# Patient Record
Sex: FEMALE | Race: OTHER | HISPANIC OR LATINO | URBAN - METROPOLITAN AREA
[De-identification: names, ages, dates, MRNs, and addresses within clinical notes are randomized per-mention and may not be internally consistent; named-entity substitution may affect disease eponyms.]

---

## 2021-10-25 ENCOUNTER — EMERGENCY (EMERGENCY)
Facility: HOSPITAL | Age: 56
LOS: 0 days | Discharge: HOME | End: 2021-10-25
Attending: EMERGENCY MEDICINE | Admitting: EMERGENCY MEDICINE
Payer: MEDICAID

## 2021-10-25 VITALS
TEMPERATURE: 99 F | SYSTOLIC BLOOD PRESSURE: 146 MMHG | HEART RATE: 86 BPM | WEIGHT: 160.06 LBS | OXYGEN SATURATION: 100 % | RESPIRATION RATE: 20 BRPM | DIASTOLIC BLOOD PRESSURE: 66 MMHG

## 2021-10-25 DIAGNOSIS — L97.429 NON-PRESSURE CHRONIC ULCER OF LEFT HEEL AND MIDFOOT WITH UNSPECIFIED SEVERITY: ICD-10-CM

## 2021-10-25 DIAGNOSIS — I12.9 HYPERTENSIVE CHRONIC KIDNEY DISEASE WITH STAGE 1 THROUGH STAGE 4 CHRONIC KIDNEY DISEASE, OR UNSPECIFIED CHRONIC KIDNEY DISEASE: ICD-10-CM

## 2021-10-25 DIAGNOSIS — E11.22 TYPE 2 DIABETES MELLITUS WITH DIABETIC CHRONIC KIDNEY DISEASE: ICD-10-CM

## 2021-10-25 DIAGNOSIS — M79.662 PAIN IN LEFT LOWER LEG: ICD-10-CM

## 2021-10-25 DIAGNOSIS — I77.1 STRICTURE OF ARTERY: ICD-10-CM

## 2021-10-25 DIAGNOSIS — F17.200 NICOTINE DEPENDENCE, UNSPECIFIED, UNCOMPLICATED: ICD-10-CM

## 2021-10-25 DIAGNOSIS — N18.9 CHRONIC KIDNEY DISEASE, UNSPECIFIED: ICD-10-CM

## 2021-10-25 LAB
ANION GAP SERPL CALC-SCNC: 14 MMOL/L — SIGNIFICANT CHANGE UP (ref 7–14)
APTT BLD: 33.4 SEC — SIGNIFICANT CHANGE UP (ref 27–39.2)
BASOPHILS # BLD AUTO: 0.03 K/UL — SIGNIFICANT CHANGE UP (ref 0–0.2)
BASOPHILS NFR BLD AUTO: 0.3 % — SIGNIFICANT CHANGE UP (ref 0–1)
BUN SERPL-MCNC: 45 MG/DL — HIGH (ref 10–20)
CALCIUM SERPL-MCNC: 8.5 MG/DL — SIGNIFICANT CHANGE UP (ref 8.5–10.1)
CHLORIDE SERPL-SCNC: 110 MMOL/L — SIGNIFICANT CHANGE UP (ref 98–110)
CO2 SERPL-SCNC: 17 MMOL/L — SIGNIFICANT CHANGE UP (ref 17–32)
CREAT SERPL-MCNC: 2.4 MG/DL — HIGH (ref 0.7–1.5)
EOSINOPHIL # BLD AUTO: 0.59 K/UL — SIGNIFICANT CHANGE UP (ref 0–0.7)
EOSINOPHIL NFR BLD AUTO: 6.4 % — SIGNIFICANT CHANGE UP (ref 0–8)
GLUCOSE SERPL-MCNC: 140 MG/DL — HIGH (ref 70–99)
HCT VFR BLD CALC: 29.8 % — LOW (ref 37–47)
HGB BLD-MCNC: 9.4 G/DL — LOW (ref 12–16)
IMM GRANULOCYTES NFR BLD AUTO: 0.4 % — HIGH (ref 0.1–0.3)
INR BLD: 0.96 RATIO — SIGNIFICANT CHANGE UP (ref 0.65–1.3)
LYMPHOCYTES # BLD AUTO: 2.22 K/UL — SIGNIFICANT CHANGE UP (ref 1.2–3.4)
LYMPHOCYTES # BLD AUTO: 24.2 % — SIGNIFICANT CHANGE UP (ref 20.5–51.1)
MCHC RBC-ENTMCNC: 27.6 PG — SIGNIFICANT CHANGE UP (ref 27–31)
MCHC RBC-ENTMCNC: 31.5 G/DL — LOW (ref 32–37)
MCV RBC AUTO: 87.4 FL — SIGNIFICANT CHANGE UP (ref 81–99)
MONOCYTES # BLD AUTO: 0.6 K/UL — SIGNIFICANT CHANGE UP (ref 0.1–0.6)
MONOCYTES NFR BLD AUTO: 6.5 % — SIGNIFICANT CHANGE UP (ref 1.7–9.3)
NEUTROPHILS # BLD AUTO: 5.69 K/UL — SIGNIFICANT CHANGE UP (ref 1.4–6.5)
NEUTROPHILS NFR BLD AUTO: 62.2 % — SIGNIFICANT CHANGE UP (ref 42.2–75.2)
NRBC # BLD: 0 /100 WBCS — SIGNIFICANT CHANGE UP (ref 0–0)
PLATELET # BLD AUTO: 239 K/UL — SIGNIFICANT CHANGE UP (ref 130–400)
POTASSIUM SERPL-MCNC: 5 MMOL/L — SIGNIFICANT CHANGE UP (ref 3.5–5)
POTASSIUM SERPL-SCNC: 5 MMOL/L — SIGNIFICANT CHANGE UP (ref 3.5–5)
PROTHROM AB SERPL-ACNC: 11 SEC — SIGNIFICANT CHANGE UP (ref 9.95–12.87)
RBC # BLD: 3.41 M/UL — LOW (ref 4.2–5.4)
RBC # FLD: 13.6 % — SIGNIFICANT CHANGE UP (ref 11.5–14.5)
SODIUM SERPL-SCNC: 141 MMOL/L — SIGNIFICANT CHANGE UP (ref 135–146)
WBC # BLD: 9.17 K/UL — SIGNIFICANT CHANGE UP (ref 4.8–10.8)
WBC # FLD AUTO: 9.17 K/UL — SIGNIFICANT CHANGE UP (ref 4.8–10.8)

## 2021-10-25 PROCEDURE — 93970 EXTREMITY STUDY: CPT | Mod: 26

## 2021-10-25 PROCEDURE — 93926 LOWER EXTREMITY STUDY: CPT | Mod: 26,LT

## 2021-10-25 PROCEDURE — 99285 EMERGENCY DEPT VISIT HI MDM: CPT

## 2021-10-25 NOTE — CONSULT NOTE ADULT - ASSESSMENT
No acute surgical intervention at this time , patient has a chronic heel ulcer , occlusions are chronic  Patient seen and examined by Dr. Sullivan,- patient leg is warm 5/5 motor and sensation (non urgent)   Will schedule patient as outpt lle angiogram possible endovascular revascularization- Nephew was informed to call Dr. Sullivan office on Wednesday speak to Farrah and schedule procedure at that time

## 2021-10-25 NOTE — ED ADULT TRIAGE NOTE - CHIEF COMPLAINT QUOTE
patient sent in by md for decreased pulses to left leg - patient complains of pain to left side- patient needs

## 2021-10-25 NOTE — ED PROVIDER NOTE - NSFOLLOWUPINSTRUCTIONS_ED_ALL_ED_FT
WE FOUND ARTERIAL OCCLUSION (BLOCKED/NO BLOOD FLOW) IN YOUR LEFT LEG. YOU WERE SEEN BY THE VASCULAR SURGERY SPECIALIST AND YOU MUST CALL THEIR OFFICE FOR A PROCEDURE TO BE SCHEDULED  RETURN TO THE ER WITH ANY NEW OR WORSENING SYMPTOMS

## 2021-10-25 NOTE — ED PROVIDER NOTE - CLINICAL SUMMARY MEDICAL DECISION MAKING FREE TEXT BOX
57 yo female PMH PVD, smoker, intellectual disability and hearing/speech impairment  sent by her doctor for evaluation of rt leg pain and decreased pulses .  According to patient (  was used to obtain history, but the  had some difficulties communicating with the patient) symptoms are present for over a month, she is her today only because her doctor sent her in.  Denies fever, chills, CP, SOB, abdominal or back pain, no other acute complaints.  Well-appearing, well-nourished female in NAD, PERRL, pink conjunctivae, mmm, nml work of breathing, lungs CTA b/l, RRR, abdomen soft, NT/ND, no CVA or midline spine ttp, + large ulcer of the rt heel without odor or purulent drainage, no surrounding cellulitis, s/p rt 4th toe amputation, foot is warm to the touch without erythema, difficult to palpate pulses, both feet are same color and temp, no calf ttp, no leg edema.  Will get labs, vascular studies /consult and reassess. 57 yo female PMH DM, CKD, PVD, smoker, intellectual disability and hearing/speech impairment  sent by her doctor for evaluation of rt leg pain and decreased pulses .  According to patient (  was used to obtain history, but the  had some difficulties communicating with the patient) symptoms are present for over a month, she is her today only because her doctor sent her in.  Denies fever, chills, CP, SOB, abdominal or back pain, no other acute complaints.  Well-appearing, well-nourished female in NAD, PERRL, pink conjunctivae, mmm, nml work of breathing, lungs CTA b/l, RRR, abdomen soft, NT/ND, no CVA or midline spine ttp, + large ulcer of the rt heel without odor or purulent drainage, no surrounding cellulitis, s/p rt 4th toe amputation, foot is warm to the touch without erythema, difficult to palpate pulses, both feet are same color and temp, no calf ttp, no leg edema.  Will get labs, vascular studies /consult and reassess.

## 2021-10-25 NOTE — ED ADULT NURSE NOTE - NSIMPLEMENTINTERV_GEN_ALL_ED
Implemented All Universal Safety Interventions:  [place] to call system. Call bell, personal items and telephone within reach. Instruct patient to call for assistance. Room bathroom lighting operational. Non-slip footwear when patient is off stretcher. Physically safe environment: no spills, clutter or unnecessary equipment. Stretcher in lowest position, wheels locked, appropriate side rails in place.

## 2021-10-25 NOTE — ED PROVIDER NOTE - OBJECTIVE STATEMENT
pt with pmhx HTN, DM, CKD, PAD/PVD with left toe amputation, active smoker, CVA presents to ED for further eval of left leg pulseless and heel ulcer for weeks. Denies fever/chill/HA/dizziness/chest pain/palpitation/sob/abd pain/n/v/d/ black stool/bloody stool/urinary sxs

## 2021-10-25 NOTE — ED PROVIDER NOTE - CARE PROVIDER_API CALL
Augustin Sullivan)  Surgery; Vascular Surgery  [address] [place] [address]  Phone: [phone]  Fax: [phone]  Follow Up Time:

## 2021-10-25 NOTE — ED PROVIDER NOTE - PATIENT PORTAL LINK FT
You can access the FollowMyHealth Patient Portal offered by [facility] by registering at the following website: [email or web address]. By joining [employer]’s FollowMyHealth portal, you will also be able to view your health information using other applications (apps) compatible with our system.

## 2021-10-25 NOTE — ED ADULT NURSE NOTE - OBJECTIVE STATEMENT
Pt presents to the Ed for decreases pulses in the lower leg bilaterally. Pt complains of pain to the left leg x3 days.     Pt is alert and oriented x3. No acute s/s of respiratory distress. Pt is ambulatory with walker. Skin is intact.

## 2021-10-25 NOTE — ED PROVIDER NOTE - PHYSICAL EXAMINATION
CONSTITUTIONAL: Well-appearing; well-nourished; in no apparent distress.   MS: pulseless, dusky, but warm LLE; Normal ROM in all four extremities; non-tender to palpation; distal pulses are normal RLE  SKIN: medial heel ulcerated likely chronic 2/2 PAD; Normal for age and race; warm; dry; good turgor; no apparent lesions or exudate.   NEURO/PSYCH: A & O x 4; grossly unremarkable. mood and manner are appropriate.

## 2021-10-25 NOTE — ED PROVIDER NOTE - PROGRESS NOTE DETAILS
per vasc, chronic and no need for acute intervention/admission; will f/u outpt for angio Patient cleared by vascular surgery for outpatient management of chronic occlusions.  Patient to be discharged from ED. Any available test results were discussed with patient and/or family. Verbal instructions given, including instructions to return to ED immediately for any new, worsening, or concerning symptoms. Patient endorsed understanding. Written discharge instructions additionally given, including follow-up plan.  Patient was given opportunity to ask questions.

## 2021-10-25 NOTE — ED PROVIDER NOTE - ATTENDING CONTRIBUTION TO CARE
57 yo female PMH PVD, smoker, intellectual disability and hearing/speech impairment  sent by her doctor for evaluation of rt leg pain and decreased pulses .  According to patient (  was used to obtain history, but the  had some difficulties communicating with the patient) symptoms are present for over a month, she is her today only because her doctor sent her in.  Denies fever, chills, CP, SOB, abdominal or back pain, no other acute complaints.  Well-appearing, well-nourished female in NAD, PERRL, pink conjunctivae, mmm, nml work of breathing, lungs CTA b/l, RRR, abdomen soft, NT/ND, no CVA or midline spine ttp, + large ulcer of the rt heel without odor or purulent drainage, no surrounding cellulitis, s/p rt 4th toe amputation, foot is warm to the touch without erythema, difficult to palpate pulses, both feet are same color and temp, no calf ttp, no leg edema.  Will get labs, vascular studies /consult and reassess. 55 yo female PMH DM, CKD, PVD, smoker, intellectual disability and hearing/speech impairment  sent by her doctor for evaluation of rt leg pain and decreased pulses .  According to patient (  was used to obtain history, but the  had some difficulties communicating with the patient) symptoms are present for over a month, she is her today only because her doctor sent her in.  Denies fever, chills, CP, SOB, abdominal or back pain, no other acute complaints.  Well-appearing, well-nourished female in NAD, PERRL, pink conjunctivae, mmm, nml work of breathing, lungs CTA b/l, RRR, abdomen soft, NT/ND, no CVA or midline spine ttp, + large ulcer of the rt heel without odor or purulent drainage, no surrounding cellulitis, s/p rt 4th toe amputation, foot is warm to the touch without erythema, difficult to palpate pulses, both feet are same color and temp, no calf ttp, no leg edema.  Will get labs, vascular studies /consult and reassess.

## 2021-10-25 NOTE — CONSULT NOTE ADULT - SUBJECTIVE AND OBJECTIVE BOX
55 yo female PMH PVD, smoker, intellectual disability and hearing/speech impairment  sent by her doctor for evaluation of rt leg pain and decreased pulses .  According to patient (  was used to obtain history, but the  had some difficulties communicating with the patient) symptoms are present for over a month, she is her today only because her doctor sent her in.  Denies fever, chills, CP, SOB, abdominal or back pain, no other acute complaints.    Past Medical History/ Surgical History:      Arterial occlusion    DM     PVD       Allergies:No Known Allergies    Medications:    Physical Exam:  Vitals:T(C): 37.1 (10-25-21 @ 16:40), Max: 37.1 (10-25-21 @ 16:40)  HR: 86 (10-25-21 @ 16:40) (86 - 86)  BP: 146/66 (10-25-21 @ 16:40) (146/66 - 146/66)  RR: 20 (10-25-21 @ 16:40) (20 - 20)  SpO2: 100% (10-25-21 @ 16:40) (100% - 100%)    General: Alert and oriented times 3 , Not in acute distress   Heart: Regular rate and rhythm , no rubs murmurs or gallops  Lungs: Clear to auscultation , no wheezes , rales rhonci or adventicious breath sounds  Abdomen: Soft , positive bowel sounds, non tender, non distended, no peritoneal signs  Extemities: , warm, capillary refill, no swelling , no edema, good motor and sensation 5/5 , absent pulses below theknee                 9.4    9.17  )-----------( 239      ( 10-25 @ 18:26 )             29.8                    141   |  110   |  45                 Ca: 8.5    BMP:   ----------------------------< 140    Mg: x     (10-25-21 @ 18:26)             5.0    |  17    | 2.4                Ph: x                PT/INR - ( 25 Oct 2021 18:26 )   PT: 11.00 sec;   INR: 0.96 ratio         PTT - ( 25 Oct 2021 18:26 )  PTT:33.4 sec                         57 yo female PMH PVD, smoker, intellectual disability and hearing/speech impairment  sent by her doctor for evaluation of rt leg pain and decreased pulses .  According to patient (  was used to obtain history, but the  had some difficulties communicating with the patient) symptoms are present for over a month, she is her today only because her doctor sent her in.  Denies fever, chills, CP, SOB, abdominal or back pain, no other acute complaints.    Past Medical History/ Surgical History:      Arterial occlusion    DM     PVD       Allergies:No Known Allergies    Medications:    Physical Exam:  Vitals:T(C): 37.1 (10-25-21 @ 16:40), Max: 37.1 (10-25-21 @ 16:40)  HR: 86 (10-25-21 @ 16:40) (86 - 86)  BP: 146/66 (10-25-21 @ 16:40) (146/66 - 146/66)  RR: 20 (10-25-21 @ 16:40) (20 - 20)  SpO2: 100% (10-25-21 @ 16:40) (100% - 100%)    General: Alert and oriented times 3 , Not in acute distress   Heart: Regular rate and rhythm , no rubs murmurs or gallops  Lungs: Clear to auscultation , no wheezes , rales rhonci or adventicious breath sounds  Abdomen: Soft , positive bowel sounds, non tender, non distended, no peritoneal signs  Extemities: , warm, capillary refill, no swelling , no edema, good motor and sensation 5/5 , absent pulses below the knee, chronic heel ulcer lle                  9.4    9.17  )-----------( 239      ( 10-25 @ 18:26 )             29.8                    141   |  110   |  45                 Ca: 8.5    BMP:   ----------------------------< 140    Mg: x     (10-25-21 @ 18:26)             5.0    |  17    | 2.4                Ph: x                PT/INR - ( 25 Oct 2021 18:26 )   PT: 11.00 sec;   INR: 0.96 ratio         PTT - ( 25 Oct 2021 18:26 )  PTT:33.4 sec

## 2021-10-26 PROCEDURE — 99222 1ST HOSP IP/OBS MODERATE 55: CPT

## 2021-10-27 ENCOUNTER — APPOINTMENT (OUTPATIENT)
Dept: VASCULAR SURGERY | Facility: CLINIC | Age: 56
End: 2021-10-27
Payer: MEDICAID

## 2021-10-27 VITALS — HEIGHT: 60 IN | BODY MASS INDEX: 31.41 KG/M2 | WEIGHT: 160 LBS

## 2021-10-27 PROCEDURE — 99214 OFFICE O/P EST MOD 30 MIN: CPT

## 2021-10-27 RX ORDER — CALCITRIOL 0.25 UG/1
0.25 CAPSULE, LIQUID FILLED ORAL
Refills: 0 | Status: ACTIVE | COMMUNITY

## 2021-10-27 RX ORDER — ASPIRIN 81 MG
81 TABLET, DELAYED RELEASE (ENTERIC COATED) ORAL
Refills: 0 | Status: ACTIVE | COMMUNITY

## 2021-10-27 RX ORDER — FAMOTIDINE 20 MG/1
20 TABLET, FILM COATED ORAL
Refills: 0 | Status: DISCONTINUED | COMMUNITY
End: 2021-10-27

## 2021-10-27 RX ORDER — NIFEDIPINE 60 MG
60 TABLET, EXTENDED RELEASE ORAL
Refills: 0 | Status: DISCONTINUED | COMMUNITY
End: 2021-10-27

## 2021-11-01 ENCOUNTER — INPATIENT (INPATIENT)
Facility: HOSPITAL | Age: 56
LOS: 17 days | Discharge: HOME HEALTH | End: 2021-11-19
Attending: SURGERY | Admitting: SURGERY
Payer: MEDICAID

## 2021-11-01 VITALS
TEMPERATURE: 99 F | SYSTOLIC BLOOD PRESSURE: 131 MMHG | RESPIRATION RATE: 16 BRPM | DIASTOLIC BLOOD PRESSURE: 59 MMHG | OXYGEN SATURATION: 99 % | HEART RATE: 79 BPM | WEIGHT: 158.73 LBS

## 2021-11-01 LAB
ALBUMIN SERPL ELPH-MCNC: 4 G/DL — SIGNIFICANT CHANGE UP (ref 3.5–5.2)
ALP SERPL-CCNC: 236 U/L — HIGH (ref 30–115)
ALT FLD-CCNC: 18 U/L — SIGNIFICANT CHANGE UP (ref 0–41)
ANION GAP SERPL CALC-SCNC: 14 MMOL/L — SIGNIFICANT CHANGE UP (ref 7–14)
APTT BLD: 36.6 SEC — SIGNIFICANT CHANGE UP (ref 27–39.2)
AST SERPL-CCNC: 18 U/L — SIGNIFICANT CHANGE UP (ref 0–41)
BASOPHILS # BLD AUTO: 0.05 K/UL — SIGNIFICANT CHANGE UP (ref 0–0.2)
BASOPHILS NFR BLD AUTO: 0.6 % — SIGNIFICANT CHANGE UP (ref 0–1)
BILIRUB SERPL-MCNC: <0.2 MG/DL — SIGNIFICANT CHANGE UP (ref 0.2–1.2)
BLD GP AB SCN SERPL QL: SIGNIFICANT CHANGE UP
BUN SERPL-MCNC: 56 MG/DL — HIGH (ref 10–20)
CALCIUM SERPL-MCNC: 8.7 MG/DL — SIGNIFICANT CHANGE UP (ref 8.5–10.1)
CHLORIDE SERPL-SCNC: 108 MMOL/L — SIGNIFICANT CHANGE UP (ref 98–110)
CO2 SERPL-SCNC: 18 MMOL/L — SIGNIFICANT CHANGE UP (ref 17–32)
CREAT SERPL-MCNC: 2.5 MG/DL — HIGH (ref 0.7–1.5)
EOSINOPHIL # BLD AUTO: 0.52 K/UL — SIGNIFICANT CHANGE UP (ref 0–0.7)
EOSINOPHIL NFR BLD AUTO: 6.6 % — SIGNIFICANT CHANGE UP (ref 0–8)
GLUCOSE BLDC GLUCOMTR-MCNC: 204 MG/DL — HIGH (ref 70–99)
GLUCOSE BLDC GLUCOMTR-MCNC: 287 MG/DL — HIGH (ref 70–99)
GLUCOSE SERPL-MCNC: 144 MG/DL — HIGH (ref 70–99)
HCT VFR BLD CALC: 31.1 % — LOW (ref 37–47)
HGB BLD-MCNC: 10 G/DL — LOW (ref 12–16)
IMM GRANULOCYTES NFR BLD AUTO: 0.3 % — SIGNIFICANT CHANGE UP (ref 0.1–0.3)
INR BLD: 0.93 RATIO — SIGNIFICANT CHANGE UP (ref 0.65–1.3)
LYMPHOCYTES # BLD AUTO: 2.06 K/UL — SIGNIFICANT CHANGE UP (ref 1.2–3.4)
LYMPHOCYTES # BLD AUTO: 26.3 % — SIGNIFICANT CHANGE UP (ref 20.5–51.1)
MCHC RBC-ENTMCNC: 28.5 PG — SIGNIFICANT CHANGE UP (ref 27–31)
MCHC RBC-ENTMCNC: 32.2 G/DL — SIGNIFICANT CHANGE UP (ref 32–37)
MCV RBC AUTO: 88.6 FL — SIGNIFICANT CHANGE UP (ref 81–99)
MONOCYTES # BLD AUTO: 0.51 K/UL — SIGNIFICANT CHANGE UP (ref 0.1–0.6)
MONOCYTES NFR BLD AUTO: 6.5 % — SIGNIFICANT CHANGE UP (ref 1.7–9.3)
NEUTROPHILS # BLD AUTO: 4.66 K/UL — SIGNIFICANT CHANGE UP (ref 1.4–6.5)
NEUTROPHILS NFR BLD AUTO: 59.7 % — SIGNIFICANT CHANGE UP (ref 42.2–75.2)
NRBC # BLD: 0 /100 WBCS — SIGNIFICANT CHANGE UP (ref 0–0)
PLATELET # BLD AUTO: 276 K/UL — SIGNIFICANT CHANGE UP (ref 130–400)
POTASSIUM SERPL-MCNC: 5 MMOL/L — SIGNIFICANT CHANGE UP (ref 3.5–5)
POTASSIUM SERPL-SCNC: 5 MMOL/L — SIGNIFICANT CHANGE UP (ref 3.5–5)
PROT SERPL-MCNC: 7.2 G/DL — SIGNIFICANT CHANGE UP (ref 6–8)
PROTHROM AB SERPL-ACNC: 10.7 SEC — SIGNIFICANT CHANGE UP (ref 9.95–12.87)
RBC # BLD: 3.51 M/UL — LOW (ref 4.2–5.4)
RBC # FLD: 13.2 % — SIGNIFICANT CHANGE UP (ref 11.5–14.5)
SARS-COV-2 RNA SPEC QL NAA+PROBE: SIGNIFICANT CHANGE UP
SODIUM SERPL-SCNC: 140 MMOL/L — SIGNIFICANT CHANGE UP (ref 135–146)
WBC # BLD: 7.82 K/UL — SIGNIFICANT CHANGE UP (ref 4.8–10.8)
WBC # FLD AUTO: 7.82 K/UL — SIGNIFICANT CHANGE UP (ref 4.8–10.8)

## 2021-11-01 PROCEDURE — 76937 US GUIDE VASCULAR ACCESS: CPT | Mod: 26

## 2021-11-01 PROCEDURE — 99285 EMERGENCY DEPT VISIT HI MDM: CPT | Mod: 25

## 2021-11-01 PROCEDURE — 36000 PLACE NEEDLE IN VEIN: CPT

## 2021-11-01 PROCEDURE — 93010 ELECTROCARDIOGRAM REPORT: CPT

## 2021-11-01 PROCEDURE — 71045 X-RAY EXAM CHEST 1 VIEW: CPT | Mod: 26

## 2021-11-01 PROCEDURE — 99223 1ST HOSP IP/OBS HIGH 75: CPT | Mod: GC

## 2021-11-01 PROCEDURE — 99222 1ST HOSP IP/OBS MODERATE 55: CPT

## 2021-11-01 RX ORDER — METOPROLOL TARTRATE 50 MG
25 TABLET ORAL DAILY
Refills: 0 | Status: DISCONTINUED | OUTPATIENT
Start: 2021-11-01 | End: 2021-11-03

## 2021-11-01 RX ORDER — GABAPENTIN 400 MG/1
300 CAPSULE ORAL THREE TIMES A DAY
Refills: 0 | Status: DISCONTINUED | OUTPATIENT
Start: 2021-11-01 | End: 2021-11-03

## 2021-11-01 RX ORDER — PANTOPRAZOLE SODIUM 20 MG/1
40 TABLET, DELAYED RELEASE ORAL
Refills: 0 | Status: DISCONTINUED | OUTPATIENT
Start: 2021-11-01 | End: 2021-11-03

## 2021-11-01 RX ORDER — CALCITRIOL 0.5 UG/1
0.25 CAPSULE ORAL DAILY
Refills: 0 | Status: DISCONTINUED | OUTPATIENT
Start: 2021-11-01 | End: 2021-11-03

## 2021-11-01 RX ORDER — CLOPIDOGREL BISULFATE 75 MG/1
75 TABLET, FILM COATED ORAL DAILY
Refills: 0 | Status: DISCONTINUED | OUTPATIENT
Start: 2021-11-01 | End: 2021-11-03

## 2021-11-01 RX ORDER — LOSARTAN POTASSIUM 100 MG/1
50 TABLET, FILM COATED ORAL DAILY
Refills: 0 | Status: DISCONTINUED | OUTPATIENT
Start: 2021-11-01 | End: 2021-11-03

## 2021-11-01 RX ORDER — ACETAMINOPHEN 500 MG
650 TABLET ORAL EVERY 6 HOURS
Refills: 0 | Status: DISCONTINUED | OUTPATIENT
Start: 2021-11-01 | End: 2021-11-03

## 2021-11-01 RX ORDER — ATORVASTATIN CALCIUM 80 MG/1
80 TABLET, FILM COATED ORAL AT BEDTIME
Refills: 0 | Status: DISCONTINUED | OUTPATIENT
Start: 2021-11-01 | End: 2021-11-03

## 2021-11-01 RX ORDER — ASPIRIN/CALCIUM CARB/MAGNESIUM 324 MG
81 TABLET ORAL DAILY
Refills: 0 | Status: DISCONTINUED | OUTPATIENT
Start: 2021-11-01 | End: 2021-11-03

## 2021-11-01 RX ORDER — HEPARIN SODIUM 5000 [USP'U]/ML
5000 INJECTION INTRAVENOUS; SUBCUTANEOUS EVERY 8 HOURS
Refills: 0 | Status: DISCONTINUED | OUTPATIENT
Start: 2021-11-01 | End: 2021-11-03

## 2021-11-01 RX ADMIN — GABAPENTIN 300 MILLIGRAM(S): 400 CAPSULE ORAL at 21:39

## 2021-11-01 RX ADMIN — ATORVASTATIN CALCIUM 80 MILLIGRAM(S): 80 TABLET, FILM COATED ORAL at 21:39

## 2021-11-01 RX ADMIN — HEPARIN SODIUM 5000 UNIT(S): 5000 INJECTION INTRAVENOUS; SUBCUTANEOUS at 21:40

## 2021-11-01 RX ADMIN — Medication 650 MILLIGRAM(S): at 18:08

## 2021-11-01 RX ADMIN — Medication 650 MILLIGRAM(S): at 18:41

## 2021-11-01 RX ADMIN — Medication 650 MILLIGRAM(S): at 23:18

## 2021-11-01 NOTE — H&P ADULT - ASSESSMENT
ASSESSMENT:   57 yo female PMH PVD, smoker, HTN, HLD, h/o right pontine CVA (2/7/21), intellectual disability, hearing/speech impairment presents with chronic left heel ulcer and pain. Recent duplex on 10/25/21 showed no significant arterial blood flow visualized beyond the left superficial femoral artery.    PLAN:   - Admit to vascular surgery   - Booked for Left lower extremity angiogram; possible endovascular revascularization on Wednesday 11/3/21  - PreOp pt tomorrow   - Nephrology consulted for CKD3 management  - Podiatry following   - Plan discussed with Attending, Dr. Fink      VASCULAR TEAM SPECTRA: [id number]

## 2021-11-01 NOTE — H&P ADULT - NSICDXPASTMEDICALHX_GEN_ALL_CORE_FT
PAST MEDICAL HISTORY:  Benign essential HTN     Hearing impaired     HLD (hyperlipidemia)     Intellectual disability     PVD (peripheral vascular disease)

## 2021-11-01 NOTE — ED PROVIDER NOTE - PHYSICAL EXAMINATION
CONSTITUTIONAL: NAD  SKIN: warm, dry  HEAD: Normocephalic; atraumatic.  EYES: PERRL, EOMI, no conjunctival erythema  ENT: No nasal discharge; airway clear.  NECK: Supple; non tender.  CARD: S1, S2 normal; no murmurs, gallops, or rubs.   RESP: No wheezes, rales or rhonchi.  ABD: soft ntnd  EXT: Left leg ttp. Pulse diminished. Gangrenous ulcer on heel.   LYMPH: No acute cervical adenopathy.  NEURO: Alert, oriented, grossly unremarkable  PSYCH: Cooperative, appropriate.

## 2021-11-01 NOTE — ED PROVIDER NOTE - ATTENDING CONTRIBUTION TO CARE
55 yo female PMH PVD, smoker, HTN, HLD, h/o right pontine CVA (2/7/21), intellectual disability, hearing/speech impairment presents with chronic left heel ulcer and pain. Pt's son was at bedside to help translate. Pt states the pain is in the entire left leg and can travel up to her back. Pt recently visited the ED pm 10/25/21 for left lower extremity chronic arterial occlusion. Duplex at that time showed No significant arterial blood flow visualized beyond the left superficial femoral artery. Pt was sent to see Dr. Sullivan as an outpatient; at that appointment the pt was told to return to the hospital for an angiogram. Denies fever, chills, CP, SOB, abdominal, HA.   PE:  agree with above.  A/P:  Severe PAD, ADMIT VASCULAR

## 2021-11-01 NOTE — ED PROVIDER NOTE - OBJECTIVE STATEMENT
56 y f, pmh of cva w residual slurred speech, dm, htn, pad, sent by Dr. Sullivan for admission for angiogram. Pt  endorses pain in her left leg, worse in the calf, nonradiating, constant, no all factors, worse w walking, 10/10, associated with ulcer on the foot. Denies f/c, cp, sob, n/v/d, abd pain dysuria

## 2021-11-01 NOTE — H&P ADULT - NSHPLABSRESULTS_GEN_ALL_CORE
LAB/STUDIES:                        10.0   7.82  )-----------( 276      ( 01 Nov 2021 14:30 )             31.1     11-01    140  |  108  |  56<H>  ----------------------------<  144<H>  5.0   |  18  |  2.5<H>    Ca    8.7      01 Nov 2021 14:30    TPro  7.2  /  Alb  4.0  /  TBili  <0.2  /  DBili  x   /  AST  18  /  ALT  18  /  AlkPhos  236<H>  11-01    PT/INR - ( 01 Nov 2021 14:30 )   PT: 10.70 sec;   INR: 0.93 ratio    PTT - ( 01 Nov 2021 14:30 )  PTT:36.6 sec  LIVER FUNCTIONS - ( 01 Nov 2021 14:30 )  Alb: 4.0 g/dL / Pro: 7.2 g/dL / ALK PHOS: 236 U/L / ALT: 18 U/L / AST: 18 U/L / GGT: x           IMAGING:

## 2021-11-01 NOTE — CONSULT NOTE ADULT - SUBJECTIVE AND OBJECTIVE BOX
Podiatry Consult Note    Subjective:  SEN LING is a 56y old  Female who presents with a chief complaint of left heel wound;  HPI:  Pt follows up w/ Dr. Sullivan; Per pt, pt is in ED due to planned LLE angio on Wed 11/3 by Dr. Sullivan; Podiatry was consulted for left heel wound; eval L foot;     Past Medical History and Surgical History  PAST MEDICAL & SURGICAL HISTORY:    Objective:  Vital Signs Last 24 Hrs  T(C): 37 (01 Nov 2021 10:45), Max: 37 (01 Nov 2021 10:45)  T(F): 98.6 (01 Nov 2021 10:45), Max: 98.6 (01 Nov 2021 10:45)  HR: 79 (01 Nov 2021 10:45) (79 - 79)  BP: 131/59 (01 Nov 2021 10:45) (131/59 - 131/59)  BP(mean): --  RR: 16 (01 Nov 2021 10:45) (16 - 16)  SpO2: 99% (01 Nov 2021 10:45) (99% - 99%)                Physical Exam - Lower Extremity Focused:   Derm:   L heel wounds; two  circular wounds, about 2cm x 2cm each; necrotic soft tissue wound base without active bleeding or drainage; no malodor noted;   Vascular: DP and PT Pulses Diminished;   Neuro: Protective Sensation Diminished;    Assessment:  Left heel wounds;     Plan:  Chart reviewed and Patient evaluated. All Questions and Concerns Addressed and Answered  Discussed diagnosis and treatment with patient  Wound Flushed w/ normal saline; Betadine / DSD / Kerlix; q24  Local Wound Care; As Stated Above   Request left foot xray; will f/u w/ result;  Request arterial duplex of left lower extremities;  Request vascular surgery consult; per patient, pt came into ED for Wed 11/3 Left lower extremities angio;   Possible sx intervention from podiatry post vascular intervention on Wed 11/3; will update exact sx procedure and schedule once finalized;   Request medical clearance;  Weight bearing status; WBAT BL feet;   Discussed plan w/ Dr. Llamas    Podiatry ;

## 2021-11-01 NOTE — H&P ADULT - NSHPPHYSICALEXAM_GEN_ALL_CORE
PHYSICAL EXAM  Vital Signs Last 24 Hrs  T(C): 35.7 (01 Nov 2021 15:33), Max: 37 (01 Nov 2021 10:45)  T(F): 96.2 (01 Nov 2021 15:33), Max: 98.6 (01 Nov 2021 10:45)  HR: 67 (01 Nov 2021 15:33) (67 - 79)  BP: 144/69 (01 Nov 2021 15:33) (131/59 - 144/69)  BP(mean): --  RR: 18 (01 Nov 2021 15:33) (16 - 18)  SpO2: 100% (01 Nov 2021 15:33) (99% - 100%)    Appearance: Normal	  HEENT: NCAT, EOMI	  Neck: Supple  Cardiovascular: Normal S1 S2,   Respiratory: Lungs clear to auscultation  Gastrointestinal:  Soft, Non-tender, nondistended   Extremities: LE are warm, LLE chronic heel ulcer; motor and sensation intact    PULSES:  Popliteal: +signals on doppler bilaterally   Dorsal Pedal: diminished signals on doppler bilaterally   Posterior Tibial: no signals appreciated on doppler bilaterally

## 2021-11-01 NOTE — H&P ADULT - NSHPREVIEWOFSYSTEMS_GEN_ALL_CORE
REVIEW OF SYSTEMS:  GENERAL:                                         see above  SKIN:                                                 see above  OPTHALMOLOGIC:                          negative  ENMT:                                               negative  RESPIRATORY AND THORAX:        negative  CARDIOVASCULAR:                         see above  GASTROINTESTINAL:                       negative  NEPHROLOGY:                                  negative  MUSCULOSKELETAL:                       see above  NEUROLOGIC:                                   see above  PSYCHIATRIC:                                    negative  HEMATOLOGY/LYMPHATICS:         negative  ENDOCRINE:                                     negative  ALLERGIC/IMMUNOLOGIC:            negative    12 point ROS otherwise normal except as stated in HPI

## 2021-11-02 ENCOUNTER — TRANSCRIPTION ENCOUNTER (OUTPATIENT)
Age: 56
End: 2021-11-02

## 2021-11-02 LAB
ANION GAP SERPL CALC-SCNC: 14 MMOL/L — SIGNIFICANT CHANGE UP (ref 7–14)
APPEARANCE UR: ABNORMAL
APTT BLD: 38.2 SEC — SIGNIFICANT CHANGE UP (ref 27–39.2)
BACTERIA # UR AUTO: ABNORMAL
BASOPHILS # BLD AUTO: 0.05 K/UL — SIGNIFICANT CHANGE UP (ref 0–0.2)
BASOPHILS NFR BLD AUTO: 0.7 % — SIGNIFICANT CHANGE UP (ref 0–1)
BILIRUB UR-MCNC: NEGATIVE — SIGNIFICANT CHANGE UP
BUN SERPL-MCNC: 56 MG/DL — HIGH (ref 10–20)
CALCIUM SERPL-MCNC: 8.5 MG/DL — SIGNIFICANT CHANGE UP (ref 8.5–10.1)
CHLORIDE SERPL-SCNC: 111 MMOL/L — HIGH (ref 98–110)
CO2 SERPL-SCNC: 15 MMOL/L — LOW (ref 17–32)
COLOR SPEC: YELLOW — SIGNIFICANT CHANGE UP
CREAT SERPL-MCNC: 2.9 MG/DL — HIGH (ref 0.7–1.5)
DIFF PNL FLD: SIGNIFICANT CHANGE UP
EOSINOPHIL # BLD AUTO: 0.48 K/UL — SIGNIFICANT CHANGE UP (ref 0–0.7)
EOSINOPHIL NFR BLD AUTO: 6.6 % — SIGNIFICANT CHANGE UP (ref 0–8)
EPI CELLS # UR: 1 /HPF — SIGNIFICANT CHANGE UP (ref 0–5)
GLUCOSE BLDC GLUCOMTR-MCNC: 142 MG/DL — HIGH (ref 70–99)
GLUCOSE BLDC GLUCOMTR-MCNC: 190 MG/DL — HIGH (ref 70–99)
GLUCOSE BLDC GLUCOMTR-MCNC: 191 MG/DL — HIGH (ref 70–99)
GLUCOSE BLDC GLUCOMTR-MCNC: 209 MG/DL — HIGH (ref 70–99)
GLUCOSE SERPL-MCNC: 208 MG/DL — HIGH (ref 70–99)
GLUCOSE UR QL: NEGATIVE — SIGNIFICANT CHANGE UP
HCT VFR BLD CALC: 31.7 % — LOW (ref 37–47)
HCV AB S/CO SERPL IA: 0.03 COI — SIGNIFICANT CHANGE UP
HCV AB SERPL-IMP: SIGNIFICANT CHANGE UP
HGB BLD-MCNC: 10.1 G/DL — LOW (ref 12–16)
HYALINE CASTS # UR AUTO: 1 /LPF — SIGNIFICANT CHANGE UP (ref 0–7)
IMM GRANULOCYTES NFR BLD AUTO: 0.3 % — SIGNIFICANT CHANGE UP (ref 0.1–0.3)
INR BLD: 0.97 RATIO — SIGNIFICANT CHANGE UP (ref 0.65–1.3)
KETONES UR-MCNC: NEGATIVE — SIGNIFICANT CHANGE UP
LEUKOCYTE ESTERASE UR-ACNC: ABNORMAL
LYMPHOCYTES # BLD AUTO: 2.04 K/UL — SIGNIFICANT CHANGE UP (ref 1.2–3.4)
LYMPHOCYTES # BLD AUTO: 28.1 % — SIGNIFICANT CHANGE UP (ref 20.5–51.1)
MAGNESIUM SERPL-MCNC: 1.9 MG/DL — SIGNIFICANT CHANGE UP (ref 1.8–2.4)
MCHC RBC-ENTMCNC: 28.7 PG — SIGNIFICANT CHANGE UP (ref 27–31)
MCHC RBC-ENTMCNC: 31.9 G/DL — LOW (ref 32–37)
MCV RBC AUTO: 90.1 FL — SIGNIFICANT CHANGE UP (ref 81–99)
MONOCYTES # BLD AUTO: 0.47 K/UL — SIGNIFICANT CHANGE UP (ref 0.1–0.6)
MONOCYTES NFR BLD AUTO: 6.5 % — SIGNIFICANT CHANGE UP (ref 1.7–9.3)
NEUTROPHILS # BLD AUTO: 4.2 K/UL — SIGNIFICANT CHANGE UP (ref 1.4–6.5)
NEUTROPHILS NFR BLD AUTO: 57.8 % — SIGNIFICANT CHANGE UP (ref 42.2–75.2)
NITRITE UR-MCNC: NEGATIVE — SIGNIFICANT CHANGE UP
NRBC # BLD: 0 /100 WBCS — SIGNIFICANT CHANGE UP (ref 0–0)
PH UR: 6 — SIGNIFICANT CHANGE UP (ref 5–8)
PHOSPHATE SERPL-MCNC: 5 MG/DL — HIGH (ref 2.1–4.9)
PLATELET # BLD AUTO: 266 K/UL — SIGNIFICANT CHANGE UP (ref 130–400)
POTASSIUM SERPL-MCNC: 6 MMOL/L — CRITICAL HIGH (ref 3.5–5)
POTASSIUM SERPL-SCNC: 6 MMOL/L — CRITICAL HIGH (ref 3.5–5)
PROT UR-MCNC: ABNORMAL
PROTHROM AB SERPL-ACNC: 11.2 SEC — SIGNIFICANT CHANGE UP (ref 9.95–12.87)
RBC # BLD: 3.52 M/UL — LOW (ref 4.2–5.4)
RBC # FLD: 13.2 % — SIGNIFICANT CHANGE UP (ref 11.5–14.5)
RBC CASTS # UR COMP ASSIST: 1 /HPF — SIGNIFICANT CHANGE UP (ref 0–4)
SODIUM SERPL-SCNC: 140 MMOL/L — SIGNIFICANT CHANGE UP (ref 135–146)
SP GR SPEC: 1.01 — SIGNIFICANT CHANGE UP (ref 1.01–1.03)
UROBILINOGEN FLD QL: SIGNIFICANT CHANGE UP
WBC # BLD: 7.26 K/UL — SIGNIFICANT CHANGE UP (ref 4.8–10.8)
WBC # FLD AUTO: 7.26 K/UL — SIGNIFICANT CHANGE UP (ref 4.8–10.8)
WBC UR QL: 122 /HPF — HIGH (ref 0–5)

## 2021-11-02 PROCEDURE — 73630 X-RAY EXAM OF FOOT: CPT | Mod: 26,LT

## 2021-11-02 PROCEDURE — 99232 SBSQ HOSP IP/OBS MODERATE 35: CPT

## 2021-11-02 PROCEDURE — 93010 ELECTROCARDIOGRAM REPORT: CPT

## 2021-11-02 RX ORDER — SODIUM CHLORIDE 9 MG/ML
1000 INJECTION, SOLUTION INTRAVENOUS
Refills: 0 | Status: DISCONTINUED | OUTPATIENT
Start: 2021-11-02 | End: 2021-11-03

## 2021-11-02 RX ORDER — GLUCAGON INJECTION, SOLUTION 0.5 MG/.1ML
1 INJECTION, SOLUTION SUBCUTANEOUS ONCE
Refills: 0 | Status: DISCONTINUED | OUTPATIENT
Start: 2021-11-02 | End: 2021-11-03

## 2021-11-02 RX ORDER — DEXTROSE 50 % IN WATER 50 %
25 SYRINGE (ML) INTRAVENOUS ONCE
Refills: 0 | Status: DISCONTINUED | OUTPATIENT
Start: 2021-11-02 | End: 2021-11-03

## 2021-11-02 RX ORDER — INSULIN LISPRO 100/ML
VIAL (ML) SUBCUTANEOUS
Refills: 0 | Status: DISCONTINUED | OUTPATIENT
Start: 2021-11-02 | End: 2021-11-03

## 2021-11-02 RX ORDER — DEXTROSE 50 % IN WATER 50 %
12.5 SYRINGE (ML) INTRAVENOUS ONCE
Refills: 0 | Status: DISCONTINUED | OUTPATIENT
Start: 2021-11-02 | End: 2021-11-03

## 2021-11-02 RX ORDER — CALCIUM GLUCONATE 100 MG/ML
1 VIAL (ML) INTRAVENOUS ONCE
Refills: 0 | Status: COMPLETED | OUTPATIENT
Start: 2021-11-02 | End: 2021-11-02

## 2021-11-02 RX ORDER — DEXTROSE 50 % IN WATER 50 %
15 SYRINGE (ML) INTRAVENOUS ONCE
Refills: 0 | Status: DISCONTINUED | OUTPATIENT
Start: 2021-11-02 | End: 2021-11-03

## 2021-11-02 RX ORDER — SODIUM CHLORIDE 9 MG/ML
1000 INJECTION INTRAMUSCULAR; INTRAVENOUS; SUBCUTANEOUS
Refills: 0 | Status: DISCONTINUED | OUTPATIENT
Start: 2021-11-02 | End: 2021-11-03

## 2021-11-02 RX ORDER — DEXTROSE 50 % IN WATER 50 %
50 SYRINGE (ML) INTRAVENOUS ONCE
Refills: 0 | Status: COMPLETED | OUTPATIENT
Start: 2021-11-02 | End: 2021-11-02

## 2021-11-02 RX ORDER — INSULIN HUMAN 100 [IU]/ML
10 INJECTION, SOLUTION SUBCUTANEOUS ONCE
Refills: 0 | Status: COMPLETED | OUTPATIENT
Start: 2021-11-02 | End: 2021-11-02

## 2021-11-02 RX ADMIN — Medication 650 MILLIGRAM(S): at 12:10

## 2021-11-02 RX ADMIN — LOSARTAN POTASSIUM 50 MILLIGRAM(S): 100 TABLET, FILM COATED ORAL at 05:26

## 2021-11-02 RX ADMIN — PANTOPRAZOLE SODIUM 40 MILLIGRAM(S): 20 TABLET, DELAYED RELEASE ORAL at 05:25

## 2021-11-02 RX ADMIN — Medication 81 MILLIGRAM(S): at 12:10

## 2021-11-02 RX ADMIN — INSULIN HUMAN 10 UNIT(S): 100 INJECTION, SOLUTION SUBCUTANEOUS at 21:01

## 2021-11-02 RX ADMIN — Medication 650 MILLIGRAM(S): at 17:07

## 2021-11-02 RX ADMIN — Medication 2: at 12:11

## 2021-11-02 RX ADMIN — ATORVASTATIN CALCIUM 80 MILLIGRAM(S): 80 TABLET, FILM COATED ORAL at 21:02

## 2021-11-02 RX ADMIN — GABAPENTIN 300 MILLIGRAM(S): 400 CAPSULE ORAL at 21:02

## 2021-11-02 RX ADMIN — Medication 25 MILLIGRAM(S): at 05:25

## 2021-11-02 RX ADMIN — HEPARIN SODIUM 5000 UNIT(S): 5000 INJECTION INTRAVENOUS; SUBCUTANEOUS at 14:19

## 2021-11-02 RX ADMIN — Medication 50 MILLILITER(S): at 21:01

## 2021-11-02 RX ADMIN — Medication 650 MILLIGRAM(S): at 00:00

## 2021-11-02 RX ADMIN — Medication 100 GRAM(S): at 22:25

## 2021-11-02 RX ADMIN — GABAPENTIN 300 MILLIGRAM(S): 400 CAPSULE ORAL at 05:25

## 2021-11-02 RX ADMIN — CLOPIDOGREL BISULFATE 75 MILLIGRAM(S): 75 TABLET, FILM COATED ORAL at 12:10

## 2021-11-02 RX ADMIN — Medication 650 MILLIGRAM(S): at 23:05

## 2021-11-02 RX ADMIN — Medication 650 MILLIGRAM(S): at 23:20

## 2021-11-02 RX ADMIN — HEPARIN SODIUM 5000 UNIT(S): 5000 INJECTION INTRAVENOUS; SUBCUTANEOUS at 05:26

## 2021-11-02 RX ADMIN — CALCITRIOL 0.25 MICROGRAM(S): 0.5 CAPSULE ORAL at 12:10

## 2021-11-02 RX ADMIN — Medication 2: at 08:18

## 2021-11-02 RX ADMIN — GABAPENTIN 300 MILLIGRAM(S): 400 CAPSULE ORAL at 14:19

## 2021-11-02 RX ADMIN — Medication 650 MILLIGRAM(S): at 05:25

## 2021-11-02 RX ADMIN — HEPARIN SODIUM 5000 UNIT(S): 5000 INJECTION INTRAVENOUS; SUBCUTANEOUS at 21:02

## 2021-11-02 RX ADMIN — Medication 4: at 17:07

## 2021-11-02 RX ADMIN — Medication 650 MILLIGRAM(S): at 05:51

## 2021-11-02 NOTE — PROGRESS NOTE ADULT - ASSESSMENT
ASSESSMENT:  57 yo female PMH PVD, smoker, HTN, HLD, h/o right pontine CVA (2/7/21), intellectual disability, hearing/speech impairment presents with chronic left heel ulcer and pain. Recent duplex on 10/25/21 showed no significant arterial blood flow visualized beyond the left superficial femoral artery.    PLAN:   - OR tomorrow (11/3/21) for Left lower extremity angiogram; possible endovascular revascularization  - Consent in chart  - PreOp labs today   - NPO after midnight   - F/U nephrology consult  - Pain control     VASCULAR TEAM SPECTRA: [id number] ASSESSMENT:  57 yo female PMH PVD, smoker, HTN, HLD, h/o right pontine CVA (2/7/21), intellectual disability, hearing/speech impairment presents with chronic left heel ulcer and pain. Recent duplex on 10/25/21 showed no significant arterial blood flow visualized beyond the left superficial femoral artery.    PLAN:   - OR tomorrow (11/3/21) for Left lower extremity angiogram; possible endovascular revascularization  - Consent in chart  - PreOp labs today   - NPO after midnight   - Appreciate nephrology recommendation   - Pain control     VASCULAR TEAM SPECTRA: [id number]

## 2021-11-02 NOTE — DISCHARGE NOTE NURSING/CASE MANAGEMENT/SOCIAL WORK - PATIENT PORTAL LINK FT
You can access the FollowMyHealth Patient Portal offered by [facility] by registering at the following website: [email or web address]. By joining [employer]’s FollowMyHealth portal, you will also be able to view your health information using other applications (apps) compatible with our system.

## 2021-11-02 NOTE — CONSULT NOTE ADULT - SUBJECTIVE AND OBJECTIVE BOX
NEPHROLOGY CONSULTATION NOTE    57 yo female PMH CKD3 PVD, smoker, HTN, HLD, h/o right pontine CVA (2/7/21), intellectual disability, hearing/speech impairment presents with chronic left heel ulcer and pain. Pt's son was at bedside to help translate. Pt states the pain is in the entire left leg and can travel up to her back. Pt recently visited the ED pm 10/25/21 for left lower extremity chronic arterial occlusion. Duplex at that time showed No significant arterial blood flow visualized beyond the left superficial femoral artery. Pt was sent to see Dr. Sullivan as an outpatient; at that appointment the pt was told to return to the hospital for an angiogram. Denies fever, chills, CP, SOB, abdominal, HA.     Has underlying CKD4 Cr 2.7 eGFR <20 at least since 2/21'     PAST MEDICAL & SURGICAL HISTORY:  PVD (peripheral vascular disease)    Benign essential HTN    Intellectual disability    Hearing impaired    HLD (hyperlipidemia)      Allergies:  No Known Allergies    Home Medications Reviewed  Hospital Medications:   MEDICATIONS  (STANDING):  acetaminophen     Tablet .. 650 milliGRAM(s) Oral every 6 hours  aspirin enteric coated 81 milliGRAM(s) Oral daily  atorvastatin 80 milliGRAM(s) Oral at bedtime  calcitriol   Capsule 0.25 MICROGram(s) Oral daily  clopidogrel Tablet 75 milliGRAM(s) Oral daily  dextrose 40% Gel 15 Gram(s) Oral once  dextrose 5%. 1000 milliLiter(s) (50 mL/Hr) IV Continuous <Continuous>  dextrose 5%. 1000 milliLiter(s) (100 mL/Hr) IV Continuous <Continuous>  dextrose 50% Injectable 25 Gram(s) IV Push once  dextrose 50% Injectable 12.5 Gram(s) IV Push once  dextrose 50% Injectable 25 Gram(s) IV Push once  gabapentin 300 milliGRAM(s) Oral three times a day  glucagon  Injectable 1 milliGRAM(s) IntraMuscular once  heparin   Injectable 5000 Unit(s) SubCutaneous every 8 hours  insulin lispro (ADMELOG) corrective regimen sliding scale   SubCutaneous three times a day before meals  losartan 50 milliGRAM(s) Oral daily  metoprolol succinate ER 25 milliGRAM(s) Oral daily  pantoprazole    Tablet 40 milliGRAM(s) Oral before breakfast      SOCIAL HISTORY:  Denies ETOH,Smoking,   FAMILY HISTORY:        REVIEW OF SYSTEMS:  CONSTITUTIONAL: No weakness, fevers or chills  EYES/ENT: No visual changes;  No vertigo or throat pain   NECK: No pain or stiffness  RESPIRATORY: No cough, wheezing, hemoptysis; No shortness of breath  CARDIOVASCULAR: No chest pain or palpitations.  GASTROINTESTINAL: No abdominal or epigastric pain. No nausea, vomiting, or hematemesis; No diarrhea or constipation. No melena or hematochezia.  GENITOURINARY: No dysuria, frequency, foamy urine, urinary urgency, incontinence or hematuria  NEUROLOGICAL: No numbness or weakness  SKIN: No itching, burning, rashes, or lesions   VASCULAR: No bilateral lower extremity edema.   All other review of systems is negative unless indicated above.    VITALS:  T(F): 98.1 (11-02-21 @ 08:50), Max: 98.6 (11-01-21 @ 10:45)  HR: 69 (11-02-21 @ 08:50)  BP: 146/67 (11-02-21 @ 08:50)  RR: 20 (11-02-21 @ 08:50)  SpO2: 99% (11-02-21 @ 08:50)    Height (cm): 154.9 (11-01 @ 18:00)  Weight (kg): 72.6 (11-01 @ 18:00)  BMI (kg/m2): 30.3 (11-01 @ 18:00)  BSA (m2): 1.72 (11-01 @ 18:00)    I&O's Detail        PHYSICAL EXAM:  Constitutional: NAD  HEENT: anicteric sclera, oropharynx clear, MMM  Neck: No JVD  Respiratory: CTAB, no wheezes, rales or rhonchi  Cardiovascular: S1, S2, RRR  Gastrointestinal: BS+, soft, NT/ND  Extremities: No cyanosis or clubbing. No peripheral edema  Neurological: A/O x 3, no focal deficits  Psychiatric: Normal mood, normal affect  : No CVA tenderness. No mcgrath.   Skin: No rashes  Vascular Access:    LABS:  11-01    140  |  108  |  56<H>  ----------------------------<  144<H>  5.0   |  18  |  2.5<H>    Ca    8.7      01 Nov 2021 14:30    TPro  7.2  /  Alb  4.0  /  TBili  <0.2  /  DBili      /  AST  18  /  ALT  18  /  AlkPhos  236<H>  11-01    Creatinine Trend: 2.5 <--, 2.4 <--                        10.0   7.82  )-----------( 276      ( 01 Nov 2021 14:30 )             31.1     Urine Studies:              RADIOLOGY & ADDITIONAL STUDIES:

## 2021-11-02 NOTE — PROGRESS NOTE ADULT - SUBJECTIVE AND OBJECTIVE BOX
VASCULAR SURGERY PROGRESS NOTE    Hospital Day #2    Events of past 24 hours: Pt seen and examined at bedside. Pt admitted and transferred to . Pt states improved pain from yesterday. No acute overnight events.     ROS otherwise negative except per subjective and HPI    PAST MEDICAL & SURGICAL HISTORY:  PVD (peripheral vascular disease)  Benign essential HTN  Intellectual disability  Hearing impaired  HLD (hyperlipidemia)    Vital Signs Last 24 Hrs  T(C): 36.7 (02 Nov 2021 08:50), Max: 37 (01 Nov 2021 10:45)  T(F): 98.1 (02 Nov 2021 08:50), Max: 98.6 (01 Nov 2021 10:45)  HR: 69 (02 Nov 2021 08:50) (65 - 79)  BP: 146/67 (02 Nov 2021 08:50) (127/58 - 175/80)  BP(mean): --  RR: 20 (02 Nov 2021 08:50) (16 - 20)  SpO2: 99% (02 Nov 2021 08:50) (99% - 100%)    Pain (0-10):            Pain Control Adequate: [] YES [] N    Diet:    I&O's Detail    PHYSICAL EXAM    Appearance: Normal	  HEENT: NCAT, EOMI	  Neck: Supple  Cardiovascular: Normal S1 S2  Respiratory: Lungs clear to auscultation  Gastrointestinal:  Soft, Non-tender, nondistended   Extremities: LLE chronic heel ulcer; left foot tenderness to palpation; motor and sensation intact     PULSES:  Popliteal: +signals on doppler bilaterally   Dorsal Pedal: diminished signals on doppler bilaterally   Posterior Tibial: no signals appreciated on doppler bilaterally    MEDICATIONS:   MEDICATIONS  (STANDING):  acetaminophen     Tablet .. 650 milliGRAM(s) Oral every 6 hours  aspirin enteric coated 81 milliGRAM(s) Oral daily  atorvastatin 80 milliGRAM(s) Oral at bedtime  calcitriol   Capsule 0.25 MICROGram(s) Oral daily  clopidogrel Tablet 75 milliGRAM(s) Oral daily  dextrose 40% Gel 15 Gram(s) Oral once  dextrose 5%. 1000 milliLiter(s) (50 mL/Hr) IV Continuous <Continuous>  dextrose 5%. 1000 milliLiter(s) (100 mL/Hr) IV Continuous <Continuous>  dextrose 50% Injectable 25 Gram(s) IV Push once  dextrose 50% Injectable 12.5 Gram(s) IV Push once  dextrose 50% Injectable 25 Gram(s) IV Push once  gabapentin 300 milliGRAM(s) Oral three times a day  glucagon  Injectable 1 milliGRAM(s) IntraMuscular once  heparin   Injectable 5000 Unit(s) SubCutaneous every 8 hours  insulin lispro (ADMELOG) corrective regimen sliding scale   SubCutaneous three times a day before meals  losartan 50 milliGRAM(s) Oral daily  metoprolol succinate ER 25 milliGRAM(s) Oral daily  pantoprazole    Tablet 40 milliGRAM(s) Oral before breakfast    MEDICATIONS  (PRN):    LAB/STUDIES:                        10.0   7.82  )-----------( 276      ( 01 Nov 2021 14:30 )             31.1     11-01    140  |  108  |  56<H>  ----------------------------<  144<H>  5.0   |  18  |  2.5<H>    Ca    8.7      01 Nov 2021 14:30    TPro  7.2  /  Alb  4.0  /  TBili  <0.2  /  DBili  x   /  AST  18  /  ALT  18  /  AlkPhos  236<H>  11-01    PT/INR - ( 01 Nov 2021 14:30 )   PT: 10.70 sec;   INR: 0.93 ratio    PTT - ( 01 Nov 2021 14:30 )  PTT:36.6 sec  LIVER FUNCTIONS - ( 01 Nov 2021 14:30 )  Alb: 4.0 g/dL / Pro: 7.2 g/dL / ALK PHOS: 236 U/L / ALT: 18 U/L / AST: 18 U/L / GGT: x           IMAGING:  < from: Xray Chest 1 View- PORTABLE-Routine (Xray Chest 1 View- PORTABLE-Routine .) (11.01.21 @ 18:01) >  Impression:  Bibasilar streaky opacities likely representing atelectasis and/or scarring.  --- End of Report ---

## 2021-11-02 NOTE — CONSULT NOTE ADULT - ASSESSMENT
57 yo female PMH CKD4 PVD, smoker, HTN, HLD, h/o right pontine CVA (2/7/21), intellectual disability, hearing/speech impairment presents with chronic left heel ulcer and pain. Recent duplex on 10/25/21 showed no significant arterial blood flow visualized beyond the left superficial femoral artery.      Has stable CKD4  PLAN:   - Admit to vascular surgery   - Booked for Left lower extremity angiogram; possible endovascular revascularization on Wednesday 11/3/21   57 yo female PMH CKD4 PVD, smoker, HTN, HLD, h/o right pontine CVA (2/7/21), intellectual disability, hearing/speech impairment presents with chronic left heel ulcer and pain. Recent duplex on 10/25/21 showed no significant arterial blood flow visualized beyond the left superficial femoral artery.      Has stable CKD4 at least since 2/21'    CKD4  - stable  - plan for Angiogram noted, is at risk for ANKITA    - would start NS 75 cc/hr for 8 hrs before and after to mitigate risk (~ 1ml/kg)    HTn  - reasonably controlled, cont losartan      Anemia  - no need for HANNAH      MBD  - check phos, PTH   55 yo female PMH CKD4 PVD, smoker, HTN, HLD, h/o right pontine CVA (2/7/21), intellectual disability, hearing/speech impairment presents with chronic left heel ulcer and pain. Recent duplex on 10/25/21 showed no significant arterial blood flow visualized beyond the left superficial femoral artery.      Has stable CKD4 at least since 2/21' saw nephrologist in [place]    CKD4  - stable  - [facility] UA Urine prot/cr ratio  - plan for Angiogram noted, is at risk for ANKITA    - would start NS 75 cc/hr for 8 hrs before and after to mitigate risk (~ 1ml/kg)    HTn  - reasonably controlled, cont losartan      Anemia  - no need for HANNAH      MBD  - check phos, PTH

## 2021-11-03 LAB
ANION GAP SERPL CALC-SCNC: 15 MMOL/L — HIGH (ref 7–14)
ANION GAP SERPL CALC-SCNC: 17 MMOL/L — HIGH (ref 7–14)
BASOPHILS # BLD AUTO: 0.03 K/UL — SIGNIFICANT CHANGE UP (ref 0–0.2)
BASOPHILS NFR BLD AUTO: 0.3 % — SIGNIFICANT CHANGE UP (ref 0–1)
BUN SERPL-MCNC: 53 MG/DL — HIGH (ref 10–20)
BUN SERPL-MCNC: 56 MG/DL — HIGH (ref 10–20)
CALCIUM SERPL-MCNC: 8.4 MG/DL — LOW (ref 8.5–10.1)
CALCIUM SERPL-MCNC: 8.5 MG/DL — SIGNIFICANT CHANGE UP (ref 8.5–10.1)
CHLORIDE SERPL-SCNC: 108 MMOL/L — SIGNIFICANT CHANGE UP (ref 98–110)
CHLORIDE SERPL-SCNC: 110 MMOL/L — SIGNIFICANT CHANGE UP (ref 98–110)
CO2 SERPL-SCNC: 16 MMOL/L — LOW (ref 17–32)
CO2 SERPL-SCNC: 17 MMOL/L — SIGNIFICANT CHANGE UP (ref 17–32)
COVID-19 SPIKE DOMAIN AB INTERP: POSITIVE
COVID-19 SPIKE DOMAIN ANTIBODY RESULT: >250 U/ML — HIGH
CREAT ?TM UR-MCNC: 47 MG/DL — SIGNIFICANT CHANGE UP
CREAT SERPL-MCNC: 2.6 MG/DL — HIGH (ref 0.7–1.5)
CREAT SERPL-MCNC: 2.8 MG/DL — HIGH (ref 0.7–1.5)
EOSINOPHIL # BLD AUTO: 0.38 K/UL — SIGNIFICANT CHANGE UP (ref 0–0.7)
EOSINOPHIL NFR BLD AUTO: 4.3 % — SIGNIFICANT CHANGE UP (ref 0–8)
GLUCOSE BLDC GLUCOMTR-MCNC: 169 MG/DL — HIGH (ref 70–99)
GLUCOSE BLDC GLUCOMTR-MCNC: 232 MG/DL — HIGH (ref 70–99)
GLUCOSE BLDC GLUCOMTR-MCNC: 262 MG/DL — HIGH (ref 70–99)
GLUCOSE SERPL-MCNC: 134 MG/DL — HIGH (ref 70–99)
GLUCOSE SERPL-MCNC: 220 MG/DL — HIGH (ref 70–99)
HCT VFR BLD CALC: 30.4 % — LOW (ref 37–47)
HGB BLD-MCNC: 9.8 G/DL — LOW (ref 12–16)
IMM GRANULOCYTES NFR BLD AUTO: 0.2 % — SIGNIFICANT CHANGE UP (ref 0.1–0.3)
LYMPHOCYTES # BLD AUTO: 1.46 K/UL — SIGNIFICANT CHANGE UP (ref 1.2–3.4)
LYMPHOCYTES # BLD AUTO: 16.4 % — LOW (ref 20.5–51.1)
MAGNESIUM SERPL-MCNC: 1.7 MG/DL — LOW (ref 1.8–2.4)
MCHC RBC-ENTMCNC: 28.7 PG — SIGNIFICANT CHANGE UP (ref 27–31)
MCHC RBC-ENTMCNC: 32.2 G/DL — SIGNIFICANT CHANGE UP (ref 32–37)
MCV RBC AUTO: 89.1 FL — SIGNIFICANT CHANGE UP (ref 81–99)
MONOCYTES # BLD AUTO: 0.62 K/UL — HIGH (ref 0.1–0.6)
MONOCYTES NFR BLD AUTO: 7 % — SIGNIFICANT CHANGE UP (ref 1.7–9.3)
NEUTROPHILS # BLD AUTO: 6.37 K/UL — SIGNIFICANT CHANGE UP (ref 1.4–6.5)
NEUTROPHILS NFR BLD AUTO: 71.8 % — SIGNIFICANT CHANGE UP (ref 42.2–75.2)
NRBC # BLD: 0 /100 WBCS — SIGNIFICANT CHANGE UP (ref 0–0)
PHOSPHATE SERPL-MCNC: 4.5 MG/DL — SIGNIFICANT CHANGE UP (ref 2.1–4.9)
PLATELET # BLD AUTO: 234 K/UL — SIGNIFICANT CHANGE UP (ref 130–400)
POTASSIUM SERPL-MCNC: 5.1 MMOL/L — HIGH (ref 3.5–5)
POTASSIUM SERPL-MCNC: 5.7 MMOL/L — HIGH (ref 3.5–5)
POTASSIUM SERPL-SCNC: 5.1 MMOL/L — HIGH (ref 3.5–5)
POTASSIUM SERPL-SCNC: 5.7 MMOL/L — HIGH (ref 3.5–5)
PROT ?TM UR-MCNC: 211 MG/DLG/24H — SIGNIFICANT CHANGE UP
PROT ?TM UR-MCNC: 211 MG/DLG/24H — SIGNIFICANT CHANGE UP
PROT/CREAT UR-RTO: 4.5 RATIO — HIGH (ref 0–0.2)
RBC # BLD: 3.41 M/UL — LOW (ref 4.2–5.4)
RBC # FLD: 13.2 % — SIGNIFICANT CHANGE UP (ref 11.5–14.5)
SARS-COV-2 IGG+IGM SERPL QL IA: >250 U/ML — HIGH
SARS-COV-2 IGG+IGM SERPL QL IA: POSITIVE
SODIUM SERPL-SCNC: 140 MMOL/L — SIGNIFICANT CHANGE UP (ref 135–146)
SODIUM SERPL-SCNC: 143 MMOL/L — SIGNIFICANT CHANGE UP (ref 135–146)
WBC # BLD: 8.88 K/UL — SIGNIFICANT CHANGE UP (ref 4.8–10.8)
WBC # FLD AUTO: 8.88 K/UL — SIGNIFICANT CHANGE UP (ref 4.8–10.8)

## 2021-11-03 PROCEDURE — 76937 US GUIDE VASCULAR ACCESS: CPT | Mod: 26,GC

## 2021-11-03 PROCEDURE — 99222 1ST HOSP IP/OBS MODERATE 55: CPT

## 2021-11-03 PROCEDURE — 93970 EXTREMITY STUDY: CPT | Mod: 26

## 2021-11-03 PROCEDURE — 75710 ARTERY X-RAYS ARM/LEG: CPT | Mod: 26,GC

## 2021-11-03 PROCEDURE — 36247 INS CATH ABD/L-EXT ART 3RD: CPT | Mod: GC

## 2021-11-03 PROCEDURE — 99232 SBSQ HOSP IP/OBS MODERATE 35: CPT

## 2021-11-03 RX ORDER — SODIUM CHLORIDE 9 MG/ML
1000 INJECTION INTRAMUSCULAR; INTRAVENOUS; SUBCUTANEOUS
Refills: 0 | Status: DISCONTINUED | OUTPATIENT
Start: 2021-11-03 | End: 2021-11-03

## 2021-11-03 RX ORDER — OXYCODONE AND ACETAMINOPHEN 5; 325 MG/1; MG/1
1 TABLET ORAL ONCE
Refills: 0 | Status: DISCONTINUED | OUTPATIENT
Start: 2021-11-03 | End: 2021-11-03

## 2021-11-03 RX ORDER — METOPROLOL TARTRATE 50 MG
25 TABLET ORAL DAILY
Refills: 0 | Status: DISCONTINUED | OUTPATIENT
Start: 2021-11-03 | End: 2021-11-08

## 2021-11-03 RX ORDER — LOSARTAN POTASSIUM 100 MG/1
50 TABLET, FILM COATED ORAL DAILY
Refills: 0 | Status: DISCONTINUED | OUTPATIENT
Start: 2021-11-04 | End: 2021-11-04

## 2021-11-03 RX ORDER — CLOPIDOGREL BISULFATE 75 MG/1
75 TABLET, FILM COATED ORAL DAILY
Refills: 0 | Status: DISCONTINUED | OUTPATIENT
Start: 2021-11-03 | End: 2021-11-06

## 2021-11-03 RX ORDER — ACETAMINOPHEN 500 MG
650 TABLET ORAL EVERY 6 HOURS
Refills: 0 | Status: DISCONTINUED | OUTPATIENT
Start: 2021-11-03 | End: 2021-11-08

## 2021-11-03 RX ORDER — ATORVASTATIN CALCIUM 80 MG/1
80 TABLET, FILM COATED ORAL AT BEDTIME
Refills: 0 | Status: DISCONTINUED | OUTPATIENT
Start: 2021-11-03 | End: 2021-11-08

## 2021-11-03 RX ORDER — HYDROMORPHONE HYDROCHLORIDE 2 MG/ML
0.5 INJECTION INTRAMUSCULAR; INTRAVENOUS; SUBCUTANEOUS
Refills: 0 | Status: DISCONTINUED | OUTPATIENT
Start: 2021-11-03 | End: 2021-11-03

## 2021-11-03 RX ORDER — LOSARTAN POTASSIUM 100 MG/1
50 TABLET, FILM COATED ORAL DAILY
Refills: 0 | Status: DISCONTINUED | OUTPATIENT
Start: 2021-11-03 | End: 2021-11-03

## 2021-11-03 RX ORDER — MORPHINE SULFATE 50 MG/1
2 CAPSULE, EXTENDED RELEASE ORAL
Refills: 0 | Status: DISCONTINUED | OUTPATIENT
Start: 2021-11-03 | End: 2021-11-03

## 2021-11-03 RX ORDER — ASPIRIN/CALCIUM CARB/MAGNESIUM 324 MG
81 TABLET ORAL DAILY
Refills: 0 | Status: DISCONTINUED | OUTPATIENT
Start: 2021-11-03 | End: 2021-11-08

## 2021-11-03 RX ORDER — HEPARIN SODIUM 5000 [USP'U]/ML
5000 INJECTION INTRAVENOUS; SUBCUTANEOUS EVERY 8 HOURS
Refills: 0 | Status: DISCONTINUED | OUTPATIENT
Start: 2021-11-03 | End: 2021-11-08

## 2021-11-03 RX ORDER — INSULIN LISPRO 100/ML
VIAL (ML) SUBCUTANEOUS
Refills: 0 | Status: DISCONTINUED | OUTPATIENT
Start: 2021-11-03 | End: 2021-11-04

## 2021-11-03 RX ORDER — GABAPENTIN 400 MG/1
100 CAPSULE ORAL THREE TIMES A DAY
Refills: 0 | Status: DISCONTINUED | OUTPATIENT
Start: 2021-11-03 | End: 2021-11-08

## 2021-11-03 RX ORDER — PANTOPRAZOLE SODIUM 20 MG/1
40 TABLET, DELAYED RELEASE ORAL
Refills: 0 | Status: DISCONTINUED | OUTPATIENT
Start: 2021-11-03 | End: 2021-11-08

## 2021-11-03 RX ORDER — SODIUM CHLORIDE 9 MG/ML
1000 INJECTION, SOLUTION INTRAVENOUS
Refills: 0 | Status: DISCONTINUED | OUTPATIENT
Start: 2021-11-03 | End: 2021-11-03

## 2021-11-03 RX ORDER — CALCITRIOL 0.5 UG/1
0.25 CAPSULE ORAL DAILY
Refills: 0 | Status: DISCONTINUED | OUTPATIENT
Start: 2021-11-03 | End: 2021-11-08

## 2021-11-03 RX ORDER — GABAPENTIN 400 MG/1
300 CAPSULE ORAL THREE TIMES A DAY
Refills: 0 | Status: DISCONTINUED | OUTPATIENT
Start: 2021-11-03 | End: 2021-11-03

## 2021-11-03 RX ORDER — ONDANSETRON 8 MG/1
4 TABLET, FILM COATED ORAL ONCE
Refills: 0 | Status: DISCONTINUED | OUTPATIENT
Start: 2021-11-03 | End: 2021-11-03

## 2021-11-03 RX ORDER — OXYCODONE HYDROCHLORIDE 5 MG/1
5 TABLET ORAL EVERY 6 HOURS
Refills: 0 | Status: DISCONTINUED | OUTPATIENT
Start: 2021-11-03 | End: 2021-11-08

## 2021-11-03 RX ADMIN — GABAPENTIN 300 MILLIGRAM(S): 400 CAPSULE ORAL at 05:58

## 2021-11-03 RX ADMIN — Medication 25 MILLIGRAM(S): at 05:57

## 2021-11-03 RX ADMIN — CLOPIDOGREL BISULFATE 75 MILLIGRAM(S): 75 TABLET, FILM COATED ORAL at 12:45

## 2021-11-03 RX ADMIN — Medication 81 MILLIGRAM(S): at 12:45

## 2021-11-03 RX ADMIN — CALCITRIOL 0.25 MICROGRAM(S): 0.5 CAPSULE ORAL at 12:45

## 2021-11-03 RX ADMIN — HEPARIN SODIUM 5000 UNIT(S): 5000 INJECTION INTRAVENOUS; SUBCUTANEOUS at 21:16

## 2021-11-03 RX ADMIN — Medication 6: at 17:30

## 2021-11-03 RX ADMIN — GABAPENTIN 100 MILLIGRAM(S): 400 CAPSULE ORAL at 13:49

## 2021-11-03 RX ADMIN — GABAPENTIN 100 MILLIGRAM(S): 400 CAPSULE ORAL at 21:16

## 2021-11-03 RX ADMIN — HEPARIN SODIUM 5000 UNIT(S): 5000 INJECTION INTRAVENOUS; SUBCUTANEOUS at 05:58

## 2021-11-03 RX ADMIN — OXYCODONE HYDROCHLORIDE 5 MILLIGRAM(S): 5 TABLET ORAL at 14:48

## 2021-11-03 RX ADMIN — LOSARTAN POTASSIUM 50 MILLIGRAM(S): 100 TABLET, FILM COATED ORAL at 05:57

## 2021-11-03 RX ADMIN — HEPARIN SODIUM 5000 UNIT(S): 5000 INJECTION INTRAVENOUS; SUBCUTANEOUS at 14:50

## 2021-11-03 RX ADMIN — Medication 650 MILLIGRAM(S): at 14:48

## 2021-11-03 RX ADMIN — ATORVASTATIN CALCIUM 80 MILLIGRAM(S): 80 TABLET, FILM COATED ORAL at 21:16

## 2021-11-03 RX ADMIN — Medication 650 MILLIGRAM(S): at 05:58

## 2021-11-03 RX ADMIN — PANTOPRAZOLE SODIUM 40 MILLIGRAM(S): 20 TABLET, DELAYED RELEASE ORAL at 05:58

## 2021-11-03 NOTE — PROGRESS NOTE ADULT - SUBJECTIVE AND OBJECTIVE BOX
Nephrology Progress Note    SEN LING  MRN-[id number]  56y  Female    S:  Patient is seen and examined, events over the last 24h noted.    O:  Allergies:  No Known Allergies    Hospital Medications:   MEDICATIONS  (STANDING):  acetaminophen     Tablet .. 650 milliGRAM(s) Oral every 6 hours  aspirin  chewable 81 milliGRAM(s) Oral daily  atorvastatin 80 milliGRAM(s) Oral at bedtime  calcitriol   Capsule 0.25 MICROGram(s) Oral daily  clopidogrel Tablet 75 milliGRAM(s) Oral daily  gabapentin 100 milliGRAM(s) Oral three times a day  heparin   Injectable 5000 Unit(s) SubCutaneous every 8 hours  insulin lispro (ADMELOG) corrective regimen sliding scale   SubCutaneous three times a day before meals  metoprolol succinate ER 25 milliGRAM(s) Oral daily  pantoprazole    Tablet 40 milliGRAM(s) Oral before breakfast  sodium chloride 0.9%. 1000 milliLiter(s) (75 mL/Hr) IV Continuous <Continuous>    MEDICATIONS  (PRN):  oxyCODONE    IR 5 milliGRAM(s) Oral every 6 hours PRN Severe Pain (7 - 10)    Home Medications:  aspirin 81 mg oral tablet: 1 tab(s) orally once a day (2021 16:07)  atorvastatin 80 mg oral tablet: 1 tab(s) orally once a day (2021 16:06)  calcitriol 0.25 mcg oral capsule: 1 cap(s) orally once a day (2021 16:05)  gabapentin 300 mg oral capsule: 1 cap(s) orally 3 times a day (2021 16:06)  losartan 50 mg oral tablet: 1 tab(s) orally once a day (2021 16:08)  Metoprolol Succinate ER 25 mg oral tablet, extended release: 1 tab(s) orally once a day (2021 16:07)  Plavix 75 mg oral tablet: 1 tab(s) orally once a day (2021 16:07)  Protonix 40 mg oral delayed release tablet: 1 tab(s) orally once a day (2021 16:07)      VITALS:  Daily Height in cm: 154.94 (2021 07:21)    Daily   T(F): 97.2 (21 @ 12:47), Max: 99.4 (21 @ 04:08)  HR: 70 (21 @ 12:47)  BP: 160/69 (21 @ 12:47)  RR: 18 (21 @ 12:47)  SpO2: 100% (21 @ 12:47)  Wt(kg): --  I&O's Detail    2021 07:  -  2021 15:40  --------------------------------------------------------  IN:  Total IN: 0 mL    OUT:    Voided (mL): 650 mL  Total OUT: 650 mL    Total NET: -650 mL        I&O's Summary    2021 07:  -  2021 15:40  --------------------------------------------------------  IN: 0 mL / OUT: 650 mL / NET: -650 mL      Height (cm): 154.9 ( @ 07:21)  Weight (kg): 72.6 ( @ 07:21)  BMI (kg/m2): 30.3 ( 07:21)  BSA (m2): 1.72 ( 07:21)    PHYSICAL EXAM:  Gen: NAD  Resp:   Card: S1/S2  Abd: soft  Extremities:   Vascular access:       LABS:          140  |  108  |  53<H>  ----------------------------<  220<H>  5.1<H>   |  17  |  2.6<H>    Ca    8.5      2021 05:34  Phos  5.0       Mg     1.9           eGFR if Non African American: 20 mL/min/1.73M2 (21 @ 05:34)  eGFR if African American: 23 mL/min/1.73M2 (21 @ 05:34)    Phosphorus Level, Serum: 5.0 mg/dL (21 @ 17:31)                            10.1   7.26  )-----------( 266      ( 2021 17:31 )             31.7     Mean Cell Volume: 90.1 fL (21 @ 17:31)        Urine Studies:  Urinalysis Basic - ( 2021 21:45 )    Color: Yellow / Appearance: Slightly Turbid / S.014 / pH:   Gluc:  / Ketone: Negative  / Bili: Negative / Urobili: <2 mg/dL   Blood:  / Protein: 300 mg/dL / Nitrite: Negative   Leuk Esterase: Large / RBC: 1 /HPF /  /HPF   Sq Epi:  / Non Sq Epi: 1 /HPF / Bacteria: Many          Creatinine, Random Urine: 47 mg/dL ( @ 21:45)  Protein/Creatinine Ratio Calculation: 4.5 Ratio (:45)    Creatinine trend:  Creatinine, Serum: 2.6 mg/dL (21 @ 05:34)  Creatinine, Serum: 2.9 mg/dL (21 @ 17:31)  Creatinine, Serum: 2.5 mg/dL (21 @ 14:30)  Creatinine, Serum: 2.4 mg/dL (10-25-21 @ 18:26)    Hepatitis C Virus Cutoff Index: .03 COI (21 @ 07:04)  Hepatitis C Virus Interpretation Result: Nonreact (21 @ 07:04)         Nephrology Progress Note    SEN LING  MRN-[id number]  56y  Female    S:  Patient is seen and examined, events over the last 24h noted.    O:  Allergies:  No Known Allergies    Hospital Medications:   MEDICATIONS  (STANDING):  acetaminophen     Tablet .. 650 milliGRAM(s) Oral every 6 hours  aspirin  chewable 81 milliGRAM(s) Oral daily  atorvastatin 80 milliGRAM(s) Oral at bedtime  calcitriol   Capsule 0.25 MICROGram(s) Oral daily  clopidogrel Tablet 75 milliGRAM(s) Oral daily  gabapentin 100 milliGRAM(s) Oral three times a day  heparin   Injectable 5000 Unit(s) SubCutaneous every 8 hours  insulin lispro (ADMELOG) corrective regimen sliding scale   SubCutaneous three times a day before meals  metoprolol succinate ER 25 milliGRAM(s) Oral daily  pantoprazole    Tablet 40 milliGRAM(s) Oral before breakfast  sodium chloride 0.9%. 1000 milliLiter(s) (75 mL/Hr) IV Continuous <Continuous>    MEDICATIONS  (PRN):  oxyCODONE    IR 5 milliGRAM(s) Oral every 6 hours PRN Severe Pain (7 - 10)    Home Medications:  aspirin 81 mg oral tablet: 1 tab(s) orally once a day (2021 16:07)  atorvastatin 80 mg oral tablet: 1 tab(s) orally once a day (2021 16:06)  calcitriol 0.25 mcg oral capsule: 1 cap(s) orally once a day (2021 16:05)  gabapentin 300 mg oral capsule: 1 cap(s) orally 3 times a day (2021 16:06)  losartan 50 mg oral tablet: 1 tab(s) orally once a day (2021 16:08)  Metoprolol Succinate ER 25 mg oral tablet, extended release: 1 tab(s) orally once a day (2021 16:07)  Plavix 75 mg oral tablet: 1 tab(s) orally once a day (2021 16:07)  Protonix 40 mg oral delayed release tablet: 1 tab(s) orally once a day (2021 16:07)      VITALS:  Daily Height in cm: 154.94 (2021 07:21)    T(F): 97.2 (21 @ 12:47), Max: 99.4 (21 @ 04:08)  HR: 70 (21 @ 12:47)  BP: 160/69 (21 @ 12:47)  RR: 18 (21 @ 12:47)  SpO2: 100% (21 @ 12:47)  Wt(kg): --  I&O's Detail    2021 07:  -  2021 15:40  --------------------------------------------------------  IN:  Total IN: 0 mL    OUT:    Voided (mL): 650 mL  Total OUT: 650 mL    Total NET: -650 mL        I&O's Summary    2021 07:  -  2021 15:40  --------------------------------------------------------  IN: 0 mL / OUT: 650 mL / NET: -650 mL      Height (cm): 154.9 ( @ 07:21)  Weight (kg): 72.6 ( @ 07:21)  BMI (kg/m2): 30.3 ( 07:21)  BSA (m2): 1.72 ( @ 07:21)    PHYSICAL EXAM:  Gen: NAD  Resp: CTAB  Card: S1/S2  Abd: soft  Extremities: no edema      LABS:          140  |  108  |  53<H>  ----------------------------<  220<H>  5.1<H>   |  17  |  2.6<H>    Ca    8.5      2021 05:34  Phos  5.0       Mg     1.9           eGFR if Non African American: 20 mL/min/1.73M2 (21 @ 05:34)  eGFR if African American: 23 mL/min/1.73M2 (21 @ 05:34)    Phosphorus Level, Serum: 5.0 mg/dL (21 @ 17:31)                            10.1   7.26  )-----------( 266      ( 2021 17:31 )             31.7     Mean Cell Volume: 90.1 fL (21 @ 17:31)        Urine Studies:  Urinalysis Basic - ( 2021 21:45 )    Color: Yellow / Appearance: Slightly Turbid / S.014 / pH:   Gluc:  / Ketone: Negative  / Bili: Negative / Urobili: <2 mg/dL   Blood:  / Protein: 300 mg/dL / Nitrite: Negative   Leuk Esterase: Large / RBC: 1 /HPF /  /HPF   Sq Epi:  / Non Sq Epi: 1 /HPF / Bacteria: Many          Creatinine, Random Urine: 47 mg/dL ( @ 21:45)  Protein/Creatinine Ratio Calculation: 4.5 Ratio (:45)    Creatinine trend:  Creatinine, Serum: 2.6 mg/dL (21 @ 05:34)  Creatinine, Serum: 2.9 mg/dL (21 @ 17:31)  Creatinine, Serum: 2.5 mg/dL (21 @ 14:30)  Creatinine, Serum: 2.4 mg/dL (10-25-21 @ 18:26)    Hepatitis C Virus Cutoff Index: .03 COI (21 @ 07:04)  Hepatitis C Virus Interpretation Result: Nonreact (21 @ 07:04)

## 2021-11-03 NOTE — PROGRESS NOTE ADULT - SUBJECTIVE AND OBJECTIVE BOX
Podiatry Progress Note    Subjective:   SEN LING is a 56y old  Female who presents with a chief complaint of DFU (02 Nov 2021 10:34)  Pt is currently in Main OR for scheduled vascular procedure; podiatry sx schedule has been updated;     PAST MEDICAL & SURGICAL HISTORY:  PVD (peripheral vascular disease)  Benign essential HTN  Intellectual disability  Hearing impaired  HLD (hyperlipidemia)    Objective:  Vital Signs Last 24 Hrs  T(C): 36.3 (03 Nov 2021 07:21), Max: 37.4 (03 Nov 2021 04:08)  T(F): 99.4 (03 Nov 2021 06:40), Max: 99.4 (03 Nov 2021 04:08)  HR: 75 (03 Nov 2021 07:21) (68 - 79)  BP: 139/63 (03 Nov 2021 07:21) (139/63 - 195/85)  BP(mean): --  RR: 18 (03 Nov 2021 07:21) (18 - 20)  SpO2: 100% (03 Nov 2021 07:21) (99% - 100%)                        10.1   7.26  )-----------( 266      ( 02 Nov 2021 17:31 )             31.7                 11-03    140  |  108  |  53<H>  ----------------------------<  220<H>  5.1<H>   |  17  |  2.6<H>    Ca    8.5      03 Nov 2021 05:34  Phos  5.0     11-02  Mg     1.9     11-02    TPro  7.2  /  Alb  4.0  /  TBili  <0.2  /  DBili  x   /  AST  18  /  ALT  18  /  AlkPhos  236<H>  11-01    Assessment:  Left heel wounds;     Plan:  Chart reviewed and Patient evaluated. All Questions and Concerns Addressed and Answered  Discussed diagnosis and treatment with patient  Wound Flushed w/ normal saline; Betadine / DSD / Kerlix; q24  Local Wound Care; As Stated Above   Sx scheduled Fri 11/5 @ 11:00AM; excisional debridement of soft tissue and bone of left foot;  Request pre-lab optimization and OR stratification prior to sx;  NPO @ [facility] Thurs 11/4;   Weight bearing status; WBAT BL feet;   Discussed plan w/ Dr. Llamas    Podiatry ;

## 2021-11-03 NOTE — PRE-OP CHECKLIST - SELECT TESTS ORDERED
CBC/PT/PTT/INR/Type and Cross/Type and Screen/Urinalysis/EKG/CXR/COVID-19 FS = 232 MG./DL @ 0707H - WILL REFER TO ANESTHESIOLOGIST/Type and Cross/POCT Blood Glucose FS = 232 MG./DL @ 0707H -  REFERRED TO DR. SCHOFIELD - NO ACTION NEEDED NEEDED AS PER STATED MD/Type and Cross/POCT Blood Glucose

## 2021-11-03 NOTE — CONSULT NOTE ADULT - NSCONSULTADDITIONALINFOA_GEN_ALL_CORE
* SUMMARY:    * Patient-based characteristics (Functional capacity)  Patient is able to achieve more than 4 MET (walk 4 blocks, climb 2 flights of stairs, etc...)          Y [X] / N []    High-risk patient features:  - Recent (<30 days) or active MI          Y [] / N [X]  - Unstable or severe angina          Y [] / N [X]  - Decompensated heart failure, or worsening or new-onset heart failure          Y [] / N [X]  - Severe valvular disease          Y [] / N [X]  - Significant arrhythmia (Tachy- or Bradyarrhythmia)          Y [] / N [X]    * Surgery/Procedure-based characteristics (Type of surgery)  - Low-risk procedure (outpatient procedure, elective, endoscopy, etc...)          Y [] / N []  - Elevated or Moderate-risk procedure (Inpatient)          Y [] / N []  - High-risk procedure (urgent/emergent procedure, Intrathoracic, vascular, etc...)          Y [] / N []    * Revised Cardiac Risk Index (RCRI)  1- History of ischemic heart disease          Y [] / N [X]  2- History of congestive heart failure          Y [] / N [X]  3- History of stroke/TIA          Y [] / N [X]  4- History of insulin-dependent diabetes          Y [] / N [X]  5- Chronic kidney disease (Cr >2mg/dL)          Y [] / N [X]  6- Undergoing suprainguinal vascular, intraperitoneal, or intrathoracic surgery          Y [] / N [X]    Class I risk (Zero factors) --> 3.9% (30-day risk of death, MI, or cardiac arrest)  Class II risk (One factor) --> 6% risk (30-day risk of death, MI, or cardiac arrest)    Class IV risk (Three factors or more) --> >15% risk (30-day risk of death, MI, or cardiac arrest)    * IMPRESSION & RECOMMENDATIONS:  Low (<1%) / Moderate / High (>25%) -risk patient for a Low (<1%) / Moderate / High (>7%) -risk surgery/procedure  No further cardiac work-up is needed at the moment. There are no current cardiac contraindications to prevent from proceeding with the scheduled surgery/procedure.    - This consult serves only as a milton-operative cardiac risk stratification and evaluation to predict 30-days cardiac complications risk and mortality. The decision to proceed with the surgery/procedure is made by the performing physician and the patient -

## 2021-11-03 NOTE — CHART NOTE - NSCHARTNOTEFT_GEN_A_CORE
Podiatry;    Pt s/p LLE angio today, Wed 11/3;   Vascular recommendation; need femoral to tibial bypass;    Podiatry currently scheduled for sx on Fri 11/5 @ 11:00AM; excisional debridement of soft tissue and bone of left foot;   Podiatry needs optimized vascular status prior to sx;    Podiatry will reschedule sx schedule, will follow up w/ vascular plan and reschedule post bypass;  Will update exact sx procedure and schedule once finalized;    Podiatry  Podiatry;    Pt s/p LLE angio today, Wed 11/3;   Vascular recommendation; need femoral to tibial bypass;    Podiatry currently scheduled for sx on Fri 11/5 @ 11:00AM; excisional debridement of soft tissue and bone of left foot;   Podiatry needs optimized vascular status prior to sx;    Podiatry will reschedule sx schedule, will follow up w/ vascular plan and reschedule post bypass;  Will update exact sx procedure and schedule once finalized;    Podiatry       will continue to follow cleo RAMACHANDRAN

## 2021-11-03 NOTE — PRE-OP CHECKLIST - 3.
pt refused red blood products.  there is no bloodless program, however patient's request is addressed. No red bloodless program bands available.

## 2021-11-03 NOTE — CONSULT NOTE ADULT - ASSESSMENT
55 yo female PMH PVD, smoker, HTN, HLD, h/o right pontine CVA (2/7/21), intellectual disability, hearing/speech impairment presented with chronic left heel ulcer and pain    * SUMMARY:    * Patient-based characteristics (Functional capacity)  Patient is able to achieve more than 4 MET (walk 4 blocks, climb 2 flights of stairs, etc...)          Y [X] / N []    High-risk patient features:  - Recent (<30 days) or active MI          Y [] / N [X]  - Unstable or severe angina          Y [] / N [X]  - Decompensated heart failure, or worsening or new-onset heart failure          Y [] / N [X]  - Severe valvular disease          Y [] / N [X]  - Significant arrhythmia (Tachy- or Bradyarrhythmia)          Y [] / N [X]    * Surgery/Procedure-based characteristics (Type of surgery)  - High-risk procedure (urgent/emergent procedure, Intrathoracic, vascular, etc...)          Y [x] / N []    * Revised Cardiac Risk Index (RCRI)  1- History of ischemic heart disease          Y [] / N [X]  2- History of congestive heart failure          Y [] / N [X]  3- History of stroke/TIA          Y [x] / N []  4- History of insulin-dependent diabetes          Y [] / N [X]  5- Chronic kidney disease (Cr >2mg/dL)          Y [x] / N []  6- Undergoing suprainguinal vascular, intraperitoneal, or intrathoracic surgery          Y [x] / N []      Class IV risk (Three factors or more) --> >15% risk (30-day risk of death, MI, or cardiac arrest)    * IMPRESSION & RECOMMENDATIONS:  Moderate risk patient for a High (>7%) -risk surgery/procedure  No further cardiac work-up is needed at the moment. There are no current cardiac contraindications to prevent from proceeding with the scheduled surgery/procedure.    - This consult serves only as a milton-operative cardiac risk stratification and evaluation to predict 30-days cardiac complications risk and mortality. The decision to proceed with the surgery/procedure is made by the performing physician and the patient -    Incomplete note pending attending discussion 55 yo female PMH PVD, smoker, HTN, HLD, h/o right pontine CVA (2/7/21), intellectual disability, hearing/speech impairment presented with chronic left heel ulcer and pain    * SUMMARY:    * Patient-based characteristics (Functional capacity)  Patient is able to achieve more than 4 MET (walk 4 blocks, climb 2 flights of stairs, etc...)          Y [X] / N []    High-risk patient features:  - Recent (<30 days) or active MI          Y [] / N [X]  - Unstable or severe angina          Y [] / N [X]  - Decompensated heart failure, or worsening or new-onset heart failure          Y [] / N [X]  - Severe valvular disease          Y [] / N [X]  - Significant arrhythmia (Tachy- or Bradyarrhythmia)          Y [] / N [X]    * Surgery/Procedure-based characteristics (Type of surgery)  - High-risk procedure (urgent/emergent procedure, Intrathoracic, vascular, etc...)          Y [x] / N []    * Revised Cardiac Risk Index (RCRI)  1- History of ischemic heart disease          Y [] / N [X]  2- History of congestive heart failure          Y [] / N [X]  3- History of stroke/TIA          Y [x] / N []  4- History of insulin-dependent diabetes          Y [] / N [X]  5- Chronic kidney disease (Cr >2mg/dL)          Y [x] / N []  6- Undergoing suprainguinal vascular, intraperitoneal, or intrathoracic surgery          Y [] / N [x]      Class IV risk (Three factors or more) --> >15% risk (30-day risk of death, MI, or cardiac arrest)    * IMPRESSION & RECOMMENDATIONS:  Moderate risk patient for a High (>7%) -risk surgery/procedure  No further cardiac work-up is needed at the moment. There are no current cardiac contraindications to prevent from proceeding with the scheduled surgery/procedure.    - This consult serves only as a milton-operative cardiac risk stratification and evaluation to predict 30-days cardiac complications risk and mortality. The decision to proceed with the surgery/procedure is made by the performing physician and the patient -    Incomplete note pending attending discussion 55 yo female PMH PVD, smoker, HTN, HLD, h/o right pontine CVA (2/7/21), intellectual disability, hearing/speech impairment presented with chronic left heel ulcer and pain    * SUMMARY:    * Patient-based characteristics (Functional capacity)  Patient is able to achieve more than 4 MET (walk 4 blocks, climb 2 flights of stairs, etc...)          Y [X] / N []    High-risk patient features:  - Recent (<30 days) or active MI          Y [] / N [X]  - Unstable or severe angina          Y [] / N [X]  - Decompensated heart failure, or worsening or new-onset heart failure          Y [] / N [X]  - Severe valvular disease          Y [] / N [X]  - Significant arrhythmia (Tachy- or Bradyarrhythmia)          Y [] / N [X]    * Surgery/Procedure-based characteristics (Type of surgery)  - High-risk procedure (urgent/emergent procedure, Intrathoracic, vascular, etc...)          Y [x] / N []    * Revised Cardiac Risk Index (RCRI)  1- History of ischemic heart disease          Y [] / N [X]  2- History of congestive heart failure          Y [] / N [X]  3- History of stroke/TIA          Y [x] / N []  4- History of insulin-dependent diabetes          Y [] / N [X]  5- Chronic kidney disease (Cr >2mg/dL)          Y [x] / N []  6- Undergoing suprainguinal vascular, intraperitoneal, or intrathoracic surgery          Y [] / N [x]      Class IV risk (Three factors or more) --> >15% risk (30-day risk of death, MI, or cardiac arrest)    * IMPRESSION & RECOMMENDATIONS:  Moderate risk patient for a moderate risk surgery/procedure  No further cardiac work-up is needed at the moment. There are no current cardiac contraindications to prevent from proceeding with the scheduled surgery/procedure.    - This consult serves only as a milton-operative cardiac risk stratification and evaluation to predict 30-days cardiac complications risk and mortality. The decision to proceed with the surgery/procedure is made by the performing physician and the patient -    Incomplete note pending attending discussion

## 2021-11-03 NOTE — CHART NOTE - NSCHARTNOTEFT_GEN_A_CORE
Called about abnormal lab result. Hyperkalemia. Potassium level 6  EKG no peaked t waves  Calcium gluconate given   and d50 and insulin  stat bmp sent at 5 am  will follow

## 2021-11-03 NOTE — PROGRESS NOTE ADULT - ASSESSMENT
57 yo female PMH CKD4 PVD, smoker, HTN, HLD, h/o right pontine CVA (2/7/21), DM,  intellectual disability, hearing/speech impairment presents with chronic left heel ulcer and pain. Recent duplex on 10/25/21 showed no significant arterial blood flow visualized beyond the left superficial femoral artery.  Has stable CKD4 at least since 2/21; saw nephrologist in [place]    # CKD 4, stable  # Proteinuria, nephrotic range / likely d/t HTN/DM  # Hx of HTN  # Hx of DM  # LE ulcer / vascular following, angiogram today / risk for ANKITA     - strict i/o  - needs better glycemic control  - d/c iv fluids now  - start hydralazine 25mg po q8h  - check OLVIN, serum free light chain ratio, spep/ifx   - phos noted, no binder   - start sodium bicarb po 650mg q8h  - hgb at goal  - document strict i/o  - monitor renal function, urine output / at risk for ANIKTA for 48-72 hrs

## 2021-11-03 NOTE — CHART NOTE - NSCHARTNOTEFT_GEN_A_CORE
Post Operative Note  Patient: SEN LING 56y (1965) Female   MRN: [id number]  Location: [address]  Visit: 11-01-21 Inpatient  Date: 11-03-21 @ 14:03    Procedure:  56F s/p Left lower extremity angiogram: single AT vessel runoff    Subjective:   Nausea: no, Vomiting: no, Ambulating: no, Flatus: no  Pain Assessment: Patient is complaining of groin pain that is appropriate for post-operative course. Patient still has left leg pain beyond the knee.    Objective:  Vitals: T(F): 97.2 (11-03-21 @ 12:47), Max: 99.4 (11-03-21 @ 04:08)  HR: 70 (11-03-21 @ 12:47)  BP: 160/69 (11-03-21 @ 12:47) (139/62 - 195/85)  RR: 18 (11-03-21 @ 12:47)  SpO2: 100% (11-03-21 @ 12:47)  Vent Settings:     In:   11-03-21 @ 07:01  -  11-03-21 @ 14:03  --------------------------------------------------------  IN: 0 mL      IV Fluids: calcitriol   Capsule 0.25 MICROGram(s) Oral daily      Out:   11-03-21 @ 07:01  -  11-03-21 @ 14:03  --------------------------------------------------------  OUT: 650 mL      EBL:     Voided Urine:   11-03-21 @ 07:01  -  11-03-21 @ 14:03  --------------------------------------------------------  OUT: 650 mL      Henriquez Catheter: yes no   Drains:   PÉREZ:    ,   Chest Tube:      NG Tube:       Physical Examination:  General Appearance: NAD  HEENT: EOMI, sclera non-icteric.  Heart: RRR  Lungs: CTABL  Abdomen:  Soft, nontender, No rigidity, guarding, or rebound tenderness.   MSK/Extremities: Right groin wound clean/dry/intact  LLE: +DP on doppler, No PT on doppler  RLE: +DP and +PT on doppler    Medications: [Standing]  aspirin  chewable 81 milliGRAM(s) Oral daily  atorvastatin 80 milliGRAM(s) Oral at bedtime  calcitriol   Capsule 0.25 MICROGram(s) Oral daily  clopidogrel Tablet 75 milliGRAM(s) Oral daily  gabapentin 100 milliGRAM(s) Oral three times a day  metoprolol succinate ER 25 milliGRAM(s) Oral daily  pantoprazole    Tablet 40 milliGRAM(s) Oral before breakfast    Medications: [PRN]  aspirin  chewable 81 milliGRAM(s) Oral daily  atorvastatin 80 milliGRAM(s) Oral at bedtime  calcitriol   Capsule 0.25 MICROGram(s) Oral daily  clopidogrel Tablet 75 milliGRAM(s) Oral daily  gabapentin 100 milliGRAM(s) Oral three times a day  metoprolol succinate ER 25 milliGRAM(s) Oral daily  pantoprazole    Tablet 40 milliGRAM(s) Oral before breakfast      DVT PROPHYLAXIS:   GI PROPHYLAXIS: pantoprazole    Tablet 40 milliGRAM(s) Oral before breakfast    ANTICOAGULATION:   ANTIBIOTICS:        Labs:                        10.1   7.26  )-----------( 266      ( 02 Nov 2021 17:31 )             31.7     11-03    140  |  108  |  53<H>  ----------------------------<  220<H>  5.1<H>   |  17  |  2.6<H>    Ca    8.5      03 Nov 2021 05:34  Phos  5.0     11-02  Mg     1.9     11-02    TPro  7.2  /  Alb  4.0  /  TBili  <0.2  /  DBili  x   /  AST  18  /  ALT  18  /  AlkPhos  236<H>  11-01    PT/INR - ( 02 Nov 2021 17:31 )   PT: 11.20 sec;   INR: 0.97 ratio         PTT - ( 02 Nov 2021 17:31 )  PTT:38.2 sec    Imaging:  No post-op imaging studies    Assessment:  56F s/p Left lower extremity angiogram: single AT vessel runoff    Plan:  - needs femoral to tibial bypass  - cardiac evaluation for clearance for general anesthesia  - vein mapping  - echocardiogram ordered    Date/Time: 11-03-21 @ 14:03 Post Operative Note  Patient: SEN LING 56y (1965) Female   MRN: [id number]  Location: [address]  Visit: 11-01-21 Inpatient  Date: 11-03-21 @ 14:03    Procedure:  56F s/p Left lower extremity angiogram: single AT vessel runoff    Subjective:   Nausea: no, Vomiting: no, Ambulating: no, Flatus: no  Pain Assessment: Patient is complaining of groin pain that is appropriate for post-operative course. Patient still has left leg pain beyond the knee.    Objective:  Vitals: T(F): 97.2 (11-03-21 @ 12:47), Max: 99.4 (11-03-21 @ 04:08)  HR: 70 (11-03-21 @ 12:47)  BP: 160/69 (11-03-21 @ 12:47) (139/62 - 195/85)  RR: 18 (11-03-21 @ 12:47)  SpO2: 100% (11-03-21 @ 12:47)  Vent Settings:     In:   11-03-21 @ 07:01  -  11-03-21 @ 14:03  --------------------------------------------------------  IN: 0 mL      IV Fluids: calcitriol   Capsule 0.25 MICROGram(s) Oral daily      Out:   11-03-21 @ 07:01  -  11-03-21 @ 14:03  --------------------------------------------------------  OUT: 650 mL      EBL:     Voided Urine:   11-03-21 @ 07:01  -  11-03-21 @ 14:03  --------------------------------------------------------  OUT: 650 mL      Henriquez Catheter: yes no   Drains:   PÉREZ:    ,   Chest Tube:      NG Tube:       Physical Examination:  General Appearance: NAD  HEENT: EOMI, sclera non-icteric.  Heart: RRR  Lungs: CTABL  Abdomen:  Soft, nontender, No rigidity, guarding, or rebound tenderness.   MSK/Extremities: Right groin wound clean/dry/intact  LLE: + Popliteal, +DP on doppler, No PT on doppler  RLE: +DP and +PT on doppler    Medications: [Standing]  aspirin  chewable 81 milliGRAM(s) Oral daily  atorvastatin 80 milliGRAM(s) Oral at bedtime  calcitriol   Capsule 0.25 MICROGram(s) Oral daily  clopidogrel Tablet 75 milliGRAM(s) Oral daily  gabapentin 100 milliGRAM(s) Oral three times a day  metoprolol succinate ER 25 milliGRAM(s) Oral daily  pantoprazole    Tablet 40 milliGRAM(s) Oral before breakfast    Medications: [PRN]  aspirin  chewable 81 milliGRAM(s) Oral daily  atorvastatin 80 milliGRAM(s) Oral at bedtime  calcitriol   Capsule 0.25 MICROGram(s) Oral daily  clopidogrel Tablet 75 milliGRAM(s) Oral daily  gabapentin 100 milliGRAM(s) Oral three times a day  metoprolol succinate ER 25 milliGRAM(s) Oral daily  pantoprazole    Tablet 40 milliGRAM(s) Oral before breakfast      DVT PROPHYLAXIS:   GI PROPHYLAXIS: pantoprazole    Tablet 40 milliGRAM(s) Oral before breakfast    ANTICOAGULATION:   ANTIBIOTICS:        Labs:                        10.1   7.26  )-----------( 266      ( 02 Nov 2021 17:31 )             31.7     11-03    140  |  108  |  53<H>  ----------------------------<  220<H>  5.1<H>   |  17  |  2.6<H>    Ca    8.5      03 Nov 2021 05:34  Phos  5.0     11-02  Mg     1.9     11-02    TPro  7.2  /  Alb  4.0  /  TBili  <0.2  /  DBili  x   /  AST  18  /  ALT  18  /  AlkPhos  236<H>  11-01    PT/INR - ( 02 Nov 2021 17:31 )   PT: 11.20 sec;   INR: 0.97 ratio         PTT - ( 02 Nov 2021 17:31 )  PTT:38.2 sec    Imaging:  No post-op imaging studies    Assessment:  56F s/p Left lower extremity angiogram: single AT vessel runoff    Plan:  - needs femoral to tibial bypass  - cardiac evaluation for clearance for general anesthesia  - vein mapping  - echocardiogram ordered    Date/Time: 11-03-21 @ 14:03

## 2021-11-03 NOTE — CONSULT NOTE ADULT - SUBJECTIVE AND OBJECTIVE BOX
HPI:  HPI:  55 yo female PMH PVD, smoker, HTN, HLD, h/o right pontine CVA (2/7/21), intellectual disability, hearing/speech impairment presented with chronic left heel ulcer and pain. Pt's son was at bedside to help translate. Pt states the pain is in the entire left leg and can travel up to her back. Pt recently visited the ED pm 10/25/21 for left lower extremity chronic arterial occlusion. Duplex at that time showed No significant arterial blood flow visualized beyond the left superficial femoral artery. Pt was sent to see Dr. Sullivan as an outpatient; at that appointment the pt was told to return to the hospital for an angiogram. Denies fever, chills, CP, SOB, abdominal, HA.    (01 Nov 2021 15:52)  interval historyL patient LLE angiogram shoed Single AT runoff. patient is scheduled for femoral to tibial bypass. cardiology consulted for pre-operative risk assessment     PAST MEDICAL & SURGICAL HISTORY  PVD (peripheral vascular disease)    Benign essential HTN    Intellectual disability    Hearing impaired    HLD (hyperlipidemia)        FAMILY HISTORY:  FAMILY HISTORY:      SOCIAL HISTORY:  []smoker  []Alcohol  []Drug    ALLERGIES:  No Known Allergies      MEDICATIONS:  MEDICATIONS  (STANDING):  aspirin  chewable 81 milliGRAM(s) Oral daily  atorvastatin 80 milliGRAM(s) Oral at bedtime  calcitriol   Capsule 0.25 MICROGram(s) Oral daily  clopidogrel Tablet 75 milliGRAM(s) Oral daily  gabapentin 100 milliGRAM(s) Oral three times a day  metoprolol succinate ER 25 milliGRAM(s) Oral daily  pantoprazole    Tablet 40 milliGRAM(s) Oral before breakfast    MEDICATIONS  (PRN):      HOME MEDICATIONS:  Home Medications:  aspirin 81 mg oral tablet: 1 tab(s) orally once a day (01 Nov 2021 16:07)  atorvastatin 80 mg oral tablet: 1 tab(s) orally once a day (01 Nov 2021 16:06)  calcitriol 0.25 mcg oral capsule: 1 cap(s) orally once a day (01 Nov 2021 16:05)  gabapentin 300 mg oral capsule: 1 cap(s) orally 3 times a day (01 Nov 2021 16:06)  losartan 50 mg oral tablet: 1 tab(s) orally once a day (01 Nov 2021 16:08)  Metoprolol Succinate ER 25 mg oral tablet, extended release: 1 tab(s) orally once a day (01 Nov 2021 16:07)  Plavix 75 mg oral tablet: 1 tab(s) orally once a day (01 Nov 2021 16:07)  Protonix 40 mg oral delayed release tablet: 1 tab(s) orally once a day (01 Nov 2021 16:07)      VITALS:   T(F): 98 (11-03 @ 11:45), Max: 99.4 (11-03 @ 04:08)  HR: 67 (11-03 @ 11:45) (65 - 79)  BP: 140/65 (11-03 @ 11:45) (127/58 - 195/85)  BP(mean): --  RR: 16 (11-03 @ 11:45) (11 - 20)  SpO2: 100% (11-03 @ 11:45) (99% - 100%)    I&O's Summary    03 Nov 2021 07:01  -  03 Nov 2021 12:26  --------------------------------------------------------  IN: 0 mL / OUT: 650 mL / NET: -650 mL        REVIEW OF SYSTEMS:  unable to assess     PHYSICAL EXAM:  NEURO: patient is awake , alert and oriented  GEN: Not in acute distress  NECK: no thyroid enlargement, no JVD  LUNGS: Clear to auscultation bilaterally   CARDIOVASCULAR: S1/S2 present, RRR , no murmurs or rubs, no carotid bruits,  + PP bilaterally  ABD: Soft, non-tender, non-distended, +BS  EXT: left heel ulcer, covered with dressing   SKIN: Intact    LABS:                        10.1   7.26  )-----------( 266      ( 02 Nov 2021 17:31 )             31.7     11-03    140  |  108  |  53<H>  ----------------------------<  220<H>  5.1<H>   |  17  |  2.6<H>    Ca    8.5      03 Nov 2021 05:34  Phos  5.0     11-02  Mg     1.9     11-02    TPro  7.2  /  Alb  4.0  /  TBili  <0.2  /  DBili  x   /  AST  18  /  ALT  18  /  AlkPhos  236<H>  11-01    PT/INR - ( 02 Nov 2021 17:31 )   PT: 11.20 sec;   INR: 0.97 ratio         PTT - ( 02 Nov 2021 17:31 )  PTT:38.2 sec          Troponin trend:            RADIOLOGY:  -CXR:  < from: Xray Chest 1 View- PORTABLE-Routine (Xray Chest 1 View- PORTABLE-Routine .) (11.01.21 @ 18:01) >  Impression:    Bibasilar streaky opacities likely representing atelectasis and/or scarring.    --- End of Report ---    < end of copied text >      -TTE:  pending    ECG:    < from: 12 Lead ECG (11.01.21 @ 12:09) >  Diagnosis Line Normal sinus rhythm  Normal ECG    < end of copied text >

## 2021-11-03 NOTE — CHART NOTE - NSCHARTNOTEFT_GEN_A_CORE
dx: left heel necrosis  s/p Left lower extremity angiogram: single AT vessel runoff  Recs:  - needs femoral to tibial bypass  - cardiac evaluation for clearance for general anesthesia  - vein mapping  - echocardiogram ordered    SPECTRA [id number]

## 2021-11-04 LAB
ANION GAP SERPL CALC-SCNC: 15 MMOL/L — HIGH (ref 7–14)
ANION GAP SERPL CALC-SCNC: 19 MMOL/L — HIGH (ref 7–14)
APTT BLD: 35.3 SEC — SIGNIFICANT CHANGE UP (ref 27–39.2)
BUN SERPL-MCNC: 55 MG/DL — HIGH (ref 10–20)
BUN SERPL-MCNC: 57 MG/DL — HIGH (ref 10–20)
CALCIUM SERPL-MCNC: 8.4 MG/DL — LOW (ref 8.5–10.1)
CALCIUM SERPL-MCNC: 8.6 MG/DL — SIGNIFICANT CHANGE UP (ref 8.5–10.1)
CHLORIDE SERPL-SCNC: 104 MMOL/L — SIGNIFICANT CHANGE UP (ref 98–110)
CHLORIDE SERPL-SCNC: 106 MMOL/L — SIGNIFICANT CHANGE UP (ref 98–110)
CO2 SERPL-SCNC: 14 MMOL/L — LOW (ref 17–32)
CO2 SERPL-SCNC: 17 MMOL/L — SIGNIFICANT CHANGE UP (ref 17–32)
CREAT SERPL-MCNC: 3.2 MG/DL — HIGH (ref 0.7–1.5)
CREAT SERPL-MCNC: 3.3 MG/DL — HIGH (ref 0.7–1.5)
GLUCOSE BLDC GLUCOMTR-MCNC: 126 MG/DL — HIGH (ref 70–99)
GLUCOSE BLDC GLUCOMTR-MCNC: 175 MG/DL — HIGH (ref 70–99)
GLUCOSE BLDC GLUCOMTR-MCNC: 181 MG/DL — HIGH (ref 70–99)
GLUCOSE BLDC GLUCOMTR-MCNC: 195 MG/DL — HIGH (ref 70–99)
GLUCOSE BLDC GLUCOMTR-MCNC: 252 MG/DL — HIGH (ref 70–99)
GLUCOSE SERPL-MCNC: 194 MG/DL — HIGH (ref 70–99)
GLUCOSE SERPL-MCNC: 347 MG/DL — HIGH (ref 70–99)
HCT VFR BLD CALC: 29.6 % — LOW (ref 37–47)
HGB BLD-MCNC: 9.4 G/DL — LOW (ref 12–16)
INR BLD: 0.9 RATIO — SIGNIFICANT CHANGE UP (ref 0.65–1.3)
MAGNESIUM SERPL-MCNC: 2.4 MG/DL — SIGNIFICANT CHANGE UP (ref 1.8–2.4)
MAGNESIUM SERPL-MCNC: 2.4 MG/DL — SIGNIFICANT CHANGE UP (ref 1.8–2.4)
MCHC RBC-ENTMCNC: 28.4 PG — SIGNIFICANT CHANGE UP (ref 27–31)
MCHC RBC-ENTMCNC: 31.8 G/DL — LOW (ref 32–37)
MCV RBC AUTO: 89.4 FL — SIGNIFICANT CHANGE UP (ref 81–99)
NRBC # BLD: 0 /100 WBCS — SIGNIFICANT CHANGE UP (ref 0–0)
PLATELET # BLD AUTO: 242 K/UL — SIGNIFICANT CHANGE UP (ref 130–400)
POTASSIUM SERPL-MCNC: 5.5 MMOL/L — HIGH (ref 3.5–5)
POTASSIUM SERPL-MCNC: 5.8 MMOL/L — HIGH (ref 3.5–5)
POTASSIUM SERPL-SCNC: 5.5 MMOL/L — HIGH (ref 3.5–5)
POTASSIUM SERPL-SCNC: 5.8 MMOL/L — HIGH (ref 3.5–5)
PROTHROM AB SERPL-ACNC: 10.4 SEC — SIGNIFICANT CHANGE UP (ref 9.95–12.87)
RBC # BLD: 3.31 M/UL — LOW (ref 4.2–5.4)
RBC # FLD: 13.3 % — SIGNIFICANT CHANGE UP (ref 11.5–14.5)
SODIUM SERPL-SCNC: 137 MMOL/L — SIGNIFICANT CHANGE UP (ref 135–146)
SODIUM SERPL-SCNC: 138 MMOL/L — SIGNIFICANT CHANGE UP (ref 135–146)
WBC # BLD: 9.45 K/UL — SIGNIFICANT CHANGE UP (ref 4.8–10.8)
WBC # FLD AUTO: 9.45 K/UL — SIGNIFICANT CHANGE UP (ref 4.8–10.8)

## 2021-11-04 PROCEDURE — 93306 TTE W/DOPPLER COMPLETE: CPT | Mod: 26

## 2021-11-04 RX ORDER — SODIUM ZIRCONIUM CYCLOSILICATE 10 G/10G
5 POWDER, FOR SUSPENSION ORAL ONCE
Refills: 0 | Status: COMPLETED | OUTPATIENT
Start: 2021-11-04 | End: 2021-11-04

## 2021-11-04 RX ORDER — MAGNESIUM SULFATE 500 MG/ML
2 VIAL (ML) INJECTION ONCE
Refills: 0 | Status: COMPLETED | OUTPATIENT
Start: 2021-11-04 | End: 2021-11-04

## 2021-11-04 RX ORDER — METHYLPREDNISOLONE 4 MG
500 TABLET ORAL ONCE
Refills: 0 | Status: COMPLETED | OUTPATIENT
Start: 2021-11-04 | End: 2021-11-04

## 2021-11-04 RX ORDER — DEXTROSE 50 % IN WATER 50 %
50 SYRINGE (ML) INTRAVENOUS ONCE
Refills: 0 | Status: COMPLETED | OUTPATIENT
Start: 2021-11-04 | End: 2021-11-04

## 2021-11-04 RX ORDER — MAGNESIUM HYDROXIDE 400 MG/1
30 TABLET, CHEWABLE ORAL ONCE
Refills: 0 | Status: DISCONTINUED | OUTPATIENT
Start: 2021-11-04 | End: 2021-11-08

## 2021-11-04 RX ORDER — SODIUM BICARBONATE 1 MEQ/ML
650 SYRINGE (ML) INTRAVENOUS EVERY 8 HOURS
Refills: 0 | Status: DISCONTINUED | OUTPATIENT
Start: 2021-11-04 | End: 2021-11-05

## 2021-11-04 RX ORDER — INSULIN LISPRO 100/ML
VIAL (ML) SUBCUTANEOUS
Refills: 0 | Status: DISCONTINUED | OUTPATIENT
Start: 2021-11-04 | End: 2021-11-08

## 2021-11-04 RX ORDER — HYDRALAZINE HCL 50 MG
25 TABLET ORAL EVERY 8 HOURS
Refills: 0 | Status: DISCONTINUED | OUTPATIENT
Start: 2021-11-04 | End: 2021-11-08

## 2021-11-04 RX ORDER — INSULIN HUMAN 100 [IU]/ML
10 INJECTION, SOLUTION SUBCUTANEOUS ONCE
Refills: 0 | Status: COMPLETED | OUTPATIENT
Start: 2021-11-04 | End: 2021-11-04

## 2021-11-04 RX ADMIN — Medication 50 GRAM(S): at 07:57

## 2021-11-04 RX ADMIN — Medication 650 MILLIGRAM(S): at 00:47

## 2021-11-04 RX ADMIN — Medication 650 MILLIGRAM(S): at 13:00

## 2021-11-04 RX ADMIN — ATORVASTATIN CALCIUM 80 MILLIGRAM(S): 80 TABLET, FILM COATED ORAL at 21:59

## 2021-11-04 RX ADMIN — PANTOPRAZOLE SODIUM 40 MILLIGRAM(S): 20 TABLET, DELAYED RELEASE ORAL at 08:08

## 2021-11-04 RX ADMIN — Medication 25 MILLIGRAM(S): at 13:52

## 2021-11-04 RX ADMIN — Medication 650 MILLIGRAM(S): at 12:37

## 2021-11-04 RX ADMIN — INSULIN HUMAN 10 UNIT(S): 100 INJECTION, SOLUTION SUBCUTANEOUS at 12:34

## 2021-11-04 RX ADMIN — OXYCODONE HYDROCHLORIDE 5 MILLIGRAM(S): 5 TABLET ORAL at 17:45

## 2021-11-04 RX ADMIN — Medication 4: at 07:57

## 2021-11-04 RX ADMIN — Medication 650 MILLIGRAM(S): at 13:52

## 2021-11-04 RX ADMIN — Medication 50 MILLILITER(S): at 12:34

## 2021-11-04 RX ADMIN — OXYCODONE HYDROCHLORIDE 5 MILLIGRAM(S): 5 TABLET ORAL at 11:10

## 2021-11-04 RX ADMIN — CALCITRIOL 0.25 MICROGRAM(S): 0.5 CAPSULE ORAL at 12:27

## 2021-11-04 RX ADMIN — Medication 81 MILLIGRAM(S): at 12:27

## 2021-11-04 RX ADMIN — Medication 650 MILLIGRAM(S): at 17:45

## 2021-11-04 RX ADMIN — GABAPENTIN 100 MILLIGRAM(S): 400 CAPSULE ORAL at 05:51

## 2021-11-04 RX ADMIN — OXYCODONE HYDROCHLORIDE 5 MILLIGRAM(S): 5 TABLET ORAL at 11:40

## 2021-11-04 RX ADMIN — GABAPENTIN 100 MILLIGRAM(S): 400 CAPSULE ORAL at 22:05

## 2021-11-04 RX ADMIN — SODIUM ZIRCONIUM CYCLOSILICATE 5 GRAM(S): 10 POWDER, FOR SUSPENSION ORAL at 12:35

## 2021-11-04 RX ADMIN — Medication 8: at 12:27

## 2021-11-04 RX ADMIN — Medication 25 MILLIGRAM(S): at 22:02

## 2021-11-04 RX ADMIN — OXYCODONE HYDROCHLORIDE 5 MILLIGRAM(S): 5 TABLET ORAL at 00:44

## 2021-11-04 RX ADMIN — Medication 650 MILLIGRAM(S): at 06:00

## 2021-11-04 RX ADMIN — Medication 650 MILLIGRAM(S): at 17:15

## 2021-11-04 RX ADMIN — Medication 25 MILLIGRAM(S): at 05:51

## 2021-11-04 RX ADMIN — HEPARIN SODIUM 5000 UNIT(S): 5000 INJECTION INTRAVENOUS; SUBCUTANEOUS at 22:02

## 2021-11-04 RX ADMIN — GABAPENTIN 100 MILLIGRAM(S): 400 CAPSULE ORAL at 13:52

## 2021-11-04 RX ADMIN — CLOPIDOGREL BISULFATE 75 MILLIGRAM(S): 75 TABLET, FILM COATED ORAL at 12:27

## 2021-11-04 RX ADMIN — HEPARIN SODIUM 5000 UNIT(S): 5000 INJECTION INTRAVENOUS; SUBCUTANEOUS at 05:51

## 2021-11-04 RX ADMIN — Medication 500 MILLIGRAM(S): at 12:45

## 2021-11-04 RX ADMIN — Medication 650 MILLIGRAM(S): at 22:03

## 2021-11-04 RX ADMIN — Medication 650 MILLIGRAM(S): at 05:44

## 2021-11-04 RX ADMIN — Medication 4: at 17:07

## 2021-11-04 RX ADMIN — OXYCODONE HYDROCHLORIDE 5 MILLIGRAM(S): 5 TABLET ORAL at 17:15

## 2021-11-04 RX ADMIN — HEPARIN SODIUM 5000 UNIT(S): 5000 INJECTION INTRAVENOUS; SUBCUTANEOUS at 13:52

## 2021-11-04 NOTE — PROGRESS NOTE ADULT - SUBJECTIVE AND OBJECTIVE BOX
GENERAL SURGERY PROGRESS NOTE    Patient: SEN LING , 56y ([birth year])Female   MRN: [id number]  Location: [address]  Visit: 21 Inpatient  Date: 21 @ 00:08    Hospital Day #: 4  Post-Op Day #: 1    Procedure/Dx/Injuries: LLE angiogram    Events of past 24 hours:    PAST MEDICAL & SURGICAL HISTORY:  PVD (peripheral vascular disease)    Benign essential HTN    Intellectual disability    Hearing impaired    HLD (hyperlipidemia)        Vitals:   T(F): 97.6 (21 @ 22:00), Max: 99.4 (21 @ 04:08)  HR: 72 (21 @ 22:00)  BP: 129/59 (21 @ 22:00)  RR: 18 (21 @ 22:00)  SpO2: 98% (21 @ 22:00)      Diet, Consistent Carbohydrate Renal w/Evening Snack      Fluids:     I & O's:      Bowel Movement: : [] YES [] NO  Flatus: : [] YES [] NO    PHYSICAL EXAM:  General: NAD,   Cardiac: RRR S1, S2,  Respiratory: CTAB,   Abdomen: Soft, non-distended, non-tender, right groin dressing in place, no bleeding or hematoma  Vascular: LLE + DP, no PT, RLE +DP/PT    MEDICATIONS  (STANDING):  acetaminophen     Tablet .. 650 milliGRAM(s) Oral every 6 hours  aspirin  chewable 81 milliGRAM(s) Oral daily  atorvastatin 80 milliGRAM(s) Oral at bedtime  calcitriol   Capsule 0.25 MICROGram(s) Oral daily  clopidogrel Tablet 75 milliGRAM(s) Oral daily  gabapentin 100 milliGRAM(s) Oral three times a day  heparin   Injectable 5000 Unit(s) SubCutaneous every 8 hours  insulin lispro (ADMELOG) corrective regimen sliding scale   SubCutaneous three times a day before meals  losartan 50 milliGRAM(s) Oral daily  metoprolol succinate ER 25 milliGRAM(s) Oral daily  pantoprazole    Tablet 40 milliGRAM(s) Oral before breakfast    MEDICATIONS  (PRN):  oxyCODONE    IR 5 milliGRAM(s) Oral every 6 hours PRN Severe Pain (7 - 10)      DVT PROPHYLAXIS: heparin   Injectable 5000 Unit(s) SubCutaneous every 8 hours    GI PROPHYLAXIS: pantoprazole    Tablet 40 milliGRAM(s) Oral before breakfast    ANTICOAGULATION:   ANTIBIOTICS:            LAB/STUDIES:  Labs:  CAPILLARY BLOOD GLUCOSE      POCT Blood Glucose.: 169 mg/dL (2021 22:03)  POCT Blood Glucose.: 262 mg/dL (2021 16:35)  POCT Blood Glucose.: 232 mg/dL (2021 07:06)                          9.8    8.88  )-----------( 234      ( 2021 20:00 )             30.4       Auto Neutrophil %: 71.8 % (21 @ 20:00)  Auto Immature Granulocyte %: 0.2 % (21 @ 20:00)        143  |  110  |  56<H>  ----------------------------<  134<H>  5.7<H>   |  16<L>  |  2.8<H>      Calcium, Total Serum: 8.4 mg/dL (21 @ 20:00)      LFTs:         Coags:     11.  ----< 0.97    ( 2021 17:31 )     38.2                Urinalysis Basic - ( 2021 21:45 )    Color: Yellow / Appearance: Slightly Turbid / S.014 / pH: x  Gluc: x / Ketone: Negative  / Bili: Negative / Urobili: <2 mg/dL   Blood: x / Protein: 300 mg/dL / Nitrite: Negative   Leuk Esterase: Large / RBC: 1 /HPF /  /HPF   Sq Epi: x / Non Sq Epi: 1 /HPF / Bacteria: Many    IMAGING:      ACCESS/ DEVICES:  [x ] Peripheral IV  [ ] Central Venous Line	[ ] R	[ ] L	[ ] IJ	[ ] Fem	[ ] SC	Placed:   [ ] Arterial Line		[ ] R	[ ] L	[ ] Fem	[ ] Rad	[ ] Ax	Placed:   [ ] PICC:					[ ] Mediport  [ ] Urinary Catheter,  Date Placed:   [ ] Chest tube: [ ] Right, [ ] Left  [ ] PÉREZ/Panfilo Drains

## 2021-11-04 NOTE — PROGRESS NOTE ADULT - ASSESSMENT
55 yo female PMH CKD4 PVD, smoker, HTN, HLD, h/o right pontine CVA (2/7/21), DM,  intellectual disability, hearing/speech impairment presents with chronic left heel ulcer and pain. Recent duplex on 10/25/21 showed no significant arterial blood flow visualized beyond the left superficial femoral artery.  Has stable CKD4 at least since 2/21; saw nephrologist in [place]    # CKD 4, stable  # Proteinuria, nephrotic range / likely d/t HTN/DM  # Hx of HTN  # Hx of DM  # LE ulcer / vascular following, angiogram today / risk for ANKITA     - strict i/o  - needs better glycemic control; increase insulin- will help lower K as well  - give patient 500cc of d5w + 75meq NaHCO3 at 50cc/hr d/t higher urine output iso hyperglycemia and pt npo w/ rising Na and metabolic acidosis   - please document accurate urine output   - cont hydralazine, bp better controlled  - check OLVIN, serum free light chain ratio, spep/ifx   - phos noted, no binder   - increase sodium bicarb po to 1300mg q8h  - hgb at goal  - document strict i/o  - monitor renal function closely, urine output / still at risk for ANKITA - creat up-trending may be d/t mild hypovolemia

## 2021-11-04 NOTE — PROGRESS NOTE ADULT - ASSESSMENT
ASSESSMENT:  56y F s/p LLE angiogram,     PLAN:  - patient needs femoral to tibial bypass  - cardiac evaluation: moderate to dennis risk for surgery   - f/u vein mapping  - f/u echocardiogram   - pain control  -DVT/GI prophylaxis    spectra [id number]   ASSESSMENT:  56y F with necrotic wounds on left heel, DM, neuropathy, CKD, CVA in the past, s/p LLE angiogram 11/3     PLAN:  - patient needs femoral to tibial bypass  - cardiac evaluation: moderate to high risk for surgery   - f/u vein mapping done  - f/u echocardiogram   - Hyperkalemia - give [place], f/u PM BMP  - Hypomagnesemia - replete Mg today, f/u repeat  - CKD - monitor Cr levels BID after angiogram    - pain control  -DVT/GI prophylaxis    spectra [id number]

## 2021-11-04 NOTE — PROGRESS NOTE ADULT - SUBJECTIVE AND OBJECTIVE BOX
Nephrology Progress Note    SEN LING  MRN-[id number]  56y  Female    S:  Patient is seen and examined, events over the last 24h noted.    O:  Allergies:  No Known Allergies    Hospital Medications:   MEDICATIONS  (STANDING):  acetaminophen     Tablet .. 650 milliGRAM(s) Oral every 6 hours  aspirin  chewable 81 milliGRAM(s) Oral daily  atorvastatin 80 milliGRAM(s) Oral at bedtime  calcitriol   Capsule 0.25 MICROGram(s) Oral daily  clopidogrel Tablet 75 milliGRAM(s) Oral daily  dextrose 50% Injectable 50 milliLiter(s) IV Push once  gabapentin 100 milliGRAM(s) Oral three times a day  heparin   Injectable 5000 Unit(s) SubCutaneous every 8 hours  hydrALAZINE 25 milliGRAM(s) Oral every 8 hours  insulin lispro (ADMELOG) corrective regimen sliding scale   SubCutaneous three times a day before meals  insulin regular  human recombinant 10 Unit(s) IV Push once  magnesium gluconate 500 milliGRAM(s) Oral once  metoprolol succinate ER 25 milliGRAM(s) Oral daily  pantoprazole    Tablet 40 milliGRAM(s) Oral before breakfast  sodium bicarbonate 650 milliGRAM(s) Oral every 8 hours  sodium zirconium cyclosilicate 5 Gram(s) Oral once    MEDICATIONS  (PRN):  magnesium hydroxide Suspension 30 milliLiter(s) Oral once PRN Constipation  oxyCODONE    IR 5 milliGRAM(s) Oral every 6 hours PRN Severe Pain (7 - 10)    Home Medications:  aspirin 81 mg oral tablet: 1 tab(s) orally once a day (2021 16:07)  atorvastatin 80 mg oral tablet: 1 tab(s) orally once a day (2021 16:06)  calcitriol 0.25 mcg oral capsule: 1 cap(s) orally once a day (2021 16:05)  gabapentin 300 mg oral capsule: 1 cap(s) orally 3 times a day (2021 16:06)  losartan 50 mg oral tablet: 1 tab(s) orally once a day (2021 16:08)  Metoprolol Succinate ER 25 mg oral tablet, extended release: 1 tab(s) orally once a day (2021 16:07)  Plavix 75 mg oral tablet: 1 tab(s) orally once a day (2021 16:07)  Protonix 40 mg oral delayed release tablet: 1 tab(s) orally once a day (2021 16:07)      VITALS:  Daily     Daily   T(F): 97.5 (21 @ 09:00), Max: 98 (21 @ 11:45)  HR: 77 (21 @ 09:00)  BP: 131/62 (21 @ 09:00)  RR: 20 (21 @ 09:00)  SpO2: 98% (21 @ 09:00)  Wt(kg): --  I&O's Detail    2021 07:  -  2021 07:00  --------------------------------------------------------  IN:  Total IN: 0 mL    OUT:    Voided (mL): 650 mL  Total OUT: 650 mL    Total NET: -650 mL        I&O's Summary    2021 07:01  -  2021 07:00  --------------------------------------------------------  IN: 0 mL / OUT: 650 mL / NET: -650 mL          PHYSICAL EXAM:  Gen: NAD  Resp:   Card: S1/S2  Abd: soft  Extremities:   Vascular access:       LABS:          143  |  110  |  56<H>  ----------------------------<  134<H>  5.7<H>   |  16<L>  |  2.8<H>    Ca    8.4<L>      2021 20:00  Phos  4.5       Mg     1.7           eGFR if Non African American: 18 mL/min/1.73M2 (21 @ 20:00)  eGFR if African American: 21 mL/min/1.73M2 (21 @ 20:00)    Phosphorus Level, Serum: 4.5 mg/dL (11-03-21 @ 20:00)  Phosphorus Level, Serum: 5.0 mg/dL (21 @ 17:31)                            9.8    8.88  )-----------( 234      ( 2021 20:00 )             30.4     Mean Cell Volume: 89.1 fL (21 @ 20:00)        Urine Studies:  Urinalysis Basic - ( 2021 21:45 )    Color: Yellow / Appearance: Slightly Turbid / S.014 / pH:   Gluc:  / Ketone: Negative  / Bili: Negative / Urobili: <2 mg/dL   Blood:  / Protein: 300 mg/dL / Nitrite: Negative   Leuk Esterase: Large / RBC: 1 /HPF /  /HPF   Sq Epi:  / Non Sq Epi: 1 /HPF / Bacteria: Many          Creatinine, Random Urine: 47 mg/dL ( @ 21:45)  Protein/Creatinine Ratio Calculation: 4.5 Ratio ( 21:45)    Creatinine trend:  Creatinine, Serum: 2.8 mg/dL (21 @ 20:00)  Creatinine, Serum: 2.6 mg/dL (21 @ 05:34)  Creatinine, Serum: 2.9 mg/dL (21 @ 17:31)  Creatinine, Serum: 2.5 mg/dL (21 @ 14:30)  Creatinine, Serum: 2.4 mg/dL (10-25-21 @ 18:26)    Hepatitis C Virus Cutoff Index: .03 COI (21 @ 07:04)  Hepatitis C Virus Interpretation Result: Nonreact (21 @ 07:04)         Nephrology Progress Note    SEN LING  MRN-[id number]  56y  Female    S:  Patient is seen and examined, events over the last 24h noted.    O:  Allergies:  No Known Allergies    Hospital Medications:   MEDICATIONS  (STANDING):  acetaminophen     Tablet .. 650 milliGRAM(s) Oral every 6 hours  aspirin  chewable 81 milliGRAM(s) Oral daily  atorvastatin 80 milliGRAM(s) Oral at bedtime  calcitriol   Capsule 0.25 MICROGram(s) Oral daily  clopidogrel Tablet 75 milliGRAM(s) Oral daily  gabapentin 100 milliGRAM(s) Oral three times a day  heparin   Injectable 5000 Unit(s) SubCutaneous every 8 hours  hydrALAZINE 25 milliGRAM(s) Oral every 8 hours  insulin lispro (ADMELOG) corrective regimen sliding scale   SubCutaneous three times a day before meals  insulin regular  human recombinant 10 Unit(s) IV Push once  magnesium gluconate 500 milliGRAM(s) Oral once  metoprolol succinate ER 25 milliGRAM(s) Oral daily  pantoprazole    Tablet 40 milliGRAM(s) Oral before breakfast  sodium bicarbonate 650 milliGRAM(s) Oral every 8 hours  sodium zirconium cyclosilicate 5 Gram(s) Oral once    MEDICATIONS  (PRN):  magnesium hydroxide Suspension 30 milliLiter(s) Oral once PRN Constipation  oxyCODONE    IR 5 milliGRAM(s) Oral every 6 hours PRN Severe Pain (7 - 10)    Home Medications:  aspirin 81 mg oral tablet: 1 tab(s) orally once a day (2021 16:07)  atorvastatin 80 mg oral tablet: 1 tab(s) orally once a day (2021 16:06)  calcitriol 0.25 mcg oral capsule: 1 cap(s) orally once a day (2021 16:05)  gabapentin 300 mg oral capsule: 1 cap(s) orally 3 times a day (2021 16:06)  losartan 50 mg oral tablet: 1 tab(s) orally once a day (2021 16:08)  Metoprolol Succinate ER 25 mg oral tablet, extended release: 1 tab(s) orally once a day (2021 16:07)  Plavix 75 mg oral tablet: 1 tab(s) orally once a day (2021 16:07)  Protonix 40 mg oral delayed release tablet: 1 tab(s) orally once a day (2021 16:07)      VITALS:  T(F): 97.5 (21 @ 09:00), Max: 98 (21 @ 11:45)  HR: 77 (21 @ 09:00)  BP: 131/62 (21 @ 09:00)  RR: 20 (21 @ 09:00)  SpO2: 98% (21 @ 09:00)  Wt(kg): --  I&O's Detail    2021 07:01  -  2021 07:00  --------------------------------------------------------  IN:  Total IN: 0 mL    OUT:    Voided (mL): 650 mL  Total OUT: 650 mL    Total NET: -650 mL        I&O's Summary    2021 07:  -  2021 07:00  --------------------------------------------------------  IN: 0 mL / OUT: 650 mL / NET: -650 mL          PHYSICAL EXAM:  Gen: NAD  Resp: CTAB  Card: S1/S2  Abd: soft  Extremities: no edema       LABS:          143  |  110  |  56<H>  ----------------------------<  134<H>  5.7<H>   |  16<L>  |  2.8<H>    Ca    8.4<L>      2021 20:00  Phos  4.5       Mg     1.7           eGFR if Non African American: 18 mL/min/1.73M2 (21 @ 20:00)  eGFR if African American: 21 mL/min/1.73M2 (21 @ 20:00)    Phosphorus Level, Serum: 4.5 mg/dL (21 @ 20:00)  Phosphorus Level, Serum: 5.0 mg/dL (21 @ 17:31)                            9.8    8.88  )-----------( 234      ( 2021 20:00 )             30.4     Mean Cell Volume: 89.1 fL (21 @ 20:00)        Urine Studies:  Urinalysis Basic - ( 2021 21:45 )    Color: Yellow / Appearance: Slightly Turbid / S.014 / pH:   Gluc:  / Ketone: Negative  / Bili: Negative / Urobili: <2 mg/dL   Blood:  / Protein: 300 mg/dL / Nitrite: Negative   Leuk Esterase: Large / RBC: 1 /HPF /  /HPF   Sq Epi:  / Non Sq Epi: 1 /HPF / Bacteria: Many          Creatinine, Random Urine: 47 mg/dL ( @ 21:45)  Protein/Creatinine Ratio Calculation: 4.5 Ratio (:45)    Creatinine trend:  Creatinine, Serum: 2.8 mg/dL (21 @ 20:00)  Creatinine, Serum: 2.6 mg/dL (21 @ 05:34)  Creatinine, Serum: 2.9 mg/dL (21 @ 17:31)  Creatinine, Serum: 2.5 mg/dL (21 @ 14:30)  Creatinine, Serum: 2.4 mg/dL (10-25-21 @ 18:26)    Hepatitis C Virus Cutoff Index: .03 COI (21 @ 07:04)  Hepatitis C Virus Interpretation Result: Nonreact (21 @ 07:04)

## 2021-11-05 LAB
ANION GAP SERPL CALC-SCNC: 15 MMOL/L — HIGH (ref 7–14)
ANION GAP SERPL CALC-SCNC: 16 MMOL/L — HIGH (ref 7–14)
APTT BLD: 40.7 SEC — HIGH (ref 27–39.2)
BASOPHILS # BLD AUTO: 0.02 K/UL — SIGNIFICANT CHANGE UP (ref 0–0.2)
BASOPHILS NFR BLD AUTO: 0.2 % — SIGNIFICANT CHANGE UP (ref 0–1)
BUN SERPL-MCNC: 54 MG/DL — HIGH (ref 10–20)
BUN SERPL-MCNC: 54 MG/DL — HIGH (ref 10–20)
CALCIUM SERPL-MCNC: 8.2 MG/DL — LOW (ref 8.5–10.1)
CALCIUM SERPL-MCNC: 8.6 MG/DL — SIGNIFICANT CHANGE UP (ref 8.5–10.1)
CHLORIDE SERPL-SCNC: 107 MMOL/L — SIGNIFICANT CHANGE UP (ref 98–110)
CHLORIDE SERPL-SCNC: 107 MMOL/L — SIGNIFICANT CHANGE UP (ref 98–110)
CO2 SERPL-SCNC: 17 MMOL/L — SIGNIFICANT CHANGE UP (ref 17–32)
CO2 SERPL-SCNC: 18 MMOL/L — SIGNIFICANT CHANGE UP (ref 17–32)
CREAT SERPL-MCNC: 3.3 MG/DL — HIGH (ref 0.7–1.5)
CREAT SERPL-MCNC: 3.4 MG/DL — HIGH (ref 0.7–1.5)
EOSINOPHIL # BLD AUTO: 0.73 K/UL — HIGH (ref 0–0.7)
EOSINOPHIL NFR BLD AUTO: 8.7 % — HIGH (ref 0–8)
GLUCOSE BLDC GLUCOMTR-MCNC: 146 MG/DL — HIGH (ref 70–99)
GLUCOSE BLDC GLUCOMTR-MCNC: 155 MG/DL — HIGH (ref 70–99)
GLUCOSE BLDC GLUCOMTR-MCNC: 165 MG/DL — HIGH (ref 70–99)
GLUCOSE BLDC GLUCOMTR-MCNC: 197 MG/DL — HIGH (ref 70–99)
GLUCOSE BLDC GLUCOMTR-MCNC: 231 MG/DL — HIGH (ref 70–99)
GLUCOSE BLDC GLUCOMTR-MCNC: 234 MG/DL — HIGH (ref 70–99)
GLUCOSE BLDC GLUCOMTR-MCNC: 249 MG/DL — HIGH (ref 70–99)
GLUCOSE SERPL-MCNC: 121 MG/DL — HIGH (ref 70–99)
GLUCOSE SERPL-MCNC: 131 MG/DL — HIGH (ref 70–99)
HCT VFR BLD CALC: 29.5 % — LOW (ref 37–47)
HGB BLD-MCNC: 9.2 G/DL — LOW (ref 12–16)
IMM GRANULOCYTES NFR BLD AUTO: 0.4 % — HIGH (ref 0.1–0.3)
INR BLD: 0.94 RATIO — SIGNIFICANT CHANGE UP (ref 0.65–1.3)
KAPPA LC SER QL IFE: 9.28 MG/DL — HIGH (ref 0.33–1.94)
KAPPA/LAMBDA FREE LIGHT CHAIN RATIO, SERUM: 1.14 RATIO — SIGNIFICANT CHANGE UP (ref 0.26–1.65)
LAMBDA LC SER QL IFE: 8.11 MG/DL — HIGH (ref 0.57–2.63)
LYMPHOCYTES # BLD AUTO: 2.5 K/UL — SIGNIFICANT CHANGE UP (ref 1.2–3.4)
LYMPHOCYTES # BLD AUTO: 29.8 % — SIGNIFICANT CHANGE UP (ref 20.5–51.1)
MAGNESIUM SERPL-MCNC: 2.1 MG/DL — SIGNIFICANT CHANGE UP (ref 1.8–2.4)
MAGNESIUM SERPL-MCNC: 2.3 MG/DL — SIGNIFICANT CHANGE UP (ref 1.8–2.4)
MCHC RBC-ENTMCNC: 28 PG — SIGNIFICANT CHANGE UP (ref 27–31)
MCHC RBC-ENTMCNC: 31.2 G/DL — LOW (ref 32–37)
MCV RBC AUTO: 89.7 FL — SIGNIFICANT CHANGE UP (ref 81–99)
MONOCYTES # BLD AUTO: 0.55 K/UL — SIGNIFICANT CHANGE UP (ref 0.1–0.6)
MONOCYTES NFR BLD AUTO: 6.6 % — SIGNIFICANT CHANGE UP (ref 1.7–9.3)
NEUTROPHILS # BLD AUTO: 4.55 K/UL — SIGNIFICANT CHANGE UP (ref 1.4–6.5)
NEUTROPHILS NFR BLD AUTO: 54.3 % — SIGNIFICANT CHANGE UP (ref 42.2–75.2)
NRBC # BLD: 0 /100 WBCS — SIGNIFICANT CHANGE UP (ref 0–0)
PHOSPHATE SERPL-MCNC: 4.5 MG/DL — SIGNIFICANT CHANGE UP (ref 2.1–4.9)
PLATELET # BLD AUTO: 242 K/UL — SIGNIFICANT CHANGE UP (ref 130–400)
POTASSIUM SERPL-MCNC: 5.1 MMOL/L — HIGH (ref 3.5–5)
POTASSIUM SERPL-MCNC: 5.1 MMOL/L — HIGH (ref 3.5–5)
POTASSIUM SERPL-SCNC: 5.1 MMOL/L — HIGH (ref 3.5–5)
POTASSIUM SERPL-SCNC: 5.1 MMOL/L — HIGH (ref 3.5–5)
PROT SERPL-MCNC: 6.1 G/DL — SIGNIFICANT CHANGE UP (ref 6–8.3)
PROT SERPL-MCNC: 6.1 G/DL — SIGNIFICANT CHANGE UP (ref 6–8.3)
PROTHROM AB SERPL-ACNC: 10.8 SEC — SIGNIFICANT CHANGE UP (ref 9.95–12.87)
RBC # BLD: 3.29 M/UL — LOW (ref 4.2–5.4)
RBC # FLD: 13.2 % — SIGNIFICANT CHANGE UP (ref 11.5–14.5)
SODIUM SERPL-SCNC: 140 MMOL/L — SIGNIFICANT CHANGE UP (ref 135–146)
SODIUM SERPL-SCNC: 140 MMOL/L — SIGNIFICANT CHANGE UP (ref 135–146)
WBC # BLD: 8.38 K/UL — SIGNIFICANT CHANGE UP (ref 4.8–10.8)
WBC # FLD AUTO: 8.38 K/UL — SIGNIFICANT CHANGE UP (ref 4.8–10.8)

## 2021-11-05 PROCEDURE — 99232 SBSQ HOSP IP/OBS MODERATE 35: CPT

## 2021-11-05 PROCEDURE — 93010 ELECTROCARDIOGRAM REPORT: CPT

## 2021-11-05 RX ORDER — INSULIN HUMAN 100 [IU]/ML
10 INJECTION, SOLUTION SUBCUTANEOUS ONCE
Refills: 0 | Status: COMPLETED | OUTPATIENT
Start: 2021-11-05 | End: 2021-11-05

## 2021-11-05 RX ORDER — SODIUM CHLORIDE 9 MG/ML
500 INJECTION, SOLUTION INTRAVENOUS
Refills: 0 | Status: DISCONTINUED | OUTPATIENT
Start: 2021-11-05 | End: 2021-11-05

## 2021-11-05 RX ORDER — SODIUM CHLORIDE 9 MG/ML
1000 INJECTION, SOLUTION INTRAVENOUS
Refills: 0 | Status: DISCONTINUED | OUTPATIENT
Start: 2021-11-05 | End: 2021-11-08

## 2021-11-05 RX ORDER — SODIUM BICARBONATE 1 MEQ/ML
1300 SYRINGE (ML) INTRAVENOUS EVERY 8 HOURS
Refills: 0 | Status: DISCONTINUED | OUTPATIENT
Start: 2021-11-05 | End: 2021-11-08

## 2021-11-05 RX ORDER — INSULIN GLARGINE 100 [IU]/ML
5 INJECTION, SOLUTION SUBCUTANEOUS AT BEDTIME
Refills: 0 | Status: DISCONTINUED | OUTPATIENT
Start: 2021-11-05 | End: 2021-11-08

## 2021-11-05 RX ORDER — DEXTROSE 50 % IN WATER 50 %
50 SYRINGE (ML) INTRAVENOUS ONCE
Refills: 0 | Status: COMPLETED | OUTPATIENT
Start: 2021-11-05 | End: 2021-11-05

## 2021-11-05 RX ADMIN — OXYCODONE HYDROCHLORIDE 5 MILLIGRAM(S): 5 TABLET ORAL at 03:06

## 2021-11-05 RX ADMIN — Medication 650 MILLIGRAM(S): at 00:40

## 2021-11-05 RX ADMIN — OXYCODONE HYDROCHLORIDE 5 MILLIGRAM(S): 5 TABLET ORAL at 13:00

## 2021-11-05 RX ADMIN — Medication 1300 MILLIGRAM(S): at 21:38

## 2021-11-05 RX ADMIN — PANTOPRAZOLE SODIUM 40 MILLIGRAM(S): 20 TABLET, DELAYED RELEASE ORAL at 06:05

## 2021-11-05 RX ADMIN — Medication 6: at 12:32

## 2021-11-05 RX ADMIN — Medication 650 MILLIGRAM(S): at 23:18

## 2021-11-05 RX ADMIN — Medication 650 MILLIGRAM(S): at 12:32

## 2021-11-05 RX ADMIN — CALCITRIOL 0.25 MICROGRAM(S): 0.5 CAPSULE ORAL at 12:32

## 2021-11-05 RX ADMIN — Medication 50 MILLILITER(S): at 01:59

## 2021-11-05 RX ADMIN — INSULIN GLARGINE 5 UNIT(S): 100 INJECTION, SOLUTION SUBCUTANEOUS at 21:38

## 2021-11-05 RX ADMIN — Medication 25 MILLIGRAM(S): at 21:38

## 2021-11-05 RX ADMIN — HEPARIN SODIUM 5000 UNIT(S): 5000 INJECTION INTRAVENOUS; SUBCUTANEOUS at 13:03

## 2021-11-05 RX ADMIN — HEPARIN SODIUM 5000 UNIT(S): 5000 INJECTION INTRAVENOUS; SUBCUTANEOUS at 06:00

## 2021-11-05 RX ADMIN — HEPARIN SODIUM 5000 UNIT(S): 5000 INJECTION INTRAVENOUS; SUBCUTANEOUS at 21:39

## 2021-11-05 RX ADMIN — SODIUM CHLORIDE 50 MILLILITER(S): 9 INJECTION, SOLUTION INTRAVENOUS at 17:35

## 2021-11-05 RX ADMIN — Medication 25 MILLIGRAM(S): at 06:01

## 2021-11-05 RX ADMIN — Medication 650 MILLIGRAM(S): at 01:29

## 2021-11-05 RX ADMIN — Medication 650 MILLIGRAM(S): at 16:50

## 2021-11-05 RX ADMIN — Medication 1300 MILLIGRAM(S): at 13:03

## 2021-11-05 RX ADMIN — Medication 650 MILLIGRAM(S): at 06:58

## 2021-11-05 RX ADMIN — Medication 650 MILLIGRAM(S): at 06:02

## 2021-11-05 RX ADMIN — OXYCODONE HYDROCHLORIDE 5 MILLIGRAM(S): 5 TABLET ORAL at 02:29

## 2021-11-05 RX ADMIN — Medication 650 MILLIGRAM(S): at 13:00

## 2021-11-05 RX ADMIN — OXYCODONE HYDROCHLORIDE 5 MILLIGRAM(S): 5 TABLET ORAL at 12:32

## 2021-11-05 RX ADMIN — INSULIN HUMAN 10 UNIT(S): 100 INJECTION, SOLUTION SUBCUTANEOUS at 01:59

## 2021-11-05 RX ADMIN — ATORVASTATIN CALCIUM 80 MILLIGRAM(S): 80 TABLET, FILM COATED ORAL at 21:38

## 2021-11-05 RX ADMIN — Medication 650 MILLIGRAM(S): at 06:01

## 2021-11-05 RX ADMIN — GABAPENTIN 100 MILLIGRAM(S): 400 CAPSULE ORAL at 06:05

## 2021-11-05 RX ADMIN — GABAPENTIN 100 MILLIGRAM(S): 400 CAPSULE ORAL at 21:51

## 2021-11-05 RX ADMIN — Medication 650 MILLIGRAM(S): at 16:19

## 2021-11-05 RX ADMIN — Medication 25 MILLIGRAM(S): at 13:02

## 2021-11-05 RX ADMIN — GABAPENTIN 100 MILLIGRAM(S): 400 CAPSULE ORAL at 13:03

## 2021-11-05 RX ADMIN — CLOPIDOGREL BISULFATE 75 MILLIGRAM(S): 75 TABLET, FILM COATED ORAL at 12:32

## 2021-11-05 RX ADMIN — Medication 81 MILLIGRAM(S): at 12:32

## 2021-11-05 RX ADMIN — Medication 4: at 17:34

## 2021-11-05 NOTE — PROGRESS NOTE ADULT - SUBJECTIVE AND OBJECTIVE BOX
GENERAL SURGERY PROGRESS NOTE    Patient: SEN LING , 56y ([birth year])Female   MRN: [id number]  Location: [address]  Visit: 11-01-21 Inpatient  Date: 11-05-21 @ 00:26    Hospital Day #: 5  Post-Op Day #: 2    Procedure/Dx/Injuries: LLE angiogram    Events of past 24 hours: 1 episode of vomiting overnight, some nausea and epigastric discomfort     PAST MEDICAL & SURGICAL HISTORY:  PVD (peripheral vascular disease)    Benign essential HTN    Intellectual disability    Hearing impaired    HLD (hyperlipidemia)    Vitals:   T(F): 97.1 (11-04-21 @ 20:34), Max: 98 (11-04-21 @ 01:06)  HR: 76 (11-04-21 @ 20:34)  BP: 122/70 (11-04-21 @ 20:34)  RR: 20 (11-04-21 @ 20:34)  SpO2: 98% (11-04-21 @ 20:34)      Diet, Consistent Carbohydrate Renal w/Evening Snack      Fluids:     I & O's:    11-03-21 @ 07:01  -  11-04-21 @ 07:00  --------------------------------------------------------  IN:  Total IN: 0 mL    OUT:    Voided (mL): 650 mL  Total OUT: 650 mL    Total NET: -650 mL    Bowel Movement: : [] YES [] NO  Flatus: : [] YES [] NO    PHYSICAL EXAM:  General: NAD,   Cardiac: RRR S1, S2,  Respiratory: CTAB,   Abdomen: Soft, non-distended, non-tender, right groin dressing in place, no bleeding or hematoma  Vascular: LLE + DP, no PT, RLE +DP/PT    MEDICATIONS  (STANDING):  acetaminophen     Tablet .. 650 milliGRAM(s) Oral every 6 hours  aspirin  chewable 81 milliGRAM(s) Oral daily  atorvastatin 80 milliGRAM(s) Oral at bedtime  calcitriol   Capsule 0.25 MICROGram(s) Oral daily  clopidogrel Tablet 75 milliGRAM(s) Oral daily  gabapentin 100 milliGRAM(s) Oral three times a day  heparin   Injectable 5000 Unit(s) SubCutaneous every 8 hours  hydrALAZINE 25 milliGRAM(s) Oral every 8 hours  insulin lispro (ADMELOG) corrective regimen sliding scale   SubCutaneous three times a day before meals  metoprolol succinate ER 25 milliGRAM(s) Oral daily  pantoprazole    Tablet 40 milliGRAM(s) Oral before breakfast  sodium bicarbonate 650 milliGRAM(s) Oral every 8 hours    MEDICATIONS  (PRN):  magnesium hydroxide Suspension 30 milliLiter(s) Oral once PRN Constipation  oxyCODONE    IR 5 milliGRAM(s) Oral every 6 hours PRN Severe Pain (7 - 10)      DVT PROPHYLAXIS: heparin   Injectable 5000 Unit(s) SubCutaneous every 8 hours    GI PROPHYLAXIS: pantoprazole    Tablet 40 milliGRAM(s) Oral before breakfast    ANTICOAGULATION:   ANTIBIOTICS:      LAB/STUDIES:  Labs:  CAPILLARY BLOOD GLUCOSE      POCT Blood Glucose.: 126 mg/dL (04 Nov 2021 21:20)  POCT Blood Glucose.: 195 mg/dL (04 Nov 2021 16:57)  POCT Blood Glucose.: 252 mg/dL (04 Nov 2021 12:23)  POCT Blood Glucose.: 181 mg/dL (04 Nov 2021 11:16)  POCT Blood Glucose.: 175 mg/dL (04 Nov 2021 07:31)                          9.4    9.45  )-----------( 242      ( 04 Nov 2021 12:47 )             29.6         11-04    137  |  104  |  55<H>  ----------------------------<  194<H>  5.8<H>   |  14<L>  |  3.3<H>      Calcium, Total Serum: 8.6 mg/dL (11-04-21 @ 17:48)    Coags:     10.40  ----< 0.90    ( 03 Nov 2021 20:00 )     35.3     IMAGING:    ACCESS/ DEVICES:  [x ] Peripheral IV  [ ] Central Venous Line	[ ] R	[ ] L	[ ] IJ	[ ] Fem	[ ] SC	Placed:   [ ] Arterial Line		[ ] R	[ ] L	[ ] Fem	[ ] Rad	[ ] Ax	Placed:   [ ] PICC:					[ ] Mediport  [ ] Urinary Catheter,  Date Placed:   [ ] Chest tube: [ ] Right, [ ] Left  [ ] PÉREZ/Panfilo Drains

## 2021-11-05 NOTE — PROGRESS NOTE ADULT - SUBJECTIVE AND OBJECTIVE BOX
Nephrology progress note    Patient is seen and examined, events over the last 24 h noted .    Allergies:  No Known Allergies    Hospital Medications:   MEDICATIONS  (STANDING):  acetaminophen     Tablet .. 650 milliGRAM(s) Oral every 6 hours  aspirin  chewable 81 milliGRAM(s) Oral daily  atorvastatin 80 milliGRAM(s) Oral at bedtime  calcitriol   Capsule 0.25 MICROGram(s) Oral daily  clopidogrel Tablet 75 milliGRAM(s) Oral daily  dextrose 5% 1000 milliLiter(s) (50 mL/Hr) IV Continuous <Continuous>  gabapentin 100 milliGRAM(s) Oral three times a day  heparin   Injectable 5000 Unit(s) SubCutaneous every 8 hours  hydrALAZINE 25 milliGRAM(s) Oral every 8 hours  insulin glargine Injectable (LANTUS) 5 Unit(s) SubCutaneous at bedtime  insulin lispro (ADMELOG) corrective regimen sliding scale   SubCutaneous three times a day before meals  metoprolol succinate ER 25 milliGRAM(s) Oral daily  pantoprazole    Tablet 40 milliGRAM(s) Oral before breakfast  sodium bicarbonate 1300 milliGRAM(s) Oral every 8 hours        VITALS:  T(F): 98 (21 @ 13:45), Max: 98 (21 @ 13:45)  HR: 80 (21 @ 13:45)  BP: 132/60 (21 @ 13:45)  RR: 20 (21 @ 13:45)  SpO2: 98% (21 @ 13:45)  Wt(kg): --     @ 07:01  -   @ 07:00  --------------------------------------------------------  IN: 0 mL / OUT: 650 mL / NET: -650 mL          PHYSICAL EXAM:  Constitutional: NAD  HEENT: anicteric sclera, oropharynx clear, MMM  Neck: No JVD  Respiratory: CTA  Cardiovascular: S1, S2, RRR  Gastrointestinal: BS+, soft, NT/ND  Extremities: No peripheral edema  Neurological: Awake alert  : No CVA tenderness. No mcgrath.   Skin: No rashes  Vascular Access:    LABS:      140  |  107  |  54<H>  ----------------------------<  121<H>  5.1<H>   |  18  |  3.3<H>    Ca    8.6      2021 06:30  Phos  4.5       Mg     2.3         TPro  6.1  /  Alb      /  TBili      /  DBili      /  AST      /  ALT      /  AlkPhos                                9.2    8.38  )-----------( 242      ( 2021 06:30 )             29.5       Urine Studies:  Urinalysis Basic - ( 2021 21:45 )    Color: Yellow / Appearance: Slightly Turbid / S.014 / pH:   Gluc:  / Ketone: Negative  / Bili: Negative / Urobili: <2 mg/dL   Blood:  / Protein: 300 mg/dL / Nitrite: Negative   Leuk Esterase: Large / RBC: 1 /HPF /  /HPF   Sq Epi:  / Non Sq Epi: 1 /HPF / Bacteria: Many      Creatinine, Random Urine: 47 mg/dL ( @ 21:45)  Protein/Creatinine Ratio Calculation: 4.5 Ratio ( @ 21:45)    RADIOLOGY & ADDITIONAL STUDIES:  < from: Xray Chest 1 View- PORTABLE-Routine (Xray Chest 1 View- PORTABLE-Routine .) (21 @ 18:01) >    Bibasilar streaky opacities likely representing atelectasis and/or scarring.    < end of copied text >

## 2021-11-05 NOTE — PROGRESS NOTE ADULT - ASSESSMENT
ASSESSMENT:  56y F with necrotic wounds on left heel, DM, neuropathy, CKD, CVA in the past, s/p LLE angiogram 11/3     PLAN:  - patient needs femoral to tibial bypass  - cardiac evaluation: moderate to high risk for surgery   - Hyperkalemia - given Gorge, f/u BMP  - CKD - monitor Cr levels BID after angiogram    - pain control  - DVT/GI prophylaxis    spectra [id number]

## 2021-11-05 NOTE — PROGRESS NOTE ADULT - SUBJECTIVE AND OBJECTIVE BOX
Podiatry Progress Note    Subjective:   SEN LING is a 56y old  Female who presents with a chief complaint of DFU (05 Nov 2021 00:25)  Discussed w/ vascular team regarding bypass schedule; vascular and podiatry sx schedule updated;     PAST MEDICAL & SURGICAL HISTORY:  PVD (peripheral vascular disease)  Benign essential HTN  Intellectual disability  Hearing impaired  HLD (hyperlipidemia)    Objective:  Vital Signs Last 24 Hrs  T(C): 36.3 (05 Nov 2021 09:00), Max: 36.4 (04 Nov 2021 11:20)  T(F): 97.3 (05 Nov 2021 09:00), Max: 97.6 (05 Nov 2021 05:14)  HR: 75 (05 Nov 2021 09:00) (73 - 82)  BP: 131/61 (05 Nov 2021 09:00) (122/56 - 158/64)  BP(mean): --  RR: 20 (05 Nov 2021 09:00) (18 - 20)  SpO2: 99% (05 Nov 2021 09:00) (98% - 99%)                        9.2    8.38  )-----------( 242      ( 05 Nov 2021 06:30 )             29.5                 11-05    140  |  107  |  54<H>  ----------------------------<  121<H>  5.1<H>   |  18  |  3.3<H>    Ca    8.6      05 Nov 2021 06:30  Phos  4.5     11-05  Mg     2.3     11-05    TPro  6.1  /  Alb  x   /  TBili  x   /  DBili  x   /  AST  x   /  ALT  x   /  AlkPhos  x   11-04    Assessment:  Left heel wounds;     Plan:  Chart reviewed and Patient evaluated. All Questions and Concerns Addressed and Answered  Discussed diagnosis and treatment with patient  Wound Flushed w/ normal saline; Betadine / DSD / Kerlix; q24  Local Wound Care; As Stated Above   Discussed w/ vascular team regarding sx schedule; Vascular plan for LLE bypass on Mon 11/8;   Podiatry sx scheduled on Tues 11/9 @ 11:00AM; excisional debridement of soft tissue and bone of left foot;  Request pre-lab optimization and OR stratification prior to sx;  NPO @ [facility] Mon 11/8;   Weight bearing status; WBAT BL feet;   Discussed plan w/ Dr. Llamas    Podiatry ;    Podiatry Progress Note    Subjective:   SEN LING is a 56y old  Female who presents with a chief complaint of DFU (05 Nov 2021 00:25)  Discussed w/ vascular team regarding bypass schedule; vascular and podiatry sx schedule updated;     PAST MEDICAL & SURGICAL HISTORY:  PVD (peripheral vascular disease)  Benign essential HTN  Intellectual disability  Hearing impaired  HLD (hyperlipidemia)    Objective:  Vital Signs Last 24 Hrs  T(C): 36.3 (05 Nov 2021 09:00), Max: 36.4 (04 Nov 2021 11:20)  T(F): 97.3 (05 Nov 2021 09:00), Max: 97.6 (05 Nov 2021 05:14)  HR: 75 (05 Nov 2021 09:00) (73 - 82)  BP: 131/61 (05 Nov 2021 09:00) (122/56 - 158/64)  BP(mean): --  RR: 20 (05 Nov 2021 09:00) (18 - 20)  SpO2: 99% (05 Nov 2021 09:00) (98% - 99%)                        9.2    8.38  )-----------( 242      ( 05 Nov 2021 06:30 )             29.5                 11-05    140  |  107  |  54<H>  ----------------------------<  121<H>  5.1<H>   |  18  |  3.3<H>    Ca    8.6      05 Nov 2021 06:30  Phos  4.5     11-05  Mg     2.3     11-05    TPro  6.1  /  Alb  x   /  TBili  x   /  DBili  x   /  AST  x   /  ALT  x   /  AlkPhos  x   11-04    Assessment:  Left heel wounds;     Plan:  Chart reviewed and Patient evaluated. All Questions and Concerns Addressed and Answered  Discussed diagnosis and treatment with patient  Wound Flushed w/ normal saline; Betadine / DSD / Kerlix; q24  Local Wound Care; As Stated Above   Discussed w/ vascular team regarding sx schedule; Vascular plan for LLE bypass on Mon 11/8;   Podiatry sx scheduled on Tues 11/9 @ 11:00AM; excisional debridement of soft tissue and bone of left foot;  Request pre-lab optimization and OR stratification prior to sx;  NPO @ [facility] Mon 11/8;   Weight bearing status; WBAT BL feet;   Discussed plan w/ Dr. Llamas    Podiatry ;

## 2021-11-05 NOTE — PROGRESS NOTE ADULT - ASSESSMENT
55 yo female PMH CKD4 PVD, smoker, HTN, HLD, h/o right pontine CVA (2/7/21), DM,  intellectual disability, hearing/speech impairment presents with chronic left heel ulcer and pain. Recent duplex on 10/25/21 showed no significant arterial blood flow visualized beyond the left superficial femoral artery.  Has stable CKD4 at least since 2/21; saw nephrologist in [place]    # CKD 4, stable  # Proteinuria, nephrotic range / likely d/t HTN/DM  # Hx of HTN  # Hx of DM  # LE ulcer / vascular following, angiogram today / risk for ANKITA   #PVD s/p LLE angio 11/3 - needs LE bypass    - strict i/o, low K diet  - needs better glycemic control; increase insulin- will help lower K as well  - met acidosi s- improved, cont po Bicarb 1300 tid  - please document accurate urine output   - cont hydralazine, bp better controlled  - check OLVIN, serum free light chain ratio, spep/ifx   - phos noted, no binder   - hgb at goal  - monitor renal function closely, urine output / still at risk for ANKITA - creat up-trending may be d/t mild hypovolemia

## 2021-11-06 LAB
ANION GAP SERPL CALC-SCNC: 16 MMOL/L — HIGH (ref 7–14)
BASOPHILS # BLD AUTO: 0.03 K/UL — SIGNIFICANT CHANGE UP (ref 0–0.2)
BASOPHILS NFR BLD AUTO: 0.3 % — SIGNIFICANT CHANGE UP (ref 0–1)
BUN SERPL-MCNC: 51 MG/DL — HIGH (ref 10–20)
CALCIUM SERPL-MCNC: 8.4 MG/DL — LOW (ref 8.5–10.1)
CHLORIDE SERPL-SCNC: 104 MMOL/L — SIGNIFICANT CHANGE UP (ref 98–110)
CO2 SERPL-SCNC: 19 MMOL/L — SIGNIFICANT CHANGE UP (ref 17–32)
CREAT SERPL-MCNC: 3.1 MG/DL — HIGH (ref 0.7–1.5)
EOSINOPHIL # BLD AUTO: 0.67 K/UL — SIGNIFICANT CHANGE UP (ref 0–0.7)
EOSINOPHIL NFR BLD AUTO: 7.3 % — SIGNIFICANT CHANGE UP (ref 0–8)
GLUCOSE BLDC GLUCOMTR-MCNC: 127 MG/DL — HIGH (ref 70–99)
GLUCOSE BLDC GLUCOMTR-MCNC: 197 MG/DL — HIGH (ref 70–99)
GLUCOSE BLDC GLUCOMTR-MCNC: 222 MG/DL — HIGH (ref 70–99)
GLUCOSE BLDC GLUCOMTR-MCNC: 284 MG/DL — HIGH (ref 70–99)
GLUCOSE SERPL-MCNC: 173 MG/DL — HIGH (ref 70–99)
HCT VFR BLD CALC: 27.7 % — LOW (ref 37–47)
HCT VFR BLD CALC: 29.7 % — LOW (ref 37–47)
HGB BLD-MCNC: 8.9 G/DL — LOW (ref 12–16)
HGB BLD-MCNC: 9.3 G/DL — LOW (ref 12–16)
IMM GRANULOCYTES NFR BLD AUTO: 0.3 % — SIGNIFICANT CHANGE UP (ref 0.1–0.3)
LYMPHOCYTES # BLD AUTO: 1.89 K/UL — SIGNIFICANT CHANGE UP (ref 1.2–3.4)
LYMPHOCYTES # BLD AUTO: 20.5 % — SIGNIFICANT CHANGE UP (ref 20.5–51.1)
MAGNESIUM SERPL-MCNC: 2 MG/DL — SIGNIFICANT CHANGE UP (ref 1.8–2.4)
MCHC RBC-ENTMCNC: 28.1 PG — SIGNIFICANT CHANGE UP (ref 27–31)
MCHC RBC-ENTMCNC: 28.4 PG — SIGNIFICANT CHANGE UP (ref 27–31)
MCHC RBC-ENTMCNC: 31.3 G/DL — LOW (ref 32–37)
MCHC RBC-ENTMCNC: 32.1 G/DL — SIGNIFICANT CHANGE UP (ref 32–37)
MCV RBC AUTO: 88.5 FL — SIGNIFICANT CHANGE UP (ref 81–99)
MCV RBC AUTO: 89.7 FL — SIGNIFICANT CHANGE UP (ref 81–99)
MONOCYTES # BLD AUTO: 0.63 K/UL — HIGH (ref 0.1–0.6)
MONOCYTES NFR BLD AUTO: 6.8 % — SIGNIFICANT CHANGE UP (ref 1.7–9.3)
NEUTROPHILS # BLD AUTO: 5.99 K/UL — SIGNIFICANT CHANGE UP (ref 1.4–6.5)
NEUTROPHILS NFR BLD AUTO: 64.8 % — SIGNIFICANT CHANGE UP (ref 42.2–75.2)
NRBC # BLD: 0 /100 WBCS — SIGNIFICANT CHANGE UP (ref 0–0)
NRBC # BLD: 0 /100 WBCS — SIGNIFICANT CHANGE UP (ref 0–0)
PLATELET # BLD AUTO: 235 K/UL — SIGNIFICANT CHANGE UP (ref 130–400)
PLATELET # BLD AUTO: 248 K/UL — SIGNIFICANT CHANGE UP (ref 130–400)
POTASSIUM SERPL-MCNC: 4.8 MMOL/L — SIGNIFICANT CHANGE UP (ref 3.5–5)
POTASSIUM SERPL-SCNC: 4.8 MMOL/L — SIGNIFICANT CHANGE UP (ref 3.5–5)
RBC # BLD: 3.13 M/UL — LOW (ref 4.2–5.4)
RBC # BLD: 3.31 M/UL — LOW (ref 4.2–5.4)
RBC # FLD: 13.1 % — SIGNIFICANT CHANGE UP (ref 11.5–14.5)
RBC # FLD: 13.1 % — SIGNIFICANT CHANGE UP (ref 11.5–14.5)
SODIUM SERPL-SCNC: 139 MMOL/L — SIGNIFICANT CHANGE UP (ref 135–146)
WBC # BLD: 8.57 K/UL — SIGNIFICANT CHANGE UP (ref 4.8–10.8)
WBC # BLD: 9.24 K/UL — SIGNIFICANT CHANGE UP (ref 4.8–10.8)
WBC # FLD AUTO: 8.57 K/UL — SIGNIFICANT CHANGE UP (ref 4.8–10.8)
WBC # FLD AUTO: 9.24 K/UL — SIGNIFICANT CHANGE UP (ref 4.8–10.8)

## 2021-11-06 RX ADMIN — ATORVASTATIN CALCIUM 80 MILLIGRAM(S): 80 TABLET, FILM COATED ORAL at 21:50

## 2021-11-06 RX ADMIN — INSULIN GLARGINE 5 UNIT(S): 100 INJECTION, SOLUTION SUBCUTANEOUS at 21:50

## 2021-11-06 RX ADMIN — Medication 650 MILLIGRAM(S): at 05:53

## 2021-11-06 RX ADMIN — Medication 1300 MILLIGRAM(S): at 21:50

## 2021-11-06 RX ADMIN — GABAPENTIN 100 MILLIGRAM(S): 400 CAPSULE ORAL at 05:53

## 2021-11-06 RX ADMIN — Medication 1300 MILLIGRAM(S): at 05:53

## 2021-11-06 RX ADMIN — Medication 650 MILLIGRAM(S): at 17:59

## 2021-11-06 RX ADMIN — HEPARIN SODIUM 5000 UNIT(S): 5000 INJECTION INTRAVENOUS; SUBCUTANEOUS at 14:45

## 2021-11-06 RX ADMIN — Medication 25 MILLIGRAM(S): at 21:50

## 2021-11-06 RX ADMIN — Medication 6: at 12:30

## 2021-11-06 RX ADMIN — GABAPENTIN 100 MILLIGRAM(S): 400 CAPSULE ORAL at 14:46

## 2021-11-06 RX ADMIN — Medication 25 MILLIGRAM(S): at 14:45

## 2021-11-06 RX ADMIN — HEPARIN SODIUM 5000 UNIT(S): 5000 INJECTION INTRAVENOUS; SUBCUTANEOUS at 21:51

## 2021-11-06 RX ADMIN — Medication 25 MILLIGRAM(S): at 05:53

## 2021-11-06 RX ADMIN — Medication 1300 MILLIGRAM(S): at 14:45

## 2021-11-06 RX ADMIN — CALCITRIOL 0.25 MICROGRAM(S): 0.5 CAPSULE ORAL at 11:36

## 2021-11-06 RX ADMIN — PANTOPRAZOLE SODIUM 40 MILLIGRAM(S): 20 TABLET, DELAYED RELEASE ORAL at 05:53

## 2021-11-06 RX ADMIN — Medication 4: at 07:57

## 2021-11-06 RX ADMIN — Medication 81 MILLIGRAM(S): at 11:40

## 2021-11-06 RX ADMIN — Medication 650 MILLIGRAM(S): at 11:37

## 2021-11-06 RX ADMIN — HEPARIN SODIUM 5000 UNIT(S): 5000 INJECTION INTRAVENOUS; SUBCUTANEOUS at 05:53

## 2021-11-06 RX ADMIN — Medication 650 MILLIGRAM(S): at 12:10

## 2021-11-06 RX ADMIN — GABAPENTIN 100 MILLIGRAM(S): 400 CAPSULE ORAL at 21:54

## 2021-11-06 NOTE — PROGRESS NOTE ADULT - ASSESSMENT
· Assessment	  55 yo female PMH CKD4 PVD, smoker, HTN, HLD, h/o right pontine CVA (2/7/21), DM,  intellectual disability, hearing/speech impairment presents with chronic left heel ulcer and pain. Recent duplex on 10/25/21 showed no significant arterial blood flow visualized beyond the left superficial femoral artery.  Has stable CKD4 at least since 2/21; saw nephrologist in [place]  # CKD 4, stable  # Proteinuria, nephrotic range / likely d/t HTN/DM  # Hx of HTN  # Hx of DM  # LE ulcer / vascular following, angiogram today / risk for ANKITA   #PVD s/p LLE angio 11/3 - needs LE bypass  - strict i/o, low K diet/ start lokelma 5  q12, if repeated k > 5.5  -  cont po Bicarb 1300 tid  - please document accurate urine output   - creatinine stable   - cont hydralazine, bp better controlled  - check OLVIN, serum free light chain ratio normal , spep/ifx   - phos noted, no binder , on calcitriol check PTH   - hgb noted , check iron stores   - vascular f/up appreciated   - will follow

## 2021-11-06 NOTE — PROGRESS NOTE ADULT - SUBJECTIVE AND OBJECTIVE BOX
seen and examined   24 h events noted         PAST HISTORY  --------------------------------------------------------------------------------  No significant changes to PMH, PSH, FHx, SHx, unless otherwise noted    ALLERGIES & MEDICATIONS  --------------------------------------------------------------------------------  Allergies    No Known Allergies    Intolerances      Standing Inpatient Medications  acetaminophen     Tablet .. 650 milliGRAM(s) Oral every 6 hours  aspirin  chewable 81 milliGRAM(s) Oral daily  atorvastatin 80 milliGRAM(s) Oral at bedtime  calcitriol   Capsule 0.25 MICROGram(s) Oral daily  clopidogrel Tablet 75 milliGRAM(s) Oral daily  dextrose 5% 1000 milliLiter(s) IV Continuous <Continuous>  gabapentin 100 milliGRAM(s) Oral three times a day  heparin   Injectable 5000 Unit(s) SubCutaneous every 8 hours  hydrALAZINE 25 milliGRAM(s) Oral every 8 hours  insulin glargine Injectable (LANTUS) 5 Unit(s) SubCutaneous at bedtime  insulin lispro (ADMELOG) corrective regimen sliding scale   SubCutaneous three times a day before meals  metoprolol succinate ER 25 milliGRAM(s) Oral daily  pantoprazole    Tablet 40 milliGRAM(s) Oral before breakfast  sodium bicarbonate 1300 milliGRAM(s) Oral every 8 hours    PRN Inpatient Medications  magnesium hydroxide Suspension 30 milliLiter(s) Oral once PRN  oxyCODONE    IR 5 milliGRAM(s) Oral every 6 hours PRN        VITALS/PHYSICAL EXAM  --------------------------------------------------------------------------------  T(C): 36.5 (11-06-21 @ 05:30), Max: 36.9 (11-05-21 @ 21:41)  HR: 78 (11-06-21 @ 05:30) (74 - 80)  BP: 139/65 (11-06-21 @ 05:30) (125/59 - 154/68)  RR: 18 (11-06-21 @ 05:30) (18 - 20)  SpO2: 98% (11-05-21 @ 21:41) (98% - 99%)  Wt(kg): --  Height (cm): 154.9 (11-04-21 @ 11:20)  Weight (kg): 72.6 (11-04-21 @ 11:20)  BMI (kg/m2): 30.3 (11-04-21 @ 11:20)  BSA (m2): 1.72 (11-04-21 @ 11:20)      11-05-21 @ 07:01  -  11-06-21 @ 07:00  --------------------------------------------------------  IN: 525 mL / OUT: 0 mL / NET: 525 mL      Physical Exam:  	Gen: NAD  	Pulm: CTA B/L  	CV:S1S2; no rub  	Abd: +distended      LABS/STUDIES  --------------------------------------------------------------------------------              9.2    8.38  >-----------<  242      [11-05-21 @ 06:30]              29.5     140  |  107  |  54  ----------------------------<  131      [11-05-21 @ 17:28]  5.1   |  17  |  3.4        Ca     8.2     [11-05-21 @ 17:28]      Mg     2.1     [11-05-21 @ 17:28]      Phos  4.5     [11-05-21 @ 06:30]    TPro  6.1  /  Alb  x   /  TBili  x   /  DBili  x   /  AST  x   /  ALT  x   /  AlkPhos  x   [11-04-21 @ 12:47]    PT/INR: PT 10.80, INR 0.94       [11-05-21 @ 06:30]  PTT: 40.7       [11-05-21 @ 06:30]    Creatinine Trend:  SCr 3.4 [11-05 @ 17:28]  SCr 3.3 [11-05 @ 06:30]  SCr 3.3 [11-04 @ 17:48]  SCr 3.2 [11-04 @ 12:47]  SCr 2.8 [11-03 @ 20:00]    Urinalysis - [11-02-21 @ 21:45]      Color Yellow / Appearance Slightly Turbid / SG 1.014 / pH 6.0      Gluc Negative / Ketone Negative  / Bili Negative / Urobili <2 mg/dL       Blood Trace / Protein 300 mg/dL / Leuk Est Large / Nitrite Negative      RBC 1 /  / Hyaline 1 / Gran  / Sq Epi  / Non Sq Epi 1 / Bacteria Many    Urine Creatinine 47      [11-02-21 @ 21:45]  Urine Protein 211      [11-02-21 @ 21:45]        Free Light Chains: kappa 9.28, lambda 8.11, ratio = 1.14      [11-04 @ 12:47]

## 2021-11-06 NOTE — PROGRESS NOTE ADULT - SUBJECTIVE AND OBJECTIVE BOX
GENERAL SURGERY PROGRESS NOTE    Patient: SEN LING , 56y ([birth year])Female   MRN: [id number]  Location: [address]  Visit: 11-01-21 Inpatient  Date: 11-06-21 @ 04:55    Hospital Day #: 6  Post-Op Day #:3    Procedure/Dx/Injuries: LLE angiogram    Events of past 24 hours: hyperkalemia, receive D50 and insulin    PAST MEDICAL & SURGICAL HISTORY:  PVD (peripheral vascular disease)    Benign essential HTN    Intellectual disability    Hearing impaired    HLD (hyperlipidemia)        Vitals:   T(F): 98 (11-06-21 @ 00:47), Max: 98.4 (11-05-21 @ 21:41)  HR: 78 (11-06-21 @ 00:47)  BP: 125/59 (11-06-21 @ 00:47)  RR: 18 (11-06-21 @ 00:47)  SpO2: 98% (11-05-21 @ 21:41)      Diet, Consistent Carbohydrate Renal w/Evening Snack      Fluids:     I & O's:      Bowel Movement: : [] YES [] NO  Flatus: : [] YES [] NO    PHYSICAL EXAM:  General: NAD,   Cardiac: RRR S1, S2,  Respiratory: CTAB,   Abdomen: Soft, non-distended, non-tender, right groin dressing in place, no bleeding or hematoma  Vascular: LLE + DP, no PT, RLE +DP/PT    MEDICATIONS  (STANDING):  acetaminophen     Tablet .. 650 milliGRAM(s) Oral every 6 hours  aspirin  chewable 81 milliGRAM(s) Oral daily  atorvastatin 80 milliGRAM(s) Oral at bedtime  calcitriol   Capsule 0.25 MICROGram(s) Oral daily  clopidogrel Tablet 75 milliGRAM(s) Oral daily  dextrose 5% 1000 milliLiter(s) (50 mL/Hr) IV Continuous <Continuous>  gabapentin 100 milliGRAM(s) Oral three times a day  heparin   Injectable 5000 Unit(s) SubCutaneous every 8 hours  hydrALAZINE 25 milliGRAM(s) Oral every 8 hours  insulin glargine Injectable (LANTUS) 5 Unit(s) SubCutaneous at bedtime  insulin lispro (ADMELOG) corrective regimen sliding scale   SubCutaneous three times a day before meals  metoprolol succinate ER 25 milliGRAM(s) Oral daily  pantoprazole    Tablet 40 milliGRAM(s) Oral before breakfast  sodium bicarbonate 1300 milliGRAM(s) Oral every 8 hours    MEDICATIONS  (PRN):  magnesium hydroxide Suspension 30 milliLiter(s) Oral once PRN Constipation  oxyCODONE    IR 5 milliGRAM(s) Oral every 6 hours PRN Severe Pain (7 - 10)      DVT PROPHYLAXIS: heparin   Injectable 5000 Unit(s) SubCutaneous every 8 hours    GI PROPHYLAXIS: pantoprazole    Tablet 40 milliGRAM(s) Oral before breakfast    ANTICOAGULATION:   ANTIBIOTICS:            LAB/STUDIES:  Labs:  CAPILLARY BLOOD GLUCOSE      POCT Blood Glucose.: 197 mg/dL (05 Nov 2021 21:24)  POCT Blood Glucose.: 155 mg/dL (05 Nov 2021 17:12)  POCT Blood Glucose.: 249 mg/dL (05 Nov 2021 12:26)  POCT Blood Glucose.: 234 mg/dL (05 Nov 2021 11:16)  POCT Blood Glucose.: 146 mg/dL (05 Nov 2021 07:30)                          9.2    8.38  )-----------( 242      ( 05 Nov 2021 06:30 )             29.5       Auto Neutrophil %: 54.3 % (11-05-21 @ 06:30)  Auto Immature Granulocyte %: 0.4 % (11-05-21 @ 06:30)    11-05    140  |  107  |  54<H>  ----------------------------<  131<H>  5.1<H>   |  17  |  3.4<H>      Calcium, Total Serum: 8.2 mg/dL (11-05-21 @ 17:28)      LFTs:             6.1  | x    | x        ------------------[x       ( 04 Nov 2021 12:47 )  x    | x    | x           Lipase:x      Amylase:x             Coags:     10.80  ----< 0.94    ( 05 Nov 2021 06:30 )     40.7        IMAGING:      ACCESS/ DEVICES:  [x ] Peripheral IV  [ ] Central Venous Line	[ ] R	[ ] L	[ ] IJ	[ ] Fem	[ ] SC	Placed:   [ ] Arterial Line		[ ] R	[ ] L	[ ] Fem	[ ] Rad	[ ] Ax	Placed:   [ ] PICC:					[ ] Mediport  [ ] Urinary Catheter,  Date Placed:   [ ] Chest tube: [ ] Right, [ ] Left  [ ] PÉREZ/Panfilo Drains

## 2021-11-06 NOTE — PROGRESS NOTE ADULT - ASSESSMENT
ASSESSMENT:  56y F with necrotic wounds on left heel, DM, neuropathy, CKD, CVA in the past, s/p LLE angiogram 11/3     PLAN:  - OR 11/8 for femoral to tibial bypass  - cardiac evaluation: moderate to high risk for surgery   - Hyperkalemia - f/u BMP  - CKD - monitor Cr levels BID after angiogram    - pain control  - DVT/GI prophylaxis    spectra [phone]

## 2021-11-07 LAB
ANA PAT FLD IF-IMP: ABNORMAL
ANA TITR SER: ABNORMAL
ANION GAP SERPL CALC-SCNC: 15 MMOL/L — HIGH (ref 7–14)
ANION GAP SERPL CALC-SCNC: 19 MMOL/L — HIGH (ref 7–14)
ANION GAP SERPL CALC-SCNC: 19 MMOL/L — HIGH (ref 7–14)
APTT BLD: 34.2 SEC — SIGNIFICANT CHANGE UP (ref 27–39.2)
BASOPHILS # BLD AUTO: 0.02 K/UL — SIGNIFICANT CHANGE UP (ref 0–0.2)
BASOPHILS NFR BLD AUTO: 0.2 % — SIGNIFICANT CHANGE UP (ref 0–1)
BLD GP AB SCN SERPL QL: SIGNIFICANT CHANGE UP
BUN SERPL-MCNC: 51 MG/DL — HIGH (ref 10–20)
BUN SERPL-MCNC: 51 MG/DL — HIGH (ref 10–20)
BUN SERPL-MCNC: 52 MG/DL — HIGH (ref 10–20)
CALCIUM SERPL-MCNC: 8.1 MG/DL — LOW (ref 8.5–10.1)
CALCIUM SERPL-MCNC: 8.2 MG/DL — LOW (ref 8.5–10.1)
CALCIUM SERPL-MCNC: 8.4 MG/DL — LOW (ref 8.5–10.1)
CHLORIDE SERPL-SCNC: 101 MMOL/L — SIGNIFICANT CHANGE UP (ref 98–110)
CHLORIDE SERPL-SCNC: 102 MMOL/L — SIGNIFICANT CHANGE UP (ref 98–110)
CHLORIDE SERPL-SCNC: 105 MMOL/L — SIGNIFICANT CHANGE UP (ref 98–110)
CO2 SERPL-SCNC: 16 MMOL/L — LOW (ref 17–32)
CO2 SERPL-SCNC: 17 MMOL/L — SIGNIFICANT CHANGE UP (ref 17–32)
CO2 SERPL-SCNC: 22 MMOL/L — SIGNIFICANT CHANGE UP (ref 17–32)
CREAT SERPL-MCNC: 2.9 MG/DL — HIGH (ref 0.7–1.5)
CREAT SERPL-MCNC: 3 MG/DL — HIGH (ref 0.7–1.5)
CREAT SERPL-MCNC: 3.1 MG/DL — HIGH (ref 0.7–1.5)
EOSINOPHIL # BLD AUTO: 0.66 K/UL — SIGNIFICANT CHANGE UP (ref 0–0.7)
EOSINOPHIL NFR BLD AUTO: 8.1 % — HIGH (ref 0–8)
GLUCOSE BLDC GLUCOMTR-MCNC: 138 MG/DL — HIGH (ref 70–99)
GLUCOSE BLDC GLUCOMTR-MCNC: 171 MG/DL — HIGH (ref 70–99)
GLUCOSE BLDC GLUCOMTR-MCNC: 249 MG/DL — HIGH (ref 70–99)
GLUCOSE BLDC GLUCOMTR-MCNC: 318 MG/DL — HIGH (ref 70–99)
GLUCOSE SERPL-MCNC: 269 MG/DL — HIGH (ref 70–99)
GLUCOSE SERPL-MCNC: 281 MG/DL — HIGH (ref 70–99)
GLUCOSE SERPL-MCNC: 288 MG/DL — HIGH (ref 70–99)
HCT VFR BLD CALC: 30.5 % — LOW (ref 37–47)
HGB BLD-MCNC: 9.4 G/DL — LOW (ref 12–16)
IMM GRANULOCYTES NFR BLD AUTO: 0.2 % — SIGNIFICANT CHANGE UP (ref 0.1–0.3)
INR BLD: 0.87 RATIO — SIGNIFICANT CHANGE UP (ref 0.65–1.3)
LYMPHOCYTES # BLD AUTO: 2.15 K/UL — SIGNIFICANT CHANGE UP (ref 1.2–3.4)
LYMPHOCYTES # BLD AUTO: 26.5 % — SIGNIFICANT CHANGE UP (ref 20.5–51.1)
MAGNESIUM SERPL-MCNC: 1.9 MG/DL — SIGNIFICANT CHANGE UP (ref 1.8–2.4)
MAGNESIUM SERPL-MCNC: 1.9 MG/DL — SIGNIFICANT CHANGE UP (ref 1.8–2.4)
MCHC RBC-ENTMCNC: 28 PG — SIGNIFICANT CHANGE UP (ref 27–31)
MCHC RBC-ENTMCNC: 30.8 G/DL — LOW (ref 32–37)
MCV RBC AUTO: 90.8 FL — SIGNIFICANT CHANGE UP (ref 81–99)
MONOCYTES # BLD AUTO: 0.53 K/UL — SIGNIFICANT CHANGE UP (ref 0.1–0.6)
MONOCYTES NFR BLD AUTO: 6.5 % — SIGNIFICANT CHANGE UP (ref 1.7–9.3)
NEUTROPHILS # BLD AUTO: 4.74 K/UL — SIGNIFICANT CHANGE UP (ref 1.4–6.5)
NEUTROPHILS NFR BLD AUTO: 58.5 % — SIGNIFICANT CHANGE UP (ref 42.2–75.2)
NRBC # BLD: 0 /100 WBCS — SIGNIFICANT CHANGE UP (ref 0–0)
PHOSPHATE SERPL-MCNC: 4.3 MG/DL — SIGNIFICANT CHANGE UP (ref 2.1–4.9)
PHOSPHATE SERPL-MCNC: 4.5 MG/DL — SIGNIFICANT CHANGE UP (ref 2.1–4.9)
PLATELET # BLD AUTO: 258 K/UL — SIGNIFICANT CHANGE UP (ref 130–400)
POTASSIUM SERPL-MCNC: 4.9 MMOL/L — SIGNIFICANT CHANGE UP (ref 3.5–5)
POTASSIUM SERPL-MCNC: 5.4 MMOL/L — HIGH (ref 3.5–5)
POTASSIUM SERPL-MCNC: 6.2 MMOL/L — CRITICAL HIGH (ref 3.5–5)
POTASSIUM SERPL-SCNC: 4.9 MMOL/L — SIGNIFICANT CHANGE UP (ref 3.5–5)
POTASSIUM SERPL-SCNC: 5.4 MMOL/L — HIGH (ref 3.5–5)
POTASSIUM SERPL-SCNC: 6.2 MMOL/L — CRITICAL HIGH (ref 3.5–5)
PROTHROM AB SERPL-ACNC: 10 SEC — SIGNIFICANT CHANGE UP (ref 9.95–12.87)
RBC # BLD: 3.36 M/UL — LOW (ref 4.2–5.4)
RBC # FLD: 13.2 % — SIGNIFICANT CHANGE UP (ref 11.5–14.5)
SARS-COV-2 RNA SPEC QL NAA+PROBE: SIGNIFICANT CHANGE UP
SODIUM SERPL-SCNC: 136 MMOL/L — SIGNIFICANT CHANGE UP (ref 135–146)
SODIUM SERPL-SCNC: 139 MMOL/L — SIGNIFICANT CHANGE UP (ref 135–146)
SODIUM SERPL-SCNC: 141 MMOL/L — SIGNIFICANT CHANGE UP (ref 135–146)
WBC # BLD: 8.12 K/UL — SIGNIFICANT CHANGE UP (ref 4.8–10.8)
WBC # FLD AUTO: 8.12 K/UL — SIGNIFICANT CHANGE UP (ref 4.8–10.8)

## 2021-11-07 RX ADMIN — INSULIN GLARGINE 5 UNIT(S): 100 INJECTION, SOLUTION SUBCUTANEOUS at 21:16

## 2021-11-07 RX ADMIN — Medication 6: at 08:07

## 2021-11-07 RX ADMIN — Medication 1300 MILLIGRAM(S): at 21:16

## 2021-11-07 RX ADMIN — PANTOPRAZOLE SODIUM 40 MILLIGRAM(S): 20 TABLET, DELAYED RELEASE ORAL at 06:03

## 2021-11-07 RX ADMIN — OXYCODONE HYDROCHLORIDE 5 MILLIGRAM(S): 5 TABLET ORAL at 21:27

## 2021-11-07 RX ADMIN — Medication 650 MILLIGRAM(S): at 11:54

## 2021-11-07 RX ADMIN — HEPARIN SODIUM 5000 UNIT(S): 5000 INJECTION INTRAVENOUS; SUBCUTANEOUS at 13:56

## 2021-11-07 RX ADMIN — Medication 1300 MILLIGRAM(S): at 13:57

## 2021-11-07 RX ADMIN — Medication 25 MILLIGRAM(S): at 21:16

## 2021-11-07 RX ADMIN — CALCITRIOL 0.25 MICROGRAM(S): 0.5 CAPSULE ORAL at 11:52

## 2021-11-07 RX ADMIN — GABAPENTIN 100 MILLIGRAM(S): 400 CAPSULE ORAL at 13:57

## 2021-11-07 RX ADMIN — Medication 25 MILLIGRAM(S): at 06:02

## 2021-11-07 RX ADMIN — HEPARIN SODIUM 5000 UNIT(S): 5000 INJECTION INTRAVENOUS; SUBCUTANEOUS at 21:16

## 2021-11-07 RX ADMIN — GABAPENTIN 100 MILLIGRAM(S): 400 CAPSULE ORAL at 21:17

## 2021-11-07 RX ADMIN — Medication 650 MILLIGRAM(S): at 23:39

## 2021-11-07 RX ADMIN — Medication 10: at 17:31

## 2021-11-07 RX ADMIN — OXYCODONE HYDROCHLORIDE 5 MILLIGRAM(S): 5 TABLET ORAL at 21:57

## 2021-11-07 RX ADMIN — Medication 81 MILLIGRAM(S): at 11:54

## 2021-11-07 RX ADMIN — GABAPENTIN 100 MILLIGRAM(S): 400 CAPSULE ORAL at 06:02

## 2021-11-07 RX ADMIN — Medication 650 MILLIGRAM(S): at 06:01

## 2021-11-07 RX ADMIN — HEPARIN SODIUM 5000 UNIT(S): 5000 INJECTION INTRAVENOUS; SUBCUTANEOUS at 06:02

## 2021-11-07 RX ADMIN — Medication 1300 MILLIGRAM(S): at 06:02

## 2021-11-07 RX ADMIN — Medication 650 MILLIGRAM(S): at 23:09

## 2021-11-07 RX ADMIN — Medication 650 MILLIGRAM(S): at 17:28

## 2021-11-07 RX ADMIN — Medication 25 MILLIGRAM(S): at 13:57

## 2021-11-07 RX ADMIN — Medication 650 MILLIGRAM(S): at 11:57

## 2021-11-07 RX ADMIN — ATORVASTATIN CALCIUM 80 MILLIGRAM(S): 80 TABLET, FILM COATED ORAL at 21:16

## 2021-11-07 RX ADMIN — Medication 650 MILLIGRAM(S): at 17:31

## 2021-11-07 NOTE — PROGRESS NOTE ADULT - SUBJECTIVE AND OBJECTIVE BOX
VASCULAR SURGERY PROGRESS NOTE    CC: LEFT DFU, s/p LLE angiogram via right groin access   Hospital Day # 7  Post-Op Day # 4    Procedure: Angiogram    Events of past 24 hours: NO acute events overnight. patient due for a bypass on 11/8. Patient had a potassium of 6.2 that was hemolyzed so sample was redrawn and sent to lab which came back with a normal potassium.           ROS otherwise negative except per subjective and HPI      PAST MEDICAL & SURGICAL HISTORY:  PVD (peripheral vascular disease)    Benign essential HTN    Intellectual disability    Hearing impaired    HLD (hyperlipidemia)        Vital Signs Last 24 Hrs  T(C): 36.3 (07 Nov 2021 01:46), Max: 36.9 (06 Nov 2021 12:56)  T(F): 97.3 (07 Nov 2021 01:46), Max: 98.5 (06 Nov 2021 12:56)  HR: 72 (07 Nov 2021 01:46) (72 - 81)  BP: 167/67 (07 Nov 2021 01:46) (103/54 - 179/73)  BP(mean): --  RR: 18 (07 Nov 2021 01:46) (18 - 18)  SpO2: 98% (07 Nov 2021 01:46) (98% - 99%)    Pain (0-10):            Pain Control Adequate: [] YES [] N    Diet:    I&O's Detail    05 Nov 2021 07:01  -  06 Nov 2021 07:00  --------------------------------------------------------  IN:    dextrose 5% w/ Additives: 525 mL  Total IN: 525 mL    OUT:  Total OUT: 0 mL    Total NET: 525 mL      06 Nov 2021 08:01  -  07 Nov 2021 04:07  --------------------------------------------------------  IN:  Total IN: 0 mL    OUT:    Voided (mL): 600 mL  Total OUT: 600 mL    Total NET: -600 mL      PHYSICAL EXAM    Appearance: Normal. Patient is deaf and needs sign language to be used to communicate	  HEENT:   Normal oral mucosa, PERRL, EOMI	  Neck: Supple, - JVD; Carotid Bruit   Cardiovascular: Normal S1 S2, No JVD, No murmurs,   Respiratory: Lungs clear to auscultation/Decreased Breath Sounds/No Rales, Rhonchi, Wheezing	  Gastrointestinal:  Soft, Non-tender, + BS	  Extremities: Normal range of motion, No clubbing, cyanosis or edema  Neurologic: Non-focal  Psychiatry: A & O x 3, Mood & affect appropriate        MEDICATIONS:   MEDICATIONS  (STANDING):  acetaminophen     Tablet .. 650 milliGRAM(s) Oral every 6 hours  aspirin  chewable 81 milliGRAM(s) Oral daily  atorvastatin 80 milliGRAM(s) Oral at bedtime  calcitriol   Capsule 0.25 MICROGram(s) Oral daily  dextrose 5% 1000 milliLiter(s) (50 mL/Hr) IV Continuous <Continuous>  gabapentin 100 milliGRAM(s) Oral three times a day  heparin   Injectable 5000 Unit(s) SubCutaneous every 8 hours  hydrALAZINE 25 milliGRAM(s) Oral every 8 hours  insulin glargine Injectable (LANTUS) 5 Unit(s) SubCutaneous at bedtime  insulin lispro (ADMELOG) corrective regimen sliding scale   SubCutaneous three times a day before meals  metoprolol succinate ER 25 milliGRAM(s) Oral daily  pantoprazole    Tablet 40 milliGRAM(s) Oral before breakfast  sodium bicarbonate 1300 milliGRAM(s) Oral every 8 hours    MEDICATIONS  (PRN):  magnesium hydroxide Suspension 30 milliLiter(s) Oral once PRN Constipation  oxyCODONE    IR 5 milliGRAM(s) Oral every 6 hours PRN Severe Pain (7 - 10)    LAB/STUDIES:                        9.3    9.24  )-----------( 235      ( 06 Nov 2021 20:00 )             29.7     11-07    139  |  102  |  51<H>  ----------------------------<  269<H>  4.9   |  22  |  3.0<H>    Ca    8.1<L>      07 Nov 2021 00:41  Phos  4.3     11-06  Mg     1.9     11-06      PT/INR - ( 05 Nov 2021 06:30 )   PT: 10.80 sec;   INR: 0.94 ratio         PTT - ( 05 Nov 2021 06:30 )  PTT:40.7 sec      IMAGING:      ASSESSMENT:    56y F with necrotic wounds on left heel, DM, neuropathy, CKD, CVA in the past, s/p LLE angiogram 11/3     PLAN:  - patient needs femoral to tibial bypass 11/8  - consent  - npo at midnight  - preop lab work  - cardiac evaluation: moderate to high risk for surgery   - currently has normal potassium levels- monitor   - CKD - monitor Cr levels BID after angiogram    - pain control  - DVT/GI prophylaxis    spectra [id number]

## 2021-11-07 NOTE — PROGRESS NOTE ADULT - ASSESSMENT
55 yo female PMH CKD4 PVD, smoker, HTN, HLD, h/o right pontine CVA (2/7/21), DM,  intellectual disability, hearing/speech impairment presents with chronic left heel ulcer and pain. Recent duplex on 10/25/21 showed no significant arterial blood flow visualized beyond the left superficial femoral artery.  Has stable CKD4 at least since 2/21; saw nephrologist in [place]  # CKD 4, stable  # Proteinuria, nephrotic range / likely d/t HTN/DM  # Hx of HTN  # Hx of DM  # LE ulcer / vascular following, planned for femoral to tibial bypass 11/8  #PVD s/p LLE angio 11/3 - needs LE bypass  - strict i/o, low K diet/  -  cont po Bicarb 1300 tid  - please document accurate urine output   - creatinine stable   - cont hydralazine, bp better controlled  - check OLVIN, serum free light chain ratio normal , spep/ifx   - phos noted, no binder , on calcitriol check PTH   - hgb noted , check iron stores   - vascular f/up appreciated   - will follow

## 2021-11-07 NOTE — PROGRESS NOTE ADULT - SUBJECTIVE AND OBJECTIVE BOX
Nephrology progress note    Patient was seen and examined, events over the last 24 h noted .  cr stable    Allergies:  No Known Allergies    Hospital Medications:   MEDICATIONS  (STANDING):  acetaminophen     Tablet .. 650 milliGRAM(s) Oral every 6 hours  aspirin  chewable 81 milliGRAM(s) Oral daily  atorvastatin 80 milliGRAM(s) Oral at bedtime  calcitriol   Capsule 0.25 MICROGram(s) Oral daily  dextrose 5% 1000 milliLiter(s) (50 mL/Hr) IV Continuous <Continuous>  gabapentin 100 milliGRAM(s) Oral three times a day  heparin   Injectable 5000 Unit(s) SubCutaneous every 8 hours  hydrALAZINE 25 milliGRAM(s) Oral every 8 hours  insulin glargine Injectable (LANTUS) 5 Unit(s) SubCutaneous at bedtime  insulin lispro (ADMELOG) corrective regimen sliding scale   SubCutaneous three times a day before meals  metoprolol succinate ER 25 milliGRAM(s) Oral daily  pantoprazole    Tablet 40 milliGRAM(s) Oral before breakfast  sodium bicarbonate 1300 milliGRAM(s) Oral every 8 hours        VITALS:  T(F): 98.7 (21 @ 09:00), Max: 98.7 (21 @ 09:00)  HR: 72 (21 @ 09:35)  BP: 136/60 (21 @ 09:35)  RR: 18 (21 @ 09:00)  SpO2: 100% (21 @ 09:00)  Wt(kg): --     @ :  -   @ 07:00  --------------------------------------------------------  IN: 525 mL / OUT: 0 mL / NET: 525 mL     @ 08:01  -   @ 07:00  --------------------------------------------------------  IN: 0 mL / OUT: 1050 mL / NET: -1050 mL          PHYSICAL EXAM:  	Gen: NAD  	Pulm: CTA B/L  	CV:S1S2; no rub  	Abd: +distended      LABS:      139  |  102  |  51<H>  ----------------------------<  269<H>  4.9   |  22  |  3.0<H>    Ca    8.1<L>      2021 00:41  Phos  4.3       Mg     1.9                                 9.3    9.24  )-----------( 235      ( 2021 20:00 )             29.7       Urine Studies:  Urinalysis Basic - ( 2021 21:45 )    Color: Yellow / Appearance: Slightly Turbid / S.014 / pH:   Gluc:  / Ketone: Negative  / Bili: Negative / Urobili: <2 mg/dL   Blood:  / Protein: 300 mg/dL / Nitrite: Negative   Leuk Esterase: Large / RBC: 1 /HPF /  /HPF   Sq Epi:  / Non Sq Epi: 1 /HPF / Bacteria: Many      Creatinine, Random Urine: 47 mg/dL ( @ 21:45)  Protein/Creatinine Ratio Calculation: 4.5 Ratio ( @ 21:45)    RADIOLOGY & ADDITIONAL STUDIES:

## 2021-11-08 LAB
ANION GAP SERPL CALC-SCNC: 16 MMOL/L — HIGH (ref 7–14)
ANION GAP SERPL CALC-SCNC: 17 MMOL/L — HIGH (ref 7–14)
APTT BLD: 107.3 SEC — CRITICAL HIGH (ref 27–39.2)
BASOPHILS # BLD AUTO: 0.03 K/UL — SIGNIFICANT CHANGE UP (ref 0–0.2)
BASOPHILS NFR BLD AUTO: 0.3 % — SIGNIFICANT CHANGE UP (ref 0–1)
BUN SERPL-MCNC: 44 MG/DL — HIGH (ref 10–20)
BUN SERPL-MCNC: 50 MG/DL — HIGH (ref 10–20)
CALCIUM SERPL-MCNC: 7.7 MG/DL — LOW (ref 8.5–10.1)
CALCIUM SERPL-MCNC: 8.1 MG/DL — LOW (ref 8.5–10.1)
CHLORIDE SERPL-SCNC: 105 MMOL/L — SIGNIFICANT CHANGE UP (ref 98–110)
CHLORIDE SERPL-SCNC: 110 MMOL/L — SIGNIFICANT CHANGE UP (ref 98–110)
CO2 SERPL-SCNC: 16 MMOL/L — LOW (ref 17–32)
CO2 SERPL-SCNC: 20 MMOL/L — SIGNIFICANT CHANGE UP (ref 17–32)
CREAT SERPL-MCNC: 2.7 MG/DL — HIGH (ref 0.7–1.5)
CREAT SERPL-MCNC: 2.7 MG/DL — HIGH (ref 0.7–1.5)
EOSINOPHIL # BLD AUTO: 0.64 K/UL — SIGNIFICANT CHANGE UP (ref 0–0.7)
EOSINOPHIL NFR BLD AUTO: 6.5 % — SIGNIFICANT CHANGE UP (ref 0–8)
GLUCOSE BLDC GLUCOMTR-MCNC: 145 MG/DL — HIGH (ref 70–99)
GLUCOSE BLDC GLUCOMTR-MCNC: 160 MG/DL — HIGH (ref 70–99)
GLUCOSE SERPL-MCNC: 141 MG/DL — HIGH (ref 70–99)
GLUCOSE SERPL-MCNC: 152 MG/DL — HIGH (ref 70–99)
HCT VFR BLD CALC: 22.9 % — LOW (ref 37–47)
HCT VFR BLD CALC: 23.2 % — LOW (ref 37–47)
HGB BLD-MCNC: 7.2 G/DL — LOW (ref 12–16)
HGB BLD-MCNC: 7.3 G/DL — LOW (ref 12–16)
IMM GRANULOCYTES NFR BLD AUTO: 0.5 % — HIGH (ref 0.1–0.3)
INR BLD: 1.04 RATIO — SIGNIFICANT CHANGE UP (ref 0.65–1.3)
LYMPHOCYTES # BLD AUTO: 1.41 K/UL — SIGNIFICANT CHANGE UP (ref 1.2–3.4)
LYMPHOCYTES # BLD AUTO: 14.4 % — LOW (ref 20.5–51.1)
MAGNESIUM SERPL-MCNC: 1.7 MG/DL — LOW (ref 1.8–2.4)
MCHC RBC-ENTMCNC: 28.2 PG — SIGNIFICANT CHANGE UP (ref 27–31)
MCHC RBC-ENTMCNC: 28.6 PG — SIGNIFICANT CHANGE UP (ref 27–31)
MCHC RBC-ENTMCNC: 31.4 G/DL — LOW (ref 32–37)
MCHC RBC-ENTMCNC: 31.5 G/DL — LOW (ref 32–37)
MCV RBC AUTO: 89.8 FL — SIGNIFICANT CHANGE UP (ref 81–99)
MCV RBC AUTO: 91 FL — SIGNIFICANT CHANGE UP (ref 81–99)
MONOCYTES # BLD AUTO: 0.55 K/UL — SIGNIFICANT CHANGE UP (ref 0.1–0.6)
MONOCYTES NFR BLD AUTO: 5.6 % — SIGNIFICANT CHANGE UP (ref 1.7–9.3)
NEUTROPHILS # BLD AUTO: 7.12 K/UL — HIGH (ref 1.4–6.5)
NEUTROPHILS NFR BLD AUTO: 72.7 % — SIGNIFICANT CHANGE UP (ref 42.2–75.2)
NRBC # BLD: 0 /100 WBCS — SIGNIFICANT CHANGE UP (ref 0–0)
NRBC # BLD: 0 /100 WBCS — SIGNIFICANT CHANGE UP (ref 0–0)
PHOSPHATE SERPL-MCNC: 4.4 MG/DL — SIGNIFICANT CHANGE UP (ref 2.1–4.9)
PLATELET # BLD AUTO: 225 K/UL — SIGNIFICANT CHANGE UP (ref 130–400)
PLATELET # BLD AUTO: 244 K/UL — SIGNIFICANT CHANGE UP (ref 130–400)
POTASSIUM SERPL-MCNC: 4.6 MMOL/L — SIGNIFICANT CHANGE UP (ref 3.5–5)
POTASSIUM SERPL-MCNC: 5.1 MMOL/L — HIGH (ref 3.5–5)
POTASSIUM SERPL-SCNC: 4.6 MMOL/L — SIGNIFICANT CHANGE UP (ref 3.5–5)
POTASSIUM SERPL-SCNC: 5.1 MMOL/L — HIGH (ref 3.5–5)
PROTHROM AB SERPL-ACNC: 12 SEC — SIGNIFICANT CHANGE UP (ref 9.95–12.87)
RBC # BLD: 2.55 M/UL — LOW (ref 4.2–5.4)
RBC # BLD: 2.55 M/UL — LOW (ref 4.2–5.4)
RBC # FLD: 13.2 % — SIGNIFICANT CHANGE UP (ref 11.5–14.5)
RBC # FLD: 13.2 % — SIGNIFICANT CHANGE UP (ref 11.5–14.5)
SODIUM SERPL-SCNC: 141 MMOL/L — SIGNIFICANT CHANGE UP (ref 135–146)
SODIUM SERPL-SCNC: 143 MMOL/L — SIGNIFICANT CHANGE UP (ref 135–146)
TROPONIN T SERPL-MCNC: <0.01 NG/ML — SIGNIFICANT CHANGE UP
WBC # BLD: 7.38 K/UL — SIGNIFICANT CHANGE UP (ref 4.8–10.8)
WBC # BLD: 9.8 K/UL — SIGNIFICANT CHANGE UP (ref 4.8–10.8)
WBC # FLD AUTO: 7.38 K/UL — SIGNIFICANT CHANGE UP (ref 4.8–10.8)
WBC # FLD AUTO: 9.8 K/UL — SIGNIFICANT CHANGE UP (ref 4.8–10.8)

## 2021-11-08 PROCEDURE — 93010 ELECTROCARDIOGRAM REPORT: CPT

## 2021-11-08 PROCEDURE — 71045 X-RAY EXAM CHEST 1 VIEW: CPT | Mod: 26

## 2021-11-08 PROCEDURE — 35566 ART BYP FEM-ANT-POST TIB/PRL: CPT

## 2021-11-08 PROCEDURE — 99291 CRITICAL CARE FIRST HOUR: CPT

## 2021-11-08 RX ORDER — SODIUM BICARBONATE 1 MEQ/ML
1300 SYRINGE (ML) INTRAVENOUS EVERY 8 HOURS
Refills: 0 | Status: DISCONTINUED | OUTPATIENT
Start: 2021-11-08 | End: 2021-11-19

## 2021-11-08 RX ORDER — CALCITRIOL 0.5 UG/1
0.25 CAPSULE ORAL DAILY
Refills: 0 | Status: DISCONTINUED | OUTPATIENT
Start: 2021-11-08 | End: 2021-11-19

## 2021-11-08 RX ORDER — PANTOPRAZOLE SODIUM 20 MG/1
40 TABLET, DELAYED RELEASE ORAL
Refills: 0 | Status: DISCONTINUED | OUTPATIENT
Start: 2021-11-08 | End: 2021-11-19

## 2021-11-08 RX ORDER — GABAPENTIN 400 MG/1
100 CAPSULE ORAL THREE TIMES A DAY
Refills: 0 | Status: DISCONTINUED | OUTPATIENT
Start: 2021-11-08 | End: 2021-11-08

## 2021-11-08 RX ORDER — SODIUM CHLORIDE 9 MG/ML
1000 INJECTION, SOLUTION INTRAVENOUS
Refills: 0 | Status: DISCONTINUED | OUTPATIENT
Start: 2021-11-09 | End: 2021-11-09

## 2021-11-08 RX ORDER — ATORVASTATIN CALCIUM 80 MG/1
80 TABLET, FILM COATED ORAL AT BEDTIME
Refills: 0 | Status: DISCONTINUED | OUTPATIENT
Start: 2021-11-08 | End: 2021-11-19

## 2021-11-08 RX ORDER — ONDANSETRON 8 MG/1
4 TABLET, FILM COATED ORAL ONCE
Refills: 0 | Status: COMPLETED | OUTPATIENT
Start: 2021-11-08 | End: 2021-11-08

## 2021-11-08 RX ORDER — ASPIRIN/CALCIUM CARB/MAGNESIUM 324 MG
81 TABLET ORAL DAILY
Refills: 0 | Status: DISCONTINUED | OUTPATIENT
Start: 2021-11-09 | End: 2021-11-11

## 2021-11-08 RX ORDER — METOPROLOL TARTRATE 50 MG
25 TABLET ORAL DAILY
Refills: 0 | Status: DISCONTINUED | OUTPATIENT
Start: 2021-11-08 | End: 2021-11-08

## 2021-11-08 RX ORDER — HYDRALAZINE HCL 50 MG
25 TABLET ORAL EVERY 8 HOURS
Refills: 0 | Status: DISCONTINUED | OUTPATIENT
Start: 2021-11-08 | End: 2021-11-08

## 2021-11-08 RX ORDER — SODIUM CHLORIDE 9 MG/ML
1000 INJECTION INTRAMUSCULAR; INTRAVENOUS; SUBCUTANEOUS
Refills: 0 | Status: DISCONTINUED | OUTPATIENT
Start: 2021-11-08 | End: 2021-11-08

## 2021-11-08 RX ORDER — MAGNESIUM SULFATE 500 MG/ML
2 VIAL (ML) INJECTION ONCE
Refills: 0 | Status: COMPLETED | OUTPATIENT
Start: 2021-11-08 | End: 2021-11-08

## 2021-11-08 RX ORDER — INSULIN LISPRO 100/ML
VIAL (ML) SUBCUTANEOUS
Refills: 0 | Status: DISCONTINUED | OUTPATIENT
Start: 2021-11-08 | End: 2021-11-09

## 2021-11-08 RX ORDER — HEPARIN SODIUM 5000 [USP'U]/ML
5000 INJECTION INTRAVENOUS; SUBCUTANEOUS EVERY 8 HOURS
Refills: 0 | Status: DISCONTINUED | OUTPATIENT
Start: 2021-11-09 | End: 2021-11-19

## 2021-11-08 RX ORDER — DEXTROSE 50 % IN WATER 50 %
50 SYRINGE (ML) INTRAVENOUS ONCE
Refills: 0 | Status: COMPLETED | OUTPATIENT
Start: 2021-11-08 | End: 2021-11-08

## 2021-11-08 RX ORDER — METOCLOPRAMIDE HCL 10 MG
10 TABLET ORAL ONCE
Refills: 0 | Status: DISCONTINUED | OUTPATIENT
Start: 2021-11-08 | End: 2021-11-08

## 2021-11-08 RX ORDER — ACETAMINOPHEN 500 MG
650 TABLET ORAL EVERY 6 HOURS
Refills: 0 | Status: COMPLETED | OUTPATIENT
Start: 2021-11-08 | End: 2021-11-11

## 2021-11-08 RX ORDER — OXYCODONE HYDROCHLORIDE 5 MG/1
5 TABLET ORAL EVERY 6 HOURS
Refills: 0 | Status: DISCONTINUED | OUTPATIENT
Start: 2021-11-08 | End: 2021-11-09

## 2021-11-08 RX ORDER — DEXAMETHASONE 0.5 MG/5ML
8 ELIXIR ORAL ONCE
Refills: 0 | Status: DISCONTINUED | OUTPATIENT
Start: 2021-11-08 | End: 2021-11-08

## 2021-11-08 RX ORDER — HYDROMORPHONE HYDROCHLORIDE 2 MG/ML
0.25 INJECTION INTRAMUSCULAR; INTRAVENOUS; SUBCUTANEOUS EVERY 6 HOURS
Refills: 0 | Status: DISCONTINUED | OUTPATIENT
Start: 2021-11-08 | End: 2021-11-09

## 2021-11-08 RX ORDER — METOPROLOL TARTRATE 50 MG
25 TABLET ORAL DAILY
Refills: 0 | Status: DISCONTINUED | OUTPATIENT
Start: 2021-11-08 | End: 2021-11-09

## 2021-11-08 RX ORDER — MAGNESIUM HYDROXIDE 400 MG/1
30 TABLET, CHEWABLE ORAL ONCE
Refills: 0 | Status: COMPLETED | OUTPATIENT
Start: 2021-11-08 | End: 2021-11-12

## 2021-11-08 RX ORDER — ACETAMINOPHEN 500 MG
650 TABLET ORAL EVERY 6 HOURS
Refills: 0 | Status: DISCONTINUED | OUTPATIENT
Start: 2021-11-08 | End: 2021-11-08

## 2021-11-08 RX ORDER — INSULIN HUMAN 100 [IU]/ML
10 INJECTION, SOLUTION SUBCUTANEOUS ONCE
Refills: 0 | Status: COMPLETED | OUTPATIENT
Start: 2021-11-08 | End: 2021-11-08

## 2021-11-08 RX ORDER — HYDROMORPHONE HYDROCHLORIDE 2 MG/ML
0.5 INJECTION INTRAMUSCULAR; INTRAVENOUS; SUBCUTANEOUS EVERY 4 HOURS
Refills: 0 | Status: DISCONTINUED | OUTPATIENT
Start: 2021-11-08 | End: 2021-11-08

## 2021-11-08 RX ORDER — OXYCODONE HYDROCHLORIDE 5 MG/1
10 TABLET ORAL EVERY 6 HOURS
Refills: 0 | Status: DISCONTINUED | OUTPATIENT
Start: 2021-11-08 | End: 2021-11-09

## 2021-11-08 RX ORDER — SODIUM ZIRCONIUM CYCLOSILICATE 10 G/10G
5 POWDER, FOR SUSPENSION ORAL ONCE
Refills: 0 | Status: COMPLETED | OUTPATIENT
Start: 2021-11-08 | End: 2021-11-08

## 2021-11-08 RX ORDER — SODIUM CHLORIDE 9 MG/ML
1000 INJECTION, SOLUTION INTRAVENOUS
Refills: 0 | Status: DISCONTINUED | OUTPATIENT
Start: 2021-11-08 | End: 2021-11-08

## 2021-11-08 RX ORDER — GABAPENTIN 400 MG/1
300 CAPSULE ORAL EVERY 8 HOURS
Refills: 0 | Status: DISCONTINUED | OUTPATIENT
Start: 2021-11-08 | End: 2021-11-13

## 2021-11-08 RX ORDER — HYDROMORPHONE HYDROCHLORIDE 2 MG/ML
0.2 INJECTION INTRAMUSCULAR; INTRAVENOUS; SUBCUTANEOUS
Refills: 0 | Status: DISCONTINUED | OUTPATIENT
Start: 2021-11-08 | End: 2021-11-08

## 2021-11-08 RX ORDER — ACETAMINOPHEN 500 MG
1000 TABLET ORAL ONCE
Refills: 0 | Status: COMPLETED | OUTPATIENT
Start: 2021-11-08 | End: 2021-11-08

## 2021-11-08 RX ADMIN — SODIUM ZIRCONIUM CYCLOSILICATE 5 GRAM(S): 10 POWDER, FOR SUSPENSION ORAL at 01:27

## 2021-11-08 RX ADMIN — Medication 100 MILLIGRAM(S): at 23:27

## 2021-11-08 RX ADMIN — INSULIN HUMAN 10 UNIT(S): 100 INJECTION, SOLUTION SUBCUTANEOUS at 01:19

## 2021-11-08 RX ADMIN — HYDROMORPHONE HYDROCHLORIDE 0.5 MILLIGRAM(S): 2 INJECTION INTRAMUSCULAR; INTRAVENOUS; SUBCUTANEOUS at 17:35

## 2021-11-08 RX ADMIN — HEPARIN SODIUM 5000 UNIT(S): 5000 INJECTION INTRAVENOUS; SUBCUTANEOUS at 05:18

## 2021-11-08 RX ADMIN — ONDANSETRON 4 MILLIGRAM(S): 8 TABLET, FILM COATED ORAL at 16:26

## 2021-11-08 RX ADMIN — SODIUM CHLORIDE 75 MILLILITER(S): 9 INJECTION INTRAMUSCULAR; INTRAVENOUS; SUBCUTANEOUS at 02:21

## 2021-11-08 RX ADMIN — HYDROMORPHONE HYDROCHLORIDE 0.5 MILLIGRAM(S): 2 INJECTION INTRAMUSCULAR; INTRAVENOUS; SUBCUTANEOUS at 16:33

## 2021-11-08 RX ADMIN — Medication 1000 MILLIGRAM(S): at 22:30

## 2021-11-08 RX ADMIN — Medication 400 MILLIGRAM(S): at 21:59

## 2021-11-08 RX ADMIN — Medication 25 GRAM(S): at 19:42

## 2021-11-08 RX ADMIN — GABAPENTIN 100 MILLIGRAM(S): 400 CAPSULE ORAL at 05:18

## 2021-11-08 RX ADMIN — Medication 25 MILLIGRAM(S): at 05:17

## 2021-11-08 RX ADMIN — Medication 50 MILLILITER(S): at 01:19

## 2021-11-08 RX ADMIN — PANTOPRAZOLE SODIUM 40 MILLIGRAM(S): 20 TABLET, DELAYED RELEASE ORAL at 05:18

## 2021-11-08 RX ADMIN — Medication 100 MILLIGRAM(S): at 18:55

## 2021-11-08 RX ADMIN — Medication 1300 MILLIGRAM(S): at 05:17

## 2021-11-08 RX ADMIN — Medication 650 MILLIGRAM(S): at 05:18

## 2021-11-08 RX ADMIN — HYDROMORPHONE HYDROCHLORIDE 0.5 MILLIGRAM(S): 2 INJECTION INTRAMUSCULAR; INTRAVENOUS; SUBCUTANEOUS at 16:32

## 2021-11-08 RX ADMIN — HYDROMORPHONE HYDROCHLORIDE 0.5 MILLIGRAM(S): 2 INJECTION INTRAMUSCULAR; INTRAVENOUS; SUBCUTANEOUS at 17:39

## 2021-11-08 RX ADMIN — HYDROMORPHONE HYDROCHLORIDE 0.25 MILLIGRAM(S): 2 INJECTION INTRAMUSCULAR; INTRAVENOUS; SUBCUTANEOUS at 19:02

## 2021-11-08 RX ADMIN — Medication 650 MILLIGRAM(S): at 05:31

## 2021-11-08 NOTE — CHART NOTE - NSCHARTNOTEFT_GEN_A_CORE
Vascular Surgery Pre-op Note    Patient is a 56y old  Female who presents with a chief complaint of DFU (07 Nov 2021 13:54)    Procedure: left femoral tibial bypass   Surgeon: Dr. Muñoz    Vitals/Labs:  Vital Signs Last 24 Hrs  T(C): 36.6 (07 Nov 2021 22:12), Max: 37.1 (07 Nov 2021 09:00)  T(F): 97.8 (07 Nov 2021 22:12), Max: 98.7 (07 Nov 2021 09:00)  HR: 74 (07 Nov 2021 22:50) (72 - 99)  BP: 124/58 (07 Nov 2021 22:50) (96/53 - 178/77)  BP(mean): --  RR: 18 (07 Nov 2021 22:12) (18 - 18)  SpO2: 99% (07 Nov 2021 22:12) (96% - 100%)    I&O's Detail    06 Nov 2021 08:01  -  07 Nov 2021 07:00  --------------------------------------------------------  IN:  Total IN: 0 mL  OUT:    Voided (mL): 1050 mL  Total OUT: 1050 mL  Total NET: -1050 mL    07 Nov 2021 07:01  -  08 Nov 2021 03:32  --------------------------------------------------------  IN:    sodium chloride 0.9%: 75 mL  Total IN: 75 mL  OUT:    Voided (mL): 600 mL  Total OUT: 600 mL  Total NET: -525 mL                          9.4    8.12  )-----------( 258      ( 07 Nov 2021 17:03 )             30.5       11-07    141  |  105  |  52<H>  ----------------------------<  288<H>  5.4<H>   |  17  |  2.9<H>    Ca    8.4<L>      07 Nov 2021 17:03  Phos  4.5     11-07  Mg     1.9     11-07    CAPILLARY BLOOD GLUCOSE  POCT Blood Glucose.: 160 mg/dL (08 Nov 2021 01:07)  POCT Blood Glucose.: 171 mg/dL (07 Nov 2021 21:14)  POCT Blood Glucose.: 318 mg/dL (07 Nov 2021 17:30)  POCT Blood Glucose.: 138 mg/dL (07 Nov 2021 11:56)  POCT Blood Glucose.: 249 mg/dL (07 Nov 2021 07:28)    PT/INR - ( 07 Nov 2021 17:03 )   PT: 10.00 sec;   INR: 0.87 ratio    PTT - ( 07 Nov 2021 17:03 )  PTT:34.2 sec    T&S:       Imaging:   Chest X RAY:  < from: Xray Chest 1 View- PORTABLE-Routine (Xray Chest 1 View- PORTABLE-Routine .) (11.01.21 @ 18:01) >  Impression:  Bibasilar streaky opacities likely representing atelectasis and/or scarring.  --- End of Report ---    EKG:   < from: 12 Lead ECG (11.05.21 @ 02:12) >  Ventricular Rate 72 BPM  Atrial Rate 72 BPM  P-R Interval 146 ms  QRS Duration 76 ms  Q-T Interval 414 ms  QTC Calculation(Bazett) 453 ms  P Axis 51 degrees  R Axis 46 degrees  T Axis 47 degrees  Diagnosis Line Normal sinus rhythm  Normal ECG    Assessment & Plan:  SEN LING 56y Female    - NPO since midnight  - IVF while NPO  - Pain Control  - Inputs and outputs  - DVT ppx  - CBC, BMP w/ mag and phos, PTT and PT/INR, type and screen     MEDICATIONS  (STANDING):  acetaminophen     Tablet .. 650 milliGRAM(s) Oral every 6 hours  aspirin  chewable 81 milliGRAM(s) Oral daily  atorvastatin 80 milliGRAM(s) Oral at bedtime  calcitriol   Capsule 0.25 MICROGram(s) Oral daily  dextrose 5% 1000 milliLiter(s) (50 mL/Hr) IV Continuous <Continuous>  gabapentin 100 milliGRAM(s) Oral three times a day  heparin   Injectable 5000 Unit(s) SubCutaneous every 8 hours  hydrALAZINE 25 milliGRAM(s) Oral every 8 hours  insulin glargine Injectable (LANTUS) 5 Unit(s) SubCutaneous at bedtime  insulin lispro (ADMELOG) corrective regimen sliding scale   SubCutaneous three times a day before meals  metoprolol succinate ER 25 milliGRAM(s) Oral daily  pantoprazole    Tablet 40 milliGRAM(s) Oral before breakfast  sodium bicarbonate 1300 milliGRAM(s) Oral every 8 hours  sodium chloride 0.9%. 1000 milliLiter(s) (75 mL/Hr) IV Continuous <Continuous>    MEDICATIONS  (PRN):  magnesium hydroxide Suspension 30 milliLiter(s) Oral once PRN Constipation  oxyCODONE    IR 5 milliGRAM(s) Oral every 6 hours PRN Severe Pain (7 - 10)      VASCULAR TEAM SPECTRA: [id number]

## 2021-11-08 NOTE — CHART NOTE - NSCHARTNOTEFT_GEN_A_CORE
Post Operative Note  Patient: SEN LING 56y (1965) Female   MRN: [id number]  [facility]  Visit: 11-01-21 Inpatient  Date: 11-08-21 @ 19:44    Procedure: Open wound of left foot     S/P Bypass left femoral artery to posterior tibial artery with autologous venous tissue, open approach    Subjective:   Nausea:  no, Vomiting:  no, Ambulating:  no, Flatus:  no  Pain Assessment: Patient is complaining of mild LLE pain that is appropriate for post-operative course.     Objective:  Vitals: T(F): 97.3 (11-08-21 @ 18:43), Max: 98.9 (11-08-21 @ 15:00)  HR: 80 (11-08-21 @ 19:12)  BP: 159/74 (11-08-21 @ 18:43) (107/51 - 185/79)  RR: 15 (11-08-21 @ 19:12)  SpO2: 100% (11-08-21 @ 19:12)  Vent Settings:     In:   11-07-21 @ 07:01  -  11-08-21 @ 07:00  --------------------------------------------------------  IN: 375 mL    11-08-21 @ 07:01  -  11-08-21 @ 19:44  --------------------------------------------------------  IN: 50 mL      IV Fluids: calcitriol   Capsule 0.25 MICROGram(s) Oral daily  dextrose 5% 1000 milliLiter(s) (50 mL/Hr) IV Continuous <Continuous>  sodium bicarbonate 1300 milliGRAM(s) Oral every 8 hours      Out:   11-07-21 @ 07:01  -  11-08-21 @ 07:00  --------------------------------------------------------  OUT: 600 mL    11-08-21 @ 07:01  -  11-08-21 @ 19:44  --------------------------------------------------------  OUT: 300 mL      EBL:     Voided Urine:   11-07-21 @ 07:01  -  11-08-21 @ 07:00  --------------------------------------------------------  OUT: 600 mL    11-08-21 @ 07:01  -  11-08-21 @ 19:44  --------------------------------------------------------  OUT: 300 mL      Henriquez Catheter: yes no   Drains:   PÉREZ:    ,   Chest Tube:      NG Tube:     Physical Examination:  General Appearance: NAD,   Heart: RRR  Lungs: CTABL  Abdomen:  Soft, nontender, nondistended.   MSK/Extremities: Warm & well-perfused. LLE dressing in place with some serosanguinous drainage. DP doplerable, no PT. LLE foot ulcer. RLE DP/PT  Skin: Warm, dry. No jaundice.     Medications: [Standing]  acetaminophen     Tablet .. 650 milliGRAM(s) Oral every 6 hours  atorvastatin 80 milliGRAM(s) Oral at bedtime  calcitriol   Capsule 0.25 MICROGram(s) Oral daily  clindamycin IVPB 600 milliGRAM(s) IV Intermittent every 6 hours  dextrose 5% 1000 milliLiter(s) IV Continuous <Continuous>  gabapentin 300 milliGRAM(s) Oral every 8 hours  HYDROmorphone  Injectable 0.25 milliGRAM(s) IV Push every 6 hours PRN  insulin lispro (ADMELOG) corrective regimen sliding scale   SubCutaneous three times a day before meals  magnesium hydroxide Suspension 30 milliLiter(s) Oral once PRN  metoprolol succinate ER 25 milliGRAM(s) Oral daily  oxyCODONE    IR 5 milliGRAM(s) Oral every 6 hours PRN  oxyCODONE    IR 10 milliGRAM(s) Oral every 6 hours PRN  pantoprazole    Tablet 40 milliGRAM(s) Oral before breakfast  sodium bicarbonate 1300 milliGRAM(s) Oral every 8 hours    Medications: [PRN]  acetaminophen     Tablet .. 650 milliGRAM(s) Oral every 6 hours  atorvastatin 80 milliGRAM(s) Oral at bedtime  calcitriol   Capsule 0.25 MICROGram(s) Oral daily  clindamycin IVPB 600 milliGRAM(s) IV Intermittent every 6 hours  dextrose 5% 1000 milliLiter(s) IV Continuous <Continuous>  gabapentin 300 milliGRAM(s) Oral every 8 hours  HYDROmorphone  Injectable 0.25 milliGRAM(s) IV Push every 6 hours PRN  insulin lispro (ADMELOG) corrective regimen sliding scale   SubCutaneous three times a day before meals  magnesium hydroxide Suspension 30 milliLiter(s) Oral once PRN  metoprolol succinate ER 25 milliGRAM(s) Oral daily  oxyCODONE    IR 5 milliGRAM(s) Oral every 6 hours PRN  oxyCODONE    IR 10 milliGRAM(s) Oral every 6 hours PRN  pantoprazole    Tablet 40 milliGRAM(s) Oral before breakfast  sodium bicarbonate 1300 milliGRAM(s) Oral every 8 hours      DVT PROPHYLAXIS:   GI PROPHYLAXIS: pantoprazole    Tablet 40 milliGRAM(s) Oral before breakfast    ANTICOAGULATION:   ANTIBIOTICS:  clindamycin IVPB 600 milliGRAM(s)    Labs:                        7.2    9.80  )-----------( 225      ( 08 Nov 2021 15:34 )             22.9     11-08    141  |  105  |  50<H>  ----------------------------<  141<H>  4.6   |  20  |  2.7<H>    Ca    8.1<L>      08 Nov 2021 07:50  Phos  4.4     11-08  Mg     1.7     11-08      PT/INR - ( 08 Nov 2021 15:34 )   PT: 12.00 sec;   INR: 1.04 ratio         PTT - ( 08 Nov 2021 15:34 )  PTT:107.3 sec  CARDIAC MARKERS ( 08 Nov 2021 15:34 )  x     / <0.01 ng/mL / x     / x     / x        Imaging:  No post-op imaging studies    Assessment:  56yF s/p     Plan:  - SICU for q1 hour vascular checks  - f/u podiatry,  - Monitor vitals  - Monitor post-op labs and replete as necessary  - Monitor for bowel function  - Continue Pain Medications if necessary  - Continue Antibiotics if necessary  - Bedrest  - Monitor urine output and trial of void once Henriquez removed  - DVT and GI Prophylaxis  - Monitor wound and dressing for changes,       Date/Time: 11-08-21 @ 19:44

## 2021-11-08 NOTE — PROGRESS NOTE ADULT - ASSESSMENT
ASSESSMENT:  56y F with necrotic wounds on left heel, DM, neuropathy, CKD, CVA in the past, s/p LLE angiogram 11/3 planned for Left Fem-Tib Bypass 11/8    PLAN:  - OR today for Left Fem-Tib Bypass    Lines/Tubes: VARSHA    VASCULAR TEAM SPECTRA: [id number]

## 2021-11-08 NOTE — CONSULT NOTE ADULT - ASSESSMENT
Assessment & Plan    56y Female 7d s/p left femoral artery to posterior tibial artery bypass with autologous venous tissue.    NEURO:  Acute pain-controlled with   Inpatient Rx: acetaminophen     Tablet .. 650 milliGRAM(s) Oral every 6 hours PRN  gabapentin 100 milliGRAM(s) Oral three times a day  HYDROmorphone  Injectable 0.2 milliGRAM(s) IV Push every 10 minutes PRN  HYDROmorphone  Injectable 0.5 milliGRAM(s) IV Push every 4 hours PRN  metoclopramide Injectable 10 milliGRAM(s) IV Push once PRN    Home Rx: aspirin 81 mg oral tablet  gabapentin 300 mg oral capsule      RESP:     Oxygenation-wean off NC to RA as tolerated    Activity-          Current Rx:     Home Rx:     Inpatient Rx-    Home Rx:           CARDS:     Rx-hydrALAZINE 25 every 8 hours  metoprolol succinate ER 25 daily      Inpatient Rx: hydrALAZINE 25 milliGRAM(s) Oral every 8 hours  metoprolol succinate ER 25 milliGRAM(s) Oral daily      Home Rx: losartan 50 mg oral tablet  Metoprolol Succinate ER 25 mg oral tablet, extended release      GI/NUTR:     Diet, Consistent Carbohydrate Renal w/Evening Snack      Diet, NPO after Midnight:      NPO Start Date: 08-Nov-2021,   NPO Start Time: 23:59 (11-08-21 @ 16:06) [Active]  Diet, Consistent Carbohydrate Renal w/Evening Snack (11-08-21 @ 15:33) [Active]          GI Prophylaxis- Pantoprazole       Drug Dosing Weight  Height (cm): 154.9 (08 Nov 2021 07:18)  Weight (kg): 72.6 (08 Nov 2021 07:18)  BMI (kg/m2): 30.3 (08 Nov 2021 07:18)  BSA (m2): 1.72 (08 Nov 2021 07:18)      Inpatient Rx: magnesium hydroxide Suspension 30 milliLiter(s) Oral once PRN  pantoprazole    Tablet 40 milliGRAM(s) Oral before breakfast      /RENAL:     Monitor Patric in place    Current Rx:     Labs:          BUN/Cr- 52/2.9  -->,  50/2.7  -->          Electrolytes-Na 141 // K 4.6 // Mg -- //  Phos -- (11-08 @ 07:50)      HEME/ONC:       DVT prophylaxis-, SCDs    Labs: Hb/Hct:  9.4/30.5  -->,  7.2/22.9  -->                      Plts:  258  -->,  225  -->                 PTT/INR:  34.2/0.87  --->,  --/1.04  --->       Home Rx: Plavix 75 mg oral tablet: 1 tab(s) orally once a day        ID:  WBC- 9.24  --->>,  8.12  --->>,  9.80  --->>  Temp trend- 24hrs T(F): 97.1 (11-08 @ 17:00), Max: 98.9 (11-08 @ 15:00)  Antibiotics-clindamycin IVPB 600 every 6 hours           ENDO:  Glucose, Serum: 141 (11-08 @ 07:50)    LINES/DRAINS:  Florence MADDOX     Advanced Directives: Presumed Full Code    DISPO:    SICU Assessment & Plan    56y Female 7d s/p left femoral artery to posterior tibial artery bypass with autologous venous tissue.    NEURO:  No chronic dx  Acute pain-controlled with tylenol ATC, oxy  and dilaudid prn   gabapentin 300 milliGRAM(s) Oral three times a day      RESP: No chronic dx    Oxygenation-wean off NC to RA as tolerated  Encourage IS  AM CXR       CARDS:  Hx of HTN, HLD,   Restarted home metoprolol and atorvastatin, holding home losartan/HCTZ   11/4 ECHO: EF 75%, mild concentric LVH, mild TR, mild AS   CE x1 neg , 2 more sets pending  Post EKG - SR,     VASC:  Hx of PVD  -s/p LLE fem-tib bypass  -Pulse exam- Dopplerable L DP/AT, R DP/PT  -L heel foot ulcer, podiatry following, OR tomorrow for debridement      GI/NUTR:  No chronic dx    Diet, Consistent Carbohydrate Renal w/Evening Snack    Diet, NPO after Midnight for OR w/ Podiatry    GI Prophylaxis- PPI, Senna       /RENAL:  Hx of CKD4 (baseline Cr 2.4?)  Nephro following    Monitor UO-mcgrath in place    IVL, IVF to start at MN when NPO     Labs:          BUN/Cr- 52/2.9  -->,  50/2.7  -->          Electrolytes-Na -- // K -- // Mg 1.7 //  Phos 4.4 (11-08 @ 15:34)        HEME/ONC:       DVT prophylaxis-,held as per primary team til tomorrow,  SCDs    Labs: Hb/Hct:  9.4/30.5  -->,  7.2/22.9  -->                      Plts:  258  -->,  225  -->                 PTT/INR:  34.2/0.87  --->,  --/1.04  --->       Home Rx: Plavix 75 mg oral tablet: 1 tab(s) orally once a day    ID:  WBC- 9.24  --->>,  8.12  --->>,  9.80  --->>  Temp trend- 24hrs T(F): 97.1 (11-08 @ 17:00), Max: 98.9 (11-08 @ 15:00)  Antibiotics-clindamycin IVPB 600 every 6 hours         ENDO:  Glucose, Serum: 141 (11-08 @ 07:50)    LINES/DRAINS:  Florence MADDOX aline    Advanced Directives: Presumed Full Code    d/w Dr Ashley  DISPO:    SICU

## 2021-11-08 NOTE — CONSULT NOTE ADULT - ATTENDING COMMENTS
Patient would benefit from Debridement of wounds  left foot   will follow vascular intervention   discussed with patient and resident
56y Female 7d s/p left femoral artery to posterior tibial artery bypass with autologous venous tissue.       Acute post op pain-controlled with   Inpatient Rx: acetaminophen     Tablet .. 650 milliGRAM(s) Oral every 6 hours PRN  gabapentin 100 milliGRAM(s) Oral three times a day  HYDROmorphone  Injectable 0.2 milliGRAM(s) IV Push every 10 minutes PRN  HYDROmorphone  Injectable 0.5 milliGRAM(s) IV Push every 4 hours PRN  metoclopramide Injectable 10 milliGRAM(s) IV Push once PRN    Home Rx: aspirin 81 mg oral tablet  gabapentin 300 mg oral capsule      Rx-hydrALAZINE 25 every 8 hours  metoprolol succinate ER 25 daily      Inpatient Rx: hydrALAZINE 25 milliGRAM(s) Oral every 8 hours  metoprolol succinate ER 25 milliGRAM(s) Oral daily      Home Rx: losartan 50 mg oral tablet  Metoprolol Succinate ER 25 mg oral tablet, extended release         Diet, Consistent Carbohydrate Renal w/Evening Snack      GI Prophylaxis- Pantoprazole       Monitor Patric in place    Current Rx:     Labs:          BUN/Cr- 52/2.9  -->,  50/2.7  -->          Electrolytes-Na 141 // K 4.6 // Mg -- //  Phos -- (11-08 @ 07:50)      HEME/ONC:       DVT prophylaxis-, SCDs per primary team hold chemical dvt ppx     Labs: Hb/Hct:  9.4/30.5  -->,  7.2/22.9  -->                       check cbc in am or if hemodynamically unstable      Home Rx: Plavix 75 mg oral tablet: 1 tab(s) orally once a day once cleared by surgeon         ID:  WBC- 9.24  --->>,  8.12  --->>,  9.80  --->>  Temp trend- 24hrs T(F): 97.1 (11-08 @ 17:00), Max: 98.9 (11-08 @ 15:00)  Antibiotics-clindamycin IVPB 600 every 6 hours       at risk for hemodynamic instability s/p vascular bypass surgery     LINES/DRAINS:  Florence MADDOX
agree with above  proceed with sx at mod to high risk for MACE  recall prn

## 2021-11-08 NOTE — PROGRESS NOTE ADULT - SUBJECTIVE AND OBJECTIVE BOX
Nephrology Progress Note    SEN LING  MRN-[id number]  56y  Female    S:  Patient is seen and examined, events over the last 24h noted.    O:  Allergies:  penicillin (Rash)    Hospital Medications:   MEDICATIONS  (STANDING):  acetaminophen     Tablet .. 650 milliGRAM(s) Oral every 6 hours  atorvastatin 80 milliGRAM(s) Oral at bedtime  calcitriol   Capsule 0.25 MICROGram(s) Oral daily  gabapentin 300 milliGRAM(s) Oral every 8 hours  insulin lispro (ADMELOG) corrective regimen sliding scale   SubCutaneous three times a day before meals  metoprolol succinate ER 25 milliGRAM(s) Oral daily  pantoprazole    Tablet 40 milliGRAM(s) Oral before breakfast  sodium bicarbonate 1300 milliGRAM(s) Oral every 8 hours    MEDICATIONS  (PRN):  HYDROmorphone  Injectable 0.25 milliGRAM(s) IV Push every 6 hours PRN breakthrough  magnesium hydroxide Suspension 30 milliLiter(s) Oral once PRN Constipation  oxyCODONE    IR 5 milliGRAM(s) Oral every 6 hours PRN Moderate Pain (4 - 6)  oxyCODONE    IR 10 milliGRAM(s) Oral every 6 hours PRN Severe Pain (7 - 10)    Home Medications:  aspirin 81 mg oral tablet: 1 tab(s) orally once a day (2021 16:07)  atorvastatin 80 mg oral tablet: 1 tab(s) orally once a day (2021 16:06)  calcitriol 0.25 mcg oral capsule: 1 cap(s) orally once a day (2021 16:05)  gabapentin 300 mg oral capsule: 1 cap(s) orally 3 times a day (2021 16:06)  losartan 50 mg oral tablet: 1 tab(s) orally once a day (2021 16:08)  Metoprolol Succinate ER 25 mg oral tablet, extended release: 1 tab(s) orally once a day (2021 16:07)  Plavix 75 mg oral tablet: 1 tab(s) orally once a day (2021 16:07)  Protonix 40 mg oral delayed release tablet: 1 tab(s) orally once a day (2021 16:07)      VITALS:  Daily Height in cm: 154.94 (2021 07:18)    Daily   T(F): 97.1 (21 @ 23:00), Max: 98.9 (21 @ 15:00)  HR: 79 (21 @ 23:00)  BP: 154/68 (21 @ 22:00)  RR: 17 (21 @ 23:00)  SpO2: 99% (21 @ 23:00)  Wt(kg): --  I&O's Detail    2021 07:  -  2021 07:00  --------------------------------------------------------  IN:    sodium chloride 0.9%: 375 mL  Total IN: 375 mL    OUT:    Voided (mL): 600 mL  Total OUT: 600 mL    Total NET: -225 mL      2021 07:  -  2021 00:01  --------------------------------------------------------  IN:    IV PiggyBack: 150 mL  Total IN: 150 mL    OUT:    Indwelling Catheter - Urethral (mL): 480 mL    Voided (mL): 260 mL  Total OUT: 740 mL    Total NET: -590 mL        I&O's Summary    2021 07:  -  2021 07:00  --------------------------------------------------------  IN: 375 mL / OUT: 600 mL / NET: -225 mL    : 00:01  --------------------------------------------------------  IN: 150 mL / OUT: 740 mL / NET: -590 mL      Height (cm): 154.9 ( 07:18)  Weight (kg): 72.6 ( 07:18)  BMI (kg/m2): 30.3 (:18)  BSA (m2): 1.72 (:18)    PHYSICAL EXAM:  Gen: NAD  Resp: CTAB  Card: S1/S2  Abd: soft  Extremities: no edema      LABS:          143  |  110  |  44<H>  ----------------------------<  152<H>  5.1<H>   |  16<L>  |  2.7<H>    Ca    7.7<L>      2021 18:04  Phos  4.4       Mg     1.7           eGFR if African American: 22 mL/min/1.73M2 (21 @ 18:04)  eGFR if Non African American: 19 mL/min/1.73M2 (21 @ 18:04)    Phosphorus Level, Serum: 4.4 mg/dL (21 @ 15:34)  Phosphorus Level, Serum: 4.5 mg/dL (21 @ 17:03)                            7.3    7.38  )-----------( 244      ( 2021 23:25 )             23.2     Mean Cell Volume: 91.0 fL (21 @ 23:25)        Urine Studies:  Urinalysis Basic - ( 2021 21:45 )    Color: Yellow / Appearance: Slightly Turbid / S.014 / pH:   Gluc:  / Ketone: Negative  / Bili: Negative / Urobili: <2 mg/dL   Blood:  / Protein: 300 mg/dL / Nitrite: Negative   Leuk Esterase: Large / RBC: 1 /HPF /  /HPF   Sq Epi:  / Non Sq Epi: 1 /HPF / Bacteria: Many      Creatinine, Random Urine: 47 mg/dL ( @ 21:45)  Protein/Creatinine Ratio Calculation: 4.5 Ratio ( 21:45)    Creatinine trend:  Creatinine, Serum: 2.7 mg/dL (21 @ 18:04)  Creatinine, Serum: 2.7 mg/dL (21 @ 07:50)  Creatinine, Serum: 2.9 mg/dL (21 @ 17:03)  Creatinine, Serum: 3.0 mg/dL (21 @ 00:41)  Creatinine, Serum: 3.1 mg/dL (21 @ 20:00)  Creatinine, Serum: 3.1 mg/dL (21 @ 07:22)  Creatinine, Serum: 3.4 mg/dL (21 @ 17:28)  Creatinine, Serum: 3.3 mg/dL (21 @ 06:30)    OLVIN Pattern: Speckled (21 @ 12:47)  AMINAH Lambda: 8.11 mg/dL (21 @ 12:47)  AMINAH Kappa: 9.28 mg/dL (21 @ 12:47)

## 2021-11-08 NOTE — CHART NOTE - NSCHARTNOTEFT_GEN_A_CORE
Podiatry;    Pt s/p LLE bypass Mon 11/8;  Podiatry sx scheduled Tues 11/9 @ 11:00AM; excisional debridement of soft tissue and bone of left foot;  Current hemoglobin 7.2; request optimization prior to sx tomorrow; request PRBC;     Podiatry

## 2021-11-08 NOTE — PROGRESS NOTE ADULT - SUBJECTIVE AND OBJECTIVE BOX
GENERAL SURGERY PROGRESS NOTE    Patient: SEN LNIG , 56y ([birth year])Female   MRN: [id number]  Location: [address]  Visit: 11-01-21 Inpatient  Date: 11-08-21 @ 09:34    Hospital Day #: 8  Post-Op Day #: 5    Procedure/Dx/Injuries: 56F s/p LLE angiogram with right groin access    Events of past 24 hours: Pre-op for Left-Fem-Tib Bypass 11/8.    PAST MEDICAL & SURGICAL HISTORY:  PVD (peripheral vascular disease)    Benign essential HTN    Intellectual disability    Hearing impaired    HLD (hyperlipidemia)        Vitals:   T(F): 98.2 (11-08-21 @ 07:30), Max: 98.2 (11-08-21 @ 07:30)  HR: 68 (11-08-21 @ 07:30)  BP: 185/79 (11-08-21 @ 07:30)  RR: 14 (11-08-21 @ 07:30)  SpO2: 95% (11-08-21 @ 07:30)          Fluids:     I & O's:    11-07-21 @ 07:01  -  11-08-21 @ 07:00  --------------------------------------------------------  IN:    sodium chloride 0.9%: 375 mL  Total IN: 375 mL    OUT:    Voided (mL): 600 mL  Total OUT: 600 mL    Total NET: -225 mL    PHYSICAL EXAM:  Appearance: Normal. Patient is deaf and needs sign language to be used to communicate	  HEENT:  EOMI	  Neck: Supple   Cardiovascular: RRR  Respiratory: Normal respiratory effort	  Gastrointestinal:  Soft, Non-tender, + BS  Vascular:   LLE: + DP, no PT  RLE: +DP/PT    MEDICATIONS  (STANDING):  dextrose 5% 1000 milliLiter(s) (50 mL/Hr) IV Continuous <Continuous>    MEDICATIONS  (PRN):      DVT PROPHYLAXIS:   GI PROPHYLAXIS:   ANTICOAGULATION:   ANTIBIOTICS:            LAB/STUDIES:  Labs:  CAPILLARY BLOOD GLUCOSE      POCT Blood Glucose.: 145 mg/dL (08 Nov 2021 05:25)  POCT Blood Glucose.: 160 mg/dL (08 Nov 2021 01:07)  POCT Blood Glucose.: 171 mg/dL (07 Nov 2021 21:14)  POCT Blood Glucose.: 318 mg/dL (07 Nov 2021 17:30)  POCT Blood Glucose.: 138 mg/dL (07 Nov 2021 11:56)                          9.4    8.12  )-----------( 258      ( 07 Nov 2021 17:03 )             30.5       Auto Neutrophil %: 58.5 % (11-07-21 @ 17:03)  Auto Immature Granulocyte %: 0.2 % (11-07-21 @ 17:03)    11-08    141  |  105  |  50<H>  ----------------------------<  141<H>  4.6   |  20  |  2.7<H>      Calcium, Total Serum: 8.1 mg/dL (11-08-21 @ 07:50)    oags:     10.00  ----< 0.87    ( 07 Nov 2021 17:03 )     34.2      ACCESS/ DEVICES:  [x] Peripheral IV

## 2021-11-08 NOTE — CONSULT NOTE ADULT - SUBJECTIVE AND OBJECTIVE BOX
SICU Consultation Note  =====================================================  HPI:  55 yo female PMH PVD, smoker, HTN, HLD, h/o right pontine CVA (2/7/21), intellectual disability, hearing/speech impairment, CKD 4 (being followed by nephro), mild aortic stenosis, initially presented to ED on 10/25/21 for left lower extremity chronic arterial occlusion. Duplex at that time showed no significant blood flow beyond L superficial femoral artery. Pt saw Dr. Sullivan outpatient and was told to return to hospital for angiogram (performed on 11/3). Pt presented again to [facility] ED 11/1/21 c/o entire L leg pain and left heel ulcer. Podiatry plan to bring pt to OR Tuesday 11/9 for excisional debridement of soft tissue and bone of left foot. S/p left femoral artery to posterior tibial artery bypass with autologous venous tissue. Pt's family was at bedside to help translate. Patient presented to ED on 11/1 for left foot pain and heel ulcer. Pt c/o post-op left anteromedial lower thigh pain and nausea.       Surgery Information  OR time: 4.5 hours      EBL: 300 mL       UO: 740 mL            IV Fluids: 5% Albumin/500 mL, 1,800 mL NS      Blood Products:     None        PAST MEDICAL & SURGICAL HISTORY:  PVD (peripheral vascular disease)    Benign essential HTN    Intellectual disability    Hearing impaired    HLD (hyperlipidemia)        Allergies    PCN (as per anesthesia)      SH:  Home Medications:  aspirin 81 mg oral tablet: 1 tab(s) orally once a day (01 Nov 2021 16:07)  atorvastatin 80 mg oral tablet: 1 tab(s) orally once a day (01 Nov 2021 16:06)  calcitriol 0.25 mcg oral capsule: 1 cap(s) orally once a day (01 Nov 2021 16:05)  gabapentin 300 mg oral capsule: 1 cap(s) orally 3 times a day (01 Nov 2021 16:06)  losartan 50 mg oral tablet: 1 tab(s) orally once a day (01 Nov 2021 16:08)  Metoprolol Succinate ER 25 mg oral tablet, extended release: 1 tab(s) orally once a day (01 Nov 2021 16:07)  Plavix 75 mg oral tablet: 1 tab(s) orally once a day (01 Nov 2021 16:07)  Protonix 40 mg oral delayed release tablet: 1 tab(s) orally once a day (01 Nov 2021 16:07)    Advanced Directives: Full Code     ROS:  [X] A ten-point review of systems was otherwise negative except as noted.  [ ] Due to altered mental status/intubation, subjective information were not able to be obtained from the patient. History was obtained, to the extent possible, from review of the chart and collateral sources of information.      CURRENT MEDICATIONS:   --------------------------------------------------------------------------------------  Neurologic Medications  acetaminophen     Tablet .. 650 milliGRAM(s) Oral every 6 hours PRN Moderate Pain (4 - 6)  gabapentin 100 milliGRAM(s) Oral three times a day  HYDROmorphone  Injectable 0.2 milliGRAM(s) IV Push every 10 minutes PRN Moderate Pain (4 - 6)  HYDROmorphone  Injectable 0.5 milliGRAM(s) IV Push every 4 hours PRN Severe Pain (7 - 10)  metoclopramide Injectable 10 milliGRAM(s) IV Push once PRN Nausea and/or Vomiting    Respiratory Medications  None    Cardiovascular Medications  hydrALAZINE 25 milliGRAM(s) Oral every 8 hours  metoprolol succinate ER 25 milliGRAM(s) Oral daily    Gastrointestinal Medications  calcitriol   Capsule 0.25 MICROGram(s) Oral daily  dextrose 5% 1000 milliLiter(s) IV Continuous <Continuous>  lactated ringers. 1000 milliLiter(s) IV Continuous <Continuous>  magnesium hydroxide Suspension 30 milliLiter(s) Oral once PRN Constipation  pantoprazole    Tablet 40 milliGRAM(s) Oral before breakfast  sodium bicarbonate 1300 milliGRAM(s) Oral every 8 hours    Genitourinary Medications  None    Hematologic/Oncologic Medications  None    Antimicrobial/Immunologic Medications  clindamycin IVPB 600 milliGRAM(s) IV Intermittent every 6 hours    Endocrine/Metabolic Medications  atorvastatin 80 milliGRAM(s) Oral at bedtime  dexAMETHasone  IVPB 8 milliGRAM(s) IV Intermittent once PRN Nausea and/or Vomiting  insulin lispro (ADMELOG) corrective regimen sliding scale   SubCutaneous three times a day before meals    Topical/Other Medications  None    --------------------------------------------------------------------------------------    Vital Signs Last 24 Hrs  T(C): 36.2 (08 Nov 2021 17:00), Max: 37.2 (08 Nov 2021 15:00)  T(F): 97.1 (08 Nov 2021 17:00), Max: 98.9 (08 Nov 2021 15:00)  HR: 78 (08 Nov 2021 15:45) (68 - 99)  BP: 153/69 (08 Nov 2021 17:00) (107/51 - 185/79)  BP(mean): --  RR: 16 (08 Nov 2021 16:30) (14 - 18)  SpO2: 100% (08 Nov 2021 16:30) (95% - 100%)  I&O's Detail    07 Nov 2021 07:01  -  08 Nov 2021 07:00  --------------------------------------------------------  IN:    sodium chloride 0.9%: 375 mL  Total IN: 375 mL    OUT:    Voided (mL): 600 mL  Total OUT: 600 mL    Total NET: -225 mL        I&O's Summary    07 Nov 2021 07:01  -  08 Nov 2021 07:00  --------------------------------------------------------  IN: 375 mL / OUT: 600 mL / NET: -225 mL        LABS:                          7.2    9.80  )-----------( 225      ( 08 Nov 2021 15:34 )             22.9     11-08    141  |  105  |  50<H>  ----------------------------<  141<H>  4.6   |  20  |  2.7<H>    Ca    8.1<L>      08 Nov 2021 07:50  Phos  4.5     11-07  Mg     1.9     11-07        PT/INR - ( 08 Nov 2021 15:34 )   PT: 12.00 sec;   INR: 1.04 ratio         PTT - ( 07 Nov 2021 17:03 )  PTT:34.2 sec  CARDIAC MARKERS ( 08 Nov 2021 15:34 )  x     / <0.01 ng/mL / x     / x     / x            EXAM:  General/Neuro  GCS: 15  Exam: Normal, alert, oriented x 3, no focal deficits.    Respiratory  Exam: Lungs clear to auscultation bilaterally, Normal expansion/effort.       Cardiovascular  Exam: S1, S2.  Regular rate and rhythm.   Cardiac Rhythm: Normal Sinus Rhythm  ECHO: EF 75%, mild concentric LVH, mild TR    GI  Exam: Abdomen soft, Non-tender, Non-distended.   Current Diet:  NPO after midnight    Extremities  Exam: Extremities warm and dry. Bandages present from left groin and extends to left ankle. Another bandage on anterolateral lower leg. Bandages minimally stained with blood. Left DP/AT triphasic pulses and right DP/PT pulses present on doppler. Panfilo drain with bulb present in left leg, no drainage or discharge noted. Pain on palpation of left anteromedial lower thigh.    Derm:  Exam: Good skin turgor. Left medial gangrenous heel ulcer.    :   Exam: Henriquez catheter in place.     Tubes/Lines/Drains  [x] Peripheral IV  [X] Panfilo drain  [X] A-line           SICU Consultation Note  =====================================================  HPI:  57 yo female PMH PVD, smoker, HTN, HLD, h/o right pontine CVA (2/7/21), intellectual disability, hearing/speech impairment, CKD 4 (being followed by nephro), mild aortic stenosis, initially presented to ED on 10/25/21 for left lower extremity chronic arterial occlusion. Duplex at that time showed no significant blood flow beyond L superficial femoral artery. Pt saw Dr. Sullivan outpatient and was told to return to hospital for angiogram (performed on 11/3). Pt presented [facility] to [facility] ED 11/1/21 c/o entire L leg pain and left heel ulcer.  Cardiology c/s, moderate risk for surgery, ECHO: EF 75%, mild concentric LVH, mild TR, mild AS.  Podiatry plan to bring pt to OR Tuesday 11/9 for excisional debridement of soft tissue and bone of left foot. S/p left femoral artery to posterior tibial artery bypass with autologous venous tissue. Pt's family was at bedside to help translate. Patient presented to ED on 11/1 for left foot pain and heel ulcer. Pt c/o post-op left anteromedial lower thigh pain and nausea.     Intra op findings: Left femoral to anterior artery bypass using rGSV  -GETA w/ glidescope intubation, hemodynamically stable, received Clindamycin 600mg x1, extubated post op  -Pacu- vomiting x2    Surgery Information  OR time: 4.5 hours      EBL: 300 mL       UO: 740 mL            IV Fluids: 5% Albumin/500 mL, 1,800 mL NS      Blood Products:     None        PAST MEDICAL & SURGICAL HISTORY:  PVD (peripheral vascular disease)  Benign essential HTN  Intellectual disability  Hearing impaired  HLD (hyperlipidemia)        Allergies    PCN (as per anesthesia)      SH:  Home Medications:  aspirin 81 mg oral tablet: 1 tab(s) orally once a day (01 Nov 2021 16:07)  atorvastatin 80 mg oral tablet: 1 tab(s) orally once a day (01 Nov 2021 16:06)  calcitriol 0.25 mcg oral capsule: 1 cap(s) orally once a day (01 Nov 2021 16:05)  gabapentin 300 mg oral capsule: 1 cap(s) orally 3 times a day (01 Nov 2021 16:06)  losartan 50 mg oral tablet: 1 tab(s) orally once a day (01 Nov 2021 16:08)  Metoprolol Succinate ER 25 mg oral tablet, extended release: 1 tab(s) orally once a day (01 Nov 2021 16:07)  Plavix 75 mg oral tablet: 1 tab(s) orally once a day (01 Nov 2021 16:07)  Protonix 40 mg oral delayed release tablet: 1 tab(s) orally once a day (01 Nov 2021 16:07)    Advanced Directives: Full Code     ROS:  [X] A ten-point review of systems was otherwise negative except as noted.  [ ] Due to altered mental status/intubation, subjective information were not able to be obtained from the patient. History was obtained, to the extent possible, from review of the chart and collateral sources of information.      CURRENT MEDICATIONS:   --------------------------------------------------------------------------------------  Neurologic Medications  acetaminophen     Tablet .. 650 milliGRAM(s) Oral every 6 hours PRN Moderate Pain (4 - 6)  gabapentin 100 milliGRAM(s) Oral three times a day  HYDROmorphone  Injectable 0.2 milliGRAM(s) IV Push every 10 minutes PRN Moderate Pain (4 - 6)  HYDROmorphone  Injectable 0.5 milliGRAM(s) IV Push every 4 hours PRN Severe Pain (7 - 10)  metoclopramide Injectable 10 milliGRAM(s) IV Push once PRN Nausea and/or Vomiting    Respiratory Medications  None    Cardiovascular Medications  hydrALAZINE 25 milliGRAM(s) Oral every 8 hours  metoprolol succinate ER 25 milliGRAM(s) Oral daily    Gastrointestinal Medications  calcitriol   Capsule 0.25 MICROGram(s) Oral daily  dextrose 5% 1000 milliLiter(s) IV Continuous <Continuous>  lactated ringers. 1000 milliLiter(s) IV Continuous <Continuous>  magnesium hydroxide Suspension 30 milliLiter(s) Oral once PRN Constipation  pantoprazole    Tablet 40 milliGRAM(s) Oral before breakfast  sodium bicarbonate 1300 milliGRAM(s) Oral every 8 hours    Genitourinary Medications  None    Hematologic/Oncologic Medications  None    Antimicrobial/Immunologic Medications  clindamycin IVPB 600 milliGRAM(s) IV Intermittent every 6 hours    Endocrine/Metabolic Medications  atorvastatin 80 milliGRAM(s) Oral at bedtime  dexAMETHasone  IVPB 8 milliGRAM(s) IV Intermittent once PRN Nausea and/or Vomiting  insulin lispro (ADMELOG) corrective regimen sliding scale   SubCutaneous three times a day before meals    Topical/Other Medications  None    --------------------------------------------------------------------------------------    Vital Signs Last 24 Hrs  T(C): 36.2 (08 Nov 2021 17:00), Max: 37.2 (08 Nov 2021 15:00)  T(F): 97.1 (08 Nov 2021 17:00), Max: 98.9 (08 Nov 2021 15:00)  HR: 78 (08 Nov 2021 15:45) (68 - 99)  BP: 153/69 (08 Nov 2021 17:00) (107/51 - 185/79)  BP(mean): --  RR: 16 (08 Nov 2021 16:30) (14 - 18)  SpO2: 100% (08 Nov 2021 16:30) (95% - 100%)  I&O's Detail    07 Nov 2021 07:01  -  08 Nov 2021 07:00  --------------------------------------------------------  IN:    sodium chloride 0.9%: 375 mL  Total IN: 375 mL    OUT:    Voided (mL): 600 mL  Total OUT: 600 mL    Total NET: -225 mL        I&O's Summary    07 Nov 2021 07:01  -  08 Nov 2021 07:00  --------------------------------------------------------  IN: 375 mL / OUT: 600 mL / NET: -225 mL        LABS:                          7.2    9.80  )-----------( 225      ( 08 Nov 2021 15:34 )             22.9     11-08    141  |  105  |  50<H>  ----------------------------<  141<H>  4.6   |  20  |  2.7<H>    Ca    8.1<L>      08 Nov 2021 07:50  Phos  4.5     11-07  Mg     1.9     11-07        PT/INR - ( 08 Nov 2021 15:34 )   PT: 12.00 sec;   INR: 1.04 ratio         PTT - ( 07 Nov 2021 17:03 )  PTT:34.2 sec  CARDIAC MARKERS ( 08 Nov 2021 15:34 )  x     / <0.01 ng/mL / x     / x     / x            EXAM:  General/Neuro  GCS: 15  Exam: Normal, alert, oriented x 3, no focal deficits.    Respiratory  Exam: Lungs clear to auscultation bilaterally, Normal expansion/effort.       Cardiovascular  Exam: S1, S2.  Regular rate and rhythm.   Cardiac Rhythm: Normal Sinus Rhythm  ECHO: EF 75%, mild concentric LVH, mild TR    GI  Exam: Abdomen soft, Non-tender, Non-distended.   Current Diet:  NPO after midnight    Extremities  Exam: Extremities warm and dry. Bandages present from left groin and extends to left ankle. Another bandage on anterolateral lower leg. Bandages minimally stained with blood. Left DP/AT triphasic pulses and right DP/PT pulses present on doppler. Panfilo drain with bulb present in left leg, no drainage or discharge noted. Pain on palpation of left anteromedial lower thigh.    Derm:  Exam: Good skin turgor. Left medial gangrenous heel ulcer.    :   Exam: Henriquez catheter in place.     Tubes/Lines/Drains  [x] Peripheral IV  [X] Panfilo drain  [X] A-line

## 2021-11-08 NOTE — PROGRESS NOTE ADULT - ASSESSMENT
Saw and examined patient on 11/8 afternoon.    57 yo female PMH CKD4 PVD, smoker, HTN, HLD, h/o right pontine CVA (2/7/21), DM,  intellectual disability, hearing/speech impairment presents with chronic left heel ulcer and pain. Recent duplex on 10/25/21 showed no significant arterial blood flow visualized beyond the left superficial femoral artery.  Has stable CKD4 at least since 2/21; saw nephrologist in [place]  # CKD 4, stable  # Proteinuria, nephrotic range / likely d/t HTN/DM  # Hx of HTN  # Hx of DM  # LE ulcer / vascular following, planned for femoral to tibial bypass 11/8  #PVD s/p LLE angio 11/3 - s/p fem/tib bypass today  - strict i/o, low K diet/  - cont po Bicarb 1300 tid / avoid hydration w/ NS  - please document accurate urine output   - creatinine stable   - resume hydralazine for bp control   - OLVIN weak positive/ check dsDNA.  serum flc ratio wnl  - phos noted, no binder , on calcitriol check PTH   - hgb noted , check iron stores   - vascular f/up appreciated   - will follow

## 2021-11-09 LAB
% ALBUMIN: 49.2 % — SIGNIFICANT CHANGE UP
% ALPHA 1: 6.2 % — SIGNIFICANT CHANGE UP
% ALPHA 2: 18.2 % — SIGNIFICANT CHANGE UP
% BETA: 10.8 % — SIGNIFICANT CHANGE UP
% GAMMA: 15.6 % — SIGNIFICANT CHANGE UP
% M SPIKE: 1.3 % — SIGNIFICANT CHANGE UP
ALBUMIN SERPL ELPH-MCNC: 3 G/DL — LOW (ref 3.6–5.5)
ALBUMIN/GLOB SERPL ELPH: 1 RATIO — SIGNIFICANT CHANGE UP
ALPHA1 GLOB SERPL ELPH-MCNC: 0.4 G/DL — SIGNIFICANT CHANGE UP (ref 0.1–0.4)
ALPHA2 GLOB SERPL ELPH-MCNC: 1.1 G/DL — HIGH (ref 0.5–1)
ANION GAP SERPL CALC-SCNC: 13 MMOL/L — SIGNIFICANT CHANGE UP (ref 7–14)
ANION GAP SERPL CALC-SCNC: 15 MMOL/L — HIGH (ref 7–14)
B-GLOBULIN SERPL ELPH-MCNC: 0.7 G/DL — SIGNIFICANT CHANGE UP (ref 0.5–1)
BUN SERPL-MCNC: 36 MG/DL — HIGH (ref 10–20)
BUN SERPL-MCNC: 43 MG/DL — HIGH (ref 10–20)
CALCIUM SERPL-MCNC: 7.8 MG/DL — LOW (ref 8.5–10.1)
CALCIUM SERPL-MCNC: 7.8 MG/DL — LOW (ref 8.5–10.1)
CHLORIDE SERPL-SCNC: 105 MMOL/L — SIGNIFICANT CHANGE UP (ref 98–110)
CHLORIDE SERPL-SCNC: 106 MMOL/L — SIGNIFICANT CHANGE UP (ref 98–110)
CK MB CFR SERPL CALC: 3.5 NG/ML — SIGNIFICANT CHANGE UP (ref 0.6–6.3)
CK MB CFR SERPL CALC: 3.7 NG/ML — SIGNIFICANT CHANGE UP (ref 0.6–6.3)
CK SERPL-CCNC: 344 U/L — HIGH (ref 0–225)
CK SERPL-CCNC: 383 U/L — HIGH (ref 0–225)
CO2 SERPL-SCNC: 17 MMOL/L — SIGNIFICANT CHANGE UP (ref 17–32)
CO2 SERPL-SCNC: 18 MMOL/L — SIGNIFICANT CHANGE UP (ref 17–32)
COVID-19 NUCLEOCAPSID GAM AB INTERP: POSITIVE
COVID-19 NUCLEOCAPSID TOTAL GAM ANTIBODY RESULT: 1.41 INDEX — HIGH
CREAT SERPL-MCNC: 2.6 MG/DL — HIGH (ref 0.7–1.5)
CREAT SERPL-MCNC: 2.7 MG/DL — HIGH (ref 0.7–1.5)
GAMMA GLOBULIN: 1 G/DL — SIGNIFICANT CHANGE UP (ref 0.6–1.6)
GLUCOSE BLDC GLUCOMTR-MCNC: 126 MG/DL — HIGH (ref 70–99)
GLUCOSE BLDC GLUCOMTR-MCNC: 139 MG/DL — HIGH (ref 70–99)
GLUCOSE BLDC GLUCOMTR-MCNC: 139 MG/DL — HIGH (ref 70–99)
GLUCOSE BLDC GLUCOMTR-MCNC: 169 MG/DL — HIGH (ref 70–99)
GLUCOSE BLDC GLUCOMTR-MCNC: 187 MG/DL — HIGH (ref 70–99)
GLUCOSE BLDC GLUCOMTR-MCNC: 215 MG/DL — HIGH (ref 70–99)
GLUCOSE SERPL-MCNC: 128 MG/DL — HIGH (ref 70–99)
GLUCOSE SERPL-MCNC: 156 MG/DL — HIGH (ref 70–99)
INTERPRETATION SERPL IFE-IMP: SIGNIFICANT CHANGE UP
M-SPIKE: 0.1 G/DL — HIGH (ref 0–0)
MAGNESIUM SERPL-MCNC: 2.4 MG/DL — SIGNIFICANT CHANGE UP (ref 1.8–2.4)
PHOSPHATE SERPL-MCNC: 4.7 MG/DL — SIGNIFICANT CHANGE UP (ref 2.1–4.9)
POTASSIUM SERPL-MCNC: 4.3 MMOL/L — SIGNIFICANT CHANGE UP (ref 3.5–5)
POTASSIUM SERPL-MCNC: 5.1 MMOL/L — HIGH (ref 3.5–5)
POTASSIUM SERPL-SCNC: 4.3 MMOL/L — SIGNIFICANT CHANGE UP (ref 3.5–5)
POTASSIUM SERPL-SCNC: 5.1 MMOL/L — HIGH (ref 3.5–5)
PROT PATTERN SERPL ELPH-IMP: SIGNIFICANT CHANGE UP
SARS-COV-2 IGG+IGM SERPL QL IA: 1.41 INDEX — HIGH
SARS-COV-2 IGG+IGM SERPL QL IA: POSITIVE
SODIUM SERPL-SCNC: 136 MMOL/L — SIGNIFICANT CHANGE UP (ref 135–146)
SODIUM SERPL-SCNC: 138 MMOL/L — SIGNIFICANT CHANGE UP (ref 135–146)
TROPONIN T SERPL-MCNC: <0.01 NG/ML — SIGNIFICANT CHANGE UP
TROPONIN T SERPL-MCNC: <0.01 NG/ML — SIGNIFICANT CHANGE UP

## 2021-11-09 PROCEDURE — 71045 X-RAY EXAM CHEST 1 VIEW: CPT | Mod: 26

## 2021-11-09 PROCEDURE — 99291 CRITICAL CARE FIRST HOUR: CPT

## 2021-11-09 PROCEDURE — 93010 ELECTROCARDIOGRAM REPORT: CPT

## 2021-11-09 RX ORDER — INSULIN LISPRO 100/ML
VIAL (ML) SUBCUTANEOUS EVERY 4 HOURS
Refills: 0 | Status: DISCONTINUED | OUTPATIENT
Start: 2021-11-09 | End: 2021-11-09

## 2021-11-09 RX ORDER — INSULIN HUMAN 100 [IU]/ML
10 INJECTION, SOLUTION SUBCUTANEOUS ONCE
Refills: 0 | Status: COMPLETED | OUTPATIENT
Start: 2021-11-09 | End: 2021-11-09

## 2021-11-09 RX ORDER — LABETALOL HCL 100 MG
10 TABLET ORAL ONCE
Refills: 0 | Status: COMPLETED | OUTPATIENT
Start: 2021-11-09 | End: 2021-11-09

## 2021-11-09 RX ORDER — CALCIUM GLUCONATE 100 MG/ML
2 VIAL (ML) INTRAVENOUS ONCE
Refills: 0 | Status: COMPLETED | OUTPATIENT
Start: 2021-11-09 | End: 2021-11-09

## 2021-11-09 RX ORDER — FERROUS SULFATE 325(65) MG
325 TABLET ORAL DAILY
Refills: 0 | Status: DISCONTINUED | OUTPATIENT
Start: 2021-11-09 | End: 2021-11-19

## 2021-11-09 RX ORDER — CHLORHEXIDINE GLUCONATE 213 G/1000ML
1 SOLUTION TOPICAL DAILY
Refills: 0 | Status: DISCONTINUED | OUTPATIENT
Start: 2021-11-09 | End: 2021-11-19

## 2021-11-09 RX ORDER — SENNA PLUS 8.6 MG/1
2 TABLET ORAL AT BEDTIME
Refills: 0 | Status: DISCONTINUED | OUTPATIENT
Start: 2021-11-09 | End: 2021-11-19

## 2021-11-09 RX ORDER — METOPROLOL TARTRATE 50 MG
12.5 TABLET ORAL EVERY 12 HOURS
Refills: 0 | Status: DISCONTINUED | OUTPATIENT
Start: 2021-11-09 | End: 2021-11-09

## 2021-11-09 RX ORDER — ASCORBIC ACID 60 MG
500 TABLET,CHEWABLE ORAL DAILY
Refills: 0 | Status: DISCONTINUED | OUTPATIENT
Start: 2021-11-09 | End: 2021-11-19

## 2021-11-09 RX ORDER — HYDROMORPHONE HYDROCHLORIDE 2 MG/ML
0.25 INJECTION INTRAMUSCULAR; INTRAVENOUS; SUBCUTANEOUS
Refills: 0 | Status: DISCONTINUED | OUTPATIENT
Start: 2021-11-09 | End: 2021-11-16

## 2021-11-09 RX ORDER — INSULIN LISPRO 100/ML
VIAL (ML) SUBCUTANEOUS
Refills: 0 | Status: DISCONTINUED | OUTPATIENT
Start: 2021-11-09 | End: 2021-11-10

## 2021-11-09 RX ORDER — METOPROLOL TARTRATE 50 MG
12.5 TABLET ORAL ONCE
Refills: 0 | Status: COMPLETED | OUTPATIENT
Start: 2021-11-09 | End: 2021-11-09

## 2021-11-09 RX ORDER — DEXTROSE 50 % IN WATER 50 %
50 SYRINGE (ML) INTRAVENOUS ONCE
Refills: 0 | Status: COMPLETED | OUTPATIENT
Start: 2021-11-09 | End: 2021-11-09

## 2021-11-09 RX ORDER — METOPROLOL TARTRATE 50 MG
25 TABLET ORAL DAILY
Refills: 0 | Status: DISCONTINUED | OUTPATIENT
Start: 2021-11-10 | End: 2021-11-19

## 2021-11-09 RX ADMIN — ATORVASTATIN CALCIUM 80 MILLIGRAM(S): 80 TABLET, FILM COATED ORAL at 22:08

## 2021-11-09 RX ADMIN — GABAPENTIN 300 MILLIGRAM(S): 400 CAPSULE ORAL at 22:07

## 2021-11-09 RX ADMIN — HYDROMORPHONE HYDROCHLORIDE 0.25 MILLIGRAM(S): 2 INJECTION INTRAMUSCULAR; INTRAVENOUS; SUBCUTANEOUS at 16:23

## 2021-11-09 RX ADMIN — GABAPENTIN 300 MILLIGRAM(S): 400 CAPSULE ORAL at 15:08

## 2021-11-09 RX ADMIN — Medication 1300 MILLIGRAM(S): at 22:08

## 2021-11-09 RX ADMIN — Medication 12.5 MILLIGRAM(S): at 16:23

## 2021-11-09 RX ADMIN — Medication 1300 MILLIGRAM(S): at 08:18

## 2021-11-09 RX ADMIN — Medication 1300 MILLIGRAM(S): at 15:08

## 2021-11-09 RX ADMIN — Medication 81 MILLIGRAM(S): at 12:05

## 2021-11-09 RX ADMIN — Medication 50 MILLILITER(S): at 03:42

## 2021-11-09 RX ADMIN — SENNA PLUS 2 TABLET(S): 8.6 TABLET ORAL at 22:07

## 2021-11-09 RX ADMIN — HYDROMORPHONE HYDROCHLORIDE 0.25 MILLIGRAM(S): 2 INJECTION INTRAMUSCULAR; INTRAVENOUS; SUBCUTANEOUS at 03:15

## 2021-11-09 RX ADMIN — HYDROMORPHONE HYDROCHLORIDE 0.25 MILLIGRAM(S): 2 INJECTION INTRAMUSCULAR; INTRAVENOUS; SUBCUTANEOUS at 11:13

## 2021-11-09 RX ADMIN — Medication 200 GRAM(S): at 03:22

## 2021-11-09 RX ADMIN — HEPARIN SODIUM 5000 UNIT(S): 5000 INJECTION INTRAVENOUS; SUBCUTANEOUS at 22:09

## 2021-11-09 RX ADMIN — HYDROMORPHONE HYDROCHLORIDE 0.25 MILLIGRAM(S): 2 INJECTION INTRAMUSCULAR; INTRAVENOUS; SUBCUTANEOUS at 02:58

## 2021-11-09 RX ADMIN — GABAPENTIN 300 MILLIGRAM(S): 400 CAPSULE ORAL at 08:18

## 2021-11-09 RX ADMIN — INSULIN HUMAN 10 UNIT(S): 100 INJECTION, SOLUTION SUBCUTANEOUS at 03:41

## 2021-11-09 RX ADMIN — Medication 6: at 22:08

## 2021-11-09 RX ADMIN — HEPARIN SODIUM 5000 UNIT(S): 5000 INJECTION INTRAVENOUS; SUBCUTANEOUS at 05:45

## 2021-11-09 RX ADMIN — Medication 650 MILLIGRAM(S): at 12:45

## 2021-11-09 RX ADMIN — SODIUM CHLORIDE 75 MILLILITER(S): 9 INJECTION, SOLUTION INTRAVENOUS at 00:01

## 2021-11-09 RX ADMIN — Medication 12.5 MILLIGRAM(S): at 08:18

## 2021-11-09 RX ADMIN — HEPARIN SODIUM 5000 UNIT(S): 5000 INJECTION INTRAVENOUS; SUBCUTANEOUS at 15:00

## 2021-11-09 RX ADMIN — Medication 650 MILLIGRAM(S): at 12:05

## 2021-11-09 RX ADMIN — CALCITRIOL 0.25 MICROGRAM(S): 0.5 CAPSULE ORAL at 12:05

## 2021-11-09 RX ADMIN — Medication 4: at 11:14

## 2021-11-09 RX ADMIN — HYDROMORPHONE HYDROCHLORIDE 0.25 MILLIGRAM(S): 2 INJECTION INTRAMUSCULAR; INTRAVENOUS; SUBCUTANEOUS at 20:32

## 2021-11-09 RX ADMIN — Medication 10 MILLIGRAM(S): at 08:40

## 2021-11-09 RX ADMIN — Medication 650 MILLIGRAM(S): at 23:28

## 2021-11-09 RX ADMIN — PANTOPRAZOLE SODIUM 40 MILLIGRAM(S): 20 TABLET, DELAYED RELEASE ORAL at 08:18

## 2021-11-09 RX ADMIN — HYDROMORPHONE HYDROCHLORIDE 0.25 MILLIGRAM(S): 2 INJECTION INTRAMUSCULAR; INTRAVENOUS; SUBCUTANEOUS at 20:52

## 2021-11-09 NOTE — CHART NOTE - NSCHARTNOTEFT_GEN_A_CORE
-Sx cancelled today, 11/8; per Anesthesia pt is tachycardic  -Will rescheduled for a later date once patient is medically stable

## 2021-11-09 NOTE — PRE-ANESTHESIA EVALUATION ADULT - NSANTHPMHFT_GEN_ALL_CORE
pt is [place]-speaking and deaf. She lives with brother-in-law Herb Cifuentes, who wants to be at the bedside when the  is online. I spoke to Mr. Cifuentes @ [phone], who is running errands at the moment and will be at the hospital later. Advised to get in touch with anesth dept when he is in the hospital building.
S?p fem-pop bypass this admission
Chart reviewed, pt interviewed and examined. Previous anesthesia valuation and record on 11/03/2021 checked. Labs, EKG checked.
reviewed with pt via   chart reviewed
CKD stage 4, patient makes urine    had stroke in february 2021, residual left sided weakness

## 2021-11-09 NOTE — PROGRESS NOTE ADULT - ASSESSMENT
Assessment:  55 y/o female s/p left fem-tib bypass    Plan:  - SICU for q1 hour vascular checks  - f/u podiatry, OR today for excisional debridement of soft tissue and bone of left foot;  - Monitor vitals  - Monitor post-op labs and replete as necessary  - Monitor for bowel function  - Continue Pain Medications if necessary  - Continue Antibiotics if necessary  - Bedrest  - Monitor urine output and trial of void once Henriquez removed  - DVT and GI Prophylaxis  - Monitor wound and dressing for changes,

## 2021-11-09 NOTE — CHART NOTE - NSCHARTNOTEFT_GEN_A_CORE
Podiatry;    Sx cancelled Tues 11/9 due to tachycardia;   Sx rescheduled Thurs 11/11 @ TBD; excisional debridement of soft tissue and bone of left foot;   Request pre-lab optimization and OR stratification prior to sx;  NPO @ [place] Wed 11/10;     Dr. Llamas will see pt on Wed 11/10 w/  for sx consent and will inform pt regarding future plan;    Podiatry [phone]

## 2021-11-09 NOTE — PROGRESS NOTE ADULT - SUBJECTIVE AND OBJECTIVE BOX
GENERAL SURGERY PROGRESS NOTE    Patient: SEN LING , 56y ([birth year])Female   MRN: [id number]  [facility]  Visit: 11-01-21 Inpatient  Date: 11-09-21 @ 00:56    Hospital Day #: 9  Post-Op Day #: 1    Procedure/Dx/Injuries: left fem-tib bypass    Events of past 24 hours:    PAST MEDICAL & SURGICAL HISTORY:  PVD (peripheral vascular disease)    Benign essential HTN    Intellectual disability    Hearing impaired    HLD (hyperlipidemia)        Vitals:   T(F): 97.1 (11-08-21 @ 23:00), Max: 98.9 (11-08-21 @ 15:00)  HR: 79 (11-08-21 @ 23:00)  BP: 154/68 (11-08-21 @ 22:00)  RR: 17 (11-08-21 @ 23:00)  SpO2: 99% (11-08-21 @ 23:00)      Diet, NPO after Midnight:      NPO Start Date: 08-Nov-2021,   NPO Start Time: 23:59  Diet, Consistent Carbohydrate Renal w/Evening Snack      Fluids: lactated ringers.: Solution, 1000 milliLiter(s) infuse at 75 mL/Hr  Provider's Contact #: [phone]      I & O's:    11-07-21 @ 07:01  -  11-08-21 @ 07:00  --------------------------------------------------------  IN:    sodium chloride 0.9%: 375 mL  Total IN: 375 mL    OUT:    Voided (mL): 600 mL  Total OUT: 600 mL    Total NET: -225 mL        Bowel Movement: : [] YES [] NO  Flatus: : [] YES [] NO    PHYSICAL EXAM:  General Appearance: NAD,   Heart: RRR  Lungs: CTABL  Abdomen:  Soft, nontender, nondistended.   MSK/Extremities: Warm & well-perfused. LLE dressing in place with some serosanguinous drainage. DP doplerable, no PT. LLE foot ulcer. RLE DP/PT  Skin: Warm, dry. No jaundice.     MEDICATIONS  (STANDING):  acetaminophen     Tablet .. 650 milliGRAM(s) Oral every 6 hours  aspirin  chewable 81 milliGRAM(s) Oral daily  atorvastatin 80 milliGRAM(s) Oral at bedtime  calcitriol   Capsule 0.25 MICROGram(s) Oral daily  gabapentin 300 milliGRAM(s) Oral every 8 hours  heparin   Injectable 5000 Unit(s) SubCutaneous every 8 hours  insulin lispro (ADMELOG) corrective regimen sliding scale   SubCutaneous three times a day before meals  lactated ringers. 1000 milliLiter(s) (75 mL/Hr) IV Continuous <Continuous>  metoprolol succinate ER 25 milliGRAM(s) Oral daily  pantoprazole    Tablet 40 milliGRAM(s) Oral before breakfast  sodium bicarbonate 1300 milliGRAM(s) Oral every 8 hours    MEDICATIONS  (PRN):  HYDROmorphone  Injectable 0.25 milliGRAM(s) IV Push every 6 hours PRN breakthrough  magnesium hydroxide Suspension 30 milliLiter(s) Oral once PRN Constipation  oxyCODONE    IR 5 milliGRAM(s) Oral every 6 hours PRN Moderate Pain (4 - 6)  oxyCODONE    IR 10 milliGRAM(s) Oral every 6 hours PRN Severe Pain (7 - 10)      DVT PROPHYLAXIS: heparin   Injectable 5000 Unit(s) SubCutaneous every 8 hours    GI PROPHYLAXIS: pantoprazole    Tablet 40 milliGRAM(s) Oral before breakfast    ANTICOAGULATION:   ANTIBIOTICS:            LAB/STUDIES:  Labs:  CAPILLARY BLOOD GLUCOSE      POCT Blood Glucose.: 145 mg/dL (08 Nov 2021 05:25)  POCT Blood Glucose.: 160 mg/dL (08 Nov 2021 01:07)                          7.3    7.38  )-----------( 244      ( 08 Nov 2021 23:25 )             23.2       Auto Neutrophil %: 72.7 % (11-08-21 @ 15:34)  Auto Immature Granulocyte %: 0.5 % (11-08-21 @ 15:34)    11-08    138  |  105  |  43<H>  ----------------------------<  156<H>  5.1<H>   |  18  |  2.7<H>      Calcium, Total Serum: 7.8 mg/dL (11-08-21 @ 23:25)      LFTs:         Coags:     12.00  ----< 1.04    ( 08 Nov 2021 15:34 )     107.3       CARDIAC MARKERS ( 08 Nov 2021 23:25 )  x     / <0.01 ng/mL / 344 U/L / x     / 3.7 ng/mL  CARDIAC MARKERS ( 08 Nov 2021 15:34 )  x     / <0.01 ng/mL / x     / x     / x        IMAGING:      ACCESS/ DEVICES:  [x ] Peripheral IV  [ ] Central Venous Line	[ ] R	[ ] L	[ ] IJ	[ ] Fem	[ ] SC	Placed:   [ ] Arterial Line		[ ] R	[ ] L	[ ] Fem	[ ] Rad	[ ] Ax	Placed:   [ ] PICC:					[ ] Mediport  [ ] Urinary Catheter,  Date Placed:   [ ] Chest tube: [ ] Right, [ ] Left  [ ] PÉREZ/Panfilo Drains

## 2021-11-09 NOTE — PROGRESS NOTE ADULT - ATTENDING COMMENTS
56y Female 7d s/p left femoral artery to posterior tibial artery bypass with autologous venous tissue.      Acute pain-controlled with tylenol ATC, oxy  and dilaudid prn   gabapentin 300 milliGRAM(s) Oral three times a day      CARDS:  Hx of HTN, HLD,   Restarted home metoprolol and atorvastatin, holding home losartan/HCTZ   11/4 ECHO: EF 75%, mild concentric LVH, mild TR, mild AS   CE negative x3  post op ekg - unchanged from preop     acute blood loss anemia   - hgb 7.1 ( 9.0)  - blood on call for OR ( emergent only)     Hx of PVD  -s/p LLE fem-tib bypass  -Pulse exam- Dopplerable L DP/AT weaker this am ( vascular aware), R DP/PT  -L heel foot ulcer, podiatry following, OR today for debridement  - clarify with primary team for need for abx for heal ulcer     ckd 4 , baseline bun 45/2.7, K 5.1   -hydralazine for bp   - gentle hydration 56y Female 7d s/p left femoral artery to posterior tibial artery bypass with autologous venous tissue.      Acute pain-controlled with tylenol ATC, oxy  and dilaudid prn   gabapentin 300 milliGRAM(s) Oral three times a day      CARDS:  Hx of HTN, HLD,   Restarted home metoprolol and atorvastatin, holding home losartan/HCTZ   11/4 ECHO: EF 75%, mild concentric LVH, mild TR, mild AS   CE negative x3  post op ekg - unchanged from preop     acute blood loss anemia   - hgb 7.1 ( 9.0)  - blood on call for OR ( emergent only)     Hx of PVD  -s/p LLE fem-tib bypass  -Pulse exam- Dopplerable L DP/AT weaker this am ( vascular aware), R DP/PT  -L heel foot ulcer, podiatry following, OR today for debridement  - clarify with primary team for need for abx for heal ulcer     ckd 4 , baseline bun 45/2.7, K 5.1   -hydralazine for bp   - gentle hydration with LR not ns   - bicard po tid

## 2021-11-09 NOTE — PROGRESS NOTE ADULT - ASSESSMENT
56y Female 7d s/p left femoral artery to posterior tibial artery bypass with autologous venous tissue.    NEURO:  No chronic dx  Acute pain-controlled with tylenol ATC, oxy  and dilaudid prn   gabapentin 300 milliGRAM(s) Oral three times a day      RESP: No chronic dx  Oxygenation-wean off NC to RA as tolerated  Encourage IS  AM CXR       CARDS:  Hx of HTN, HLD,   Restarted home metoprolol and atorvastatin, holding home losartan/HCTZ   11/4 ECHO: EF 75%, mild concentric LVH, mild TR, mild AS   CE negative x2 , FU 4am  [ ] fu am EKG:    VASC:  Hx of PVD  -s/p LLE fem-tib bypass  -Pulse exam- Dopplerable L DP/AT, R DP/PT  -L heel foot ulcer, podiatry following, OR tomorrow for debridement      GI/NUTR:  No chronic dx    Diet, Consistent Carbohydrate Renal w/Evening Snack    Diet, NPO after Midnight for OR w/ Podiatry    GI Prophylaxis- PPI, Senna       /RENAL:  Hx of CKD4 (baseline Cr 2.4?)  Nephro following    Monitor UO-ramila in place    IVL, IVF to start at MN when NPO   Labs:          BUN/Cr- 50/2.7  -->,  44/2.7  -->,  43/2.7  -->          Electrolytes-Na 138 // K 5.1 // Mg 2.4 //  Phos 4.7 (11-08 @ 23:25)    HEME/ONC:       DVT prophylaxis-,held as per primary team til tomorrow,  SCDs    Home Rx: Plavix 75 mg oral tablet: 1 tab(s) orally once a day    Labs: Hb/Hct:  7.2/22.9  -->,  7.3/23.2  -->                      Plts:  244  -->,  225  -->                 PTT/INR:  107.3/1.04  (11-08 @ 15:34) --->   ID:  WBC- 8.12  --->>,  9.80  --->> 7.38  Tmax: afebrile  Antibiotics-clindamycin IVPB 600 every 6 hours         ENDO:  Glucose, Serum: 141 (11-08 @ 07:50)  FS q4 while NPO for OR  Hg A1c pending    LINES/DRAINS:  PIV, Henriquez, chin    Advanced Directives: Presumed Full Code    DISPO:    SICU   56y Female 7d s/p left femoral artery to posterior tibial artery bypass with autologous venous tissue.    NEURO:  No chronic dx  Acute pain-controlled with tylenol ATC, oxy  and dilaudid prn   gabapentin 300 milliGRAM(s) Oral three times a day      RESP: No chronic dx  Oxygenation-wean off NC to RA as tolerated  Encourage IS  AM CXR       CARDS:  Hx of HTN, HLD,   Restarted home metoprolol and atorvastatin, holding home losartan/HCTZ   11/4 ECHO: EF 75%, mild concentric LVH, mild TR, mild AS   CE negative x2 , FU 4am  [ ] fu am EKG:    VASC:  Hx of PVD  -s/p LLE fem-tib bypass  -Pulse exam- Dopplerable L DP/AT, R DP/PT  -L heel foot ulcer, podiatry following, OR tomorrow for debridement      GI/NUTR:  No chronic dx    Diet, Consistent Carbohydrate Renal w/Evening Snack    Diet, NPO after Midnight for OR w/ Podiatry    GI Prophylaxis- PPI, Senna       /RENAL:  Hx of CKD4 (baseline Cr 2.4?)  Nephro following    Monitor UO-mcgrath in place    IVL, IVF to start at MN when NPO   Labs:          BUN/Cr- 50/2.7  -->,  44/2.7  -->,  43/2.7  -->          Electrolytes-Na 138 // K 5.1 // Mg 2.4 //  Phos 4.7 (11-08 @ 23:25)    HEME/ONC:      DVT prophylaxis-heparin   Injectable, SCDs    Labs: Hb/Hct:  7.2/22.9  -->,  7.3/23.2  -->                      Plts:  225  -->,  244  -->                 PTT/INR:  107.3/1.04  --->       Home Rx: Plavix 75 mg oral tablet: 1 tab(s) orally once a day *HELD*      ID:  WBC- 8.12  --->>,  9.80  --->>,  7.38  --->>  Temp trend- 24hrs T(F): 97.2 (11-09 @ 04:00), Max: 98.9 (11-08 @ 15:00)         ENDO:  Glucose, Serum: 141 (11-08 @ 07:50)  FS q4 while NPO for OR  Hg A1c pending    LINES/DRAINS:  PIV, Mcgrath, chin    Advanced Directives: Presumed Full Code    DISPO:    SICU

## 2021-11-09 NOTE — CHART NOTE - NSCHARTNOTEFT_GEN_A_CORE
Patient schedule for OR with podiatry today 11/9, case aborted/cancelled due to patient remaining tachycardia and stating she did not know what procedure was going to be completed. Held discussion with patient w/ Patricia () and Brother Javier (HCP) bedside w/  Brett # [phone]. Patient and family expressed that they would like to have procedure done on current admission but requesting explanation from podiatry of planned OR. Spoke with Dr. Mcfarland (Podiatry [phone]), patient added on for Thursday 11/11. Relayed that  for patient and  for HCP should be contacted for planned procedure explanation. All updates given to patient and brother bedside.    Plan  -restart Diet, make NPO 11/10 at midnight  -d/w vascular regarding q1 checks

## 2021-11-09 NOTE — CHART NOTE - NSCHARTNOTEFT_GEN_A_CORE
SICU Transfer Note:    Transfer from: SICU  Transfer to:  ( x ) Surgery    (  ) Telemetry    (  ) Medicine    (  ) Palliative    (  ) Stroke Unit    (  ) _______________      SICU COURSE:  55 yo female PMH PVD, smoker, HTN, HLD, h/o right pontine CVA (2/7/21), intellectual disability, hearing/speech impairment, CKD 4 (being followed by nephro), mild aortic stenosis, initially presented to ED on 10/25/21 for left lower extremity chronic arterial occlusion. Duplex at that time showed no significant blood flow beyond L superficial femoral artery. Pt saw Dr. Sullivan outpatient and was told to return to hospital for angiogram (performed on 11/3). Pt presented again to [facility] ED 11/1/21 c/o entire L leg pain and left heel ulcer.  Cardiology c/s, moderate risk for surgery, ECHO: EF 75%, mild concentric LVH, mild TR, mild AS.  Podiatry plan to bring pt to OR Tuesday 11/9 for excisional debridement of soft tissue and bone of left foot. S/p left femoral artery to posterior tibial artery bypass with autologous venous tissue. Pt's family was at bedside to help translate. Patient presented to ED on 11/1 for left foot pain and heel ulcer. Pt c/o post-op left anteromedial lower thigh pain and nausea.     Intra op findings: Left femoral to anterior artery bypass using rGSV  -GETA w/ glidescope intubation, hemodynamically stable, received Clindamycin 600mg x1, extubated post op  -Pacu- vomiting x2    In the SICU patient had more episodes of vomiting, now resolved. Patient remained hemodynamically stable. Patient was planned for OR today (11/9) but in the OR patient was tachycardic noemy to anxiety and case was cancelled by anesthesia. Patients tachycardia resolved upon return to ICU. Patient seen and Evaluated on PM rounds, now stable for downgrade.         PAST MEDICAL & SURGICAL HISTORY:  PVD (peripheral vascular disease)    Benign essential HTN    Intellectual disability    Hearing impaired    HLD (hyperlipidemia)      Allergies    penicillin (Rash)    Intolerances      MEDICATIONS  (STANDING):  acetaminophen     Tablet .. 650 milliGRAM(s) Oral every 6 hours  ascorbic acid 500 milliGRAM(s) Oral daily  aspirin  chewable 81 milliGRAM(s) Oral daily  atorvastatin 80 milliGRAM(s) Oral at bedtime  calcitriol   Capsule 0.25 MICROGram(s) Oral daily  chlorhexidine 4% Liquid 1 Application(s) Topical daily  ferrous    sulfate 325 milliGRAM(s) Oral daily  gabapentin 300 milliGRAM(s) Oral every 8 hours  heparin   Injectable 5000 Unit(s) SubCutaneous every 8 hours  insulin lispro (ADMELOG) corrective regimen sliding scale   SubCutaneous Before meals and at bedtime  pantoprazole    Tablet 40 milliGRAM(s) Oral before breakfast  senna 2 Tablet(s) Oral at bedtime  sodium bicarbonate 1300 milliGRAM(s) Oral every 8 hours    MEDICATIONS  (PRN):  HYDROmorphone  Injectable 0.25 milliGRAM(s) IV Push every 3 hours PRN Severe Pain (7 - 10)  magnesium hydroxide Suspension 30 milliLiter(s) Oral once PRN Constipation        Vital Signs Last 24 Hrs  T(C): 37.4 (09 Nov 2021 16:18), Max: 37.7 (09 Nov 2021 07:57)  T(F): 99.4 (09 Nov 2021 16:18), Max: 99.8 (09 Nov 2021 07:57)  HR: 92 (09 Nov 2021 16:18) (77 - 107)  BP: 138/62 (09 Nov 2021 12:00) (131/60 - 181/75)  BP(mean): 89 (09 Nov 2021 12:00) (87 - 108)  RR: 20 (09 Nov 2021 16:18) (15 - 24)  SpO2: 99% (09 Nov 2021 16:18) (97% - 100%)  I&O's Summary    08 Nov 2021 07:01  -  09 Nov 2021 07:00  --------------------------------------------------------  IN: 850 mL / OUT: 1200 mL / NET: -350 mL    09 Nov 2021 07:01  -  09 Nov 2021 18:51  --------------------------------------------------------  IN: 975 mL / OUT: 1100 mL / NET: -125 mL        LABS                                            7.3                   Neurophils% (auto):   x      (11-08 @ 23:25):    7.38 )-----------(244          Lymphocytes% (auto):  x                                             23.2                   Eosinphils% (auto):   x        Manual%: Neutrophils x    ; Lymphocytes x    ; Eosinophils x    ; Bands%: x    ; Blasts x                                    136    |  106    |  36                  Calcium: 7.8   / iCa: x      (11-09 @ 13:22)    ----------------------------<  128       Magnesium: x                                4.3     |  17     |  2.6              Phosphorous: x                ASSESSMENT & PLAN  56y Female 7d s/p left femoral artery to posterior tibial artery bypass with autologous venous tissue.    NEURO:  No chronic dx  Acute pain-controlled with tylenol ATC  gabapentin 300 milliGRAM(s) Oral three times a day  Dilaudid 0.25mg q3 PRN    RESP: No chronic dx  Oxygenation-wean off NC to RA as tolerated  Encourage IS  AM CXR       CARDS:  Hx of HTN, HLD,   Restarted home metoprolol and atorvastatin, holding home losartan/HCTZ  - per Nephro can restart hydralazine 25 q8 if BP requires   11/4 ECHO: EF 75%, mild concentric LVH, mild TR, mild AS   CE negative x3  am EKG: Qtc 437    VASC:  Hx of PVD  -s/p LLE fem-tib bypass  -Pulse exam- Dopplerable L DP/AT, R DP/PT  -L heel foot ulcer, podiatry following, OR Thursday 11/11 for debridement      GI/NUTR:  No chronic dx    Diet, Consistent Carbohydrate Renal w/Evening Snack    Diet, NPO after Midnight on Wednesday for OR w/ Podiatry     GI Prophylaxis- PPI, Senna       /RENAL:  Hx of CKD4 (baseline Cr 2.4?)  Nephro following    Monitor CHANDA Henriquez DCed @6pm, TOV @2am    IVL  Labs:          BUN/Cr- 44/2.7  -->,  43/2.7  -->,  36/2.6  -->          Electrolytes-Na 136 // K 4.3 // Mg -- //  Phos -- (11-09 @ 13:22)    HEME/ONC:     DVT prophylaxis-,HSQ,  SCDs    Home Rx: Plavix 75 mg oral tablet: 1 tab(s) orally once a day- on hold per vascular team    Labs: Hb/Hct:  7.2/22.9  -->,  7.3/23.2  -->                      Plts:  244  -->,  225  -->                 PTT/INR:  107.3/1.04  (11-08 @ 15:34) --->     Jehova's witness, would consider transfusion in emergency- limit blood draws, started on Ferrous sulfate and ascorbic acid      ID:  WBC- 8.12  --->>,  9.80  --->> 7.38  Tmax: afebrile  Antibiotics-none, completed periop    ENDO:  FS AC/HS  ISS  Hg A1c pending    LINES/DRAINS:  PIV    Advanced Directives: Presumed Full Code    DISPO:    Downgrade, approved by Dr. Ashley    For Follow-Up:  - wean to RA  - monitor BP, can start hydralazine per nephro  - OR Thursday with podiatry  - TOV by 2am  - f/u restarting plavix  - f/u a1c    Signed out to China GUDINO on 11/9 @19:00

## 2021-11-09 NOTE — PROGRESS NOTE ADULT - SUBJECTIVE AND OBJECTIVE BOX
SEN LING  [id number]  56y Female    Indication for ICU admission: s/p LLE fem-tib bypass   Admit Date:11-01-21  ICU Date: 11/8  OR Date: 11/8    penicillin (Rash)    PAST MEDICAL & SURGICAL HISTORY:  PVD (peripheral vascular disease)  Benign essential HTN  Intellectual disability  Hearing impaired  HLD (hyperlipidemia)  Jehova's Witness       Home Medications:  aspirin 81 mg oral tablet: 1 tab(s) orally once a day (01 Nov 2021 16:07)  atorvastatin 80 mg oral tablet: 1 tab(s) orally once a day (01 Nov 2021 16:06)  calcitriol 0.25 mcg oral capsule: 1 cap(s) orally once a day (01 Nov 2021 16:05)  gabapentin 300 mg oral capsule: 1 cap(s) orally 3 times a day (01 Nov 2021 16:06)  losartan 50 mg oral tablet: 1 tab(s) orally once a day (01 Nov 2021 16:08)  Metoprolol Succinate ER 25 mg oral tablet, extended release: 1 tab(s) orally once a day (01 Nov 2021 16:07)  Plavix 75 mg oral tablet: 1 tab(s) orally once a day (01 Nov 2021 16:07)  Protonix 40 mg oral delayed release tablet: 1 tab(s) orally once a day (01 Nov 2021 16:07)        24HRS EVENT:  -iv tylenol 1g x1  -vomited x2, did not recieve zofran  -K 5.1, treated with Ca/dex/insulin  -FU EKG:     DVT PTX: held as per primary team til 11/9    GI PTX:pantoprazole    Tablet 40 milliGRAM(s) Oral before breakfast      ***Tubes/Lines/Drains  ***  Peripheral IV  Arterial Line		                Date   Urinary Catheter		Indication: Strict I&O    Date Placed:       REVIEW OF SYSTEMS    [x ] A ten-point review of systems was otherwise negative except as noted.  [ ] Due to altered mental status/intubation, subjective information were not able to be obtained from the patient. History was obtained, to the extent possible, from review of the chart and collateral sources of information.         SEN LING  [id number]  56y Female    Indication for ICU admission: s/p LLE fem-tib bypass   Admit Date:11-01-21  ICU Date: 11/8  OR Date: 11/8    penicillin (Rash)    PAST MEDICAL & SURGICAL HISTORY:  PVD (peripheral vascular disease)  Benign essential HTN  Intellectual disability  Hearing impaired  HLD (hyperlipidemia)  Jehova's Witness       Home Medications:  aspirin 81 mg oral tablet: 1 tab(s) orally once a day (01 Nov 2021 16:07)  atorvastatin 80 mg oral tablet: 1 tab(s) orally once a day (01 Nov 2021 16:06)  calcitriol 0.25 mcg oral capsule: 1 cap(s) orally once a day (01 Nov 2021 16:05)  gabapentin 300 mg oral capsule: 1 cap(s) orally 3 times a day (01 Nov 2021 16:06)  losartan 50 mg oral tablet: 1 tab(s) orally once a day (01 Nov 2021 16:08)  Metoprolol Succinate ER 25 mg oral tablet, extended release: 1 tab(s) orally once a day (01 Nov 2021 16:07)  Plavix 75 mg oral tablet: 1 tab(s) orally once a day (01 Nov 2021 16:07)  Protonix 40 mg oral delayed release tablet: 1 tab(s) orally once a day (01 Nov 2021 16:07)        24HRS EVENT:  -iv tylenol 1g x1  -vomited x2, did not recieve zofran  -K 5.1, treated with Ca/dex/insulin  -FU EKG:     DVT PTX: held as per primary team til 11/9    GI PTX:pantoprazole    Tablet 40 milliGRAM(s) Oral before breakfast      ***Tubes/Lines/Drains  ***  Peripheral IV  Arterial Line		                Date   Urinary Catheter		Indication: Strict I&O    Date Placed:       REVIEW OF SYSTEMS    [x ] A ten-point review of systems was otherwise negative except as noted.  [ ] Due to altered mental status/intubation, subjective information were not able to be obtained from the patient. History was obtained, to the extent possible, from review of the chart and collateral sources of information.        Daily     Daily     Diet, NPO after Midnight:      NPO Start Date: 08-Nov-2021,   NPO Start Time: 23:59 (11-08-21 @ 16:06)  Diet, Consistent Carbohydrate Renal w/Evening Snack (11-08-21 @ 15:33)      CURRENT MEDS:  Neurologic Medications  acetaminophen     Tablet .. 650 milliGRAM(s) Oral every 6 hours  gabapentin 300 milliGRAM(s) Oral every 8 hours  oxyCODONE    IR 5 milliGRAM(s) Oral every 6 hours PRN Moderate Pain (4 - 6)  oxyCODONE    IR 10 milliGRAM(s) Oral every 6 hours PRN Severe Pain (7 - 10)    Respiratory Medications    Cardiovascular Medications  metoprolol succinate ER 25 milliGRAM(s) Oral daily    Gastrointestinal Medications  calcitriol   Capsule 0.25 MICROGram(s) Oral daily  lactated ringers. 1000 milliLiter(s) IV Continuous <Continuous>  magnesium hydroxide Suspension 30 milliLiter(s) Oral once PRN Constipation  pantoprazole    Tablet 40 milliGRAM(s) Oral before breakfast  sodium bicarbonate 1300 milliGRAM(s) Oral every 8 hours    Genitourinary Medications    Hematologic/Oncologic Medications  aspirin  chewable 81 milliGRAM(s) Oral daily  heparin   Injectable 5000 Unit(s) SubCutaneous every 8 hours    Antimicrobial/Immunologic Medications    Endocrine/Metabolic Medications  atorvastatin 80 milliGRAM(s) Oral at bedtime  insulin lispro (ADMELOG) corrective regimen sliding scale   SubCutaneous three times a day before meals    Topical/Other Medications  chlorhexidine 4% Liquid 1 Application(s) Topical daily      ICU Vital Signs Last 24 Hrs  T(C): 36.2 (09 Nov 2021 04:00), Max: 37.2 (08 Nov 2021 15:00)  T(F): 97.2 (09 Nov 2021 04:00), Max: 98.9 (08 Nov 2021 15:00)  HR: 97 (09 Nov 2021 07:00) (68 - 97)  BP: 156/65 (09 Nov 2021 06:00) (107/51 - 185/79)  BP(mean): 93 (09 Nov 2021 06:00) (87 - 106)  ABP: 169/57 (09 Nov 2021 07:00) (122/56 - 177/62)  ABP(mean): 96 (09 Nov 2021 07:00) (81 - 109)  RR: 17 (08 Nov 2021 23:00) (14 - 17)  SpO2: 99% (09 Nov 2021 07:00) (95% - 100%)              I&O's Summary    08 Nov 2021 07:01  -  09 Nov 2021 07:00  --------------------------------------------------------  IN: 850 mL / OUT: 1200 mL / NET: -350 mL      I&O's Detail    08 Nov 2021 07:01  -  09 Nov 2021 07:00  --------------------------------------------------------  IN:    IV PiggyBack: 250 mL    Lactated Ringers: 600 mL  Total IN: 850 mL    OUT:    Indwelling Catheter - Urethral (mL): 1200 mL  Total OUT: 1200 mL    Total NET: -350 mL          PHYSICAL EXAM:     a&ox3  follows commands, PALOMINO  no acute distress  equal chest rise b/l  abdomen soft  extremities soft  urinary cath in place       SEN LING  [id number]  56y Female    Indication for ICU admission: s/p LLE fem-tib bypass   Admit Date:11-01-21  ICU Date: 11/8  OR Date: 11/8    penicillin (Rash)    PAST MEDICAL & SURGICAL HISTORY:  PVD (peripheral vascular disease)  Benign essential HTN  Intellectual disability  Hearing impaired  HLD (hyperlipidemia)  Jehova's Witness       Home Medications:  aspirin 81 mg oral tablet: 1 tab(s) orally once a day (01 Nov 2021 16:07)  atorvastatin 80 mg oral tablet: 1 tab(s) orally once a day (01 Nov 2021 16:06)  calcitriol 0.25 mcg oral capsule: 1 cap(s) orally once a day (01 Nov 2021 16:05)  gabapentin 300 mg oral capsule: 1 cap(s) orally 3 times a day (01 Nov 2021 16:06)  losartan 50 mg oral tablet: 1 tab(s) orally once a day (01 Nov 2021 16:08)  Metoprolol Succinate ER 25 mg oral tablet, extended release: 1 tab(s) orally once a day (01 Nov 2021 16:07)  Plavix 75 mg oral tablet: 1 tab(s) orally once a day (01 Nov 2021 16:07)  Protonix 40 mg oral delayed release tablet: 1 tab(s) orally once a day (01 Nov 2021 16:07)        24HRS EVENT:  -iv tylenol 1g x1  -vomited x2, did not recieve zofran  -K 5.1, treated with Ca/dex/insulin  -FU EKG:     DVT PTX: held as per primary team til 11/9    GI PTX:pantoprazole    Tablet 40 milliGRAM(s) Oral before breakfast      ***Tubes/Lines/Drains  ***  Peripheral IV  Arterial Line		                Date   Urinary Catheter		Indication: Strict I&O    Date Placed:       REVIEW OF SYSTEMS    [x ] A ten-point review of systems was otherwise negative except as noted.  [ ] Due to altered mental status/intubation, subjective information were not able to be obtained from the patient. History was obtained, to the extent possible, from review of the chart and collateral sources of information.        Daily     Daily     Diet, NPO after Midnight:      NPO Start Date: 08-Nov-2021,   NPO Start Time: 23:59 (11-08-21 @ 16:06)  Diet, Consistent Carbohydrate Renal w/Evening Snack (11-08-21 @ 15:33)      CURRENT MEDS:  Neurologic Medications  acetaminophen     Tablet .. 650 milliGRAM(s) Oral every 6 hours  gabapentin 300 milliGRAM(s) Oral every 8 hours  oxyCODONE    IR 5 milliGRAM(s) Oral every 6 hours PRN Moderate Pain (4 - 6)  oxyCODONE    IR 10 milliGRAM(s) Oral every 6 hours PRN Severe Pain (7 - 10)    Respiratory Medications    Cardiovascular Medications  metoprolol succinate ER 25 milliGRAM(s) Oral daily    Gastrointestinal Medications  calcitriol   Capsule 0.25 MICROGram(s) Oral daily  lactated ringers. 1000 milliLiter(s) IV Continuous <Continuous>  magnesium hydroxide Suspension 30 milliLiter(s) Oral once PRN Constipation  pantoprazole    Tablet 40 milliGRAM(s) Oral before breakfast  sodium bicarbonate 1300 milliGRAM(s) Oral every 8 hours    Genitourinary Medications    Hematologic/Oncologic Medications  aspirin  chewable 81 milliGRAM(s) Oral daily  heparin   Injectable 5000 Unit(s) SubCutaneous every 8 hours    Antimicrobial/Immunologic Medications    Endocrine/Metabolic Medications  atorvastatin 80 milliGRAM(s) Oral at bedtime  insulin lispro (ADMELOG) corrective regimen sliding scale   SubCutaneous three times a day before meals    Topical/Other Medications  chlorhexidine 4% Liquid 1 Application(s) Topical daily      ICU Vital Signs Last 24 Hrs  T(C): 36.2 (09 Nov 2021 04:00), Max: 37.2 (08 Nov 2021 15:00)  T(F): 97.2 (09 Nov 2021 04:00), Max: 98.9 (08 Nov 2021 15:00)  HR: 97 (09 Nov 2021 07:00) (68 - 97)  BP: 156/65 (09 Nov 2021 06:00) (107/51 - 185/79)  BP(mean): 93 (09 Nov 2021 06:00) (87 - 106)  ABP: 169/57 (09 Nov 2021 07:00) (122/56 - 177/62)  ABP(mean): 96 (09 Nov 2021 07:00) (81 - 109)  RR: 17 (08 Nov 2021 23:00) (14 - 17)  SpO2: 99% (09 Nov 2021 07:00) (95% - 100%)              I&O's Summary    08 Nov 2021 07:01  -  09 Nov 2021 07:00  --------------------------------------------------------  IN: 850 mL / OUT: 1200 mL / NET: -350 mL      I&O's Detail    08 Nov 2021 07:01  -  09 Nov 2021 07:00  --------------------------------------------------------  IN:    IV PiggyBack: 250 mL    Lactated Ringers: 600 mL  Total IN: 850 mL    OUT:    Indwelling Catheter - Urethral (mL): 1200 mL  Total OUT: 1200 mL    Total NET: -350 mL          PHYSICAL EXAM:

## 2021-11-10 LAB
A1C WITH ESTIMATED AVERAGE GLUCOSE RESULT: 7.6 % — HIGH (ref 4–5.6)
ANION GAP SERPL CALC-SCNC: 15 MMOL/L — HIGH (ref 7–14)
APPEARANCE UR: ABNORMAL
BACTERIA # UR AUTO: ABNORMAL
BILIRUB UR-MCNC: NEGATIVE — SIGNIFICANT CHANGE UP
BUN SERPL-MCNC: 33 MG/DL — HIGH (ref 10–20)
CALCIUM SERPL-MCNC: 8 MG/DL — LOW (ref 8.5–10.1)
CHLORIDE SERPL-SCNC: 105 MMOL/L — SIGNIFICANT CHANGE UP (ref 98–110)
CO2 SERPL-SCNC: 19 MMOL/L — SIGNIFICANT CHANGE UP (ref 17–32)
COLOR SPEC: YELLOW — SIGNIFICANT CHANGE UP
CREAT SERPL-MCNC: 2.7 MG/DL — HIGH (ref 0.7–1.5)
DIFF PNL FLD: ABNORMAL
EPI CELLS # UR: 1 /HPF — SIGNIFICANT CHANGE UP (ref 0–5)
ESTIMATED AVERAGE GLUCOSE: 171 MG/DL — HIGH (ref 68–114)
GLUCOSE BLDC GLUCOMTR-MCNC: 139 MG/DL — HIGH (ref 70–99)
GLUCOSE BLDC GLUCOMTR-MCNC: 159 MG/DL — HIGH (ref 70–99)
GLUCOSE BLDC GLUCOMTR-MCNC: 165 MG/DL — HIGH (ref 70–99)
GLUCOSE BLDC GLUCOMTR-MCNC: 195 MG/DL — HIGH (ref 70–99)
GLUCOSE SERPL-MCNC: 135 MG/DL — HIGH (ref 70–99)
GLUCOSE UR QL: NEGATIVE — SIGNIFICANT CHANGE UP
HCT VFR BLD CALC: 20.4 % — LOW (ref 37–47)
HCT VFR BLD CALC: 21.2 % — LOW (ref 37–47)
HCT VFR BLD CALC: 25 % — LOW (ref 37–47)
HGB BLD-MCNC: 6.5 G/DL — CRITICAL LOW (ref 12–16)
HGB BLD-MCNC: 6.7 G/DL — CRITICAL LOW (ref 12–16)
HGB BLD-MCNC: 8 G/DL — LOW (ref 12–16)
HYALINE CASTS # UR AUTO: 1 /LPF — SIGNIFICANT CHANGE UP (ref 0–7)
KETONES UR-MCNC: NEGATIVE — SIGNIFICANT CHANGE UP
LEUKOCYTE ESTERASE UR-ACNC: ABNORMAL
MAGNESIUM SERPL-MCNC: 1.9 MG/DL — SIGNIFICANT CHANGE UP (ref 1.8–2.4)
MCHC RBC-ENTMCNC: 28.4 PG — SIGNIFICANT CHANGE UP (ref 27–31)
MCHC RBC-ENTMCNC: 28.6 PG — SIGNIFICANT CHANGE UP (ref 27–31)
MCHC RBC-ENTMCNC: 28.8 PG — SIGNIFICANT CHANGE UP (ref 27–31)
MCHC RBC-ENTMCNC: 31.6 G/DL — LOW (ref 32–37)
MCHC RBC-ENTMCNC: 31.9 G/DL — LOW (ref 32–37)
MCHC RBC-ENTMCNC: 32 G/DL — SIGNIFICANT CHANGE UP (ref 32–37)
MCV RBC AUTO: 89.3 FL — SIGNIFICANT CHANGE UP (ref 81–99)
MCV RBC AUTO: 89.8 FL — SIGNIFICANT CHANGE UP (ref 81–99)
MCV RBC AUTO: 90.3 FL — SIGNIFICANT CHANGE UP (ref 81–99)
NITRITE UR-MCNC: NEGATIVE — SIGNIFICANT CHANGE UP
NRBC # BLD: 0 /100 WBCS — SIGNIFICANT CHANGE UP (ref 0–0)
PH UR: 6.5 — SIGNIFICANT CHANGE UP (ref 5–8)
PHOSPHATE SERPL-MCNC: 4 MG/DL — SIGNIFICANT CHANGE UP (ref 2.1–4.9)
PLATELET # BLD AUTO: 216 K/UL — SIGNIFICANT CHANGE UP (ref 130–400)
PLATELET # BLD AUTO: 221 K/UL — SIGNIFICANT CHANGE UP (ref 130–400)
PLATELET # BLD AUTO: 221 K/UL — SIGNIFICANT CHANGE UP (ref 130–400)
POTASSIUM SERPL-MCNC: 4.6 MMOL/L — SIGNIFICANT CHANGE UP (ref 3.5–5)
POTASSIUM SERPL-SCNC: 4.6 MMOL/L — SIGNIFICANT CHANGE UP (ref 3.5–5)
PROT UR-MCNC: ABNORMAL
RBC # BLD: 2.26 M/UL — LOW (ref 4.2–5.4)
RBC # BLD: 2.36 M/UL — LOW (ref 4.2–5.4)
RBC # BLD: 2.8 M/UL — LOW (ref 4.2–5.4)
RBC # FLD: 13.2 % — SIGNIFICANT CHANGE UP (ref 11.5–14.5)
RBC CASTS # UR COMP ASSIST: 2 /HPF — SIGNIFICANT CHANGE UP (ref 0–4)
SODIUM SERPL-SCNC: 139 MMOL/L — SIGNIFICANT CHANGE UP (ref 135–146)
SP GR SPEC: 1.01 — SIGNIFICANT CHANGE UP (ref 1.01–1.03)
UROBILINOGEN FLD QL: SIGNIFICANT CHANGE UP
WBC # BLD: 9.18 K/UL — SIGNIFICANT CHANGE UP (ref 4.8–10.8)
WBC # BLD: 9.71 K/UL — SIGNIFICANT CHANGE UP (ref 4.8–10.8)
WBC # BLD: 9.97 K/UL — SIGNIFICANT CHANGE UP (ref 4.8–10.8)
WBC # FLD AUTO: 9.18 K/UL — SIGNIFICANT CHANGE UP (ref 4.8–10.8)
WBC # FLD AUTO: 9.71 K/UL — SIGNIFICANT CHANGE UP (ref 4.8–10.8)
WBC # FLD AUTO: 9.97 K/UL — SIGNIFICANT CHANGE UP (ref 4.8–10.8)
WBC UR QL: >720 /HPF — HIGH (ref 0–5)

## 2021-11-10 PROCEDURE — 71045 X-RAY EXAM CHEST 1 VIEW: CPT | Mod: 26

## 2021-11-10 PROCEDURE — 99291 CRITICAL CARE FIRST HOUR: CPT

## 2021-11-10 RX ORDER — MAGNESIUM SULFATE 500 MG/ML
1 VIAL (ML) INJECTION ONCE
Refills: 0 | Status: COMPLETED | OUTPATIENT
Start: 2021-11-10 | End: 2021-11-10

## 2021-11-10 RX ORDER — SODIUM CHLORIDE 9 MG/ML
1000 INJECTION, SOLUTION INTRAVENOUS
Refills: 0 | Status: DISCONTINUED | OUTPATIENT
Start: 2021-11-10 | End: 2021-11-11

## 2021-11-10 RX ORDER — HYDRALAZINE HCL 50 MG
10 TABLET ORAL EVERY 8 HOURS
Refills: 0 | Status: DISCONTINUED | OUTPATIENT
Start: 2021-11-10 | End: 2021-11-13

## 2021-11-10 RX ORDER — HYDRALAZINE HCL 50 MG
10 TABLET ORAL EVERY 8 HOURS
Refills: 0 | Status: DISCONTINUED | OUTPATIENT
Start: 2021-11-10 | End: 2021-11-10

## 2021-11-10 RX ORDER — INSULIN LISPRO 100/ML
VIAL (ML) SUBCUTANEOUS
Refills: 0 | Status: DISCONTINUED | OUTPATIENT
Start: 2021-11-10 | End: 2021-11-13

## 2021-11-10 RX ADMIN — Medication 1300 MILLIGRAM(S): at 13:00

## 2021-11-10 RX ADMIN — OXYCODONE HYDROCHLORIDE 5 MILLIGRAM(S): 5 TABLET ORAL at 21:13

## 2021-11-10 RX ADMIN — Medication 650 MILLIGRAM(S): at 07:36

## 2021-11-10 RX ADMIN — GABAPENTIN 300 MILLIGRAM(S): 400 CAPSULE ORAL at 07:34

## 2021-11-10 RX ADMIN — SENNA PLUS 2 TABLET(S): 8.6 TABLET ORAL at 21:20

## 2021-11-10 RX ADMIN — Medication 81 MILLIGRAM(S): at 12:22

## 2021-11-10 RX ADMIN — Medication 1300 MILLIGRAM(S): at 21:20

## 2021-11-10 RX ADMIN — Medication 650 MILLIGRAM(S): at 21:13

## 2021-11-10 RX ADMIN — HYDROMORPHONE HYDROCHLORIDE 0.25 MILLIGRAM(S): 2 INJECTION INTRAMUSCULAR; INTRAVENOUS; SUBCUTANEOUS at 10:08

## 2021-11-10 RX ADMIN — Medication 650 MILLIGRAM(S): at 12:22

## 2021-11-10 RX ADMIN — Medication 650 MILLIGRAM(S): at 00:30

## 2021-11-10 RX ADMIN — HEPARIN SODIUM 5000 UNIT(S): 5000 INJECTION INTRAVENOUS; SUBCUTANEOUS at 21:20

## 2021-11-10 RX ADMIN — Medication 1300 MILLIGRAM(S): at 07:32

## 2021-11-10 RX ADMIN — HYDROMORPHONE HYDROCHLORIDE 0.25 MILLIGRAM(S): 2 INJECTION INTRAMUSCULAR; INTRAVENOUS; SUBCUTANEOUS at 21:13

## 2021-11-10 RX ADMIN — Medication 500 MILLIGRAM(S): at 12:21

## 2021-11-10 RX ADMIN — Medication 25 MILLIGRAM(S): at 08:19

## 2021-11-10 RX ADMIN — HYDROMORPHONE HYDROCHLORIDE 0.25 MILLIGRAM(S): 2 INJECTION INTRAMUSCULAR; INTRAVENOUS; SUBCUTANEOUS at 15:33

## 2021-11-10 RX ADMIN — Medication 650 MILLIGRAM(S): at 06:31

## 2021-11-10 RX ADMIN — ATORVASTATIN CALCIUM 80 MILLIGRAM(S): 80 TABLET, FILM COATED ORAL at 21:20

## 2021-11-10 RX ADMIN — Medication 650 MILLIGRAM(S): at 21:14

## 2021-11-10 RX ADMIN — HYDROMORPHONE HYDROCHLORIDE 0.25 MILLIGRAM(S): 2 INJECTION INTRAMUSCULAR; INTRAVENOUS; SUBCUTANEOUS at 22:40

## 2021-11-10 RX ADMIN — HEPARIN SODIUM 5000 UNIT(S): 5000 INJECTION INTRAVENOUS; SUBCUTANEOUS at 13:00

## 2021-11-10 RX ADMIN — CHLORHEXIDINE GLUCONATE 1 APPLICATION(S): 213 SOLUTION TOPICAL at 12:25

## 2021-11-10 RX ADMIN — HYDROMORPHONE HYDROCHLORIDE 0.25 MILLIGRAM(S): 2 INJECTION INTRAMUSCULAR; INTRAVENOUS; SUBCUTANEOUS at 14:15

## 2021-11-10 RX ADMIN — PANTOPRAZOLE SODIUM 40 MILLIGRAM(S): 20 TABLET, DELAYED RELEASE ORAL at 07:32

## 2021-11-10 RX ADMIN — Medication 650 MILLIGRAM(S): at 08:45

## 2021-11-10 RX ADMIN — Medication 650 MILLIGRAM(S): at 21:09

## 2021-11-10 RX ADMIN — Medication 10 MILLIGRAM(S): at 21:21

## 2021-11-10 RX ADMIN — Medication 50 GRAM(S): at 04:03

## 2021-11-10 RX ADMIN — Medication 3: at 12:15

## 2021-11-10 RX ADMIN — GABAPENTIN 300 MILLIGRAM(S): 400 CAPSULE ORAL at 13:00

## 2021-11-10 RX ADMIN — Medication 3: at 17:18

## 2021-11-10 RX ADMIN — Medication 10 MILLIGRAM(S): at 09:51

## 2021-11-10 RX ADMIN — Medication 4: at 07:54

## 2021-11-10 RX ADMIN — CALCITRIOL 0.25 MICROGRAM(S): 0.5 CAPSULE ORAL at 12:22

## 2021-11-10 RX ADMIN — HYDROMORPHONE HYDROCHLORIDE 0.25 MILLIGRAM(S): 2 INJECTION INTRAMUSCULAR; INTRAVENOUS; SUBCUTANEOUS at 09:25

## 2021-11-10 RX ADMIN — HEPARIN SODIUM 5000 UNIT(S): 5000 INJECTION INTRAVENOUS; SUBCUTANEOUS at 08:00

## 2021-11-10 RX ADMIN — Medication 650 MILLIGRAM(S): at 17:15

## 2021-11-10 RX ADMIN — Medication 650 MILLIGRAM(S): at 23:57

## 2021-11-10 RX ADMIN — Medication 650 MILLIGRAM(S): at 14:03

## 2021-11-10 RX ADMIN — GABAPENTIN 300 MILLIGRAM(S): 400 CAPSULE ORAL at 21:21

## 2021-11-10 RX ADMIN — Medication 325 MILLIGRAM(S): at 12:23

## 2021-11-10 NOTE — PHYSICAL THERAPY INITIAL EVALUATION ADULT - GENERAL OBSERVATIONS, REHAB EVAL
10:15-11:00. chart reviewed. Pt received semi-aguilera at B/S, alert, oriented, able to follow multi-step instructions and agreeable to PT evaluation. Pt is deaf, translation using  (Patricia) was present t/o session. HGH 8 s/p blood transfusion. R ankle arthrodesis, L side weakness 2/2 old stroke. + monitoring, + L LE dressing, L heel wounds planning for debridement tomorrow. Pt is [place]. touching in not allowed for Islam reasons as per patient. Pt requires close supervision for overall functional mobility, able to ambulate using RW for 10 ft, CC L LE pain 7/10. VSS, NAD.

## 2021-11-10 NOTE — PROGRESS NOTE ADULT - ASSESSMENT
ASSESSMENT & PLAN  56y Female 7d s/p left femoral artery to posterior tibial artery bypass with autologous venous tissue.    NEURO:  No chronic dx  Acute pain-controlled with tylenol ATC  gabapentin 300 milliGRAM(s) Oral three times a day  Dilaudid 0.25mg q3 PRN    RESP: No chronic dx  Oxygenation-wean off NC to RA as tolerated  Encourage IS  AM CXR     CARDS:  Hx of HTN, HLD,   Restarted home metoprolol and atorvastatin, holding home losartan/HCTZ  - per Nephro can restart hydralazine 25 q8 if BP requires   11/4 ECHO: EF 75%, mild concentric LVH, mild TR, mild AS   CE negative x3  am EKG: Qtc 437    VASC:  Hx of PVD  -s/p LLE fem-tib bypass  -Pulse exam- Dopplerable L DP/AT, R DP/PT  -L heel foot ulcer, podiatry following, OR Thursday 11/11 for debridement    GI/NUTR:  No chronic dx    Diet, Consistent Carbohydrate Renal w/Evening Snack    Diet, NPO after Midnight on Wednesday for OR w/ Podiatry     GI Prophylaxis- PPI, Senna     /RENAL:  Hx of CKD4 (baseline Cr 2.4?)  Nephro following    Monitor UO    Henriquez DCed @6pm, passeed TOV     IVL  Labs:          BUN/Cr- 44/2.7  -->,  43/2.7  -->,  36/2.6  --> 32/2.7          Electrolytes-Na 139 // K 4.6 // Mg 1.9 //  Phos 4  -Mag repleted    HEME/ONC:     DVT prophylaxis-,HSQ,  SCDs    Home Rx: Plavix 75 mg oral tablet: 1 tab(s) orally once a day- on hold per vascular team    Labs: Hb/Hct:  7.2/22.9  -->,  7.3/23.2  -->    6.7/21.2->6.5/20.4                  Plts:  244  -->,  225  -->                 PTT/INR:  107.3/1.04  (11-08 @ 15:34) --->     Jehova's witness, would consider transfusion in emergency- limit blood draws, started on Ferrous sulfate and ascorbic acid  -given drop in Hb to 6.5, d/w Language line Sign  - pt is a known Religious, but now is agreeable to get blood transfusion and signed the consent  -f/up 1100 CBC    ID:  WBC- 8.12  --->>,  9.80  --->> 7.38->9.7->10  Tmax: afebrile  Antibiotics-none, completed periop    ENDO:  FS AC/HS  Hg A1c pending    LINES/DRAINS:  PIV    Advanced Directives: Presumed Full Code    DISPO:    SICU  -Cancelled downgrade for now

## 2021-11-10 NOTE — PROGRESS NOTE ADULT - ASSESSMENT
Saw and examined patient on 11/8 afternoon.    55 yo female PMH CKD4 PVD, smoker, HTN, HLD, h/o right pontine CVA (2/7/21), DM,  intellectual disability, hearing/speech impairment presents with chronic left heel ulcer and pain. Recent duplex on 10/25/21 showed no significant arterial blood flow visualized beyond the left superficial femoral artery.  Has stable CKD4 at least since 2/21; saw nephrologist in [place]  # CKD 4, stable  # Proteinuria, nephrotic range / likely d/t HTN/DM  # Hx of HTN  # Hx of DM  # DFU / PAD - s/p fem-tib bypass on 11/8 / podiatry plan for debridement on 11/11    Recommendations:  - please d/c standing iv fluids;  pt received RBCs today, has no ssx of volume depletion  - document strict i/o and daily weights  - renal diet  - cont po Bicarb 1300 tid   - please document accurate urine output   - increase hydralazine if bp uncontrolled  - OLVIN weak positive/ serum flc ratio wnl; can have further w/u for proteinuria outpatient  - phos noted, does not need binder  - monitor hgb, RBC transfusion to maintain hgb > 7  / check iron stores

## 2021-11-10 NOTE — PROGRESS NOTE ADULT - SUBJECTIVE AND OBJECTIVE BOX
SEN LING  [id number]  56y Female    Indication for ICU admission: s/p LLE fem-tib bypass   Admit Date:11-01-21  ICU Date: 11/8  OR Date: 11/8    penicillin (Rash)    PAST MEDICAL & SURGICAL HISTORY:  PVD (peripheral vascular disease)  Benign essential HTN  Intellectual disability  Hearing impaired  HLD (hyperlipidemia)  Jehova's Witness       Home Medications:  aspirin 81 mg oral tablet: 1 tab(s) orally once a day (01 Nov 2021 16:07)  atorvastatin 80 mg oral tablet: 1 tab(s) orally once a day (01 Nov 2021 16:06)  calcitriol 0.25 mcg oral capsule: 1 cap(s) orally once a day (01 Nov 2021 16:05)  gabapentin 300 mg oral capsule: 1 cap(s) orally 3 times a day (01 Nov 2021 16:06)  losartan 50 mg oral tablet: 1 tab(s) orally once a day (01 Nov 2021 16:08)  Metoprolol Succinate ER 25 mg oral tablet, extended release: 1 tab(s) orally once a day (01 Nov 2021 16:07)  Plavix 75 mg oral tablet: 1 tab(s) orally once a day (01 Nov 2021 16:07)  Protonix 40 mg oral delayed release tablet: 1 tab(s) orally once a day (01 Nov 2021 16:07)        24HRS EVENT:  11/9  Day  - OR with podiatry- cancelled, patient tachycardic and nervous. will reconsent and reschedule for Thursday  - DC ramila neely  - MORGAN  - BP normotensive    11/9  Night:  -passed TOV  -Hb dropped from 7.3 to 6.7, rept 6.5, d/w Language line Sign  - pt is a known Jewish, but now is agreeable to get blood transfusion and signed the consent  -Cancelled downgrade      DVT PTX: HSQ    GI PTX:pantoprazole    Tablet 40 milliGRAM(s) Oral before breakfast      ***Tubes/Lines/Drains  ***  Peripheral IV  Arterial Line		                Date   Urinary Catheter		Indication: Strict I&O    Date Placed:       REVIEW OF SYSTEMS    [x ] A ten-point review of systems was otherwise negative except as noted.  [ ] Due to altered mental status/intubation, subjective information were not able to be obtained from the patient. History was obtained, to the extent possible, from review of the chart and collateral sources of information.       SEN LING  [id number]  56y Female    Indication for ICU admission: s/p LLE fem-tib bypass   Admit Date:11-01-21  ICU Date: 11/8  OR Date: 11/8    penicillin (Rash)    PAST MEDICAL & SURGICAL HISTORY:  PVD (peripheral vascular disease)  Benign essential HTN  Intellectual disability  Hearing impaired  HLD (hyperlipidemia)  Jehova's Witness       Home Medications:  aspirin 81 mg oral tablet: 1 tab(s) orally once a day (01 Nov 2021 16:07)  atorvastatin 80 mg oral tablet: 1 tab(s) orally once a day (01 Nov 2021 16:06)  calcitriol 0.25 mcg oral capsule: 1 cap(s) orally once a day (01 Nov 2021 16:05)  gabapentin 300 mg oral capsule: 1 cap(s) orally 3 times a day (01 Nov 2021 16:06)  losartan 50 mg oral tablet: 1 tab(s) orally once a day (01 Nov 2021 16:08)  Metoprolol Succinate ER 25 mg oral tablet, extended release: 1 tab(s) orally once a day (01 Nov 2021 16:07)  Plavix 75 mg oral tablet: 1 tab(s) orally once a day (01 Nov 2021 16:07)  Protonix 40 mg oral delayed release tablet: 1 tab(s) orally once a day (01 Nov 2021 16:07)        24HRS EVENT:  11/9  Day  - OR with podiatry- cancelled, patient tachycardic and nervous. will reconsent and reschedule for Thursday  - DC ramila neely  - MORGAN  - BP normotensive    11/9  Night:  -passed TOV  -Hb dropped from 7.3 to 6.7, rept 6.5, d/w Language line Sign  - pt is a known Anglican, but now is agreeable to get blood transfusion and signed the consent  -Cancelled downgrade      DVT PTX: HSQ    GI PTX:pantoprazole    Tablet 40 milliGRAM(s) Oral before breakfast      ***Tubes/Lines/Drains  ***  Peripheral IV  Arterial Line		                Date   Urinary Catheter		Indication: Strict I&O    Date Placed:       REVIEW OF SYSTEMS    [x ] A ten-point review of systems was otherwise negative except as noted.  [ ] Due to altered mental status/intubation, subjective information were not able to be obtained from the patient. History was obtained, to the extent possible, from review of the chart and collateral sources of information.    Daily Height in cm: 157.48 (09 Nov 2021 10:44)    Daily     Diet, NPO after Midnight:      NPO Start Date: 10-Nov-2021,   NPO Start Time: 23:59 (11-10-21 @ 06:55)  Diet, Consistent Carbohydrate Renal w/Evening Snack (11-08-21 @ 15:33)      CURRENT MEDS:  Neurologic Medications  acetaminophen     Tablet .. 650 milliGRAM(s) Oral every 6 hours  gabapentin 300 milliGRAM(s) Oral every 8 hours  HYDROmorphone  Injectable 0.25 milliGRAM(s) IV Push every 3 hours PRN Severe Pain (7 - 10)    Respiratory Medications    Cardiovascular Medications  hydrALAZINE 10 milliGRAM(s) Oral every 8 hours  metoprolol succinate ER 25 milliGRAM(s) Oral daily    Gastrointestinal Medications  ascorbic acid 500 milliGRAM(s) Oral daily  calcitriol   Capsule 0.25 MICROGram(s) Oral daily  ferrous    sulfate 325 milliGRAM(s) Oral daily  lactated ringers. 1000 milliLiter(s) IV Continuous <Continuous>  magnesium hydroxide Suspension 30 milliLiter(s) Oral once PRN Constipation  pantoprazole    Tablet 40 milliGRAM(s) Oral before breakfast  senna 2 Tablet(s) Oral at bedtime  sodium bicarbonate 1300 milliGRAM(s) Oral every 8 hours    Genitourinary Medications    Hematologic/Oncologic Medications  aspirin  chewable 81 milliGRAM(s) Oral daily  heparin   Injectable 5000 Unit(s) SubCutaneous every 8 hours    Antimicrobial/Immunologic Medications    Endocrine/Metabolic Medications  atorvastatin 80 milliGRAM(s) Oral at bedtime  insulin lispro (ADMELOG) corrective regimen sliding scale   SubCutaneous Before meals and at bedtime    Topical/Other Medications  chlorhexidine 4% Liquid 1 Application(s) Topical daily      ICU Vital Signs Last 24 Hrs  T(C): 36.8 (10 Nov 2021 07:00), Max: 37.9 (10 Nov 2021 00:30)  T(F): 98.3 (10 Nov 2021 07:00), Max: 100.2 (10 Nov 2021 00:30)  HR: 84 (10 Nov 2021 07:00) (84 - 105)  BP: 160/67 (10 Nov 2021 07:00) (137/65 - 160/67)  BP(mean): 97 (10 Nov 2021 07:00) (89 - 100)  ABP: 146/47 (09 Nov 2021 16:18) (129/49 - 158/56)  ABP(mean): 86 (09 Nov 2021 16:18) (77 - 91)  RR: 18 (10 Nov 2021 07:00) (16 - 24)  SpO2: 96% (10 Nov 2021 07:00) (96% - 100%)    I&O's Summary    09 Nov 2021 07:01  -  10 Nov 2021 07:00  --------------------------------------------------------  IN: 975 mL / OUT: 1300 mL / NET: -325 mL      I&O's Detail    09 Nov 2021 07:01  -  10 Nov 2021 07:00  --------------------------------------------------------  IN:    Lactated Ringers: 525 mL    Oral Fluid: 450 mL  Total IN: 975 mL    OUT:    Indwelling Catheter - Urethral (mL): 1100 mL    Voided (mL): 200 mL  Total OUT: 1300 mL    Total NET: -325 mL          PHYSICAL EXAM:    General/Neuro  GCS 14  Deficits: nonverbal, no focal deficits    Lungs:      clear to auscultation, Normal expansion/effort.     Cardiovascular : S1, S2.  Regular rate and rhythm.    Cardiac Rhythm: Normal Sinus Rhythm    GI: Abdomen soft, Non-tender, Non-distended.      Wound: No hematoma, c/d/i    Extremities: Extremities warm, pink, well-perfused. Pulses: b/l doppler DP, no PT    CXR: stable    LABS:  CAPILLARY BLOOD GLUCOSE      POCT Blood Glucose.: 195 mg/dL (10 Nov 2021 07:43)  POCT Blood Glucose.: 215 mg/dL (09 Nov 2021 22:00)  POCT Blood Glucose.: 169 mg/dL (09 Nov 2021 17:56)  POCT Blood Glucose.: 139 mg/dL (09 Nov 2021 15:09)  POCT Blood Glucose.: 187 mg/dL (09 Nov 2021 10:15)                          6.5    9.97  )-----------( 221      ( 10 Nov 2021 02:30 )             20.4       11-10    139  |  105  |  33<H>  ----------------------------<  135<H>  4.6   |  19  |  2.7<H>    Ca    8.0<L>      10 Nov 2021 00:16  Phos  4.0     11-10  Mg     1.9     11-10        PT/INR - ( 08 Nov 2021 15:34 )   PT: 12.00 sec;   INR: 1.04 ratio         PTT - ( 08 Nov 2021 15:34 )  PTT:107.3 sec  CARDIAC MARKERS ( 09 Nov 2021 04:30 )  x     / <0.01 ng/mL / 383 U/L / x     / 3.5 ng/mL  CARDIAC MARKERS ( 08 Nov 2021 23:25 )  x     / <0.01 ng/mL / 344 U/L / x     / 3.7 ng/mL  CARDIAC MARKERS ( 08 Nov 2021 15:34 )  x     / <0.01 ng/mL / x     / x     / x

## 2021-11-10 NOTE — PHYSICAL THERAPY INITIAL EVALUATION ADULT - PERTINENT HX OF CURRENT PROBLEM, REHAB EVAL
55 yo female PMH PVD, smoker, HTN, HLD, h/o right pontine CVA (2/7/21), intellectual disability, hearing/speech impairment, CKD 4 (being followed by nephro), mild aortic stenosis, initially presented to ED on 10/25/21 for left lower extremity chronic arterial occlusion.

## 2021-11-10 NOTE — PROGRESS NOTE ADULT - SUBJECTIVE AND OBJECTIVE BOX
VASCULAR SURGERY PROGRESS NOTE    CC: left foot ulcer  Hospital Day #: 9  Post-Op Day #: 1    Procedure/Dx/Injuries: left fem-tib bypass    Pt seen and examined bed site. Found low Hgb and received a unit of blood      ROS otherwise negative except per subjective and HPI      PAST MEDICAL & SURGICAL HISTORY:  PVD (peripheral vascular disease)    Benign essential HTN    Intellectual disability    Hearing impaired    HLD (hyperlipidemia)        Vital Signs Last 24 Hrs  T(C): 36.8 (10 Nov 2021 07:00), Max: 37.9 (10 Nov 2021 00:30)  T(F): 98.3 (10 Nov 2021 07:00), Max: 100.2 (10 Nov 2021 00:30)  HR: 75 (10 Nov 2021 10:00) (72 - 104)  BP: 137/62 (10 Nov 2021 10:00) (137/62 - 160/67)  BP(mean): 89 (10 Nov 2021 10:00) (89 - 97)  RR: 18 (10 Nov 2021 10:00) (15 - 24)  SpO2: 98% (10 Nov 2021 10:00) (96% - 100%)    Pain (0-10):            Pain Control Adequate: [] YES [] N    Diet:    I&O's Detail    09 Nov 2021 07:01  -  10 Nov 2021 07:00  --------------------------------------------------------  IN:    Lactated Ringers: 525 mL    Oral Fluid: 450 mL  Total IN: 975 mL    OUT:    Indwelling Catheter - Urethral (mL): 1100 mL    Voided (mL): 200 mL  Total OUT: 1300 mL    Total NET: -325 mL      10 Nov 2021 07:01  -  10 Nov 2021 10:43  --------------------------------------------------------  IN:  Total IN: 0 mL    OUT:    Voided (mL): 0 mL  Total OUT: 0 mL    Total NET: 0 mL      PHYSICAL EXAM    Appearance: Normal	  HEENT:   Normal oral mucosa, PERRL, EOMI	  Neck: Supple, - JVD;   Cardiovascular: Normal S1 S2, No JVD, No murmurs,   Respiratory: Lungs clear to auscultation/No Rales, Rhonchi, Wheezing	  Gastrointestinal:  Soft, Non-tender, + BS	  Skin: No rashes, No ecchymoses, No cyanosis  Extremities: Normal range of motion, No clubbing, cyanosis   Left lower extremity + dressing clean/dry/intact  Neurologic: Non-focal  Psychiatry: A & O x 3, Mood & affect appropriate    PULSES:  Femoral:  Popliteal:  Dorsal Pedal: b/l dopplerable  Posterior Tibial: b/l dopplerable  Capillary:      MEDICATIONS:   MEDICATIONS  (STANDING):  acetaminophen     Tablet .. 650 milliGRAM(s) Oral every 6 hours  ascorbic acid 500 milliGRAM(s) Oral daily  aspirin  chewable 81 milliGRAM(s) Oral daily  atorvastatin 80 milliGRAM(s) Oral at bedtime  calcitriol   Capsule 0.25 MICROGram(s) Oral daily  chlorhexidine 4% Liquid 1 Application(s) Topical daily  ferrous    sulfate 325 milliGRAM(s) Oral daily  gabapentin 300 milliGRAM(s) Oral every 8 hours  heparin   Injectable 5000 Unit(s) SubCutaneous every 8 hours  hydrALAZINE 10 milliGRAM(s) Oral every 8 hours  insulin lispro (ADMELOG) corrective regimen sliding scale   SubCutaneous Before meals and at bedtime  lactated ringers. 1000 milliLiter(s) (75 mL/Hr) IV Continuous <Continuous>  metoprolol succinate ER 25 milliGRAM(s) Oral daily  pantoprazole    Tablet 40 milliGRAM(s) Oral before breakfast  senna 2 Tablet(s) Oral at bedtime  sodium bicarbonate 1300 milliGRAM(s) Oral every 8 hours    MEDICATIONS  (PRN):  HYDROmorphone  Injectable 0.25 milliGRAM(s) IV Push every 3 hours PRN Severe Pain (7 - 10)  magnesium hydroxide Suspension 30 milliLiter(s) Oral once PRN Constipation      DVT PROPHYLAXIS: [x] YES [] NO  GI PROPHYLAXIS: [x] YES [] NO  ANTIPLATELETS: [x] YES [] NO  Pt is on ASA, also takes Plavix at home (on hold now)  ANTICOAGULATION: [] YES [x] NO  ANTIBIOTICS: [] YES [x] NO    LAB/STUDIES:                        8.0    9.18  )-----------( 216      ( 10 Nov 2021 09:30 )             25.0     11-10    139  |  105  |  33<H>  ----------------------------<  135<H>  4.6   |  19  |  2.7<H>    Ca    8.0<L>      10 Nov 2021 00:16  Phos  4.0     11-10  Mg     1.9     11-10      PT/INR - ( 08 Nov 2021 15:34 )   PT: 12.00 sec;   INR: 1.04 ratio         PTT - ( 08 Nov 2021 15:34 )  PTT:107.3 sec        CARDIAC MARKERS ( 09 Nov 2021 04:30 )  x     / <0.01 ng/mL / 383 U/L / x     / 3.5 ng/mL  CARDIAC MARKERS ( 08 Nov 2021 23:25 )  x     / <0.01 ng/mL / 344 U/L / x     / 3.7 ng/mL  CARDIAC MARKERS ( 08 Nov 2021 15:34 )  x     / <0.01 ng/mL / x     / x     / x                    IMAGING:    < from: Xray Chest 1 View- PORTABLE-Routine (11.10.21 @ 06:03) >  Cardiomegaly, unchanged.    Left basilar opacity, unchanged.

## 2021-11-10 NOTE — PROGRESS NOTE ADULT - ATTENDING COMMENTS
56y Female 7d s/p left femoral artery to posterior tibial artery bypass with autologous venous tissue.      Acute pain-controlled with tylenol ATC, oxy  and dilaudid prn   gabapentin 300 milliGRAM(s) Oral three times a day        Hx of HTN, HLD,   Restarted home metoprolol and atorvastatin, holding home losartan/HCTZ   11/4 ECHO: EF 75%, mild concentric LVH, mild TR, mild AS   CE negative x3    hypertensive 160s add po hydralazine     acute blood loss anemia   - hgb 6.8< 7.1 ( 9.0)  - given 1 unit prbc   - patient agreeable to blood products     Hx of PVD  -s/p LLE fem-tib bypass  -Pulse exam- Dopplerable L DP/AT weaker this am ( vascular aware), R DP/PT  -L heel foot ulcer, podiatry following, OR tomorrow for debridement    ckd 4 , baseline bun 45/2.7, K 5.1   -hydralazine for bp   - gentle hydration with LR not ns   - bicard po tid    high chance of hemodynamic instability due to acute blood loss anemia

## 2021-11-10 NOTE — PROGRESS NOTE ADULT - SUBJECTIVE AND OBJECTIVE BOX
Nephrology Progress Note    SEN LING  [id number]  56y  Female    S:  Patient is seen and examined, events over the last 24h noted.    O:  Allergies:  penicillin (Rash)    Hospital Medications:   MEDICATIONS  (STANDING):  acetaminophen     Tablet .. 650 milliGRAM(s) Oral every 6 hours  ascorbic acid 500 milliGRAM(s) Oral daily  aspirin  chewable 81 milliGRAM(s) Oral daily  atorvastatin 80 milliGRAM(s) Oral at bedtime  calcitriol   Capsule 0.25 MICROGram(s) Oral daily  ferrous    sulfate 325 milliGRAM(s) Oral daily  gabapentin 300 milliGRAM(s) Oral every 8 hours  heparin   Injectable 5000 Unit(s) SubCutaneous every 8 hours  hydrALAZINE 10 milliGRAM(s) Oral every 8 hours  insulin lispro (ADMELOG) corrective regimen sliding scale   SubCutaneous Before meals and at bedtime  lactated ringers. 1000 milliLiter(s) (75 mL/Hr) IV Continuous <Continuous>  metoprolol succinate ER 25 milliGRAM(s) Oral daily  pantoprazole    Tablet 40 milliGRAM(s) Oral before breakfast  senna 2 Tablet(s) Oral at bedtime  sodium bicarbonate 1300 milliGRAM(s) Oral every 8 hours    MEDICATIONS  (PRN):  HYDROmorphone  Injectable 0.25 milliGRAM(s) IV Push every 3 hours PRN Severe Pain (7 - 10)  magnesium hydroxide Suspension 30 milliLiter(s) Oral once PRN Constipation    Home Medications:  aspirin 81 mg oral tablet: 1 tab(s) orally once a day (01 Nov 2021 16:07)  atorvastatin 80 mg oral tablet: 1 tab(s) orally once a day (01 Nov 2021 16:06)  calcitriol 0.25 mcg oral capsule: 1 cap(s) orally once a day (01 Nov 2021 16:05)  gabapentin 300 mg oral capsule: 1 cap(s) orally 3 times a day (01 Nov 2021 16:06)  losartan 50 mg oral tablet: 1 tab(s) orally once a day (01 Nov 2021 16:08)  Metoprolol Succinate ER 25 mg oral tablet, extended release: 1 tab(s) orally once a day (01 Nov 2021 16:07)  Plavix 75 mg oral tablet: 1 tab(s) orally once a day (01 Nov 2021 16:07)  Protonix 40 mg oral delayed release tablet: 1 tab(s) orally once a day (01 Nov 2021 16:07)      VITALS:  T(F): 98.3 (11-10-21 @ 07:00), Max: 100.2 (11-10-21 @ 00:30)  HR: 73 (11-10-21 @ 11:00)  BP: 134/58 (11-10-21 @ 11:00)  RR: 16 (11-10-21 @ 11:00)  SpO2: 98% (11-10-21 @ 11:00)  Wt(kg): --  I&O's Detail    09 Nov 2021 07:01  -  10 Nov 2021 07:00  --------------------------------------------------------  IN:    Lactated Ringers: 525 mL    Oral Fluid: 450 mL  Total IN: 975 mL    OUT:    Indwelling Catheter - Urethral (mL): 1100 mL    Voided (mL): 200 mL  Total OUT: 1300 mL    Total NET: -325 mL      10 Nov 2021 07:01  -  10 Nov 2021 12:17  --------------------------------------------------------  IN:  Total IN: 0 mL    OUT:    Indwelling Catheter - Urethral (mL): 1625 mL    Voided (mL): 0 mL  Total OUT: 1625 mL    Total NET: -1625 mL        I&O's Summary    09 Nov 2021 07:01  -  10 Nov 2021 07:00  --------------------------------------------------------  IN: 975 mL / OUT: 1300 mL / NET: -325 mL    10 Nov 2021 07:01  -  10 Nov 2021 12:17  --------------------------------------------------------  IN: 0 mL / OUT: 1625 mL / NET: -1625 mL          PHYSICAL EXAM:  Gen: NAD  Resp: CTAB  Card: S1/S2  Abd: soft  Extremities: no edema      LABS:      11-10    139  |  105  |  33<H>  ----------------------------<  135<H>  4.6   |  19  |  2.7<H>    Ca    8.0<L>      10 Nov 2021 00:16  Phos  4.0     11-10  Mg     1.9     11-10      eGFR if Non African American: 19 mL/min/1.73M2 (11-10-21 @ 00:16)  eGFR if African American: 22 mL/min/1.73M2 (11-10-21 @ 00:16)    Phosphorus Level, Serum: 4.0 mg/dL (11-10-21 @ 00:16)  Phosphorus Level, Serum: 4.7 mg/dL (11-08-21 @ 23:25)                            8.0    9.18  )-----------( 216      ( 10 Nov 2021 09:30 )             25.0     Mean Cell Volume: 89.3 fL (11-10-21 @ 09:30)        Creatinine trend:  Creatinine, Serum: 2.7 mg/dL (11-10-21 @ 00:16)  Creatinine, Serum: 2.6 mg/dL (11-09-21 @ 13:22)  Creatinine, Serum: 2.7 mg/dL (11-08-21 @ 23:25)  Creatinine, Serum: 2.7 mg/dL (11-08-21 @ 18:04)  Creatinine, Serum: 2.7 mg/dL (11-08-21 @ 07:50)  Creatinine, Serum: 2.9 mg/dL (11-07-21 @ 17:03)  Creatinine, Serum: 3.0 mg/dL (11-07-21 @ 00:41)

## 2021-11-10 NOTE — CHART NOTE - NSCHARTNOTEFT_GEN_A_CORE
Patient with low Hb 6.7, confirmed on repeat 6.5. Patient is known L[employer]-day and previously stated she would agree to blood transfusion if "emergency". Spoke with family at bedside and patient using sign language, Sami speaking  and stated it is currently not an emergency, however due to low hemoblogin level we would still recommend a blood transfusion. Risk and benefits were discussed and patient agreed for blood transfusion. Will proceed with 1uPRBC and cancel downgrade to floor.

## 2021-11-10 NOTE — PHYSICAL THERAPY INITIAL EVALUATION ADULT - DID THE PATIENT HAVE SURGERY?
Bypass left femoral artery to posterior tibial artery with autologous venous tissue, open approach/yes

## 2021-11-10 NOTE — PROGRESS NOTE ADULT - ASSESSMENT
55 y/o female with DM, left heel DFU, s/p left fem-tib bypass      Plan:  - PAD s/p bypass surgery, ASA 81 mg po daily  - hx CVA - on Plavix at home (to be resumed after podiatry surgery)  - Acute blood loss anemia - pt received 1 u PRBC, repeat CBC in AM   - I reviewed labs  - I reviewed radiology imagings  - I discussed the findings and the plan with Dr. Muñoz: patient may ambulate with PT  Proceed with podiatry for debridement    SPECTRA [id number]

## 2021-11-11 ENCOUNTER — RESULT REVIEW (OUTPATIENT)
Age: 56
End: 2021-11-11

## 2021-11-11 LAB
ANION GAP SERPL CALC-SCNC: 15 MMOL/L — HIGH (ref 7–14)
ANION GAP SERPL CALC-SCNC: 16 MMOL/L — HIGH (ref 7–14)
APTT BLD: 36.6 SEC — SIGNIFICANT CHANGE UP (ref 27–39.2)
BASOPHILS # BLD AUTO: 0.02 K/UL — SIGNIFICANT CHANGE UP (ref 0–0.2)
BASOPHILS NFR BLD AUTO: 0.2 % — SIGNIFICANT CHANGE UP (ref 0–1)
BUN SERPL-MCNC: 32 MG/DL — HIGH (ref 10–20)
BUN SERPL-MCNC: 34 MG/DL — HIGH (ref 10–20)
CALCIUM SERPL-MCNC: 7.9 MG/DL — LOW (ref 8.5–10.1)
CALCIUM SERPL-MCNC: 8.2 MG/DL — LOW (ref 8.5–10.1)
CHLORIDE SERPL-SCNC: 105 MMOL/L — SIGNIFICANT CHANGE UP (ref 98–110)
CHLORIDE SERPL-SCNC: 106 MMOL/L — SIGNIFICANT CHANGE UP (ref 98–110)
CO2 SERPL-SCNC: 19 MMOL/L — SIGNIFICANT CHANGE UP (ref 17–32)
CO2 SERPL-SCNC: 19 MMOL/L — SIGNIFICANT CHANGE UP (ref 17–32)
CREAT SERPL-MCNC: 2.8 MG/DL — HIGH (ref 0.7–1.5)
CREAT SERPL-MCNC: 3 MG/DL — HIGH (ref 0.7–1.5)
EOSINOPHIL # BLD AUTO: 0.74 K/UL — HIGH (ref 0–0.7)
EOSINOPHIL NFR BLD AUTO: 8.9 % — HIGH (ref 0–8)
GLUCOSE BLDC GLUCOMTR-MCNC: 114 MG/DL — HIGH (ref 70–99)
GLUCOSE BLDC GLUCOMTR-MCNC: 141 MG/DL — HIGH (ref 70–99)
GLUCOSE BLDC GLUCOMTR-MCNC: 183 MG/DL — HIGH (ref 70–99)
GLUCOSE BLDC GLUCOMTR-MCNC: 206 MG/DL — HIGH (ref 70–99)
GLUCOSE SERPL-MCNC: 122 MG/DL — HIGH (ref 70–99)
GLUCOSE SERPL-MCNC: 165 MG/DL — HIGH (ref 70–99)
HCT VFR BLD CALC: 22.6 % — LOW (ref 37–47)
HCT VFR BLD CALC: 25.1 % — LOW (ref 37–47)
HGB BLD-MCNC: 7.3 G/DL — LOW (ref 12–16)
HGB BLD-MCNC: 8.1 G/DL — LOW (ref 12–16)
IMM GRANULOCYTES NFR BLD AUTO: 0.2 % — SIGNIFICANT CHANGE UP (ref 0.1–0.3)
INR BLD: 0.95 RATIO — SIGNIFICANT CHANGE UP (ref 0.65–1.3)
LYMPHOCYTES # BLD AUTO: 1.93 K/UL — SIGNIFICANT CHANGE UP (ref 1.2–3.4)
LYMPHOCYTES # BLD AUTO: 23.3 % — SIGNIFICANT CHANGE UP (ref 20.5–51.1)
MAGNESIUM SERPL-MCNC: 1.8 MG/DL — SIGNIFICANT CHANGE UP (ref 1.8–2.4)
MAGNESIUM SERPL-MCNC: 2 MG/DL — SIGNIFICANT CHANGE UP (ref 1.8–2.4)
MCHC RBC-ENTMCNC: 28.7 PG — SIGNIFICANT CHANGE UP (ref 27–31)
MCHC RBC-ENTMCNC: 28.8 PG — SIGNIFICANT CHANGE UP (ref 27–31)
MCHC RBC-ENTMCNC: 32.3 G/DL — SIGNIFICANT CHANGE UP (ref 32–37)
MCHC RBC-ENTMCNC: 32.3 G/DL — SIGNIFICANT CHANGE UP (ref 32–37)
MCV RBC AUTO: 89 FL — SIGNIFICANT CHANGE UP (ref 81–99)
MCV RBC AUTO: 89.3 FL — SIGNIFICANT CHANGE UP (ref 81–99)
MONOCYTES # BLD AUTO: 0.58 K/UL — SIGNIFICANT CHANGE UP (ref 0.1–0.6)
MONOCYTES NFR BLD AUTO: 7 % — SIGNIFICANT CHANGE UP (ref 1.7–9.3)
NEUTROPHILS # BLD AUTO: 5.01 K/UL — SIGNIFICANT CHANGE UP (ref 1.4–6.5)
NEUTROPHILS NFR BLD AUTO: 60.4 % — SIGNIFICANT CHANGE UP (ref 42.2–75.2)
NRBC # BLD: 0 /100 WBCS — SIGNIFICANT CHANGE UP (ref 0–0)
NRBC # BLD: 0 /100 WBCS — SIGNIFICANT CHANGE UP (ref 0–0)
PHOSPHATE SERPL-MCNC: 4.1 MG/DL — SIGNIFICANT CHANGE UP (ref 2.1–4.9)
PHOSPHATE SERPL-MCNC: 4.5 MG/DL — SIGNIFICANT CHANGE UP (ref 2.1–4.9)
PLATELET # BLD AUTO: 218 K/UL — SIGNIFICANT CHANGE UP (ref 130–400)
PLATELET # BLD AUTO: 233 K/UL — SIGNIFICANT CHANGE UP (ref 130–400)
POTASSIUM SERPL-MCNC: 4.5 MMOL/L — SIGNIFICANT CHANGE UP (ref 3.5–5)
POTASSIUM SERPL-MCNC: 4.6 MMOL/L — SIGNIFICANT CHANGE UP (ref 3.5–5)
POTASSIUM SERPL-SCNC: 4.5 MMOL/L — SIGNIFICANT CHANGE UP (ref 3.5–5)
POTASSIUM SERPL-SCNC: 4.6 MMOL/L — SIGNIFICANT CHANGE UP (ref 3.5–5)
PROTHROM AB SERPL-ACNC: 10.9 SEC — SIGNIFICANT CHANGE UP (ref 9.95–12.87)
RBC # BLD: 2.54 M/UL — LOW (ref 4.2–5.4)
RBC # BLD: 2.81 M/UL — LOW (ref 4.2–5.4)
RBC # FLD: 13.1 % — SIGNIFICANT CHANGE UP (ref 11.5–14.5)
RBC # FLD: 13.1 % — SIGNIFICANT CHANGE UP (ref 11.5–14.5)
SARS-COV-2 RNA SPEC QL NAA+PROBE: SIGNIFICANT CHANGE UP
SODIUM SERPL-SCNC: 139 MMOL/L — SIGNIFICANT CHANGE UP (ref 135–146)
SODIUM SERPL-SCNC: 141 MMOL/L — SIGNIFICANT CHANGE UP (ref 135–146)
WBC # BLD: 8.3 K/UL — SIGNIFICANT CHANGE UP (ref 4.8–10.8)
WBC # BLD: 9.18 K/UL — SIGNIFICANT CHANGE UP (ref 4.8–10.8)
WBC # FLD AUTO: 8.3 K/UL — SIGNIFICANT CHANGE UP (ref 4.8–10.8)
WBC # FLD AUTO: 9.18 K/UL — SIGNIFICANT CHANGE UP (ref 4.8–10.8)

## 2021-11-11 PROCEDURE — 88311 DECALCIFY TISSUE: CPT | Mod: 26

## 2021-11-11 PROCEDURE — 99231 SBSQ HOSP IP/OBS SF/LOW 25: CPT

## 2021-11-11 PROCEDURE — 97605 NEG PRS WND THER DME<=50SQCM: CPT | Mod: 59

## 2021-11-11 PROCEDURE — 71045 X-RAY EXAM CHEST 1 VIEW: CPT | Mod: 26

## 2021-11-11 PROCEDURE — 88304 TISSUE EXAM BY PATHOLOGIST: CPT | Mod: 26

## 2021-11-11 PROCEDURE — 11043 DBRDMT MUSC&/FSCA 1ST 20/<: CPT | Mod: 59

## 2021-11-11 PROCEDURE — 97597 DBRDMT OPN WND 1ST 20 CM/<: CPT | Mod: 59

## 2021-11-11 RX ORDER — SODIUM CHLORIDE 9 MG/ML
1000 INJECTION, SOLUTION INTRAVENOUS
Refills: 0 | Status: DISCONTINUED | OUTPATIENT
Start: 2021-11-11 | End: 2021-11-11

## 2021-11-11 RX ORDER — ONDANSETRON 8 MG/1
4 TABLET, FILM COATED ORAL ONCE
Refills: 0 | Status: DISCONTINUED | OUTPATIENT
Start: 2021-11-11 | End: 2021-11-11

## 2021-11-11 RX ORDER — CLOPIDOGREL BISULFATE 75 MG/1
75 TABLET, FILM COATED ORAL DAILY
Refills: 0 | Status: DISCONTINUED | OUTPATIENT
Start: 2021-11-11 | End: 2021-11-19

## 2021-11-11 RX ORDER — HYDROMORPHONE HYDROCHLORIDE 2 MG/ML
1 INJECTION INTRAMUSCULAR; INTRAVENOUS; SUBCUTANEOUS
Refills: 0 | Status: DISCONTINUED | OUTPATIENT
Start: 2021-11-11 | End: 2021-11-11

## 2021-11-11 RX ORDER — HYDROMORPHONE HYDROCHLORIDE 2 MG/ML
0.5 INJECTION INTRAMUSCULAR; INTRAVENOUS; SUBCUTANEOUS
Refills: 0 | Status: DISCONTINUED | OUTPATIENT
Start: 2021-11-11 | End: 2021-11-11

## 2021-11-11 RX ADMIN — Medication 650 MILLIGRAM(S): at 03:36

## 2021-11-11 RX ADMIN — Medication 1300 MILLIGRAM(S): at 05:00

## 2021-11-11 RX ADMIN — Medication 10 MILLIGRAM(S): at 17:03

## 2021-11-11 RX ADMIN — Medication 1300 MILLIGRAM(S): at 21:37

## 2021-11-11 RX ADMIN — Medication 325 MILLIGRAM(S): at 11:37

## 2021-11-11 RX ADMIN — SODIUM CHLORIDE 75 MILLILITER(S): 9 INJECTION, SOLUTION INTRAVENOUS at 00:00

## 2021-11-11 RX ADMIN — Medication 6: at 17:05

## 2021-11-11 RX ADMIN — GABAPENTIN 300 MILLIGRAM(S): 400 CAPSULE ORAL at 21:37

## 2021-11-11 RX ADMIN — HEPARIN SODIUM 5000 UNIT(S): 5000 INJECTION INTRAVENOUS; SUBCUTANEOUS at 21:37

## 2021-11-11 RX ADMIN — Medication 650 MILLIGRAM(S): at 07:02

## 2021-11-11 RX ADMIN — SENNA PLUS 2 TABLET(S): 8.6 TABLET ORAL at 21:37

## 2021-11-11 RX ADMIN — Medication 1300 MILLIGRAM(S): at 14:09

## 2021-11-11 RX ADMIN — Medication 25 MILLIGRAM(S): at 05:01

## 2021-11-11 RX ADMIN — Medication 10 MILLIGRAM(S): at 21:37

## 2021-11-11 RX ADMIN — GABAPENTIN 300 MILLIGRAM(S): 400 CAPSULE ORAL at 14:08

## 2021-11-11 RX ADMIN — HEPARIN SODIUM 5000 UNIT(S): 5000 INJECTION INTRAVENOUS; SUBCUTANEOUS at 14:08

## 2021-11-11 RX ADMIN — HYDROMORPHONE HYDROCHLORIDE 0.25 MILLIGRAM(S): 2 INJECTION INTRAMUSCULAR; INTRAVENOUS; SUBCUTANEOUS at 03:36

## 2021-11-11 RX ADMIN — HYDROMORPHONE HYDROCHLORIDE 0.25 MILLIGRAM(S): 2 INJECTION INTRAMUSCULAR; INTRAVENOUS; SUBCUTANEOUS at 18:28

## 2021-11-11 RX ADMIN — Medication 650 MILLIGRAM(S): at 11:37

## 2021-11-11 RX ADMIN — Medication 10 MILLIGRAM(S): at 05:00

## 2021-11-11 RX ADMIN — HEPARIN SODIUM 5000 UNIT(S): 5000 INJECTION INTRAVENOUS; SUBCUTANEOUS at 05:01

## 2021-11-11 RX ADMIN — Medication 81 MILLIGRAM(S): at 11:36

## 2021-11-11 RX ADMIN — ATORVASTATIN CALCIUM 80 MILLIGRAM(S): 80 TABLET, FILM COATED ORAL at 21:37

## 2021-11-11 RX ADMIN — Medication 500 MILLIGRAM(S): at 11:37

## 2021-11-11 RX ADMIN — HYDROMORPHONE HYDROCHLORIDE 0.25 MILLIGRAM(S): 2 INJECTION INTRAMUSCULAR; INTRAVENOUS; SUBCUTANEOUS at 08:00

## 2021-11-11 RX ADMIN — CALCITRIOL 0.25 MICROGRAM(S): 0.5 CAPSULE ORAL at 11:37

## 2021-11-11 RX ADMIN — GABAPENTIN 300 MILLIGRAM(S): 400 CAPSULE ORAL at 05:01

## 2021-11-11 RX ADMIN — Medication 650 MILLIGRAM(S): at 05:01

## 2021-11-11 RX ADMIN — HYDROMORPHONE HYDROCHLORIDE 0.25 MILLIGRAM(S): 2 INJECTION INTRAMUSCULAR; INTRAVENOUS; SUBCUTANEOUS at 18:38

## 2021-11-11 RX ADMIN — PANTOPRAZOLE SODIUM 40 MILLIGRAM(S): 20 TABLET, DELAYED RELEASE ORAL at 06:31

## 2021-11-11 RX ADMIN — Medication 650 MILLIGRAM(S): at 17:04

## 2021-11-11 RX ADMIN — HYDROMORPHONE HYDROCHLORIDE 0.25 MILLIGRAM(S): 2 INJECTION INTRAMUSCULAR; INTRAVENOUS; SUBCUTANEOUS at 07:17

## 2021-11-11 RX ADMIN — Medication 6: at 08:13

## 2021-11-11 NOTE — PRE-ANESTHESIA EVALUATION ADULT - NSANTHRISKNONERD_GEN_ALL_CORE
No risk alerts present
Risk Alerts:
No risk alerts present

## 2021-11-11 NOTE — PROGRESS NOTE ADULT - ASSESSMENT
Saw and examined patient on 11/8 afternoon.    55 yo female PMH CKD4 PVD, smoker, HTN, HLD, h/o right pontine CVA (2/7/21), DM,  intellectual disability, hearing/speech impairment presents with chronic left heel ulcer and pain. Recent duplex on 10/25/21 showed no significant arterial blood flow visualized beyond the left superficial femoral artery.  Has stable CKD4 at least since 2/21; saw nephrologist in [place]  # CKD 4, stable  # Proteinuria, nephrotic range / likely d/t HTN/DM  # Hx of HTN  # Hx of DM  # DFU / PAD - s/p fem-tib bypass on 11/8 / podiatry plan for debridement on 11/11    Recommendations:  - document strict i/o and daily weights  - renal diet  - cont po bicarb 1300 tid   - strict i/o  - increase hydralazine to 25mg po q8h for better bp control  - OLVIN weak positive/ serum flc ratio wnl; can have further w/u for proteinuria outpatient  - phos noted, does not need binder  - monitor hgb, RBC transfusion to maintain hgb > 7  / check iron stores

## 2021-11-11 NOTE — PROGRESS NOTE ADULT - SUBJECTIVE AND OBJECTIVE BOX
SEN LING  [id number]  56y Female    Indication for ICU admission: s/p LLE fem-tib bypass   Admit Date:11-01-21  ICU Date: 11/8  OR Date: 11/8    penicillin (Rash)    PAST MEDICAL & SURGICAL HISTORY:  PVD (peripheral vascular disease)  Benign essential HTN  Intellectual disability  Hearing impaired  HLD (hyperlipidemia)  Jehova's Witness       Home Medications:  aspirin 81 mg oral tablet: 1 tab(s) orally once a day (01 Nov 2021 16:07)  atorvastatin 80 mg oral tablet: 1 tab(s) orally once a day (01 Nov 2021 16:06)  calcitriol 0.25 mcg oral capsule: 1 cap(s) orally once a day (01 Nov 2021 16:05)  gabapentin 300 mg oral capsule: 1 cap(s) orally 3 times a day (01 Nov 2021 16:06)  losartan 50 mg oral tablet: 1 tab(s) orally once a day (01 Nov 2021 16:08)  Metoprolol Succinate ER 25 mg oral tablet, extended release: 1 tab(s) orally once a day (01 Nov 2021 16:07)  Plavix 75 mg oral tablet: 1 tab(s) orally once a day (01 Nov 2021 16:07)  Protonix 40 mg oral delayed release tablet: 1 tab(s) orally once a day (01 Nov 2021 16:07)        24HRS EVENT:  11/10  NIGHT  -DG – pending bed  -NPO after midnight – podiatry to take to OR tomorrow  -retained >900cc - mcgrath reinserted - (+)U/A – no abx given – suspect colonized       11/10  Day  - start hydralazine 10 q8  - f/u post transfusion CBC  - DG if appropriate response  - COVID swab preop      DVT PTX: HSQ    GI PTX:pantoprazole    Tablet 40 milliGRAM(s) Oral before breakfast      ***Tubes/Lines/Drains  ***  Peripheral IV  	                    REVIEW OF SYSTEMS    [x ] A ten-point review of systems was otherwise negative except as noted.  [ ] Due to altered mental status/intubation, subjective information were not able to be obtained from the patient. History was obtained, to the extent possible, from review of the chart and collateral sources of information.       SEN LING  [id number]  56y Female    Indication for ICU admission: s/p LLE fem-tib bypass   Admit Date:11-01-21  ICU Date: 11/8  OR Date: 11/8    penicillin (Rash)    PAST MEDICAL & SURGICAL HISTORY:  PVD (peripheral vascular disease)  Benign essential HTN  Intellectual disability  Hearing impaired  HLD (hyperlipidemia)  Jehova's Witness       Home Medications:  aspirin 81 mg oral tablet: 1 tab(s) orally once a day (01 Nov 2021 16:07)  atorvastatin 80 mg oral tablet: 1 tab(s) orally once a day (01 Nov 2021 16:06)  calcitriol 0.25 mcg oral capsule: 1 cap(s) orally once a day (01 Nov 2021 16:05)  gabapentin 300 mg oral capsule: 1 cap(s) orally 3 times a day (01 Nov 2021 16:06)  losartan 50 mg oral tablet: 1 tab(s) orally once a day (01 Nov 2021 16:08)  Metoprolol Succinate ER 25 mg oral tablet, extended release: 1 tab(s) orally once a day (01 Nov 2021 16:07)  Plavix 75 mg oral tablet: 1 tab(s) orally once a day (01 Nov 2021 16:07)  Protonix 40 mg oral delayed release tablet: 1 tab(s) orally once a day (01 Nov 2021 16:07)        24HRS EVENT:  11/10  NIGHT  -DG – pending bed  -NPO after midnight – podiatry to take to OR tomorrow  -retained >900cc - mcgrath reinserted - (+)U/A – no abx given – suspect colonized   --Per vascular: H/o CVA - on Plavix at home (to be resumed after podiatry surgery),  -Ambulate as tolerated       11/10  Day  - start hydralazine 10 q8  - f/u post transfusion CBC  - DG if appropriate response  - COVID swab preop      DVT PTX: HSQ    GI PTX:pantoprazole    Tablet 40 milliGRAM(s) Oral before breakfast      ***Tubes/Lines/Drains  ***  Peripheral IV  	                    REVIEW OF SYSTEMS    [x ] A ten-point review of systems was otherwise negative except as noted.  [ ] Due to altered mental status/intubation, subjective information were not able to be obtained from the patient. History was obtained, to the extent possible, from review of the chart and collateral sources of information.       SEN LING  [id number]  56y Female    Indication for ICU admission: s/p LLE fem-tib bypass   Admit Date:21  ICU Date:   OR Date:     penicillin (Rash)    PAST MEDICAL & SURGICAL HISTORY:  PVD (peripheral vascular disease)  Benign essential HTN  Intellectual disability  Hearing impaired  HLD (hyperlipidemia)  Jehova's Witness       Home Medications:  aspirin 81 mg oral tablet: 1 tab(s) orally once a day (2021 16:07)  atorvastatin 80 mg oral tablet: 1 tab(s) orally once a day (2021 16:06)  calcitriol 0.25 mcg oral capsule: 1 cap(s) orally once a day (2021 16:05)  gabapentin 300 mg oral capsule: 1 cap(s) orally 3 times a day (2021 16:06)  losartan 50 mg oral tablet: 1 tab(s) orally once a day (2021 16:08)  Metoprolol Succinate ER 25 mg oral tablet, extended release: 1 tab(s) orally once a day (2021 16:07)  Plavix 75 mg oral tablet: 1 tab(s) orally once a day (2021 16:07)  Protonix 40 mg oral delayed release tablet: 1 tab(s) orally once a day (2021 16:07)        24HRS EVENT:  11/10  NIGHT  -DG – pending bed  -NPO after midnight – podiatry to take to OR tomorrow  -retained >900cc - mcgrath reinserted - (+)U/A – no abx given – suspect colonized   --Per vascular: H/o CVA - on Plavix at home (to be resumed after podiatry surgery),  -Ambulate as tolerated       11/10  Day  - start hydralazine 10 q8  - f/u post transfusion CBC  - DG if appropriate response  - COVID swab preop      DVT PTX: HSQ    GI PTX:pantoprazole    Tablet 40 milliGRAM(s) Oral before breakfast      ***Tubes/Lines/Drains  ***  Peripheral IV  	                    REVIEW OF SYSTEMS    [x ] A ten-point review of systems was otherwise negative except as noted.  [ ] Due to altered mental status/intubation, subjective information were not able to be obtained from the patient. History was obtained, to the extent possible, from review of the chart and collateral sources of information.    Daily Height in cm: 157.48 (2021 08:47)    Daily     Diet, NPO after Midnight:      NPO Start Date: 10-Nov-2021,   NPO Start Time: 23:59 (11-10-21 @ 06:55)  Diet, Consistent Carbohydrate Renal w/Evening Snack (21 @ 15:33)      CURRENT MEDS:  Neurologic Medications  acetaminophen     Tablet .. 650 milliGRAM(s) Oral every 6 hours  gabapentin 300 milliGRAM(s) Oral every 8 hours  HYDROmorphone  Injectable 0.25 milliGRAM(s) IV Push every 3 hours PRN Severe Pain (7 - 10)    Respiratory Medications    Cardiovascular Medications  hydrALAZINE 10 milliGRAM(s) Oral every 8 hours  metoprolol succinate ER 25 milliGRAM(s) Oral daily    Gastrointestinal Medications  ascorbic acid 500 milliGRAM(s) Oral daily  calcitriol   Capsule 0.25 MICROGram(s) Oral daily  ferrous    sulfate 325 milliGRAM(s) Oral daily  lactated ringers. 1000 milliLiter(s) IV Continuous <Continuous>  magnesium hydroxide Suspension 30 milliLiter(s) Oral once PRN Constipation  pantoprazole    Tablet 40 milliGRAM(s) Oral before breakfast  senna 2 Tablet(s) Oral at bedtime  sodium bicarbonate 1300 milliGRAM(s) Oral every 8 hours    Genitourinary Medications    Hematologic/Oncologic Medications  aspirin  chewable 81 milliGRAM(s) Oral daily  heparin   Injectable 5000 Unit(s) SubCutaneous every 8 hours    Antimicrobial/Immunologic Medications    Endocrine/Metabolic Medications  atorvastatin 80 milliGRAM(s) Oral at bedtime  insulin lispro (ADMELOG) corrective regimen sliding scale   SubCutaneous Before meals and at bedtime    Topical/Other Medications  chlorhexidine 4% Liquid 1 Application(s) Topical daily      ICU Vital Signs Last 24 Hrs  T(C): 36.1 (2021 08:47), Max: 37.2 (2021 08:45)  T(F): 99 (2021 08:45), Max: 99 (2021 08:45)  HR: 77 (2021 08:47) (72 - 89)  BP: 178/74 (2021 08:47) (108/53 - 178/74)  BP(mean): 107 (2021 08:47) (80 - 107)  ABP: --  ABP(mean): --  RR: 13 (2021 08:47) (12 - 22)  SpO2: 100% (2021 08:47) (97% - 100%)      Adult Advanced Hemodynamics Last 24 Hrs  CVP(mm Hg): --  CVP(cm H2O): --  CO: --  CI: --  PA: --  PA(mean): --  PCWP: --  SVR: --  SVRI: --  PVR: --  PVRI: --          I&O's Summary    10 Nov 2021 07:01  -  2021 07:00  --------------------------------------------------------  IN: 600 mL / OUT: 2542 mL / NET: - mL    :  -  2021 10:11  --------------------------------------------------------  IN: 75 mL / OUT: 225 mL / NET: -150 mL      I&O's Detail    10 Nov 2021 07:01  -  2021 07:00  --------------------------------------------------------  IN:    Lactated Ringers: 600 mL  Total IN: 600 mL    OUT:    Indwelling Catheter - Urethral (mL): 2542 mL    Voided (mL): 0 mL  Total OUT: 2542 mL    Total NET: -1942 mL      2021 07:  -  2021 10:11  --------------------------------------------------------  IN:    Lactated Ringers: 75 mL  Total IN: 75 mL    OUT:    Indwelling Catheter - Urethral (mL): 225 mL  Total OUT: 225 mL    Total NET: -150 mL          PHYSICAL EXAM:    General/Neuro    Lungs:      clear to auscultation, Normal expansion/effort.     Cardiovascular : S1, S2.  Regular rate and rhythm.  Peripheral edema   Cardiac Rhythm: Normal Sinus Rhythm    GI: Abdomen soft, Non-tender, Non-distended.    CXR: No abnormalities    LABS:  CAPILLARY BLOOD GLUCOSE    POCT Blood Glucose.: 206 mg/dL (2021 08:05)  POCT Blood Glucose.: 139 mg/dL (10 Nov 2021 21:25)  POCT Blood Glucose.: 159 mg/dL (10 Nov 2021 17:17)  POCT Blood Glucose.: 165 mg/dL (10 Nov 2021 12:07)                        8.1    9.18  )-----------( 218      ( 2021 00:28 )             25.1       11-11    141  |  106  |  34<H>  ----------------------------<  165<H>  4.5   |  19  |  3.0<H>    Ca    7.9<L>      2021 00:28  Phos  4.5     11-11  Mg     2.0     11      Urinalysis Basic - ( 10 Nov 2021 14:51 )    Color: Yellow / Appearance: Turbid / S.014 / pH: x  Gluc: x / Ketone: Negative  / Bili: Negative / Urobili: <2 mg/dL   Blood: x / Protein: 300 mg/dL / Nitrite: Negative   Leuk Esterase: Large / RBC: 2 /HPF / WBC >720 /HPF   Sq Epi: x / Non Sq Epi: 1 /HPF / Bacteria: Many

## 2021-11-11 NOTE — BRIEF OPERATIVE NOTE - NSICDXBRIEFPREOP_GEN_ALL_CORE_FT
PRE-OP DIAGNOSIS:  Open wound of left foot 08-Nov-2021 15:21:42  Sivakumra Dela Cruz  
PRE-OP DIAGNOSIS:  Open wound of left foot 08-Nov-2021 15:21:42  Sivakumar Dela Cruz

## 2021-11-11 NOTE — BRIEF OPERATIVE NOTE - OPERATION/FINDINGS
Left femoral to anterior artery bypass using rGSV
Gangrenous Patch; Plantar Heel; Left Heel;   Healthy Underlying Soft Tissue; Good Tissue Perfusion;

## 2021-11-11 NOTE — PROGRESS NOTE ADULT - ASSESSMENT
ASSESSMENT:  55 y/o female with DM, left heel DFU, s/p left fem-tib bypass    Plan:  -PAD s/p bypass surgery, ASA 81 mg po daily  -OR with Podiatry tomorrow   -H/o CVA - on Plavix at home (to be resumed after podiatry surgery)  -Ambulate as tolerated     VASCULAR TEAM SPECTRA: [id number]

## 2021-11-11 NOTE — PROGRESS NOTE ADULT - TIME BILLING
patient stable overnight   po hydralazine for htn   cr slightly elevated 3.0 highest this admission 3.4   in or with podiatry   hgb stable   downgrade from or

## 2021-11-11 NOTE — BRIEF OPERATIVE NOTE - COMMENTS
Healthy Underlying Soft Tissue w/ Good Tissue Perfusion;  Applied Wound VAC @ 125mmHg; Likely Will Need Wound VAC as Outpatient;   Will Monitor; Healthy Underlying Soft Tissue w/ Good Tissue Perfusion;  Applied Wound VAC @ 125mmHg; Likely Will Need Wound VAC as Outpatient;   Will Monitor   vac change in q 48

## 2021-11-11 NOTE — PROGRESS NOTE ADULT - ASSESSMENT
ASSESSMENT & PLAN  56y Female 7d s/p left femoral artery to posterior tibial artery bypass with autologous venous tissue.    NEURO:  No chronic dx  Acute pain-controlled with tylenol ATC  gabapentin 300 milliGRAM(s) Oral three times a day  dilaudid 0.25 prn    RESP: No chronic dx  Oxygenation-wean off NC to RA as tolerated  NC 2L  Encourage IS  AM CXR     CARDS:  Hx of HTN, HLD,   Restarted home metoprolol and atorvastatin, holding home losartan/HCTZ  - per Nephro can restart hydralazine 25 q8 if BP requires  - on hydralazine 10 Q8, metoprolol 25 QD   11/4 ECHO: EF 75%, mild concentric LVH, mild TR, mild AS   CE negative x3  am EKG: Qtc 437    VASC:  Hx of PVD  -s/p LLE fem-tib bypass  -Pulse exam- Dopplerable L DP/AT, R DP/PT  -L heel foot ulcer, podiatry following, OR Thursday 11/11 for debridement    GI/NUTR:  No chronic dx    Diet, Consistent Carbohydrate Renal w/Evening Snack    Diet, NPO after Midnight for OR w/ Podiatry on 11/11    GI Prophylaxis- PPI, Senna    /RENAL:  Hx of CKD4 (baseline Cr 2.4?)  Nephro following    Monitor CHANDA Singh, passed TOV     IVL  Labs:           Labs:          BUN/Cr- 33/2.7  -->,  34/3.0  -->          Electrolytes-Na 141 // K 4.5 // Mg 2.0 //  Phos 4.5 (11-11 @ 00:28)    HEME/ONC:     DVT prophylaxis- HSQ,  SCDs    Home Rx: Plavix 75 mg oral tablet: 1 tab(s) orally once a day- on hold per vascular team   Labs: Hb/Hct:  8.0/25.0  -->,  8.1/25.1  -->                      Plts:  216  -->,  218  -->                 PTT/INR:  107.3/1.04  (11-08 @ 15:34) --->     Jehova's witness, would consider transfusion in emergency- limit blood draws, started on Ferrous sulfate and ascorbic acid  -given drop in Hb to 6.5, d/w Language line Sign  - pt is a known [facility], but now is agreeable to get blood transfusion and signed the consent  -f/up 1100 CBC    ID:  WBC- 9.97  --->>,  9.18  --->>,  9.18  --->>  Temp trend- 24hrs T(F): 97 (11-10 @ 23:00), Max: 99.7 (11-10 @ 04:45)  Antibiotics-none, completed periop    ENDO:  FS AC/HS  Hg A1c pending    LINES/DRAINS:  PIV    Advanced Directives: Presumed Full Code    DISPO:    SICU    Discussed with Dr. Youngblood

## 2021-11-11 NOTE — BRIEF OPERATIVE NOTE - NSICDXBRIEFPROCEDURE_GEN_ALL_CORE_FT
PROCEDURES:  Excisional debridement of ulcer of left foot 11-Nov-2021 10:16:27  Zach Vyas  
PROCEDURES:  Bypass left femoral artery to posterior tibial artery with autologous venous tissue, open approach 08-Nov-2021 15:20:52  Sivakumar Dela Cruz

## 2021-11-11 NOTE — PRE-ANESTHESIA EVALUATION ADULT - MALLAMPATI CLASS
Class III - visualization of the soft palate and the base of the uvula

## 2021-11-11 NOTE — CHART NOTE - NSCHARTNOTEFT_GEN_A_CORE
PACU ANESTHESIA ADMISSION NOTE      Procedure: Bypass left femoral artery to posterior tibial artery with autologous venous tissue, open approach      Post op diagnosis:  Open wound of left foot        ____  Intubated  TV:______       Rate: ______      FiO2: ______    __x__  Patent Airway    __x__  Full return of protective reflexes    __x__  Full recovery from anesthesia / back to baseline status    Vitals:  T(C): 36.1 (11-11-21 @ 08:47), Max: 37.2 (11-11-21 @ 08:45)  HR: 77 (11-11-21 @ 08:47) (72 - 89)  BP: 178/74 (11-11-21 @ 08:47) (108/53 - 178/74)  RR: 13 (11-11-21 @ 08:47) (12 - 22)  SpO2: 100% (11-11-21 @ 08:47) (97% - 100%)    Mental Status:  __x__ Awake   ___x__ Alert   _____ Drowsy   _____ Sedated    Nausea/Vomiting:  __x__ NO  ______Yes,   See Post - Op Orders          Pain Scale (0-10):  _____    Treatment: ____ None    __x__ See Post - Op/PCA Orders    Post - Operative Fluids:   ____ Oral   __x__ See Post - Op Orders    Plan: Discharge:   ____Home       _X____Floor     _____Critical Care    _____  Other:_________________    Comments: Patient had smooth intraoperative event, no anesthesia complication.  PACU Vital signs: HR:   79         BP:    146    / 65         RR:   18          O2 Sat:     99  %     Temp 36c

## 2021-11-11 NOTE — PRE-OP CHECKLIST - 1.
DM
sign language used # in flowsheet
American Sign Language Shannan Sanderson at bedside, translating all questions for pt

## 2021-11-11 NOTE — PROGRESS NOTE ADULT - SUBJECTIVE AND OBJECTIVE BOX
Nephrology Progress Note    SEN LING  [id number]  56y  Female    S:  Patient is seen and examined, events over the last 24h noted.    O:  Allergies:  penicillin (Rash)    Hospital Medications:   MEDICATIONS  (STANDING):  ascorbic acid 500 milliGRAM(s) Oral daily  atorvastatin 80 milliGRAM(s) Oral at bedtime  calcitriol   Capsule 0.25 MICROGram(s) Oral daily  chlorhexidine 4% Liquid 1 Application(s) Topical daily  clopidogrel Tablet 75 milliGRAM(s) Oral daily  ferrous    sulfate 325 milliGRAM(s) Oral daily  gabapentin 300 milliGRAM(s) Oral every 8 hours  heparin   Injectable 5000 Unit(s) SubCutaneous every 8 hours  hydrALAZINE 10 milliGRAM(s) Oral every 8 hours  insulin lispro (ADMELOG) corrective regimen sliding scale   SubCutaneous Before meals and at bedtime  metoprolol succinate ER 25 milliGRAM(s) Oral daily  pantoprazole    Tablet 40 milliGRAM(s) Oral before breakfast  senna 2 Tablet(s) Oral at bedtime  sodium bicarbonate 1300 milliGRAM(s) Oral every 8 hours    MEDICATIONS  (PRN):  HYDROmorphone  Injectable 0.25 milliGRAM(s) IV Push every 3 hours PRN Severe Pain (7 - 10)  magnesium hydroxide Suspension 30 milliLiter(s) Oral once PRN Constipation    Home Medications:  aspirin 81 mg oral tablet: 1 tab(s) orally once a day (2021 16:07)  atorvastatin 80 mg oral tablet: 1 tab(s) orally once a day (2021 16:06)  calcitriol 0.25 mcg oral capsule: 1 cap(s) orally once a day (2021 16:05)  gabapentin 300 mg oral capsule: 1 cap(s) orally 3 times a day (2021 16:06)  losartan 50 mg oral tablet: 1 tab(s) orally once a day (2021 16:08)  Metoprolol Succinate ER 25 mg oral tablet, extended release: 1 tab(s) orally once a day (2021 16:07)  Plavix 75 mg oral tablet: 1 tab(s) orally once a day (2021 16:07)  Protonix 40 mg oral delayed release tablet: 1 tab(s) orally once a day (2021 16:07)      VITALS:  Daily Height in cm: 157.48 (2021 08:47)    Daily   T(F): 98.6 (21 @ 20:16), Max: 99 (21 @ 08:45)  HR: 74 (21 @ 20:16)  BP: 170/73 (21 @ 20:16)  RR: 18 (21 @ 20:16)  SpO2: 97% (21 @ 20:16)  Wt(kg): --  I&O's Detail    10 Nov 2021 07:  -  2021 07:00  --------------------------------------------------------  IN:    Lactated Ringers: 600 mL  Total IN: 600 mL    OUT:    Indwelling Catheter - Urethral (mL): 2542 mL    Voided (mL): 0 mL  Total OUT: 2542 mL    Total NET: - mL      2021 07:  -  2021 22:42  --------------------------------------------------------  IN:    Lactated Ringers: 75 mL  Total IN: 75 mL    OUT:    Indwelling Catheter - Urethral (mL): 1000 mL  Total OUT: 1000 mL    Total NET: -925 mL        I&O's Summary    10 Nov 2021 07:  -  2021 07:00  --------------------------------------------------------  IN: 600 mL / OUT: 2542 mL / NET: -1942 mL    2021 07:  -  2021 22:42  --------------------------------------------------------  IN: 75 mL / OUT: 1000 mL / NET: -925 mL      Height (cm): 157.5 ( @ 08:47)  Weight (kg): 72 ( @ 08:47)  BMI (kg/m2): 29 ( @ 08:47)  BSA (m2): 1.73 ( 08:47)    PHYSICAL EXAM:  Gen: NAD  Resp: CTAB  Card: S1/S2  Abd: soft  Extremities: no edema      LABS:          141  |  106  |  34<H>  ----------------------------<  165<H>  4.5   |  19  |  3.0<H>    Ca    7.9<L>      2021 00:28  Phos  4.5       Mg     2.0           eGFR if Non African American: 17 mL/min/1.73M2 (21 @ 00:28)  eGFR if : 19 mL/min/1.73M2 (21 @ 00:28)    Phosphorus Level, Serum: 4.5 mg/dL (21 @ 00:28)  Phosphorus Level, Serum: 4.0 mg/dL (11-10-21 @ 00:16)                            8.1    9.18  )-----------( 218      ( 2021 00:28 )             25.1     Mean Cell Volume: 89.3 fL (21 @ 00:28)        Urine Studies:  Urinalysis Basic - ( 10 Nov 2021 14:51 )    Color: Yellow / Appearance: Turbid / S.014 / pH:   Gluc:  / Ketone: Negative  / Bili: Negative / Urobili: <2 mg/dL   Blood:  / Protein: 300 mg/dL / Nitrite: Negative   Leuk Esterase: Large / RBC: 2 /HPF / WBC >720 /HPF   Sq Epi:  / Non Sq Epi: 1 /HPF / Bacteria: Many          Culture Results:   >100,000 CFU/ml Escherichia coli (11-10 @ 15:55)  Creatinine, Random Urine: 47 mg/dL ( @ 21:45)    Creatinine trend:  Creatinine, Serum: 3.0 mg/dL (21 @ 00:28)  Creatinine, Serum: 2.7 mg/dL (11-10-21 @ 00:16)  Creatinine, Serum: 2.6 mg/dL (21 @ 13:22)  Creatinine, Serum: 2.7 mg/dL (21 @ 23:25)  Creatinine, Serum: 2.7 mg/dL (21 @ 18:04)  Creatinine, Serum: 2.7 mg/dL (21 @ 07:50)  Creatinine, Serum: 2.9 mg/dL (21 @ 17:03)  Creatinine, Serum: 3.0 mg/dL (21 @ 00:41)  Creatinine, Serum: 3.1 mg/dL (21 @ 20:00)  Creatinine, Serum: 3.1 mg/dL (21 @ 07:22)  Creatinine, Serum: 3.4 mg/dL (21 @ 17:28)

## 2021-11-11 NOTE — PRE-ANESTHESIA EVALUATION ADULT - NSANTHAPLANRD_GEN_ALL_CORE
general
general/monitored anesthesia care (MAC)
monitored anesthesia care (MAC)
monitored anesthesia care (MAC)
general/monitored anesthesia care (MAC)
monitored anesthesia care (MAC)

## 2021-11-11 NOTE — PROGRESS NOTE ADULT - SUBJECTIVE AND OBJECTIVE BOX
VASCULAR SURGERY PROGRESS NOTE    Hospital Day #11  Post-Op Day #3    Procedure: left fem-tib bypass    Events of past 24 hours: Pt seen and examined at bedside. Pt reports feeling better, pain improving. No acute overnight events. Afebrile, vitals are stable       ROS otherwise negative except per subjective and HPI    PAST MEDICAL & SURGICAL HISTORY:  PVD (peripheral vascular disease)  Benign essential HTN  Intellectual disability  Hearing impaired  HLD (hyperlipidemia)    Vital Signs Last 24 Hrs  T(C): 36.1 (10 Nov 2021 23:00), Max: 37.6 (10 Nov 2021 04:45)  T(F): 97 (10 Nov 2021 23:00), Max: 99.7 (10 Nov 2021 04:45)  HR: 77 (2021 00:00) (72 - 89)  BP: 156/70 (2021 00:00) (108/53 - 160/67)  BP(mean): 101 (2021 00:00) (80 - 101)  RR: 15 (2021 00:00) (12 - 20)  SpO2: 100% (2021 00:00) (96% - 100%)    Pain (0-10):            Pain Control Adequate: [] YES [] N    Diet:    I&O's Detail    2021 07:01  -  10 Nov 2021 07:00  --------------------------------------------------------  IN:    Lactated Ringers: 525 mL    Oral Fluid: 450 mL  Total IN: 975 mL  OUT:    Indwelling Catheter - Urethral (mL): 1100 mL    Voided (mL): 200 mL  Total OUT: 1300 mL  Total NET: -325 mL    10 Nov 2021 07:01  -  2021 00:30  --------------------------------------------------------  IN:  Total IN: 0 mL    OUT:    Indwelling Catheter - Urethral (mL): 2187 mL    Voided (mL): 0 mL  Total OUT: 2187 mL  Total NET: -2187 mL      PHYSICAL EXAM  Appearance: NAD	  HEENT: NCAT, EOMI	  Neck: Supple  Cardiovascular: Normal S1 S2  Respiratory: Lungs clear to auscultation  Gastrointestinal:  Soft, Non-tender, nondistended   Extremities: LLE dressing intact    PULSES:  Dorsal Pedal: +signals bilaterally   Posterior Tibial: +signals bilaterally       MEDICATIONS:   MEDICATIONS  (STANDING):  acetaminophen     Tablet .. 650 milliGRAM(s) Oral every 6 hours  ascorbic acid 500 milliGRAM(s) Oral daily  aspirin  chewable 81 milliGRAM(s) Oral daily  atorvastatin 80 milliGRAM(s) Oral at bedtime  calcitriol   Capsule 0.25 MICROGram(s) Oral daily  chlorhexidine 4% Liquid 1 Application(s) Topical daily  ferrous    sulfate 325 milliGRAM(s) Oral daily  gabapentin 300 milliGRAM(s) Oral every 8 hours  heparin   Injectable 5000 Unit(s) SubCutaneous every 8 hours  hydrALAZINE 10 milliGRAM(s) Oral every 8 hours  insulin lispro (ADMELOG) corrective regimen sliding scale   SubCutaneous Before meals and at bedtime  lactated ringers. 1000 milliLiter(s) (75 mL/Hr) IV Continuous <Continuous>  metoprolol succinate ER 25 milliGRAM(s) Oral daily  pantoprazole    Tablet 40 milliGRAM(s) Oral before breakfast  senna 2 Tablet(s) Oral at bedtime  sodium bicarbonate 1300 milliGRAM(s) Oral every 8 hours    MEDICATIONS  (PRN):  HYDROmorphone  Injectable 0.25 milliGRAM(s) IV Push every 3 hours PRN Severe Pain (7 - 10)  magnesium hydroxide Suspension 30 milliLiter(s) Oral once PRN Constipation    LAB/STUDIES:                        8.0    9.18  )-----------( 216      ( 10 Nov 2021 09:30 )             25.0     11-10    139  |  105  |  33<H>  ----------------------------<  135<H>  4.6   |  19  |  2.7<H>    Ca    8.0<L>      10 Nov 2021 00:16  Phos  4.0     11-10  Mg     1.9     11-10    Urinalysis Basic - ( 10 Nov 2021 14:51 )    Color: Yellow / Appearance: Turbid / S.014 / pH: x  Gluc: x / Ketone: Negative  / Bili: Negative / Urobili: <2 mg/dL   Blood: x / Protein: 300 mg/dL / Nitrite: Negative   Leuk Esterase: Large / RBC: 2 /HPF / WBC >720 /HPF   Sq Epi: x / Non Sq Epi: 1 /HPF / Bacteria: Many    CARDIAC MARKERS ( 2021 04:30 )  x     / <0.01 ng/mL / 383 U/L / x     / 3.5 ng/mL    IMAGING:  < from: Xray Chest 1 View- PORTABLE-Routine (11.10.21 @ 06:03) >  Impression:  Cardiomegaly, unchanged.  Left basilar opacity, unchanged.  --- End of Report ---

## 2021-11-11 NOTE — BRIEF OPERATIVE NOTE - NSICDXBRIEFPOSTOP_GEN_ALL_CORE_FT
POST-OP DIAGNOSIS:  Open wound of left foot 08-Nov-2021 15:22:10  Sivakumar Dela Cruz  
POST-OP DIAGNOSIS:  Open wound of left foot 08-Nov-2021 15:22:10  Sivakumar Dela Cruz

## 2021-11-11 NOTE — PRE-ANESTHESIA EVALUATION ADULT - NSDENTALSD_ENT_ALL_CORE
caps/bridge/implants
appears normal and intact
caps/bridge/implants
missing teeth

## 2021-11-12 LAB
-  AMIKACIN: SIGNIFICANT CHANGE UP
-  AMOXICILLIN/CLAVULANIC ACID: SIGNIFICANT CHANGE UP
-  AMPICILLIN/SULBACTAM: SIGNIFICANT CHANGE UP
-  AMPICILLIN: SIGNIFICANT CHANGE UP
-  AZTREONAM: SIGNIFICANT CHANGE UP
-  CEFAZOLIN: SIGNIFICANT CHANGE UP
-  CEFEPIME: SIGNIFICANT CHANGE UP
-  CEFOXITIN: SIGNIFICANT CHANGE UP
-  CEFTRIAXONE: SIGNIFICANT CHANGE UP
-  CIPROFLOXACIN: SIGNIFICANT CHANGE UP
-  ERTAPENEM: SIGNIFICANT CHANGE UP
-  GENTAMICIN: SIGNIFICANT CHANGE UP
-  IMIPENEM: SIGNIFICANT CHANGE UP
-  LEVOFLOXACIN: SIGNIFICANT CHANGE UP
-  MEROPENEM: SIGNIFICANT CHANGE UP
-  NITROFURANTOIN: SIGNIFICANT CHANGE UP
-  PIPERACILLIN/TAZOBACTAM: SIGNIFICANT CHANGE UP
-  TIGECYCLINE: SIGNIFICANT CHANGE UP
-  TOBRAMYCIN: SIGNIFICANT CHANGE UP
-  TRIMETHOPRIM/SULFAMETHOXAZOLE: SIGNIFICANT CHANGE UP
ANION GAP SERPL CALC-SCNC: 20 MMOL/L — HIGH (ref 7–14)
APTT BLD: 31 SEC — SIGNIFICANT CHANGE UP (ref 27–39.2)
BASOPHILS # BLD AUTO: 0.03 K/UL — SIGNIFICANT CHANGE UP (ref 0–0.2)
BASOPHILS NFR BLD AUTO: 0.4 % — SIGNIFICANT CHANGE UP (ref 0–1)
BUN SERPL-MCNC: 37 MG/DL — HIGH (ref 10–20)
CALCIUM SERPL-MCNC: 8.4 MG/DL — LOW (ref 8.5–10.1)
CHLORIDE SERPL-SCNC: 100 MMOL/L — SIGNIFICANT CHANGE UP (ref 98–110)
CO2 SERPL-SCNC: 17 MMOL/L — SIGNIFICANT CHANGE UP (ref 17–32)
CREAT SERPL-MCNC: 2.8 MG/DL — HIGH (ref 0.7–1.5)
CULTURE RESULTS: SIGNIFICANT CHANGE UP
EOSINOPHIL # BLD AUTO: 0.59 K/UL — SIGNIFICANT CHANGE UP (ref 0–0.7)
EOSINOPHIL NFR BLD AUTO: 7.1 % — SIGNIFICANT CHANGE UP (ref 0–8)
GLUCOSE BLDC GLUCOMTR-MCNC: 175 MG/DL — HIGH (ref 70–99)
GLUCOSE BLDC GLUCOMTR-MCNC: 179 MG/DL — HIGH (ref 70–99)
GLUCOSE BLDC GLUCOMTR-MCNC: 185 MG/DL — HIGH (ref 70–99)
GLUCOSE BLDC GLUCOMTR-MCNC: 210 MG/DL — HIGH (ref 70–99)
GLUCOSE SERPL-MCNC: 140 MG/DL — HIGH (ref 70–99)
HCT VFR BLD CALC: 24.9 % — LOW (ref 37–47)
HGB BLD-MCNC: 7.8 G/DL — LOW (ref 12–16)
IMM GRANULOCYTES NFR BLD AUTO: 0.4 % — HIGH (ref 0.1–0.3)
INR BLD: 0.93 RATIO — SIGNIFICANT CHANGE UP (ref 0.65–1.3)
LYMPHOCYTES # BLD AUTO: 1.91 K/UL — SIGNIFICANT CHANGE UP (ref 1.2–3.4)
LYMPHOCYTES # BLD AUTO: 22.9 % — SIGNIFICANT CHANGE UP (ref 20.5–51.1)
MAGNESIUM SERPL-MCNC: 1.9 MG/DL — SIGNIFICANT CHANGE UP (ref 1.8–2.4)
MCHC RBC-ENTMCNC: 28.4 PG — SIGNIFICANT CHANGE UP (ref 27–31)
MCHC RBC-ENTMCNC: 31.3 G/DL — LOW (ref 32–37)
MCV RBC AUTO: 90.5 FL — SIGNIFICANT CHANGE UP (ref 81–99)
METHOD TYPE: SIGNIFICANT CHANGE UP
MONOCYTES # BLD AUTO: 0.62 K/UL — HIGH (ref 0.1–0.6)
MONOCYTES NFR BLD AUTO: 7.4 % — SIGNIFICANT CHANGE UP (ref 1.7–9.3)
NEUTROPHILS # BLD AUTO: 5.17 K/UL — SIGNIFICANT CHANGE UP (ref 1.4–6.5)
NEUTROPHILS NFR BLD AUTO: 61.8 % — SIGNIFICANT CHANGE UP (ref 42.2–75.2)
NRBC # BLD: 0 /100 WBCS — SIGNIFICANT CHANGE UP (ref 0–0)
ORGANISM # SPEC MICROSCOPIC CNT: SIGNIFICANT CHANGE UP
ORGANISM # SPEC MICROSCOPIC CNT: SIGNIFICANT CHANGE UP
PHOSPHATE SERPL-MCNC: 3.5 MG/DL — SIGNIFICANT CHANGE UP (ref 2.1–4.9)
PLATELET # BLD AUTO: 286 K/UL — SIGNIFICANT CHANGE UP (ref 130–400)
POTASSIUM SERPL-MCNC: 4.9 MMOL/L — SIGNIFICANT CHANGE UP (ref 3.5–5)
POTASSIUM SERPL-SCNC: 4.9 MMOL/L — SIGNIFICANT CHANGE UP (ref 3.5–5)
PROTHROM AB SERPL-ACNC: 10.7 SEC — SIGNIFICANT CHANGE UP (ref 9.95–12.87)
RBC # BLD: 2.75 M/UL — LOW (ref 4.2–5.4)
RBC # FLD: 13 % — SIGNIFICANT CHANGE UP (ref 11.5–14.5)
SODIUM SERPL-SCNC: 137 MMOL/L — SIGNIFICANT CHANGE UP (ref 135–146)
SPECIMEN SOURCE: SIGNIFICANT CHANGE UP
WBC # BLD: 8.35 K/UL — SIGNIFICANT CHANGE UP (ref 4.8–10.8)
WBC # FLD AUTO: 8.35 K/UL — SIGNIFICANT CHANGE UP (ref 4.8–10.8)

## 2021-11-12 PROCEDURE — 71045 X-RAY EXAM CHEST 1 VIEW: CPT | Mod: 26

## 2021-11-12 RX ORDER — LABETALOL HCL 100 MG
10 TABLET ORAL ONCE
Refills: 0 | Status: COMPLETED | OUTPATIENT
Start: 2021-11-12 | End: 2021-11-12

## 2021-11-12 RX ORDER — NIFEDIPINE 30 MG
30 TABLET, EXTENDED RELEASE 24 HR ORAL ONCE
Refills: 0 | Status: COMPLETED | OUTPATIENT
Start: 2021-11-12 | End: 2021-11-12

## 2021-11-12 RX ORDER — HYDRALAZINE HCL 50 MG
5 TABLET ORAL ONCE
Refills: 0 | Status: COMPLETED | OUTPATIENT
Start: 2021-11-12 | End: 2021-11-12

## 2021-11-12 RX ADMIN — Medication 10 MILLIGRAM(S): at 05:11

## 2021-11-12 RX ADMIN — Medication 5 MILLIGRAM(S): at 16:59

## 2021-11-12 RX ADMIN — HEPARIN SODIUM 5000 UNIT(S): 5000 INJECTION INTRAVENOUS; SUBCUTANEOUS at 05:10

## 2021-11-12 RX ADMIN — Medication 3: at 11:36

## 2021-11-12 RX ADMIN — SENNA PLUS 2 TABLET(S): 8.6 TABLET ORAL at 21:20

## 2021-11-12 RX ADMIN — Medication 1300 MILLIGRAM(S): at 14:17

## 2021-11-12 RX ADMIN — HYDROMORPHONE HYDROCHLORIDE 0.25 MILLIGRAM(S): 2 INJECTION INTRAMUSCULAR; INTRAVENOUS; SUBCUTANEOUS at 09:13

## 2021-11-12 RX ADMIN — Medication 6: at 07:46

## 2021-11-12 RX ADMIN — Medication 10 MILLIGRAM(S): at 14:17

## 2021-11-12 RX ADMIN — CHLORHEXIDINE GLUCONATE 1 APPLICATION(S): 213 SOLUTION TOPICAL at 05:10

## 2021-11-12 RX ADMIN — Medication 25 MILLIGRAM(S): at 05:10

## 2021-11-12 RX ADMIN — CALCITRIOL 0.25 MICROGRAM(S): 0.5 CAPSULE ORAL at 11:03

## 2021-11-12 RX ADMIN — GABAPENTIN 300 MILLIGRAM(S): 400 CAPSULE ORAL at 21:20

## 2021-11-12 RX ADMIN — Medication 1300 MILLIGRAM(S): at 21:19

## 2021-11-12 RX ADMIN — MAGNESIUM HYDROXIDE 30 MILLILITER(S): 400 TABLET, CHEWABLE ORAL at 08:43

## 2021-11-12 RX ADMIN — Medication 100 MILLIGRAM(S): at 11:53

## 2021-11-12 RX ADMIN — HEPARIN SODIUM 5000 UNIT(S): 5000 INJECTION INTRAVENOUS; SUBCUTANEOUS at 21:20

## 2021-11-12 RX ADMIN — Medication 30 MILLIGRAM(S): at 16:59

## 2021-11-12 RX ADMIN — HEPARIN SODIUM 5000 UNIT(S): 5000 INJECTION INTRAVENOUS; SUBCUTANEOUS at 14:17

## 2021-11-12 RX ADMIN — PANTOPRAZOLE SODIUM 40 MILLIGRAM(S): 20 TABLET, DELAYED RELEASE ORAL at 05:11

## 2021-11-12 RX ADMIN — ATORVASTATIN CALCIUM 80 MILLIGRAM(S): 80 TABLET, FILM COATED ORAL at 21:19

## 2021-11-12 RX ADMIN — Medication 325 MILLIGRAM(S): at 11:03

## 2021-11-12 RX ADMIN — CLOPIDOGREL BISULFATE 75 MILLIGRAM(S): 75 TABLET, FILM COATED ORAL at 11:03

## 2021-11-12 RX ADMIN — Medication 1300 MILLIGRAM(S): at 05:10

## 2021-11-12 RX ADMIN — Medication 3: at 16:39

## 2021-11-12 RX ADMIN — Medication 10 MILLIGRAM(S): at 18:24

## 2021-11-12 RX ADMIN — GABAPENTIN 300 MILLIGRAM(S): 400 CAPSULE ORAL at 05:10

## 2021-11-12 RX ADMIN — HYDROMORPHONE HYDROCHLORIDE 0.25 MILLIGRAM(S): 2 INJECTION INTRAMUSCULAR; INTRAVENOUS; SUBCUTANEOUS at 08:43

## 2021-11-12 RX ADMIN — GABAPENTIN 300 MILLIGRAM(S): 400 CAPSULE ORAL at 14:17

## 2021-11-12 RX ADMIN — Medication 100 MILLIGRAM(S): at 23:01

## 2021-11-12 RX ADMIN — Medication 500 MILLIGRAM(S): at 11:03

## 2021-11-12 RX ADMIN — Medication 100 MILLIGRAM(S): at 17:01

## 2021-11-12 RX ADMIN — Medication 6: at 21:20

## 2021-11-12 RX ADMIN — Medication 10 MILLIGRAM(S): at 21:20

## 2021-11-12 NOTE — PROGRESS NOTE ADULT - ATTENDING COMMENTS
agree with above note  discussed  with patient and resident   no further sx intervention   vac will be applied while in patient

## 2021-11-12 NOTE — PROGRESS NOTE ADULT - SUBJECTIVE AND OBJECTIVE BOX
Nephrology progress note    Patient is seen and examined, events over the last 24 h noted .    Allergies:  penicillin (Rash)    Hospital Medications:   MEDICATIONS  (STANDING):  ascorbic acid 500 milliGRAM(s) Oral daily  atorvastatin 80 milliGRAM(s) Oral at bedtime  calcitriol   Capsule 0.25 MICROGram(s) Oral daily  chlorhexidine 4% Liquid 1 Application(s) Topical daily  clindamycin IVPB 600 milliGRAM(s) IV Intermittent every 6 hours  clopidogrel Tablet 75 milliGRAM(s) Oral daily  ferrous    sulfate 325 milliGRAM(s) Oral daily  gabapentin 300 milliGRAM(s) Oral every 8 hours  heparin   Injectable 5000 Unit(s) SubCutaneous every 8 hours  hydrALAZINE 10 milliGRAM(s) Oral every 8 hours  insulin lispro (ADMELOG) corrective regimen sliding scale   SubCutaneous Before meals and at bedtime  metoprolol succinate ER 25 milliGRAM(s) Oral daily  pantoprazole    Tablet 40 milliGRAM(s) Oral before breakfast  senna 2 Tablet(s) Oral at bedtime  sodium bicarbonate 1300 milliGRAM(s) Oral every 8 hours        VITALS:  T(F): 98.3 (21 @ 09:00), Max: 98.6 (21 @ 20:16)  HR: 78 (21 @ 09:00)  BP: 157/70 (21 @ 09:00)  RR: 18 (21 @ 09:00)  SpO2: 98% (21 @ 09:00)  Wt(kg): --    11-10 @ :  -   @ 07:00  --------------------------------------------------------  IN: 600 mL / OUT: 2542 mL / NET: -1942 mL     @ :  -   @ 07:00  --------------------------------------------------------  IN: 75 mL / OUT: 1800 mL / NET: -1725 mL     @ 07:01  -   @ 12:25  --------------------------------------------------------  IN: 50 mL / OUT: 800 mL / NET: -750 mL          PHYSICAL EXAM:  Constitutional: NAD  HEENT: anicteric sclera  Neck: No JVD  Respiratory: CTA  Cardiovascular: S1, S2, RRR  Gastrointestinal: BS+, soft, NT/ND  Extremities: Lt foot vac+ No peripheral edema  Neurological: A/wake  : has mcgrath.   Skin: No rashes  Vascular Access:    LABS:      139  |  105  |  32<H>  ----------------------------<  122<H>  4.6   |  19  |  2.8<H>  Creatinine Trend: 2.8<--, 3.0<--, 2.7<--, 2.6<--, 2.7<--, 2.7<--    Ca    8.2<L>      2021 23:07  Phos  4.1       Mg     1.8                                 7.3    8.30  )-----------( 233      ( 2021 23:07 )             22.6       Urine Studies:  Urinalysis Basic - ( 10 Nov 2021 14:51 )    Color: Yellow / Appearance: Turbid / S.014 / pH:   Gluc:  / Ketone: Negative  / Bili: Negative / Urobili: <2 mg/dL   Blood:  / Protein: 300 mg/dL / Nitrite: Negative   Leuk Esterase: Large / RBC: 2 /HPF / WBC >720 /HPF   Sq Epi:  / Non Sq Epi: 1 /HPF / Bacteria: Many        RADIOLOGY & ADDITIONAL STUDIES:

## 2021-11-12 NOTE — PROGRESS NOTE ADULT - ASSESSMENT
ASSESSMENT:   56y year old Female s/p FEm-tib bypass using right GSV. Patient has dopplerable PT/DP pulses with warm feet and cap refill less than 2 seconds.     PLAN:  - Continue wound vac   - Pulse checks regularly on left leg   - Monitor vitals   - Monitor labs   - Ambulate as tolerated.       VASCULAR TEAM SPECTRA: [id number] ASSESSMENT:   56y year old Female s/p FEm-tib bypass using right GSV. Patient has dopplerable PT/DP pulses with warm feet and cap refill less than 2 seconds.     PLAN:  - Continue wound vac   - Pulse checks regularly on left leg   - Monitor vitals   - Monitor labs   - left heel s/p debridement and wound vac application. Non-weight bearing when working with PT  - Plan for dispo to be discussed with social work: + wound vac for at least 1 months      VASCULAR TEAM SPECTRA: [id number] ASSESSMENT:   56y year old Female s/p FEm-tib bypass using right GSV. Patient has dopplerable PT/DP pulses with warm feet and cap refill less than 2 seconds.     PLAN:  - Continue wound vac   - Pulse checks regularly on left leg   - Monitor vitals   - Monitor labs   - left heel s/p debridement and wound vac application. Weight bearing as tolerated per podiatry when working with PT  - start Clindamycin IV for left thigh erythema, suspected cellulitis  - Plan for dispo to be discussed with social work: + wound vac for at least 1 months      VASCULAR TEAM SPECTRA: [id number]

## 2021-11-12 NOTE — PROGRESS NOTE ADULT - SUBJECTIVE AND OBJECTIVE BOX
VASCULAR SURGERY PROGRESS NOTE    CC:  left leg pain with absent pulses   Hospital Day # 12  Post-Op Day # 4    Procedure: s/p left fem-tib bypass using right gsv.     Events of past 24 hours: Patient had an episode of HTN to 179/77 overnight, was given her hydralazine early and patient's repeat BP went down to 158/71. Patient was seen resting comfortably in bed.           ROS otherwise negative except per subjective and HPI      PAST MEDICAL & SURGICAL HISTORY:  PVD (peripheral vascular disease)    Benign essential HTN    Intellectual disability    Hearing impaired    HLD (hyperlipidemia)        Vital Signs Last 24 Hrs  T(C): 36.7 (2021 23:30), Max: 37.2 (2021 08:45)  T(F): 98.1 (2021 23:30), Max: 99 (2021 08:45)  HR: 78 (2021 23:30) (68 - 89)  BP: 158/71 (2021 23:30) (132/61 - 179/77)  BP(mean): 107 (2021 08:47) (98 - 107)  RR: 16 (2021 23:30) (12 - 22)  SpO2: 95% (2021 23:30) (95% - 100%)    Pain (0-10):            Pain Control Adequate: [] YES [] N    Diet:    I&O's Detail    10 Nov 2021 07:01  -  2021 07:00  --------------------------------------------------------  IN:    Lactated Ringers: 600 mL  Total IN: 600 mL    OUT:    Indwelling Catheter - Urethral (mL): 2542 mL    Voided (mL): 0 mL  Total OUT: 2542 mL    Total NET: -1942 mL      2021 07:01  -  2021 03:19  --------------------------------------------------------  IN:    Lactated Ringers: 75 mL  Total IN: 75 mL    OUT:    Indwelling Catheter - Urethral (mL): 1000 mL  Total OUT: 1000 mL    Total NET: -925 mL        PHYSICAL EXAM    Appearance: Normal	  HEENT:   Normal oral mucosa, PERRL, EOMI	  Neck: Supple, - JVD; Carotid Bruit   Cardiovascular: Normal S1 S2, No JVD, No murmurs,   Respiratory: Lungs clear to auscultation/Decreased Breath Sounds/No Rales, Rhonchi, Wheezing	  Gastrointestinal:  Soft, Non-tender, + BS	  Skin: No rashes, No ecchymoses, No cyanosis  Extremities: Left foot wrapped in kerlex with wound vac. DP/PT pulses dopplerable. Feet are warm with cap refill less than 2 seconds.         MEDICATIONS:   MEDICATIONS  (STANDING):  ascorbic acid 500 milliGRAM(s) Oral daily  atorvastatin 80 milliGRAM(s) Oral at bedtime  calcitriol   Capsule 0.25 MICROGram(s) Oral daily  chlorhexidine 4% Liquid 1 Application(s) Topical daily  clopidogrel Tablet 75 milliGRAM(s) Oral daily  ferrous    sulfate 325 milliGRAM(s) Oral daily  gabapentin 300 milliGRAM(s) Oral every 8 hours  heparin   Injectable 5000 Unit(s) SubCutaneous every 8 hours  hydrALAZINE 10 milliGRAM(s) Oral every 8 hours  insulin lispro (ADMELOG) corrective regimen sliding scale   SubCutaneous Before meals and at bedtime  metoprolol succinate ER 25 milliGRAM(s) Oral daily  pantoprazole    Tablet 40 milliGRAM(s) Oral before breakfast  senna 2 Tablet(s) Oral at bedtime  sodium bicarbonate 1300 milliGRAM(s) Oral every 8 hours    MEDICATIONS  (PRN):  HYDROmorphone  Injectable 0.25 milliGRAM(s) IV Push every 3 hours PRN Severe Pain (7 - 10)  magnesium hydroxide Suspension 30 milliLiter(s) Oral once PRN Constipation      DVT PROPHYLAXIS: [] YES [] NO  GI PROPHYLAXIS: [] YES [] NO  ANTIPLATELETS: [] YES [] NO  ANTICOAGULATION: [] YES [] NO  ANTIBIOTICS: [] YES [] NO    LAB/STUDIES:                        7.3    8.30  )-----------( 233      ( 2021 23:07 )             22.6     11-11    139  |  105  |  32<H>  ----------------------------<  122<H>  4.6   |  19  |  2.8<H>    Ca    8.2<L>      2021 23:07  Phos  4.1       Mg     1.8           PT/INR - ( 2021 23:07 )   PT: 10.90 sec;   INR: 0.95 ratio         PTT - ( 2021 23:07 )  PTT:36.6 sec      Urinalysis Basic - ( 10 Nov 2021 14:51 )    Color: Yellow / Appearance: Turbid / S.014 / pH: x  Gluc: x / Ketone: Negative  / Bili: Negative / Urobili: <2 mg/dL   Blood: x / Protein: 300 mg/dL / Nitrite: Negative   Leuk Esterase: Large / RBC: 2 /HPF / WBC >720 /HPF   Sq Epi: x / Non Sq Epi: 1 /HPF / Bacteria: Many                  Culture - Tissue with Gram Stain (collected 2021 11:58)  Source: .Tissue None  Gram Stain (2021 01:24):    No polymorphonuclear cells seen per low power field    No organisms seen per oil power field    Culture - Urine (collected 10 Nov 2021 15:55)  Source: Catheterized Catheterized  Preliminary Report (2021 20:37):    >100,000 CFU/ml Escherichia coli        IMAGING:       VASCULAR SURGERY PROGRESS NOTE    CC:  left leg pain with absent pulses   Hospital Day # 12  Post-Op Day # 4    Procedure: s/p left fem-tib bypass using right gsv.     Events of past 24 hours: Patient had an episode of HTN to 179/77 overnight, was given her hydralazine early and patient's repeat BP went down to 158/71. Patient was seen resting comfortably in bed.           ROS otherwise negative except per subjective and HPI      PAST MEDICAL & SURGICAL HISTORY:  PVD (peripheral vascular disease)    Benign essential HTN    Intellectual disability    Hearing impaired    HLD (hyperlipidemia)        Vital Signs Last 24 Hrs  T(C): 36.7 (2021 23:30), Max: 37.2 (2021 08:45)  T(F): 98.1 (2021 23:30), Max: 99 (2021 08:45)  HR: 78 (2021 23:30) (68 - 89)  BP: 158/71 (2021 23:30) (132/61 - 179/77)  BP(mean): 107 (2021 08:47) (98 - 107)  RR: 16 (2021 23:30) (12 - 22)  SpO2: 95% (2021 23:30) (95% - 100%)    Pain (0-10):            Pain Control Adequate: [] YES [] N    Diet:    I&O's Detail    10 Nov 2021 07:01  -  2021 07:00  --------------------------------------------------------  IN:    Lactated Ringers: 600 mL  Total IN: 600 mL    OUT:    Indwelling Catheter - Urethral (mL): 2542 mL    Voided (mL): 0 mL  Total OUT: 2542 mL    Total NET: -1942 mL      2021 07:01  -  2021 03:19  --------------------------------------------------------  IN:    Lactated Ringers: 75 mL  Total IN: 75 mL    OUT:    Indwelling Catheter - Urethral (mL): 1000 mL  Total OUT: 1000 mL    Total NET: -925 mL        PHYSICAL EXAM    Appearance: Normal	  HEENT:   Normal oral mucosa, PERRL, EOMI	  Neck: Supple, - JVD; Carotid Bruit   Cardiovascular: Normal S1 S2, No JVD, No murmurs,   Respiratory: Lungs clear to auscultation/Decreased Breath Sounds/No Rales, Rhonchi, Wheezing	  Gastrointestinal:  Soft, Non-tender, + BS	  Skin: No rashes, No ecchymoses, No cyanosis  Extremities: Left foot wrapped in kerlex with wound vac. DP/PT pulses dopplerable. Feet are warm with cap refill less than 2 seconds.   Left thigh erythema noted        MEDICATIONS:   MEDICATIONS  (STANDING):  ascorbic acid 500 milliGRAM(s) Oral daily  atorvastatin 80 milliGRAM(s) Oral at bedtime  calcitriol   Capsule 0.25 MICROGram(s) Oral daily  chlorhexidine 4% Liquid 1 Application(s) Topical daily  clopidogrel Tablet 75 milliGRAM(s) Oral daily  ferrous    sulfate 325 milliGRAM(s) Oral daily  gabapentin 300 milliGRAM(s) Oral every 8 hours  heparin   Injectable 5000 Unit(s) SubCutaneous every 8 hours  hydrALAZINE 10 milliGRAM(s) Oral every 8 hours  insulin lispro (ADMELOG) corrective regimen sliding scale   SubCutaneous Before meals and at bedtime  metoprolol succinate ER 25 milliGRAM(s) Oral daily  pantoprazole    Tablet 40 milliGRAM(s) Oral before breakfast  senna 2 Tablet(s) Oral at bedtime  sodium bicarbonate 1300 milliGRAM(s) Oral every 8 hours    MEDICATIONS  (PRN):  HYDROmorphone  Injectable 0.25 milliGRAM(s) IV Push every 3 hours PRN Severe Pain (7 - 10)  magnesium hydroxide Suspension 30 milliLiter(s) Oral once PRN Constipation      DVT PROPHYLAXIS: [] YES [] NO  GI PROPHYLAXIS: [] YES [] NO  ANTIPLATELETS: [] YES [] NO  ANTICOAGULATION: [] YES [] NO  ANTIBIOTICS: [] YES [] NO    LAB/STUDIES:                        7.3    8.30  )-----------( 233      ( 2021 23:07 )             22.6     11-11    139  |  105  |  32<H>  ----------------------------<  122<H>  4.6   |  19  |  2.8<H>    Ca    8.2<L>      2021 23:07  Phos  4.1       Mg     1.8           PT/INR - ( 2021 23:07 )   PT: 10.90 sec;   INR: 0.95 ratio         PTT - ( 2021 23:07 )  PTT:36.6 sec      Urinalysis Basic - ( 10 Nov 2021 14:51 )    Color: Yellow / Appearance: Turbid / S.014 / pH: x  Gluc: x / Ketone: Negative  / Bili: Negative / Urobili: <2 mg/dL   Blood: x / Protein: 300 mg/dL / Nitrite: Negative   Leuk Esterase: Large / RBC: 2 /HPF / WBC >720 /HPF   Sq Epi: x / Non Sq Epi: 1 /HPF / Bacteria: Many                  Culture - Tissue with Gram Stain (collected 2021 11:58)  Source: .Tissue None  Gram Stain (2021 01:24):    No polymorphonuclear cells seen per low power field    No organisms seen per oil power field    Culture - Urine (collected 10 Nov 2021 15:55)  Source: Catheterized Catheterized  Preliminary Report (2021 20:37):    >100,000 CFU/ml Escherichia coli        IMAGING:

## 2021-11-12 NOTE — PROGRESS NOTE ADULT - SUBJECTIVE AND OBJECTIVE BOX
Podiatry Consult Note    Subjective:  SEN LING is a pleasant well-nourished, well developed 56y Female in no acute distress, alert awake, and oriented to person, place and time.   Patient is a 56y old  Female who presents with a chief complaint of DFU (12 Nov 2021 03:18)    HPI:  HPI:  55 yo female PMH PVD, smoker, HTN, HLD, h/o right pontine CVA (2/7/21), intellectual disability, hearing/speech impairment presents with chronic left heel ulcer and pain. Pt's son was at bedside to help translate. Pt states the pain is in the entire left leg and can travel up to her back. Pt recently visited the ED pm 10/25/21 for left lower extremity chronic arterial occlusion. Duplex at that time showed No significant arterial blood flow visualized beyond the left superficial femoral artery. Pt was sent to see Dr. Sulilvan as an outpatient; at that appointment the pt was told to return to the hospital for an angiogram. Denies fever, chills, CP, SOB, abdominal, HA.        (01 Nov 2021 15:52)      Past Medical History and Surgical History  PAST MEDICAL & SURGICAL HISTORY:  PVD (peripheral vascular disease)    Benign essential HTN    Intellectual disability    Hearing impaired    HLD (hyperlipidemia)         Objective:  Vital Signs Last 24 Hrs  T(C): 36.8 (12 Nov 2021 09:00), Max: 37 (11 Nov 2021 20:16)  T(F): 98.3 (12 Nov 2021 09:00), Max: 98.6 (11 Nov 2021 20:16)  HR: 78 (12 Nov 2021 09:00) (68 - 79)  BP: 157/70 (12 Nov 2021 09:00) (140/63 - 179/77)  BP(mean): --  RR: 18 (12 Nov 2021 09:00) (12 - 18)  SpO2: 98% (12 Nov 2021 09:00) (95% - 100%)                        7.3    8.30  )-----------( 233      ( 11 Nov 2021 23:07 )             22.6                 11-11    139  |  105  |  32<H>  ----------------------------<  122<H>  4.6   |  19  |  2.8<H>    Ca    8.2<L>      11 Nov 2021 23:07  Phos  4.1     11-11  Mg     1.8     11-11        Culture - Fungal, Tissue (collected 11-11-21 @ 11:58)  Source: .Tissue None  Preliminary Report (11-12-21 @ 08:03):    Testing in progress    Culture - Tissue with Gram Stain (collected 11-11-21 @ 11:58)  Source: .Tissue None  Gram Stain (11-12-21 @ 01:24):    No polymorphonuclear cells seen per low power field    No organisms seen per oil power field            Physical Exam - Lower Extremity Focused:   VAC functional.   Assessment:  Left heel wounds; s/p excisional debridement w/ vac application 11/11/21    Plan:  Chart reviewed and Patient evaluated. All Questions and Concerns Addressed and Answered  Discussed diagnosis and treatment with patient  s/p excisional debridement w/ vac application 11/11/21   Local Wound Care; VAC with VAC changes 3x a week.   Request VNS  VAC paperwork will be put in chart.   Patient stable from podiatric standpoint can follow up with Dr. Llamas at [address] [place] [address] 1 week after dc.   Weight bearing status; WBAT BL feet;   Discussed plan w/ Dr. Llamas    Podiatry ;  Podiatry Consult Note    Subjective:  SEN LING is a pleasant well-nourished, well developed 56y Female in no acute distress, alert awake, and oriented to person, place and time.   Patient is a 56y old  Female who presents with a chief complaint of DFU (12 Nov 2021 03:18)    HPI:  HPI:  55 yo female PMH PVD, smoker, HTN, HLD, h/o right pontine CVA (2/7/21), intellectual disability, hearing/speech impairment presents with chronic left heel ulcer and pain. Pt's son was at bedside to help translate. Pt states the pain is in the entire left leg and can travel up to her back. Pt recently visited the ED pm 10/25/21 for left lower extremity chronic arterial occlusion. Duplex at that time showed No significant arterial blood flow visualized beyond the left superficial femoral artery. Pt was sent to see Dr. Sullivan as an outpatient; at that appointment the pt was told to return to the hospital for an angiogram. Denies fever, chills, CP, SOB, abdominal, HA.        (01 Nov 2021 15:52)      Past Medical History and Surgical History  PAST MEDICAL & SURGICAL HISTORY:  PVD (peripheral vascular disease)    Benign essential HTN    Intellectual disability    Hearing impaired    HLD (hyperlipidemia)         Objective:  Vital Signs Last 24 Hrs  T(C): 36.8 (12 Nov 2021 09:00), Max: 37 (11 Nov 2021 20:16)  T(F): 98.3 (12 Nov 2021 09:00), Max: 98.6 (11 Nov 2021 20:16)  HR: 78 (12 Nov 2021 09:00) (68 - 79)  BP: 157/70 (12 Nov 2021 09:00) (140/63 - 179/77)  BP(mean): --  RR: 18 (12 Nov 2021 09:00) (12 - 18)  SpO2: 98% (12 Nov 2021 09:00) (95% - 100%)                        7.3    8.30  )-----------( 233      ( 11 Nov 2021 23:07 )             22.6                 11-11    139  |  105  |  32<H>  ----------------------------<  122<H>  4.6   |  19  |  2.8<H>    Ca    8.2<L>      11 Nov 2021 23:07  Phos  4.1     11-11  Mg     1.8     11-11        Culture - Fungal, Tissue (collected 11-11-21 @ 11:58)  Source: .Tissue None  Preliminary Report (11-12-21 @ 08:03):    Testing in progress    Culture - Tissue with Gram Stain (collected 11-11-21 @ 11:58)  Source: .Tissue None  Gram Stain (11-12-21 @ 01:24):    No polymorphonuclear cells seen per low power field    No organisms seen per oil power field            Physical Exam - Lower Extremity Focused:   VAC functional.   Assessment:  Left heel wounds; s/p excisional debridement w/ vac application 11/11/21    Plan:  Chart reviewed and Patient evaluated. All Questions and Concerns Addressed and Answered  Discussed diagnosis and treatment with patient  s/p excisional debridement w/ vac application 11/11/21   Local Wound Care; VAC with VAC changes 3x a week.   Request VNS  VAC paperwork will be put in chart.   Patient stable from podiatric standpoint can follow up with Dr. Llamas at [address] [place] [address] 1 week after dc.   Weight bearing status; WBAT R foot; NWB L foot;   Discussed plan w/ Dr. Llamas    Podiatry ;

## 2021-11-12 NOTE — PROGRESS NOTE ADULT - ASSESSMENT
Saw and examined patient on 11/8 afternoon.    57 yo female PMH CKD4 PVD, smoker, HTN, HLD, h/o right pontine CVA (2/7/21), DM,  intellectual disability, hearing/speech impairment presents with chronic left heel ulcer and pain. Recent duplex on 10/25/21 showed no significant arterial blood flow visualized beyond the left superficial femoral artery.  Has stable CKD4 at least since 2/21; saw nephrologist in [place]  # CKD 4, stable  # Proteinuria, nephrotic range / likely d/t HTN/DM  # Hx of HTN  # Hx of DM  # DFU / PAD - s/p fem-tib bypass on 11/8 / podiatry plan for debridement on 11/11    Recommendations:  - document strict i/o and daily weights  - renal diet  - decrease Gabapentin to 300 mg q12 (renal dose)  - cont po bicarb 1300 tid   - strict i/o  - increase hydralazine to 25mg po q8h for better bp control  - OLVIN weak positive/ serum flc ratio wnl; can have further w/u for proteinuria outpatient  - phos noted, does not need binder  - monitor hgb, RBC transfusion to maintain hgb > 7  / check iron stores

## 2021-11-13 LAB
ANION GAP SERPL CALC-SCNC: 19 MMOL/L — HIGH (ref 7–14)
BASOPHILS # BLD AUTO: 0.05 K/UL — SIGNIFICANT CHANGE UP (ref 0–0.2)
BASOPHILS NFR BLD AUTO: 0.6 % — SIGNIFICANT CHANGE UP (ref 0–1)
BUN SERPL-MCNC: 44 MG/DL — HIGH (ref 10–20)
CALCIUM SERPL-MCNC: 8.5 MG/DL — SIGNIFICANT CHANGE UP (ref 8.5–10.1)
CHLORIDE SERPL-SCNC: 99 MMOL/L — SIGNIFICANT CHANGE UP (ref 98–110)
CO2 SERPL-SCNC: 20 MMOL/L — SIGNIFICANT CHANGE UP (ref 17–32)
CREAT SERPL-MCNC: 3.1 MG/DL — HIGH (ref 0.7–1.5)
EOSINOPHIL # BLD AUTO: 0.69 K/UL — SIGNIFICANT CHANGE UP (ref 0–0.7)
EOSINOPHIL NFR BLD AUTO: 7.7 % — SIGNIFICANT CHANGE UP (ref 0–8)
GLUCOSE BLDC GLUCOMTR-MCNC: 152 MG/DL — HIGH (ref 70–99)
GLUCOSE BLDC GLUCOMTR-MCNC: 177 MG/DL — HIGH (ref 70–99)
GLUCOSE BLDC GLUCOMTR-MCNC: 184 MG/DL — HIGH (ref 70–99)
GLUCOSE BLDC GLUCOMTR-MCNC: 220 MG/DL — HIGH (ref 70–99)
GLUCOSE SERPL-MCNC: 184 MG/DL — HIGH (ref 70–99)
HCT VFR BLD CALC: 25.2 % — LOW (ref 37–47)
HGB BLD-MCNC: 8 G/DL — LOW (ref 12–16)
IMM GRANULOCYTES NFR BLD AUTO: 0.4 % — HIGH (ref 0.1–0.3)
LYMPHOCYTES # BLD AUTO: 2.05 K/UL — SIGNIFICANT CHANGE UP (ref 1.2–3.4)
LYMPHOCYTES # BLD AUTO: 23 % — SIGNIFICANT CHANGE UP (ref 20.5–51.1)
MAGNESIUM SERPL-MCNC: 2.3 MG/DL — SIGNIFICANT CHANGE UP (ref 1.8–2.4)
MCHC RBC-ENTMCNC: 28.4 PG — SIGNIFICANT CHANGE UP (ref 27–31)
MCHC RBC-ENTMCNC: 31.7 G/DL — LOW (ref 32–37)
MCV RBC AUTO: 89.4 FL — SIGNIFICANT CHANGE UP (ref 81–99)
MONOCYTES # BLD AUTO: 0.66 K/UL — HIGH (ref 0.1–0.6)
MONOCYTES NFR BLD AUTO: 7.4 % — SIGNIFICANT CHANGE UP (ref 1.7–9.3)
NEUTROPHILS # BLD AUTO: 5.44 K/UL — SIGNIFICANT CHANGE UP (ref 1.4–6.5)
NEUTROPHILS NFR BLD AUTO: 60.9 % — SIGNIFICANT CHANGE UP (ref 42.2–75.2)
NRBC # BLD: 0 /100 WBCS — SIGNIFICANT CHANGE UP (ref 0–0)
PHOSPHATE SERPL-MCNC: 4.3 MG/DL — SIGNIFICANT CHANGE UP (ref 2.1–4.9)
PLATELET # BLD AUTO: 289 K/UL — SIGNIFICANT CHANGE UP (ref 130–400)
POTASSIUM SERPL-MCNC: 5 MMOL/L — SIGNIFICANT CHANGE UP (ref 3.5–5)
POTASSIUM SERPL-SCNC: 5 MMOL/L — SIGNIFICANT CHANGE UP (ref 3.5–5)
RBC # BLD: 2.82 M/UL — LOW (ref 4.2–5.4)
RBC # FLD: 12.9 % — SIGNIFICANT CHANGE UP (ref 11.5–14.5)
SODIUM SERPL-SCNC: 138 MMOL/L — SIGNIFICANT CHANGE UP (ref 135–146)
WBC # BLD: 8.93 K/UL — SIGNIFICANT CHANGE UP (ref 4.8–10.8)
WBC # FLD AUTO: 8.93 K/UL — SIGNIFICANT CHANGE UP (ref 4.8–10.8)

## 2021-11-13 RX ORDER — HYDRALAZINE HCL 50 MG
25 TABLET ORAL EVERY 8 HOURS
Refills: 0 | Status: DISCONTINUED | OUTPATIENT
Start: 2021-11-13 | End: 2021-11-19

## 2021-11-13 RX ORDER — TAMSULOSIN HYDROCHLORIDE 0.4 MG/1
0.4 CAPSULE ORAL ONCE
Refills: 0 | Status: COMPLETED | OUTPATIENT
Start: 2021-11-13 | End: 2021-11-13

## 2021-11-13 RX ORDER — GABAPENTIN 400 MG/1
300 CAPSULE ORAL EVERY 12 HOURS
Refills: 0 | Status: DISCONTINUED | OUTPATIENT
Start: 2021-11-13 | End: 2021-11-19

## 2021-11-13 RX ORDER — TAMSULOSIN HYDROCHLORIDE 0.4 MG/1
0.4 CAPSULE ORAL AT BEDTIME
Refills: 0 | Status: DISCONTINUED | OUTPATIENT
Start: 2021-11-13 | End: 2021-11-19

## 2021-11-13 RX ORDER — INSULIN LISPRO 100/ML
VIAL (ML) SUBCUTANEOUS
Refills: 0 | Status: DISCONTINUED | OUTPATIENT
Start: 2021-11-13 | End: 2021-11-19

## 2021-11-13 RX ORDER — MAGNESIUM SULFATE 500 MG/ML
2 VIAL (ML) INJECTION ONCE
Refills: 0 | Status: COMPLETED | OUTPATIENT
Start: 2021-11-13 | End: 2021-11-13

## 2021-11-13 RX ADMIN — PANTOPRAZOLE SODIUM 40 MILLIGRAM(S): 20 TABLET, DELAYED RELEASE ORAL at 05:27

## 2021-11-13 RX ADMIN — Medication 500 MILLIGRAM(S): at 11:41

## 2021-11-13 RX ADMIN — CHLORHEXIDINE GLUCONATE 1 APPLICATION(S): 213 SOLUTION TOPICAL at 05:28

## 2021-11-13 RX ADMIN — Medication 25 MILLIGRAM(S): at 14:13

## 2021-11-13 RX ADMIN — TAMSULOSIN HYDROCHLORIDE 0.4 MILLIGRAM(S): 0.4 CAPSULE ORAL at 21:51

## 2021-11-13 RX ADMIN — HEPARIN SODIUM 5000 UNIT(S): 5000 INJECTION INTRAVENOUS; SUBCUTANEOUS at 05:28

## 2021-11-13 RX ADMIN — Medication 8: at 12:01

## 2021-11-13 RX ADMIN — HEPARIN SODIUM 5000 UNIT(S): 5000 INJECTION INTRAVENOUS; SUBCUTANEOUS at 21:56

## 2021-11-13 RX ADMIN — Medication 25 MILLIGRAM(S): at 21:52

## 2021-11-13 RX ADMIN — Medication 100 MILLIGRAM(S): at 17:01

## 2021-11-13 RX ADMIN — ATORVASTATIN CALCIUM 80 MILLIGRAM(S): 80 TABLET, FILM COATED ORAL at 21:52

## 2021-11-13 RX ADMIN — Medication 10: at 07:56

## 2021-11-13 RX ADMIN — SENNA PLUS 2 TABLET(S): 8.6 TABLET ORAL at 21:52

## 2021-11-13 RX ADMIN — Medication 25 MILLIGRAM(S): at 05:27

## 2021-11-13 RX ADMIN — Medication 50 GRAM(S): at 01:18

## 2021-11-13 RX ADMIN — HYDROMORPHONE HYDROCHLORIDE 0.25 MILLIGRAM(S): 2 INJECTION INTRAMUSCULAR; INTRAVENOUS; SUBCUTANEOUS at 21:53

## 2021-11-13 RX ADMIN — TAMSULOSIN HYDROCHLORIDE 0.4 MILLIGRAM(S): 0.4 CAPSULE ORAL at 13:04

## 2021-11-13 RX ADMIN — CLOPIDOGREL BISULFATE 75 MILLIGRAM(S): 75 TABLET, FILM COATED ORAL at 11:41

## 2021-11-13 RX ADMIN — Medication 6: at 16:57

## 2021-11-13 RX ADMIN — HYDROMORPHONE HYDROCHLORIDE 0.25 MILLIGRAM(S): 2 INJECTION INTRAMUSCULAR; INTRAVENOUS; SUBCUTANEOUS at 22:23

## 2021-11-13 RX ADMIN — HYDROMORPHONE HYDROCHLORIDE 0.25 MILLIGRAM(S): 2 INJECTION INTRAMUSCULAR; INTRAVENOUS; SUBCUTANEOUS at 12:00

## 2021-11-13 RX ADMIN — GABAPENTIN 300 MILLIGRAM(S): 400 CAPSULE ORAL at 05:27

## 2021-11-13 RX ADMIN — CALCITRIOL 0.25 MICROGRAM(S): 0.5 CAPSULE ORAL at 11:41

## 2021-11-13 RX ADMIN — Medication 100 MILLIGRAM(S): at 05:28

## 2021-11-13 RX ADMIN — Medication 6: at 21:53

## 2021-11-13 RX ADMIN — Medication 325 MILLIGRAM(S): at 11:42

## 2021-11-13 RX ADMIN — Medication 1300 MILLIGRAM(S): at 05:28

## 2021-11-13 RX ADMIN — HYDROMORPHONE HYDROCHLORIDE 0.25 MILLIGRAM(S): 2 INJECTION INTRAMUSCULAR; INTRAVENOUS; SUBCUTANEOUS at 11:40

## 2021-11-13 RX ADMIN — HEPARIN SODIUM 5000 UNIT(S): 5000 INJECTION INTRAVENOUS; SUBCUTANEOUS at 14:13

## 2021-11-13 RX ADMIN — Medication 1300 MILLIGRAM(S): at 21:52

## 2021-11-13 RX ADMIN — GABAPENTIN 300 MILLIGRAM(S): 400 CAPSULE ORAL at 17:01

## 2021-11-13 RX ADMIN — Medication 1300 MILLIGRAM(S): at 14:12

## 2021-11-13 NOTE — PROGRESS NOTE ADULT - ASSESSMENT
ASSESSMENT:   56y year old Female s/p FEm-tib bypass using reversed Left GSV.     PLAN:  - Continue wound vac   - Pulse checks regularly on left leg   - Monitor vitals   - Monitor labs   - left heel s/p debridement and wound vac application. Weight bearing as tolerated per podiatry when working with PT  - Continue Clindamycin IV for left thigh erythema, suspected cellulitis  - Plan for dispo to be discussed with social work: + wound vac for at least 1 months    VASCULAR TEAM SPECTRA: [id number]   ASSESSMENT:   56y year old Female s/p FEm-tib bypass using reversed Left GSV.     PLAN:  - Continue wound vac   - Pulse checks regularly on left leg   - Monitor vitals   - Monitor labs   - left heel s/p debridement and wound vac application. Weight bearing as tolerated per podiatry when working with PT  - Continue Clindamycin IV changed to PO doxy 100 bid for left thigh erythema, suspected cellulitis  - Plan for dispo to be discussed with social work: + wound vac for at least 1 months  - Flomax added  - d/c ramila 1 hour post flomax - TOV    VASCULAR TEAM SPECTRA: [id number]

## 2021-11-13 NOTE — PROGRESS NOTE ADULT - SUBJECTIVE AND OBJECTIVE BOX
seen and examined   no distress   24 h events noted         PAST HISTORY  --------------------------------------------------------------------------------  No significant changes to PMH, PSH, FHx, SHx, unless otherwise noted    ALLERGIES & MEDICATIONS  --------------------------------------------------------------------------------  Allergies    penicillin (Rash)    Intolerances      Standing Inpatient Medications  ascorbic acid 500 milliGRAM(s) Oral daily  atorvastatin 80 milliGRAM(s) Oral at bedtime  calcitriol   Capsule 0.25 MICROGram(s) Oral daily  chlorhexidine 4% Liquid 1 Application(s) Topical daily  clindamycin IVPB 600 milliGRAM(s) IV Intermittent every 6 hours  clopidogrel Tablet 75 milliGRAM(s) Oral daily  ferrous    sulfate 325 milliGRAM(s) Oral daily  gabapentin 300 milliGRAM(s) Oral every 12 hours  heparin   Injectable 5000 Unit(s) SubCutaneous every 8 hours  hydrALAZINE 25 milliGRAM(s) Oral every 8 hours  insulin lispro (ADMELOG) corrective regimen sliding scale   SubCutaneous Before meals and at bedtime  metoprolol succinate ER 25 milliGRAM(s) Oral daily  pantoprazole    Tablet 40 milliGRAM(s) Oral before breakfast  senna 2 Tablet(s) Oral at bedtime  sodium bicarbonate 1300 milliGRAM(s) Oral every 8 hours    PRN Inpatient Medications  HYDROmorphone  Injectable 0.25 milliGRAM(s) IV Push every 3 hours PRN    VITALS/PHYSICAL EXAM  --------------------------------------------------------------------------------  T(C): 36.9 (11-13-21 @ 04:44), Max: 37.4 (11-12-21 @ 16:06)  HR: 73 (11-13-21 @ 04:44) (72 - 78)  BP: 128/60 (11-13-21 @ 04:44) (128/60 - 198/84)  RR: 18 (11-13-21 @ 04:44) (18 - 18)  SpO2: 99% (11-13-21 @ 04:44) (95% - 99%)  Wt(kg): --  Height (cm): 157.5 (11-11-21 @ 08:47)  Weight (kg): 72 (11-11-21 @ 08:47)  BMI (kg/m2): 29 (11-11-21 @ 08:47)  BSA (m2): 1.73 (11-11-21 @ 08:47)      11-12-21 @ 07:01  -  11-13-21 @ 07:00  --------------------------------------------------------  IN: 50 mL / OUT: 2850 mL / NET: -2800 mL      Physical Exam:  	Gen: NAD  	Pulm: CTA B/L  	CV:  S1S2; no rub  	Abd: +distended      LABS/STUDIES  --------------------------------------------------------------------------------              7.8    8.35  >-----------<  286      [11-12-21 @ 20:00]              24.9     137  |  100  |  37  ----------------------------<  140      [11-12-21 @ 20:00]  4.9   |  17  |  2.8        Ca     8.4     [11-12-21 @ 20:00]      Mg     1.9     [11-12-21 @ 20:00]      Phos  3.5     [11-12-21 @ 20:00]      PT/INR: PT 10.70, INR 0.93       [11-12-21 @ 20:00]  PTT: 31.0       [11-12-21 @ 20:00]    Creatinine Trend:  SCr 2.8 [11-12 @ 20:00]  SCr 2.8 [11-11 @ 23:07]  SCr 3.0 [11-11 @ 00:28]  SCr 2.7 [11-10 @ 00:16]  SCr 2.6 [11-09 @ 13:22]    Urinalysis - [11-10-21 @ 14:51]      Color Yellow / Appearance Turbid / SG 1.014 / pH 6.5      Gluc Negative / Ketone Negative  / Bili Negative / Urobili <2 mg/dL       Blood Moderate / Protein 300 mg/dL / Leuk Est Large / Nitrite Negative      RBC 2 / WBC >720 / Hyaline 1 / Gran  / Sq Epi  / Non Sq Epi 1 / Bacteria Many          OLVIN: titer 1:80, pattern Speckled      [11-04-21 @ 12:47]  Free Light Chains: kappa 9.28, lambda 8.11, ratio = 1.14      [11-04 @ 12:47]  Immunofixation Serum:   IgM Lambda Band Identified    Reference Range: None Detected      [11-04-21 @ 12:47]  SPEP Interpretation: Gamma-Migrating Paraprotein Identified      [11-04-21 @ 12:47]

## 2021-11-13 NOTE — PROGRESS NOTE ADULT - ASSESSMENT
55 yo female PMH CKD4 PVD, smoker, HTN, HLD, h/o right pontine CVA (2/7/21), DM,  intellectual disability, hearing/speech impairment presents with chronic left heel ulcer and pain. Recent duplex on 10/25/21 showed no significant arterial blood flow visualized beyond the left superficial femoral artery.  Has stable CKD4 at least since 2/21; saw nephrologist in [place]  # CKD 4, stable  # Proteinuria, nephrotic range / likely d/t HTN/DM  # Hx of HTN  # Hx of DM  # DFU / PAD - s/p fem-tib bypass on 11/8 / podiatry plan for debridement on 11/11  Recommendations:  - document strict i/o and daily weights  - creatinine stable   - renal diet  -  Gabapentin  decreased to 300 mg q12 (renal dose)  - cont po bicarb 1300 tid   - BP better controlled   - OLVIN weak positive/ serum flc ratio wnl; can have further w/u for proteinuria outpatient/ consider hematology evaluation   - phos noted, does not need binder  - monitor hgb, RBC transfusion to maintain hgb > 7  / check iron stores  - will follow

## 2021-11-13 NOTE — PROGRESS NOTE ADULT - SUBJECTIVE AND OBJECTIVE BOX
GENERAL SURGERY PROGRESS NOTE    Patient: SEN LING , 56y ([birth year])Female   MRN: [id number]  Location: [address]  Visit: 11-01-21 Inpatient  Date: 11-13-21 @ 00:18    Hospital Day #: 13  Post-Op Day #: 5    Procedure/Dx/Injuries: 56F s/p left fem-tib bypass using reverse L. GSV.     Events of past 24 hours: Insulin sliding scale tightened, Hydralazine and Gabapentin dosing modified per nephrology recommendations. Patient seen by PT. HD stable.    PAST MEDICAL & SURGICAL HISTORY:  PVD (peripheral vascular disease)    Benign essential HTN    Intellectual disability    Hearing impaired    HLD (hyperlipidemia)    Vitals:   T(F): 99.4 (11-12-21 @ 20:47), Max: 99.4 (11-12-21 @ 20:47)  HR: 75 (11-12-21 @ 20:47)  BP: 151/66 (11-12-21 @ 20:47)  RR: 18 (11-12-21 @ 20:47)  SpO2: 96% (11-12-21 @ 20:47)      Diet, Consistent Carbohydrate Renal w/Evening Snack      Fluids:     I & O's:    11-11-21 @ 07:01  -  11-12-21 @ 07:00  --------------------------------------------------------  IN:    Lactated Ringers: 75 mL  Total IN: 75 mL    OUT:    Indwelling Catheter - Urethral (mL): 1800 mL  Total OUT: 1800 mL    Total NET: -1725 mL    PHYSICAL EXAM:  General: NAD  HEENT: EOMI  Cardiac: RRR  Respiratory: normal respiratory effort  Abdomen: Soft, non-distended, non-tender, no rebound, no guarding   Skin: No rashes, No ecchymoses, No cyanosis  Extremities: Left foot wrapped in kerlex with wound vac. DP/PT pulses dopplerable. Feet are warm with cap refill less than 2 seconds.     MEDICATIONS  (STANDING):  ascorbic acid 500 milliGRAM(s) Oral daily  atorvastatin 80 milliGRAM(s) Oral at bedtime  calcitriol   Capsule 0.25 MICROGram(s) Oral daily  chlorhexidine 4% Liquid 1 Application(s) Topical daily  clindamycin IVPB 600 milliGRAM(s) IV Intermittent every 6 hours  clopidogrel Tablet 75 milliGRAM(s) Oral daily  ferrous    sulfate 325 milliGRAM(s) Oral daily  gabapentin 300 milliGRAM(s) Oral every 12 hours  heparin   Injectable 5000 Unit(s) SubCutaneous every 8 hours  hydrALAZINE 25 milliGRAM(s) Oral every 8 hours  insulin lispro (ADMELOG) corrective regimen sliding scale   SubCutaneous Before meals and at bedtime  magnesium sulfate  IVPB 2 Gram(s) IV Intermittent once  metoprolol succinate ER 25 milliGRAM(s) Oral daily  pantoprazole    Tablet 40 milliGRAM(s) Oral before breakfast  senna 2 Tablet(s) Oral at bedtime  sodium bicarbonate 1300 milliGRAM(s) Oral every 8 hours    MEDICATIONS  (PRN):  HYDROmorphone  Injectable 0.25 milliGRAM(s) IV Push every 3 hours PRN Severe Pain (7 - 10)      DVT PROPHYLAXIS: heparin   Injectable 5000 Unit(s) SubCutaneous every 8 hours    GI PROPHYLAXIS: pantoprazole    Tablet 40 milliGRAM(s) Oral before breakfast    ANTICOAGULATION:   ANTIBIOTICS:  clindamycin IVPB 600 milliGRAM(s)      LAB/STUDIES:  Labs:  CAPILLARY BLOOD GLUCOSE      POCT Blood Glucose.: 210 mg/dL (12 Nov 2021 21:05)  POCT Blood Glucose.: 175 mg/dL (12 Nov 2021 16:17)  POCT Blood Glucose.: 179 mg/dL (12 Nov 2021 11:32)  POCT Blood Glucose.: 185 mg/dL (12 Nov 2021 07:35)                          7.8    8.35  )-----------( 286      ( 12 Nov 2021 20:00 )             24.9       Auto Neutrophil %: 61.8 % (11-12-21 @ 20:00)  Auto Immature Granulocyte %: 0.4 % (11-12-21 @ 20:00)    11-12    137  |  100  |  37<H>  ----------------------------<  140<H>  4.9   |  17  |  2.8<H>      Calcium, Total Serum: 8.4 mg/dL (11-12-21 @ 20:00)      LFTs:         Coags:     10.70  ----< 0.93    ( 12 Nov 2021 20:00 )     31.0        Culture - Acid Fast - Tissue w/Smear (collected 11 Nov 2021 11:58)  Source: .Tissue None    Culture - Fungal, Tissue (collected 11 Nov 2021 11:58)  Source: .Tissue None  Preliminary Report (12 Nov 2021 08:03):    Testing in progress    Culture - Tissue with Gram Stain (collected 11 Nov 2021 11:58)  Source: .Tissue None  Gram Stain (12 Nov 2021 21:36):    Upon re-evaluation of gram stain:    No polymorphonuclear cells seen per low power field    Rare Gram Negative Rods per oil power field  Preliminary Report (12 Nov 2021 23:01):    Moderate Escherichia coli    Rare Enterococcus faecalis    Culture - Urine (collected 10 Nov 2021 15:55)  Source: Catheterized Catheterized  Final Report (12 Nov 2021 17:26):    >100,000 CFU/ml Escherichia coli  Organism: Escherichia coli (12 Nov 2021 17:26)  Organism: Escherichia coli (12 Nov 2021 17:26)    ACCESS/ DEVICES:  [x] Peripheral IV

## 2021-11-14 LAB
ANION GAP SERPL CALC-SCNC: 19 MMOL/L — HIGH (ref 7–14)
BASOPHILS # BLD AUTO: 0.03 K/UL — SIGNIFICANT CHANGE UP (ref 0–0.2)
BASOPHILS NFR BLD AUTO: 0.3 % — SIGNIFICANT CHANGE UP (ref 0–1)
BUN SERPL-MCNC: 50 MG/DL — HIGH (ref 10–20)
CALCIUM SERPL-MCNC: 8.2 MG/DL — LOW (ref 8.5–10.1)
CHLORIDE SERPL-SCNC: 100 MMOL/L — SIGNIFICANT CHANGE UP (ref 98–110)
CO2 SERPL-SCNC: 19 MMOL/L — SIGNIFICANT CHANGE UP (ref 17–32)
CREAT SERPL-MCNC: 2.9 MG/DL — HIGH (ref 0.7–1.5)
EOSINOPHIL # BLD AUTO: 0.62 K/UL — SIGNIFICANT CHANGE UP (ref 0–0.7)
EOSINOPHIL NFR BLD AUTO: 7.2 % — SIGNIFICANT CHANGE UP (ref 0–8)
GLUCOSE BLDC GLUCOMTR-MCNC: 167 MG/DL — HIGH (ref 70–99)
GLUCOSE BLDC GLUCOMTR-MCNC: 176 MG/DL — HIGH (ref 70–99)
GLUCOSE BLDC GLUCOMTR-MCNC: 218 MG/DL — HIGH (ref 70–99)
GLUCOSE BLDC GLUCOMTR-MCNC: 254 MG/DL — HIGH (ref 70–99)
GLUCOSE SERPL-MCNC: 164 MG/DL — HIGH (ref 70–99)
HCT VFR BLD CALC: 23.1 % — LOW (ref 37–47)
HGB BLD-MCNC: 7.4 G/DL — LOW (ref 12–16)
IMM GRANULOCYTES NFR BLD AUTO: 0.7 % — HIGH (ref 0.1–0.3)
LYMPHOCYTES # BLD AUTO: 2.04 K/UL — SIGNIFICANT CHANGE UP (ref 1.2–3.4)
LYMPHOCYTES # BLD AUTO: 23.7 % — SIGNIFICANT CHANGE UP (ref 20.5–51.1)
MAGNESIUM SERPL-MCNC: 2 MG/DL — SIGNIFICANT CHANGE UP (ref 1.8–2.4)
MCHC RBC-ENTMCNC: 28.8 PG — SIGNIFICANT CHANGE UP (ref 27–31)
MCHC RBC-ENTMCNC: 32 G/DL — SIGNIFICANT CHANGE UP (ref 32–37)
MCV RBC AUTO: 89.9 FL — SIGNIFICANT CHANGE UP (ref 81–99)
MONOCYTES # BLD AUTO: 0.63 K/UL — HIGH (ref 0.1–0.6)
MONOCYTES NFR BLD AUTO: 7.3 % — SIGNIFICANT CHANGE UP (ref 1.7–9.3)
NEUTROPHILS # BLD AUTO: 5.24 K/UL — SIGNIFICANT CHANGE UP (ref 1.4–6.5)
NEUTROPHILS NFR BLD AUTO: 60.8 % — SIGNIFICANT CHANGE UP (ref 42.2–75.2)
NRBC # BLD: 0 /100 WBCS — SIGNIFICANT CHANGE UP (ref 0–0)
PHOSPHATE SERPL-MCNC: 4.8 MG/DL — SIGNIFICANT CHANGE UP (ref 2.1–4.9)
PLATELET # BLD AUTO: 279 K/UL — SIGNIFICANT CHANGE UP (ref 130–400)
POTASSIUM SERPL-MCNC: 5.3 MMOL/L — HIGH (ref 3.5–5)
POTASSIUM SERPL-SCNC: 5.3 MMOL/L — HIGH (ref 3.5–5)
RBC # BLD: 2.57 M/UL — LOW (ref 4.2–5.4)
RBC # FLD: 13.1 % — SIGNIFICANT CHANGE UP (ref 11.5–14.5)
SODIUM SERPL-SCNC: 138 MMOL/L — SIGNIFICANT CHANGE UP (ref 135–146)
WBC # BLD: 8.62 K/UL — SIGNIFICANT CHANGE UP (ref 4.8–10.8)
WBC # FLD AUTO: 8.62 K/UL — SIGNIFICANT CHANGE UP (ref 4.8–10.8)

## 2021-11-14 RX ORDER — ACETAMINOPHEN 500 MG
650 TABLET ORAL EVERY 6 HOURS
Refills: 0 | Status: DISCONTINUED | OUTPATIENT
Start: 2021-11-14 | End: 2021-11-19

## 2021-11-14 RX ORDER — HYDROMORPHONE HYDROCHLORIDE 2 MG/ML
0.5 INJECTION INTRAMUSCULAR; INTRAVENOUS; SUBCUTANEOUS ONCE
Refills: 0 | Status: DISCONTINUED | OUTPATIENT
Start: 2021-11-14 | End: 2021-11-14

## 2021-11-14 RX ORDER — THROMBIN (BOVINE) 10000 UNIT
20000 KIT TOPICAL ONCE
Refills: 0 | Status: COMPLETED | OUTPATIENT
Start: 2021-11-14 | End: 2021-11-14

## 2021-11-14 RX ADMIN — SENNA PLUS 2 TABLET(S): 8.6 TABLET ORAL at 21:46

## 2021-11-14 RX ADMIN — Medication 6: at 16:55

## 2021-11-14 RX ADMIN — HYDROMORPHONE HYDROCHLORIDE 0.25 MILLIGRAM(S): 2 INJECTION INTRAMUSCULAR; INTRAVENOUS; SUBCUTANEOUS at 19:55

## 2021-11-14 RX ADMIN — Medication 325 MILLIGRAM(S): at 11:43

## 2021-11-14 RX ADMIN — Medication 1300 MILLIGRAM(S): at 21:47

## 2021-11-14 RX ADMIN — TAMSULOSIN HYDROCHLORIDE 0.4 MILLIGRAM(S): 0.4 CAPSULE ORAL at 21:46

## 2021-11-14 RX ADMIN — HYDROMORPHONE HYDROCHLORIDE 0.25 MILLIGRAM(S): 2 INJECTION INTRAMUSCULAR; INTRAVENOUS; SUBCUTANEOUS at 08:02

## 2021-11-14 RX ADMIN — CALCITRIOL 0.25 MICROGRAM(S): 0.5 CAPSULE ORAL at 11:43

## 2021-11-14 RX ADMIN — Medication 100 MILLIGRAM(S): at 05:56

## 2021-11-14 RX ADMIN — Medication 25 MILLIGRAM(S): at 13:24

## 2021-11-14 RX ADMIN — HEPARIN SODIUM 5000 UNIT(S): 5000 INJECTION INTRAVENOUS; SUBCUTANEOUS at 13:24

## 2021-11-14 RX ADMIN — Medication 1300 MILLIGRAM(S): at 05:56

## 2021-11-14 RX ADMIN — Medication 12: at 11:43

## 2021-11-14 RX ADMIN — Medication 6: at 21:51

## 2021-11-14 RX ADMIN — GABAPENTIN 300 MILLIGRAM(S): 400 CAPSULE ORAL at 18:06

## 2021-11-14 RX ADMIN — HEPARIN SODIUM 5000 UNIT(S): 5000 INJECTION INTRAVENOUS; SUBCUTANEOUS at 05:58

## 2021-11-14 RX ADMIN — HEPARIN SODIUM 5000 UNIT(S): 5000 INJECTION INTRAVENOUS; SUBCUTANEOUS at 21:49

## 2021-11-14 RX ADMIN — HYDROMORPHONE HYDROCHLORIDE 0.25 MILLIGRAM(S): 2 INJECTION INTRAMUSCULAR; INTRAVENOUS; SUBCUTANEOUS at 13:23

## 2021-11-14 RX ADMIN — HYDROMORPHONE HYDROCHLORIDE 0.25 MILLIGRAM(S): 2 INJECTION INTRAMUSCULAR; INTRAVENOUS; SUBCUTANEOUS at 13:54

## 2021-11-14 RX ADMIN — PANTOPRAZOLE SODIUM 40 MILLIGRAM(S): 20 TABLET, DELAYED RELEASE ORAL at 05:56

## 2021-11-14 RX ADMIN — ATORVASTATIN CALCIUM 80 MILLIGRAM(S): 80 TABLET, FILM COATED ORAL at 21:46

## 2021-11-14 RX ADMIN — Medication 25 MILLIGRAM(S): at 05:56

## 2021-11-14 RX ADMIN — HYDROMORPHONE HYDROCHLORIDE 0.5 MILLIGRAM(S): 2 INJECTION INTRAMUSCULAR; INTRAVENOUS; SUBCUTANEOUS at 22:55

## 2021-11-14 RX ADMIN — Medication 100 MILLIGRAM(S): at 18:05

## 2021-11-14 RX ADMIN — Medication 10: at 07:55

## 2021-11-14 RX ADMIN — Medication 1300 MILLIGRAM(S): at 13:24

## 2021-11-14 RX ADMIN — Medication 25 MILLIGRAM(S): at 21:47

## 2021-11-14 RX ADMIN — CLOPIDOGREL BISULFATE 75 MILLIGRAM(S): 75 TABLET, FILM COATED ORAL at 11:43

## 2021-11-14 RX ADMIN — HYDROMORPHONE HYDROCHLORIDE 0.25 MILLIGRAM(S): 2 INJECTION INTRAMUSCULAR; INTRAVENOUS; SUBCUTANEOUS at 08:20

## 2021-11-14 RX ADMIN — GABAPENTIN 300 MILLIGRAM(S): 400 CAPSULE ORAL at 05:56

## 2021-11-14 RX ADMIN — HYDROMORPHONE HYDROCHLORIDE 0.25 MILLIGRAM(S): 2 INJECTION INTRAMUSCULAR; INTRAVENOUS; SUBCUTANEOUS at 20:25

## 2021-11-14 RX ADMIN — CHLORHEXIDINE GLUCONATE 1 APPLICATION(S): 213 SOLUTION TOPICAL at 05:58

## 2021-11-14 RX ADMIN — Medication 500 MILLIGRAM(S): at 11:43

## 2021-11-14 NOTE — CHART NOTE - NSCHARTNOTEFT_GEN_A_CORE
Called for strikethrough bleeding    Removed dressings, dressed wound w/20K units Topical Thrombin / Gelfoam / 4x4 gauze / alternating layers of Kerlix / ABD / ACE    [id number] Called for strikethrough bleeding    Removed dressings, dressed wound w/20K units Topical Thrombin / Gelfoam and Compression Dressing consisting of DSD / ABD / Kerlix / ACE    [id number]

## 2021-11-14 NOTE — PROGRESS NOTE ADULT - SUBJECTIVE AND OBJECTIVE BOX
VASCULAR SURGERY PROGRESS NOTE    Hospital Day # 13  Post-Op Day # 5    Procedure: 56F s/p left fem-tib bypass using reverse L. GSV.     Events of past 24 hours:  Pt seen and examined at bedside. Dressing changed. Abx switch from clinda to doxy. VSS. Pt failed TOV straight cath had 600cc on insertion. Henriquez left in. No acute events overnight    ROS otherwise negative except per subjective and HPI    PAST MEDICAL & SURGICAL HISTORY:  PVD (peripheral vascular disease)    Benign essential HTN    Intellectual disability    Hearing impaired    HLD (hyperlipidemia)    Vital Signs Last 24 Hrs  T(C): 37.4 (13 Nov 2021 20:53), Max: 37.4 (13 Nov 2021 20:53)  T(F): 99.4 (13 Nov 2021 20:53), Max: 99.4 (13 Nov 2021 20:53)  HR: 72 (13 Nov 2021 20:53) (67 - 73)  BP: 130/61 (13 Nov 2021 20:53) (116/59 - 130/61)  BP(mean): --  RR: 18 (13 Nov 2021 20:53) (16 - 18)  SpO2: 97% (13 Nov 2021 20:53) (96% - 99%)    Pain (0-10):            Pain Control Adequate: [] YES [] N    Diet:    I&O's Detail    12 Nov 2021 07:01  -  13 Nov 2021 07:00  --------------------------------------------------------  IN:    IV PiggyBack: 50 mL  Total IN: 50 mL    OUT:    Indwelling Catheter - Urethral (mL): 2850 mL  Total OUT: 2850 mL    Total NET: -2800 mL      13 Nov 2021 07:01  -  14 Nov 2021 01:03  --------------------------------------------------------  IN:    Oral Fluid: 240 mL  Total IN: 240 mL    OUT:    Indwelling Catheter - Urethral (mL): 2100 mL  Total OUT: 2100 mL    Total NET: -1860 mL    PHYSICAL EXAM      PHYSICAL EXAM:  General: NAD  HEENT: EOMI  Cardiac: RRR  Respiratory: normal respiratory effort  Abdomen: Soft, non-distended, non-tender, no rebound, no guarding   Skin: No rashes, No ecchymoses, No cyanosis  Extremities: Left foot wrapped in kerlex with wound vac. DP/PT pulses dopplerable. Feet are warm with cap refill less than 2 seconds.       MEDICATIONS:   MEDICATIONS  (STANDING):  ascorbic acid 500 milliGRAM(s) Oral daily  atorvastatin 80 milliGRAM(s) Oral at bedtime  calcitriol   Capsule 0.25 MICROGram(s) Oral daily  chlorhexidine 4% Liquid 1 Application(s) Topical daily  clopidogrel Tablet 75 milliGRAM(s) Oral daily  doxycycline hyclate Capsule 100 milliGRAM(s) Oral every 12 hours  ferrous    sulfate 325 milliGRAM(s) Oral daily  gabapentin 300 milliGRAM(s) Oral every 12 hours  heparin   Injectable 5000 Unit(s) SubCutaneous every 8 hours  hydrALAZINE 25 milliGRAM(s) Oral every 8 hours  insulin lispro (ADMELOG) corrective regimen sliding scale   SubCutaneous Before meals and at bedtime  metoprolol succinate ER 25 milliGRAM(s) Oral daily  pantoprazole    Tablet 40 milliGRAM(s) Oral before breakfast  senna 2 Tablet(s) Oral at bedtime  sodium bicarbonate 1300 milliGRAM(s) Oral every 8 hours  tamsulosin 0.4 milliGRAM(s) Oral at bedtime    MEDICATIONS  (PRN):  HYDROmorphone  Injectable 0.25 milliGRAM(s) IV Push every 3 hours PRN Severe Pain (7 - 10)    DVT PROPHYLAXIS: [x] YES [] NO  GI PROPHYLAXIS: [x] YES [] NO  ANTIPLATELETS: [] YES [] NO  ANTICOAGULATION: [] YES [x] NO  ANTIBIOTICS: [x] YES [] NO    LAB/STUDIES:                        8.0    8.93  )-----------( 289      ( 13 Nov 2021 22:28 )             25.2     11-13    138  |  99  |  44<H>  ----------------------------<  184<H>  5.0   |  20  |  3.1<H>    Ca    8.5      13 Nov 2021 22:28  Phos  4.3     11-13  Mg     2.3     11-13    PT/INR - ( 12 Nov 2021 20:00 )   PT: 10.70 sec;   INR: 0.93 ratio       PTT - ( 12 Nov 2021 20:00 )  PTT:31.0 sec    Culture - Acid Fast - Tissue w/Smear (collected 11 Nov 2021 11:58)  Source: .Tissue None  Preliminary Report (13 Nov 2021 15:04):    Culture is being performed.    Culture - Fungal, Tissue (collected 11 Nov 2021 11:58)  Source: .Tissue None  Preliminary Report (12 Nov 2021 08:03):    Testing in progress    Culture - Tissue with Gram Stain (collected 11 Nov 2021 11:58)  Source: .Tissue None  Gram Stain (12 Nov 2021 21:36):    Upon re-evaluation of gram stain:    No polymorphonuclear cells seen per low power field    Rare Gram Negative Rods per oil power field  Preliminary Report (13 Nov 2021 18:44):    Moderate Escherichia coli    Rare Enterococcus faecalis (vancomycin resistant)    Rare Candida parapsilosis "Susceptibilities not performed"  Organism: Escherichia coli  Enterococcus faecalis (vancomycin resistant) (13 Nov 2021 16:49)  Organism: Enterococcus faecalis (vancomycin resistant) (13 Nov 2021 16:49)  Organism: Escherichia coli (13 Nov 2021 16:49)    IMAGING:  No imaging in the past 24 hours

## 2021-11-14 NOTE — PROGRESS NOTE ADULT - ASSESSMENT
CKD 4 , DFU , CVA , hemoglobin 8.0 , creatinine 3.1 , K 5.0 , discussed status with patient , /60 , to monitor renal function closely , but at this point no dialysis at this point , podiatry management of DFU reviewed

## 2021-11-14 NOTE — PROGRESS NOTE ADULT - SUBJECTIVE AND OBJECTIVE BOX
Podiatry Progress Note    Subjective:   SEN LING is a pleasant well-nourished, well developed 56y Female in no acute distress, alert awake, and oriented to person, place and time.  Patient presents with a chief complaint of DFU (14 Nov 2021 10:07). Pt seen & evaluated at bedside. VAC intact and functional at 125 mmHg. Pt is deaf & son is bedside to translate.       PAST MEDICAL & SURGICAL HISTORY:  PVD (peripheral vascular disease)    Benign essential HTN    Intellectual disability    Hearing impaired    HLD (hyperlipidemia)         Objective:  Vital Signs Last 24 Hrs  T(C): 36.3 (14 Nov 2021 09:30), Max: 37.4 (13 Nov 2021 20:53)  T(F): 97.3 (14 Nov 2021 09:30), Max: 99.4 (13 Nov 2021 20:53)  HR: 75 (14 Nov 2021 09:30) (68 - 75)  BP: 127/60 (14 Nov 2021 09:30) (122/58 - 137/64)  BP(mean): --  RR: 18 (14 Nov 2021 09:30) (16 - 18)  SpO2: 97% (14 Nov 2021 09:30) (95% - 99%)                        8.0    8.93  )-----------( 289      ( 13 Nov 2021 22:28 )             25.2                 11-13    138  |  99  |  44<H>  ----------------------------<  184<H>  5.0   |  20  |  3.1<H>    Ca    8.5      13 Nov 2021 22:28  Phos  4.3     11-13  Mg     2.3     11-13    Physical Exam - Lower Extremity Focused:   #Left Foot  Derm:   Surgical wound base fibrogranular (50% granular, 50% fibrous); slow constant bleeding from wound base  Mild maceration to periwound and skin to proximal medial rearfoot  Wound probes to deep soft tissue    Vascular: DP and PT Pulses Diminished; Foot is Warm to Warm to the touch   Neuro: Protective Sensation Diminished / Moderately Neuropathic     Assessment:  Left heel wounds;   s/p excisional debridement w/ vac application 11/11/21    Plan:  Chart reviewed and Patient evaluated. All Questions and Concerns Addressed and Answered  Discussed diagnosis and treatment with patient and son  Applied manual pressure w/4x4 Gauze for 10 minutes; some improvement in hemostasis noted but mild sanguinous drainage noted from wound bed persisted  Wound cleansed w/NS; Dressed w/Xeroform / DSD / ABD / Kerlix / light ACE  Will reassess Mon 11/15; if hemostasis obtained will reapply VAC  Request VNS; Home VAC paperwork in chart.   Patient stable from Podiatry standpoint. Pt can follow up w/Dr. Llamas at [address] [place] [address], [facility] [place] [facility], 1 week after DSC.   Weight bearing status; WBAT R foot; NWB L foot;   Discussed plan w/ Dr. Llamas

## 2021-11-14 NOTE — PROGRESS NOTE ADULT - ASSESSMENT
ASSESSMENT:   56y year old Female s/p FEm-tib bypass using reversed Left GSV.     PLAN:  - Continue wound vac   - Pulse checks regularly on left leg   - Monitor vitals   - Monitor labs   - left heel s/p debridement and wound vac application. Weight bearing as tolerated per podiatry when working with PT  - Continue PO doxy 100 bid for left thigh erythema, suspected cellulitis  - Plan for dispo to be discussed with social work: + wound vac for at least 1 months  - Flomax added  - Pt failed TOV with 600cc upon straight cath.   - TOV in AM    VASCULAR TEAM SPECTRA: [id number]

## 2021-11-14 NOTE — PROGRESS NOTE ADULT - SUBJECTIVE AND OBJECTIVE BOX
FAMILIA FOLLOW UP NOTE  --------------------------------------------------------------------------------  Chief Complaint:    24 hour events/subjective:        PAST HISTORY  --------------------------------------------------------------------------------  No significant changes to PMH, PSH, FHx, SHx, unless otherwise noted    ALLERGIES & MEDICATIONS  --------------------------------------------------------------------------------  Allergies    penicillin (Rash)    Intolerances      Standing Inpatient Medications  ascorbic acid 500 milliGRAM(s) Oral daily  atorvastatin 80 milliGRAM(s) Oral at bedtime  calcitriol   Capsule 0.25 MICROGram(s) Oral daily  chlorhexidine 4% Liquid 1 Application(s) Topical daily  clopidogrel Tablet 75 milliGRAM(s) Oral daily  doxycycline hyclate Capsule 100 milliGRAM(s) Oral every 12 hours  ferrous    sulfate 325 milliGRAM(s) Oral daily  gabapentin 300 milliGRAM(s) Oral every 12 hours  heparin   Injectable 5000 Unit(s) SubCutaneous every 8 hours  hydrALAZINE 25 milliGRAM(s) Oral every 8 hours  insulin lispro (ADMELOG) corrective regimen sliding scale   SubCutaneous Before meals and at bedtime  metoprolol succinate ER 25 milliGRAM(s) Oral daily  pantoprazole    Tablet 40 milliGRAM(s) Oral before breakfast  senna 2 Tablet(s) Oral at bedtime  sodium bicarbonate 1300 milliGRAM(s) Oral every 8 hours  tamsulosin 0.4 milliGRAM(s) Oral at bedtime    PRN Inpatient Medications  HYDROmorphone  Injectable 0.25 milliGRAM(s) IV Push every 3 hours PRN      REVIEW OF SYSTEMS  --------------------------------------------------------------------------------  Gen: No weight changes, fatigue, fevers/chills, weakness  Skin: No rashes  Head/Eyes/Ears/Mouth: No headache; Normal hearing; Normal vision w/o blurriness; No sinus pain/discomfort, sore throat  Respiratory: No dyspnea, cough, wheezing, hemoptysis  CV: No chest pain, PND, orthopnea  GI: No abdominal pain, diarrhea, constipation, nausea, vomiting, melena, hematochezia  : No increased frequency, dysuria, hematuria, nocturia  MSK: No joint pain/swelling; no back pain; no edema  Neuro: No dizziness/lightheadedness, weakness, seizures, numbness, tingling  Heme: No easy bruising or bleeding  Endo: No heat/cold intolerance  Psych: No significant nervousness, anxiety, stress, depression    All other systems were reviewed and are negative, except as noted.    VITALS/PHYSICAL EXAM  --------------------------------------------------------------------------------  T(C): 36.3 (11-14-21 @ 09:30), Max: 37.4 (11-13-21 @ 20:53)  HR: 75 (11-14-21 @ 09:30) (67 - 75)  BP: 127/60 (11-14-21 @ 09:30) (116/59 - 137/64)  RR: 18 (11-14-21 @ 09:30) (16 - 18)  SpO2: 97% (11-14-21 @ 09:30) (95% - 99%)  Wt(kg): --        11-13-21 @ 07:01  -  11-14-21 @ 07:00  --------------------------------------------------------  IN: 240 mL / OUT: 2400 mL / NET: -2160 mL    11-14-21 @ 07:01  -  11-14-21 @ 10:08  --------------------------------------------------------  IN: 450 mL / OUT: 1200 mL / NET: -750 mL      Physical Exam:  	Gen: NAD, well-appearing  	HEENT: PERRL, supple neck, clear oropharynx  	Pulm: CTA B/L  	CV: RRR, S1S2; no rub  	Back: No spinal or CVA tenderness; no sacral edema  	Abd: +BS, soft, nontender/nondistended  	: No suprapubic tenderness  	UE: Warm, FROM, no clubbing, intact strength; no edema; no asterixis  	LE: Warm, FROM, no clubbing, intact strength; no edema  	Neuro: No focal deficits, intact gait  	Psych: Normal affect and mood  	Skin: Warm, without rashes  	Vascular access:    LABS/STUDIES  --------------------------------------------------------------------------------              8.0    8.93  >-----------<  289      [11-13-21 @ 22:28]              25.2     138  |  99  |  44  ----------------------------<  184      [11-13-21 @ 22:28]  5.0   |  20  |  3.1        Ca     8.5     [11-13-21 @ 22:28]      Mg     2.3     [11-13-21 @ 22:28]      Phos  4.3     [11-13-21 @ 22:28]      PT/INR: PT 10.70, INR 0.93       [11-12-21 @ 20:00]  PTT: 31.0       [11-12-21 @ 20:00]      Creatinine Trend:  SCr 3.1 [11-13 @ 22:28]  SCr 2.8 [11-12 @ 20:00]  SCr 2.8 [11-11 @ 23:07]  SCr 3.0 [11-11 @ 00:28]  SCr 2.7 [11-10 @ 00:16]    Urinalysis - [11-10-21 @ 14:51]      Color Yellow / Appearance Turbid / SG 1.014 / pH 6.5      Gluc Negative / Ketone Negative  / Bili Negative / Urobili <2 mg/dL       Blood Moderate / Protein 300 mg/dL / Leuk Est Large / Nitrite Negative      RBC 2 / WBC >720 / Hyaline 1 / Gran  / Sq Epi  / Non Sq Epi 1 / Bacteria Many          OLVIN: titer 1:80, pattern Speckled      [11-04-21 @ 12:47]  Free Light Chains: kappa 9.28, lambda 8.11, ratio = 1.14      [11-04 @ 12:47]  Immunofixation Serum:   IgM Lambda Band Identified    Reference Range: None Detected      [11-04-21 @ 12:47]  SPEP Interpretation: Gamma-Migrating Paraprotein Identified      [11-04-21 @ 12:47]

## 2021-11-15 PROBLEM — I73.9 PERIPHERAL VASCULAR DISEASE, UNSPECIFIED: Chronic | Status: ACTIVE | Noted: 2021-11-01

## 2021-11-15 PROBLEM — E78.5 HYPERLIPIDEMIA, UNSPECIFIED: Chronic | Status: ACTIVE | Noted: 2021-11-01

## 2021-11-15 PROBLEM — F79 UNSPECIFIED INTELLECTUAL DISABILITIES: Chronic | Status: ACTIVE | Noted: 2021-11-01

## 2021-11-15 PROBLEM — H91.90 UNSPECIFIED HEARING LOSS, UNSPECIFIED EAR: Chronic | Status: ACTIVE | Noted: 2021-11-01

## 2021-11-15 PROBLEM — I10 ESSENTIAL (PRIMARY) HYPERTENSION: Chronic | Status: ACTIVE | Noted: 2021-11-01

## 2021-11-15 LAB
ANION GAP SERPL CALC-SCNC: 17 MMOL/L — HIGH (ref 7–14)
APTT BLD: 33.5 SEC — SIGNIFICANT CHANGE UP (ref 27–39.2)
BASOPHILS # BLD AUTO: 0.04 K/UL — SIGNIFICANT CHANGE UP (ref 0–0.2)
BASOPHILS NFR BLD AUTO: 0.5 % — SIGNIFICANT CHANGE UP (ref 0–1)
BUN SERPL-MCNC: 58 MG/DL — HIGH (ref 10–20)
CALCIUM SERPL-MCNC: 8.2 MG/DL — LOW (ref 8.5–10.1)
CHLORIDE SERPL-SCNC: 103 MMOL/L — SIGNIFICANT CHANGE UP (ref 98–110)
CO2 SERPL-SCNC: 19 MMOL/L — SIGNIFICANT CHANGE UP (ref 17–32)
CREAT SERPL-MCNC: 3.2 MG/DL — HIGH (ref 0.7–1.5)
EOSINOPHIL # BLD AUTO: 0.57 K/UL — SIGNIFICANT CHANGE UP (ref 0–0.7)
EOSINOPHIL NFR BLD AUTO: 6.5 % — SIGNIFICANT CHANGE UP (ref 0–8)
GLUCOSE BLDC GLUCOMTR-MCNC: 121 MG/DL — HIGH (ref 70–99)
GLUCOSE BLDC GLUCOMTR-MCNC: 135 MG/DL — HIGH (ref 70–99)
GLUCOSE BLDC GLUCOMTR-MCNC: 138 MG/DL — HIGH (ref 70–99)
GLUCOSE BLDC GLUCOMTR-MCNC: 226 MG/DL — HIGH (ref 70–99)
GLUCOSE BLDC GLUCOMTR-MCNC: 237 MG/DL — HIGH (ref 70–99)
GLUCOSE BLDC GLUCOMTR-MCNC: 265 MG/DL — HIGH (ref 70–99)
GLUCOSE SERPL-MCNC: 111 MG/DL — HIGH (ref 70–99)
HCT VFR BLD CALC: 22.5 % — LOW (ref 37–47)
HGB BLD-MCNC: 7.2 G/DL — LOW (ref 12–16)
IMM GRANULOCYTES NFR BLD AUTO: 0.8 % — HIGH (ref 0.1–0.3)
INR BLD: 0.93 RATIO — SIGNIFICANT CHANGE UP (ref 0.65–1.3)
LYMPHOCYTES # BLD AUTO: 2.32 K/UL — SIGNIFICANT CHANGE UP (ref 1.2–3.4)
LYMPHOCYTES # BLD AUTO: 26.6 % — SIGNIFICANT CHANGE UP (ref 20.5–51.1)
MAGNESIUM SERPL-MCNC: 2 MG/DL — SIGNIFICANT CHANGE UP (ref 1.8–2.4)
MCHC RBC-ENTMCNC: 28.9 PG — SIGNIFICANT CHANGE UP (ref 27–31)
MCHC RBC-ENTMCNC: 32 G/DL — SIGNIFICANT CHANGE UP (ref 32–37)
MCV RBC AUTO: 90.4 FL — SIGNIFICANT CHANGE UP (ref 81–99)
MONOCYTES # BLD AUTO: 0.49 K/UL — SIGNIFICANT CHANGE UP (ref 0.1–0.6)
MONOCYTES NFR BLD AUTO: 5.6 % — SIGNIFICANT CHANGE UP (ref 1.7–9.3)
NEUTROPHILS # BLD AUTO: 5.23 K/UL — SIGNIFICANT CHANGE UP (ref 1.4–6.5)
NEUTROPHILS NFR BLD AUTO: 60 % — SIGNIFICANT CHANGE UP (ref 42.2–75.2)
NRBC # BLD: 0 /100 WBCS — SIGNIFICANT CHANGE UP (ref 0–0)
PHOSPHATE SERPL-MCNC: 5.2 MG/DL — HIGH (ref 2.1–4.9)
PLATELET # BLD AUTO: 288 K/UL — SIGNIFICANT CHANGE UP (ref 130–400)
POTASSIUM SERPL-MCNC: 4.6 MMOL/L — SIGNIFICANT CHANGE UP (ref 3.5–5)
POTASSIUM SERPL-SCNC: 4.6 MMOL/L — SIGNIFICANT CHANGE UP (ref 3.5–5)
PROTHROM AB SERPL-ACNC: 10.7 SEC — SIGNIFICANT CHANGE UP (ref 9.95–12.87)
RBC # BLD: 2.49 M/UL — LOW (ref 4.2–5.4)
RBC # FLD: 13.1 % — SIGNIFICANT CHANGE UP (ref 11.5–14.5)
SODIUM SERPL-SCNC: 139 MMOL/L — SIGNIFICANT CHANGE UP (ref 135–146)
SURGICAL PATHOLOGY STUDY: SIGNIFICANT CHANGE UP
WBC # BLD: 8.72 K/UL — SIGNIFICANT CHANGE UP (ref 4.8–10.8)
WBC # FLD AUTO: 8.72 K/UL — SIGNIFICANT CHANGE UP (ref 4.8–10.8)

## 2021-11-15 PROCEDURE — 93010 ELECTROCARDIOGRAM REPORT: CPT

## 2021-11-15 RX ORDER — INSULIN HUMAN 100 [IU]/ML
10 INJECTION, SOLUTION SUBCUTANEOUS ONCE
Refills: 0 | Status: COMPLETED | OUTPATIENT
Start: 2021-11-15 | End: 2021-11-15

## 2021-11-15 RX ORDER — SODIUM CHLORIDE 9 MG/ML
1000 INJECTION, SOLUTION INTRAVENOUS ONCE
Refills: 0 | Status: DISCONTINUED | OUTPATIENT
Start: 2021-11-15 | End: 2021-11-15

## 2021-11-15 RX ORDER — SODIUM CHLORIDE 9 MG/ML
1000 INJECTION, SOLUTION INTRAVENOUS
Refills: 0 | Status: DISCONTINUED | OUTPATIENT
Start: 2021-11-15 | End: 2021-11-15

## 2021-11-15 RX ORDER — DEXTROSE 50 % IN WATER 50 %
50 SYRINGE (ML) INTRAVENOUS ONCE
Refills: 0 | Status: COMPLETED | OUTPATIENT
Start: 2021-11-15 | End: 2021-11-15

## 2021-11-15 RX ADMIN — HYDROMORPHONE HYDROCHLORIDE 0.25 MILLIGRAM(S): 2 INJECTION INTRAMUSCULAR; INTRAVENOUS; SUBCUTANEOUS at 15:17

## 2021-11-15 RX ADMIN — Medication 25 MILLIGRAM(S): at 06:09

## 2021-11-15 RX ADMIN — HYDROMORPHONE HYDROCHLORIDE 0.25 MILLIGRAM(S): 2 INJECTION INTRAMUSCULAR; INTRAVENOUS; SUBCUTANEOUS at 19:30

## 2021-11-15 RX ADMIN — Medication 500 MILLIGRAM(S): at 11:18

## 2021-11-15 RX ADMIN — Medication 650 MILLIGRAM(S): at 17:08

## 2021-11-15 RX ADMIN — Medication 1300 MILLIGRAM(S): at 21:14

## 2021-11-15 RX ADMIN — Medication 12: at 16:29

## 2021-11-15 RX ADMIN — Medication 650 MILLIGRAM(S): at 00:21

## 2021-11-15 RX ADMIN — Medication 650 MILLIGRAM(S): at 11:18

## 2021-11-15 RX ADMIN — HYDROMORPHONE HYDROCHLORIDE 0.25 MILLIGRAM(S): 2 INJECTION INTRAMUSCULAR; INTRAVENOUS; SUBCUTANEOUS at 07:57

## 2021-11-15 RX ADMIN — Medication 10: at 07:58

## 2021-11-15 RX ADMIN — Medication 1300 MILLIGRAM(S): at 13:14

## 2021-11-15 RX ADMIN — HYDROMORPHONE HYDROCHLORIDE 0.25 MILLIGRAM(S): 2 INJECTION INTRAMUSCULAR; INTRAVENOUS; SUBCUTANEOUS at 17:09

## 2021-11-15 RX ADMIN — Medication 650 MILLIGRAM(S): at 17:09

## 2021-11-15 RX ADMIN — HEPARIN SODIUM 5000 UNIT(S): 5000 INJECTION INTRAVENOUS; SUBCUTANEOUS at 13:14

## 2021-11-15 RX ADMIN — Medication 650 MILLIGRAM(S): at 06:08

## 2021-11-15 RX ADMIN — HYDROMORPHONE HYDROCHLORIDE 0.25 MILLIGRAM(S): 2 INJECTION INTRAMUSCULAR; INTRAVENOUS; SUBCUTANEOUS at 09:07

## 2021-11-15 RX ADMIN — CLOPIDOGREL BISULFATE 75 MILLIGRAM(S): 75 TABLET, FILM COATED ORAL at 11:19

## 2021-11-15 RX ADMIN — HYDROMORPHONE HYDROCHLORIDE 0.25 MILLIGRAM(S): 2 INJECTION INTRAMUSCULAR; INTRAVENOUS; SUBCUTANEOUS at 21:26

## 2021-11-15 RX ADMIN — SENNA PLUS 2 TABLET(S): 8.6 TABLET ORAL at 21:12

## 2021-11-15 RX ADMIN — Medication 1 TABLET(S): at 17:08

## 2021-11-15 RX ADMIN — Medication 10: at 21:17

## 2021-11-15 RX ADMIN — GABAPENTIN 300 MILLIGRAM(S): 400 CAPSULE ORAL at 06:08

## 2021-11-15 RX ADMIN — ATORVASTATIN CALCIUM 80 MILLIGRAM(S): 80 TABLET, FILM COATED ORAL at 21:13

## 2021-11-15 RX ADMIN — CHLORHEXIDINE GLUCONATE 1 APPLICATION(S): 213 SOLUTION TOPICAL at 06:12

## 2021-11-15 RX ADMIN — HYDROMORPHONE HYDROCHLORIDE 0.25 MILLIGRAM(S): 2 INJECTION INTRAMUSCULAR; INTRAVENOUS; SUBCUTANEOUS at 22:10

## 2021-11-15 RX ADMIN — TAMSULOSIN HYDROCHLORIDE 0.4 MILLIGRAM(S): 0.4 CAPSULE ORAL at 21:12

## 2021-11-15 RX ADMIN — CALCITRIOL 0.25 MICROGRAM(S): 0.5 CAPSULE ORAL at 12:06

## 2021-11-15 RX ADMIN — Medication 25 MILLIGRAM(S): at 06:08

## 2021-11-15 RX ADMIN — HYDROMORPHONE HYDROCHLORIDE 0.25 MILLIGRAM(S): 2 INJECTION INTRAMUSCULAR; INTRAVENOUS; SUBCUTANEOUS at 13:13

## 2021-11-15 RX ADMIN — PANTOPRAZOLE SODIUM 40 MILLIGRAM(S): 20 TABLET, DELAYED RELEASE ORAL at 06:09

## 2021-11-15 RX ADMIN — Medication 50 MILLILITER(S): at 00:22

## 2021-11-15 RX ADMIN — Medication 1300 MILLIGRAM(S): at 06:08

## 2021-11-15 RX ADMIN — Medication 325 MILLIGRAM(S): at 11:18

## 2021-11-15 RX ADMIN — GABAPENTIN 300 MILLIGRAM(S): 400 CAPSULE ORAL at 17:08

## 2021-11-15 RX ADMIN — INSULIN HUMAN 10 UNIT(S): 100 INJECTION, SOLUTION SUBCUTANEOUS at 00:21

## 2021-11-15 RX ADMIN — HEPARIN SODIUM 5000 UNIT(S): 5000 INJECTION INTRAVENOUS; SUBCUTANEOUS at 21:13

## 2021-11-15 RX ADMIN — Medication 650 MILLIGRAM(S): at 23:08

## 2021-11-15 RX ADMIN — HEPARIN SODIUM 5000 UNIT(S): 5000 INJECTION INTRAVENOUS; SUBCUTANEOUS at 06:09

## 2021-11-15 RX ADMIN — Medication 25 MILLIGRAM(S): at 21:12

## 2021-11-15 RX ADMIN — Medication 1 TABLET(S): at 06:08

## 2021-11-15 RX ADMIN — Medication 650 MILLIGRAM(S): at 11:19

## 2021-11-15 NOTE — PROGRESS NOTE ADULT - ASSESSMENT
ASSESSMENT:   56y year old Female s/p FEm-tib bypass using reversed Left GSV.     PLAN:  - Regular pulse checks   - Wound vac per podiatry   - Monitor potassium   - LLE NWB per podiatry   - Continue Bactrim   - Plan for dispo to be discussed with social work: + wound vac for at least 1 months    VASCULAR TEAM SPECTRA: [id number]

## 2021-11-15 NOTE — PROGRESS NOTE ADULT - SUBJECTIVE AND OBJECTIVE BOX
Podiatry Progress Note    Subjective:   SEN LING is a 56y old  Female who presents with a chief complaint of DFU (15 Nov 2021 02:57)  Seen by bedside; hx of recent active bleeding on L heel sx site; no strikethrough bleeding noted, will keep current dressing another day;     PAST MEDICAL & SURGICAL HISTORY:  PVD (peripheral vascular disease)  Benign essential HTN  Intellectual disability  Hearing impaired  HLD (hyperlipidemia)    Objective:  Vital Signs Last 24 Hrs  T(C): 36.4 (15 Nov 2021 08:59), Max: 36.9 (14 Nov 2021 20:00)  T(F): 97.6 (15 Nov 2021 08:59), Max: 98.5 (14 Nov 2021 20:00)  HR: 74 (15 Nov 2021 08:59) (71 - 91)  BP: 126/61 (15 Nov 2021 08:59) (126/61 - 158/68)  BP(mean): --  RR: 18 (15 Nov 2021 08:59) (18 - 18)  SpO2: 95% (15 Nov 2021 08:59) (95% - 98%)                        7.4    8.62  )-----------( 279      ( 14 Nov 2021 21:39 )             23.1                 11-14    138  |  100  |  50<H>  ----------------------------<  164<H>  5.3<H>   |  19  |  2.9<H>    Ca    8.2<L>      14 Nov 2021 21:39  Phos  4.8     11-14  Mg     2.0     11-14    Physical Exam - Lower Extremity Focused:   #Left Foot  Checked for L foot dressing status; recent hx of active bleeding; no strikethrough noted;     Assessment:  Left heel wounds;   s/p excisional debridement w/ vac application 11/11/21    Plan:  Chart reviewed and Patient evaluated. All Questions and Concerns Addressed and Answered  Discussed diagnosis and treatment with patient and son  No strikethrough noted on L heel; will keep current dressing for another day, will possibly apply VAC Tues 11/16;   Request VNS; Home VAC paperwork in chart.   Patient stable from Podiatry standpoint. Pt can follow up w/Dr. Llamas at [address], [place], 1 week after discharge   Weight bearing status; WBAT R foot; NWB L foot;   Discussed plan w/ Dr. Llamas    Podiatry

## 2021-11-15 NOTE — PROGRESS NOTE ADULT - SUBJECTIVE AND OBJECTIVE BOX
Nephrology Progress Note    SEN LING  MRN-[id number]  56y  Female    S:  Patient is seen and examined, events over the last 24h noted.    O:  Allergies:  penicillin (Rash)    Hospital Medications:   MEDICATIONS  (STANDING):  acetaminophen     Tablet .. 650 milliGRAM(s) Oral every 6 hours  amoxicillin  875 milliGRAM(s)/clavulanate 1 Tablet(s) Oral every 12 hours  ascorbic acid 500 milliGRAM(s) Oral daily  atorvastatin 80 milliGRAM(s) Oral at bedtime  calcitriol   Capsule 0.25 MICROGram(s) Oral daily  chlorhexidine 4% Liquid 1 Application(s) Topical daily  clopidogrel Tablet 75 milliGRAM(s) Oral daily  ferrous    sulfate 325 milliGRAM(s) Oral daily  gabapentin 300 milliGRAM(s) Oral every 12 hours  heparin   Injectable 5000 Unit(s) SubCutaneous every 8 hours  hydrALAZINE 25 milliGRAM(s) Oral every 8 hours  insulin lispro (ADMELOG) corrective regimen sliding scale   SubCutaneous Before meals and at bedtime  metoprolol succinate ER 25 milliGRAM(s) Oral daily  pantoprazole    Tablet 40 milliGRAM(s) Oral before breakfast  senna 2 Tablet(s) Oral at bedtime  sodium bicarbonate 1300 milliGRAM(s) Oral every 8 hours  tamsulosin 0.4 milliGRAM(s) Oral at bedtime    MEDICATIONS  (PRN):  HYDROmorphone  Injectable 0.25 milliGRAM(s) IV Push every 3 hours PRN Severe Pain (7 - 10)    Home Medications:  aspirin 81 mg oral tablet: 1 tab(s) orally once a day (2021 16:07)  atorvastatin 80 mg oral tablet: 1 tab(s) orally once a day (2021 16:06)  calcitriol 0.25 mcg oral capsule: 1 cap(s) orally once a day (2021 16:05)  gabapentin 300 mg oral capsule: 1 cap(s) orally 3 times a day (2021 16:06)  losartan 50 mg oral tablet: 1 tab(s) orally once a day (2021 16:08)  Metoprolol Succinate ER 25 mg oral tablet, extended release: 1 tab(s) orally once a day (2021 16:07)  Plavix 75 mg oral tablet: 1 tab(s) orally once a day (2021 16:07)  Protonix 40 mg oral delayed release tablet: 1 tab(s) orally once a day (2021 16:07)      VITALS:  T(F): 98.6 (11-15-21 @ 20:49), Max: 98.6 (11-15-21 @ 20:49)  HR: 76 (11-15-21 @ 20:49)  BP: 142/65 (11-15-21 @ 20:49)  RR: 18 (11-15-21 @ 20:49)  SpO2: 97% (11-15-21 @ 20:49)  Wt(kg): --  I&O's Detail    2021 07:  -  15 Nov 2021 07:00  --------------------------------------------------------  IN:    Oral Fluid: 870 mL  Total IN: 870 mL    OUT:    Indwelling Catheter - Urethral (mL): 3550 mL  Total OUT: 3550 mL    Total NET: -2680 mL      15 Nov 2021 07:  -  15 Nov 2021 21:54  --------------------------------------------------------  IN:  Total IN: 0 mL    OUT:    Indwelling Catheter - Urethral (mL): 1500 mL  Total OUT: 1500 mL    Total NET: -1500 mL        I&O's Summary    2021 07:  -  15 Nov 2021 07:00  --------------------------------------------------------  IN: 870 mL / OUT: 3550 mL / NET: -2680 mL    15 Nov 2021 07:  -  15 Nov 2021 21:54  --------------------------------------------------------  IN: 0 mL / OUT: 1500 mL / NET: -1500 mL          PHYSICAL EXAM:  Gen: NAD  Resp: CTAB  Card: S1/S2  Abd: soft  Extremities: LLE bandaged      LABS:      11-15    139  |  103  |  58<H>  ----------------------------<  111<H>  4.6   |  19  |  3.2<H>    Ca    8.2<L>      15 Nov 2021 18:52  Phos  5.2     11-15  Mg     2.0     11-15      eGFR if Non African American: 15 mL/min/1.73M2 (11-15-21 @ 18:52)  eGFR if African American: 18 mL/min/1.73M2 (11-15-21 @ 18:52)    Phosphorus Level, Serum: 5.2 mg/dL (11-15-21 @ 18:52)  Phosphorus Level, Serum: 4.8 mg/dL (21 @ 21:39)                            7.2    8.72  )-----------( 288      ( 15 Nov 2021 18:52 )             22.5     Mean Cell Volume: 90.4 fL (11-15-21 @ 18:52)        Urine Studies:  Urinalysis Basic - ( 10 Nov 2021 14:51 )    Color: Yellow / Appearance: Turbid / S.014 / pH:   Gluc:  / Ketone: Negative  / Bili: Negative / Urobili: <2 mg/dL   Blood:  / Protein: 300 mg/dL / Nitrite: Negative   Leuk Esterase: Large / RBC: 2 /HPF / WBC >720 /HPF   Sq Epi:  / Non Sq Epi: 1 /HPF / Bacteria: Many          Culture Results:   Moderate Escherichia coli  Rare Enterococcus faecalis (vancomycin resistant)  Rare Candida parapsilosis "Susceptibilities not performed" ( @ 11:58)  Culture Results:   Rare Yeast ( 11:58)    Creatinine trend:  Creatinine, Serum: 3.2 mg/dL (11-15-21 @ 18:52)  Creatinine, Serum: 2.9 mg/dL (21 @ 21:39)  Creatinine, Serum: 3.1 mg/dL (21 @ 22:28)  Creatinine, Serum: 2.8 mg/dL (21 @ 20:00)  Creatinine, Serum: 2.8 mg/dL (21 @ 23:07)  Creatinine, Serum: 3.0 mg/dL (21 @ 00:28)

## 2021-11-15 NOTE — CHART NOTE - NSCHARTNOTEFT_GEN_A_CORE
Per nephrology recommendations 1 unit or PRBC was ordered. Patient is Voodoo and is refusing blood at this time. Patient told the indications for blood and risk of not getting it.   Patient's brother in law Tyrese served a  per patient's request.   All surgical wounds checked they are covered with dressing which is clean ad dry    Plan:   Will give IV fluids at this time and continue to monitor Hemoglobin daily  Plan discussed with vascular fellow Dr Arreguin Per nephrology recommendations 1 unit or PRBC was ordered. Patient is Voodoo and is refusing blood at this time. Patient told the indications for blood and risk of not getting it.   Patient's brother in law Tyrese served a  per patient's request.   All surgical wounds checked they are covered with dressing which is clean ad dry    Plan:   Will give IV fluids at this time and continue to monitor Hemoglobin daily  Plan discussed with vascular fellow Dr Arreguin    Update: @ 10:50 pm  got a call from the nurse, patient changed her mind and wants to get the blood now. confirmed with the patient and brother in law Tyrese. will proceed with blood transfusion at this moment

## 2021-11-15 NOTE — CHART NOTE - NSCHARTNOTEFT_GEN_A_CORE
Patient has an appointment with Dr. Roopa Pardo on 11/24/21 @1:30 PM, clinic requests patient come in 30/40 mins early to set up sliding scale due to no insurance. Patient also has an appointment with urology clinic with Dr. Aguillon on Thursday, January 13th 2022 @ 1:30 pm. Urology clinic will make note that patient is going home with [facility] and will need to be seen sooner at earliest possible date.

## 2021-11-15 NOTE — PROGRESS NOTE ADULT - SUBJECTIVE AND OBJECTIVE BOX
VASCULAR SURGERY PROGRESS NOTE    Hospital Day #15  Post-Op Day #7    Procedure: s/p left fem-tib bypass using reverse L GSV    Events of past 24 hours: Pt seen and examined at bedside. LLE dressing changed twice due to being saturated. Podiatry called to bedside; gave thrombin powder and redressed. Pt was having left foot pain last night, gave extra pain medication and redressed left foot dressing. Pt had elevated potassium at 5.3, gave insulin and dextrose and ordered EKG: normal sinus rhythm. Abx was changed to bactrim. Afebrile, vitals are stable      ROS otherwise negative except per subjective and HPI    PAST MEDICAL & SURGICAL HISTORY:  PVD (peripheral vascular disease)  Benign essential HTN  Intellectual disability  Hearing impaired  HLD (hyperlipidemia)    Vital Signs Last 24 Hrs  T(C): 36.9 (15 Nov 2021 00:32), Max: 36.9 (14 Nov 2021 20:00)  T(F): 98.4 (15 Nov 2021 00:32), Max: 98.5 (14 Nov 2021 20:00)  HR: 75 (15 Nov 2021 00:32) (69 - 91)  BP: 144/67 (15 Nov 2021 00:32) (122/58 - 158/68)  BP(mean): --  RR: 18 (15 Nov 2021 00:32) (18 - 18)  SpO2: 98% (15 Nov 2021 00:32) (96% - 98%)    Pain (0-10):            Pain Control Adequate: [] YES [] N    I&O's Detail    13 Nov 2021 07:01  -  14 Nov 2021 07:00  --------------------------------------------------------  IN:    Oral Fluid: 240 mL  Total IN: 240 mL  OUT:    Indwelling Catheter - Urethral (mL): 2400 mL  Total OUT: 2400 mL  Total NET: -2160 mL    14 Nov 2021 07:01  -  15 Nov 2021 02:58  --------------------------------------------------------  IN:    Oral Fluid: 870 mL  Total IN: 870 mL  OUT:    Indwelling Catheter - Urethral (mL): 2750 mL  Total OUT: 2750 mL  Total NET: -1880 mL    PHYSICAL EXAM  Appearance: Normal	  HEENT: NCAT, EOMI	  Neck: Supple  Cardiovascular: Normal S1 S2  Respiratory: Lungs clear to auscultation  Gastrointestinal:  Soft, nondistended, LLQ and left groin tenderness  LLE: left foot in dressing, mildly saturated     PULSES:  Left Femoral: +signals on doppler  Left Popliteal: +signals on doppler  Left Anterior tibial: +signals on doppler    MEDICATIONS:   MEDICATIONS  (STANDING):  acetaminophen     Tablet .. 650 milliGRAM(s) Oral every 6 hours  ascorbic acid 500 milliGRAM(s) Oral daily  atorvastatin 80 milliGRAM(s) Oral at bedtime  calcitriol   Capsule 0.25 MICROGram(s) Oral daily  chlorhexidine 4% Liquid 1 Application(s) Topical daily  clopidogrel Tablet 75 milliGRAM(s) Oral daily  ferrous    sulfate 325 milliGRAM(s) Oral daily  gabapentin 300 milliGRAM(s) Oral every 12 hours  heparin   Injectable 5000 Unit(s) SubCutaneous every 8 hours  hydrALAZINE 25 milliGRAM(s) Oral every 8 hours  insulin lispro (ADMELOG) corrective regimen sliding scale   SubCutaneous Before meals and at bedtime  metoprolol succinate ER 25 milliGRAM(s) Oral daily  pantoprazole    Tablet 40 milliGRAM(s) Oral before breakfast  senna 2 Tablet(s) Oral at bedtime  sodium bicarbonate 1300 milliGRAM(s) Oral every 8 hours  tamsulosin 0.4 milliGRAM(s) Oral at bedtime  thrombin Topical Powder [address] [employer] Unit(s) Topical once  trimethoprim  160 mG/sulfamethoxazole 800 mG 1 Tablet(s) Oral two times a day    MEDICATIONS  (PRN):  HYDROmorphone  Injectable 0.25 milliGRAM(s) IV Push every 3 hours PRN Severe Pain (7 - 10)    LAB/STUDIES:                        7.4    8.62  )-----------( 279      ( 14 Nov 2021 21:39 )             23.1     11-14    138  |  100  |  50<H>  ----------------------------<  164<H>  5.3<H>   |  19  |  2.9<H>    Ca    8.2<L>      14 Nov 2021 21:39  Phos  4.8     11-14  Mg     2.0     11-14    IMAGING:  No new imaging past 24hrs

## 2021-11-15 NOTE — PROGRESS NOTE ADULT - ASSESSMENT
57 yo female PMH CKD4 PVD, smoker, HTN, HLD, h/o right pontine CVA (2/7/21), DM,  intellectual disability, hearing/speech impairment presents with chronic left heel ulcer and pain. Recent duplex on 10/25/21 showed no significant arterial blood flow visualized beyond the left superficial femoral artery.  Has stable CKD4 at least since 2/21; saw nephrologist in [place]  # CKD 4, stable / creat mild increase today, hgb down trending, bleeding from LE, mild hypotension improved  # Proteinuria, nephrotic range / likely d/t HTN/DM  # Hx of HTN  # Hx of DM  # DFU / PAD - s/p fem-tib bypass on 11/8 / s/o debridement    Recommendations:  - hgb down trending / acute blood loss anemia - suggest RBC transfusion / monitor hgb  - document strict i/o and daily weights - polyuria yesterday / replete volume w/ rbc transfusion   - needs better glycemic control, BG > 250  - renal diet  - dose meds per egfr  - cont po bicarb 1300 tid   - hold hydralazine if hypotensive   - OLVIN weak positive/ serum flc ratio wnl; can have further w/u for proteinuria outpatient  - phos noted, does not need binder   55 yo female PMH CKD4 PVD, smoker, HTN, HLD, h/o right pontine CVA (2/7/21), DM,  intellectual disability, hearing/speech impairment presents with chronic left heel ulcer and pain. Recent duplex on 10/25/21 showed no significant arterial blood flow visualized beyond the left superficial femoral artery.  Has stable CKD4 at least since 2/21; saw nephrologist in [place]  # CKD 4, stable / creat mild increase today, hgb down trending, bleeding from LE, mild hypotension improved  # Proteinuria, nephrotic range / likely d/t HTN/DM  # Hx of HTN  # Hx of DM  # DFU / PAD - s/p fem-tib bypass on 11/8 / s/o debridement    Recommendations:  - hgb down trending / acute blood loss anemia - suggest RBC transfusion / monitor hgb  - document strict i/o and daily weights - polyuria yesterday / replete volume w/ rbc transfusion   - needs better glycemic control, BG > 250  - renal diet  - dose meds per egfr  - cont po bicarb 1300 tid   - hold hydralazine if hypotensive     D/w vascular team  - OLVIN weak positive/ serum flc ratio wnl; can have further w/u for proteinuria outpatient  - phos noted, does not need binder

## 2021-11-16 LAB
GLUCOSE BLDC GLUCOMTR-MCNC: 136 MG/DL — HIGH (ref 70–99)
GLUCOSE BLDC GLUCOMTR-MCNC: 192 MG/DL — HIGH (ref 70–99)
GLUCOSE BLDC GLUCOMTR-MCNC: 226 MG/DL — HIGH (ref 70–99)
GLUCOSE BLDC GLUCOMTR-MCNC: 276 MG/DL — HIGH (ref 70–99)
HCT VFR BLD CALC: 26 % — LOW (ref 37–47)
HGB BLD-MCNC: 8.3 G/DL — LOW (ref 12–16)
MCHC RBC-ENTMCNC: 28.5 PG — SIGNIFICANT CHANGE UP (ref 27–31)
MCHC RBC-ENTMCNC: 31.9 G/DL — LOW (ref 32–37)
MCV RBC AUTO: 89.3 FL — SIGNIFICANT CHANGE UP (ref 81–99)
NRBC # BLD: 0 /100 WBCS — SIGNIFICANT CHANGE UP (ref 0–0)
PLATELET # BLD AUTO: 278 K/UL — SIGNIFICANT CHANGE UP (ref 130–400)
RBC # BLD: 2.91 M/UL — LOW (ref 4.2–5.4)
RBC # FLD: 13.5 % — SIGNIFICANT CHANGE UP (ref 11.5–14.5)
WBC # BLD: 9.81 K/UL — SIGNIFICANT CHANGE UP (ref 4.8–10.8)
WBC # FLD AUTO: 9.81 K/UL — SIGNIFICANT CHANGE UP (ref 4.8–10.8)

## 2021-11-16 PROCEDURE — 97605 NEG PRS WND THER DME<=50SQCM: CPT | Mod: 58

## 2021-11-16 PROCEDURE — 99253 IP/OBS CNSLTJ NEW/EST LOW 45: CPT

## 2021-11-16 RX ORDER — SODIUM CHLORIDE 9 MG/ML
1000 INJECTION, SOLUTION INTRAVENOUS
Refills: 0 | Status: DISCONTINUED | OUTPATIENT
Start: 2021-11-16 | End: 2021-11-19

## 2021-11-16 RX ORDER — GLUCAGON INJECTION, SOLUTION 0.5 MG/.1ML
1 INJECTION, SOLUTION SUBCUTANEOUS ONCE
Refills: 0 | Status: DISCONTINUED | OUTPATIENT
Start: 2021-11-16 | End: 2021-11-19

## 2021-11-16 RX ORDER — INSULIN GLARGINE 100 [IU]/ML
10 INJECTION, SOLUTION SUBCUTANEOUS AT BEDTIME
Refills: 0 | Status: DISCONTINUED | OUTPATIENT
Start: 2021-11-16 | End: 2021-11-19

## 2021-11-16 RX ADMIN — Medication 25 MILLIGRAM(S): at 21:51

## 2021-11-16 RX ADMIN — Medication 1 TABLET(S): at 17:02

## 2021-11-16 RX ADMIN — TAMSULOSIN HYDROCHLORIDE 0.4 MILLIGRAM(S): 0.4 CAPSULE ORAL at 21:50

## 2021-11-16 RX ADMIN — Medication 650 MILLIGRAM(S): at 17:01

## 2021-11-16 RX ADMIN — HYDROMORPHONE HYDROCHLORIDE 0.25 MILLIGRAM(S): 2 INJECTION INTRAMUSCULAR; INTRAVENOUS; SUBCUTANEOUS at 10:42

## 2021-11-16 RX ADMIN — HEPARIN SODIUM 5000 UNIT(S): 5000 INJECTION INTRAVENOUS; SUBCUTANEOUS at 21:53

## 2021-11-16 RX ADMIN — CALCITRIOL 0.25 MICROGRAM(S): 0.5 CAPSULE ORAL at 13:39

## 2021-11-16 RX ADMIN — PANTOPRAZOLE SODIUM 40 MILLIGRAM(S): 20 TABLET, DELAYED RELEASE ORAL at 05:07

## 2021-11-16 RX ADMIN — Medication 650 MILLIGRAM(S): at 06:00

## 2021-11-16 RX ADMIN — Medication 1300 MILLIGRAM(S): at 21:51

## 2021-11-16 RX ADMIN — Medication 650 MILLIGRAM(S): at 23:01

## 2021-11-16 RX ADMIN — INSULIN GLARGINE 10 UNIT(S): 100 INJECTION, SOLUTION SUBCUTANEOUS at 21:52

## 2021-11-16 RX ADMIN — HYDROMORPHONE HYDROCHLORIDE 0.25 MILLIGRAM(S): 2 INJECTION INTRAMUSCULAR; INTRAVENOUS; SUBCUTANEOUS at 21:50

## 2021-11-16 RX ADMIN — HEPARIN SODIUM 5000 UNIT(S): 5000 INJECTION INTRAVENOUS; SUBCUTANEOUS at 05:09

## 2021-11-16 RX ADMIN — GABAPENTIN 300 MILLIGRAM(S): 400 CAPSULE ORAL at 05:07

## 2021-11-16 RX ADMIN — Medication 8: at 17:20

## 2021-11-16 RX ADMIN — Medication 25 MILLIGRAM(S): at 05:07

## 2021-11-16 RX ADMIN — Medication 1300 MILLIGRAM(S): at 13:40

## 2021-11-16 RX ADMIN — HYDROMORPHONE HYDROCHLORIDE 0.25 MILLIGRAM(S): 2 INJECTION INTRAMUSCULAR; INTRAVENOUS; SUBCUTANEOUS at 22:45

## 2021-11-16 RX ADMIN — HEPARIN SODIUM 5000 UNIT(S): 5000 INJECTION INTRAVENOUS; SUBCUTANEOUS at 13:40

## 2021-11-16 RX ADMIN — Medication 650 MILLIGRAM(S): at 13:41

## 2021-11-16 RX ADMIN — HYDROMORPHONE HYDROCHLORIDE 0.25 MILLIGRAM(S): 2 INJECTION INTRAMUSCULAR; INTRAVENOUS; SUBCUTANEOUS at 13:54

## 2021-11-16 RX ADMIN — Medication 10: at 12:22

## 2021-11-16 RX ADMIN — HYDROMORPHONE HYDROCHLORIDE 0.25 MILLIGRAM(S): 2 INJECTION INTRAMUSCULAR; INTRAVENOUS; SUBCUTANEOUS at 16:20

## 2021-11-16 RX ADMIN — CHLORHEXIDINE GLUCONATE 1 APPLICATION(S): 213 SOLUTION TOPICAL at 06:41

## 2021-11-16 RX ADMIN — Medication 650 MILLIGRAM(S): at 00:08

## 2021-11-16 RX ADMIN — SENNA PLUS 2 TABLET(S): 8.6 TABLET ORAL at 21:50

## 2021-11-16 RX ADMIN — HYDROMORPHONE HYDROCHLORIDE 0.25 MILLIGRAM(S): 2 INJECTION INTRAMUSCULAR; INTRAVENOUS; SUBCUTANEOUS at 12:54

## 2021-11-16 RX ADMIN — HYDROMORPHONE HYDROCHLORIDE 0.25 MILLIGRAM(S): 2 INJECTION INTRAMUSCULAR; INTRAVENOUS; SUBCUTANEOUS at 18:48

## 2021-11-16 RX ADMIN — Medication 500 MILLIGRAM(S): at 12:23

## 2021-11-16 RX ADMIN — Medication 14: at 08:24

## 2021-11-16 RX ADMIN — Medication 650 MILLIGRAM(S): at 23:22

## 2021-11-16 RX ADMIN — Medication 325 MILLIGRAM(S): at 12:24

## 2021-11-16 RX ADMIN — Medication 25 MILLIGRAM(S): at 13:40

## 2021-11-16 RX ADMIN — CLOPIDOGREL BISULFATE 75 MILLIGRAM(S): 75 TABLET, FILM COATED ORAL at 12:24

## 2021-11-16 RX ADMIN — HYDROMORPHONE HYDROCHLORIDE 0.25 MILLIGRAM(S): 2 INJECTION INTRAMUSCULAR; INTRAVENOUS; SUBCUTANEOUS at 19:30

## 2021-11-16 RX ADMIN — Medication 1300 MILLIGRAM(S): at 05:08

## 2021-11-16 RX ADMIN — Medication 1 TABLET(S): at 05:08

## 2021-11-16 RX ADMIN — ATORVASTATIN CALCIUM 80 MILLIGRAM(S): 80 TABLET, FILM COATED ORAL at 21:51

## 2021-11-16 RX ADMIN — Medication 650 MILLIGRAM(S): at 05:08

## 2021-11-16 RX ADMIN — GABAPENTIN 300 MILLIGRAM(S): 400 CAPSULE ORAL at 17:02

## 2021-11-16 RX ADMIN — Medication 650 MILLIGRAM(S): at 12:24

## 2021-11-16 RX ADMIN — Medication 650 MILLIGRAM(S): at 17:02

## 2021-11-16 NOTE — PROGRESS NOTE ADULT - SUBJECTIVE AND OBJECTIVE BOX
VASCULAR SURGERY PROGRESS NOTE    CC: left leg pain with absent pulses   Hospital Day # 16  Post-Op Day # 8    Procedure: S/P left fem-tib bypass using reversed Left GSV    Events of past 24 hours: Patient received 1 unit of blood overnight as nephrology was worried about hgb of 7.2. Repeat 11 am CBC pending.       ROS otherwise negative except per subjective and HPI      PAST MEDICAL & SURGICAL HISTORY:  PVD (peripheral vascular disease)    Benign essential HTN    Intellectual disability    Hearing impaired    HLD (hyperlipidemia)        Vital Signs Last 24 Hrs  T(C): 36.8 (16 Nov 2021 08:51), Max: 37.1 (16 Nov 2021 01:00)  T(F): 98.3 (16 Nov 2021 08:51), Max: 98.8 (16 Nov 2021 01:00)  HR: 77 (16 Nov 2021 08:51) (70 - 77)  BP: 133/62 (16 Nov 2021 08:51) (111/56 - 146/66)  BP(mean): --  RR: 20 (16 Nov 2021 08:51) (18 - 20)  SpO2: 99% (16 Nov 2021 08:51) (95% - 100%)    Pain (0-10):            Pain Control Adequate: [] YES [] N    Diet:    I&O's Detail    15 Nov 2021 07:01  -  16 Nov 2021 07:00  --------------------------------------------------------  IN:  Total IN: 0 mL    OUT:    Indwelling Catheter - Urethral (mL): 1500 mL    Voided (mL): 1100 mL  Total OUT: 2600 mL    Total NET: -2600 mL      PHYSICAL EXAM    Appearance: Normal	  HEENT:   Normal oral mucosa, PERRL, EOMI	  Neck: Supple, - JVD; Carotid Bruit   Cardiovascular: Normal S1 S2, No JVD, No murmurs,   Respiratory: Lungs clear to auscultation/Decreased Breath Sounds/No Rales, Rhonchi, Wheezing	  Gastrointestinal:  Soft, Non-tender, + BS	  Skin: No rashes, No ecchymoses, No cyanosis  Extremities: left foot with wound vac per podiatry. Dressing is C/D/I down left leg. Patient has strong dopplerable DP/PT bilaterally.   Neurologic: Non-focal  Psychiatry: A & O x 3, Mood & affect appropriate          MEDICATIONS:   MEDICATIONS  (STANDING):  acetaminophen     Tablet .. 650 milliGRAM(s) Oral every 6 hours  amoxicillin  875 milliGRAM(s)/clavulanate 1 Tablet(s) Oral every 12 hours  ascorbic acid 500 milliGRAM(s) Oral daily  atorvastatin 80 milliGRAM(s) Oral at bedtime  calcitriol   Capsule 0.25 MICROGram(s) Oral daily  chlorhexidine 4% Liquid 1 Application(s) Topical daily  clopidogrel Tablet 75 milliGRAM(s) Oral daily  ferrous    sulfate 325 milliGRAM(s) Oral daily  gabapentin 300 milliGRAM(s) Oral every 12 hours  heparin   Injectable 5000 Unit(s) SubCutaneous every 8 hours  hydrALAZINE 25 milliGRAM(s) Oral every 8 hours  insulin lispro (ADMELOG) corrective regimen sliding scale   SubCutaneous Before meals and at bedtime  metoprolol succinate ER 25 milliGRAM(s) Oral daily  pantoprazole    Tablet 40 milliGRAM(s) Oral before breakfast  senna 2 Tablet(s) Oral at bedtime  sodium bicarbonate 1300 milliGRAM(s) Oral every 8 hours  tamsulosin 0.4 milliGRAM(s) Oral at bedtime    MEDICATIONS  (PRN):  HYDROmorphone  Injectable 0.25 milliGRAM(s) IV Push every 3 hours PRN Severe Pain (7 - 10)        LAB/STUDIES:                        7.2    8.72  )-----------( 288      ( 15 Nov 2021 18:52 )             22.5     11-15    139  |  103  |  58<H>  ----------------------------<  111<H>  4.6   |  19  |  3.2<H>    Ca    8.2<L>      15 Nov 2021 18:52  Phos  5.2     11-15  Mg     2.0     11-15      PT/INR - ( 15 Nov 2021 18:52 )   PT: 10.70 sec;   INR: 0.93 ratio         PTT - ( 15 Nov 2021 18:52 )  PTT:33.5 sec

## 2021-11-16 NOTE — CONSULT NOTE ADULT - CONSULT REASON
Co- Management for DM2
Left heel wound
"ckd3 increasing cr"
Pre-operative risk assessment
S/P left femoral to tibial bypass with autologous venous tissue

## 2021-11-16 NOTE — CONSULT NOTE ADULT - ASSESSMENT
## DM2 with PVD and nephropathy  - Patient due to her CKD 4 is not a candidate for oral hypoglycemic agents  - Discharge with Lantus basal insulin 10U at night and Insulin Lispro TID before each meal as described below  -200- 2 units  201-250-       4 units  251-300-       6 units  301-350-       8 units  351-400-      10 units  >400 -- take 12 units and call your doctor.     -Schedule an appointment for patient to see an endocrinologist within 1 week of discharge.   - Diabetic education    ##HTN  -Losartan held due to worsening creatinine  -C/w Hydralazine with holding parameters, consider hydralazine 25 mg BID    ## HLD  - Continue home statin    ## Anemia of chronic KD  -iron supplement  -Monitor H/H    Post up management per podiatry and vascular     Thank you for involving me in the care of this patient.     Thank you

## 2021-11-16 NOTE — PROGRESS NOTE ADULT - SUBJECTIVE AND OBJECTIVE BOX
Podiatry Progress Note    Subjective:   SEN LING is a 56y old Female who presents with a chief complaint of DFU (16 Nov 2021 12:34)  Seen pt w/ ; eval L foot; no strikethrough noted;     PAST MEDICAL & SURGICAL HISTORY:  PVD (peripheral vascular disease)  Benign essential HTN  Intellectual disability  Hearing impaired  HLD (hyperlipidemia)    Objective:  Vital Signs Last 24 Hrs  T(C): 36.8 (16 Nov 2021 08:51), Max: 37.1 (16 Nov 2021 01:00)  T(F): 98.3 (16 Nov 2021 08:51), Max: 98.8 (16 Nov 2021 01:00)  HR: 77 (16 Nov 2021 08:51) (70 - 77)  BP: 133/62 (16 Nov 2021 08:51) (111/56 - 146/66)  BP(mean): --  RR: 20 (16 Nov 2021 08:51) (18 - 20)  SpO2: 99% (16 Nov 2021 08:51) (95% - 100%)                        7.2    8.72  )-----------( 288      ( 15 Nov 2021 18:52 )             22.5                 11-15    139  |  103  |  58<H>  ----------------------------<  111<H>  4.6   |  19  |  3.2<H>    Ca    8.2<L>      15 Nov 2021 18:52  Phos  5.2     11-15  Mg     2.0     11-15    Physical Exam - Lower Extremity Focused:   #Left Foot  Derm:   Surgical wound base fibrogranular (60% granular, 40% fibrous); no active bleeding noted; improving;   Mild maceration to periwound and skin to proximal medial rearfoot  Wound probes less, compare to last evaluation  Vascular: DP and PT Pulses Diminished; Foot is Warm to Warm to the touch   Neuro: Protective Sensation Diminished / Moderately Neuropathic     Assessment:  Left heel wounds;   s/p excisional debridement w/ vac application 11/11/21    Plan:  Chart reviewed and Patient evaluated. All Questions and Concerns Addressed and Answered  Discussed diagnosis and treatment with patient and son  Pt hx of active bleeding on left heel; podiatry monitored pt's left heel dressing daily bases; no strikethrough noted;  Removed dressing; no active bleeding noted;   Applied Wound  mmHg without leakage; next VAC change plan on Thurs 11/18;   Podiatry will continue wound VAC while pt stays in-house; will change to wet to dry dressing prior to pt's discharge;   Will continue VAC therapy w/ Home VAC dressing; Home VAC paperwork in chart;   Request VNS to change home VAC properly;   Patient stable from Podiatry standpoint. Pt can follow up w/Dr. Llamas at [address] [place] [address] III, 1 week after DSC.   Weight bearing status; WBAT R foot; NWB L foot;   Discussed plan w/ Dr. Llamas    Podiatry

## 2021-11-16 NOTE — CONSULT NOTE ADULT - SUBJECTIVE AND OBJECTIVE BOX
SEN LING  56y, Female  Allergy: penicillin (Rash)      HPI:   Medical consult called for management of DM2  Patient is a 56 year old f with intellectual disability, hearing impairment, HLD, DM2 admitted for diabetic foot ulcer and is s/p L fem-fib bypass and ulcer debridement.   Her only complaint was post op pain when seen. She denied chest pain, sob, palpitations.     Sign language was used.     REVIEW OF SYSTEMS:    CONSTITUTIONAL: No weakness, fevers or chills  EYES/ENT: No visual changes;  No vertigo or throat pain   NECK: No pain or stiffness  RESPIRATORY: No cough, wheezing, hemoptysis; No shortness of breath  CARDIOVASCULAR: No chest pain or palpitations  GASTROINTESTINAL: No abdominal or epigastric pain. No nausea, vomiting, or hematemesis; No diarrhea or constipation. No melena or hematochezia.  GENITOURINARY: No dysuria, frequency or hematuria  NEUROLOGICAL: No numbness or weakness  SKIN: No itching, rashes    PAST MEDICAL & SURGICAL HISTORY:  PVD (peripheral vascular disease)  Benign essential HTN  Intellectual disability  Hearing impaired  HLD (hyperlipidemia)    VITALS:  T(F): 96.9 (11-16-21 @ 13:26), Max: 98.8 (11-16-21 @ 01:00)  HR: 75 (11-16-21 @ 13:26)  BP: 137/62 (11-16-21 @ 13:26) (125/75 - 146/66)  RR: 18 (11-16-21 @ 13:26)  SpO2: 97% (11-16-21 @ 13:26)    HEENT:   Moist oral mucosa 	  Cardiovascular: Normal S1 S2, No JVD, No murmurs,   Respiratory: Lungs clear to auscultation   Gastrointestinal:  Soft, Non-tender, + BS	  Skin: No rashes, No ecchymoses, No cyanosis  Extremities: left foot with wound vac per podiatry. Dressing is C/D/I down left leg. Patient has strong   Psychiatry: A & O x 3     TESTS & MEASUREMENTS:      11-14-21 @ 07:01  -  11-15-21 @ 07:00  --------------------------------------------------------  IN: 870 mL / OUT: 3550 mL / NET: -2680 mL    11-15-21 @ 07:01  -  11-16-21 @ 07:00  --------------------------------------------------------  IN: 0 mL / OUT: 2600 mL / NET: -2600 mL                            8.3    9.81  )-----------( 278      ( 16 Nov 2021 11:00 )             26.0     PT/INR - ( 15 Nov 2021 18:52 )   PT: 10.70 sec;   INR: 0.93 ratio         PTT - ( 15 Nov 2021 18:52 )  PTT:33.5 sec  11-15    139  |  103  |  58<H>  ----------------------------<  111<H>  4.6   |  19  |  3.2<H>    Ca    8.2<L>      15 Nov 2021 18:52  Phos  5.2     11-15  Mg     2.0     11-15      Culture - Acid Fast - Tissue w/Smear (collected 11-11-21 @ 11:58)  Source: .Tissue None  Preliminary Report (11-13-21 @ 15:04):    Culture is being performed.    Culture - Fungal, Tissue (collected 11-11-21 @ 11:58)  Source: .Tissue None  Preliminary Report (11-16-21 @ 13:32):    Rare Candida parapsilosis    Culture - Tissue with Gram Stain (collected 11-11-21 @ 11:58)  Source: .Tissue None  Gram Stain (11-12-21 @ 21:36):    Upon re-evaluation of gram stain:    No polymorphonuclear cells seen per low power field    Rare Gram Negative Rods per oil power field  Preliminary Report (11-13-21 @ 18:44):    Moderate Escherichia coli    Rare Enterococcus faecalis (vancomycin resistant)    Rare Candida parapsilosis "Susceptibilities not performed"  Organism: Escherichia coli  Enterococcus faecalis (vancomycin resistant) (11-13-21 @ 16:49)  Organism: Enterococcus faecalis (vancomycin resistant) (11-13-21 @ 16:49)      -  Ampicillin: S <=2 Predicts results to ampicillin/sulbactam, amoxacillin-clavulanate and  piperacillin-tazobactam.      -  Daptomycin: S 0.5      -  Levofloxacin: R >4      -  Linezolid: S 1      -  Tetra/Doxy: R >8      -  Vancomycin: R >16      Method Type: RASHID  Organism: Escherichia coli (11-13-21 @ 16:49)      -  Amikacin: S <=16      -  Amoxicillin/Clavulanic Acid: S <=8/4      -  Ampicillin: S <=8 These ampicillin results predict results for amoxicillin      -  Ampicillin/Sulbactam: S <=4/2 Enterobacter, Klebsiella aerogenes, Citrobacter, and Serratia may develop resistance during prolonged therapy (3-4 days)      -  Aztreonam: S <=4      -  Cefazolin: S <=2 Enterobacter, Klebsiella aerogenes, Citrobacter, and Serratia may develop resistance during prolonged therapy (3-4 days)      -  Cefepime: S <=2      -  Cefoxitin: S <=8      -  Ceftriaxone: S <=1 Enterobacter, Klebsiella aerogenes, Citrobacter, and Serratia may develop resistance during prolonged therapy      -  Ciprofloxacin: S <=0.25      -  Ertapenem: S <=0.5      -  Gentamicin: S <=2      -  Imipenem: S <=1      -  Levofloxacin: S <=0.5      -  Meropenem: S <=1      -  Piperacillin/Tazobactam: S <=8      -  Tobramycin: S <=2      -  Trimethoprim/Sulfamethoxazole: S <=0.5/9.5      Method Type: RASHID    Culture - Urine (collected 11-10-21 @ 15:55)  Source: Catheterized Catheterized  Final Report (11-12-21 @ 17:26):    >100,000 CFU/ml Escherichia coli  Organism: Escherichia coli (11-12-21 @ 17:26)  Organism: Escherichia coli (11-12-21 @ 17:26)      -  Amikacin: S <=16      -  Amoxicillin/Clavulanic Acid: S <=8/4      -  Ampicillin: S <=8 These ampicillin results predict results for amoxicillin      -  Ampicillin/Sulbactam: S <=4/2 Enterobacter, Klebsiella aerogenes, Citrobacter, and Serratia may develop resistance during prolonged therapy (3-4 days)      -  Aztreonam: S <=4      -  Cefazolin: S <=2 ([id number]_[id number]) For uncomplicated UTI with K. pneumoniae, E. coli, or P. mirablis: RASHID <=16 is sensitive and RASHID >=32 is resistant. This also predicts results for oral agents cefaclor, cefdinir, cefpodoxime, cefprozil, cefuroxime axetil, cephalexin and locarbef for uncomplicated UTI. Note that some isolates may be susceptible to these agents while testing resistant to cefazolin.      -  Cefepime: S <=2      -  Cefoxitin: S <=8      -  Ceftriaxone: S <=1 Enterobacter, Klebsiella aerogenes, Citrobacter, and Serratia may develop resistance during prolonged therapy      -  Ciprofloxacin: S <=0.25      -  Ertapenem: S <=0.5      -  Gentamicin: S <=2      -  Imipenem: S <=1      -  Levofloxacin: S <=0.5      -  Meropenem: S <=1      -  Nitrofurantoin: S <=32 Should not be used to treat pyelonephritis      -  Piperacillin/Tazobactam: S <=8      -  Tigecycline: S <=2      -  Tobramycin: S <=2      -  Trimethoprim/Sulfamethoxazole: S <=0.5/9.5      Method Type: RASHID          RADIOLOGY & ADDITIONAL TESTS:    MEDICATIONS:  MEDICATIONS  (STANDING):  acetaminophen     Tablet .. 650 milliGRAM(s) Oral every 6 hours  amoxicillin  875 milliGRAM(s)/clavulanate 1 Tablet(s) Oral every 12 hours  ascorbic acid 500 milliGRAM(s) Oral daily  atorvastatin 80 milliGRAM(s) Oral at bedtime  calcitriol   Capsule 0.25 MICROGram(s) Oral daily  chlorhexidine 4% Liquid 1 Application(s) Topical daily  clopidogrel Tablet 75 milliGRAM(s) Oral daily  ferrous    sulfate 325 milliGRAM(s) Oral daily  gabapentin 300 milliGRAM(s) Oral every 12 hours  heparin   Injectable 5000 Unit(s) SubCutaneous every 8 hours  hydrALAZINE 25 milliGRAM(s) Oral every 8 hours  insulin lispro (ADMELOG) corrective regimen sliding scale   SubCutaneous Before meals and at bedtime  metoprolol succinate ER 25 milliGRAM(s) Oral daily  pantoprazole    Tablet 40 milliGRAM(s) Oral before breakfast  senna 2 Tablet(s) Oral at bedtime  sodium bicarbonate 1300 milliGRAM(s) Oral every 8 hours  tamsulosin 0.4 milliGRAM(s) Oral at bedtime    MEDICATIONS  (PRN):  HYDROmorphone  Injectable 0.25 milliGRAM(s) IV Push every 3 hours PRN Severe Pain (7 - 10)      HEALTH ISSUES - PROBLEM Dx:          Case discussed in details with:

## 2021-11-16 NOTE — PROGRESS NOTE ADULT - SUBJECTIVE AND OBJECTIVE BOX
Nephrology progress note    Patient is seen and examined, events over the last 24 h noted .    Allergies:  penicillin (Rash)    Hospital Medications:   MEDICATIONS  (STANDING):  acetaminophen     Tablet .. 650 milliGRAM(s) Oral every 6 hours  amoxicillin  875 milliGRAM(s)/clavulanate 1 Tablet(s) Oral every 12 hours  ascorbic acid 500 milliGRAM(s) Oral daily  atorvastatin 80 milliGRAM(s) Oral at bedtime  calcitriol   Capsule 0.25 MICROGram(s) Oral daily  chlorhexidine 4% Liquid 1 Application(s) Topical daily  clopidogrel Tablet 75 milliGRAM(s) Oral daily  ferrous    sulfate 325 milliGRAM(s) Oral daily  gabapentin 300 milliGRAM(s) Oral every 12 hours  heparin   Injectable 5000 Unit(s) SubCutaneous every 8 hours  hydrALAZINE 25 milliGRAM(s) Oral every 8 hours  insulin lispro (ADMELOG) corrective regimen sliding scale   SubCutaneous Before meals and at bedtime  metoprolol succinate ER 25 milliGRAM(s) Oral daily  pantoprazole    Tablet 40 milliGRAM(s) Oral before breakfast  senna 2 Tablet(s) Oral at bedtime  sodium bicarbonate 1300 milliGRAM(s) Oral every 8 hours  tamsulosin 0.4 milliGRAM(s) Oral at bedtime        VITALS:  T(F): 98.3 (21 @ 08:51), Max: 98.8 (21 @ 01:00)  HR: 77 (21 @ 08:51)  BP: 133/62 (21 @ 08:51)  RR: 20 (21 @ 08:51)  SpO2: 99% (21 @ 08:51)  Wt(kg): --     @ 07:01  -  11-15 @ 07:00  --------------------------------------------------------  IN: 870 mL / OUT: 3550 mL / NET: -2680 mL    11-15 @ 07:01  -   @ 07:00  --------------------------------------------------------  IN: 0 mL / OUT: 2600 mL / NET: -2600 mL          PHYSICAL EXAM:  Constitutional: NAD  HEENT: anicteric sclera,   Neck: No JVD  Respiratory: CTA  Cardiovascular: S1, S2, RRR  Gastrointestinal: BS+, soft, NT/ND  Extremities: No peripheral edema  Neurological: Awake alert  : +mcgrath.   Skin: No rashes  Vascular Access:    LABS:  11-15    139  |  103  |  58<H>  ----------------------------<  111<H>  4.6   |  19  |  3.2<H>    Ca    8.2<L>      15 Nov 2021 18:52  Phos  5.2     11-15  Mg     2.0     11-15                            7.2    8.72  )-----------( 288      ( 15 Nov 2021 18:52 )             22.5       Urine Studies:  Urinalysis Basic - ( 10 Nov 2021 14:51 )    Color: Yellow / Appearance: Turbid / S.014 / pH:   Gluc:  / Ketone: Negative  / Bili: Negative / Urobili: <2 mg/dL   Blood:  / Protein: 300 mg/dL / Nitrite: Negative   Leuk Esterase: Large / RBC: 2 /HPF / WBC >720 /HPF   Sq Epi:  / Non Sq Epi: 1 /HPF / Bacteria: Many        RADIOLOGY & ADDITIONAL STUDIES:

## 2021-11-16 NOTE — PROGRESS NOTE ADULT - ASSESSMENT
ASSESSMENT:  56y year old Female s/p Fem-tib bypass using reversed Left GSV, patient is POD 8. Patient is comfortable in bed, doing well post op with no pain. Patient has dopplerable pulses bilaterally and wound vac in place on left leg by podiatry.     PLAN:  - Follow up with  regarding wound vac   - Possible wound vac change by podiatry today   - F/U 11 am CBC  - Hospitalist consult for medical comanagement   - Patient has [facility] appt and urology appointment set up.         VASCULAR TEAM SPECTRA: [phone]

## 2021-11-17 LAB
GLUCOSE BLDC GLUCOMTR-MCNC: 113 MG/DL — HIGH (ref 70–99)
GLUCOSE BLDC GLUCOMTR-MCNC: 164 MG/DL — HIGH (ref 70–99)
GLUCOSE BLDC GLUCOMTR-MCNC: 173 MG/DL — HIGH (ref 70–99)
GLUCOSE BLDC GLUCOMTR-MCNC: 222 MG/DL — HIGH (ref 70–99)
GLUCOSE BLDC GLUCOMTR-MCNC: 234 MG/DL — HIGH (ref 70–99)

## 2021-11-17 RX ORDER — LOSARTAN POTASSIUM 100 MG/1
1 TABLET, FILM COATED ORAL
Qty: 0 | Refills: 0 | DISCHARGE

## 2021-11-17 RX ORDER — INSULIN LISPRO 100/ML
4 VIAL (ML) SUBCUTANEOUS
Qty: 0 | Refills: 0 | DISCHARGE
Start: 2021-11-17

## 2021-11-17 RX ORDER — OXYCODONE HYDROCHLORIDE 5 MG/1
1 TABLET ORAL
Qty: 10 | Refills: 0
Start: 2021-11-17

## 2021-11-17 RX ORDER — ASPIRIN/CALCIUM CARB/MAGNESIUM 324 MG
1 TABLET ORAL
Qty: 20 | Refills: 0
Start: 2021-11-17

## 2021-11-17 RX ORDER — TAMSULOSIN HYDROCHLORIDE 0.4 MG/1
1 CAPSULE ORAL
Qty: 30 | Refills: 0
Start: 2021-11-17 | End: 2021-12-16

## 2021-11-17 RX ORDER — INSULIN GLARGINE 100 [IU]/ML
10 INJECTION, SOLUTION SUBCUTANEOUS
Qty: 10 | Refills: 3
Start: 2021-11-17

## 2021-11-17 RX ORDER — OXYCODONE HYDROCHLORIDE 5 MG/1
5 TABLET ORAL EVERY 6 HOURS
Refills: 0 | Status: DISCONTINUED | OUTPATIENT
Start: 2021-11-17 | End: 2021-11-19

## 2021-11-17 RX ORDER — HYDRALAZINE HCL 50 MG
1 TABLET ORAL
Qty: 90 | Refills: 2
Start: 2021-11-17 | End: 2022-02-14

## 2021-11-17 RX ORDER — ASCORBIC ACID 60 MG
1 TABLET,CHEWABLE ORAL
Qty: 30 | Refills: 0
Start: 2021-11-17 | End: 2021-12-16

## 2021-11-17 RX ORDER — FERROUS SULFATE 325(65) MG
1 TABLET ORAL
Qty: 30 | Refills: 2
Start: 2021-11-17 | End: 2022-02-14

## 2021-11-17 RX ADMIN — GABAPENTIN 300 MILLIGRAM(S): 400 CAPSULE ORAL at 18:07

## 2021-11-17 RX ADMIN — Medication 6: at 12:16

## 2021-11-17 RX ADMIN — OXYCODONE HYDROCHLORIDE 5 MILLIGRAM(S): 5 TABLET ORAL at 21:29

## 2021-11-17 RX ADMIN — Medication 1300 MILLIGRAM(S): at 05:22

## 2021-11-17 RX ADMIN — CALCITRIOL 0.25 MICROGRAM(S): 0.5 CAPSULE ORAL at 12:17

## 2021-11-17 RX ADMIN — Medication 25 MILLIGRAM(S): at 21:29

## 2021-11-17 RX ADMIN — OXYCODONE HYDROCHLORIDE 5 MILLIGRAM(S): 5 TABLET ORAL at 10:40

## 2021-11-17 RX ADMIN — Medication 500 MILLIGRAM(S): at 12:17

## 2021-11-17 RX ADMIN — SENNA PLUS 2 TABLET(S): 8.6 TABLET ORAL at 21:30

## 2021-11-17 RX ADMIN — HEPARIN SODIUM 5000 UNIT(S): 5000 INJECTION INTRAVENOUS; SUBCUTANEOUS at 05:38

## 2021-11-17 RX ADMIN — HEPARIN SODIUM 5000 UNIT(S): 5000 INJECTION INTRAVENOUS; SUBCUTANEOUS at 21:29

## 2021-11-17 RX ADMIN — Medication 1 TABLET(S): at 05:20

## 2021-11-17 RX ADMIN — HEPARIN SODIUM 5000 UNIT(S): 5000 INJECTION INTRAVENOUS; SUBCUTANEOUS at 14:05

## 2021-11-17 RX ADMIN — Medication 1 TABLET(S): at 18:05

## 2021-11-17 RX ADMIN — Medication 650 MILLIGRAM(S): at 05:21

## 2021-11-17 RX ADMIN — Medication 650 MILLIGRAM(S): at 05:38

## 2021-11-17 RX ADMIN — Medication 25 MILLIGRAM(S): at 05:21

## 2021-11-17 RX ADMIN — Medication 325 MILLIGRAM(S): at 12:17

## 2021-11-17 RX ADMIN — INSULIN GLARGINE 10 UNIT(S): 100 INJECTION, SOLUTION SUBCUTANEOUS at 21:32

## 2021-11-17 RX ADMIN — Medication 25 MILLIGRAM(S): at 14:04

## 2021-11-17 RX ADMIN — Medication 650 MILLIGRAM(S): at 18:05

## 2021-11-17 RX ADMIN — Medication 10: at 18:10

## 2021-11-17 RX ADMIN — Medication 1300 MILLIGRAM(S): at 14:04

## 2021-11-17 RX ADMIN — ATORVASTATIN CALCIUM 80 MILLIGRAM(S): 80 TABLET, FILM COATED ORAL at 21:30

## 2021-11-17 RX ADMIN — CLOPIDOGREL BISULFATE 75 MILLIGRAM(S): 75 TABLET, FILM COATED ORAL at 12:17

## 2021-11-17 RX ADMIN — Medication 1300 MILLIGRAM(S): at 21:30

## 2021-11-17 RX ADMIN — CHLORHEXIDINE GLUCONATE 1 APPLICATION(S): 213 SOLUTION TOPICAL at 05:38

## 2021-11-17 RX ADMIN — GABAPENTIN 300 MILLIGRAM(S): 400 CAPSULE ORAL at 05:22

## 2021-11-17 RX ADMIN — PANTOPRAZOLE SODIUM 40 MILLIGRAM(S): 20 TABLET, DELAYED RELEASE ORAL at 05:21

## 2021-11-17 RX ADMIN — Medication 650 MILLIGRAM(S): at 23:24

## 2021-11-17 RX ADMIN — TAMSULOSIN HYDROCHLORIDE 0.4 MILLIGRAM(S): 0.4 CAPSULE ORAL at 21:31

## 2021-11-17 RX ADMIN — OXYCODONE HYDROCHLORIDE 5 MILLIGRAM(S): 5 TABLET ORAL at 11:10

## 2021-11-17 RX ADMIN — Medication 650 MILLIGRAM(S): at 12:17

## 2021-11-17 RX ADMIN — Medication 6: at 07:53

## 2021-11-17 NOTE — PROGRESS NOTE ADULT - ASSESSMENT
ASSESSMENT:  57 y/o female s/p left femoral to tibial bypass using RSVG    PLAN:  - follow up podiatry  - patient needs wound vac to left foot wound  - pain control  - incentive spirometry  - encourage ambulation    spectra [phone]

## 2021-11-17 NOTE — PROGRESS NOTE ADULT - SUBJECTIVE AND OBJECTIVE BOX
GENERAL SURGERY PROGRESS NOTE    Patient: SEN LING , 56y ([birth year])Female   MRN: [id number]  Location: [address]  Visit: 11-01-21 Inpatient  Date: 11-17-21 @ 03:56    Hospital Day #:  Post-Op Day #:    Procedure/Dx/Injurie    Events of past 24 hours: s/p 1 unit of prbc    PAST MEDICAL & SURGICAL HISTORY:  PVD (peripheral vascular disease)    Benign essential HTN    Intellectual disability    Hearing impaired    HLD (hyperlipidemia)        Vitals:   T(F): 97.3 (11-17-21 @ 00:54), Max: 98.6 (11-16-21 @ 17:17)  HR: 69 (11-17-21 @ 00:54)  BP: 143/63 (11-17-21 @ 00:54)  RR: 18 (11-17-21 @ 00:54)  SpO2: 98% (11-17-21 @ 00:54)      Diet, Consistent Carbohydrate Renal w/Evening Snack      Fluids:     I & O's:    11-15-21 @ 07:01  -  11-16-21 @ 07:00  --------------------------------------------------------  IN:  Total IN: 0 mL    OUT:    Indwelling Catheter - Urethral (mL): 1500 mL    Voided (mL): 1100 mL  Total OUT: 2600 mL    Total NET: -2600 mL        Bowel Movement: : [] YES [] NO  Flatus: : [] YES [] NO    PHYSICAL EXAM:  General: NAD,  Cardiac: RRR S1, S2,   Respiratory: CTAB,  Abdomen: Soft, non-distended, non-tender,   Vascular: extremities warm, well perfused dressing in place clean/dry/intact    MEDICATIONS  (STANDING):  acetaminophen     Tablet .. 650 milliGRAM(s) Oral every 6 hours  amoxicillin  875 milliGRAM(s)/clavulanate 1 Tablet(s) Oral every 12 hours  ascorbic acid 500 milliGRAM(s) Oral daily  atorvastatin 80 milliGRAM(s) Oral at bedtime  calcitriol   Capsule 0.25 MICROGram(s) Oral daily  chlorhexidine 4% Liquid 1 Application(s) Topical daily  clopidogrel Tablet 75 milliGRAM(s) Oral daily  dextrose 5%. 1000 milliLiter(s) (100 mL/Hr) IV Continuous <Continuous>  ferrous    sulfate 325 milliGRAM(s) Oral daily  gabapentin 300 milliGRAM(s) Oral every 12 hours  glucagon  Injectable 1 milliGRAM(s) IntraMuscular once  heparin   Injectable 5000 Unit(s) SubCutaneous every 8 hours  hydrALAZINE 25 milliGRAM(s) Oral every 8 hours  insulin glargine Injectable (LANTUS) 10 Unit(s) SubCutaneous at bedtime  insulin lispro (ADMELOG) corrective regimen sliding scale   SubCutaneous Before meals and at bedtime  metoprolol succinate ER 25 milliGRAM(s) Oral daily  pantoprazole    Tablet 40 milliGRAM(s) Oral before breakfast  senna 2 Tablet(s) Oral at bedtime  sodium bicarbonate 1300 milliGRAM(s) Oral every 8 hours  tamsulosin 0.4 milliGRAM(s) Oral at bedtime    MEDICATIONS  (PRN):      DVT PROPHYLAXIS: heparin   Injectable 5000 Unit(s) SubCutaneous every 8 hours    GI PROPHYLAXIS: pantoprazole    Tablet 40 milliGRAM(s) Oral before breakfast    ANTICOAGULATION:   ANTIBIOTICS:  amoxicillin  875 milliGRAM(s)/clavulanate 1 Tablet(s)            LAB/STUDIES:  Labs:  CAPILLARY BLOOD GLUCOSE      POCT Blood Glucose.: 136 mg/dL (16 Nov 2021 21:33)  POCT Blood Glucose.: 192 mg/dL (16 Nov 2021 16:57)  POCT Blood Glucose.: 226 mg/dL (16 Nov 2021 11:29)  POCT Blood Glucose.: 276 mg/dL (16 Nov 2021 07:27)                          8.3    9.81  )-----------( 278      ( 16 Nov 2021 11:00 )             26.0         11-15    139  |  103  |  58<H>  ----------------------------<  111<H>  4.6   |  19  |  3.2<H>          LFTs:         Coags:     10.70  ----< 0.93    ( 15 Nov 2021 18:52 )     33.5        IMAGING:      ACCESS/ DEVICES:  [x ] Peripheral IV  [ ] Central Venous Line	[ ] R	[ ] L	[ ] IJ	[ ] Fem	[ ] SC	Placed:   [ ] Arterial Line		[ ] R	[ ] L	[ ] Fem	[ ] Rad	[ ] Ax	Placed:   [ ] PICC:					[ ] Mediport  [ ] Urinary Catheter,  Date Placed:   [ ] Chest tube: [ ] Right, [ ] Left  [ ] PÉREZ/Panfilo Drains

## 2021-11-17 NOTE — PROGRESS NOTE ADULT - ASSESSMENT
55 yo female PMH CKD4 PVD, smoker, HTN, HLD, h/o right pontine CVA (2/7/21), DM,  intellectual disability, hearing/speech impairment presents with chronic left heel ulcer and pain. Recent duplex on 10/25/21 showed no significant arterial blood flow visualized beyond the left superficial femoral artery.  Has stable CKD4 at least since 2/21; saw nephrologist in [place]  # CKD 4, creat rising polyuric  # Proteinuria, nephrotic range / likely d/t HTN/DM  # Hx of HTN  # Hx of DM  # DFU / PAD - s/p fem-tib bypass on 11/8 / s/o debridement  # Anemia - hgb improved w/ pRBC      - please repeat chemistry  - renal diet  - dose meds per egfr  - cont po bicarb 1300 tid   - hold hydralazine if hypotensive   - OLVIN weak positive/ serum flc ratio wnl; can have further w/u for proteinuria outpatient  - phos noted, does not need binder

## 2021-11-17 NOTE — PROGRESS NOTE ADULT - SUBJECTIVE AND OBJECTIVE BOX
Nephrology Progress Note    SEN LING  MRN-[id number]  56y  Female    S:  Patient is seen and examined, events over the last 24h noted.    O:  Allergies:  penicillin (Rash)    Hospital Medications:   MEDICATIONS  (STANDING):  acetaminophen     Tablet .. 650 milliGRAM(s) Oral every 6 hours  amoxicillin  875 milliGRAM(s)/clavulanate 1 Tablet(s) Oral every 12 hours  ascorbic acid 500 milliGRAM(s) Oral daily  atorvastatin 80 milliGRAM(s) Oral at bedtime  calcitriol   Capsule 0.25 MICROGram(s) Oral daily  chlorhexidine 4% Liquid 1 Application(s) Topical daily  clopidogrel Tablet 75 milliGRAM(s) Oral daily  dextrose 5%. 1000 milliLiter(s) (100 mL/Hr) IV Continuous <Continuous>  ferrous    sulfate 325 milliGRAM(s) Oral daily  gabapentin 300 milliGRAM(s) Oral every 12 hours  glucagon  Injectable 1 milliGRAM(s) IntraMuscular once  heparin   Injectable 5000 Unit(s) SubCutaneous every 8 hours  hydrALAZINE 25 milliGRAM(s) Oral every 8 hours  insulin glargine Injectable (LANTUS) 10 Unit(s) SubCutaneous at bedtime  insulin lispro (ADMELOG) corrective regimen sliding scale   SubCutaneous Before meals and at bedtime  metoprolol succinate ER 25 milliGRAM(s) Oral daily  pantoprazole    Tablet 40 milliGRAM(s) Oral before breakfast  senna 2 Tablet(s) Oral at bedtime  sodium bicarbonate 1300 milliGRAM(s) Oral every 8 hours  tamsulosin 0.4 milliGRAM(s) Oral at bedtime    MEDICATIONS  (PRN):  oxyCODONE    IR 5 milliGRAM(s) Oral every 6 hours PRN Severe Pain (7 - 10)    Home Medications:  aspirin 81 mg oral tablet: 1 tab(s) orally once a day (01 Nov 2021 16:07)  atorvastatin 80 mg oral tablet: 1 tab(s) orally once a day (01 Nov 2021 16:06)  calcitriol 0.25 mcg oral capsule: 1 cap(s) orally once a day (01 Nov 2021 16:05)  gabapentin 300 mg oral capsule: 1 cap(s) orally 3 times a day (01 Nov 2021 16:06)  insulin lispro 100 units/mL injectable solution: 4 unit(s) injectable 3 times a day (before meals) (17 Nov 2021 13:20)  Metoprolol Succinate ER 25 mg oral tablet, extended release: 1 tab(s) orally once a day (01 Nov 2021 16:07)  Plavix 75 mg oral tablet: 1 tab(s) orally once a day (01 Nov 2021 16:07)  Protonix 40 mg oral delayed release tablet: 1 tab(s) orally once a day (01 Nov 2021 16:07)      VITALS:  Daily     Daily   T(F): 98 (11-17-21 @ 20:45), Max: 98 (11-17-21 @ 20:45)  HR: 77 (11-17-21 @ 20:45)  BP: 146/63 (11-17-21 @ 20:45)  RR: 18 (11-17-21 @ 20:45)  SpO2: 98% (11-17-21 @ 20:45)  Wt(kg): --  I&O's Detail    16 Nov 2021 07:01  -  17 Nov 2021 07:00  --------------------------------------------------------  IN:    Oral Fluid: 1160 mL  Total IN: 1160 mL    OUT:    Indwelling Catheter - Urethral (mL): 500 mL  Total OUT: 500 mL    Total NET: 660 mL      17 Nov 2021 07:01  -  17 Nov 2021 22:55  --------------------------------------------------------  IN:    Oral Fluid: 480 mL  Total IN: 480 mL    OUT:    Indwelling Catheter - Urethral (mL): 2500 mL  Total OUT: 2500 mL    Total NET: -2020 mL        I&O's Summary    16 Nov 2021 07:01  -  17 Nov 2021 07:00  --------------------------------------------------------  IN: 1160 mL / OUT: 500 mL / NET: 660 mL    17 Nov 2021 07:01  -  17 Nov 2021 22:55  --------------------------------------------------------  IN: 480 mL / OUT: 2500 mL / NET: -2020 mL          PHYSICAL EXAM:  Gen: NAD  Resp: CTAB  Card: S1/S2  Abd: soft  Extremities: no edema      LABS:      Basic Metabolic Panel (11.15.21 @ 18:52)    Sodium, Serum: 139 mmol/L    Potassium, Serum: 4.6 mmol/L    Chloride, Serum: 103 mmol/L    Carbon Dioxide, Serum: 19 mmol/L    Anion Gap, Serum: 17 mmol/L    Blood Urea Nitrogen, Serum: 58 mg/dL    Creatinine, Serum: 3.2 mg/dL    Glucose, Serum: 111 mg/dL    Calcium, Total Serum: 8.2 mg/dL    eGFR if Non African American: 15 mL/min/1.73M2                              8.3    9.81  )-----------( 278      ( 16 Nov 2021 11:00 )             26.0     Mean Cell Volume: 89.3 fL (11-16-21 @ 11:00)          Culture Results:   Moderate Escherichia coli  Rare Enterococcus faecalis (vancomycin resistant)  Rare Candida parapsilosis "Susceptibilities not performed" (11-11 @ 11:58)  Culture Results:   Rare Candida parapsilosis (11-11 @ 11:58)    Creatinine trend:  Creatinine, Serum: 3.2 mg/dL (11-15-21 @ 18:52)  Creatinine, Serum: 2.9 mg/dL (11-14-21 @ 21:39)  Creatinine, Serum: 3.1 mg/dL (11-13-21 @ 22:28)  Creatinine, Serum: 2.8 mg/dL (11-12-21 @ 20:00)  Creatinine, Serum: 2.8 mg/dL (11-11-21 @ 23:07)  Creatinine, Serum: 3.0 mg/dL (11-11-21 @ 00:28)  Creatinine, Serum: 2.7 mg/dL (11-10-21 @ 00:16)

## 2021-11-18 LAB
DEPRECATED GELATIN IGE RAST QL: 0 — SIGNIFICANT CHANGE UP
GELATIN IGE QN: <0.1 KUA/L — SIGNIFICANT CHANGE UP
GLUCOSE BLDC GLUCOMTR-MCNC: 131 MG/DL — HIGH (ref 70–99)
GLUCOSE BLDC GLUCOMTR-MCNC: 140 MG/DL — HIGH (ref 70–99)
GLUCOSE BLDC GLUCOMTR-MCNC: 166 MG/DL — HIGH (ref 70–99)
GLUCOSE BLDC GLUCOMTR-MCNC: 232 MG/DL — HIGH (ref 70–99)
GLUCOSE BLDC GLUCOMTR-MCNC: 239 MG/DL — HIGH (ref 70–99)
TOTAL IGE SMQN RAST: SIGNIFICANT CHANGE UP

## 2021-11-18 RX ORDER — FERROUS SULFATE 325(65) MG
1 TABLET ORAL
Qty: 30 | Refills: 2
Start: 2021-11-18 | End: 2022-02-15

## 2021-11-18 RX ORDER — INSULIN GLARGINE 100 [IU]/ML
10 INJECTION, SOLUTION SUBCUTANEOUS
Qty: 10 | Refills: 3
Start: 2021-11-18

## 2021-11-18 RX ORDER — TAMSULOSIN HYDROCHLORIDE 0.4 MG/1
1 CAPSULE ORAL
Qty: 30 | Refills: 0
Start: 2021-11-18 | End: 2021-12-17

## 2021-11-18 RX ORDER — ASCORBIC ACID 60 MG
1 TABLET,CHEWABLE ORAL
Qty: 30 | Refills: 0
Start: 2021-11-18 | End: 2021-12-17

## 2021-11-18 RX ORDER — OXYCODONE HYDROCHLORIDE 5 MG/1
1 TABLET ORAL
Qty: 10 | Refills: 0
Start: 2021-11-18

## 2021-11-18 RX ADMIN — Medication 1 TABLET(S): at 05:25

## 2021-11-18 RX ADMIN — Medication 1 TABLET(S): at 18:11

## 2021-11-18 RX ADMIN — SENNA PLUS 2 TABLET(S): 8.6 TABLET ORAL at 21:30

## 2021-11-18 RX ADMIN — HEPARIN SODIUM 5000 UNIT(S): 5000 INJECTION INTRAVENOUS; SUBCUTANEOUS at 21:29

## 2021-11-18 RX ADMIN — Medication 650 MILLIGRAM(S): at 05:25

## 2021-11-18 RX ADMIN — Medication 6: at 08:17

## 2021-11-18 RX ADMIN — OXYCODONE HYDROCHLORIDE 5 MILLIGRAM(S): 5 TABLET ORAL at 22:10

## 2021-11-18 RX ADMIN — Medication 25 MILLIGRAM(S): at 05:25

## 2021-11-18 RX ADMIN — Medication 10: at 18:17

## 2021-11-18 RX ADMIN — Medication 1300 MILLIGRAM(S): at 15:22

## 2021-11-18 RX ADMIN — INSULIN GLARGINE 10 UNIT(S): 100 INJECTION, SOLUTION SUBCUTANEOUS at 21:29

## 2021-11-18 RX ADMIN — Medication 325 MILLIGRAM(S): at 12:08

## 2021-11-18 RX ADMIN — HEPARIN SODIUM 5000 UNIT(S): 5000 INJECTION INTRAVENOUS; SUBCUTANEOUS at 05:26

## 2021-11-18 RX ADMIN — Medication 25 MILLIGRAM(S): at 21:29

## 2021-11-18 RX ADMIN — CLOPIDOGREL BISULFATE 75 MILLIGRAM(S): 75 TABLET, FILM COATED ORAL at 12:08

## 2021-11-18 RX ADMIN — Medication 25 MILLIGRAM(S): at 15:22

## 2021-11-18 RX ADMIN — Medication 650 MILLIGRAM(S): at 12:10

## 2021-11-18 RX ADMIN — GABAPENTIN 300 MILLIGRAM(S): 400 CAPSULE ORAL at 05:24

## 2021-11-18 RX ADMIN — Medication 650 MILLIGRAM(S): at 23:46

## 2021-11-18 RX ADMIN — TAMSULOSIN HYDROCHLORIDE 0.4 MILLIGRAM(S): 0.4 CAPSULE ORAL at 21:29

## 2021-11-18 RX ADMIN — HEPARIN SODIUM 5000 UNIT(S): 5000 INJECTION INTRAVENOUS; SUBCUTANEOUS at 15:23

## 2021-11-18 RX ADMIN — Medication 650 MILLIGRAM(S): at 06:44

## 2021-11-18 RX ADMIN — Medication 1300 MILLIGRAM(S): at 05:26

## 2021-11-18 RX ADMIN — GABAPENTIN 300 MILLIGRAM(S): 400 CAPSULE ORAL at 18:11

## 2021-11-18 RX ADMIN — ATORVASTATIN CALCIUM 80 MILLIGRAM(S): 80 TABLET, FILM COATED ORAL at 21:29

## 2021-11-18 RX ADMIN — Medication 500 MILLIGRAM(S): at 12:09

## 2021-11-18 RX ADMIN — Medication 1300 MILLIGRAM(S): at 21:30

## 2021-11-18 RX ADMIN — OXYCODONE HYDROCHLORIDE 5 MILLIGRAM(S): 5 TABLET ORAL at 21:28

## 2021-11-18 RX ADMIN — CALCITRIOL 0.25 MICROGRAM(S): 0.5 CAPSULE ORAL at 12:08

## 2021-11-18 RX ADMIN — Medication 650 MILLIGRAM(S): at 18:10

## 2021-11-18 RX ADMIN — PANTOPRAZOLE SODIUM 40 MILLIGRAM(S): 20 TABLET, DELAYED RELEASE ORAL at 05:25

## 2021-11-18 NOTE — PROVIDER CONTACT NOTE (OTHER) - REASON
B/P 166/84 HR 77
Need mcgrath renewed
Patient's BP now 198/84 pulse 73
bp 175/73
elevated FS
doppler check
elevated blood pressure
wound vac

## 2021-11-18 NOTE — PROVIDER CONTACT NOTE (OTHER) - NAME OF MD/NP/PA/DO NOTIFIED:
MD Gabriel
Medical student Anias
TERESE Atkinson [phone]
TERESE Mcnulty
vascular [phone]
MD Barger [phone]
MD Barger [phone]
[phone] TERESE Bolton

## 2021-11-18 NOTE — PROVIDER CONTACT NOTE (OTHER) - DATE AND TIME:
01-Nov-2021 23:03
12-Nov-2021 12:04
12-Nov-2021 16:14
12-Nov-2021 18:35
11-Nov-2021 20:45
11-Nov-2021 23:30
16-Nov-2021 03:40
18-Nov-2021 20:48
6

## 2021-11-18 NOTE — PROGRESS NOTE ADULT - SUBJECTIVE AND OBJECTIVE BOX
Podiatry Progress Note    Subjective:   SEN LING is a 56y old  Female who presents with a chief complaint of DFU (18 Nov 2021 07:45)  Seen pt at bedside; changed dressing to WTD;     PAST MEDICAL & SURGICAL HISTORY:  PVD (peripheral vascular disease)  Benign essential HTN  Intellectual disability  Hearing impaired  HLD (hyperlipidemia)     Objective:  Vital Signs Last 24 Hrs  T(C): 36.1 (18 Nov 2021 17:11), Max: 37.1 (18 Nov 2021 08:20)  T(F): 97 (18 Nov 2021 17:11), Max: 98.8 (18 Nov 2021 08:20)  HR: 69 (18 Nov 2021 17:11) (66 - 77)  BP: 157/73 (18 Nov 2021 17:11) (127/58 - 157/73)  BP(mean): --  RR: 18 (18 Nov 2021 17:11) (18 - 18)  SpO2: 100% (18 Nov 2021 17:11) (97% - 100%)                Physical Exam - Lower Extremity Focused:   #Left Foot  Derm:   Surgical wound base fibrogranular (65% granular, 35% fibrous); no active bleeding noted; improving;   Mild maceration to periwound and skin to proximal medial rearfoot  Wound probes less, compare to last evaluation  Vascular: DP and PT Pulses Diminished; Foot is Warm to Warm to the touch   Neuro: Protective Sensation Diminished / Moderately Neuropathic     Assessment:  Left heel wounds;   s/p excisional debridement w/ vac application 11/11/21    Plan:  Chart reviewed and Patient evaluated. All Questions and Concerns Addressed and Answered  Discussed diagnosis and treatment with patient and son  Discontinued VAC; changed dressing to WTD; pt can be discharge w/ current dressing on;   Will continue VAC therapy w/ Home VAC dressing; Home VAC paperwork in chart;   Request VNS to change home VAC properly;   Patient stable from Podiatry standpoint. Pt can follow up w/Dr. Llamas at [address] [facility], [facility], 1 week after DSC.   Weight bearing status; WBAT R foot; NWB L foot;   Discussed plan w/ Dr. Llamas    Podiatry

## 2021-11-18 NOTE — PROGRESS NOTE ADULT - SUBJECTIVE AND OBJECTIVE BOX
VASCULAR SURGERY PROGRESS NOTE    Patient: SEN LING , 56y ([birth year])Female   MRN: [id number]  Location: [address]  Visit: 11-01-21 Inpatient  Date: 11-18-21 @ 00:30    Hospital Day #: 18  Post-Op Day #: 10    Procedure/Dx/Injuries: 56F S/P left fem-tib bypass using reversed Left GSV    Events of past 24 hours: Patient's dressing was changed, patient wound vac forms were filled, patient anticipated for discharge today.    PAST MEDICAL & SURGICAL HISTORY:  PVD (peripheral vascular disease)    Benign essential HTN    Intellectual disability    Hearing impaired    HLD (hyperlipidemia)    Vitals:   T(F): 98 (11-17-21 @ 20:45), Max: 98 (11-17-21 @ 20:45)  HR: 77 (11-17-21 @ 20:45)  BP: 146/63 (11-17-21 @ 20:45)  RR: 18 (11-17-21 @ 20:45)  SpO2: 98% (11-17-21 @ 20:45)      Diet, Consistent Carbohydrate Renal w/Evening Snack      Fluids:     I & O's:    11-16-21 @ 07:01  -  11-17-21 @ 07:00  --------------------------------------------------------  IN:    Oral Fluid: 1160 mL  Total IN: 1160 mL    OUT:    Indwelling Catheter - Urethral (mL): 500 mL  Total OUT: 500 mL    Total NET: 660 mL    PHYSICAL EXAM:  General: NAD  Neuro: AAOx3  HEENT: EOMI, Trachea midline  Cardio: RRR  Respiratory: Normal respiratory effort  GI/Abd: Soft, NT/ND, no rebound/guarding  Vascular:  -Left Lower Extremity: Extremities warm and well perfused, dressing c/d/i. woundvac to foot functioning with good seal    MEDICATIONS  (STANDING):  acetaminophen     Tablet .. 650 milliGRAM(s) Oral every 6 hours  amoxicillin  875 milliGRAM(s)/clavulanate 1 Tablet(s) Oral every 12 hours  ascorbic acid 500 milliGRAM(s) Oral daily  atorvastatin 80 milliGRAM(s) Oral at bedtime  calcitriol   Capsule 0.25 MICROGram(s) Oral daily  chlorhexidine 4% Liquid 1 Application(s) Topical daily  clopidogrel Tablet 75 milliGRAM(s) Oral daily  dextrose 5%. 1000 milliLiter(s) (100 mL/Hr) IV Continuous <Continuous>  ferrous    sulfate 325 milliGRAM(s) Oral daily  gabapentin 300 milliGRAM(s) Oral every 12 hours  glucagon  Injectable 1 milliGRAM(s) IntraMuscular once  heparin   Injectable 5000 Unit(s) SubCutaneous every 8 hours  hydrALAZINE 25 milliGRAM(s) Oral every 8 hours  insulin glargine Injectable (LANTUS) 10 Unit(s) SubCutaneous at bedtime  insulin lispro (ADMELOG) corrective regimen sliding scale   SubCutaneous Before meals and at bedtime  metoprolol succinate ER 25 milliGRAM(s) Oral daily  pantoprazole    Tablet 40 milliGRAM(s) Oral before breakfast  senna 2 Tablet(s) Oral at bedtime  sodium bicarbonate 1300 milliGRAM(s) Oral every 8 hours  tamsulosin 0.4 milliGRAM(s) Oral at bedtime    MEDICATIONS  (PRN):  oxyCODONE    IR 5 milliGRAM(s) Oral every 6 hours PRN Severe Pain (7 - 10)      DVT PROPHYLAXIS: heparin   Injectable 5000 Unit(s) SubCutaneous every 8 hours    GI PROPHYLAXIS: pantoprazole    Tablet 40 milliGRAM(s) Oral before breakfast    ANTICOAGULATION:   ANTIBIOTICS:  amoxicillin  875 milliGRAM(s)/clavulanate 1 Tablet(s)    LAB/STUDIES:  Labs:  CAPILLARY BLOOD GLUCOSE      POCT Blood Glucose.: 113 mg/dL (17 Nov 2021 20:53)  POCT Blood Glucose.: 234 mg/dL (17 Nov 2021 18:08)  POCT Blood Glucose.: 222 mg/dL (17 Nov 2021 16:46)  POCT Blood Glucose.: 173 mg/dL (17 Nov 2021 11:34)  POCT Blood Glucose.: 164 mg/dL (17 Nov 2021 07:38)                          8.3    9.81  )-----------( 278      ( 16 Nov 2021 11:00 )             26.0       ACCESS/ DEVICES:  [x] Peripheral IV  [x] Urinary Catheter

## 2021-11-18 NOTE — PROVIDER CONTACT NOTE (OTHER) - SITUATION
Patient to be d/c with wound vac. Wound vac delivered to bedside.
Patient received all morning BP meds
patient has PMH of DM. Pt has had consistently high FS but does not have any blood glucose monitoring orders
patient ordered for vascular checks with doppler. MD called to make aware that left foot cannot be assessed cause surgical dressing is in place
patients blood pressure elevated at 170/73, heart rate of 74. patient has no I received handoff report from RN at patient's bedsides of pain. patient is due for Hydralazine 10 mg at 2200

## 2021-11-18 NOTE — PROGRESS NOTE ADULT - SUBJECTIVE AND OBJECTIVE BOX
seen and examined  24 h events noted         PAST HISTORY  --------------------------------------------------------------------------------  No significant changes to PMH, PSH, FHx, SHx, unless otherwise noted    ALLERGIES & MEDICATIONS  --------------------------------------------------------------------------------  Allergies    penicillin (Rash)    Intolerances      Standing Inpatient Medications  acetaminophen     Tablet .. 650 milliGRAM(s) Oral every 6 hours  amoxicillin  875 milliGRAM(s)/clavulanate 1 Tablet(s) Oral every 12 hours  ascorbic acid 500 milliGRAM(s) Oral daily  atorvastatin 80 milliGRAM(s) Oral at bedtime  calcitriol   Capsule 0.25 MICROGram(s) Oral daily  chlorhexidine 4% Liquid 1 Application(s) Topical daily  clopidogrel Tablet 75 milliGRAM(s) Oral daily  dextrose 5%. 1000 milliLiter(s) IV Continuous <Continuous>  ferrous    sulfate 325 milliGRAM(s) Oral daily  gabapentin 300 milliGRAM(s) Oral every 12 hours  glucagon  Injectable 1 milliGRAM(s) IntraMuscular once  heparin   Injectable 5000 Unit(s) SubCutaneous every 8 hours  hydrALAZINE 25 milliGRAM(s) Oral every 8 hours  insulin glargine Injectable (LANTUS) 10 Unit(s) SubCutaneous at bedtime  insulin lispro (ADMELOG) corrective regimen sliding scale   SubCutaneous Before meals and at bedtime  metoprolol succinate ER 25 milliGRAM(s) Oral daily  pantoprazole    Tablet 40 milliGRAM(s) Oral before breakfast  senna 2 Tablet(s) Oral at bedtime  sodium bicarbonate 1300 milliGRAM(s) Oral every 8 hours  tamsulosin 0.4 milliGRAM(s) Oral at bedtime    PRN Inpatient Medications  oxyCODONE    IR 5 milliGRAM(s) Oral every 6 hours PRN          VITALS/PHYSICAL EXAM  --------------------------------------------------------------------------------  T(C): 36.9 (11-18-21 @ 05:30), Max: 37 (11-18-21 @ 01:11)  HR: 71 (11-18-21 @ 05:30) (71 - 77)  BP: 127/58 (11-18-21 @ 05:30) (127/58 - 155/73)  RR: 18 (11-18-21 @ 05:30) (18 - 18)  SpO2: 97% (11-18-21 @ 05:30) (97% - 100%)  Wt(kg): --        11-17-21 @ 07:01  -  11-18-21 @ 07:00  --------------------------------------------------------  IN: 480 mL / OUT: 3550 mL / NET: -3070 mL      Physical Exam:  	Gen: NAD,  	Pulm: CTA B/L  	CV: S1S2; no rub  	Abd: +distended  	LE: dressings     LABS/STUDIES  --------------------------------------------------------------------------------              8.3    9.81  >-----------<  278      [11-16-21 @ 11:00]              26.0       Creatinine Trend:  SCr 3.2 [11-15 @ 18:52]  SCr 2.9 [11-14 @ 21:39]  SCr 3.1 [11-13 @ 22:28]  SCr 2.8 [11-12 @ 20:00]  SCr 2.8 [11-11 @ 23:07]    Urinalysis - [11-10-21 @ 14:51]      Color Yellow / Appearance Turbid / SG 1.014 / pH 6.5      Gluc Negative / Ketone Negative  / Bili Negative / Urobili <2 mg/dL       Blood Moderate / Protein 300 mg/dL / Leuk Est Large / Nitrite Negative      RBC 2 / WBC >720 / Hyaline 1 / Gran  / Sq Epi  / Non Sq Epi 1 / Bacteria Many          OLVIN: titer 1:80, pattern Speckled      [11-04-21 @ 12:47]  Free Light Chains: kappa 9.28, lambda 8.11, ratio = 1.14      [11-04 @ 12:47]  Immunofixation Serum:   IgM Lambda Band Identified    Reference Range: None Detected      [11-04-21 @ 12:47]  SPEP Interpretation: Gamma-Migrating Paraprotein Identified      [11-04-21 @ 12:47]

## 2021-11-18 NOTE — PROGRESS NOTE ADULT - ASSESSMENT
55 yo female PMH CKD4 PVD, smoker, HTN, HLD, h/o right pontine CVA (2/7/21), DM,  intellectual disability, hearing/speech impairment presents with chronic left heel ulcer and pain. Recent duplex on 10/25/21 showed no significant arterial blood flow visualized beyond the left superficial femoral artery.  Has stable CKD4 at least since 2/21; saw nephrologist in [place]  # CKD 4, creat rising polyuric   -please repeat labs today   - cont po bicarb 1300 tid   - BP at goal   - OLVIN weak positive/ serum flc ratio wnl; / IF noted , consider hematology evaluation   - phos noted, does not need binder/ on calcitriol check PTH   # Proteinuria, nephrotic range / likely d/t HTN/DM  # Hx of HTN  # Hx of DM  # DFU / PAD - s/p fem-tib bypass on 11/8 / s/p debridement/ vascular notes appreciated   # Anemia - hgb improved w/ pRBC, on FESo4 , check iron stores   # will follow

## 2021-11-18 NOTE — PROVIDER CONTACT NOTE (OTHER) - BACKGROUND
s/p L heel debridement. Wound vac to be applied for 1 month
patient post op
patient post op. history of hypertension

## 2021-11-18 NOTE — PROGRESS NOTE ADULT - ASSESSMENT
ASSESSMENT:  56y F w/ PMHx of PVD (peripheral vascular disease), Benign essential HTN, Intellectual disability, Hearing impaired, HLD (hyperlipidemia), Jehova's Witness s/p left fem-tib bypass using reversed Left GSV     PLAN:  - Discharge today  - F/u outpatient with Dr. Muñoz  - F/u outpatient with Dr. Aguillon for mcgrath  - F/u outpatient with Dr. Llamas  - F/u outpatient with Dr. Roopa Pardo    Lines/Tubes: PIV, Mcgrath Catheter    VASCULAR TEAM SPECTRA: [id number]

## 2021-11-18 NOTE — PROVIDER CONTACT NOTE (OTHER) - ACTION/TREATMENT ORDERED:
Give hydralazine at 1pm as ordered and recheck BP at 2 pm.
MD made aware. Awaiting orders
will order IV labetolol
MD stated she will come perform doppler check
TERESE Willams made aware of delivery. Podiatry to apply wound vac.
administer Hydralazine at 2100 and reassess BP

## 2021-11-19 ENCOUNTER — TRANSCRIPTION ENCOUNTER (OUTPATIENT)
Age: 56
End: 2021-11-19

## 2021-11-19 VITALS
OXYGEN SATURATION: 96 % | RESPIRATION RATE: 18 BRPM | DIASTOLIC BLOOD PRESSURE: 65 MMHG | SYSTOLIC BLOOD PRESSURE: 143 MMHG | HEART RATE: 65 BPM | TEMPERATURE: 97 F

## 2021-11-19 DIAGNOSIS — E11.9 TYPE 2 DIABETES MELLITUS WITHOUT COMPLICATIONS: ICD-10-CM

## 2021-11-19 LAB
GLUCOSE BLDC GLUCOMTR-MCNC: 177 MG/DL — HIGH (ref 70–99)
GLUCOSE BLDC GLUCOMTR-MCNC: 182 MG/DL — HIGH (ref 70–99)

## 2021-11-19 RX ADMIN — GABAPENTIN 300 MILLIGRAM(S): 400 CAPSULE ORAL at 05:11

## 2021-11-19 RX ADMIN — CLOPIDOGREL BISULFATE 75 MILLIGRAM(S): 75 TABLET, FILM COATED ORAL at 12:11

## 2021-11-19 RX ADMIN — Medication 25 MILLIGRAM(S): at 05:11

## 2021-11-19 RX ADMIN — Medication 500 MILLIGRAM(S): at 12:11

## 2021-11-19 RX ADMIN — Medication 1300 MILLIGRAM(S): at 05:14

## 2021-11-19 RX ADMIN — CALCITRIOL 0.25 MICROGRAM(S): 0.5 CAPSULE ORAL at 12:11

## 2021-11-19 RX ADMIN — Medication 6: at 08:17

## 2021-11-19 RX ADMIN — Medication 25 MILLIGRAM(S): at 05:13

## 2021-11-19 RX ADMIN — Medication 650 MILLIGRAM(S): at 00:16

## 2021-11-19 RX ADMIN — CHLORHEXIDINE GLUCONATE 1 APPLICATION(S): 213 SOLUTION TOPICAL at 05:14

## 2021-11-19 RX ADMIN — Medication 650 MILLIGRAM(S): at 05:11

## 2021-11-19 RX ADMIN — Medication 650 MILLIGRAM(S): at 12:10

## 2021-11-19 RX ADMIN — Medication 8: at 12:10

## 2021-11-19 RX ADMIN — HEPARIN SODIUM 5000 UNIT(S): 5000 INJECTION INTRAVENOUS; SUBCUTANEOUS at 05:11

## 2021-11-19 RX ADMIN — PANTOPRAZOLE SODIUM 40 MILLIGRAM(S): 20 TABLET, DELAYED RELEASE ORAL at 05:11

## 2021-11-19 RX ADMIN — Medication 1 TABLET(S): at 05:12

## 2021-11-19 RX ADMIN — Medication 650 MILLIGRAM(S): at 06:01

## 2021-11-19 RX ADMIN — Medication 325 MILLIGRAM(S): at 12:11

## 2021-11-19 NOTE — PROGRESS NOTE ADULT - ASSESSMENT
57 yo female PMH CKD4 PVD, smoker, HTN, HLD, h/o right pontine CVA (2/7/21), DM,  intellectual disability, hearing/speech impairment presents with chronic left heel ulcer and pain. Recent duplex on 10/25/21 showed no significant arterial blood flow visualized beyond the left superficial femoral artery.  Has stable CKD4 at least since 2/21; saw nephrologist in [place]  # CKD 4, creat rising polyuric   -no repeat labs   - cont po bicarb 1300 tid   - BP at goal   - OLVIN weak positive/ serum flc ratio wnl; / IF noted , consider hematology evaluation   - phos noted, does not need binder/ on calcitriol check PTH   # Proteinuria, nephrotic range / likely d/t HTN/DM  # Hx of HTN  # Hx of DM  # DFU / PAD - s/p fem-tib bypass on 11/8 / s/p debridement/ vascular notes appreciated   # Anemia - hgb improved w/ pRBC, on FESo4 , check iron stores   # will follow   if discharged OP renal follow up

## 2021-11-19 NOTE — DISCHARGE NOTE PROVIDER - NSDCQMPCI_CARD_ALL_CORE
H/O myomectomy  2010  History of surgery  TOP of artificial insemination 2010 No H/O myomectomy  2010  H/O:  section    History of surgery  TOP of artificial insemination 2010

## 2021-11-19 NOTE — DISCHARGE NOTE PROVIDER - HOSPITAL COURSE
55 yo female PMH PVD, smoker, HTN, HLD, h/o right pontine CVA (2/7/21), DM, intellectual disability, hearing/speech impairment, CKD 4 (being followed by nephro), mild aortic stenosis, initially presented to ED on 10/25/21 for left lower extremity chronic arterial occlusion. Duplex at that time showed no significant blood flow beyond L superficial femoral artery. Pt saw Dr. Sullivan outpatient and was told to return to hospital for angiogram (performed on 11/3). Pt presented again to [facility] ED 11/1/21 c/o entire L leg pain and left heel ulcer.  Cardiology c/s, moderate risk for surgery, ECHO: EF 75%, mild concentric LVH, mild TR, mild AS.  Podiatry plan to bring pt to OR Tuesday 11/9 for excisional debridement of soft tissue and bone of left foot. S/p left femoral artery to posterior tibial artery bypass with autologous venous tissue. Pt's family was at bedside to help translate. Patient presented to ED on 11/1 for left foot pain and heel ulcer. Pt c/o post-op left anteromedial lower thigh pain and nausea.     Intra op findings: Left femoral to anterior artery bypass using rGSV  -GETA w/ glidescope intubation, hemodynamically stable, received Clindamycin 600mg x1, extubated post op  -Pacu- vomiting x2    In the SICU patient had more episodes of vomiting, now resolved. Patient remained hemodynamically stable. Patient was planned for OR today (11/9) but in the OR patient was tachycardic noemy to anxiety and case was cancelled by anesthesia. Patients tachycardia resolved upon return to ICU. Patient seen and Evaluated on PM rounds, downgraded 11/9. She received blood transfusion for Hgb <7.0.  Pt went back to OR with podiatry on 11/11 for excisional debridement of left heel wound. Wound vac was placed intr-op.   During the patient's hospitalization pt was seen by medicine for better diabetes control, and PT evaluation.

## 2021-11-19 NOTE — DISCHARGE NOTE PROVIDER - NSDCMRMEDTOKEN_GEN_ALL_CORE_FT
amoxicillin-clavulanate 875 mg-125 mg oral tablet: 1 tab(s) orally every 12 hours  ascorbic acid 500 mg oral tablet: 1 tab(s) orally once a day  aspirin 81 mg oral tablet: 1 tab(s) orally once a day  atorvastatin 80 mg oral tablet: 1 tab(s) orally once a day  Basaglar KwikPen 100 units/mL subcutaneous solution: 10 unit(s) subcutaneous once a day (at bedtime)   calcitriol 0.25 mcg oral capsule: 1 cap(s) orally once a day  ferrous sulfate 325 mg (65 mg elemental iron) oral tablet: 1 tab(s) orally once a day  gabapentin 300 mg oral capsule: 1 cap(s) orally 3 times a day  hydrALAZINE 25 mg oral tablet: 1 tab(s) orally every 8 hours  insulin lispro 100 units/mL injectable solution: 4 unit(s) injectable 3 times a day (before meals)  Metoprolol Succinate ER 25 mg oral tablet, extended release: 1 tab(s) orally once a day  oxyCODONE 5 mg oral tablet: 1 tab(s) orally every 6 hours, As needed, Severe Pain (7 - 10) MDD:4  Plavix 75 mg oral tablet: 1 tab(s) orally once a day  Protonix 40 mg oral delayed release tablet: 1 tab(s) orally once a day  tamsulosin 0.4 mg oral capsule: 1 cap(s) orally once a day (at bedtime)

## 2021-11-19 NOTE — PROGRESS NOTE ADULT - SUBJECTIVE AND OBJECTIVE BOX
VASCULAR SURGERY PROGRESS NOTE    Patient: SEN LING , 56y ([birth year])Female   MRN: [id number]  Location: [address]  Visit: 11-01-21 Inpatient  Date: 11-19-21 @ 11:36    Hospital Day #: 19  Post-Op Day #: 11    Procedure/Dx/Injuries: 56F S/P left fem-tib bypass using reversed Left GSV    Events of past 24 hours: Patient received wound vac, stable for discharge    PAST MEDICAL & SURGICAL HISTORY:  PVD (peripheral vascular disease)    Benign essential HTN    Intellectual disability    Hearing impaired    HLD (hyperlipidemia)    Vitals:   T(F): 97.4 (11-19-21 @ 09:00), Max: 97.7 (11-18-21 @ 21:02)  HR: 65 (11-19-21 @ 09:00)  BP: 143/65 (11-19-21 @ 09:00)  RR: 18 (11-19-21 @ 09:00)  SpO2: 96% (11-19-21 @ 09:00)    Diet, Consistent Carbohydrate Renal w/Evening Snack    Fluids:     I & O's:    11-18-21 @ 07:01  -  11-19-21 @ 07:00  --------------------------------------------------------  IN:  Total IN: 0 mL    OUT:    Indwelling Catheter - Urethral (mL): 3700 mL  Total OUT: 3700 mL    Total NET: -3700 mL    PHYSICAL EXAM:  General: NAD  Neuro: AAOx3  HEENT: EOMI, Trachea midline  Cardio: RRR  Respiratory: Normal respiratory effort  GI/Abd: Soft, NT/ND, no rebound/guarding  Vascular:  -Left Lower Extremity: Extremities warm and well perfused, dressing c/d/i. woundvac to foot functioning with good seal    MEDICATIONS  (STANDING):  acetaminophen     Tablet .. 650 milliGRAM(s) Oral every 6 hours  amoxicillin  875 milliGRAM(s)/clavulanate 1 Tablet(s) Oral every 12 hours  ascorbic acid 500 milliGRAM(s) Oral daily  atorvastatin 80 milliGRAM(s) Oral at bedtime  calcitriol   Capsule 0.25 MICROGram(s) Oral daily  chlorhexidine 4% Liquid 1 Application(s) Topical daily  clopidogrel Tablet 75 milliGRAM(s) Oral daily  dextrose 5%. 1000 milliLiter(s) (100 mL/Hr) IV Continuous <Continuous>  ferrous    sulfate 325 milliGRAM(s) Oral daily  gabapentin 300 milliGRAM(s) Oral every 12 hours  glucagon  Injectable 1 milliGRAM(s) IntraMuscular once  heparin   Injectable 5000 Unit(s) SubCutaneous every 8 hours  hydrALAZINE 25 milliGRAM(s) Oral every 8 hours  insulin glargine Injectable (LANTUS) 10 Unit(s) SubCutaneous at bedtime  insulin lispro (ADMELOG) corrective regimen sliding scale   SubCutaneous Before meals and at bedtime  metoprolol succinate ER 25 milliGRAM(s) Oral daily  pantoprazole    Tablet 40 milliGRAM(s) Oral before breakfast  senna 2 Tablet(s) Oral at bedtime  sodium bicarbonate 1300 milliGRAM(s) Oral every 8 hours  tamsulosin 0.4 milliGRAM(s) Oral at bedtime    MEDICATIONS  (PRN):  oxyCODONE    IR 5 milliGRAM(s) Oral every 6 hours PRN Severe Pain (7 - 10)      DVT PROPHYLAXIS: heparin   Injectable 5000 Unit(s) SubCutaneous every 8 hours    GI PROPHYLAXIS: pantoprazole    Tablet 40 milliGRAM(s) Oral before breakfast    ANTICOAGULATION:   ANTIBIOTICS:  amoxicillin  875 milliGRAM(s)/clavulanate 1 Tablet(s)    LAB/STUDIES:  Labs:  CAPILLARY BLOOD GLUCOSE      POCT Blood Glucose.: 177 mg/dL (19 Nov 2021 07:38)  POCT Blood Glucose.: 131 mg/dL (18 Nov 2021 21:20)  POCT Blood Glucose.: 239 mg/dL (18 Nov 2021 18:16)  POCT Blood Glucose.: 232 mg/dL (18 Nov 2021 16:50)      LFTs:     Coags:    ACCESS/ DEVICES:  [x] Urinary Catheter

## 2021-11-19 NOTE — PROGRESS NOTE ADULT - PROVIDER SPECIALTY LIST ADULT
Podiatry
Podiatry
Vascular Surgery
Nephrology
Podiatry
Podiatry
SICU
Vascular Surgery
Vascular Surgery
Nephrology
Podiatry
SICU
SICU
Vascular Surgery
Nephrology
Nephrology
Vascular Surgery

## 2021-11-19 NOTE — PROGRESS NOTE ADULT - SUBJECTIVE AND OBJECTIVE BOX
Nephrology progress note    THIS IS AN INCOMPLETE NOTE . FULL NOTE TO FOLLOW SHORTLY    Patient is seen and examined, events over the last 24 h noted .    Allergies:  penicillin (Rash)    Hospital Medications:   MEDICATIONS  (STANDING):  acetaminophen     Tablet .. 650 milliGRAM(s) Oral every 6 hours  amoxicillin  875 milliGRAM(s)/clavulanate 1 Tablet(s) Oral every 12 hours  ascorbic acid 500 milliGRAM(s) Oral daily  atorvastatin 80 milliGRAM(s) Oral at bedtime  calcitriol   Capsule 0.25 MICROGram(s) Oral daily  chlorhexidine 4% Liquid 1 Application(s) Topical daily  clopidogrel Tablet 75 milliGRAM(s) Oral daily  dextrose 5%. 1000 milliLiter(s) (100 mL/Hr) IV Continuous <Continuous>  ferrous    sulfate 325 milliGRAM(s) Oral daily  gabapentin 300 milliGRAM(s) Oral every 12 hours  glucagon  Injectable 1 milliGRAM(s) IntraMuscular once  heparin   Injectable 5000 Unit(s) SubCutaneous every 8 hours  hydrALAZINE 25 milliGRAM(s) Oral every 8 hours  insulin glargine Injectable (LANTUS) 10 Unit(s) SubCutaneous at bedtime  insulin lispro (ADMELOG) corrective regimen sliding scale   SubCutaneous Before meals and at bedtime  metoprolol succinate ER 25 milliGRAM(s) Oral daily  pantoprazole    Tablet 40 milliGRAM(s) Oral before breakfast  senna 2 Tablet(s) Oral at bedtime  sodium bicarbonate 1300 milliGRAM(s) Oral every 8 hours  tamsulosin 0.4 milliGRAM(s) Oral at bedtime        VITALS:  T(F): 97 (11-19-21 @ 05:00), Max: 97.7 (11-18-21 @ 21:02)  HR: 72 (11-19-21 @ 05:00)  BP: 144/64 (11-19-21 @ 05:00)  RR: 18 (11-19-21 @ 05:00)  SpO2: 97% (11-19-21 @ 05:00)  Wt(kg): --    11-17 @ 07:01  -  11-18 @ 07:00  --------------------------------------------------------  IN: 480 mL / OUT: 3550 mL / NET: -3070 mL    11-18 @ 07:01  -  11-19 @ 07:00  --------------------------------------------------------  IN: 0 mL / OUT: 3700 mL / NET: -3700 mL          PHYSICAL EXAM:  Constitutional: NAD  HEENT: anicteric sclera, oropharynx clear, MMM  Neck: No JVD  Respiratory: CTAB, no wheezes, rales or rhonchi  Cardiovascular: S1, S2, RRR  Gastrointestinal: BS+, soft, NT/ND  Extremities: No cyanosis or clubbing. No peripheral edema  :  No mcgrath.   Skin: No rashes    LABS:            Urine Studies:      RADIOLOGY & ADDITIONAL STUDIES:   Nephrology progress note  Patient is seen and examined, events over the last 24 h noted .  lying in bed comfortable   Allergies:  penicillin (Rash)    Hospital Medications:   MEDICATIONS  (STANDING):  acetaminophen     Tablet .. 650 milliGRAM(s) Oral every 6 hours  amoxicillin  875 milliGRAM(s)/clavulanate 1 Tablet(s) Oral every 12 hours  ascorbic acid 500 milliGRAM(s) Oral daily  atorvastatin 80 milliGRAM(s) Oral at bedtime  calcitriol   Capsule 0.25 MICROGram(s) Oral daily  clopidogrel Tablet 75 milliGRAM(s) Oral daily  dextrose 5%. 1000 milliLiter(s) (100 mL/Hr) IV Continuous <Continuous>  ferrous    sulfate 325 milliGRAM(s) Oral daily  gabapentin 300 milliGRAM(s) Oral every 12 hours  glucagon  Injectable 1 milliGRAM(s) IntraMuscular once  heparin   Injectable 5000 Unit(s) SubCutaneous every 8 hours  hydrALAZINE 25 milliGRAM(s) Oral every 8 hours  insulin glargine Injectable (LANTUS) 10 Unit(s) SubCutaneous at bedtime  insulin lispro (ADMELOG) corrective regimen sliding scale   SubCutaneous Before meals and at bedtime  metoprolol succinate ER 25 milliGRAM(s) Oral daily  pantoprazole    Tablet 40 milliGRAM(s) Oral before breakfast  senna 2 Tablet(s) Oral at bedtime  sodium bicarbonate 1300 milliGRAM(s) Oral every 8 hours  tamsulosin 0.4 milliGRAM(s) Oral at bedtime        VITALS:  T(F): 97 (11-19-21 @ 05:00), Max: 97.7 (11-18-21 @ 21:02)  HR: 72 (11-19-21 @ 05:00)  BP: 144/64 (11-19-21 @ 05:00)  RR: 18 (11-19-21 @ 05:00)  SpO2: 97% (11-19-21 @ 05:00)      11-17 @ 07:01  -  11-18 @ 07:00  --------------------------------------------------------  IN: 480 mL / OUT: 3550 mL / NET: -3070 mL    11-18 @ 07:01  -  11-19 @ 07:00  --------------------------------------------------------  IN: 0 mL / OUT: 3700 mL / NET: -3700 mL          PHYSICAL EXAM:  Constitutional: NAD  Neck: No JVD  Respiratory: CTAB, no wheezes, rales or rhonchi  Cardiovascular: S1, S2, RRR  Gastrointestinal: BS+, soft, NT/ND  Extremities: No cyanosis or clubbing. No peripheral edema  :  No mcgrath.   Skin: No rashes    LABS:    Basic Metabolic Panel (11.15.21 @ 18:52)    Sodium, Serum: 139 mmol/L    Potassium, Serum: 4.6 mmol/L    Chloride, Serum: 103 mmol/L    Carbon Dioxide, Serum: 19 mmol/L    Anion Gap, Serum: 17 mmol/L    Blood Urea Nitrogen, Serum: 58 mg/dL    Creatinine, Serum: 3.2 mg/dL    Glucose, Serum: 111 mg/dL    Calcium, Total Serum: 8.2 mg/dL              Urine Studies:      RADIOLOGY & ADDITIONAL STUDIES:

## 2021-11-19 NOTE — DISCHARGE NOTE PROVIDER - NSDCFUSCHEDAPPT_GEN_ALL_CORE_FT
SEN LING ; 11/24/2021 ; NPP Internal Med [address] SEN Mcfadden ; 01/13/2022 ; NPP Urology [facility]

## 2021-11-19 NOTE — DISCHARGE NOTE PROVIDER - CARE PROVIDERS DIRECT ADDRESSES
,DirectAddress_Unknown,DirectAddress_Unknown,[email or web address]

## 2021-11-19 NOTE — PROGRESS NOTE ADULT - REASON FOR ADMISSION
DFU

## 2021-11-19 NOTE — DISCHARGE NOTE PROVIDER - PROVIDER TOKENS
PROVIDER:[TOKEN:[[id number]],FOLLOWUP:[1 week]],PROVIDER:[TOKEN:[[id number]],FOLLOWUP:[2 weeks]],PROVIDER:[TOKEN:[[id number]],SCHEDULEDAPPT:[11/24/2021]],PROVIDER:[TOKEN:[[id number]],FOLLOWUP:[1-3 days]]

## 2021-11-19 NOTE — DISCHARGE NOTE PROVIDER - NSDCFUADDINST_GEN_ALL_CORE_FT
You underwent a leg bypass. You may shower. Keep incisions open to air. You may use soap and water. Pat dry. Go to ED with bleeding, oozing, coldleg, loss of sensation or motor function, fevers, chills. Call your surgeon with any questions or concerns.   Follow up with podiatry, urology, cardiology, and medicine at [facility]

## 2021-11-19 NOTE — PROGRESS NOTE ADULT - ASSESSMENT
ASSESSMENT:  56y F w/ PMHx of PVD (peripheral vascular disease), Benign essential HTN, Intellectual disability, Hearing impaired, HLD (hyperlipidemia), Jehova's Witness s/p left fem-tib bypass using reversed Left GSV     PLAN:  - Discharge today  - F/u outpatient with Dr. Muñoz  - F/u outpatient with Dr. Aguillon for mcgrath  - F/u outpatient with Dr. Llamas  - F/u outpatient with Dr. Roopa Pardo    Lines/Tubes: Mcgrath Catheter    VASCULAR TEAM SPECTRA: [id number]

## 2021-11-19 NOTE — DISCHARGE NOTE PROVIDER - NSDCCPCAREPLAN_GEN_ALL_CORE_FT
PRINCIPAL DISCHARGE DIAGNOSIS  Diagnosis: PAD (peripheral artery disease)  Assessment and Plan of Treatment: s/p bypass      SECONDARY DISCHARGE DIAGNOSES  Diagnosis: Gangrene of foot  Assessment and Plan of Treatment: s/p debriedement and wound vac application

## 2021-11-19 NOTE — DISCHARGE NOTE PROVIDER - CARE PROVIDER_API CALL
Alpesh Llamas (DUARTE)  Podiatric Medicine and Surgery  [address], [address]st [address], [address]  [place] [address]  Phone: [phone]  Fax: [phone]  Follow Up Time: 1 week    Rachael Hazel)  Surgery  [address] [place] [address]  Phone: [phone]  Fax: [phone]  Follow Up Time: 2 weeks    Raf Pardo)  Cardiovascular Disease; Internal Medicine; Interventional Cardiology  [address]  [place] [address]  Phone: [phone]  Fax: [phone]  Scheduled Appointment: 11/24/2021    Sergio Hernández)  Urology  [address], [address]  [place] [address]  Phone: [phone]  Fax: [phone]  Follow Up Time: 1-3 days

## 2021-11-20 ENCOUNTER — INPATIENT (INPATIENT)
Facility: HOSPITAL | Age: 56
LOS: 3 days | Discharge: HOME HEALTH | End: 2021-11-24
Attending: INTERNAL MEDICINE | Admitting: INTERNAL MEDICINE
Payer: SUBSIDIZED

## 2021-11-20 VITALS
HEART RATE: 75 BPM | HEIGHT: 62 IN | TEMPERATURE: 99 F | SYSTOLIC BLOOD PRESSURE: 144 MMHG | DIASTOLIC BLOOD PRESSURE: 63 MMHG | RESPIRATION RATE: 16 BRPM | OXYGEN SATURATION: 99 %

## 2021-11-20 DIAGNOSIS — Z86.73 PERSONAL HISTORY OF TRANSIENT ISCHEMIC ATTACK (TIA), AND CEREBRAL INFARCTION WITHOUT RESIDUAL DEFICITS: ICD-10-CM

## 2021-11-20 DIAGNOSIS — F79 UNSPECIFIED INTELLECTUAL DISABILITIES: ICD-10-CM

## 2021-11-20 DIAGNOSIS — I12.9 HYPERTENSIVE CHRONIC KIDNEY DISEASE WITH STAGE 1 THROUGH STAGE 4 CHRONIC KIDNEY DISEASE, OR UNSPECIFIED CHRONIC KIDNEY DISEASE: ICD-10-CM

## 2021-11-20 DIAGNOSIS — N17.9 ACUTE KIDNEY FAILURE, UNSPECIFIED: ICD-10-CM

## 2021-11-20 DIAGNOSIS — E11.65 TYPE 2 DIABETES MELLITUS WITH HYPERGLYCEMIA: ICD-10-CM

## 2021-11-20 DIAGNOSIS — Z75.1 PERSON AWAITING ADMISSION TO ADEQUATE FACILITY ELSEWHERE: ICD-10-CM

## 2021-11-20 DIAGNOSIS — E78.5 HYPERLIPIDEMIA, UNSPECIFIED: ICD-10-CM

## 2021-11-20 DIAGNOSIS — Z87.891 PERSONAL HISTORY OF NICOTINE DEPENDENCE: ICD-10-CM

## 2021-11-20 DIAGNOSIS — N18.4 CHRONIC KIDNEY DISEASE, STAGE 4 (SEVERE): ICD-10-CM

## 2021-11-20 DIAGNOSIS — E11.22 TYPE 2 DIABETES MELLITUS WITH DIABETIC CHRONIC KIDNEY DISEASE: ICD-10-CM

## 2021-11-20 DIAGNOSIS — Z88.0 ALLERGY STATUS TO PENICILLIN: ICD-10-CM

## 2021-11-20 DIAGNOSIS — H91.90 UNSPECIFIED HEARING LOSS, UNSPECIFIED EAR: ICD-10-CM

## 2021-11-20 DIAGNOSIS — Z74.1 NEED FOR ASSISTANCE WITH PERSONAL CARE: ICD-10-CM

## 2021-11-20 DIAGNOSIS — E11.51 TYPE 2 DIABETES MELLITUS WITH DIABETIC PERIPHERAL ANGIOPATHY WITHOUT GANGRENE: ICD-10-CM

## 2021-11-20 DIAGNOSIS — Z79.84 LONG TERM (CURRENT) USE OF ORAL HYPOGLYCEMIC DRUGS: ICD-10-CM

## 2021-11-20 DIAGNOSIS — D64.9 ANEMIA, UNSPECIFIED: ICD-10-CM

## 2021-11-20 DIAGNOSIS — I73.9 PERIPHERAL VASCULAR DISEASE, UNSPECIFIED: ICD-10-CM

## 2021-11-20 DIAGNOSIS — Z99.3 DEPENDENCE ON WHEELCHAIR: ICD-10-CM

## 2021-11-20 DIAGNOSIS — R33.9 RETENTION OF URINE, UNSPECIFIED: ICD-10-CM

## 2021-11-20 DIAGNOSIS — L97.429 NON-PRESSURE CHRONIC ULCER OF LEFT HEEL AND MIDFOOT WITH UNSPECIFIED SEVERITY: ICD-10-CM

## 2021-11-20 DIAGNOSIS — E87.2 ACIDOSIS: ICD-10-CM

## 2021-11-20 LAB
ALBUMIN SERPL ELPH-MCNC: 3.7 G/DL — SIGNIFICANT CHANGE UP (ref 3.5–5.2)
ALP SERPL-CCNC: 187 U/L — HIGH (ref 30–115)
ALT FLD-CCNC: 21 U/L — SIGNIFICANT CHANGE UP (ref 0–41)
ANION GAP SERPL CALC-SCNC: 19 MMOL/L — HIGH (ref 7–14)
AST SERPL-CCNC: 17 U/L — SIGNIFICANT CHANGE UP (ref 0–41)
BASOPHILS # BLD AUTO: 0.03 K/UL — SIGNIFICANT CHANGE UP (ref 0–0.2)
BASOPHILS NFR BLD AUTO: 0.3 % — SIGNIFICANT CHANGE UP (ref 0–1)
BILIRUB SERPL-MCNC: 0.3 MG/DL — SIGNIFICANT CHANGE UP (ref 0.2–1.2)
BUN SERPL-MCNC: 64 MG/DL — CRITICAL HIGH (ref 10–20)
CALCIUM SERPL-MCNC: 9.1 MG/DL — SIGNIFICANT CHANGE UP (ref 8.5–10.1)
CHLORIDE SERPL-SCNC: 102 MMOL/L — SIGNIFICANT CHANGE UP (ref 98–110)
CO2 SERPL-SCNC: 18 MMOL/L — SIGNIFICANT CHANGE UP (ref 17–32)
CREAT SERPL-MCNC: 3.3 MG/DL — HIGH (ref 0.7–1.5)
EOSINOPHIL # BLD AUTO: 0.52 K/UL — SIGNIFICANT CHANGE UP (ref 0–0.7)
EOSINOPHIL NFR BLD AUTO: 5.6 % — SIGNIFICANT CHANGE UP (ref 0–8)
GLUCOSE BLDC GLUCOMTR-MCNC: 190 MG/DL — HIGH (ref 70–99)
GLUCOSE SERPL-MCNC: 202 MG/DL — HIGH (ref 70–99)
HCT VFR BLD CALC: 29.3 % — LOW (ref 37–47)
HGB BLD-MCNC: 9.4 G/DL — LOW (ref 12–16)
IMM GRANULOCYTES NFR BLD AUTO: 0.4 % — HIGH (ref 0.1–0.3)
LYMPHOCYTES # BLD AUTO: 1.5 K/UL — SIGNIFICANT CHANGE UP (ref 1.2–3.4)
LYMPHOCYTES # BLD AUTO: 16.2 % — LOW (ref 20.5–51.1)
MCHC RBC-ENTMCNC: 29.2 PG — SIGNIFICANT CHANGE UP (ref 27–31)
MCHC RBC-ENTMCNC: 32.1 G/DL — SIGNIFICANT CHANGE UP (ref 32–37)
MCV RBC AUTO: 91 FL — SIGNIFICANT CHANGE UP (ref 81–99)
MONOCYTES # BLD AUTO: 0.52 K/UL — SIGNIFICANT CHANGE UP (ref 0.1–0.6)
MONOCYTES NFR BLD AUTO: 5.6 % — SIGNIFICANT CHANGE UP (ref 1.7–9.3)
NEUTROPHILS # BLD AUTO: 6.65 K/UL — HIGH (ref 1.4–6.5)
NEUTROPHILS NFR BLD AUTO: 71.9 % — SIGNIFICANT CHANGE UP (ref 42.2–75.2)
NRBC # BLD: 0 /100 WBCS — SIGNIFICANT CHANGE UP (ref 0–0)
PLATELET # BLD AUTO: 417 K/UL — HIGH (ref 130–400)
POTASSIUM SERPL-MCNC: 5.1 MMOL/L — HIGH (ref 3.5–5)
POTASSIUM SERPL-SCNC: 5.1 MMOL/L — HIGH (ref 3.5–5)
PROT SERPL-MCNC: 6.8 G/DL — SIGNIFICANT CHANGE UP (ref 6–8)
RBC # BLD: 3.22 M/UL — LOW (ref 4.2–5.4)
RBC # FLD: 13.7 % — SIGNIFICANT CHANGE UP (ref 11.5–14.5)
SARS-COV-2 RNA SPEC QL NAA+PROBE: SIGNIFICANT CHANGE UP
SODIUM SERPL-SCNC: 139 MMOL/L — SIGNIFICANT CHANGE UP (ref 135–146)
WBC # BLD: 9.26 K/UL — SIGNIFICANT CHANGE UP (ref 4.8–10.8)
WBC # FLD AUTO: 9.26 K/UL — SIGNIFICANT CHANGE UP (ref 4.8–10.8)

## 2021-11-20 PROCEDURE — 99285 EMERGENCY DEPT VISIT HI MDM: CPT

## 2021-11-20 PROCEDURE — 99223 1ST HOSP IP/OBS HIGH 75: CPT

## 2021-11-20 RX ORDER — ATORVASTATIN CALCIUM 80 MG/1
80 TABLET, FILM COATED ORAL AT BEDTIME
Refills: 0 | Status: DISCONTINUED | OUTPATIENT
Start: 2021-11-20 | End: 2021-11-24

## 2021-11-20 RX ORDER — OXYCODONE HYDROCHLORIDE 5 MG/1
5 TABLET ORAL EVERY 6 HOURS
Refills: 0 | Status: DISCONTINUED | OUTPATIENT
Start: 2021-11-20 | End: 2021-11-24

## 2021-11-20 RX ORDER — TAMSULOSIN HYDROCHLORIDE 0.4 MG/1
0.4 CAPSULE ORAL AT BEDTIME
Refills: 0 | Status: DISCONTINUED | OUTPATIENT
Start: 2021-11-20 | End: 2021-11-24

## 2021-11-20 RX ORDER — INSULIN GLARGINE 100 [IU]/ML
10 INJECTION, SOLUTION SUBCUTANEOUS AT BEDTIME
Refills: 0 | Status: DISCONTINUED | OUTPATIENT
Start: 2021-11-20 | End: 2021-11-24

## 2021-11-20 RX ORDER — ASPIRIN/CALCIUM CARB/MAGNESIUM 324 MG
81 TABLET ORAL DAILY
Refills: 0 | Status: DISCONTINUED | OUTPATIENT
Start: 2021-11-20 | End: 2021-11-24

## 2021-11-20 RX ORDER — FERROUS SULFATE 325(65) MG
325 TABLET ORAL DAILY
Refills: 0 | Status: DISCONTINUED | OUTPATIENT
Start: 2021-11-20 | End: 2021-11-24

## 2021-11-20 RX ORDER — HYDRALAZINE HCL 50 MG
25 TABLET ORAL EVERY 8 HOURS
Refills: 0 | Status: DISCONTINUED | OUTPATIENT
Start: 2021-11-20 | End: 2021-11-24

## 2021-11-20 RX ORDER — INSULIN LISPRO 100/ML
VIAL (ML) SUBCUTANEOUS
Refills: 0 | Status: DISCONTINUED | OUTPATIENT
Start: 2021-11-20 | End: 2021-11-24

## 2021-11-20 RX ORDER — INSULIN LISPRO 100/ML
4 VIAL (ML) SUBCUTANEOUS
Refills: 0 | Status: DISCONTINUED | OUTPATIENT
Start: 2021-11-20 | End: 2021-11-24

## 2021-11-20 RX ORDER — PANTOPRAZOLE SODIUM 20 MG/1
40 TABLET, DELAYED RELEASE ORAL
Refills: 0 | Status: DISCONTINUED | OUTPATIENT
Start: 2021-11-20 | End: 2021-11-24

## 2021-11-20 RX ORDER — CALCITRIOL 0.5 UG/1
0.25 CAPSULE ORAL DAILY
Refills: 0 | Status: DISCONTINUED | OUTPATIENT
Start: 2021-11-20 | End: 2021-11-24

## 2021-11-20 RX ORDER — GABAPENTIN 400 MG/1
300 CAPSULE ORAL THREE TIMES A DAY
Refills: 0 | Status: DISCONTINUED | OUTPATIENT
Start: 2021-11-20 | End: 2021-11-24

## 2021-11-20 RX ORDER — HEPARIN SODIUM 5000 [USP'U]/ML
5000 INJECTION INTRAVENOUS; SUBCUTANEOUS EVERY 12 HOURS
Refills: 0 | Status: DISCONTINUED | OUTPATIENT
Start: 2021-11-20 | End: 2021-11-24

## 2021-11-20 RX ORDER — OXYCODONE AND ACETAMINOPHEN 5; 325 MG/1; MG/1
1 TABLET ORAL ONCE
Refills: 0 | Status: DISCONTINUED | OUTPATIENT
Start: 2021-11-20 | End: 2021-11-20

## 2021-11-20 RX ORDER — SODIUM BICARBONATE 1 MEQ/ML
1300 SYRINGE (ML) INTRAVENOUS THREE TIMES A DAY
Refills: 0 | Status: DISCONTINUED | OUTPATIENT
Start: 2021-11-20 | End: 2021-11-24

## 2021-11-20 RX ORDER — ASCORBIC ACID 60 MG
500 TABLET,CHEWABLE ORAL DAILY
Refills: 0 | Status: DISCONTINUED | OUTPATIENT
Start: 2021-11-20 | End: 2021-11-24

## 2021-11-20 RX ORDER — CLOPIDOGREL BISULFATE 75 MG/1
75 TABLET, FILM COATED ORAL DAILY
Refills: 0 | Status: DISCONTINUED | OUTPATIENT
Start: 2021-11-20 | End: 2021-11-24

## 2021-11-20 RX ORDER — METOPROLOL TARTRATE 50 MG
25 TABLET ORAL DAILY
Refills: 0 | Status: DISCONTINUED | OUTPATIENT
Start: 2021-11-20 | End: 2021-11-24

## 2021-11-20 RX ADMIN — TAMSULOSIN HYDROCHLORIDE 0.4 MILLIGRAM(S): 0.4 CAPSULE ORAL at 21:38

## 2021-11-20 RX ADMIN — GABAPENTIN 300 MILLIGRAM(S): 400 CAPSULE ORAL at 21:37

## 2021-11-20 RX ADMIN — ATORVASTATIN CALCIUM 80 MILLIGRAM(S): 80 TABLET, FILM COATED ORAL at 21:37

## 2021-11-20 RX ADMIN — OXYCODONE AND ACETAMINOPHEN 1 TABLET(S): 5; 325 TABLET ORAL at 16:39

## 2021-11-20 RX ADMIN — INSULIN GLARGINE 10 UNIT(S): 100 INJECTION, SOLUTION SUBCUTANEOUS at 21:46

## 2021-11-20 RX ADMIN — Medication 25 MILLIGRAM(S): at 21:37

## 2021-11-20 RX ADMIN — Medication 1 TABLET(S): at 21:37

## 2021-11-20 NOTE — H&P ADULT - ASSESSMENT
Patient is a 57yo female with PMH of PVD, active smoker, HTN, HLD. right pontine CVA (February 7, 2021), intellectual disability, hearing/speech impairment, CKD stage IV, mild aortic stenosis, [employer], s/p recent left femoral artery-posterior tibial artery bypass with autologous venous tissue and left heel ulcer debridement discharged on 11/19/2021 who presented to the hospital for inability to care for herself.    #    #Recent left heel ulcer debridement  - Wound dressing: wet to dry  - Patient to continue wound VAC dressing  - Weight bearing as tolerated to right foot, non-weight bearing to left foot  - Per last podiatry note, to follow outpatient with podiatry (Dr. Llamas) in 1 week    #Recent left femoral artery-posterior tibial artery bypass with autologous venous tissue  - POD #12  - Discussed with vascular fellow, who states that incision looks good  - Follow outpatient with vascular surgery (Dr. Muñoz)    #Urinary retention  - Documentation from vascular surgery team states patient was to follow outpatient with Dr. Hernández for indwelling urethral catheter  - Will need to monitor urinary output  - Can consider voiding trial in AM if possible      #CKD stage IV  - Baseline creatinine:  - Creatinine today:  - Avoid nephrotoxic agents     #Misc  - DVT Prophylaxis:  - GI Prophylaxis:  - Diet:  - Activity:  - IV Fluids:  - Code Status:     Dispo: admit to medicine, pending placement Patient is a 57yo female with PMH of PVD, active smoker, HTN, HLD. 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    #Inability to care for self  - Brother-in-law Tyrese is unable to care for the patient full time  - Case management and social work consult placed  - Patient is wheelchair bound at baseline and primarily communicates through American sign language  - Patient will require either a 24hr home health aide on discharge to be transferred to rehab facility for possible long term placement    #Recent left heel ulcer debridement 11/11/2021  - POD #9  - Wound dressing: wet to dry  - Patient to continue wound VAC dressing  - Weight bearing as tolerated to right foot, non-weight bearing to left foot  - Per last podiatry note, to follow outpatient with podiatry (Dr. Llamas) in 1 week  - Will need podiatry follow up for wound VAC care  - Continue with Augment 875-125mg PO twice daily for total of 2 weeks, ending November 28th  - Continue with ascorbic acid 500mg PO once daily     #Peripheral arterial disease s/p left femoral artery-posterior tibial artery bypass with autologous venous tissue 11/8/2021  - POD #12  - Discussed with vascular fellow, who states that incision looks good  - Follow outpatient with vascular surgery (Dr. Muñoz)  - Continue with aspirin 81mg PO once daily, clopidogrel 75mg PO once daily, atorvastatin 80mg PO at bedtime     #Diabetes mellitus type 2  - Hemoglobin A1c 11/10/2021: 7.6  - Continue with insulin glargine 10 units SQ QHS  - Continue with insulin lispro 4 units SQ TID  - Insulin sliding scale coverage  - Monitor fingerstick glucose     #Hypertension  - Losartan was discontinued on previous admission  - Continue with hydralazine 25mg PO three times daily, metoprolol succinate 25mg PO once daily     #Urinary retention  - Documentation from vascular surgery team states patient was to follow outpatient with Dr. 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, but was agreeable to 1 unit PRBC transfusion last admission after surgery  - Hemoglobin today: 9.4  - Continue with ferrous sulfate 325mg PO once daily     #CKD stage IV, stable since 2/2021  #Proteinuria, nephrotic range, likely secondary to diabetes/HTN  - Baseline creatinine: ~2.7  - Creatinine today: 3.3  - Avoid nephrotoxic agents   - Nephrology was following patient during previous admission, will reconsult  - Restart sodium bicarbonate 1300mg PO three times daily   - Phosphorus did not require binder (will repeat)  - Follow PTH while on calcitriol  - Patient's nephrologist is in [place]  - Patient is on gabapentin 300mg PO three times daily. Consider titrating to renal function    #Misc  - DVT Prophylaxis: heparin 5000 units SQ Q12H   - GI Prophylaxis: pantoprazole 40mg PO once daily   - Diet: Renal restrictions  - Activity: increase as tolerated   - IV Fluids: not indicated   - Code Status: Full Code    Dispo: admit to medicine, pending placement Patient is a 55yo female with PMH of PVD, active smoker, HTN, HLD. right pontine CVA (February 7, 2021), intellectual disability, hearing/speech impairment, CKD stage IV, mild aortic stenosis, L[employer], s/p recent left femoral artery-posterior tibial artery bypass with autologous venous tissue and left heel ulcer debridement discharged on 11/19/2021 who presented to the hospital for inability to care for herself.    #Inability to care for self  - Brother-in-law Tyrese is unable to care for the patient full time  - Case management and social work consult placed  - Patient is wheelchair bound at baseline and primarily communicates through American sign language  - Patient will require either a 24hr home health aide on discharge to be transferred to rehab facility for possible long term placement    #Recent left heel ulcer debridement 11/11/2021  - POD #9  - Wound dressing: wet to dry  - Patient to continue wound VAC dressing  - Weight bearing as tolerated to right foot, non-weight bearing to left foot  - Per last podiatry note, to follow outpatient with podiatry (Dr. Llamas) in 1 week  - Will need podiatry follow up for wound VAC care  - Continue with Augment 875-125mg PO twice daily for total of 2 weeks, ending November 28th  - Continue with ascorbic acid 500mg PO once daily     #Peripheral arterial disease s/p left femoral artery-posterior tibial artery bypass with autologous venous tissue 11/8/2021  - POD #12  - Discussed with vascular fellow, who states that incision looks good  - Follow outpatient with vascular surgery (Dr. Muñoz)  - Continue with aspirin 81mg PO once daily, clopidogrel 75mg PO once daily, atorvastatin 80mg PO at bedtime     #Diabetes mellitus type 2  - Hemoglobin A1c 11/10/2021: 7.6  - Continue with insulin glargine 10 units SQ QHS  - Continue with insulin lispro 4 units SQ TID  - Insulin sliding scale coverage  - Monitor fingerstick glucose     #Hypertension  - Losartan was discontinued on previous admission  - Continue with hydralazine 25mg PO three times daily, metoprolol succinate 25mg PO once daily     #Urinary retention  - Documentation from vascular surgery team states patient was to follow outpatient with Dr. Hernández for indwelling urethral catheter  - Will need to monitor urinary output for retention  - Continue with Henriquez catheter (was discharged with Henriquez)  - Can consider voiding trial in AM if possible  - Continue with tamsulosin 0.4mg PO at bedtime     #Normocytic anemia s/p recent blood transfusion  - Patient is a L[employer]-day, but was agreeable to 1 unit PRBC transfusion last admission after surgery  - Hemoglobin today: 9.4  - Continue with ferrous sulfate 325mg PO once daily     #CKD stage IV, stable since 2/2021  #Proteinuria, nephrotic range, likely secondary to diabetes/HTN  - Baseline creatinine: ~2.7  - Creatinine today: 3.3  - Avoid nephrotoxic agents   - Nephrology was following patient during previous admission, will reconsult  - Restart sodium bicarbonate 1300mg PO three times daily   - Phosphorus did not require binder (will repeat)  - Follow PTH while on calcitriol  - Patient's nephrologist is in [place]  - Patient is on gabapentin 300mg PO three times daily. Consider titrating to renal function    #Misc  - DVT Prophylaxis: heparin 5000 units SQ Q12H   - GI Prophylaxis: pantoprazole 40mg PO once daily   - Diet: Renal restrictions  - Activity: increase as tolerated   - IV Fluids: not indicated   - Code Status: Full Code    Dispo: admit to medicine, pending placement

## 2021-11-20 NOTE — ED PROVIDER NOTE - PHYSICAL EXAMINATION
Vital Signs: I have reviewed the initial vital signs.  Constitutional: well-nourished, appears stated age, no acute distress  Cardiovascular: regular rate, regular rhythm, well-perfused extremities  Respiratory: unlabored respiratory effort, clear to auscultation bilaterally  Gastrointestinal: soft, (+) tender abdomen LLQ with resolving ecchymosis from surgical procedure   : (+) urinary catheter in place   Musculoskeletal: supple neck, no lower extremity edema, (+) full dressing LLE from L groin to foot, (+) L foot ulcer dressed    Integumentary: warm, dry, no rash  Neurologic: awake, alert, extremities’ motor and sensory functions at baseline   Psychiatric: appropriate mood, appropriate affect

## 2021-11-20 NOTE — ED ADULT NURSE NOTE - CHIEF COMPLAINT QUOTE
pt BIBA for FTT. pt had recent vascular surgery to the left leg. pt unable to ambulate or care for herself at this time. pt need social work consult none

## 2021-11-20 NOTE — ED ADULT NURSE NOTE - NSIMPLEMENTINTERV_GEN_ALL_ED
Implemented All Fall with Harm Risk Interventions:  Bolt to call system. Call bell, personal items and telephone within reach. Instruct patient to call for assistance. Room bathroom lighting operational. Non-slip footwear when patient is off stretcher. Physically safe environment: no spills, clutter or unnecessary equipment. Stretcher in lowest position, wheels locked, appropriate side rails in place. Provide visual cue, wrist band, yellow gown, etc. Monitor gait and stability. Monitor for mental status changes and reorient to person, place, and time. Review medications for side effects contributing to fall risk. Reinforce activity limits and safety measures with patient and family. Provide visual clues: red socks.

## 2021-11-20 NOTE — H&P ADULT - NSHPSOCIALHISTORY_GEN_ALL_CORE
Marital Status: single  Living Situation: lives at home with brother-in-law, who is primary caregiver  Occupation: unemployed  Tobacco Use: active smoker  Alcohol Use: denies  Drug Use: denies  Sexual History: not sexually active  Functional Status: requires assistance with ADLs and IADLs, wheelchair bound at baseline Marital Status: single  Living Situation: lives at home with brother-in-law, who is primary caregiver  Occupation: unemployed  Tobacco Use: former smoker  Alcohol Use: denies  Drug Use: denies  Sexual History: not sexually active  Functional Status: requires assistance with ADLs and IADLs, wheelchair bound at baseline

## 2021-11-20 NOTE — H&P ADULT - ATTENDING COMMENTS
57 YO F with a PMH of PVD, former smoker, HTN, HLD, hx of CVA (February 7, 2021), intellectual disability, hearing/speech impairment, CKD stage IV, mild aortic stenosis, s/p recent left femoral artery-posterior tibial artery bypass with autologous venous tissue and left heel ulcer debridement discharged on 11/19/2021 who presented to the hospital for inability to care for herself. Patient lives at home with her brother-in-law, who 55 YO F with a PMH of PVD, former smoker, HTN, HLD, hx of CVA (February 7, 2021), intellectual disability, hearing/speech impairment, CKD4, s/p recent left femoral artery-posterior tibial artery bypass who presents to the hospital with a c/o inability to care for herself. Denies any symptoms currently. ROS is negative except as above.     FMHx: Reviewed, not relevant    Physical exam shows pt in NAD. VSS, afebrile, not hypoxic on RA. A&Ox3. Neuro exam without deficits, motor/sensory intact, no dysarthria, no facial asymmetry. Muscle strength/sensation intact. CTA B/L with no W/C/R. RRR, no M/G/R. ABD is soft and non-tender, normoactive BSs. LEs without swelling. No rashes. Labs and radiology as above.     Failure to thrive in adult. Social consult. PT. Fall precautions.     Normocytic anemia, above baseline. Pt denies bleeding symptoms. Replace as necessary.     Diabetes mellitus with hyperglycemia. A1c. FSs. Insulin standing/correctional dosing    HX of PVD, former smoker, HTN, HLD, hx of CVA (February 7, 2021), intellectual disability, hearing/speech impairment, CKD4, and s/p recent left femoral artery-posterior tibial artery bypass. Restart home meds, except as stated above. DVT PPX. Inform PCP of pt's admission to hospital. My note supersedes the residents note.

## 2021-11-20 NOTE — ED PROVIDER NOTE - OBJECTIVE STATEMENT
The pt is a 56y F w/ hx of PVD, smoker, HTN, HLD, h/o right pontine CVA (2/7/21), DM, intellectual disability, hearing/speech impairment, CKD 4 (being followed by nephro), mild aortic stenosis, s/p recent left femoral artery to posterior tibial artery bypass with autologous venous tissue and L heel ulcer debridement presenting due to inability to care for herself at home. She lives with her brother-in-law who is her primary caretaker but he works 5 days a week from morning to evening. Pt is wheelchair bound, cannot do any ADLs on her own. Denies headache, chest pain, SOB, N/V, abdominal pain, diarrhea.

## 2021-11-20 NOTE — ED ADULT NURSE REASSESSMENT NOTE - NS ED NURSE REASSESS COMMENT FT1
emilia chaudhari at bedside. MD Garcia called to bedside. ipad also at bedside to help translate into Turkish.

## 2021-11-20 NOTE — H&P ADULT - HISTORY OF PRESENT ILLNESS
Patient is a 57yo female with PMH of PVD, active smoker, HTN, HLD. right pontine CVA (February 7, 2021), intellectual disability, hearing/speech impairment, CKD stage IV, mild aortic stenosis, s/p recent left femoral artery-posterior tibial artery bypass with autologous venous tissue and left heel ulcer debridement discharged on 11/19/2021 who presented to the hospital for inability to care for herself. Patient lives at home with her brother-in-law, who is her primary caregiver. Per the brother-in-law, he was under the impression that case management was arranging for 24hr home care. However, he found out today that [facility] was only arranged for wound care. The brother-in-law works Monday-Friday and is unable to care for the sister full time. The patient is wheelchair bound and requires assistance with her ADLs and IADLs.     In the ED, vital signs were Tmax 98.7F, HR 75, /63, RR 16, and SpO2 99% on room air. Labs showed Hb 9.4 (improved from previous), K 5.1 (stable), BUN 64 (trending up), creatinine 3.3 (trending up), and anion gap 19 (stable). Patient is being admitted to medicine service for placement pending safe disposition. Patient is a 55yo female with PMH of PVD, former smoker, HTN, HLD. right pontine CVA (February 7, 2021), intellectual disability, hearing/speech impairment, CKD stage IV, mild aortic stenosis, s/p recent left femoral artery-posterior tibial artery bypass with autologous venous tissue and left heel ulcer debridement discharged on 11/19/2021 who presented to the hospital for inability to care for herself. Patient lives at home with her brother-in-law, who is her primary caregiver. Per the brother-in-law, he was under the impression that case management was arranging for 24hr home care. However, he found out today that [facility] was only arranged for wound care. The brother-in-law works Monday-Friday and is unable to care for the sister full time. The patient is wheelchair bound and requires assistance with her ADLs and IADLs.     In the ED, vital signs were Tmax 98.7F, HR 75, /63, RR 16, and SpO2 99% on room air. Labs showed Hb 9.4 (improved from previous), K 5.1 (stable), BUN 64 (trending up), creatinine 3.3 (trending up), and anion gap 19 (stable). Patient is being admitted to medicine service for placement pending safe disposition. Patient endorses some mild abdominal discomfort, but denies any other symptoms.

## 2021-11-20 NOTE — H&P ADULT - NSHPPHYSICALEXAM_GEN_ALL_CORE
CONSTITUTIONAL: No acute distress, well-developed, well-groomed, AAOx3  HEAD: Atraumatic, normocephalic  EYES: EOM intact, PERRLA, conjunctiva and sclera clear  ENT: Supple, no masses, no thyromegaly, no bruits, no JVD; moist mucous membranes  PULMONARY: Clear to auscultation bilaterally; no wheezes, rales, or rhonchi  CARDIOVASCULAR: Regular rate and rhythm; no murmurs, rubs, or gallops  GASTROINTESTINAL: Soft, non-tender, non-distended; bowel sounds present  MUSCULOSKELETAL: 2+ peripheral pulses; no clubbing, no cyanosis, no edema  NEUROLOGY: non-focal  SKIN: No rashes or lesions; warm and dry CONSTITUTIONAL: No acute distress, well-developed, well-groomed, AAOx3  HEAD: Atraumatic, microcephalic  EYES: EOM intact, PERRLA, conjunctiva and sclera clear  ENT: Supple, no masses, no thyromegaly, no bruits, no JVD; moist mucous membranes  PULMONARY: Clear to auscultation bilaterally; no wheezes, rales, or rhonchi  CARDIOVASCULAR: Regular rate and rhythm; no murmurs, rubs, or gallops  GASTROINTESTINAL: Soft, non-tender, non-distended; bowel sounds present, ecchymosis over left lower abdominal wall (likely from Lovenox injections)  MUSCULOSKELETAL: poor peripheral pulses; clean and dry incision from proximal medial thigh to distal calf with wound dressing, wound dressing on left heel  NEUROLOGY: non-focal  SKIN: clean and dry incision from proximal medial thigh to distal calf with wound dressing, wound dressing on left heel, ecchymosis over left lower abdominal wall

## 2021-11-20 NOTE — ED ADULT TRIAGE NOTE - CHIEF COMPLAINT QUOTE
pt BIBA for FTT. pt had recent vascular surgery to the left leg. pt unable to ambulate or care for herself at this time. pt need social work consult

## 2021-11-21 LAB
ANION GAP SERPL CALC-SCNC: 18 MMOL/L — HIGH (ref 7–14)
BUN SERPL-MCNC: 68 MG/DL — CRITICAL HIGH (ref 10–20)
CALCIUM SERPL-MCNC: 8.5 MG/DL — SIGNIFICANT CHANGE UP (ref 8.5–10.1)
CHLORIDE SERPL-SCNC: 105 MMOL/L — SIGNIFICANT CHANGE UP (ref 98–110)
CO2 SERPL-SCNC: 16 MMOL/L — LOW (ref 17–32)
CREAT SERPL-MCNC: 3.2 MG/DL — HIGH (ref 0.7–1.5)
GLUCOSE BLDC GLUCOMTR-MCNC: 115 MG/DL — HIGH (ref 70–99)
GLUCOSE BLDC GLUCOMTR-MCNC: 147 MG/DL — HIGH (ref 70–99)
GLUCOSE BLDC GLUCOMTR-MCNC: 153 MG/DL — HIGH (ref 70–99)
GLUCOSE BLDC GLUCOMTR-MCNC: 211 MG/DL — HIGH (ref 70–99)
GLUCOSE SERPL-MCNC: 140 MG/DL — HIGH (ref 70–99)
HCT VFR BLD CALC: 26.6 % — LOW (ref 37–47)
HGB BLD-MCNC: 8.4 G/DL — LOW (ref 12–16)
MAGNESIUM SERPL-MCNC: 2.2 MG/DL — SIGNIFICANT CHANGE UP (ref 1.8–2.4)
MCHC RBC-ENTMCNC: 29.1 PG — SIGNIFICANT CHANGE UP (ref 27–31)
MCHC RBC-ENTMCNC: 31.6 G/DL — LOW (ref 32–37)
MCV RBC AUTO: 92 FL — SIGNIFICANT CHANGE UP (ref 81–99)
NRBC # BLD: 0 /100 WBCS — SIGNIFICANT CHANGE UP (ref 0–0)
PHOSPHATE SERPL-MCNC: 6.5 MG/DL — HIGH (ref 2.1–4.9)
PLATELET # BLD AUTO: 373 K/UL — SIGNIFICANT CHANGE UP (ref 130–400)
POTASSIUM SERPL-MCNC: 4.7 MMOL/L — SIGNIFICANT CHANGE UP (ref 3.5–5)
POTASSIUM SERPL-SCNC: 4.7 MMOL/L — SIGNIFICANT CHANGE UP (ref 3.5–5)
RBC # BLD: 2.89 M/UL — LOW (ref 4.2–5.4)
RBC # FLD: 13.6 % — SIGNIFICANT CHANGE UP (ref 11.5–14.5)
SODIUM SERPL-SCNC: 139 MMOL/L — SIGNIFICANT CHANGE UP (ref 135–146)
WBC # BLD: 7.63 K/UL — SIGNIFICANT CHANGE UP (ref 4.8–10.8)
WBC # FLD AUTO: 7.63 K/UL — SIGNIFICANT CHANGE UP (ref 4.8–10.8)

## 2021-11-21 PROCEDURE — 99231 SBSQ HOSP IP/OBS SF/LOW 25: CPT

## 2021-11-21 RX ADMIN — Medication 1300 MILLIGRAM(S): at 05:47

## 2021-11-21 RX ADMIN — INSULIN GLARGINE 10 UNIT(S): 100 INJECTION, SOLUTION SUBCUTANEOUS at 22:11

## 2021-11-21 RX ADMIN — Medication 25 MILLIGRAM(S): at 05:48

## 2021-11-21 RX ADMIN — Medication 2: at 16:30

## 2021-11-21 RX ADMIN — GABAPENTIN 300 MILLIGRAM(S): 400 CAPSULE ORAL at 15:01

## 2021-11-21 RX ADMIN — CLOPIDOGREL BISULFATE 75 MILLIGRAM(S): 75 TABLET, FILM COATED ORAL at 12:33

## 2021-11-21 RX ADMIN — GABAPENTIN 300 MILLIGRAM(S): 400 CAPSULE ORAL at 05:50

## 2021-11-21 RX ADMIN — Medication 4 UNIT(S): at 17:43

## 2021-11-21 RX ADMIN — Medication 4 UNIT(S): at 12:30

## 2021-11-21 RX ADMIN — ATORVASTATIN CALCIUM 80 MILLIGRAM(S): 80 TABLET, FILM COATED ORAL at 22:12

## 2021-11-21 RX ADMIN — Medication 650 MILLIGRAM(S): at 15:00

## 2021-11-21 RX ADMIN — Medication 500 MILLIGRAM(S): at 12:33

## 2021-11-21 RX ADMIN — HEPARIN SODIUM 5000 UNIT(S): 5000 INJECTION INTRAVENOUS; SUBCUTANEOUS at 05:47

## 2021-11-21 RX ADMIN — Medication 81 MILLIGRAM(S): at 12:33

## 2021-11-21 RX ADMIN — Medication 25 MILLIGRAM(S): at 22:11

## 2021-11-21 RX ADMIN — Medication 4 UNIT(S): at 08:44

## 2021-11-21 RX ADMIN — Medication 25 MILLIGRAM(S): at 14:52

## 2021-11-21 RX ADMIN — CALCITRIOL 0.25 MICROGRAM(S): 0.5 CAPSULE ORAL at 12:33

## 2021-11-21 RX ADMIN — PANTOPRAZOLE SODIUM 40 MILLIGRAM(S): 20 TABLET, DELAYED RELEASE ORAL at 05:48

## 2021-11-21 RX ADMIN — OXYCODONE HYDROCHLORIDE 5 MILLIGRAM(S): 5 TABLET ORAL at 14:51

## 2021-11-21 RX ADMIN — HEPARIN SODIUM 5000 UNIT(S): 5000 INJECTION INTRAVENOUS; SUBCUTANEOUS at 17:46

## 2021-11-21 RX ADMIN — Medication 1 TABLET(S): at 18:50

## 2021-11-21 RX ADMIN — Medication 1300 MILLIGRAM(S): at 22:12

## 2021-11-21 RX ADMIN — GABAPENTIN 300 MILLIGRAM(S): 400 CAPSULE ORAL at 22:12

## 2021-11-21 RX ADMIN — Medication 325 MILLIGRAM(S): at 12:33

## 2021-11-21 RX ADMIN — TAMSULOSIN HYDROCHLORIDE 0.4 MILLIGRAM(S): 0.4 CAPSULE ORAL at 22:12

## 2021-11-21 RX ADMIN — Medication 1 TABLET(S): at 05:48

## 2021-11-21 NOTE — CONSULT NOTE ADULT - ASSESSMENT
57 yo female PMH CKD4 PVD, smoker, HTN, HLD, h/o right pontine CVA (2/7/21), DM,  intellectual disability, hearing/speech impairment, recent LE arterial bypass, presents d/t family unable to care for patient.      # CKD 4, creat stable at baseline  # Proteinuria, nephrotic range / likely d/t HTN/DM  # HTN  # DM  # Normocytic anemia     - cont po sodium bicarb 1300 tid   - BP controlled  - OLVIN weak positive/ serum flc ratio wnl / immunofixation w/ weak lambda band- consider hematology evaluation   - phos noted, start sevelamer 1 tab qac   - check iron stores  - strict i/o   - dose meds per egfr

## 2021-11-21 NOTE — PROGRESS NOTE ADULT - SUBJECTIVE AND OBJECTIVE BOX
SEN LING  56y  Female      Patient is a 56y old  Female who presents with a chief complaint of social problem    INTERVAL HPI/OVERNIGHT EVENTS: brother in law at bedside reports they need much more support at home      REVIEW OF SYSTEMS:  limited as above, no LE pain reported or bleeding  All other review of systems negative    T(C): 37 (11-21-21 @ 12:23), Max: 37.1 (11-20-21 @ 20:36)  HR: 69 (11-21-21 @ 12:23) (69 - 76)  BP: 148/65 (11-21-21 @ 12:23) (117/63 - 148/65)  RR: 18 (11-21-21 @ 12:23) (18 - 18)  SpO2: 98% (11-20-21 @ 20:36) (98% - 98%)  Wt(kg): --Vital Signs Last 24 Hrs  T(C): 37 (21 Nov 2021 12:23), Max: 37.1 (20 Nov 2021 20:36)  T(F): 98.6 (21 Nov 2021 12:23), Max: 98.7 (20 Nov 2021 20:36)  HR: 69 (21 Nov 2021 12:23) (69 - 76)  BP: 148/65 (21 Nov 2021 12:23) (117/63 - 148/65)  BP(mean): --  RR: 18 (21 Nov 2021 12:23) (18 - 18)  SpO2: 98% (20 Nov 2021 20:36) (98% - 98%)      11-20-21 @ 07:01  -  11-21-21 @ 07:00  --------------------------------------------------------  IN: 0 mL / OUT: 500 mL / NET: -500 mL        PHYSICAL EXAM:  GENERAL: NAD  PSYCH: no agitation, baseline mentation, signs  NERVOUS SYSTEM:  Alert & Oriented X3, no new focal deficits  PULMONARY: Clear to percussion bilaterally; No rales, rhonchi, wheezing, or rubs  CARDIOVASCULAR: Regular rate and rhythm; No murmurs, rubs, or gallops  GI: Soft, Nontender, Nondistended; Bowel sounds present  EXTREMITIES:  no edema LLE dressing  SKIN: clean and dry incision from proximal medial thigh to distal calf with wound dressing, wound dressing on left heel, ecchymosis over left lower abdominal wall    Consultant(s) Notes Reviewed:  [x ] YES  [ ] NO    Discussed with Consultants/Other Providers [ x] YES     LABS                          8.4    7.63  )-----------( 373      ( 21 Nov 2021 04:30 )             26.6     11-21    139  |  105  |  68<HH>  ----------------------------<  140<H>  4.7   |  16<L>  |  3.2<H>    Ca    8.5      21 Nov 2021 04:30  Phos  6.5     11-21  Mg     2.2     11-21    TPro  6.8  /  Alb  3.7  /  TBili  0.3  /  DBili  x   /  AST  17  /  ALT  21  /  AlkPhos  187<H>  11-20          Lactate Trend        CAPILLARY BLOOD GLUCOSE      POCT Blood Glucose.: 147 mg/dL (21 Nov 2021 11:39)        RADIOLOGY & ADDITIONAL TESTS:    Imaging Personally Reviewed:  [ ] YES  [ ] NO    HEALTH ISSUES - PROBLEM Dx:

## 2021-11-21 NOTE — CHART NOTE - NSCHARTNOTEFT_GEN_A_CORE
Podiatry;    Podiatry is aware of pending consult;  Pt recent hx of admission - recently discharge 3 days ago; podiatry evaluated pt's left heel prior to discharge;   Podiatry planned for Home VAC application on Left heel sx wound site; pt came back to hospital for pending placement;     Ordered VAC; podiatry will apply VAC once available on pts room;     Will check for VAC availability early Mon 11/22 morning round, will apply on pt's left heel if available;   If not, will continue with different local wound care; recommendation to be decided once evaluate pt's left heel on Mon 11/22 rounding;     Podiatry ;

## 2021-11-21 NOTE — CONSULT NOTE ADULT - SUBJECTIVE AND OBJECTIVE BOX
NEPHROLOGY CONSULTATION NOTE    SEN LING  56y  Female  MRN-[id number]    CC:   Patient is a 56y old  Female who presents with a chief complaint of social (21 Nov 2021 16:11)      HPI:  Patient is a 55yo female with PMH of PVD, former smoker, HTN, HLD. right pontine CVA (February 7, 2021), intellectual disability, hearing/speech impairment, CKD stage IV, mild aortic stenosis, s/p recent left femoral artery-posterior tibial artery bypass with autologous venous tissue and left heel ulcer debridement discharged on 11/19/2021 who presented to the hospital for inability to care for herself. Patient lives at home with her brother-in-law, who is her primary caregiver. Per the brother-in-law, he was under the impression that case management was arranging for 24hr home care. However, he found out today that [facility] was only arranged for wound care. The brother-in-law works Monday-Friday and is unable to care for the sister full time. The patient is wheelchair bound and requires assistance with her ADLs and IADLs.     In the ED, vital signs were Tmax 98.7F, HR 75, /63, RR 16, and SpO2 99% on room air. Labs showed Hb 9.4 (improved from previous), K 5.1 (stable), BUN 64 (trending up), creatinine 3.3 (trending up), and anion gap 19 (stable). Patient is being admitted to medicine service for placement pending safe disposition. Patient endorses some mild abdominal discomfort, but denies any other symptoms. (20 Nov 2021 17:40)      PAST MEDICAL & SURGICAL HISTORY:  PVD (peripheral vascular disease)    Benign essential HTN    Intellectual disability    Hearing impaired    HLD (hyperlipidemia)      Allergies:  penicillin (Rash)    Home Medications Reviewed  Hospital Medications:   MEDICATIONS  (STANDING):  amoxicillin  875 milliGRAM(s)/clavulanate 1 Tablet(s) Oral two times a day  ascorbic acid 500 milliGRAM(s) Oral daily  aspirin  chewable 81 milliGRAM(s) Oral daily  atorvastatin 80 milliGRAM(s) Oral at bedtime  calcitriol   Capsule 0.25 MICROGram(s) Oral daily  clopidogrel Tablet 75 milliGRAM(s) Oral daily  ferrous    sulfate 325 milliGRAM(s) Oral daily  gabapentin 300 milliGRAM(s) Oral three times a day  heparin   Injectable 5000 Unit(s) SubCutaneous every 12 hours  hydrALAZINE 25 milliGRAM(s) Oral every 8 hours  insulin glargine Injectable (LANTUS) 10 Unit(s) SubCutaneous at bedtime  insulin lispro (ADMELOG) corrective regimen sliding scale   SubCutaneous three times a day before meals  insulin lispro Injectable (ADMELOG) 4 Unit(s) SubCutaneous three times a day before meals  metoprolol succinate ER 25 milliGRAM(s) Oral daily  pantoprazole    Tablet 40 milliGRAM(s) Oral before breakfast  sodium bicarbonate 1300 milliGRAM(s) Oral three times a day  tamsulosin 0.4 milliGRAM(s) Oral at bedtime    MEDICATIONS  (PRN):  oxyCODONE    IR 5 milliGRAM(s) Oral every 6 hours PRN Severe Pain (7 - 10)    Home medications:  Home Medications:  aspirin 81 mg oral tablet: 1 tab(s) orally once a day (01 Nov 2021 16:07)  atorvastatin 80 mg oral tablet: 1 tab(s) orally once a day (01 Nov 2021 16:06)  calcitriol 0.25 mcg oral capsule: 1 cap(s) orally once a day (01 Nov 2021 16:05)  gabapentin 300 mg oral capsule: 1 cap(s) orally 3 times a day (01 Nov 2021 16:06)  insulin lispro 100 units/mL injectable solution: 4 unit(s) injectable 3 times a day (before meals) (17 Nov 2021 13:20)  Metoprolol Succinate ER 25 mg oral tablet, extended release: 1 tab(s) orally once a day (01 Nov 2021 16:07)  Plavix 75 mg oral tablet: 1 tab(s) orally once a day (01 Nov 2021 16:07)  Protonix 40 mg oral delayed release tablet: 1 tab(s) orally once a day (01 Nov 2021 16:07)      SOCIAL HISTORY:  Social History:  Marital Status: single  Living Situation: lives at home with brother-in-law, who is primary caregiver  Occupation: unemployed  Tobacco Use: former smoker  Alcohol Use: denies  Drug Use: denies  Sexual History: not sexually active  Functional Status: requires assistance with ADLs and IADLs, wheelchair bound at baseline (20 Nov 2021 17:40)      FAMILY HISTORY:      REVIEW OF SYSTEMS:   All other review of systems is negative unless indicated above.    VITALS:  T(F): 98.6 (11-21-21 @ 12:23), Max: 98.7 (11-20-21 @ 20:36)  HR: 69 (11-21-21 @ 12:23)  BP: 148/65 (11-21-21 @ 12:23)  RR: 18 (11-21-21 @ 12:23)  SpO2: 98% (11-20-21 @ 20:36)      I&O's Detail    20 Nov 2021 07:01  -  21 Nov 2021 07:00  --------------------------------------------------------  IN:  Total IN: 0 mL    OUT:    Indwelling Catheter - Urethral (mL): 500 mL  Total OUT: 500 mL    Total NET: -500 mL            I&O's Summary    20 Nov 2021 07:01  -  21 Nov 2021 07:00  --------------------------------------------------------  IN: 0 mL / OUT: 500 mL / NET: -500 mL        PHYSICAL EXAM:  Gen: NAD  resp:   card: S1/S2  abd: soft  ext: no edema  vascular access:     LABS:  Daily     Daily       11-21    139  |  105  |  68<HH>  ----------------------------<  140<H>  4.7   |  16<L>  |  3.2<H>    Ca    8.5      21 Nov 2021 04:30  Phos  6.5     11-21  Mg     2.2     11-21    TPro  6.8  /  Alb  3.7  /  TBili  0.3  /  DBili      /  AST  17  /  ALT  21  /  AlkPhos  187<H>  11-20    eGFR if Non African American: 15 mL/min/1.73M2 (11-21-21 @ 04:30)  eGFR if African American: 18 mL/min/1.73M2 (11-21-21 @ 04:30)  eGFR if Non African American: 15 mL/min/1.73M2 (11-20-21 @ 14:45)  eGFR if : 17 mL/min/1.73M2 (11-20-21 @ 14:45)    Creatinine Trend:   Creatinine, Serum: 3.2 mg/dL (11-21-21 @ 04:30)  Creatinine, Serum: 3.3 mg/dL (11-20-21 @ 14:45)  Creatinine, Serum: 3.2 mg/dL (11-15-21 @ 18:52)  Creatinine, Serum: 2.9 mg/dL (11-14-21 @ 21:39)  Creatinine, Serum: 3.1 mg/dL (11-13-21 @ 22:28)  Creatinine, Serum: 2.8 mg/dL (11-12-21 @ 20:00)  Creatinine, Serum: 2.8 mg/dL (11-11-21 @ 23:07)  Creatinine, Serum: 3.0 mg/dL (11-11-21 @ 00:28)  Creatinine, Serum: 2.7 mg/dL (11-10-21 @ 00:16)  Creatinine, Serum: 2.6 mg/dL (11-09-21 @ 13:22)  Creatinine, Serum: 2.7 mg/dL (11-08-21 @ 23:25)  Creatinine, Serum: 2.7 mg/dL (11-08-21 @ 18:04)  Creatinine, Serum: 2.7 mg/dL (11-08-21 @ 07:50)  Creatinine, Serum: 2.9 mg/dL (11-07-21 @ 17:03)  Creatinine, Serum: 3.0 mg/dL (11-07-21 @ 00:41)  Creatinine, Serum: 3.1 mg/dL (11-06-21 @ 20:00)  Creatinine, Serum: 3.1 mg/dL (11-06-21 @ 07:22)  Creatinine, Serum: 3.4 mg/dL (11-05-21 @ 17:28)  Creatinine, Serum: 3.3 mg/dL (11-05-21 @ 06:30)  Creatinine, Serum: 3.3 mg/dL (11-04-21 @ 17:48)                            8.4    7.63  )-----------( 373      ( 21 Nov 2021 04:30 )             26.6     Mean Cell Volume: 92.0 fL (11-21-21 @ 04:30)    Urine Studies:    Creatinine, Random Urine: 47 mg/dL (11-02 @ 21:45)  Protein/Creatinine Ratio Calculation: 4.5 Ratio *H* (11-02 @ 21:45)            RADIOLOGY & ADDITIONAL STUDIES:        Xray Chest 1 View- PORTABLE-Routine:   EXAM:  XR CHEST PORTABLE ROUTINE 1V            PROCEDURE DATE:  11/12/2021            INTERPRETATION:  Clinical History / Reason for exam: Follow-up.    Comparison : Chest radiograph November 11, 2021.    Technique/Positioning: Adequate.    Findings:    Support devices: None.    Cardiac/mediastinum/hilum: Stable.    Lung parenchyma/Pleura: Left basilar opacity, unchanged. No pneumothorax is seen.    Skeleton/soft tissues: Stable.    Impression:    Left basilar opacity, unchanged.    --- End of Report ---              BRE LASSITER MD; Attending Radiologist  This document has been electronically signed. Nov 12 2021  7:01AM (11-12-21 @ 06:46)                     NEPHROLOGY CONSULTATION NOTE    SEN LING  56y  Female  MRN-[id number]    CC:   Patient is a 56y old  Female who presents with a chief complaint of social (21 Nov 2021 16:11)      HPI:  Patient is a 57yo female with PMH of PVD, former smoker, HTN, HLD. right pontine CVA (February 7, 2021), intellectual disability, hearing/speech impairment, CKD stage IV, mild aortic stenosis, s/p recent left femoral artery-posterior tibial artery bypass with autologous venous tissue and left heel ulcer debridement discharged on 11/19/2021 who presented to the hospital for inability to care for herself. Patient lives at home with her brother-in-law, who is her primary caregiver. Per the brother-in-law, he was under the impression that case management was arranging for 24hr home care. However, he found out today that [facility] was only arranged for wound care. The brother-in-law works Monday-Friday and is unable to care for the sister full time. The patient is wheelchair bound and requires assistance with her ADLs and IADLs.     In the ED, vital signs were Tmax 98.7F, HR 75, /63, RR 16, and SpO2 99% on room air. Labs showed Hb 9.4 (improved from previous), K 5.1 (stable), BUN 64 (trending up), creatinine 3.3 (trending up), and anion gap 19 (stable). Patient is being admitted to medicine service for placement pending safe disposition. Patient endorses some mild abdominal discomfort, but denies any other symptoms. (20 Nov 2021 17:40)      PAST MEDICAL & SURGICAL HISTORY:  PVD (peripheral vascular disease)    Benign essential HTN    Intellectual disability    Hearing impaired    HLD (hyperlipidemia)      Allergies:  penicillin (Rash)    Home Medications Reviewed  Hospital Medications:   MEDICATIONS  (STANDING):  amoxicillin  875 milliGRAM(s)/clavulanate 1 Tablet(s) Oral two times a day  ascorbic acid 500 milliGRAM(s) Oral daily  aspirin  chewable 81 milliGRAM(s) Oral daily  atorvastatin 80 milliGRAM(s) Oral at bedtime  calcitriol   Capsule 0.25 MICROGram(s) Oral daily  clopidogrel Tablet 75 milliGRAM(s) Oral daily  ferrous    sulfate 325 milliGRAM(s) Oral daily  gabapentin 300 milliGRAM(s) Oral three times a day  heparin   Injectable 5000 Unit(s) SubCutaneous every 12 hours  hydrALAZINE 25 milliGRAM(s) Oral every 8 hours  insulin glargine Injectable (LANTUS) 10 Unit(s) SubCutaneous at bedtime  insulin lispro (ADMELOG) corrective regimen sliding scale   SubCutaneous three times a day before meals  insulin lispro Injectable (ADMELOG) 4 Unit(s) SubCutaneous three times a day before meals  metoprolol succinate ER 25 milliGRAM(s) Oral daily  pantoprazole    Tablet 40 milliGRAM(s) Oral before breakfast  sodium bicarbonate 1300 milliGRAM(s) Oral three times a day  tamsulosin 0.4 milliGRAM(s) Oral at bedtime    MEDICATIONS  (PRN):  oxyCODONE    IR 5 milliGRAM(s) Oral every 6 hours PRN Severe Pain (7 - 10)    Home medications:  Home Medications:  aspirin 81 mg oral tablet: 1 tab(s) orally once a day (01 Nov 2021 16:07)  atorvastatin 80 mg oral tablet: 1 tab(s) orally once a day (01 Nov 2021 16:06)  calcitriol 0.25 mcg oral capsule: 1 cap(s) orally once a day (01 Nov 2021 16:05)  gabapentin 300 mg oral capsule: 1 cap(s) orally 3 times a day (01 Nov 2021 16:06)  insulin lispro 100 units/mL injectable solution: 4 unit(s) injectable 3 times a day (before meals) (17 Nov 2021 13:20)  Metoprolol Succinate ER 25 mg oral tablet, extended release: 1 tab(s) orally once a day (01 Nov 2021 16:07)  Plavix 75 mg oral tablet: 1 tab(s) orally once a day (01 Nov 2021 16:07)  Protonix 40 mg oral delayed release tablet: 1 tab(s) orally once a day (01 Nov 2021 16:07)      SOCIAL HISTORY:  Social History:  Marital Status: single  Living Situation: lives at home with brother-in-law, who is primary caregiver  Occupation: unemployed  Tobacco Use: former smoker  Alcohol Use: denies  Drug Use: denies  Sexual History: not sexually active  Functional Status: requires assistance with ADLs and IADLs, wheelchair bound at baseline (20 Nov 2021 17:40)      FAMILY HISTORY:      REVIEW OF SYSTEMS:   All other review of systems is negative unless indicated above.    VITALS:  T(F): 98.6 (11-21-21 @ 12:23), Max: 98.7 (11-20-21 @ 20:36)  HR: 69 (11-21-21 @ 12:23)  BP: 148/65 (11-21-21 @ 12:23)  RR: 18 (11-21-21 @ 12:23)  SpO2: 98% (11-20-21 @ 20:36)      I&O's Detail    20 Nov 2021 07:01  -  21 Nov 2021 07:00  --------------------------------------------------------  IN:  Total IN: 0 mL    OUT:    Indwelling Catheter - Urethral (mL): 500 mL  Total OUT: 500 mL    Total NET: -500 mL            I&O's Summary    20 Nov 2021 07:01  -  21 Nov 2021 07:00  --------------------------------------------------------  IN: 0 mL / OUT: 500 mL / NET: -500 mL        PHYSICAL EXAM:  Gen: NAD  resp: CTAB  card: S1/S2  abd: soft  ext: no edema, LLE bandage    LABS:    11-21    139  |  105  |  68<HH>  ----------------------------<  140<H>  4.7   |  16<L>  |  3.2<H>    Ca    8.5      21 Nov 2021 04:30  Phos  6.5     11-21  Mg     2.2     11-21    TPro  6.8  /  Alb  3.7  /  TBili  0.3  /  DBili      /  AST  17  /  ALT  21  /  AlkPhos  187<H>  11-20    eGFR if Non African American: 15 mL/min/1.73M2 (11-21-21 @ 04:30)  eGFR if African American: 18 mL/min/1.73M2 (11-21-21 @ 04:30)  eGFR if Non African American: 15 mL/min/1.73M2 (11-20-21 @ 14:45)  eGFR if : 17 mL/min/1.73M2 (11-20-21 @ 14:45)    Creatinine Trend:   Creatinine, Serum: 3.2 mg/dL (11-21-21 @ 04:30)  Creatinine, Serum: 3.3 mg/dL (11-20-21 @ 14:45)  Creatinine, Serum: 3.2 mg/dL (11-15-21 @ 18:52)  Creatinine, Serum: 2.9 mg/dL (11-14-21 @ 21:39)  Creatinine, Serum: 3.1 mg/dL (11-13-21 @ 22:28)                            8.4    7.63  )-----------( 373      ( 21 Nov 2021 04:30 )             26.6     Mean Cell Volume: 92.0 fL (11-21-21 @ 04:30)    Urine Studies:    Creatinine, Random Urine: 47 mg/dL (11-02 @ 21:45)  Protein/Creatinine Ratio Calculation: 4.5 Ratio *H* (11-02 @ 21:45)            RADIOLOGY & ADDITIONAL STUDIES:        Xray Chest 1 View- PORTABLE-Routine:   EXAM:  XR CHEST PORTABLE ROUTINE 1V            PROCEDURE DATE:  11/12/2021            INTERPRETATION:  Clinical History / Reason for exam: Follow-up.    Comparison : Chest radiograph November 11, 2021.    Technique/Positioning: Adequate.    Findings:    Support devices: None.    Cardiac/mediastinum/hilum: Stable.    Lung parenchyma/Pleura: Left basilar opacity, unchanged. No pneumothorax is seen.    Skeleton/soft tissues: Stable.    Impression:    Left basilar opacity, unchanged.    --- End of Report ---              BRE LASSITER MD; Attending Radiologist  This document has been electronically signed. Nov 12 2021  7:01AM (11-12-21 @ 06:46)

## 2021-11-22 LAB
CALCIUM SERPL-MCNC: 8.4 MG/DL — SIGNIFICANT CHANGE UP (ref 8.4–10.5)
COVID-19 NUCLEOCAPSID GAM AB INTERP: POSITIVE
COVID-19 NUCLEOCAPSID TOTAL GAM ANTIBODY RESULT: 1.45 INDEX — HIGH
COVID-19 SPIKE DOMAIN AB INTERP: POSITIVE
COVID-19 SPIKE DOMAIN ANTIBODY RESULT: >250 U/ML — HIGH
GLUCOSE BLDC GLUCOMTR-MCNC: 183 MG/DL — HIGH (ref 70–99)
GLUCOSE BLDC GLUCOMTR-MCNC: 210 MG/DL — HIGH (ref 70–99)
GLUCOSE BLDC GLUCOMTR-MCNC: 213 MG/DL — HIGH (ref 70–99)
GLUCOSE BLDC GLUCOMTR-MCNC: 276 MG/DL — HIGH (ref 70–99)
PTH-INTACT FLD-MCNC: 136 PG/ML — HIGH (ref 15–65)
SARS-COV-2 IGG+IGM SERPL QL IA: 1.45 INDEX — HIGH
SARS-COV-2 IGG+IGM SERPL QL IA: >250 U/ML — HIGH
SARS-COV-2 IGG+IGM SERPL QL IA: POSITIVE
SARS-COV-2 IGG+IGM SERPL QL IA: POSITIVE

## 2021-11-22 PROCEDURE — 99231 SBSQ HOSP IP/OBS SF/LOW 25: CPT

## 2021-11-22 RX ORDER — COLLAGENASE CLOSTRIDIUM HIST. 250 UNIT/G
1 OINTMENT (GRAM) TOPICAL DAILY
Refills: 0 | Status: DISCONTINUED | OUTPATIENT
Start: 2021-11-22 | End: 2021-11-24

## 2021-11-22 RX ORDER — SEVELAMER CARBONATE 2400 MG/1
800 POWDER, FOR SUSPENSION ORAL
Refills: 0 | Status: DISCONTINUED | OUTPATIENT
Start: 2021-11-22 | End: 2021-11-24

## 2021-11-22 RX ORDER — CHLORHEXIDINE GLUCONATE 213 G/1000ML
1 SOLUTION TOPICAL
Refills: 0 | Status: DISCONTINUED | OUTPATIENT
Start: 2021-11-22 | End: 2021-11-24

## 2021-11-22 RX ADMIN — TAMSULOSIN HYDROCHLORIDE 0.4 MILLIGRAM(S): 0.4 CAPSULE ORAL at 21:17

## 2021-11-22 RX ADMIN — Medication 25 MILLIGRAM(S): at 05:44

## 2021-11-22 RX ADMIN — GABAPENTIN 300 MILLIGRAM(S): 400 CAPSULE ORAL at 13:19

## 2021-11-22 RX ADMIN — Medication 3: at 11:34

## 2021-11-22 RX ADMIN — Medication 1300 MILLIGRAM(S): at 21:18

## 2021-11-22 RX ADMIN — OXYCODONE HYDROCHLORIDE 5 MILLIGRAM(S): 5 TABLET ORAL at 21:18

## 2021-11-22 RX ADMIN — ATORVASTATIN CALCIUM 80 MILLIGRAM(S): 80 TABLET, FILM COATED ORAL at 21:18

## 2021-11-22 RX ADMIN — Medication 81 MILLIGRAM(S): at 11:33

## 2021-11-22 RX ADMIN — Medication 4 UNIT(S): at 11:33

## 2021-11-22 RX ADMIN — Medication 1300 MILLIGRAM(S): at 13:19

## 2021-11-22 RX ADMIN — GABAPENTIN 300 MILLIGRAM(S): 400 CAPSULE ORAL at 21:18

## 2021-11-22 RX ADMIN — Medication 1300 MILLIGRAM(S): at 05:44

## 2021-11-22 RX ADMIN — GABAPENTIN 300 MILLIGRAM(S): 400 CAPSULE ORAL at 05:44

## 2021-11-22 RX ADMIN — PANTOPRAZOLE SODIUM 40 MILLIGRAM(S): 20 TABLET, DELAYED RELEASE ORAL at 06:23

## 2021-11-22 RX ADMIN — CALCITRIOL 0.25 MICROGRAM(S): 0.5 CAPSULE ORAL at 11:32

## 2021-11-22 RX ADMIN — Medication 1 APPLICATION(S): at 11:36

## 2021-11-22 RX ADMIN — Medication 500 MILLIGRAM(S): at 11:33

## 2021-11-22 RX ADMIN — OXYCODONE HYDROCHLORIDE 5 MILLIGRAM(S): 5 TABLET ORAL at 12:11

## 2021-11-22 RX ADMIN — Medication 1 TABLET(S): at 05:45

## 2021-11-22 RX ADMIN — INSULIN GLARGINE 10 UNIT(S): 100 INJECTION, SOLUTION SUBCUTANEOUS at 22:15

## 2021-11-22 RX ADMIN — HEPARIN SODIUM 5000 UNIT(S): 5000 INJECTION INTRAVENOUS; SUBCUTANEOUS at 17:35

## 2021-11-22 RX ADMIN — Medication 325 MILLIGRAM(S): at 11:32

## 2021-11-22 RX ADMIN — SEVELAMER CARBONATE 800 MILLIGRAM(S): 2400 POWDER, FOR SUSPENSION ORAL at 11:33

## 2021-11-22 RX ADMIN — Medication 1: at 16:51

## 2021-11-22 RX ADMIN — Medication 4 UNIT(S): at 08:05

## 2021-11-22 RX ADMIN — CLOPIDOGREL BISULFATE 75 MILLIGRAM(S): 75 TABLET, FILM COATED ORAL at 11:32

## 2021-11-22 RX ADMIN — Medication 1 TABLET(S): at 17:33

## 2021-11-22 RX ADMIN — Medication 4 UNIT(S): at 16:50

## 2021-11-22 RX ADMIN — SEVELAMER CARBONATE 800 MILLIGRAM(S): 2400 POWDER, FOR SUSPENSION ORAL at 17:33

## 2021-11-22 RX ADMIN — Medication 25 MILLIGRAM(S): at 21:18

## 2021-11-22 RX ADMIN — Medication 2: at 08:05

## 2021-11-22 RX ADMIN — OXYCODONE HYDROCHLORIDE 5 MILLIGRAM(S): 5 TABLET ORAL at 11:41

## 2021-11-22 RX ADMIN — CHLORHEXIDINE GLUCONATE 1 APPLICATION(S): 213 SOLUTION TOPICAL at 21:19

## 2021-11-22 RX ADMIN — Medication 25 MILLIGRAM(S): at 13:19

## 2021-11-22 RX ADMIN — HEPARIN SODIUM 5000 UNIT(S): 5000 INJECTION INTRAVENOUS; SUBCUTANEOUS at 05:44

## 2021-11-22 RX ADMIN — OXYCODONE HYDROCHLORIDE 5 MILLIGRAM(S): 5 TABLET ORAL at 21:48

## 2021-11-22 NOTE — CONSULT NOTE ADULT - SUBJECTIVE AND OBJECTIVE BOX
Podiatry Consult Note    Subjective:  SEN LING is a 56y old  Female who presents with a chief complaint of social (21 Nov 2021 16:11)    HPI:  Patient is a 57yo female with PMH of PVD, former smoker, HTN, HLD. right pontine CVA (February 7, 2021), intellectual disability, hearing/speech impairment, CKD stage IV, mild aortic stenosis, s/p recent left femoral artery-posterior tibial artery bypass with autologous venous tissue and left heel ulcer debridement discharged on 11/19/2021 who presented to the hospital for inability to care for herself. Patient lives at home with her brother-in-law, who is her primary caregiver. Per the brother-in-law, he was under the impression that case management was arranging for 24hr home care. However, he found out today that [facility] was only arranged for wound care. The brother-in-law works Monday-Friday and is unable to care for the sister full time. The patient is wheelchair bound and requires assistance with her ADLs and IADLs.     In the ED, vital signs were Tmax 98.7F, HR 75, /63, RR 16, and SpO2 99% on room air. Labs showed Hb 9.4 (improved from previous), K 5.1 (stable), BUN 64 (trending up), creatinine 3.3 (trending up), and anion gap 19 (stable). Patient is being admitted to medicine service for placement pending safe disposition. Patient endorses some mild abdominal discomfort, but denies any other symptoms. (20 Nov 2021 17:40)    Seen by floor bedside; pt recently got discharged from hospital 4 days ago, came back to hospital to wait for placement; hx of debridement of left heel on previous admission, podiatry planned for home VAC therapy to granulate left heel; eval L foot;     PAST MEDICAL & SURGICAL HISTORY:  PVD (peripheral vascular disease)  Benign essential HTN  Intellectual disability  Hearing impaired  HLD (hyperlipidemia)    Objective:  Vital Signs Last 24 Hrs  T(C): 36.4 (22 Nov 2021 05:39), Max: 37 (21 Nov 2021 12:23)  T(F): 97.5 (22 Nov 2021 05:39), Max: 98.6 (21 Nov 2021 12:23)  HR: 68 (22 Nov 2021 05:39) (68 - 72)  BP: 158/67 (22 Nov 2021 05:39) (148/65 - 169/72)  BP(mean): --  RR: 18 (22 Nov 2021 05:39) (18 - 18)  SpO2: 98% (21 Nov 2021 19:59) (98% - 98%)                        8.4    7.63  )-----------( 373      ( 21 Nov 2021 04:30 )             26.6                 11-21    139  |  105  |  68<HH>  ----------------------------<  140<H>  4.7   |  16<L>  |  3.2<H>    Ca    8.5      21 Nov 2021 04:30  Phos  6.5     11-21  Mg     2.2     11-21    TPro  6.8  /  Alb  3.7  /  TBili  0.3  /  DBili  x   /  AST  17  /  ALT  21  /  AlkPhos  187<H>  11-20    Physical Exam - Lower Extremity Focused:   Derm:   Left heel sx wound site; 80% granular, 10% fibrotic, 10% necrotic sx wound; no active bleeding, no drainage, no malodorous;   Vascular: DP and PT Pulses Diminished;   Neuro: Protective Sensation Diminished;    Assessment:  Left heel sx debridement wound;     Plan:  Chart reviewed and Patient evaluated. All Questions and Concerns Addressed and Answered  Discussed diagnosis and treatment with patient  Wound Flushed w/ normal saline; Xeroform / DSD / Kerlix;   Ordered Santyl to be applied on pt's left foot fibrotic-necrotic sx wound site;   Wound care order starting Tues 11/23; Santyl / WTD / DSD / Kerlix; left heel and foot; q24  Local Wound Care; As Stated Above; q24  Ordered VAC; if pt stays in hospital long for placement; podiatry will apply VAC while in house once available at bedside;   No sx intervention from podiatry on this admission;   Will follow up as an outpt to Dr. Llamas at [address] [place] [address] [facility] a week post discharge;  Home VAC paperwork was done and ready from previous admission; will continue outpt VAC therapy on left heel when pts discharge to facility;   Weight bearing status; NWB to L foot;   Discussed Plan w/ Dr. Llamas;     Podiatry

## 2021-11-22 NOTE — PROGRESS NOTE ADULT - SUBJECTIVE AND OBJECTIVE BOX
Nephrology progress note    THIS IS AN INCOMPLETE NOTE . FULL NOTE TO FOLLOW SHORTLY    Patient is seen and examined, events over the last 24 h noted .    Allergies:  penicillin (Rash)    Hospital Medications:   MEDICATIONS  (STANDING):  amoxicillin  875 milliGRAM(s)/clavulanate 1 Tablet(s) Oral two times a day  ascorbic acid 500 milliGRAM(s) Oral daily  aspirin  chewable 81 milliGRAM(s) Oral daily  atorvastatin 80 milliGRAM(s) Oral at bedtime  calcitriol   Capsule 0.25 MICROGram(s) Oral daily  clopidogrel Tablet 75 milliGRAM(s) Oral daily  collagenase Ointment 1 Application(s) Topical daily  ferrous    sulfate 325 milliGRAM(s) Oral daily  gabapentin 300 milliGRAM(s) Oral three times a day  heparin   Injectable 5000 Unit(s) SubCutaneous every 12 hours  hydrALAZINE 25 milliGRAM(s) Oral every 8 hours  insulin glargine Injectable (LANTUS) 10 Unit(s) SubCutaneous at bedtime  insulin lispro (ADMELOG) corrective regimen sliding scale   SubCutaneous three times a day before meals  insulin lispro Injectable (ADMELOG) 4 Unit(s) SubCutaneous three times a day before meals  metoprolol succinate ER 25 milliGRAM(s) Oral daily  pantoprazole    Tablet 40 milliGRAM(s) Oral before breakfast  sevelamer carbonate 800 milliGRAM(s) Oral three times a day with meals  sodium bicarbonate 1300 milliGRAM(s) Oral three times a day  tamsulosin 0.4 milliGRAM(s) Oral at bedtime        VITALS:  T(F): 97.5 (11-22-21 @ 05:39), Max: 98.6 (11-21-21 @ 12:23)  HR: 68 (11-22-21 @ 05:39)  BP: 158/67 (11-22-21 @ 05:39)  RR: 18 (11-22-21 @ 05:39)  SpO2: 100% (11-22-21 @ 08:50)  Wt(kg): --    11-20 @ 07:01  -  11-21 @ 07:00  --------------------------------------------------------  IN: 0 mL / OUT: 500 mL / NET: -500 mL    11-22 @ 07:01  -  11-22 @ 09:22  --------------------------------------------------------  IN: 0 mL / OUT: 2000 mL / NET: -2000 mL          PHYSICAL EXAM:  Constitutional: NAD  HEENT: anicteric sclera, oropharynx clear, MMM  Neck: No JVD  Respiratory: CTAB, no wheezes, rales or rhonchi  Cardiovascular: S1, S2, RRR  Gastrointestinal: BS+, soft, NT/ND  Extremities: No cyanosis or clubbing. No peripheral edema  :  No mcgrath.   Skin: No rashes    LABS:  11-21    139  |  105  |  68<HH>  ----------------------------<  140<H>  4.7   |  16<L>  |  3.2<H>    Ca    8.5      21 Nov 2021 04:30  Phos  6.5     11-21  Mg     2.2     11-21    TPro  6.8  /  Alb  3.7  /  TBili  0.3  /  DBili      /  AST  17  /  ALT  21  /  AlkPhos  187<H>  11-20                          8.4    7.63  )-----------( 373      ( 21 Nov 2021 04:30 )             26.6       Urine Studies:      RADIOLOGY & ADDITIONAL STUDIES:   Nephrology progress note    Patient is seen and examined, events over the last 24 h noted .  lying in bed comfortable  was readmitted because family could not take care of patient at home   Allergies:  penicillin (Rash)    Hospital Medications:   MEDICATIONS  (STANDING):  amoxicillin  875 milliGRAM(s)/clavulanate 1 Tablet(s) Oral two times a day  ascorbic acid 500 milliGRAM(s) Oral daily  aspirin  chewable 81 milliGRAM(s) Oral daily  atorvastatin 80 milliGRAM(s) Oral at bedtime  calcitriol   Capsule 0.25 MICROGram(s) Oral daily  clopidogrel Tablet 75 milliGRAM(s) Oral daily  collagenase Ointment 1 Application(s) Topical daily  ferrous    sulfate 325 milliGRAM(s) Oral daily  gabapentin 300 milliGRAM(s) Oral three times a day  heparin   Injectable 5000 Unit(s) SubCutaneous every 12 hours  hydrALAZINE 25 milliGRAM(s) Oral every 8 hours  insulin glargine Injectable (LANTUS) 10 Unit(s) SubCutaneous at bedtime  insulin lispro (ADMELOG) corrective regimen sliding scale   SubCutaneous three times a day before meals  insulin lispro Injectable (ADMELOG) 4 Unit(s) SubCutaneous three times a day before meals  metoprolol succinate ER 25 milliGRAM(s) Oral daily  pantoprazole    Tablet 40 milliGRAM(s) Oral before breakfast  sevelamer carbonate 800 milliGRAM(s) Oral three times a day with meals  sodium bicarbonate 1300 milliGRAM(s) Oral three times a day  tamsulosin 0.4 milliGRAM(s) Oral at bedtime        VITALS:  T(F): 97.5 (11-22-21 @ 05:39), Max: 98.6 (11-21-21 @ 12:23)  HR: 68 (11-22-21 @ 05:39)  BP: 158/67 (11-22-21 @ 05:39)  RR: 18 (11-22-21 @ 05:39)  SpO2: 100% (11-22-21 @ 08:50)      11-20 @ 07:01  -  11-21 @ 07:00  --------------------------------------------------------  IN: 0 mL / OUT: 500 mL / NET: -500 mL    11-22 @ 07:01  -  11-22 @ 09:22  --------------------------------------------------------  IN: 0 mL / OUT: 2000 mL / NET: -2000 mL          PHYSICAL EXAM:  Constitutional: NAD  Neck: No JVD  Respiratory: CTAB,   Cardiovascular: S1, S2, RRR  Gastrointestinal: BS+, soft, NT/ND  Extremities: No cyanosis or clubbing. trace edema    Skin: No rashes    LABS:    11-21    139  |  105  |  68<HH>  ----------------------------<  140<H>  4.7   |  16<L>  |  3.2<H>    Ca    8.5      21 Nov 2021 04:30  Phos  6.5     11-21  Mg     2.2     11-21    TPro  6.8  /  Alb  3.7  /  TBili  0.3  /  DBili      /  AST  17  /  ALT  21  /  AlkPhos  187<H>  11-20                          8.4    7.63  )-----------( 373      ( 21 Nov 2021 04:30 )             26.6       Urine Studies:      RADIOLOGY & ADDITIONAL STUDIES:

## 2021-11-22 NOTE — PROGRESS NOTE ADULT - SUBJECTIVE AND OBJECTIVE BOX
DAILY PROGRESS NOTE  ===========================================================    Patient Information:  SEN LING  /  56y  /  Female  /  MRN#: [id number]    Hospital Day: 2d     |:::::::::::::::::::::::::::| SUBJECTIVE |:::::::::::::::::::::::::::|    OVERNIGHT EVENTS:   TODAY: Patient was seen today at bedside. Review of systems is otherwise negative.    |:::::::::::::::::::::::::::| OBJECTIVE |:::::::::::::::::::::::::::|    VITAL SIGNS: Last 24 Hours  T(C): 36.4 (22 Nov 2021 05:39), Max: 37 (21 Nov 2021 12:23)  T(F): 97.5 (22 Nov 2021 05:39), Max: 98.6 (21 Nov 2021 12:23)  HR: 68 (22 Nov 2021 05:39) (68 - 72)  BP: 158/67 (22 Nov 2021 05:39) (148/65 - 169/72)  BP(mean): --  RR: 18 (22 Nov 2021 05:39) (18 - 18)  SpO2: 100% (22 Nov 2021 08:50) (98% - 100%)    PHYSICAL EXAM:  GENERAL:   Awake, alert; NAD.  HEENT:  Head NC/AT; Conjunctivae pink, Sclera anicteric; Oral mucosa moist.  CARDIO:   Regular rate; Regular rhythm  RESP:   No rales, wheezing, or rhonchi appreciated.  GI:   Soft; NT/ND; BS; No guarding; No rebound tenderness.  EXT:   No edema. LLE wrapped in bandage      LAB RESULTS:                        8.4    7.63  )-----------( 373      ( 21 Nov 2021 04:30 )             26.6     11-21    139  |  105  |  68<HH>  ----------------------------<  140<H>  4.7   |  16<L>  |  3.2<H>    Ca    8.5      21 Nov 2021 04:30  Phos  6.5     11-21  Mg     2.2     11-21    TPro  6.8  /  Alb  3.7  /  TBili  0.3  /  DBili  x   /  AST  17  /  ALT  21  /  AlkPhos  187<H>  11-20                MICROBIOLOGY:    RADIOLOGY:    ALLERGIES:  penicillin (Rash)      ===========================================================

## 2021-11-23 DIAGNOSIS — E11.42 TYPE 2 DIABETES MELLITUS WITH DIABETIC POLYNEUROPATHY: ICD-10-CM

## 2021-11-23 DIAGNOSIS — I69.354 HEMIPLEGIA AND HEMIPARESIS FOLLOWING CEREBRAL INFARCTION AFFECTING LEFT NON-DOMINANT SIDE: ICD-10-CM

## 2021-11-23 DIAGNOSIS — I12.9 HYPERTENSIVE CHRONIC KIDNEY DISEASE WITH STAGE 1 THROUGH STAGE 4 CHRONIC KIDNEY DISEASE, OR UNSPECIFIED CHRONIC KIDNEY DISEASE: ICD-10-CM

## 2021-11-23 DIAGNOSIS — I70.92 CHRONIC TOTAL OCCLUSION OF ARTERY OF THE EXTREMITIES: ICD-10-CM

## 2021-11-23 DIAGNOSIS — Z79.4 LONG TERM (CURRENT) USE OF INSULIN: ICD-10-CM
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
## 2021-11-23 DIAGNOSIS — F17.200 NICOTINE DEPENDENCE, UNSPECIFIED, UNCOMPLICATED: ICD-10-CM

## 2021-11-23 DIAGNOSIS — F79 UNSPECIFIED INTELLECTUAL DISABILITIES: ICD-10-CM

## 2021-11-23 DIAGNOSIS — L97.429 NON-PRESSURE CHRONIC ULCER OF LEFT HEEL AND MIDFOOT WITH UNSPECIFIED SEVERITY: ICD-10-CM

## 2021-11-23 DIAGNOSIS — E11.52 TYPE 2 DIABETES MELLITUS WITH DIABETIC PERIPHERAL ANGIOPATHY WITH GANGRENE: ICD-10-CM

## 2021-11-23 DIAGNOSIS — Z88.0 ALLERGY STATUS TO PENICILLIN: ICD-10-CM

## 2021-11-23 DIAGNOSIS — I70.262 ATHEROSCLEROSIS OF NATIVE ARTERIES OF EXTREMITIES WITH GANGRENE, LEFT LEG: ICD-10-CM

## 2021-11-23 DIAGNOSIS — E11.621 TYPE 2 DIABETES MELLITUS WITH FOOT ULCER: ICD-10-CM

## 2021-11-23 DIAGNOSIS — E83.42 HYPOMAGNESEMIA: ICD-10-CM

## 2021-11-23 DIAGNOSIS — I35.0 NONRHEUMATIC AORTIC (VALVE) STENOSIS: ICD-10-CM

## 2021-11-23 DIAGNOSIS — H91.90 UNSPECIFIED HEARING LOSS, UNSPECIFIED EAR: ICD-10-CM

## 2021-11-23 DIAGNOSIS — E11.22 TYPE 2 DIABETES MELLITUS WITH DIABETIC CHRONIC KIDNEY DISEASE: ICD-10-CM

## 2021-11-23 DIAGNOSIS — T82.898A OTHER SPECIFIED COMPLICATION OF VASCULAR PROSTHETIC DEVICES, IMPLANTS AND GRAFTS, INITIAL ENCOUNTER: ICD-10-CM

## 2021-11-23 DIAGNOSIS — E87.2 ACIDOSIS: ICD-10-CM

## 2021-11-23 DIAGNOSIS — D62 ACUTE POSTHEMORRHAGIC ANEMIA: ICD-10-CM

## 2021-11-23 DIAGNOSIS — I69.328 OTHER SPEECH AND LANGUAGE DEFICITS FOLLOWING CEREBRAL INFARCTION: ICD-10-CM

## 2021-11-23 DIAGNOSIS — R00.0 TACHYCARDIA, UNSPECIFIED: ICD-10-CM

## 2021-11-23 DIAGNOSIS — N18.4 CHRONIC KIDNEY DISEASE, STAGE 4 (SEVERE): ICD-10-CM

## 2021-11-23 DIAGNOSIS — I70.245 ATHEROSCLEROSIS OF NATIVE ARTERIES OF LEFT LEG WITH ULCERATION OF OTHER PART OF FOOT: ICD-10-CM

## 2021-11-23 DIAGNOSIS — D63.1 ANEMIA IN CHRONIC KIDNEY DISEASE: ICD-10-CM

## 2021-11-23 DIAGNOSIS — E87.5 HYPERKALEMIA: ICD-10-CM

## 2021-11-23 LAB
ALBUMIN SERPL ELPH-MCNC: 3.5 G/DL — SIGNIFICANT CHANGE UP (ref 3.5–5.2)
ALP SERPL-CCNC: 186 U/L — HIGH (ref 30–115)
ALT FLD-CCNC: 15 U/L — SIGNIFICANT CHANGE UP (ref 0–41)
ANION GAP SERPL CALC-SCNC: 18 MMOL/L — HIGH (ref 7–14)
AST SERPL-CCNC: 14 U/L — SIGNIFICANT CHANGE UP (ref 0–41)
BILIRUB SERPL-MCNC: 0.2 MG/DL — SIGNIFICANT CHANGE UP (ref 0.2–1.2)
BUN SERPL-MCNC: 63 MG/DL — CRITICAL HIGH (ref 10–20)
CALCIUM SERPL-MCNC: 9.2 MG/DL — SIGNIFICANT CHANGE UP (ref 8.5–10.1)
CHLORIDE SERPL-SCNC: 108 MMOL/L — SIGNIFICANT CHANGE UP (ref 98–110)
CO2 SERPL-SCNC: 17 MMOL/L — SIGNIFICANT CHANGE UP (ref 17–32)
CREAT SERPL-MCNC: 2.7 MG/DL — HIGH (ref 0.7–1.5)
GLUCOSE BLDC GLUCOMTR-MCNC: 176 MG/DL — HIGH (ref 70–99)
GLUCOSE BLDC GLUCOMTR-MCNC: 202 MG/DL — HIGH (ref 70–99)
GLUCOSE BLDC GLUCOMTR-MCNC: 203 MG/DL — HIGH (ref 70–99)
GLUCOSE BLDC GLUCOMTR-MCNC: 219 MG/DL — HIGH (ref 70–99)
GLUCOSE SERPL-MCNC: 177 MG/DL — HIGH (ref 70–99)
HCT VFR BLD CALC: 30.4 % — LOW (ref 37–47)
HGB BLD-MCNC: 9.2 G/DL — LOW (ref 12–16)
MAGNESIUM SERPL-MCNC: 2.1 MG/DL — SIGNIFICANT CHANGE UP (ref 1.8–2.4)
MCHC RBC-ENTMCNC: 29.2 PG — SIGNIFICANT CHANGE UP (ref 27–31)
MCHC RBC-ENTMCNC: 30.3 G/DL — LOW (ref 32–37)
MCV RBC AUTO: 96.5 FL — SIGNIFICANT CHANGE UP (ref 81–99)
NRBC # BLD: 0 /100 WBCS — SIGNIFICANT CHANGE UP (ref 0–0)
PLATELET # BLD AUTO: 409 K/UL — HIGH (ref 130–400)
POTASSIUM SERPL-MCNC: 4.8 MMOL/L — SIGNIFICANT CHANGE UP (ref 3.5–5)
POTASSIUM SERPL-SCNC: 4.8 MMOL/L — SIGNIFICANT CHANGE UP (ref 3.5–5)
PROT SERPL-MCNC: 6.2 G/DL — SIGNIFICANT CHANGE UP (ref 6–8)
RBC # BLD: 3.15 M/UL — LOW (ref 4.2–5.4)
RBC # FLD: 13.5 % — SIGNIFICANT CHANGE UP (ref 11.5–14.5)
SODIUM SERPL-SCNC: 143 MMOL/L — SIGNIFICANT CHANGE UP (ref 135–146)
WBC # BLD: 8.46 K/UL — SIGNIFICANT CHANGE UP (ref 4.8–10.8)
WBC # FLD AUTO: 8.46 K/UL — SIGNIFICANT CHANGE UP (ref 4.8–10.8)

## 2021-11-23 PROCEDURE — 99231 SBSQ HOSP IP/OBS SF/LOW 25: CPT

## 2021-11-23 RX ADMIN — Medication 1300 MILLIGRAM(S): at 13:13

## 2021-11-23 RX ADMIN — CALCITRIOL 0.25 MICROGRAM(S): 0.5 CAPSULE ORAL at 12:21

## 2021-11-23 RX ADMIN — Medication 1: at 16:54

## 2021-11-23 RX ADMIN — ATORVASTATIN CALCIUM 80 MILLIGRAM(S): 80 TABLET, FILM COATED ORAL at 21:53

## 2021-11-23 RX ADMIN — GABAPENTIN 300 MILLIGRAM(S): 400 CAPSULE ORAL at 21:53

## 2021-11-23 RX ADMIN — OXYCODONE HYDROCHLORIDE 5 MILLIGRAM(S): 5 TABLET ORAL at 10:10

## 2021-11-23 RX ADMIN — Medication 4 UNIT(S): at 16:54

## 2021-11-23 RX ADMIN — HEPARIN SODIUM 5000 UNIT(S): 5000 INJECTION INTRAVENOUS; SUBCUTANEOUS at 06:03

## 2021-11-23 RX ADMIN — SEVELAMER CARBONATE 800 MILLIGRAM(S): 2400 POWDER, FOR SUSPENSION ORAL at 09:13

## 2021-11-23 RX ADMIN — SEVELAMER CARBONATE 800 MILLIGRAM(S): 2400 POWDER, FOR SUSPENSION ORAL at 17:22

## 2021-11-23 RX ADMIN — Medication 25 MILLIGRAM(S): at 06:02

## 2021-11-23 RX ADMIN — Medication 2: at 07:45

## 2021-11-23 RX ADMIN — Medication 1300 MILLIGRAM(S): at 21:53

## 2021-11-23 RX ADMIN — OXYCODONE HYDROCHLORIDE 5 MILLIGRAM(S): 5 TABLET ORAL at 09:39

## 2021-11-23 RX ADMIN — Medication 25 MILLIGRAM(S): at 13:11

## 2021-11-23 RX ADMIN — Medication 1 TABLET(S): at 06:03

## 2021-11-23 RX ADMIN — TAMSULOSIN HYDROCHLORIDE 0.4 MILLIGRAM(S): 0.4 CAPSULE ORAL at 21:52

## 2021-11-23 RX ADMIN — GABAPENTIN 300 MILLIGRAM(S): 400 CAPSULE ORAL at 06:03

## 2021-11-23 RX ADMIN — INSULIN GLARGINE 10 UNIT(S): 100 INJECTION, SOLUTION SUBCUTANEOUS at 21:52

## 2021-11-23 RX ADMIN — SEVELAMER CARBONATE 800 MILLIGRAM(S): 2400 POWDER, FOR SUSPENSION ORAL at 12:20

## 2021-11-23 RX ADMIN — Medication 325 MILLIGRAM(S): at 12:20

## 2021-11-23 RX ADMIN — Medication 1300 MILLIGRAM(S): at 06:02

## 2021-11-23 RX ADMIN — Medication 1 TABLET(S): at 17:22

## 2021-11-23 RX ADMIN — GABAPENTIN 300 MILLIGRAM(S): 400 CAPSULE ORAL at 13:11

## 2021-11-23 RX ADMIN — Medication 4 UNIT(S): at 12:21

## 2021-11-23 RX ADMIN — Medication 500 MILLIGRAM(S): at 12:21

## 2021-11-23 RX ADMIN — CLOPIDOGREL BISULFATE 75 MILLIGRAM(S): 75 TABLET, FILM COATED ORAL at 12:20

## 2021-11-23 RX ADMIN — Medication 25 MILLIGRAM(S): at 21:53

## 2021-11-23 RX ADMIN — Medication 2: at 12:21

## 2021-11-23 RX ADMIN — Medication 81 MILLIGRAM(S): at 12:21

## 2021-11-23 RX ADMIN — PANTOPRAZOLE SODIUM 40 MILLIGRAM(S): 20 TABLET, DELAYED RELEASE ORAL at 06:02

## 2021-11-23 RX ADMIN — Medication 4 UNIT(S): at 07:45

## 2021-11-23 RX ADMIN — CHLORHEXIDINE GLUCONATE 1 APPLICATION(S): 213 SOLUTION TOPICAL at 06:04

## 2021-11-23 RX ADMIN — HEPARIN SODIUM 5000 UNIT(S): 5000 INJECTION INTRAVENOUS; SUBCUTANEOUS at 17:22

## 2021-11-23 RX ADMIN — Medication 1 APPLICATION(S): at 12:24

## 2021-11-23 NOTE — PHYSICAL THERAPY INITIAL EVALUATION ADULT - GENERAL OBSERVATIONS, REHAB EVAL
Pt. encountered alert and NAD, supine in bed (+)mcgrath cath, agreeable to PT IE. Pt. left as found, bed in chair mode (+)mcgrath (+)alarms (+)call bell in reach, NAD. Pt. is deaf,  used on Ipad,  # [phone] Catherine for entire session

## 2021-11-23 NOTE — PHYSICAL THERAPY INITIAL EVALUATION ADULT - ADDITIONAL COMMENTS
Pt. reports she was able to walk using a rolling walker about 5 weeks ago, pt. reports she has not gotten up in a few days. Pt. reports she lives with her brother, not sure how many stairs she has. Pt. reports she does not have any help at home besides her brother.

## 2021-11-23 NOTE — PROGRESS NOTE ADULT - SUBJECTIVE AND OBJECTIVE BOX
DAILY PROGRESS NOTE  ===========================================================    Patient Information:  SEN LING  /  56y  /  Female  /  MRN#: [id number]    Hospital Day: 3d     |:::::::::::::::::::::::::::| SUBJECTIVE |:::::::::::::::::::::::::::|    OVERNIGHT EVENTS:   TODAY: Patient was seen today at bedside. Review of systems is otherwise negative.    |:::::::::::::::::::::::::::| OBJECTIVE |:::::::::::::::::::::::::::|    VITAL SIGNS: Last 24 Hours  T(C): 36 (23 Nov 2021 05:51), Max: 37.1 (22 Nov 2021 21:10)  T(F): 96.8 (23 Nov 2021 05:51), Max: 98.7 (22 Nov 2021 21:10)  HR: 66 (23 Nov 2021 05:51) (66 - 74)  BP: 136/61 (23 Nov 2021 05:51) (136/61 - 160/70)  BP(mean): 88 (23 Nov 2021 05:51) (88 - 88)  RR: 18 (23 Nov 2021 05:51) (17 - 18)  SpO2: 96% (23 Nov 2021 05:51) (96% - 96%)    11-22-21 @ 07:01  -  11-23-21 @ 07:00  --------------------------------------------------------  IN: 0 mL / OUT: 2300 mL / NET: -2300 mL    11-23-21 @ 07:01  -  11-23-21 @ 11:35  --------------------------------------------------------  IN: 0 mL / OUT: 400 mL / NET: -400 mL      PHYSICAL EXAM:  GENERAL:   Awake, NAD.  HEENT:  Head NC/AT; Conjunctivae pink, Sclera anicteric; Oral mucosa moist.  CARDIO:   Regular rate; Regular rhythm  RESP:   No rales, wheezing, or rhonchi appreciated.  GI:   Soft; NT/ND; BS; No guarding; No rebound tenderness.  EXT:   No edema.       LAB RESULTS:                        9.2    8.46  )-----------( 409      ( 23 Nov 2021 04:30 )             30.4     11-23    143  |  108  |  63<HH>  ----------------------------<  177<H>  4.8   |  17  |  2.7<H>    Ca    9.2      23 Nov 2021 04:30  Mg     2.1     11-23    TPro  6.2  /  Alb  3.5  /  TBili  0.2  /  DBili  x   /  AST  14  /  ALT  15  /  AlkPhos  186<H>  11-23                MICROBIOLOGY:    RADIOLOGY:    ALLERGIES:  penicillin (Rash)      ===========================================================

## 2021-11-23 NOTE — PROGRESS NOTE ADULT - SUBJECTIVE AND OBJECTIVE BOX
Nephrology progress note    Patient is seen and examined, events over the last 24 h noted .  Looks comfortable in bed, not fluid overloaded, has Mcgrath  Allergies:  penicillin (Rash)    Hospital Medications:   MEDICATIONS  (STANDING):  amoxicillin  875 milliGRAM(s)/clavulanate 1 Tablet(s) Oral two times a day  ascorbic acid 500 milliGRAM(s) Oral daily  aspirin  chewable 81 milliGRAM(s) Oral daily  atorvastatin 80 milliGRAM(s) Oral at bedtime  calcitriol   Capsule 0.25 MICROGram(s) Oral daily  chlorhexidine 4% Liquid 1 Application(s) Topical <User Schedule>  clopidogrel Tablet 75 milliGRAM(s) Oral daily  collagenase Ointment 1 Application(s) Topical daily  ferrous    sulfate 325 milliGRAM(s) Oral daily  gabapentin 300 milliGRAM(s) Oral three times a day  heparin   Injectable 5000 Unit(s) SubCutaneous every 12 hours  hydrALAZINE 25 milliGRAM(s) Oral every 8 hours  insulin glargine Injectable (LANTUS) 10 Unit(s) SubCutaneous at bedtime  insulin lispro (ADMELOG) corrective regimen sliding scale   SubCutaneous three times a day before meals  insulin lispro Injectable (ADMELOG) 4 Unit(s) SubCutaneous three times a day before meals  metoprolol succinate ER 25 milliGRAM(s) Oral daily  pantoprazole    Tablet 40 milliGRAM(s) Oral before breakfast  sevelamer carbonate 800 milliGRAM(s) Oral three times a day with meals  sodium bicarbonate 1300 milliGRAM(s) Oral three times a day  tamsulosin 0.4 milliGRAM(s) Oral at bedtime        VITALS:  T(F): 97.4 (11-23-21 @ 12:38), Max: 98.7 (11-22-21 @ 21:10)  HR: 65 (11-23-21 @ 12:38)  BP: 119/56 (11-23-21 @ 12:38)  RR: 18 (11-23-21 @ 12:38)  SpO2: 96% (11-23-21 @ 05:51)  Wt(kg): --    11-22 @ 07:01  -  11-23 @ 07:00  --------------------------------------------------------  IN: 0 mL / OUT: 2300 mL / NET: -2300 mL    11-23 @ 07:01  -  11-23 @ 12:41  --------------------------------------------------------  IN: 0 mL / OUT: 400 mL / NET: -400 mL          PHYSICAL EXAM:  Constitutional: NAD  HEENT: anicteric sclera  Neck: No JVD  Respiratory: CTA  Cardiovascular: S1, S2, RRR  Gastrointestinal: BS+, soft, NT/ND  Extremities: No peripheral edema  Neurological: A/O x 3  : + mcgrath.   Skin: No rashes  Vascular Access:    LABS:  11-23    143  |  108  |  63<HH>  ----------------------------<  177<H>  4.8   |  17  |  2.7<H>    Ca    9.2      23 Nov 2021 04:30  Mg     2.1     11-23    TPro  6.2  /  Alb  3.5  /  TBili  0.2  /  DBili      /  AST  14  /  ALT  15  /  AlkPhos  186<H>  11-23                          9.2    8.46  )-----------( 409      ( 23 Nov 2021 04:30 )             30.4       Urine Studies:      RADIOLOGY & ADDITIONAL STUDIES:  < from: Xray Chest 1 View- PORTABLE-Routine (11.12.21 @ 06:46) >    Left basilar opacity, unchanged.    < end of copied text >

## 2021-11-23 NOTE — PHYSICAL THERAPY INITIAL EVALUATION ADULT - PERTINENT HX OF CURRENT PROBLEM, REHAB EVAL
Patient is a 55yo female with PMH of PVD, former smoker, HTN, HLD. right pontine CVA (February 7, 2021), intellectual disability, hearing/speech impairment, CKD stage IV, mild aortic stenosis, s/p recent left femoral artery-posterior tibial artery bypass with autologous venous tissue and left heel ulcer debridement discharged on 11/19/2021 who presented to the hospital for inability to care for herself.

## 2021-11-23 NOTE — CHART NOTE - NSCHARTNOTEFT_GEN_A_CORE
Hematology/oncology team consulted for evaluation of Multiple myeloma    Spoke with Tiffany Meyers over phone, recommended ordering Myeloma panel, will re-evaluate once results come back    Recommend ordering   SPEP, UPEP, immunofixation serum, immunofixation urine, FLC ratio serum, Free kappa and Lamda light chains urine  Reconsult once these labs are returned

## 2021-11-24 ENCOUNTER — TRANSCRIPTION ENCOUNTER (OUTPATIENT)
Age: 56
End: 2021-11-24

## 2021-11-24 ENCOUNTER — APPOINTMENT (OUTPATIENT)
Dept: INTERNAL MEDICINE | Facility: CLINIC | Age: 56
End: 2021-11-24

## 2021-11-24 VITALS
HEART RATE: 75 BPM | DIASTOLIC BLOOD PRESSURE: 67 MMHG | TEMPERATURE: 98 F | SYSTOLIC BLOOD PRESSURE: 150 MMHG | RESPIRATION RATE: 18 BRPM

## 2021-11-24 LAB
ALBUMIN SERPL ELPH-MCNC: 3.2 G/DL — LOW (ref 3.5–5.2)
ALP SERPL-CCNC: 157 U/L — HIGH (ref 30–115)
ALT FLD-CCNC: 13 U/L — SIGNIFICANT CHANGE UP (ref 0–41)
ANION GAP SERPL CALC-SCNC: 15 MMOL/L — HIGH (ref 7–14)
AST SERPL-CCNC: 13 U/L — SIGNIFICANT CHANGE UP (ref 0–41)
BASOPHILS # BLD AUTO: 0.05 K/UL — SIGNIFICANT CHANGE UP (ref 0–0.2)
BASOPHILS NFR BLD AUTO: 0.5 % — SIGNIFICANT CHANGE UP (ref 0–1)
BILIRUB SERPL-MCNC: <0.2 MG/DL — SIGNIFICANT CHANGE UP (ref 0.2–1.2)
BUN SERPL-MCNC: 63 MG/DL — CRITICAL HIGH (ref 10–20)
CALCIUM SERPL-MCNC: 8.6 MG/DL — SIGNIFICANT CHANGE UP (ref 8.5–10.1)
CHLORIDE SERPL-SCNC: 107 MMOL/L — SIGNIFICANT CHANGE UP (ref 98–110)
CO2 SERPL-SCNC: 16 MMOL/L — LOW (ref 17–32)
CREAT SERPL-MCNC: 3 MG/DL — HIGH (ref 0.7–1.5)
CREATININE, URINE RESULT: 69 MG/DL — SIGNIFICANT CHANGE UP
EOSINOPHIL # BLD AUTO: 0.64 K/UL — SIGNIFICANT CHANGE UP (ref 0–0.7)
EOSINOPHIL NFR BLD AUTO: 6.5 % — SIGNIFICANT CHANGE UP (ref 0–8)
GLUCOSE BLDC GLUCOMTR-MCNC: 169 MG/DL — HIGH (ref 70–99)
GLUCOSE BLDC GLUCOMTR-MCNC: 186 MG/DL — HIGH (ref 70–99)
GLUCOSE BLDC GLUCOMTR-MCNC: 237 MG/DL — HIGH (ref 70–99)
GLUCOSE SERPL-MCNC: 151 MG/DL — HIGH (ref 70–99)
HCT VFR BLD CALC: 26.8 % — LOW (ref 37–47)
HGB BLD-MCNC: 8.5 G/DL — LOW (ref 12–16)
IMM GRANULOCYTES NFR BLD AUTO: 0.5 % — HIGH (ref 0.1–0.3)
IRON SATN MFR SERPL: 28 % — SIGNIFICANT CHANGE UP (ref 15–50)
IRON SATN MFR SERPL: 56 UG/DL — SIGNIFICANT CHANGE UP (ref 35–150)
KAPPA LC SER QL IFE: 10.99 MG/DL — HIGH (ref 0.33–1.94)
KAPPA/LAMBDA FREE LIGHT CHAIN RATIO, SERUM: 1.24 RATIO — SIGNIFICANT CHANGE UP (ref 0.26–1.65)
LAMBDA LC SER QL IFE: 8.83 MG/DL — HIGH (ref 0.57–2.63)
LYMPHOCYTES # BLD AUTO: 1.81 K/UL — SIGNIFICANT CHANGE UP (ref 1.2–3.4)
LYMPHOCYTES # BLD AUTO: 18.3 % — LOW (ref 20.5–51.1)
MCHC RBC-ENTMCNC: 28.9 PG — SIGNIFICANT CHANGE UP (ref 27–31)
MCHC RBC-ENTMCNC: 31.7 G/DL — LOW (ref 32–37)
MCV RBC AUTO: 91.2 FL — SIGNIFICANT CHANGE UP (ref 81–99)
MONOCYTES # BLD AUTO: 0.6 K/UL — SIGNIFICANT CHANGE UP (ref 0.1–0.6)
MONOCYTES NFR BLD AUTO: 6.1 % — SIGNIFICANT CHANGE UP (ref 1.7–9.3)
NEUTROPHILS # BLD AUTO: 6.73 K/UL — HIGH (ref 1.4–6.5)
NEUTROPHILS NFR BLD AUTO: 68.1 % — SIGNIFICANT CHANGE UP (ref 42.2–75.2)
NRBC # BLD: 0 /100 WBCS — SIGNIFICANT CHANGE UP (ref 0–0)
PLATELET # BLD AUTO: 432 K/UL — HIGH (ref 130–400)
POTASSIUM SERPL-MCNC: 4.9 MMOL/L — SIGNIFICANT CHANGE UP (ref 3.5–5)
POTASSIUM SERPL-SCNC: 4.9 MMOL/L — SIGNIFICANT CHANGE UP (ref 3.5–5)
PROT ?TM UR-MCNC: 210 MG/DL — HIGH (ref 0–12)
PROT ?TM UR-MCNC: 210 MG/DL — HIGH (ref 0–12)
PROT SERPL-MCNC: 5.9 G/DL — LOW (ref 6–8)
RBC # BLD: 2.94 M/UL — LOW (ref 4.2–5.4)
RBC # FLD: 13.4 % — SIGNIFICANT CHANGE UP (ref 11.5–14.5)
SODIUM SERPL-SCNC: 138 MMOL/L — SIGNIFICANT CHANGE UP (ref 135–146)
TIBC SERPL-MCNC: 197 UG/DL — LOW (ref 220–430)
UIBC SERPL-MCNC: 141 UG/DL — SIGNIFICANT CHANGE UP (ref 110–370)
WBC # BLD: 9.88 K/UL — SIGNIFICANT CHANGE UP (ref 4.8–10.8)
WBC # FLD AUTO: 9.88 K/UL — SIGNIFICANT CHANGE UP (ref 4.8–10.8)

## 2021-11-24 PROCEDURE — 99233 SBSQ HOSP IP/OBS HIGH 50: CPT

## 2021-11-24 RX ORDER — DARBEPOETIN ALFA IN POLYSORBAT 200MCG/0.4
25 PEN INJECTOR (ML) SUBCUTANEOUS
Refills: 0 | Status: DISCONTINUED | OUTPATIENT
Start: 2021-11-24 | End: 2021-11-24

## 2021-11-24 RX ORDER — SEVELAMER CARBONATE 2400 MG/1
1 POWDER, FOR SUSPENSION ORAL
Qty: 90 | Refills: 1
Start: 2021-11-24 | End: 2022-01-22

## 2021-11-24 RX ORDER — ASPIRIN/CALCIUM CARB/MAGNESIUM 324 MG
1 TABLET ORAL
Qty: 0 | Refills: 0 | DISCHARGE

## 2021-11-24 RX ORDER — DARBEPOETIN ALFA IN POLYSORBAT 200MCG/0.4
20 PEN INJECTOR (ML) SUBCUTANEOUS
Refills: 0 | Status: DISCONTINUED | OUTPATIENT
Start: 2021-11-24 | End: 2021-11-24

## 2021-11-24 RX ORDER — SODIUM BICARBONATE 1 MEQ/ML
2 SYRINGE (ML) INTRAVENOUS
Qty: 180 | Refills: 1
Start: 2021-11-24 | End: 2022-01-22

## 2021-11-24 RX ORDER — ASPIRIN/CALCIUM CARB/MAGNESIUM 324 MG
1 TABLET ORAL
Qty: 30 | Refills: 0
Start: 2021-11-24 | End: 2021-12-23

## 2021-11-24 RX ADMIN — CALCITRIOL 0.25 MICROGRAM(S): 0.5 CAPSULE ORAL at 12:04

## 2021-11-24 RX ADMIN — Medication 2: at 17:10

## 2021-11-24 RX ADMIN — Medication 1 TABLET(S): at 05:36

## 2021-11-24 RX ADMIN — CLOPIDOGREL BISULFATE 75 MILLIGRAM(S): 75 TABLET, FILM COATED ORAL at 12:04

## 2021-11-24 RX ADMIN — SEVELAMER CARBONATE 800 MILLIGRAM(S): 2400 POWDER, FOR SUSPENSION ORAL at 12:04

## 2021-11-24 RX ADMIN — Medication 25 MILLIGRAM(S): at 05:37

## 2021-11-24 RX ADMIN — CHLORHEXIDINE GLUCONATE 1 APPLICATION(S): 213 SOLUTION TOPICAL at 05:39

## 2021-11-24 RX ADMIN — GABAPENTIN 300 MILLIGRAM(S): 400 CAPSULE ORAL at 05:37

## 2021-11-24 RX ADMIN — Medication 1: at 12:05

## 2021-11-24 RX ADMIN — SEVELAMER CARBONATE 800 MILLIGRAM(S): 2400 POWDER, FOR SUSPENSION ORAL at 17:10

## 2021-11-24 RX ADMIN — PANTOPRAZOLE SODIUM 40 MILLIGRAM(S): 20 TABLET, DELAYED RELEASE ORAL at 05:37

## 2021-11-24 RX ADMIN — Medication 1 TABLET(S): at 17:10

## 2021-11-24 RX ADMIN — Medication 4 UNIT(S): at 07:58

## 2021-11-24 RX ADMIN — Medication 1300 MILLIGRAM(S): at 13:15

## 2021-11-24 RX ADMIN — Medication 25 MILLIGRAM(S): at 05:36

## 2021-11-24 RX ADMIN — Medication 81 MILLIGRAM(S): at 12:04

## 2021-11-24 RX ADMIN — Medication 20 MICROGRAM(S): at 17:12

## 2021-11-24 RX ADMIN — Medication 325 MILLIGRAM(S): at 12:04

## 2021-11-24 RX ADMIN — Medication 1: at 07:59

## 2021-11-24 RX ADMIN — HEPARIN SODIUM 5000 UNIT(S): 5000 INJECTION INTRAVENOUS; SUBCUTANEOUS at 17:11

## 2021-11-24 RX ADMIN — Medication 1 APPLICATION(S): at 12:06

## 2021-11-24 RX ADMIN — SEVELAMER CARBONATE 800 MILLIGRAM(S): 2400 POWDER, FOR SUSPENSION ORAL at 07:58

## 2021-11-24 RX ADMIN — Medication 4 UNIT(S): at 17:10

## 2021-11-24 RX ADMIN — Medication 25 MILLIGRAM(S): at 13:15

## 2021-11-24 RX ADMIN — Medication 1300 MILLIGRAM(S): at 05:37

## 2021-11-24 RX ADMIN — Medication 500 MILLIGRAM(S): at 12:04

## 2021-11-24 RX ADMIN — GABAPENTIN 300 MILLIGRAM(S): 400 CAPSULE ORAL at 13:15

## 2021-11-24 RX ADMIN — Medication 4 UNIT(S): at 12:05

## 2021-11-24 NOTE — DISCHARGE NOTE PROVIDER - PROVIDER TOKENS
PROVIDER:[TOKEN:[[id number]],FOLLOWUP:[1 week]],PROVIDER:[TOKEN:[[id number]],FOLLOWUP:[1 week]],PROVIDER:[TOKEN:[[id number]],FOLLOWUP:[1 week]]

## 2021-11-24 NOTE — DISCHARGE NOTE PROVIDER - CARE PROVIDER_API CALL
Hill Franklin)  Internal Medicine; Nephrology  [address] [place] [address]  Phone: [phone]  Fax: [phone]  Follow Up Time: 1 week    Lexx Muñoz)  Surgery; Vascular Surgery  [address] [place] [address]  Phone: [phone]  Fax: [phone]  Follow Up Time: 1 week    CHINA WHELAN  Internal Medicine    Avis LUNA,    Phone: ()-  Fax: ()-  Follow Up Time: 1 week

## 2021-11-24 NOTE — PROGRESS NOTE ADULT - ASSESSMENT
Patient is a 57yo female with PMH of PVD, active smoker, HTN, HLD. right pontine CVA (February 7, 2021), intellectual disability, hearing/speech impairment, CKD stage IV, mild aortic stenosis, Anabaptist, s/p recent left femoral artery-posterior tibial artery bypass with autologous venous tissue and left heel ulcer debridement discharged on 11/19/2021 who presented to the hospital for inability to care for herself.    #Inability to care for self  - Case management and social work consult placed  -wheelchair bound at baseline, primarily communicates through American sign language      #Recent left heel ulcer debridement 11/11/2021  - Wound dressing: wet to dry  - Patient to continue wound VAC dressing  - Weight bearing as tolerated to right foot, non-weight bearing to left foot  - follow outpatient with podiatry (Dr. Llamas)   - Continue with Augment 875-125mg PO twice daily for total of 2 weeks, ending November 28th  - Continue with ascorbic acid 500mg PO once daily     #Peripheral arterial disease s/p left femoral artery-posterior tibial artery bypass with autologous venous tissue 11/8/2021  - Discussed with vascular fellow, who states that incision looks good  - Follow outpatient with vascular surgery (Dr. Muñoz)  - Continue with aspirin 81mg PO once daily, clopidogrel 75mg PO once daily, atorvastatin 80mg PO at bedtime     #Diabetes mellitus type 2  - Hemoglobin A1c 11/10/2021: 7.6  - Continue with insulin glargine 10 units SQ QHS +  insulin lispro 4 units SQ TID +SS  - Monitor fingerstick glucose     #Hypertension  - Losartan was discontinued on previous admission  - Continue with hydralazine 25mg PO three times daily, metoprolol succinate 25mg PO once daily     #Urinary retention  - Documentation from vascular surgery team states patient was to follow outpatient with Dr. Hernández for indwelling urethral catheter  - Continue with Henriquez catheter (was discharged with Henriquez)  - Continue with tamsulosin 0.4mg PO at bedtime     #Normocytic anemia s/p recent blood transfusion  - Patient is a Anabaptist, but was agreeable to 1 unit PRBC transfusion last admission after surgery  - Continue with ferrous sulfate 325mg PO once daily   -f/u iron stores    #CKD stage IV, stable since 2/2021  - Baseline creatinine: ~2.7  - Avoid nephrotoxic agents   - cont sodium bicarbonate 1300mg PO three times daily   -cont sevelamer per nephro recs    #Myeloma panel ordered per heme/onc recs   f/u UPEP, SPEP, SFLC, Immunofixation serum and urine     #Misc  - DVT Prophylaxis: heparin 5000 units SQ Q12H   - GI Prophylaxis: pantoprazole 40mg PO once daily   - Diet: Renal restrictions  - Activity: increase as tolerated   - IV Fluids: not indicated   - Code Status: Full Code  
Patient is a 57yo female with PMH of PVD, active smoker, HTN, HLD. right pontine CVA (February 7, 2021), intellectual disability, hearing/speech impairment, CKD stage IV, mild aortic stenosis, Spiritism, s/p recent left femoral artery-posterior tibial artery bypass with autologous venous tissue and left heel ulcer debridement discharged on 11/19/2021 who presented to the hospital for inability to care for herself.    #Inability to care for self  - Case management and social work consult placed  -wheelchair bound at baseline, primarily communicates through American sign language    #Recent left heel ulcer debridement 11/11/2021  - Wound dressing: wet to dry  - Patient to continue wound VAC dressing  - Weight bearing as tolerated to right foot, non-weight bearing to left foot  - follow outpatient with podiatry (Dr. Llamas)   - Continue with Augment 875-125mg PO twice daily for total of 2 weeks, ending November 28th  - Continue with ascorbic acid 500mg PO once daily     #Peripheral arterial disease s/p left femoral artery-posterior tibial artery bypass with autologous venous tissue 11/8/2021  - Discussed with vascular fellow, who states that incision looks good  - Follow outpatient with vascular surgery (Dr. Muñoz)  - Continue with aspirin 81mg PO once daily, clopidogrel 75mg PO once daily, atorvastatin 80mg PO at bedtime     #Diabetes mellitus type 2  - Hemoglobin A1c 11/10/2021: 7.6  - Continue with insulin glargine 10 units SQ QHS +  insulin lispro 4 units SQ TID +SS  - Monitor fingerstick glucose     #Hypertension  - Losartan was discontinued on previous admission  - Continue with hydralazine 25mg PO three times daily, metoprolol succinate 25mg PO once daily     #Urinary retention  - Documentation from vascular surgery team states patient was to follow outpatient with Dr. Hernández for indwelling urethral catheter  - Continue with Henriquez catheter (was discharged with Henriquez)  - Continue with tamsulosin 0.4mg PO at bedtime     #Normocytic anemia s/p recent blood transfusion  - Patient is a Spiritism, but was agreeable to 1 unit PRBC transfusion last admission after surgery  - Continue with ferrous sulfate 325mg PO once daily   -f/u iron stores    #CKD stage IV, stable since 2/2021  - Baseline creatinine: ~2.7  - Avoid nephrotoxic agents   - cont sodium bicarbonate 1300mg PO three times daily   -cont sevelamer per nephro recs    #Myeloma panel ordered per heme/onc recs   f/u UPEP, SPEP, SFLC, Immunofixation serum and urine     #Misc  - DVT Prophylaxis: heparin 5000 units SQ Q12H   - GI Prophylaxis: pantoprazole 40mg PO once daily   - Diet: Renal restrictions  - Activity: increase as tolerated   - IV Fluids: not indicated   - Code Status: Full Code    D/C planning today.   F/U nephrology/ Vascular as outpt.   
57 yo female PMH CKD4 PVD, smoker, HTN, HLD, h/o right pontine CVA (2/7/21), DM,  intellectual disability, hearing/speech impairment, recent LE arterial bypass, presents d/t family unable to care for patient.      # CHATA on  CKD 4, creat improving/ acidosis   # Proteinuria, nephrotic range / likely d/t HTN/DM  # HTN  # DM  # Normocytic anemia     - cont po sodium bicarb 1300 tid   - BP controlled  - OLVIN weak positive/ serum flc ratio wnl / immunofixation w/ weak lambda band- consider hematology evaluation   - phos noted, start sevelamer 1 tab qac   - check iron stores/ will need Brinda eventually  - strict i/o   - dose meds per egfr      will follow
Patient is a 55yo female with PMH of PVD, active smoker, HTN, HLD. right pontine CVA (February 7, 2021), intellectual disability, hearing/speech impairment, CKD stage IV, mild aortic stenosis, Congregational, s/p recent left femoral artery-posterior tibial artery bypass with autologous venous tissue and left heel ulcer debridement discharged on 11/19/2021 who presented to the hospital for inability to care for herself.    #Inability to care for self  - Case management and social work consult placed  -wheelchair bound at baseline, primarily communicates through American sign language      #Recent left heel ulcer debridement 11/11/2021  - POD #9  - Wound dressing: wet to dry  - Patient to continue wound VAC dressing  - Weight bearing as tolerated to right foot, non-weight bearing to left foot  - Per last podiatry note, to follow outpatient with podiatry (Dr. Llamas) in 1 week  - Continue with Augment 875-125mg PO twice daily for total of 2 weeks, ending November 28th  - Continue with ascorbic acid 500mg PO once daily     #Peripheral arterial disease s/p left femoral artery-posterior tibial artery bypass with autologous venous tissue 11/8/2021  - POD #12  - Discussed with vascular fellow, who states that incision looks good  - Follow outpatient with vascular surgery (Dr. Muñoz)  - Continue with aspirin 81mg PO once daily, clopidogrel 75mg PO once daily, atorvastatin 80mg PO at bedtime     #Diabetes mellitus type 2  - Hemoglobin A1c 11/10/2021: 7.6  - Continue with insulin glargine 10 units SQ QHS +  insulin lispro 4 units SQ TID +SS  - Monitor fingerstick glucose     #Hypertension  - Losartan was discontinued on previous admission  - Continue with hydralazine 25mg PO three times daily, metoprolol succinate 25mg PO once daily     #Urinary retention  - Documentation from vascular surgery team states patient was to follow outpatient with Dr. Hernández for indwelling urethral catheter  - Will need to monitor urinary output for retention  - Continue with Henriquez catheter (was discharged with Henriquez)  - Continue with tamsulosin 0.4mg PO at bedtime     #Normocytic anemia s/p recent blood transfusion  - Patient is a Congregational, but was agreeable to 1 unit PRBC transfusion last admission after surgery  - Continue with ferrous sulfate 325mg PO once daily   -f/u iron stores    #CKD stage IV, stable since 2/2021  - Baseline creatinine: ~2.7  - Avoid nephrotoxic agents   - cont sodium bicarbonate 1300mg PO three times daily   -cont sevelamer per nephro recs        #Misc  - DVT Prophylaxis: heparin 5000 units SQ Q12H   - GI Prophylaxis: pantoprazole 40mg PO once daily   - Diet: Renal restrictions  - Activity: increase as tolerated   - IV Fluids: not indicated   - Code Status: Full Code  
57 yo female PMH CKD4 PVD, smoker, HTN, HLD, h/o right pontine CVA (2/7/21), DM,  intellectual disability, hearing/speech impairment, recent LE arterial bypass, presents d/t family unable to care for patient.      # CHATA on  CKD 4, creat improving/ acidosis   # Proteinuria, nephrotic range / likely d/t HTN/DM  # HTN  # DM  # Normocytic anemia     - cont po sodium bicarb 1300 tid   - BP controlled  - OLVIN weak positive/ serum flc ratio wnl / immunofixation w/ weak lambda band- consider hematology evaluation as per previous recs   - phos noted, started sevelamer 1 tab qac   - check iron stores/ start aranesp 20 mcg sq qweek   - strict i/o   - dose meds per egfr  - no need for RRT      will follow
55 yo female PMH CKD4 PVD, smoker, HTN, HLD, h/o right pontine CVA (2/7/21), DM,  intellectual disability, hearing/speech impairment, recent LE arterial bypass, presents d/t family unable to care for patient.      # CKD 4, creat stable at baseline/ acidosis   # Proteinuria, nephrotic range / likely d/t HTN/DM  # HTN  # DM  # Normocytic anemia     - cont po sodium bicarb 1300 tid   - BP controlled  - OLVIN weak positive/ serum flc ratio wnl / immunofixation w/ weak lambda band- consider hematology evaluation   - phos noted, start sevelamer 1 tab qac   - check iron stores/ will need Brinda eventually  - strict i/o   - dose meds per egfr      will follow
57 YO F with a PMH of PVD, former smoker, HTN, HLD, hx of CVA (February 7, 2021), intellectual disability, hearing/speech impairment, CKD4, s/p recent left femoral artery-posterior tibial artery bypass who presents to the hospital with a c/o inability to care for herself    Social problem: LUCILA eval.  PT. Fall precautions.     Recent L fem art/post tib art bypass s/p excisional debridement to L foot   podiatry to apply vac  NWB to L foot    Normocytic anemia, above baseline. Pt denies bleeding symptoms. Replace as necessary.     Diabetes mellitus with hyperglycemia. A1c. FSs. Insulin standing/correctional dosing. goal 130-180    HX of PVD, former smoker, HTN, HLD, hx of CVA (February 7, 2021), intellectual disability, hearing/speech impairment, CKD4, and s/p recent left femoral artery-posterior tibial artery bypass. Restart home meds, except as stated above. DVT PPX.    Provider Hand off  Pending- PT eval/ nwb to LLE, podiatry to apply vac, SW to d/w patient and brother in law disposition options

## 2021-11-24 NOTE — DISCHARGE NOTE PROVIDER - CARE PROVIDERS DIRECT ADDRESSES
,[email or web address],DirectAddress_Unknown

## 2021-11-24 NOTE — PROGRESS NOTE ADULT - SUBJECTIVE AND OBJECTIVE BOX
Nephrology progress note    THIS IS AN INCOMPLETE NOTE . FULL NOTE TO FOLLOW SHORTLY    Patient is seen and examined, events over the last 24 h noted .    Allergies:  penicillin (Rash)    Hospital Medications:   MEDICATIONS  (STANDING):  amoxicillin  875 milliGRAM(s)/clavulanate 1 Tablet(s) Oral two times a day  ascorbic acid 500 milliGRAM(s) Oral daily  aspirin  chewable 81 milliGRAM(s) Oral daily  atorvastatin 80 milliGRAM(s) Oral at bedtime  calcitriol   Capsule 0.25 MICROGram(s) Oral daily  chlorhexidine 4% Liquid 1 Application(s) Topical <User Schedule>  clopidogrel Tablet 75 milliGRAM(s) Oral daily  collagenase Ointment 1 Application(s) Topical daily  ferrous    sulfate 325 milliGRAM(s) Oral daily  gabapentin 300 milliGRAM(s) Oral three times a day  heparin   Injectable 5000 Unit(s) SubCutaneous every 12 hours  hydrALAZINE 25 milliGRAM(s) Oral every 8 hours  insulin glargine Injectable (LANTUS) 10 Unit(s) SubCutaneous at bedtime  insulin lispro (ADMELOG) corrective regimen sliding scale   SubCutaneous three times a day before meals  insulin lispro Injectable (ADMELOG) 4 Unit(s) SubCutaneous three times a day before meals  metoprolol succinate ER 25 milliGRAM(s) Oral daily  pantoprazole    Tablet 40 milliGRAM(s) Oral before breakfast  sevelamer carbonate 800 milliGRAM(s) Oral three times a day with meals  sodium bicarbonate 1300 milliGRAM(s) Oral three times a day  tamsulosin 0.4 milliGRAM(s) Oral at bedtime        VITALS:  T(F): 98.1 (11-24-21 @ 04:45), Max: 98.1 (11-24-21 @ 04:45)  HR: 73 (11-24-21 @ 04:45)  BP: 117/58 (11-24-21 @ 04:45)  RR: 18 (11-23-21 @ 20:07)  SpO2: 100% (11-23-21 @ 20:07)  Wt(kg): --    11-22 @ 07:01  -  11-23 @ 07:00  --------------------------------------------------------  IN: 0 mL / OUT: 2300 mL / NET: -2300 mL    11-23 @ 07:01  -  11-24 @ 07:00  --------------------------------------------------------  IN: 0 mL / OUT: 400 mL / NET: -400 mL          PHYSICAL EXAM:  Constitutional: NAD  HEENT: anicteric sclera, oropharynx clear, MMM  Neck: No JVD  Respiratory: CTAB, no wheezes, rales or rhonchi  Cardiovascular: S1, S2, RRR  Gastrointestinal: BS+, soft, NT/ND  Extremities: No cyanosis or clubbing. No peripheral edema  :  No mcgrath.   Skin: No rashes    LABS:  11-23    143  |  108  |  63<HH>  ----------------------------<  177<H>  4.8   |  17  |  2.7<H>    Ca    9.2      23 Nov 2021 04:30  Mg     2.1     11-23    TPro  6.2  /  Alb  3.5  /  TBili  0.2  /  DBili      /  AST  14  /  ALT  15  /  AlkPhos  186<H>  11-23                          8.5    9.88  )-----------( 432      ( 24 Nov 2021 06:38 )             26.8       Urine Studies:      RADIOLOGY & ADDITIONAL STUDIES:   Nephrology progress note  Patient is seen and examined, events over the last 24 h noted .  lying in bed   denied any complaints   Allergies:  penicillin (Rash)    Hospital Medications:   MEDICATIONS  (STANDING):    amoxicillin  875 milliGRAM(s)/clavulanate 1 Tablet(s) Oral two times a day  ascorbic acid 500 milliGRAM(s) Oral daily  aspirin  chewable 81 milliGRAM(s) Oral daily  atorvastatin 80 milliGRAM(s) Oral at bedtime  calcitriol   Capsule 0.25 MICROGram(s) Oral daily  clopidogrel Tablet 75 milliGRAM(s) Oral daily  collagenase Ointment 1 Application(s) Topical daily  ferrous    sulfate 325 milliGRAM(s) Oral daily  gabapentin 300 milliGRAM(s) Oral three times a day  heparin   Injectable 5000 Unit(s) SubCutaneous every 12 hours  hydrALAZINE 25 milliGRAM(s) Oral every 8 hours  insulin glargine Injectable (LANTUS) 10 Unit(s) SubCutaneous at bedtime  insulin lispro (ADMELOG) corrective regimen sliding scale   SubCutaneous three times a day before meals  insulin lispro Injectable (ADMELOG) 4 Unit(s) SubCutaneous three times a day before meals  metoprolol succinate ER 25 milliGRAM(s) Oral daily  pantoprazole    Tablet 40 milliGRAM(s) Oral before breakfast  sevelamer carbonate 800 milliGRAM(s) Oral three times a day with meals  sodium bicarbonate 1300 milliGRAM(s) Oral three times a day  tamsulosin 0.4 milliGRAM(s) Oral at bedtime        VITALS:  T(F): 98.1 (11-24-21 @ 04:45), Max: 98.1 (11-24-21 @ 04:45)  HR: 73 (11-24-21 @ 04:45)  BP: 117/58 (11-24-21 @ 04:45)  RR: 18 (11-23-21 @ 20:07)  SpO2: 100% (11-23-21 @ 20:07)      11-22 @ 07:01  -  11-23 @ 07:00  --------------------------------------------------------  IN: 0 mL / OUT: 2300 mL / NET: -2300 mL    11-23 @ 07:01  -  11-24 @ 07:00  --------------------------------------------------------  IN: 0 mL / OUT: 400 mL / NET: -400 mL          PHYSICAL EXAM:  Constitutional: NAD  Neck: No JVD  Respiratory: CTAB, no wheezes, rales or rhonchi  Cardiovascular: S1, S2, RRR  Gastrointestinal: BS+, soft, NT/ND  Extremities: No cyanosis or clubbing. trace edema   :  No mcgrath.   Skin: No rashes    LABS:  11-23    143  |  108  |  63<HH>  ----------------------------<  177<H>  4.8   |  17  |  2.7<H>    Ca    9.2      23 Nov 2021 04:30  Mg     2.1     11-23    TPro  6.2  /  Alb  3.5  /  TBili  0.2  /  DBili      /  AST  14  /  ALT  15  /  AlkPhos  186<H>  11-23                          8.5    9.88  )-----------( 432      ( 24 Nov 2021 06:38 )             26.8       Urine Studies:      RADIOLOGY & ADDITIONAL STUDIES:

## 2021-11-24 NOTE — DISCHARGE NOTE NURSING/CASE MANAGEMENT/SOCIAL WORK - PATIENT PORTAL LINK FT
You can access the FollowMyHealth Patient Portal offered by [facility] by registering at the following website: [email or web address]. By joining [employer]’s FollowMyHealth portal, you will also be able to view your health information using other applications (apps) compatible with our system.

## 2021-11-24 NOTE — PROGRESS NOTE ADULT - SUBJECTIVE AND OBJECTIVE BOX
SEN LING  56y  Female      Patient is a 56y old  Female who presents with a chief complaint of social.      INTERVAL HPI/OVERNIGHT EVENTS:      ******************************* REVIEW OF SYSTEMS:**********************************************    All other review of systems negative    *********************** VITALS ******************************************    T(F): 98.1 (11-24-21 @ 14:05)  HR: 75 (11-24-21 @ 14:05) (73 - 75)  BP: 150/67 (11-24-21 @ 14:05) (117/58 - 154/68)  RR: 18 (11-24-21 @ 14:05) (18 - 18)  SpO2: 100% (11-23-21 @ 20:07) (100% - 100%)    11-23-21 @ 07:01  -  11-24-21 @ 07:00  --------------------------------------------------------  IN: 0 mL / OUT: 400 mL / NET: -400 mL    11-24-21 @ 07:01  -  11-24-21 @ 17:27  --------------------------------------------------------  IN: 410 mL / OUT: 1650 mL / NET: -1240 mL            11-23-21 @ 07:01  -  11-24-21 @ 07:00  --------------------------------------------------------  IN: 0 mL / OUT: 400 mL / NET: -400 mL    11-24-21 @ 07:01  -  11-24-21 @ 17:27  --------------------------------------------------------  IN: 410 mL / OUT: 1650 mL / NET: -1240 mL        ******************************** PHYSICAL EXAM:**************************************************  GENERAL: NAD    PSYCH: baseline mentation  HEENT:     NERVOUS SYSTEM:  Alert & awake x 2,   PULMONARY: MARLON, CTA    CARDIOVASCULAR: S1S2 RRR    GI: Soft, NT, ND; BS present.    EXTREMITIES:  2+ Peripheral Pulses, No clubbing, cyanosis, or edema    LYMPH: No lymphadenopathy noted    SKIN: No rashes or lesions      **************************** LABS *******************************************************                          8.5    9.88  )-----------( 432      ( 24 Nov 2021 06:38 )             26.8     11-24    138  |  107  |  63<HH>  ----------------------------<  151<H>  4.9   |  16<L>  |  3.0<H>    Ca    8.6      24 Nov 2021 06:38  Mg     2.1     11-23    TPro  5.9<L>  /  Alb  3.2<L>  /  TBili  <0.2  /  DBili  x   /  AST  13  /  ALT  13  /  AlkPhos  157<H>  11-24          Lactate Trend        CAPILLARY BLOOD GLUCOSE      POCT Blood Glucose.: 237 mg/dL (24 Nov 2021 16:43)          **************************Active Medications *******************************************  penicillin (Rash)      amoxicillin  875 milliGRAM(s)/clavulanate 1 Tablet(s) Oral two times a day  ascorbic acid 500 milliGRAM(s) Oral daily  aspirin  chewable 81 milliGRAM(s) Oral daily  atorvastatin 80 milliGRAM(s) Oral at bedtime  calcitriol   Capsule 0.25 MICROGram(s) Oral daily  chlorhexidine 4% Liquid 1 Application(s) Topical <User Schedule>  clopidogrel Tablet 75 milliGRAM(s) Oral daily  collagenase Ointment 1 Application(s) Topical daily  darbepoetin Injectable ViaL 20 MICROGram(s) SubCutaneous every week  ferrous    sulfate 325 milliGRAM(s) Oral daily  gabapentin 300 milliGRAM(s) Oral three times a day  heparin   Injectable 5000 Unit(s) SubCutaneous every 12 hours  hydrALAZINE 25 milliGRAM(s) Oral every 8 hours  insulin glargine Injectable (LANTUS) 10 Unit(s) SubCutaneous at bedtime  insulin lispro (ADMELOG) corrective regimen sliding scale   SubCutaneous three times a day before meals  insulin lispro Injectable (ADMELOG) 4 Unit(s) SubCutaneous three times a day before meals  metoprolol succinate ER 25 milliGRAM(s) Oral daily  oxyCODONE    IR 5 milliGRAM(s) Oral every 6 hours PRN  pantoprazole    Tablet 40 milliGRAM(s) Oral before breakfast  sevelamer carbonate 800 milliGRAM(s) Oral three times a day with meals  sodium bicarbonate 1300 milliGRAM(s) Oral three times a day  tamsulosin 0.4 milliGRAM(s) Oral at bedtime      ***************************************************  RADIOLOGY & ADDITIONAL TESTS:    Imaging Personally Reviewed:  [ ] YES  [ ] NO    HEALTH ISSUES - PROBLEM Dx:

## 2021-11-24 NOTE — DISCHARGE NOTE PROVIDER - NSDCMRMEDTOKEN_GEN_ALL_CORE_FT
amoxicillin-clavulanate 875 mg-125 mg oral tablet: 1 tab(s) orally every 12 hours (end 11/28)  ascorbic acid 500 mg oral tablet: 1 tab(s) orally once a day  aspirin 81 mg oral delayed release tablet: 1 tab(s) orally once a day   atorvastatin 80 mg oral tablet: 1 tab(s) orally once a day  Basaglar KwikPen 100 units/mL subcutaneous solution: 10 unit(s) subcutaneous once a day (at bedtime)   calcitriol 0.25 mcg oral capsule: 1 cap(s) orally once a day  ferrous sulfate 325 mg (65 mg elemental iron) oral tablet: 1 tab(s) orally once a day  gabapentin 300 mg oral capsule: 1 cap(s) orally 3 times a day  hydrALAZINE 25 mg oral tablet: 1 tab(s) orally every 8 hours  insulin lispro 100 units/mL injectable solution: 4 unit(s) injectable 3 times a day (before meals)  Metoprolol Succinate ER 25 mg oral tablet, extended release: 1 tab(s) orally once a day  oxyCODONE 5 mg oral tablet: 1 tab(s) orally every 6 hours, As needed, Severe Pain (7 - 10) MDD:4  Plavix 75 mg oral tablet: 1 tab(s) orally once a day  Protonix 40 mg oral delayed release tablet: 1 tab(s) orally once a day  sevelamer carbonate 800 mg oral tablet: 1 tab(s) orally 3 times a day (with meals)  sodium bicarbonate 650 mg oral tablet: 2 tab(s) orally 3 times a day  tamsulosin 0.4 mg oral capsule: 1 cap(s) orally once a day (at bedtime)

## 2021-11-24 NOTE — DISCHARGE NOTE PROVIDER - NSDCCPCAREPLAN_GEN_ALL_CORE_FT
PRINCIPAL DISCHARGE DIAGNOSIS  Diagnosis: Social problem  Assessment and Plan of Treatment: You were admitted for self care   Please follow up with vacular for removal of your staples   Please continue antibiotics prescribed for your heel ulcer   Please follow with nephrology for your kidney disease.

## 2021-11-24 NOTE — DISCHARGE NOTE PROVIDER - HOSPITAL COURSE
Patient is a 55yo female    PMH of PVD, Former smoker, HTN, HLD. right pontine CVA (February 7, 2021), Intellectual disability, hearing/speech impairment, CKD stage IV, mild aortic stenosis, s/p recent left femoral artery-posterior tibial artery bypass with autologous venous tissue and left heel ulcer debridement discharged on 11/19/2021     Presented to the hospital for inability to care for herself.     Patient lives at home with her brother-in-law, who is her primary caregiver. Per the brother-in-law, he was under the impression that case management was arranging for 24hr home care. However, he found out today that [facility] was only arranged for wound care. The brother-in-law works Monday-Friday and is unable to care for the sister full time. The patient is wheelchair bound and requires assistance with her ADLs and IADLs.     In the ED, vital signs were Tmax 98.7F, HR 75, /63, RR 16, and SpO2 99% on room air. Labs showed Hb 9.4 (improved from previous), K 5.1 (stable), BUN 64 (trending up), creatinine 3.3 (trending up), and anion gap 19 (stable). Patient is being admitted to medicine service for placement pending safe disposition. Patient endorses some mild abdominal discomfort, but denies any other symptoms.    Patient was admitted to [facility] and managed for :    #Inability to care for self :  - Case management and social work consult placed  - Wheelchair bound at baseline, primarily communicates through American sign language    #Recent left heel ulcer debridement 11/11/2021  - Wound dressing: wet to dry  - Patient to continue wound VAC dressing  - Weight bearing as tolerated to right foot, non-weight bearing to left foot  - follow outpatient with podiatry (Dr. Llamas)   -  Augment 875-125mg PO twice daily for total of 2 weeks, ending November 28th  -  Ascorbic acid 500mg PO once daily     #Peripheral arterial disease s/p left femoral artery-posterior tibial artery bypass with autologous venous tissue 11/8/2021  - Discussed with vascular fellow, who states that incision looks good  - Follow outpatient with vascular surgery (Dr. Muñoz)  - Continue with aspirin 81mg PO once daily, clopidogrel 75mg PO once daily, atorvastatin 80mg PO at bedtime     #Diabetes mellitus type 2  - Hemoglobin A1c 11/10/2021: 7.6  - Continue with insulin glargine 10 units SQ QHS +  insulin lispro 4 units SQ TID +SS  - Monitor fingerstick glucose     #Hypertension  - Losartan was discontinued on previous admission  - Continue with hydralazine 25mg PO three times daily, metoprolol succinate 25mg PO once daily     #Urinary retention  - Documentation from vascular surgery team states patient was to follow outpatient with Dr. Hernández for indwelling urethral catheter  - Continue with Henriquez catheter (was discharged with Henriquez)  - Continue with tamsulosin 0.4mg PO at bedtime     #Normocytic anemia s/p recent blood transfusion  - Patient is a [employer], but was agreeable to 1 unit PRBC transfusion last admission after surgery  - Continue with ferrous sulfate 325mg PO once daily     #CKD stage IV, stable since 2/2021  - Baseline creatinine: ~2.7  - Avoid nephrotoxic agents   - cont sodium bicarbonate 1300mg PO three times daily   -cont sevelamer per nephro recs  - aranesp 20 mcg sq qweek     #Myeloma panel ordered per heme/onc recs   f/u UPEP, SPEP, SFLC, Immunofixation serum and urine     patient is stable and ready for DC   Will follow with vascular outpatient in 1w for staples removal

## 2021-11-25 LAB — FERRITIN SERPL-MCNC: 109 NG/ML — SIGNIFICANT CHANGE UP (ref 15–150)

## 2021-11-27 LAB
% GAMMA, URINE: 8.6 % — SIGNIFICANT CHANGE UP
ALBUMIN 24H MFR UR ELPH: 60.9 % — SIGNIFICANT CHANGE UP
ALPHA1 GLOB 24H MFR UR ELPH: 14.1 % — SIGNIFICANT CHANGE UP
ALPHA2 GLOB 24H MFR UR ELPH: 6.8 % — SIGNIFICANT CHANGE UP
B-GLOBULIN 24H MFR UR ELPH: 9.6 % — SIGNIFICANT CHANGE UP
INTERPRETATION 24H UR IFE-IMP: SIGNIFICANT CHANGE UP
INTERPRETATION 24H UR IFE-IMP: SIGNIFICANT CHANGE UP
M PROTEIN 24H UR ELPH-MRATE: SIGNIFICANT CHANGE UP
PROT ?TM UR-MCNC: 210 MG/DL — HIGH (ref 0–12)
PROT PATTERN 24H UR ELPH-IMP: SIGNIFICANT CHANGE UP
URINE CREATININE CALCULATION: SIGNIFICANT CHANGE UP G/24 H (ref 0.8–1.8)

## 2021-11-29 ENCOUNTER — APPOINTMENT (OUTPATIENT)
Dept: PODIATRY | Facility: CLINIC | Age: 56
End: 2021-11-29
Payer: MEDICAID

## 2021-11-29 ENCOUNTER — OUTPATIENT (OUTPATIENT)
Dept: OUTPATIENT SERVICES | Facility: HOSPITAL | Age: 56
LOS: 1 days | Discharge: HOME | End: 2021-11-29

## 2021-11-29 ENCOUNTER — APPOINTMENT (OUTPATIENT)
Dept: VASCULAR SURGERY | Facility: CLINIC | Age: 56
End: 2021-11-29
Payer: SUBSIDIZED

## 2021-11-29 LAB
PROT SERPL-MCNC: 6.2 G/DL — SIGNIFICANT CHANGE UP (ref 6–8.3)
PROT SERPL-MCNC: 6.2 G/DL — SIGNIFICANT CHANGE UP (ref 6–8.3)

## 2021-11-29 PROCEDURE — 11042 DBRDMT SUBQ TIS 1ST 20SQCM/<: CPT | Mod: 58

## 2021-11-29 PROCEDURE — 99024 POSTOP FOLLOW-UP VISIT: CPT

## 2021-11-29 NOTE — REVIEW OF SYSTEMS
[Feeling Poorly] : feeling poorly [Feeling Tired] : feeling tired [Limb Pain] : limb pain [Skin Lesions] : skin lesion [Skin Wound] : skin wound [Negative] : Gastrointestinal

## 2021-11-29 NOTE — REASON FOR VISIT
[de-identified] : sp left fem PT bypass [de-identified] : PMH of PVD, Former smoker, HTN, HLD. right pontine CVA (February 7, 2021),\par Intellectual disability, hearing/speech impairment, CKD stage IV, mild aortic\par stenosis, s/p recent left femoral artery-posterior tibial artery bypass with\par autologous venous tissue and left heel ulcer debridement discharged on\par 11/19/2021

## 2021-11-30 LAB
% ALBUMIN: 45.4 % — SIGNIFICANT CHANGE UP
% ALPHA 1: 7.1 % — SIGNIFICANT CHANGE UP
% ALPHA 2: 21.1 % — SIGNIFICANT CHANGE UP
% BETA: 11.8 % — SIGNIFICANT CHANGE UP
% GAMMA: 14.6 % — SIGNIFICANT CHANGE UP
% M SPIKE: 1.3 % — SIGNIFICANT CHANGE UP
ALBUMIN SERPL ELPH-MCNC: 2.8 G/DL — LOW (ref 3.6–5.5)
ALBUMIN/GLOB SERPL ELPH: 0.8 RATIO — SIGNIFICANT CHANGE UP
ALPHA1 GLOB SERPL ELPH-MCNC: 0.4 G/DL — SIGNIFICANT CHANGE UP (ref 0.1–0.4)
ALPHA2 GLOB SERPL ELPH-MCNC: 1.3 G/DL — HIGH (ref 0.5–1)
B-GLOBULIN SERPL ELPH-MCNC: 0.7 G/DL — SIGNIFICANT CHANGE UP (ref 0.5–1)
GAMMA GLOBULIN: 0.9 G/DL — SIGNIFICANT CHANGE UP (ref 0.6–1.6)
INTERPRETATION SERPL IFE-IMP: SIGNIFICANT CHANGE UP
M-SPIKE: 0.1 G/DL — HIGH (ref 0–0)
PROT PATTERN SERPL ELPH-IMP: SIGNIFICANT CHANGE UP

## 2021-11-30 NOTE — ASSESSMENT
[FreeTextEntry1] : Assessment:\par -Diabetes mellitus\par -Diabetic foot ulcer\par \par Plan:\par -Pt seen & evaluated. Pt chart reviewed\par - Wound debrided to level of subcutaneous tissue with # 15 blade and dermal curette \par Wound cleansed and Dressed with Sterile dressing \par Follow up in 1 week\par

## 2021-11-30 NOTE — PHYSICAL EXAM
[General Appearance - Alert] : alert [General Appearance - In No Acute Distress] : in no acute distress [General Appearance - Well Nourished] : well nourished [Ankle Swelling (On Exam)] : present [Ankle Swelling On The Left] : of the left ankle [Varicose Veins Of The Left Leg] : on the left foot/ankle [] : on the left lower extremity [1+] : left foot dorsalis pedis 1+ [Foot Ulcer] : foot ulcer [Diminished Throughout Left Foot] : diminished sensation with monofilament testing throughout left foot [Oriented To Time, Place, And Person] : oriented to person, place, and time [Impaired Insight] : insight and judgment were intact [Affect] : the affect was normal [de-identified] : No TTP to plantarmedial heel wound [FreeTextEntry1] : Left plantarmedial heel ulcer; fibrogranular wound base; 70% fibrous; 30% granular. \par Minimal drainage. No malodor

## 2021-11-30 NOTE — HISTORY OF PRESENT ILLNESS
[Wheelchair] : a wheelchair [FreeTextEntry1] : CC: "Check-up after hospitalization"\par HPI:\par -56F who presents w/son for f/u after recent hospitalization ([facility] 11/24/21)\par -Son states home VAC was delivered to pt hospital room but no one came to put it on\par -No other pedal complaints

## 2021-12-02 ENCOUNTER — APPOINTMENT (OUTPATIENT)
Dept: UROLOGY | Facility: CLINIC | Age: 56
End: 2021-12-02

## 2021-12-03 DIAGNOSIS — E11.621 TYPE 2 DIABETES MELLITUS WITH FOOT ULCER: ICD-10-CM

## 2021-12-03 DIAGNOSIS — E11.628 TYPE 2 DIABETES MELLITUS WITH OTHER SKIN COMPLICATIONS: ICD-10-CM

## 2021-12-03 DIAGNOSIS — E11.42 TYPE 2 DIABETES MELLITUS WITH DIABETIC POLYNEUROPATHY: ICD-10-CM

## 2021-12-07 ENCOUNTER — APPOINTMENT (OUTPATIENT)
Dept: VASCULAR SURGERY | Facility: CLINIC | Age: 56
End: 2021-12-07
Payer: SUBSIDIZED

## 2021-12-07 PROCEDURE — 99024 POSTOP FOLLOW-UP VISIT: CPT

## 2021-12-07 NOTE — REASON FOR VISIT
[Follow-Up: _____] : a [unfilled] follow-up visit [de-identified] : sp left fem PT bypass [de-identified] : PMH of PVD, Former smoker, HTN, HLD. right pontine CVA (February 7, 2021),\par Intellectual disability, hearing/speech impairment, CKD stage IV, mild aortic\par stenosis, s/p recent left femoral artery-posterior tibial artery bypass with\par autologous venous tissue and left heel ulcer debridement discharged on\par 11/19/2021

## 2021-12-09 ENCOUNTER — APPOINTMENT (OUTPATIENT)
Dept: UROLOGY | Facility: CLINIC | Age: 56
End: 2021-12-09
Payer: MEDICAID

## 2021-12-09 ENCOUNTER — OUTPATIENT (OUTPATIENT)
Dept: OUTPATIENT SERVICES | Facility: HOSPITAL | Age: 56
LOS: 1 days | Discharge: HOME | End: 2021-12-09

## 2021-12-09 ENCOUNTER — NON-APPOINTMENT (OUTPATIENT)
Age: 56
End: 2021-12-09

## 2021-12-09 VITALS
HEIGHT: 60 IN | OXYGEN SATURATION: 97 % | HEART RATE: 76 BPM | TEMPERATURE: 97.7 F | BODY MASS INDEX: 31.41 KG/M2 | SYSTOLIC BLOOD PRESSURE: 162 MMHG | WEIGHT: 160 LBS | DIASTOLIC BLOOD PRESSURE: 73 MMHG

## 2021-12-09 PROCEDURE — 99203 OFFICE O/P NEW LOW 30 MIN: CPT

## 2021-12-09 NOTE — PHYSICAL EXAM
[General Appearance - Well Developed] : well developed [General Appearance - In No Acute Distress] : no acute distress [Abdomen Soft] : soft [Abdomen Tenderness] : non-tender [FreeTextEntry1] : + 14Fr mcgrath catheter in place draining clear yellow urine

## 2021-12-09 NOTE — HISTORY OF PRESENT ILLNESS
[FreeTextEntry1] : Patient is a 57 yo female with pmh of  PVD, Former smoker, HTN, HLD, right pontine CVA (February 7, 2021), Intellectual disability, hearing/speech impairment, CKD stage IV, mild aortic stenosis, s/p left leg femoral to posterior tibial artery bypass with saphenous vein graft by my partner and left heel ulcer debridement. The patient presents to urology clinic for mcgrath catheter evaluation. Mcgrath catheter placed for post op urinary retention in November. PT never had any issues with voiding before. Pt denies dysuria, hematuria, frequency, urgency, or urinary incontinence. \par \par Patient is a to follow-up with vascular team in two weeks\par \par \par \par

## 2021-12-09 NOTE — ASSESSMENT
[FreeTextEntry1] : Patient is a 57 yo female with pmh of  PVD, Former smoker, HTN, HLD, right pontine CVA (February 7, 2021), Intellectual disability, hearing/speech impairment, CKD stage IV, mild aortic stenosis, s/p left leg femoral to posterior tibial artery bypass with saphenous vein graft by my partner and left heel ulcer debridement. The patient presents to urology clinic for mcgrath catheter evaluation. Mcgrath catheter placed for post op urinary retention in November. PT never had any issues with voiding before. Pt denies dysuria, hematuria, frequency, urgency, or urinary incontinence. \par \par Plan:\par - deflated 10cc from mcgrath balloon and removed catheter easily\par - trial of void\par - advised patient and family if she is unable to void to return to the ED for mcgrath placement\par - obtain bladder US w/ PVRs\par - f/u in 2 weeks \par - pt also has a f/u with vascular team in two weeks

## 2021-12-10 ENCOUNTER — OUTPATIENT (OUTPATIENT)
Dept: OUTPATIENT SERVICES | Facility: HOSPITAL | Age: 56
LOS: 1 days | Discharge: HOME | End: 2021-12-10

## 2021-12-10 ENCOUNTER — EMERGENCY (EMERGENCY)
Facility: HOSPITAL | Age: 56
LOS: 0 days | Discharge: HOME | End: 2021-12-10
Attending: EMERGENCY MEDICINE | Admitting: EMERGENCY MEDICINE
Payer: MEDICAID

## 2021-12-10 ENCOUNTER — APPOINTMENT (OUTPATIENT)
Dept: PODIATRY | Facility: CLINIC | Age: 56
End: 2021-12-10
Payer: MEDICAID

## 2021-12-10 VITALS
TEMPERATURE: 97 F | HEIGHT: 62 IN | DIASTOLIC BLOOD PRESSURE: 63 MMHG | RESPIRATION RATE: 17 BRPM | OXYGEN SATURATION: 98 % | SYSTOLIC BLOOD PRESSURE: 137 MMHG | HEART RATE: 70 BPM

## 2021-12-10 DIAGNOSIS — Z88.0 ALLERGY STATUS TO PENICILLIN: ICD-10-CM

## 2021-12-10 DIAGNOSIS — Z79.02 LONG TERM (CURRENT) USE OF ANTITHROMBOTICS/ANTIPLATELETS: ICD-10-CM

## 2021-12-10 DIAGNOSIS — Z79.82 LONG TERM (CURRENT) USE OF ASPIRIN: ICD-10-CM

## 2021-12-10 DIAGNOSIS — N39.0 URINARY TRACT INFECTION, SITE NOT SPECIFIED: ICD-10-CM

## 2021-12-10 DIAGNOSIS — R30.0 DYSURIA: ICD-10-CM

## 2021-12-10 LAB
APPEARANCE UR: ABNORMAL
BACTERIA # UR AUTO: ABNORMAL
BILIRUB UR-MCNC: NEGATIVE — SIGNIFICANT CHANGE UP
COLOR SPEC: SIGNIFICANT CHANGE UP
DIFF PNL FLD: SIGNIFICANT CHANGE UP
EPI CELLS # UR: 1 /HPF — SIGNIFICANT CHANGE UP (ref 0–5)
GLUCOSE UR QL: NEGATIVE — SIGNIFICANT CHANGE UP
HYALINE CASTS # UR AUTO: 2 /LPF — SIGNIFICANT CHANGE UP (ref 0–7)
KETONES UR-MCNC: NEGATIVE — SIGNIFICANT CHANGE UP
LEUKOCYTE ESTERASE UR-ACNC: ABNORMAL
NITRITE UR-MCNC: NEGATIVE — SIGNIFICANT CHANGE UP
PH UR: 6.5 — SIGNIFICANT CHANGE UP (ref 5–8)
PROT UR-MCNC: ABNORMAL
RBC CASTS # UR COMP ASSIST: 1 /HPF — SIGNIFICANT CHANGE UP (ref 0–4)
SP GR SPEC: 1.01 — SIGNIFICANT CHANGE UP (ref 1.01–1.03)
UROBILINOGEN FLD QL: SIGNIFICANT CHANGE UP
WBC UR QL: 161 /HPF — HIGH (ref 0–5)

## 2021-12-10 PROCEDURE — 11042 DBRDMT SUBQ TIS 1ST 20SQCM/<: CPT | Mod: NC,58

## 2021-12-10 PROCEDURE — 99283 EMERGENCY DEPT VISIT LOW MDM: CPT

## 2021-12-10 RX ORDER — DOCUSATE SODIUM 100 MG
1 CAPSULE ORAL
Qty: 20 | Refills: 0
Start: 2021-12-10 | End: 2021-12-19

## 2021-12-10 RX ORDER — NITROFURANTOIN MACROCRYSTAL 50 MG
1 CAPSULE ORAL
Qty: 14 | Refills: 0
Start: 2021-12-10 | End: 2021-12-16

## 2021-12-10 RX ORDER — PHENAZOPYRIDINE HCL 100 MG
1 TABLET ORAL
Qty: 6 | Refills: 0
Start: 2021-12-10 | End: 2021-12-11

## 2021-12-10 NOTE — ED ADULT NURSE NOTE - NSIMPLEMENTINTERV_GEN_ALL_ED
Implemented All Fall with Harm Risk Interventions:  [place] to call system. Call bell, personal items and telephone within reach. Instruct patient to call for assistance. Room bathroom lighting operational. Non-slip footwear when patient is off stretcher. Physically safe environment: no spills, clutter or unnecessary equipment. Stretcher in lowest position, wheels locked, appropriate side rails in place. Provide visual cue, wrist band, yellow gown, etc. Monitor gait and stability. Monitor for mental status changes and reorient to person, place, and time. Review medications for side effects contributing to fall risk. Reinforce activity limits and safety measures with patient and family. Provide visual clues: red socks.

## 2021-12-10 NOTE — ED PROVIDER NOTE - PATIENT PORTAL LINK FT
You can access the FollowMyHealth Patient Portal offered by [facility] by registering at the following website: [email or web address]. By joining [employer]’s FollowMyHealth portal, you will also be able to view your health information using other applications (apps) compatible with our system.

## 2021-12-10 NOTE — ED PROVIDER NOTE - NS ED ROS FT
Review of Systems:  	•	CONSTITUTIONAL - no fever, no diaphoresis, no chills  	•	SKIN - no rash  	•	HEMATOLOGIC - no bleeding, no bruising  	•	EYES - no eye pain, no blurry vision  	•	ENT - no change in hearing, no sore throat, no ear pain or tinnitus  	•	RESPIRATORY - no shortness of breath, no cough  	•	CARDIAC - no chest pain, no palpitations  	•	GI - no abd pain, no nausea, no vomiting, no diarrhea, no constipation  	•	GENITO-URINARY - no discharge, + dysuria; no hematuria, no increased urinary frequency  	•	MUSCULOSKELETAL - no joint paint, no swelling, no redness  	•	NEUROLOGIC - no weakness, no headache, no paresthesias, no LOC  	•	PSYCH - no anxiety, non suicidal, non homicidal, no hallucination, no depression

## 2021-12-10 NOTE — ED PROVIDER NOTE - ATTENDING CONTRIBUTION TO CARE
I personally evaluated the patient. I reviewed the Resident’s or Physician Assistant’s note (as assigned above), and agree with the findings and plan except as documented in my note.    56 female here for odorous urine with dysuria for 2 days. No constitutional symptoms.     ROS otherwise unremarkable    PE: female in no distress. CV: pulses intact. CHEST: normal work of breathing. ABD: soft, non rigid, no guarding, nondistended. SKIN: normal. EXT: FROM. NEURO: AAO 3 no focal deficits.     Impression: UTI    Plan: IV labs imaging supportive care and reevaluation

## 2021-12-10 NOTE — ED PROVIDER NOTE - OBJECTIVE STATEMENT
56 year old female with pmhx noted presents with dysuria and foul smelling urine x 2 days. Pt denies abd pain, flank pain, fever, chills, or nausea, vomiting, diarrhea.

## 2021-12-10 NOTE — ED PROVIDER NOTE - CLINICAL SUMMARY MEDICAL DECISION MAKING FREE TEXT BOX
56 female here for urinary issues. Had screening labs supportive care medications and reevaluation, found to have UTI, plan discussed with patient with ASL translation, will discharge with outpatient management and return and follow up instructions.

## 2021-12-13 LAB
-  AMIKACIN: SIGNIFICANT CHANGE UP
-  AMIKACIN: SIGNIFICANT CHANGE UP
-  AMOXICILLIN/CLAVULANIC ACID: SIGNIFICANT CHANGE UP
-  AMOXICILLIN/CLAVULANIC ACID: SIGNIFICANT CHANGE UP
-  AMPICILLIN/SULBACTAM: SIGNIFICANT CHANGE UP
-  AMPICILLIN/SULBACTAM: SIGNIFICANT CHANGE UP
-  AMPICILLIN: SIGNIFICANT CHANGE UP
-  AMPICILLIN: SIGNIFICANT CHANGE UP
-  AZTREONAM: SIGNIFICANT CHANGE UP
-  AZTREONAM: SIGNIFICANT CHANGE UP
-  CEFAZOLIN: SIGNIFICANT CHANGE UP
-  CEFAZOLIN: SIGNIFICANT CHANGE UP
-  CEFEPIME: SIGNIFICANT CHANGE UP
-  CEFEPIME: SIGNIFICANT CHANGE UP
-  CEFOXITIN: SIGNIFICANT CHANGE UP
-  CEFOXITIN: SIGNIFICANT CHANGE UP
-  CEFTRIAXONE: SIGNIFICANT CHANGE UP
-  CEFTRIAXONE: SIGNIFICANT CHANGE UP
-  CIPROFLOXACIN: SIGNIFICANT CHANGE UP
-  CIPROFLOXACIN: SIGNIFICANT CHANGE UP
-  ERTAPENEM: SIGNIFICANT CHANGE UP
-  ERTAPENEM: SIGNIFICANT CHANGE UP
-  GENTAMICIN: SIGNIFICANT CHANGE UP
-  GENTAMICIN: SIGNIFICANT CHANGE UP
-  IMIPENEM: SIGNIFICANT CHANGE UP
-  IMIPENEM: SIGNIFICANT CHANGE UP
-  LEVOFLOXACIN: SIGNIFICANT CHANGE UP
-  LEVOFLOXACIN: SIGNIFICANT CHANGE UP
-  MEROPENEM: SIGNIFICANT CHANGE UP
-  MEROPENEM: SIGNIFICANT CHANGE UP
-  NITROFURANTOIN: SIGNIFICANT CHANGE UP
-  NITROFURANTOIN: SIGNIFICANT CHANGE UP
-  PIPERACILLIN/TAZOBACTAM: SIGNIFICANT CHANGE UP
-  PIPERACILLIN/TAZOBACTAM: SIGNIFICANT CHANGE UP
-  TIGECYCLINE: SIGNIFICANT CHANGE UP
-  TIGECYCLINE: SIGNIFICANT CHANGE UP
-  TOBRAMYCIN: SIGNIFICANT CHANGE UP
-  TOBRAMYCIN: SIGNIFICANT CHANGE UP
-  TRIMETHOPRIM/SULFAMETHOXAZOLE: SIGNIFICANT CHANGE UP
-  TRIMETHOPRIM/SULFAMETHOXAZOLE: SIGNIFICANT CHANGE UP
CULTURE RESULTS: SIGNIFICANT CHANGE UP
METHOD TYPE: SIGNIFICANT CHANGE UP
METHOD TYPE: SIGNIFICANT CHANGE UP
ORGANISM # SPEC MICROSCOPIC CNT: SIGNIFICANT CHANGE UP
SPECIMEN SOURCE: SIGNIFICANT CHANGE UP

## 2021-12-13 NOTE — PHYSICAL EXAM
[General Appearance - Alert] : alert [General Appearance - In No Acute Distress] : in no acute distress [General Appearance - Well Nourished] : well nourished [Ankle Swelling (On Exam)] : present [Ankle Swelling On The Left] : of the left ankle [Varicose Veins Of The Left Leg] : on the left foot/ankle [] : on the left lower extremity [1+] : left foot dorsalis pedis 1+ [de-identified] : No TTP to plantarmedial heel wound [Foot Ulcer] : foot ulcer [FreeTextEntry1] : Left plantarmedial heel ulcer; fibrogranular wound base; 70% fibrous; 30% granular. \par Minimal drainage. No malodor [Diminished Throughout Left Foot] : diminished sensation with monofilament testing throughout left foot [Oriented To Time, Place, And Person] : oriented to person, place, and time [Impaired Insight] : insight and judgment were intact [Affect] : the affect was normal

## 2021-12-21 ENCOUNTER — APPOINTMENT (OUTPATIENT)
Dept: VASCULAR SURGERY | Facility: CLINIC | Age: 56
End: 2021-12-21
Payer: SUBSIDIZED

## 2021-12-21 ENCOUNTER — OUTPATIENT (OUTPATIENT)
Dept: OUTPATIENT SERVICES | Facility: HOSPITAL | Age: 56
LOS: 1 days | Discharge: HOME | End: 2021-12-21

## 2021-12-21 ENCOUNTER — APPOINTMENT (OUTPATIENT)
Dept: PODIATRY | Facility: CLINIC | Age: 56
End: 2021-12-21
Payer: MEDICAID

## 2021-12-21 VITALS
DIASTOLIC BLOOD PRESSURE: 75 MMHG | BODY MASS INDEX: 31.8 KG/M2 | OXYGEN SATURATION: 98 % | SYSTOLIC BLOOD PRESSURE: 160 MMHG | WEIGHT: 162 LBS | HEIGHT: 60 IN | TEMPERATURE: 95.6 F | HEART RATE: 73 BPM

## 2021-12-21 DIAGNOSIS — R26.2 DIFFICULTY IN WALKING, NOT ELSEWHERE CLASSIFIED: ICD-10-CM

## 2021-12-21 DIAGNOSIS — I73.9 PERIPHERAL VASCULAR DISEASE, UNSPECIFIED: ICD-10-CM

## 2021-12-21 DIAGNOSIS — M79.672 PAIN IN LEFT FOOT: ICD-10-CM

## 2021-12-21 DIAGNOSIS — I96 GANGRENE, NOT ELSEWHERE CLASSIFIED: ICD-10-CM

## 2021-12-21 PROCEDURE — 11042 DBRDMT SUBQ TIS 1ST 20SQCM/<: CPT | Mod: NC,58

## 2021-12-21 PROCEDURE — 99024 POSTOP FOLLOW-UP VISIT: CPT

## 2021-12-21 PROCEDURE — 93926 LOWER EXTREMITY STUDY: CPT

## 2021-12-21 NOTE — REVIEW OF SYSTEMS
[Skin Wound] : skin wound [Muscle Weakness] : muscle weakness [Negative] : Constitutional 19-Feb-2019

## 2021-12-21 NOTE — HISTORY OF PRESENT ILLNESS
[FreeTextEntry1] : 55 y/o female with h/o stroke, with left heel gangrene and left SFA occlusion, underwent left femoral to AT reverse saphenous vein bypass on 11/8/21.

## 2021-12-21 NOTE — ASSESSMENT
[FreeTextEntry1] : 57 y/o female with h/o stroke, hearing and speech impaired, recently with left heel gangrene and left SFA occlusion, underwent left femoral to AT reverse saphenous vein bypass on 11/8/21. \par \par The left lower extremity bypass is patent on duplex today and the wounds are healing well. She was advised on Silvadene and dry dressing to left groin. I will see her back in 4 weeks for wound check.\par \par \par : Patricia Monahan

## 2021-12-21 NOTE — DATA REVIEWED
[FreeTextEntry1] : I performed an arterial duplex which was medically necessary to evaluate for patency of the bypass. it showed patent left CFA to AT RSV bypass, patent inflow and outflow.\par

## 2021-12-22 DIAGNOSIS — R26.2 DIFFICULTY IN WALKING, NOT ELSEWHERE CLASSIFIED: ICD-10-CM

## 2021-12-22 DIAGNOSIS — E11.621 TYPE 2 DIABETES MELLITUS WITH FOOT ULCER: ICD-10-CM

## 2021-12-22 DIAGNOSIS — E11.42 TYPE 2 DIABETES MELLITUS WITH DIABETIC POLYNEUROPATHY: ICD-10-CM

## 2021-12-23 ENCOUNTER — APPOINTMENT (OUTPATIENT)
Dept: UROLOGY | Facility: CLINIC | Age: 56
End: 2021-12-23

## 2021-12-28 ENCOUNTER — APPOINTMENT (OUTPATIENT)
Dept: PODIATRY | Facility: CLINIC | Age: 56
End: 2021-12-28
Payer: MEDICAID

## 2021-12-28 ENCOUNTER — OUTPATIENT (OUTPATIENT)
Dept: OUTPATIENT SERVICES | Facility: HOSPITAL | Age: 56
LOS: 1 days | Discharge: HOME | End: 2021-12-28

## 2021-12-28 PROCEDURE — 11043 DBRDMT MUSC&/FSCA 1ST 20/<: CPT | Mod: 58

## 2021-12-28 NOTE — REASON FOR VISIT
[Follow-Up Visit] : a follow-up visit for [Family Member] : family member [FreeTextEntry2] : R heel wound [Interpreters_FullName] : Jasmina Fermin [TWNoteComboBox1] : American Sign Language

## 2021-12-28 NOTE — HISTORY OF PRESENT ILLNESS
[Wheelchair] : a wheelchair [FreeTextEntry1] : CC: "Check-up after hospitalization"\par HPI:\par -56F who presents w/roomate for f/u after recent hospitalization (DSC 11/24/21)\par -S/p dbx of heel wound\par -dressing changes with cream\par - Roommate  (sign language)\par -No other pedal complaints

## 2021-12-28 NOTE — ASSESSMENT
[FreeTextEntry1] : Assessment:\par -Diabetes mellitus\par -Diabetic foot ulcer L heel\par \par Plan:\par -Pt seen & evaluated. Pt chart reviewed\par - Wound debrided to level of deep  tissue and fascia with # 15 blade and dermal curette \par Wound cleansed and Dressed with Sterile dressing \par Cont LWC\par Follow up in 1 week\par

## 2021-12-28 NOTE — PHYSICAL EXAM
[General Appearance - Alert] : alert [General Appearance - In No Acute Distress] : in no acute distress [General Appearance - Well Nourished] : well nourished [Ankle Swelling (On Exam)] : present [Ankle Swelling On The Left] : of the left ankle [Varicose Veins Of The Left Leg] : on the left foot/ankle [] : on the left lower extremity [Foot Ulcer] : foot ulcer [Diminished Throughout Left Foot] : diminished sensation with monofilament testing throughout left foot [Oriented To Time, Place, And Person] : oriented to person, place, and time [Impaired Insight] : insight and judgment were intact [Affect] : the affect was normal [FreeTextEntry3] : non palpable pulses left foot [FreeTextEntry1] : Left plantarmedial heel ulcer; fibrogranularnecrotic wound base down to heel tissue and fascia; 70% fibrous/necrotic; 30% granular. \par No drainage. No malodor\par no redness, no warmth

## 2021-12-28 NOTE — END OF VISIT
[] : A student assisted with documenting this visit. I have reviewed and verified all information documented by the student, and made modifications to such information, when appropriate. [Resident] : Resident

## 2021-12-29 NOTE — PHYSICAL EXAM
[General Appearance - Alert] : alert [General Appearance - In No Acute Distress] : in no acute distress [General Appearance - Well Nourished] : well nourished [Ankle Swelling (On Exam)] : present [Ankle Swelling On The Left] : of the left ankle [Varicose Veins Of The Left Leg] : on the left foot/ankle [] : on the left lower extremity [1+] : left foot dorsalis pedis 1+ [Foot Ulcer] : foot ulcer [Diminished Throughout Left Foot] : diminished sensation with monofilament testing throughout left foot [Oriented To Time, Place, And Person] : oriented to person, place, and time [Impaired Insight] : insight and judgment were intact [Affect] : the affect was normal [de-identified] : No TTP to plantarmedial heel wound [FreeTextEntry1] : Left plantarmedial heel ulcer; fibrogranular wound base; 70% fibrous; 30% granular. \par Minimal drainage. No malodor

## 2021-12-29 NOTE — REASON FOR VISIT
[Initial Visit] : an initial visit for [Other:___] : [unfilled] [Family Member] : family member [Time Spent: ____ minutes] : Total time spent using  services: [unfilled] minutes. The patient's primary language is not English thus required  services. [Interpreters_FullName] : Jasmina Fermin [TWNoteComboBox1] : American Sign Language

## 2021-12-29 NOTE — ASSESSMENT
[FreeTextEntry1] : Assessment:\par -Diabetes mellitus\par -Diabetic foot ulcer\par \par Plan:\par -Pt seen & evaluated. Pt chart reviewed\par - Wound debrided to level of subcutaneous tissue with # 15 blade and dermal curette \par Wound cleansed and Dressed with Sterile dressing \par interpreting services used. \par Follow up in 1 week\par

## 2022-01-04 ENCOUNTER — OUTPATIENT (OUTPATIENT)
Dept: OUTPATIENT SERVICES | Facility: HOSPITAL | Age: 57
LOS: 1 days | Discharge: HOME | End: 2022-01-04

## 2022-01-04 ENCOUNTER — APPOINTMENT (OUTPATIENT)
Dept: PODIATRY | Facility: CLINIC | Age: 57
End: 2022-01-04
Payer: SUBSIDIZED

## 2022-01-04 DIAGNOSIS — I70.492 OTHER ATHEROSCLEROSIS OF AUTOLOGOUS VEIN BYPASS GRAFT(S) OF THE EXTREMITIES, LEFT LEG: ICD-10-CM

## 2022-01-04 DIAGNOSIS — L97.523 NON-PRESSURE CHRONIC ULCER OF OTHER PART OF LEFT FOOT WITH NECROSIS OF MUSCLE: ICD-10-CM

## 2022-01-04 DIAGNOSIS — E11.621 TYPE 2 DIABETES MELLITUS WITH FOOT ULCER: ICD-10-CM

## 2022-01-04 DIAGNOSIS — E11.9 TYPE 2 DIABETES MELLITUS WITHOUT COMPLICATIONS: ICD-10-CM

## 2022-01-04 DIAGNOSIS — E11.628 TYPE 2 DIABETES MELLITUS WITH OTHER SKIN COMPLICATIONS: ICD-10-CM

## 2022-01-04 DIAGNOSIS — E11.42 TYPE 2 DIABETES MELLITUS WITH DIABETIC POLYNEUROPATHY: ICD-10-CM

## 2022-01-04 PROCEDURE — 11042 DBRDMT SUBQ TIS 1ST 20SQCM/<: CPT

## 2022-01-04 NOTE — REVIEW OF SYSTEMS
[Skin Wound] : skin wound [Muscle Weakness] : muscle weakness [Easy Bleeding] : a tendency for easy bleeding [Easy Bruising] : a tendency for easy bruising [Negative] : Constitutional

## 2022-01-05 DIAGNOSIS — I70.492 OTHER ATHEROSCLEROSIS OF AUTOLOGOUS VEIN BYPASS GRAFT(S) OF THE EXTREMITIES, LEFT LEG: ICD-10-CM

## 2022-01-05 DIAGNOSIS — L97.523 NON-PRESSURE CHRONIC ULCER OF OTHER PART OF LEFT FOOT WITH NECROSIS OF MUSCLE: ICD-10-CM

## 2022-01-05 DIAGNOSIS — E11.42 TYPE 2 DIABETES MELLITUS WITH DIABETIC POLYNEUROPATHY: ICD-10-CM

## 2022-01-05 DIAGNOSIS — E11.621 TYPE 2 DIABETES MELLITUS WITH FOOT ULCER: ICD-10-CM

## 2022-01-05 DIAGNOSIS — E11.9 TYPE 2 DIABETES MELLITUS WITHOUT COMPLICATIONS: ICD-10-CM

## 2022-01-11 ENCOUNTER — APPOINTMENT (OUTPATIENT)
Dept: VASCULAR SURGERY | Facility: CLINIC | Age: 57
End: 2022-01-11
Payer: SUBSIDIZED

## 2022-01-11 ENCOUNTER — OUTPATIENT (OUTPATIENT)
Dept: OUTPATIENT SERVICES | Facility: HOSPITAL | Age: 57
LOS: 1 days | Discharge: HOME | End: 2022-01-11

## 2022-01-11 ENCOUNTER — APPOINTMENT (OUTPATIENT)
Dept: PODIATRY | Facility: CLINIC | Age: 57
End: 2022-01-11
Payer: SUBSIDIZED

## 2022-01-11 DIAGNOSIS — E11.621 TYPE 2 DIABETES MELLITUS WITH FOOT ULCER: ICD-10-CM

## 2022-01-11 DIAGNOSIS — I70.262 ATHEROSCLEROSIS OF NATIVE ARTERIES OF EXTREMITIES WITH GANGRENE, LEFT LEG: ICD-10-CM

## 2022-01-11 DIAGNOSIS — E11.42 TYPE 2 DIABETES MELLITUS WITH DIABETIC POLYNEUROPATHY: ICD-10-CM

## 2022-01-11 DIAGNOSIS — L97.523 NON-PRESSURE CHRONIC ULCER OF OTHER PART OF LEFT FOOT WITH NECROSIS OF MUSCLE: ICD-10-CM

## 2022-01-11 PROCEDURE — 99024 POSTOP FOLLOW-UP VISIT: CPT

## 2022-01-11 PROCEDURE — 11042 DBRDMT SUBQ TIS 1ST 20SQCM/<: CPT | Mod: 58

## 2022-01-11 NOTE — REVIEW OF SYSTEMS
[Skin Wound] : skin wound [Muscle Weakness] : muscle weakness [Easy Bleeding] : a tendency for easy bleeding [Easy Bruising] : a tendency for easy bruising [Negative] : Neurological

## 2022-01-13 ENCOUNTER — APPOINTMENT (OUTPATIENT)
Dept: UROLOGY | Facility: CLINIC | Age: 57
End: 2022-01-13

## 2022-01-20 NOTE — HISTORY OF PRESENT ILLNESS
[Wheelchair] : a wheelchair [FreeTextEntry1] : CC: "Check-up after hospitalization"\par HPI:\par -56F who presents w/mother for f/u after recent hospitalization (DSC 11/24/21)\par -S/p dbx of heel wound\par -Pt changes dressing herself, daily, with Santyl\par - present (sign language)\par -Denies pain\par -Reports ankle pain\par -No other pedal complaints

## 2022-01-20 NOTE — ASSESSMENT
[Verbal] : verbal [Patient] : patient [Good - alert, interested, motivated] : Good - alert, interested, motivated [Demonstrates independently] : demonstrates independently [FreeTextEntry1] : Assessment:\par -Diabetes mellitus\par -Diabetic foot ulcer L heel\par \par Plan:\par -Pt seen & evaluated. Pt chart reviewed\par -Wound debrided to level of deep  tissue and fascia with # 15 blade and dermal curette \par -Wound cleansed and Dressed with Sterile dressing & ACE\par -Cont LWC\par -Possible STSG with Dr. Quigley; son unsure if insurance will cover cost\par -Follow up in 1 week\par

## 2022-01-20 NOTE — HISTORY OF PRESENT ILLNESS
[Wheelchair] : a wheelchair [FreeTextEntry1] : CC: "Wound check"\par HPI:\par -56F who presents w/son &  for f/u after recent hospitalization (DSC 11/24/21)\par -S/p dbx of heel wound\par -Pt changes dressing herself, daily, with Santyl\par - present (sign language)\par -Denies pain\par -No other pedal complaints

## 2022-01-20 NOTE — END OF VISIT
[Resident] : Resident [] : Resident [FreeTextEntry3] : agree with above note.  \par Was present and instructing resident during visit. \par All Procedure performed under direct supervision.  \par LUCILA Llamas DPM\par  [FreeTextEntry2] : agree with above note.  \par Was present and instructing resident during visit. \par All Procedure performed under direct supervision.  \par LUCILA Llamas DPM\par

## 2022-01-20 NOTE — PHYSICAL EXAM
[General Appearance - Alert] : alert [General Appearance - In No Acute Distress] : in no acute distress [General Appearance - Well Nourished] : well nourished [Ankle Swelling (On Exam)] : present [Ankle Swelling On The Left] : of the left ankle [Varicose Veins Of The Left Leg] : on the left foot/ankle [] : on the left lower extremity [Foot Ulcer] : foot ulcer [Diminished Throughout Left Foot] : diminished sensation with monofilament testing throughout left foot [Oriented To Time, Place, And Person] : oriented to person, place, and time [Impaired Insight] : insight and judgment were intact [Affect] : the affect was normal [FreeTextEntry3] : non palpable pulses left foot [de-identified] : TTP anterior L ankle\par No TTP to wound site [FreeTextEntry1] : Left plantarmedial heel ulcer; fibrogranularnecrotic wound base down to heel tissue and fascia; 40% fibrous/necrotic; 60% granular. \par Mild serous drainage. No malodor\par No erythema, no warmth

## 2022-01-20 NOTE — PHYSICAL EXAM
[General Appearance - Alert] : alert [General Appearance - In No Acute Distress] : in no acute distress [General Appearance - Well Nourished] : well nourished [Ankle Swelling (On Exam)] : present [Ankle Swelling On The Left] : of the left ankle [Varicose Veins Of The Left Leg] : on the left foot/ankle [] : on the left lower extremity [Foot Ulcer] : foot ulcer [Diminished Throughout Left Foot] : diminished sensation with monofilament testing throughout left foot [Oriented To Time, Place, And Person] : oriented to person, place, and time [Impaired Insight] : insight and judgment were intact [Affect] : the affect was normal [FreeTextEntry3] : non palpable pulses left foot [de-identified] : No gross skeletal deformities\par No TTP to wound site [FreeTextEntry1] : Left plantarmedial heel ulcer; fibrogranular wound base down to heel tissue and fascia; 40% fibrous/necrotic; 60% granular. \par Mild serous drainage. No malodor\par No erythema, no warmth

## 2022-01-20 NOTE — ASSESSMENT
[Verbal] : verbal [Patient] : patient [Good - alert, interested, motivated] : Good - alert, interested, motivated [Demonstrates independently] : demonstrates independently [FreeTextEntry1] : Assessment:\par -Diabetes mellitus\par -Diabetic foot ulcer L heel\par \par Plan:\par -Pt seen & evaluated. Pt chart reviewed\par -Wound debrided to level of deep tissue and fascia with # 15 blade and dermal curette; healthy bleeding noted\par -Wound cleansed and Dressed with MediHoney, DSD, & Kerlix\par -Cont LWC\par -STSG not indicated at this time; continue LWC\par -RTC 2 weeks\par

## 2022-02-01 ENCOUNTER — LABORATORY RESULT (OUTPATIENT)
Age: 57
End: 2022-02-01

## 2022-02-01 ENCOUNTER — APPOINTMENT (OUTPATIENT)
Age: 57
End: 2022-02-01
Payer: MEDICAID

## 2022-02-01 ENCOUNTER — OUTPATIENT (OUTPATIENT)
Dept: OUTPATIENT SERVICES | Facility: HOSPITAL | Age: 57
LOS: 1 days | Discharge: HOME | End: 2022-02-01

## 2022-02-01 VITALS
HEIGHT: 60 IN | TEMPERATURE: 98.8 F | DIASTOLIC BLOOD PRESSURE: 81 MMHG | HEART RATE: 85 BPM | OXYGEN SATURATION: 99 % | SYSTOLIC BLOOD PRESSURE: 183 MMHG

## 2022-02-01 PROCEDURE — 99204 OFFICE O/P NEW MOD 45 MIN: CPT | Mod: GC

## 2022-02-01 NOTE — PHYSICAL EXAM
[No Acute Distress] : no acute distress [Well Developed] : well developed [Well-Appearing] : well-appearing [Normal Sclera/Conjunctiva] : normal sclera/conjunctiva [Normal Outer Ear/Nose] : the outer ears and nose were normal in appearance [No JVD] : no jugular venous distention [No Lymphadenopathy] : no lymphadenopathy [No Respiratory Distress] : no respiratory distress  [No Accessory Muscle Use] : no accessory muscle use [Clear to Auscultation] : lungs were clear to auscultation bilaterally [Normal Rate] : normal rate  [Regular Rhythm] : with a regular rhythm [Normal S1, S2] : normal S1 and S2 [No Carotid Bruits] : no carotid bruits [No Abdominal Bruit] : a ~M bruit was not heard ~T in the abdomen [Pedal Pulses Present] : the pedal pulses are present [Normal Supraclavicular Nodes] : no supraclavicular lymphadenopathy [Normal Posterior Cervical Nodes] : no posterior cervical lymphadenopathy [Normal Anterior Cervical Nodes] : no anterior cervical lymphadenopathy [No CVA Tenderness] : no CVA  tenderness [No Spinal Tenderness] : no spinal tenderness [No Joint Swelling] : no joint swelling [Grossly Normal Strength/Tone] : grossly normal strength/tone [Coordination Grossly Intact] : coordination grossly intact [No Focal Deficits] : no focal deficits [de-identified] : +1 edema BLLE 1/2 way up lower leg [de-identified] : wears a diaper "for accidents" of urine, changes 1x per day rarely soiled. Urinates 4-5x/day [de-identified] : Nail changes: distal darkening/dusky appearance w ridges, good cap refill  [de-identified] : wheelchair bound

## 2022-02-01 NOTE — END OF VISIT
[] : Resident [FreeTextEntry3] : # CKD 4 with GFR around 20/ multiple vascular procedures\par will check CMP Hb and Fe studies \par will check UA and CS \par rtc in 2 months

## 2022-02-01 NOTE — HISTORY OF PRESENT ILLNESS
[FreeTextEntry1] : here to establish care, CKD IV [de-identified] : Ms Quinteros is a 57 YO Hearing Impaired woman w a PMH of CVA (2/2021 on ASA+ Plavix)), DM w peripheral neuropathy(on gabapentin) , peripheral artery disease (s/p vascular repair), dyslipidemia (on lipitor and losartan)  here to establish care and to treat a UTI that patient was originally on Levofloxacin for however patient was only able to complete 7/10 days due to a diffuse pruritic rash w associated palpitations. Patient was COVID + Nov 2021. Patient endorses dysuria w/o increased frequency. Denies flank pain, hematuria, fevers, chills, increased frequency or turbid urine \par \par Please note:  Jasmina was used, Son in law (care giver) is primarily Polish speaking but understands a fair amount of English

## 2022-02-01 NOTE — REVIEW OF SYSTEMS
[Palpitations] : palpitations [Lower Ext Edema] : lower extremity edema [Dysuria] : dysuria [Itching] : Itching [Nail Changes] : nail changes [Insomnia] : insomnia [Fever] : no fever [Fatigue] : no fatigue [Night Sweats] : no night sweats [Recent Change In Weight] : ~T no recent weight change [Vision Problems] : no vision problems [Nosebleed] : no nosebleeds [Nasal Discharge] : no nasal discharge [Sore Throat] : no sore throat [Chest Pain] : no chest pain [Orthopnea] : no orthopnea [Paroxysmal Nocturnal Dyspnea] : no paroxysmal nocturnal dyspnea [Shortness Of Breath] : no shortness of breath [Wheezing] : no wheezing [Cough] : no cough [Abdominal Pain] : no abdominal pain [Nausea] : no nausea [Constipation] : no constipation [Vomiting] : no vomiting [Heartburn] : no heartburn [Incontinence] : no incontinence [Nocturia] : no nocturia [Hematuria] : no hematuria [Frequency] : no frequency [Vaginal Discharge] : no vaginal discharge [Joint Pain] : no joint pain [Joint Stiffness] : no joint stiffness [Joint Swelling] : no joint swelling [Muscle Pain] : no muscle pain [Back Pain] : no back pain [Headache] : no headache [Fainting] : no fainting [Memory Loss] : no memory loss [Unsteady Walking] : no ataxia [Suicidal] : not suicidal [Anxiety] : no anxiety [Easy Bleeding] : no easy bleeding [Easy Bruising] : no easy bruising [FreeTextEntry5] : when on Levofloxacin has since resolved  [FreeTextEntry8] : as per HPI  [de-identified] : resolved since stopping levofloxacin  [de-identified] : when on levofloxacin has since resolved

## 2022-02-01 NOTE — ASSESSMENT
[FreeTextEntry1] : Ms Quinteros is a 55 YO Hearing Impaired woman w a PMH of CVA (2/2021 on ASA+ Plavix)), DM w peripheral neuropathy(on gabapentin) , peripheral artery disease (s/p vascular repair), dyslipidemia (on lipitor and losartan)  here to establish care and to treat a UTI. November labs notable for a normocytic anemia (Hgb 8.5) w a TIBC of 197, Ferritin 106, Iron saturation 28.  Of note baseline Cr = 3.0; eGFR =17. Urine protein in November: alpha 1 = 14.1, alpha 2 = 6.8, betta = 9.6 and gamma = 8.6/ Free kappa/lambda = 1.24. Patient currently on Sevelamer which she finds helpful

## 2022-02-08 ENCOUNTER — APPOINTMENT (OUTPATIENT)
Dept: VASCULAR SURGERY | Facility: CLINIC | Age: 57
End: 2022-02-08
Payer: SUBSIDIZED

## 2022-02-08 VITALS
WEIGHT: 163 LBS | DIASTOLIC BLOOD PRESSURE: 77 MMHG | SYSTOLIC BLOOD PRESSURE: 175 MMHG | HEIGHT: 60 IN | BODY MASS INDEX: 32 KG/M2

## 2022-02-08 LAB
ALBUMIN SERPL ELPH-MCNC: 3.7 G/DL
ALP BLD-CCNC: 143 U/L
ALT SERPL-CCNC: 12 U/L
ANION GAP SERPL CALC-SCNC: 20 MMOL/L
AST SERPL-CCNC: 16 U/L
BACTERIA UR CULT: NORMAL
BASOPHILS # BLD AUTO: 0.04 K/UL
BASOPHILS NFR BLD AUTO: 0.5 %
BILIRUB SERPL-MCNC: <0.2 MG/DL
BUN SERPL-MCNC: 33 MG/DL
CALCIUM SERPL-MCNC: 8.9 MG/DL
CALCIUM SERPL-MCNC: 8.9 MG/DL
CHLORIDE SERPL-SCNC: 105 MMOL/L
CO2 SERPL-SCNC: 15 MMOL/L
CREAT SERPL-MCNC: 3 MG/DL
CREAT SPEC-SCNC: 38 MG/DL
EOSINOPHIL # BLD AUTO: 0.71 K/UL
EOSINOPHIL NFR BLD AUTO: 9.4 %
GLUCOSE SERPL-MCNC: 106 MG/DL
HCT VFR BLD CALC: 32.7 %
HGB BLD-MCNC: 10.4 G/DL
IMM GRANULOCYTES NFR BLD AUTO: 0.3 %
LYMPHOCYTES # BLD AUTO: 2.22 K/UL
LYMPHOCYTES NFR BLD AUTO: 29.3 %
MAN DIFF?: NORMAL
MCHC RBC-ENTMCNC: 29.3 PG
MCHC RBC-ENTMCNC: 31.8 G/DL
MCV RBC AUTO: 92.1 FL
MICROALBUMIN 24H UR DL<=1MG/L-MCNC: 219.1 MG/DL
MICROALBUMIN/CREAT 24H UR-RTO: 5746 MG/G
MONOCYTES # BLD AUTO: 0.41 K/UL
MONOCYTES NFR BLD AUTO: 5.4 %
NEUTROPHILS # BLD AUTO: 4.17 K/UL
NEUTROPHILS NFR BLD AUTO: 55.1 %
PARATHYROID HORMONE INTACT: 119 PG/ML
PHOSPHATE SERPL-MCNC: 5.1 MG/DL
PLATELET # BLD AUTO: 261 K/UL
POTASSIUM SERPL-SCNC: 3.8 MMOL/L
PROT SERPL-MCNC: 6.4 G/DL
RBC # BLD: 3.55 M/UL
RBC # FLD: 13.7 %
SODIUM SERPL-SCNC: 140 MMOL/L
WBC # FLD AUTO: 7.57 K/UL

## 2022-02-08 PROCEDURE — 99214 OFFICE O/P EST MOD 30 MIN: CPT

## 2022-02-08 PROCEDURE — 93926 LOWER EXTREMITY STUDY: CPT

## 2022-02-08 PROCEDURE — 99072 ADDL SUPL MATRL&STAF TM PHE: CPT

## 2022-02-08 NOTE — ASSESSMENT
[FreeTextEntry1] : 55 y/o female with h/o stroke, hearing and speech impaired, recently with left heel gangrene and left SFA occlusion, underwent left femoral to AT reverse saphenous vein bypass on 11/8/21. \par \par The left lower extremity bypass is patent on duplex today and the wounds are healing well. She was advised on Silvadene and dry dressing daily. I will see her back in 3 months time.\par \par \par

## 2022-02-08 NOTE — HISTORY OF PRESENT ILLNESS
[FreeTextEntry1] : 57 y/o female with h/o stroke, with left heel gangrene and left SFA occlusion, underwent left femoral to AT reverse saphenous vein bypass on 11/8/21.

## 2022-02-15 ENCOUNTER — APPOINTMENT (OUTPATIENT)
Dept: PODIATRY | Facility: CLINIC | Age: 57
End: 2022-02-15
Payer: MEDICAID

## 2022-02-15 ENCOUNTER — OUTPATIENT (OUTPATIENT)
Dept: OUTPATIENT SERVICES | Facility: HOSPITAL | Age: 57
LOS: 1 days | Discharge: HOME | End: 2022-02-15

## 2022-02-15 PROCEDURE — 11042 DBRDMT SUBQ TIS 1ST 20SQCM/<: CPT | Mod: NC,58

## 2022-02-15 NOTE — PHYSICAL EXAM
[General Appearance - Alert] : alert [General Appearance - In No Acute Distress] : in no acute distress [General Appearance - Well Nourished] : well nourished [Ankle Swelling (On Exam)] : present [Ankle Swelling On The Left] : of the left ankle [] : on the left lower extremity [Varicose Veins Of The Left Leg] : on the left foot/ankle [FreeTextEntry3] : non palpable pulses left foot [de-identified] : No gross skeletal deformities\par No TTP to wound site [Foot Ulcer] : foot ulcer [FreeTextEntry1] : Left plantarmedial heel ulcer; fibrogranular wound base down to heel tissue and fascia; 40% fibrous/necrotic; 60% granular. \par Mild serous drainage. No malodor\par No erythema, no warmth [Diminished Throughout Left Foot] : diminished sensation with monofilament testing throughout left foot [Oriented To Time, Place, And Person] : oriented to person, place, and time [Impaired Insight] : insight and judgment were intact [Affect] : the affect was normal

## 2022-02-15 NOTE — REASON FOR VISIT
[Follow-Up Visit] : a follow-up visit for [Family Member] : family member [FreeTextEntry2] : left heel wound [Interpreters_FullName] : Jasmina Fermin [TWNoteComboBox1] : American Sign Language

## 2022-02-15 NOTE — ASSESSMENT
[FreeTextEntry1] : Assessment:\par -Diabetes mellitus\par -Diabetic foot ulcer L heel\par \par Plan:\par -Pt seen & evaluated. Pt chart reviewed\par -Wound debrided to level of deep tissue and fascia with # 15 blade and dermal curette; healthy bleeding noted\par -Wound cleansed and Dressed with MediHoney, DSD, & Kerlix\par -Cont LWC\par -STSG not indicated at this time; continue LWC\par -RTC 2 weeks\par  [Verbal] : verbal [Patient] : patient [Good - alert, interested, motivated] : Good - alert, interested, motivated [Demonstrates independently] : demonstrates independently

## 2022-02-15 NOTE — END OF VISIT
[Resident] : Resident [] : A student assisted with documenting this visit. I have reviewed and verified all information documented by the student, and made modifications to such information, when appropriate. [FreeTextEntry3] : agree with above note.  \par Was present and instructing resident during visit. \par All Procedure performed under direct supervision.  \par LUCILA Llamas DPM\par  [FreeTextEntry2] : agree with above note.  \par Was present and instructing resident during visit. \par All Procedure performed under direct supervision.  \par LUCILA Llamas DPM\par

## 2022-02-15 NOTE — PHYSICAL EXAM
[General Appearance - Alert] : alert [General Appearance - In No Acute Distress] : in no acute distress [General Appearance - Well Nourished] : well nourished [Ankle Swelling (On Exam)] : present [Ankle Swelling On The Left] : of the left ankle [Varicose Veins Of The Left Leg] : on the left foot/ankle [] : on the left lower extremity [FreeTextEntry3] : non palpable pulses left foot [de-identified] : No gross skeletal deformities\par No TTP to wound site [Foot Ulcer] : foot ulcer [FreeTextEntry1] : Left plantarmedial heel ulcer; fibrogranular wound base down to heel tissue and fascia; 40% fibrous/necrotic; 60% granular. \par Mild serous drainage. No malodor\par No erythema, no warmth [Diminished Throughout Left Foot] : diminished sensation with monofilament testing throughout left foot [Oriented To Time, Place, And Person] : oriented to person, place, and time [Impaired Insight] : insight and judgment were intact [Affect] : the affect was normal

## 2022-02-25 DIAGNOSIS — L97.523 NON-PRESSURE CHRONIC ULCER OF OTHER PART OF LEFT FOOT WITH NECROSIS OF MUSCLE: ICD-10-CM

## 2022-02-25 DIAGNOSIS — E11.42 TYPE 2 DIABETES MELLITUS WITH DIABETIC POLYNEUROPATHY: ICD-10-CM

## 2022-02-25 DIAGNOSIS — E11.621 TYPE 2 DIABETES MELLITUS WITH FOOT ULCER: ICD-10-CM

## 2022-03-03 ENCOUNTER — APPOINTMENT (OUTPATIENT)
Dept: UROLOGY | Facility: CLINIC | Age: 57
End: 2022-03-03
Payer: MEDICAID

## 2022-03-03 ENCOUNTER — APPOINTMENT (OUTPATIENT)
Dept: PODIATRY | Facility: CLINIC | Age: 57
End: 2022-03-03
Payer: MEDICAID

## 2022-03-03 ENCOUNTER — OUTPATIENT (OUTPATIENT)
Dept: OUTPATIENT SERVICES | Facility: HOSPITAL | Age: 57
LOS: 1 days | Discharge: HOME | End: 2022-03-03

## 2022-03-03 VITALS
DIASTOLIC BLOOD PRESSURE: 74 MMHG | WEIGHT: 162 LBS | HEIGHT: 60 IN | OXYGEN SATURATION: 99 % | SYSTOLIC BLOOD PRESSURE: 164 MMHG | TEMPERATURE: 98.1 F | BODY MASS INDEX: 31.8 KG/M2

## 2022-03-03 DIAGNOSIS — E11.42 TYPE 2 DIABETES MELLITUS WITH DIABETIC POLYNEUROPATHY: ICD-10-CM

## 2022-03-03 DIAGNOSIS — E11.621 TYPE 2 DIABETES MELLITUS WITH FOOT ULCER: ICD-10-CM

## 2022-03-03 DIAGNOSIS — L97.523 NON-PRESSURE CHRONIC ULCER OF OTHER PART OF LEFT FOOT WITH NECROSIS OF MUSCLE: ICD-10-CM

## 2022-03-03 PROCEDURE — 99213 OFFICE O/P EST LOW 20 MIN: CPT

## 2022-03-03 PROCEDURE — 11042 DBRDMT SUBQ TIS 1ST 20SQCM/<: CPT | Mod: NC,58

## 2022-03-03 NOTE — HISTORY OF PRESENT ILLNESS
[FreeTextEntry1] : \par Patient is a 55 yo female with pmh of PVD, Former smoker, HTN, HLD, right pontine CVA (February 7, 2021), Intellectual disability, hearing/speech impairment, CKD stage IV, mild aortic stenosis, s/p left leg femoral to posterior tibial artery bypass with saphenous vein graft by my partner and left heel ulcer debridement. \par \par Henriquez catheter placed for post op urinary retention in November. PT never had any issues with voiding before. Pt denies dysuria, hematuria, frequency, urgency, or urinary incontinence. \par catheter removed last visit and has had no issues with urination\par she is satisfied and happy and back to her normal urination\par

## 2022-03-09 NOTE — END OF VISIT
[] : A student assisted with documenting this visit. I have reviewed and verified all information documented by the student, and made modifications to such information, when appropriate. [FreeTextEntry3] : agree with above note.  \par Was present and instructing resident during visit. \par All Procedure performed under direct supervision.  \par LUCILA Llamas DPM\par  [FreeTextEntry2] : agree with above note.  \par Was present and instructing resident during visit. \par All Procedure performed under direct supervision.  \par LUCILA Llamas DPM\par

## 2022-03-09 NOTE — HISTORY OF PRESENT ILLNESS
[Wheelchair] : a wheelchair [FreeTextEntry1] : CC: "Wound check"\par HPI:\par -56F who presents w/son &  for f/u after recent hospitalization (DSC 11/24/21)\par -S/p dbx of heel wound\par -Pt changes dressing herself, daily, with Santyl\par -Son present (sign language)\par -Denies pain\par -No other pedal complaints\par \par 3/3/22 - Returns for continued care

## 2022-03-09 NOTE — PHYSICAL EXAM
[General Appearance - Alert] : alert [General Appearance - In No Acute Distress] : in no acute distress [General Appearance - Well Nourished] : well nourished [Ankle Swelling (On Exam)] : present [Ankle Swelling On The Left] : of the left ankle [Varicose Veins Of The Left Leg] : on the left foot/ankle [] : on the left lower extremity [Foot Ulcer] : foot ulcer [Diminished Throughout Left Foot] : diminished sensation with monofilament testing throughout left foot [Oriented To Time, Place, And Person] : oriented to person, place, and time [Impaired Insight] : insight and judgment were intact [Affect] : the affect was normal [1+] : left foot dorsalis pedis 1+ [Vibration Dec.] : diminished vibratory sensation at the level of the toes [Delayed in the Left Toes] : capillary refills normal in the left toes [FreeTextEntry3] : non palpable pulses left foot [de-identified] : Left 3rd digit shortened, contracted. 4th digit amputation.\par No TTP to wound site [FreeTextEntry1] : Left plantarmedial heel ulcer; fibrogranular wound base down to heel tissue and fascia; 40% fibrous, 60% granular. \par Mild serous drainage. Skin thin, atrophic, tense. \par No malodor, no erythema, no warmth

## 2022-03-09 NOTE — REASON FOR VISIT
[Follow-Up Visit] : a follow-up visit for [Family Member] : family member [Interpreters_FullName] : Jasmina Fermin [FreeTextEntry2] : left heel wound [TWNoteComboBox1] : American Sign Language

## 2022-03-17 ENCOUNTER — APPOINTMENT (OUTPATIENT)
Dept: PODIATRY | Facility: CLINIC | Age: 57
End: 2022-03-17
Payer: MEDICAID

## 2022-03-17 ENCOUNTER — OUTPATIENT (OUTPATIENT)
Dept: OUTPATIENT SERVICES | Facility: HOSPITAL | Age: 57
LOS: 1 days | Discharge: HOME | End: 2022-03-17

## 2022-03-17 PROCEDURE — 11042 DBRDMT SUBQ TIS 1ST 20SQCM/<: CPT | Mod: NC,58

## 2022-03-21 NOTE — PHYSICAL EXAM
[General Appearance - Alert] : alert [General Appearance - In No Acute Distress] : in no acute distress [General Appearance - Well Nourished] : well nourished [Ankle Swelling (On Exam)] : present [Ankle Swelling On The Left] : of the left ankle [Varicose Veins Of The Left Leg] : on the left foot/ankle [] : on the left lower extremity [Delayed in the Left Toes] : capillary refills normal in the left toes [1+] : left foot dorsalis pedis 1+ [FreeTextEntry3] : non palpable pulses left foot [de-identified] : Left 3rd digit shortened, contracted. 4th digit amputation.\par No TTP to wound site [Foot Ulcer] : foot ulcer [FreeTextEntry1] : Left plantarmedial heel ulcer; fibrogranular wound base down to heel tissue and fascia; 40% fibrous, 60% granular. \par Mild serous drainage. Skin thin, atrophic, tense. \par No malodor, no erythema, no warmth [Vibration Dec.] : diminished vibratory sensation at the level of the toes [Diminished Throughout Left Foot] : diminished sensation with monofilament testing throughout left foot [Oriented To Time, Place, And Person] : oriented to person, place, and time [Impaired Insight] : insight and judgment were intact [Affect] : the affect was normal

## 2022-04-05 ENCOUNTER — OUTPATIENT (OUTPATIENT)
Dept: OUTPATIENT SERVICES | Facility: HOSPITAL | Age: 57
LOS: 1 days | Discharge: HOME | End: 2022-04-05

## 2022-04-05 ENCOUNTER — APPOINTMENT (OUTPATIENT)
Dept: PODIATRY | Facility: CLINIC | Age: 57
End: 2022-04-05
Payer: MEDICAID

## 2022-04-05 ENCOUNTER — APPOINTMENT (OUTPATIENT)
Dept: NEPHROLOGY | Facility: CLINIC | Age: 57
End: 2022-04-05
Payer: MEDICAID

## 2022-04-05 VITALS — SYSTOLIC BLOOD PRESSURE: 207 MMHG | DIASTOLIC BLOOD PRESSURE: 74 MMHG | OXYGEN SATURATION: 98 % | HEART RATE: 80 BPM

## 2022-04-05 VITALS — SYSTOLIC BLOOD PRESSURE: 188 MMHG | DIASTOLIC BLOOD PRESSURE: 72 MMHG

## 2022-04-05 DIAGNOSIS — E11.621 TYPE 2 DIABETES MELLITUS WITH FOOT ULCER: ICD-10-CM

## 2022-04-05 DIAGNOSIS — E11.42 TYPE 2 DIABETES MELLITUS WITH DIABETIC POLYNEUROPATHY: ICD-10-CM

## 2022-04-05 DIAGNOSIS — I73.9 PERIPHERAL VASCULAR DISEASE, UNSPECIFIED: ICD-10-CM

## 2022-04-05 DIAGNOSIS — R26.2 DIFFICULTY IN WALKING, NOT ELSEWHERE CLASSIFIED: ICD-10-CM

## 2022-04-05 DIAGNOSIS — L97.523 NON-PRESSURE CHRONIC ULCER OF OTHER PART OF LEFT FOOT WITH NECROSIS OF MUSCLE: ICD-10-CM

## 2022-04-05 DIAGNOSIS — E11.628 TYPE 2 DIABETES MELLITUS WITH OTHER SKIN COMPLICATIONS: ICD-10-CM

## 2022-04-05 PROCEDURE — 99214 OFFICE O/P EST MOD 30 MIN: CPT | Mod: GC

## 2022-04-05 PROCEDURE — 11042 DBRDMT SUBQ TIS 1ST 20SQCM/<: CPT | Mod: NC,58

## 2022-04-05 NOTE — REVIEW OF SYSTEMS
[As Noted in HPI] : as noted in HPI [Fever] : no fever [Chills] : no chills [Earache] : no earache [Shortness Of Breath] : no shortness of breath [Wheezing] : no wheezing [Cough] : no cough [SOB on Exertion] : no shortness of breath during exertion [Abdominal Pain] : no abdominal pain [Vomiting] : no vomiting [FreeTextEntry4] : deaf

## 2022-04-05 NOTE — PHYSICAL EXAM
[General Appearance - Alert] : alert [General Appearance - In No Acute Distress] : in no acute distress [Sclera] : the sclera and conjunctiva were normal [PERRL With Normal Accommodation] : pupils were equal in size, round, and reactive to light [Outer Ear] : the ears and nose were normal in appearance [Both Tympanic Membranes Were Examined] : both tympanic membranes were normal [Neck Appearance] : the appearance of the neck was normal [Respiration, Rhythm And Depth] : normal respiratory rhythm and effort [Auscultation Breath Sounds / Voice Sounds] : lungs were clear to auscultation bilaterally [Apical Impulse] : the apical impulse was normal [Heart Rate And Rhythm] : heart rate was normal and rhythm regular [Heart Sounds] : normal S1 and S2 [Bowel Sounds] : normal bowel sounds [Abdomen Soft] : soft [Abdomen Tenderness] : non-tender [] : no hepato-splenomegaly [FreeTextEntry1] : in a wheelchair

## 2022-04-05 NOTE — ASSESSMENT
[FreeTextEntry1] : #CKD IV\par eGFR = 17 creat stable at 3 from repeat blood work \par -f/u CMP to assess for acid base disorders / bicarb was 15 will add sodium bicarb to regimen \par -phosphate at target 5.1 /  C/W ckd \par -Proteinuria at 5 g already on ACE inh \par -f/u w Nephro in 3 months \par \par #UTI w hematuria\par repeat neg for blood and leuk esterase \par \par #Normocytic anemia\par iron studies as above\par likely 2/2 CKD IV however need further studies to determine\par -f/u CBC last Hb 10.4 \par -if persistent anemia patient will need Hematology referral and may need Iron supplementation \par \par # HTN uncontrolled on metoprolol and LISINOPRIL / will renew lisinopril and add hydralazine 50 mg q8h \par \par rtc in 3 months

## 2022-04-05 NOTE — HISTORY OF PRESENT ILLNESS
[FreeTextEntry1] : Ms Quinteros is a 56 YO Hearing Impaired woman w a PMH of CVA (2/2021 on ASA+ Plavix)), DM w peripheral neuropathy(on gabapentin) , peripheral artery disease (s/p vascular repair), dyslipidemia (on lipitor and losartan) here to follow up  \par Complained of swelling of right lower ext / left flank pain / on off \par denied any skin changes . Said left lower ext still warm\par no chest pain no SOB / no other complaints \par Patient interviewed with the help of  .

## 2022-04-07 ENCOUNTER — APPOINTMENT (OUTPATIENT)
Dept: PODIATRY | Facility: CLINIC | Age: 57
End: 2022-04-07

## 2022-04-19 ENCOUNTER — APPOINTMENT (OUTPATIENT)
Dept: VASCULAR SURGERY | Facility: CLINIC | Age: 57
End: 2022-04-19
Payer: SUBSIDIZED

## 2022-04-19 PROCEDURE — 93971 EXTREMITY STUDY: CPT

## 2022-04-21 NOTE — PHYSICAL EXAM
[General Appearance - Alert] : alert [General Appearance - In No Acute Distress] : in no acute distress [General Appearance - Well Nourished] : well nourished [Ankle Swelling (On Exam)] : present [Ankle Swelling On The Left] : of the left ankle [Varicose Veins Of The Left Leg] : on the left foot/ankle [] : on the left lower extremity [Delayed in the Left Toes] : capillary refills normal in the left toes [1+] : left foot dorsalis pedis 1+ [FreeTextEntry3] : non palpable pulses left foot [de-identified] : Left 3rd digit shortened, contracted. 4th digit amputation.\par No TTP to wound site [Foot Ulcer] : foot ulcer [FreeTextEntry1] : Left plantarmedial heel ulcer; fibrogranular wound base down to heel tissue and fascia; 40% fibrous, 60% granular. \par Mild serous drainage. Skin thin, atrophic, tense. \par No malodor, no erythema, no warmth [Vibration Dec.] : diminished vibratory sensation at the level of the toes [Diminished Throughout Left Foot] : diminished sensation with monofilament testing throughout left foot [Oriented To Time, Place, And Person] : oriented to person, place, and time [Impaired Insight] : insight and judgment were intact [Affect] : the affect was normal

## 2022-05-09 ENCOUNTER — INPATIENT (INPATIENT)
Facility: HOSPITAL | Age: 57
LOS: 6 days | Discharge: HOME | End: 2022-05-16
Attending: HOSPITALIST | Admitting: HOSPITALIST
Payer: MEDICAID

## 2022-05-09 VITALS
RESPIRATION RATE: 17 BRPM | TEMPERATURE: 98 F | DIASTOLIC BLOOD PRESSURE: 90 MMHG | HEART RATE: 85 BPM | HEIGHT: 62 IN | OXYGEN SATURATION: 98 % | SYSTOLIC BLOOD PRESSURE: 204 MMHG

## 2022-05-09 LAB
ACANTHOCYTES BLD QL SMEAR: SLIGHT — SIGNIFICANT CHANGE UP
ALBUMIN SERPL ELPH-MCNC: 3.6 G/DL — SIGNIFICANT CHANGE UP (ref 3.5–5.2)
ALP SERPL-CCNC: 249 U/L — HIGH (ref 30–115)
ALT FLD-CCNC: 12 U/L — SIGNIFICANT CHANGE UP (ref 0–41)
ANION GAP SERPL CALC-SCNC: 14 MMOL/L — SIGNIFICANT CHANGE UP (ref 7–14)
ANISOCYTOSIS BLD QL: SLIGHT — SIGNIFICANT CHANGE UP
APPEARANCE UR: CLEAR — SIGNIFICANT CHANGE UP
AST SERPL-CCNC: 14 U/L — SIGNIFICANT CHANGE UP (ref 0–41)
BACTERIA # UR AUTO: NEGATIVE — SIGNIFICANT CHANGE UP
BASOPHILS # BLD AUTO: 0 K/UL — SIGNIFICANT CHANGE UP (ref 0–0.2)
BASOPHILS NFR BLD AUTO: 0 % — SIGNIFICANT CHANGE UP (ref 0–1)
BILIRUB SERPL-MCNC: <0.2 MG/DL — SIGNIFICANT CHANGE UP (ref 0.2–1.2)
BILIRUB UR-MCNC: NEGATIVE — SIGNIFICANT CHANGE UP
BUN SERPL-MCNC: 59 MG/DL — HIGH (ref 10–20)
BURR CELLS BLD QL SMEAR: PRESENT — SIGNIFICANT CHANGE UP
CALCIUM SERPL-MCNC: 7.8 MG/DL — LOW (ref 8.5–10.1)
CHLORIDE SERPL-SCNC: 107 MMOL/L — SIGNIFICANT CHANGE UP (ref 98–110)
CO2 SERPL-SCNC: 21 MMOL/L — SIGNIFICANT CHANGE UP (ref 17–32)
COLOR SPEC: SIGNIFICANT CHANGE UP
CREAT SERPL-MCNC: 4.2 MG/DL — CRITICAL HIGH (ref 0.7–1.5)
DIFF PNL FLD: ABNORMAL
EGFR: 12 ML/MIN/1.73M2 — LOW
EOSINOPHIL # BLD AUTO: 0.23 K/UL — SIGNIFICANT CHANGE UP (ref 0–0.7)
EOSINOPHIL NFR BLD AUTO: 6.8 % — SIGNIFICANT CHANGE UP (ref 0–8)
EPI CELLS # UR: 3 /HPF — SIGNIFICANT CHANGE UP (ref 0–5)
GIANT PLATELETS BLD QL SMEAR: PRESENT — SIGNIFICANT CHANGE UP
GLUCOSE BLDC GLUCOMTR-MCNC: 129 MG/DL — HIGH (ref 70–99)
GLUCOSE SERPL-MCNC: 135 MG/DL — HIGH (ref 70–99)
GLUCOSE UR QL: ABNORMAL
HCT VFR BLD CALC: 10.9 % — LOW (ref 37–47)
HCT VFR BLD CALC: 29.6 % — LOW (ref 37–47)
HGB BLD-MCNC: 3.4 G/DL — CRITICAL LOW (ref 12–16)
HGB BLD-MCNC: 9.8 G/DL — LOW (ref 12–16)
HYALINE CASTS # UR AUTO: 1 /LPF — SIGNIFICANT CHANGE UP (ref 0–7)
KETONES UR-MCNC: NEGATIVE — SIGNIFICANT CHANGE UP
LEUKOCYTE ESTERASE UR-ACNC: NEGATIVE — SIGNIFICANT CHANGE UP
LIDOCAIN IGE QN: 40 U/L — SIGNIFICANT CHANGE UP (ref 7–60)
LYMPHOCYTES # BLD AUTO: 0.71 K/UL — LOW (ref 1.2–3.4)
LYMPHOCYTES # BLD AUTO: 21.2 % — SIGNIFICANT CHANGE UP (ref 20.5–51.1)
MACROCYTES BLD QL: SLIGHT — SIGNIFICANT CHANGE UP
MANUAL SMEAR VERIFICATION: SIGNIFICANT CHANGE UP
MCHC RBC-ENTMCNC: 29.1 PG — SIGNIFICANT CHANGE UP (ref 27–31)
MCHC RBC-ENTMCNC: 29.2 PG — SIGNIFICANT CHANGE UP (ref 27–31)
MCHC RBC-ENTMCNC: 31.2 G/DL — LOW (ref 32–37)
MCHC RBC-ENTMCNC: 33.1 G/DL — SIGNIFICANT CHANGE UP (ref 32–37)
MCV RBC AUTO: 88.1 FL — SIGNIFICANT CHANGE UP (ref 81–99)
MCV RBC AUTO: 93.2 FL — SIGNIFICANT CHANGE UP (ref 81–99)
MONOCYTES # BLD AUTO: 0.08 K/UL — LOW (ref 0.1–0.6)
MONOCYTES NFR BLD AUTO: 2.5 % — SIGNIFICANT CHANGE UP (ref 1.7–9.3)
NEUTROPHILS # BLD AUTO: 2.34 K/UL — SIGNIFICANT CHANGE UP (ref 1.4–6.5)
NEUTROPHILS NFR BLD AUTO: 69.5 % — SIGNIFICANT CHANGE UP (ref 42.2–75.2)
NITRITE UR-MCNC: NEGATIVE — SIGNIFICANT CHANGE UP
NRBC # BLD: 0 /100 WBCS — SIGNIFICANT CHANGE UP (ref 0–0)
PH UR: 7 — SIGNIFICANT CHANGE UP (ref 5–8)
PLAT MORPH BLD: NORMAL — SIGNIFICANT CHANGE UP
PLATELET # BLD AUTO: 229 K/UL — SIGNIFICANT CHANGE UP (ref 130–400)
PLATELET # BLD AUTO: 81 K/UL — LOW (ref 130–400)
POIKILOCYTOSIS BLD QL AUTO: SLIGHT — SIGNIFICANT CHANGE UP
POTASSIUM SERPL-MCNC: 5.3 MMOL/L — HIGH (ref 3.5–5)
POTASSIUM SERPL-SCNC: 5.3 MMOL/L — HIGH (ref 3.5–5)
PROT SERPL-MCNC: 5.8 G/DL — LOW (ref 6–8)
PROT UR-MCNC: ABNORMAL
RBC # BLD: 1.17 M/UL — LOW (ref 4.2–5.4)
RBC # BLD: 3.36 M/UL — LOW (ref 4.2–5.4)
RBC # FLD: 13.2 % — SIGNIFICANT CHANGE UP (ref 11.5–14.5)
RBC # FLD: 13.4 % — SIGNIFICANT CHANGE UP (ref 11.5–14.5)
RBC BLD AUTO: ABNORMAL
RBC CASTS # UR COMP ASSIST: 3 /HPF — SIGNIFICANT CHANGE UP (ref 0–4)
SARS-COV-2 RNA SPEC QL NAA+PROBE: SIGNIFICANT CHANGE UP
SODIUM SERPL-SCNC: 142 MMOL/L — SIGNIFICANT CHANGE UP (ref 135–146)
SP GR SPEC: 1.01 — SIGNIFICANT CHANGE UP (ref 1.01–1.03)
TROPONIN T SERPL-MCNC: <0.01 NG/ML — SIGNIFICANT CHANGE UP
UROBILINOGEN FLD QL: SIGNIFICANT CHANGE UP
WBC # BLD: 3.36 K/UL — LOW (ref 4.8–10.8)
WBC # BLD: 7.55 K/UL — SIGNIFICANT CHANGE UP (ref 4.8–10.8)
WBC # FLD AUTO: 3.36 K/UL — LOW (ref 4.8–10.8)
WBC # FLD AUTO: 7.55 K/UL — SIGNIFICANT CHANGE UP (ref 4.8–10.8)
WBC UR QL: 2 /HPF — SIGNIFICANT CHANGE UP (ref 0–5)

## 2022-05-09 PROCEDURE — 74177 CT ABD & PELVIS W/CONTRAST: CPT | Mod: 26,MA

## 2022-05-09 PROCEDURE — 99285 EMERGENCY DEPT VISIT HI MDM: CPT

## 2022-05-09 PROCEDURE — 93010 ELECTROCARDIOGRAM REPORT: CPT

## 2022-05-09 RX ORDER — HYDRALAZINE HCL 50 MG
50 TABLET ORAL ONCE
Refills: 0 | Status: COMPLETED | OUTPATIENT
Start: 2022-05-09 | End: 2022-05-09

## 2022-05-09 RX ORDER — CLOPIDOGREL BISULFATE 75 MG/1
75 TABLET, FILM COATED ORAL DAILY
Refills: 0 | Status: DISCONTINUED | OUTPATIENT
Start: 2022-05-09 | End: 2022-05-16

## 2022-05-09 RX ORDER — HEPARIN SODIUM 5000 [USP'U]/ML
5000 INJECTION INTRAVENOUS; SUBCUTANEOUS EVERY 12 HOURS
Refills: 0 | Status: DISCONTINUED | OUTPATIENT
Start: 2022-05-09 | End: 2022-05-16

## 2022-05-09 RX ORDER — ACETAMINOPHEN 500 MG
650 TABLET ORAL ONCE
Refills: 0 | Status: COMPLETED | OUTPATIENT
Start: 2022-05-09 | End: 2022-05-09

## 2022-05-09 RX ORDER — SODIUM BICARBONATE 1 MEQ/ML
650 SYRINGE (ML) INTRAVENOUS THREE TIMES A DAY
Refills: 0 | Status: DISCONTINUED | OUTPATIENT
Start: 2022-05-09 | End: 2022-05-16

## 2022-05-09 RX ORDER — SEVELAMER CARBONATE 2400 MG/1
800 POWDER, FOR SUSPENSION ORAL
Refills: 0 | Status: DISCONTINUED | OUTPATIENT
Start: 2022-05-09 | End: 2022-05-16

## 2022-05-09 RX ORDER — METOPROLOL TARTRATE 50 MG
25 TABLET ORAL DAILY
Refills: 0 | Status: DISCONTINUED | OUTPATIENT
Start: 2022-05-09 | End: 2022-05-10

## 2022-05-09 RX ORDER — METOPROLOL TARTRATE 50 MG
25 TABLET ORAL ONCE
Refills: 0 | Status: COMPLETED | OUTPATIENT
Start: 2022-05-09 | End: 2022-05-09

## 2022-05-09 RX ORDER — GABAPENTIN 400 MG/1
1 CAPSULE ORAL
Qty: 0 | Refills: 0 | DISCHARGE

## 2022-05-09 RX ORDER — CALCITRIOL 0.5 UG/1
0.25 CAPSULE ORAL DAILY
Refills: 0 | Status: DISCONTINUED | OUTPATIENT
Start: 2022-05-09 | End: 2022-05-13

## 2022-05-09 RX ORDER — SODIUM CHLORIDE 9 MG/ML
1000 INJECTION INTRAMUSCULAR; INTRAVENOUS; SUBCUTANEOUS
Refills: 0 | Status: DISCONTINUED | OUTPATIENT
Start: 2022-05-09 | End: 2022-05-09

## 2022-05-09 RX ORDER — ASPIRIN/CALCIUM CARB/MAGNESIUM 324 MG
81 TABLET ORAL DAILY
Refills: 0 | Status: DISCONTINUED | OUTPATIENT
Start: 2022-05-09 | End: 2022-05-16

## 2022-05-09 RX ORDER — LOSARTAN POTASSIUM 100 MG/1
50 TABLET, FILM COATED ORAL ONCE
Refills: 0 | Status: DISCONTINUED | OUTPATIENT
Start: 2022-05-09 | End: 2022-05-09

## 2022-05-09 RX ORDER — SODIUM CHLORIDE 9 MG/ML
1000 INJECTION, SOLUTION INTRAVENOUS ONCE
Refills: 0 | Status: COMPLETED | OUTPATIENT
Start: 2022-05-09 | End: 2022-05-09

## 2022-05-09 RX ORDER — ONDANSETRON 8 MG/1
4 TABLET, FILM COATED ORAL EVERY 6 HOURS
Refills: 0 | Status: DISCONTINUED | OUTPATIENT
Start: 2022-05-09 | End: 2022-05-16

## 2022-05-09 RX ORDER — ASCORBIC ACID 60 MG
500 TABLET,CHEWABLE ORAL DAILY
Refills: 0 | Status: DISCONTINUED | OUTPATIENT
Start: 2022-05-09 | End: 2022-05-16

## 2022-05-09 RX ORDER — FERROUS SULFATE 325(65) MG
325 TABLET ORAL DAILY
Refills: 0 | Status: DISCONTINUED | OUTPATIENT
Start: 2022-05-09 | End: 2022-05-14

## 2022-05-09 RX ORDER — ACETAMINOPHEN 500 MG
650 TABLET ORAL EVERY 6 HOURS
Refills: 0 | Status: DISCONTINUED | OUTPATIENT
Start: 2022-05-09 | End: 2022-05-16

## 2022-05-09 RX ORDER — PANTOPRAZOLE SODIUM 20 MG/1
1 TABLET, DELAYED RELEASE ORAL
Qty: 0 | Refills: 0 | DISCHARGE

## 2022-05-09 RX ORDER — CHLORHEXIDINE GLUCONATE 213 G/1000ML
1 SOLUTION TOPICAL
Refills: 0 | Status: DISCONTINUED | OUTPATIENT
Start: 2022-05-09 | End: 2022-05-16

## 2022-05-09 RX ORDER — HYDRALAZINE HCL 50 MG
10 TABLET ORAL ONCE
Refills: 0 | Status: COMPLETED | OUTPATIENT
Start: 2022-05-09 | End: 2022-05-09

## 2022-05-09 RX ORDER — HYDRALAZINE HCL 50 MG
50 TABLET ORAL DAILY
Refills: 0 | Status: DISCONTINUED | OUTPATIENT
Start: 2022-05-09 | End: 2022-05-11

## 2022-05-09 RX ORDER — ATORVASTATIN CALCIUM 80 MG/1
80 TABLET, FILM COATED ORAL AT BEDTIME
Refills: 0 | Status: DISCONTINUED | OUTPATIENT
Start: 2022-05-09 | End: 2022-05-16

## 2022-05-09 RX ADMIN — CALCITRIOL 0.25 MICROGRAM(S): 0.5 CAPSULE ORAL at 22:05

## 2022-05-09 RX ADMIN — Medication 650 MILLIGRAM(S): at 17:13

## 2022-05-09 RX ADMIN — Medication 10 MILLIGRAM(S): at 23:32

## 2022-05-09 RX ADMIN — Medication 650 MILLIGRAM(S): at 14:45

## 2022-05-09 RX ADMIN — Medication 650 MILLIGRAM(S): at 22:05

## 2022-05-09 RX ADMIN — SODIUM CHLORIDE 50 MILLILITER(S): 9 INJECTION INTRAMUSCULAR; INTRAVENOUS; SUBCUTANEOUS at 22:04

## 2022-05-09 RX ADMIN — Medication 50 MILLIGRAM(S): at 14:46

## 2022-05-09 RX ADMIN — Medication 25 MILLIGRAM(S): at 14:44

## 2022-05-09 RX ADMIN — SEVELAMER CARBONATE 800 MILLIGRAM(S): 2400 POWDER, FOR SUSPENSION ORAL at 22:05

## 2022-05-09 RX ADMIN — SODIUM CHLORIDE 1000 MILLILITER(S): 9 INJECTION, SOLUTION INTRAVENOUS at 14:45

## 2022-05-09 RX ADMIN — ATORVASTATIN CALCIUM 80 MILLIGRAM(S): 80 TABLET, FILM COATED ORAL at 22:07

## 2022-05-09 NOTE — ED ADULT NURSE NOTE - OBJECTIVE STATEMENT
Pt with C/O  abdomen ,back pain B/L lower extremity weakness ,nausea on going for 3 days . Pt translation  sign language done with Patricia .

## 2022-05-09 NOTE — ED PROVIDER NOTE - PROGRESS NOTE DETAILS
Dr. Moreno: sign out to Dr. Valdes   Patient pending rpt cbc, ct read AH - CT unremarkable, Hb baseline, pt reassessed, still weak, will admit for siobhan; signed out to MAR AH - CT unremarkable, Hb baseline, pt reassessed, still weak, will admit for siobhan; signed out to ALEX Hathaway

## 2022-05-09 NOTE — ED PROVIDER NOTE - ATTENDING CONTRIBUTION TO CARE
56 yo F with PMH of PVD, HTN, HLD, CVA, intellectual disability, CKD presents to ED for abdominal pain x1 days. Pt states the pain radiates to her back. Associated with nausea but no vomiting. No diarrhea, fevers or chills.     Const: Well nourished, well developed, appears stated age  Eyes: PERRL, no conjunctival injection  HENT:  Neck supple without meningismus   CV: RRR, Warm, well-perfused extremities  RESP: CTA B/L, no tachypnea   GI: soft, epigastric tenderness, non-distended  MSK: No gross deformities appreciated  Skin: Warm, dry. No rashes  Neuro: Alert, CNs II-XII grossly intact. Sensation and motor function of extremities grossly intact.  Psych: Appropriate mood and affect.    will do labs, CT scan

## 2022-05-09 NOTE — H&P ADULT - NSHPPHYSICALEXAM_GEN_ALL_CORE
PHYSICAL EXAM:  GENERAL: NAD, lying in bed comfortably  HEAD:  Atraumatic, Normocephalic  EYES: EOMI, PERRLA, conjunctiva and sclera clear  ENT: Moist mucous membranes  NECK: Supple, No JVD  CHEST/LUNG: Clear to auscultation bilaterally; No rales, rhonchi, wheezing, or rubs. Unlabored respirations  HEART: Regular rate and rhythm; No murmurs, rubs, or gallops  ABDOMEN: Bowel sounds present; Soft, Nontender, Nondistended. No hepatomegally  EXTREMITIES:  2+ Peripheral Pulses, brisk capillary refill. No clubbing, cyanosis, or edema  NERVOUS SYSTEM:  Alert & Oriented X3, speech clear. No deficits   MSK: FROM all 4 extremities, full and equal strength  SKIN: No rashes or lesions PHYSICAL EXAM:  GENERAL: NAD, lying in bed comfortably  HEAD:  Atraumatic, Normocephalic  EYES: EOMI, PERRLA, conjunctiva and sclera clear  ENT: Moist mucous membranes  NECK: Supple, No JVD  CHEST/LUNG: Clear to auscultation bilaterally; No rales, rhonchi, wheezing, or rubs. Unlabored respirations  HEART: Regular rate and rhythm; No murmurs, rubs, or gallops  ABDOMEN: Bowel sounds present. Focal tenderness to RUQ  EXTREMITIES:  2+ Peripheral Pulses, brisk capillary refill. No clubbing, cyanosis, or edema  NERVOUS SYSTEM:  Alert & Oriented X3  MSK: FROM all 4 extremities, full and equal strength  SKIN: No rashes or lesions

## 2022-05-09 NOTE — ED ADULT NURSE NOTE - NS ED NURSE REPORT GIVEN DT
10-May-2022 00:09 Doxycycline Counseling:  Patient counseled regarding possible photosensitivity and increased risk for sunburn.  Patient instructed to avoid sunlight, if possible.  When exposed to sunlight, patients should wear protective clothing, sunglasses, and sunscreen.  The patient was instructed to call the office immediately if the following severe adverse effects occur:  hearing changes, easy bruising/bleeding, severe headache, or vision changes.  The patient verbalized understanding of the proper use and possible adverse effects of doxycycline.  All of the patient's questions and concerns were addressed.

## 2022-05-09 NOTE — ED PROVIDER NOTE - PHYSICAL EXAMINATION
CONSTITUTIONAL: in no acute distress, afebrile  SKIN: Warm, dry  HEAD: Normocephalic; atraumatic  EYES: No conjunctival injection. EOMI. PERRLA  ENT: No nasal discharge; oropharynx nonerythematous; airway clear  NECK: Supple; non tender  CARD:  Regular rate and rhythm  RESP: CTAB  ABD: Soft non distended, moderate mid ttp; No guarding or rebound tenderness, no flank or cva tenderness, neg [place]  EXT: Normal ROM.  No clubbing or cyanosis.  mild edema b/l, neurovascualrly in tact, healed scar to LLE  NEURO: A&O x3, grossly unremarkable, no focal deficits, moving all extremities  *Chaperone YOANA Durham was used during the encounter. A professional environment was maintained and discussed with patient

## 2022-05-09 NOTE — H&P ADULT - HISTORY OF PRESENT ILLNESS
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     Pain is described as mild, radiating to back, intermittent a/w nausea. Pt also endorses LE edema and generalized weakness.   used.  Endorsed palpitations. Pt denies fevers, chills, chest     In the ED, VS T(F): 98.5 HR: 76 BP: 185/78 RR: 17 SpO2: 98% in RA. 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     Pain is described as mild, radiating to back, intermittent a/w nausea. Pt also endorses LE edema and generalized weakness.   used.  Endorsed palpitations. Pt denies fevers, chills, chest     In the ED, VS T(F): 98.5 HR: 76 BP: 185/78 RR: 17 SpO2: 98% in RA. Labs notable for Cr 4.2 (bl 2.8), K 5.3. CT A/P noncont unremarkable.     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     Pain is located in right upper and lower quadrants and L lateral abd, mild-mod (unable to give number), radiating to b/l le and r arm, described as crampy, intermittent, worse at night, a/w nausea, nbnb vomitus, constipation, dec po intake, generalized weakness. Brother in law at bedside corroborated history. Endorsed, subjective fevers, chills, rhinorrhea. Pt denies H/A, sob, cough, c/p, palpitations, burning, frequency.     In the ED, VS T(F): 98.5 HR: 76 BP: 185/78 RR: 17 SpO2: 98% in RA. Labs notable for Cr 4.2 (bl 2.8), K 5.3, Alk phos 249. CT A/P noncont unremarkable.     Admitted for CHATA and abd pain. Med rec from son  in law   58 yo F PMHx of, HTN, HLD, Right pontine CVA (February 7, 2021), intellectual disability, hearing/speech impairment, DM, CKD stage IV, mild AS, former smoker, PVD s/p left femoral artery-posterior tibial artery bypass with autologous venous tissue and left heel ulcer debridement 11/21 presented to ED for abd pain x2d.     Pain is located in right upper and lower quadrants and L lateral abd, mild-mod (unable to give number), radiating to b/l le and r arm, described as crampy, intermittent, worse at night, a/w nausea, nbnb vomitus, constipation, dec po intake, generalized weakness. Brother in law at bedside corroborated history. Endorsed, subjective fevers, chills, rhinorrhea. Pt denies H/A, sob, cough, c/p, palpitations, burning, frequency.     In the ED, VS T(F): 98.5 HR: 76 BP: 185/78 RR: 17 SpO2: 98% in RA. Labs notable for Cr 4.2 (bl 2.8), K 5.3, Alk phos 249. CT A/P noncont unremarkable.     Admitted for CHATA and abd pain. Med rec from son  in law

## 2022-05-09 NOTE — ED PROVIDER NOTE - CARE PLAN
Writer attempted to call patient, no response, message left for patient to call clinic. Orders pending.   1 Principal Discharge DX:	Weakness  Secondary Diagnosis:	CHATA (acute kidney injury)

## 2022-05-09 NOTE — H&P ADULT - ATTENDING COMMENTS
HPI:  56 yo F PMHx of, HTN, HLD, Right pontine CVA (February 7, 2021), intellectual disability, hearing/speech impairment, DM, CKD stage IV, mild AS, former smoker, PVD s/p left femoral artery-posterior tibial artery bypass with autologous venous tissue and left heel ulcer debridement 11/21 presented to ED for abd pain x2d.     Pain is located in right upper and lower quadrants and L lateral abd, mild-mod (unable to give number), radiating to b/l le and r arm, described as crampy, intermittent, worse at night, a/w nausea, nbnb vomitus, constipation, dec po intake, generalized weakness. Brother in law at bedside corroborated history. Endorsed, subjective fevers, chills, rhinorrhea. Pt denies H/A, sob, cough, c/p, palpitations, burning, frequency.     In the ED, VS T(F): 98.5 HR: 76 BP: 185/78 RR: 17 SpO2: 98% in RA. Labs notable for Cr 4.2 (bl 2.8), K 5.3, Alk phos 249. CT A/P noncont unremarkable.     Admitted for CHATA and abd pain. Med rec from son  in law   (09 May 2022 19:08)    REVIEW OF SYSTEMS:  CONSTITUTIONAL: No weakness, fevers or chills  EYES/ENT: No visual changes;  No vertigo or throat pain   NECK: No pain or stiffness  RESPIRATORY: No cough, wheezing, hemoptysis; No shortness of breath  CARDIOVASCULAR: No chest pain or palpitations  GASTROINTESTINAL: No abdominal or epigastric pain. No nausea, vomiting, or hematemesis; No diarrhea or constipation. No melena or hematochezia.  GENITOURINARY: No dysuria, frequency or hematuria  NEUROLOGICAL: No numbness or weakness  SKIN: No itching, rashes    Physical Exam:    General: WN/WD NAD  Neurology: A&Ox3, nonfocal, follows commands  Eyes: PERRLA/ EOMI  ENT/Neck: Neck supple, trachea midline, No JVD  Respiratory: CTA B/L, No wheezing, rales, rhonchi  CV: Normal rate regular rhythm, S1S2, no murmurs, rubs or gallops  Abdominal: Soft, NT, ND +BS,   Extremities: No edema, + peripheral pulses  Skin: No Rashes, Hematoma, Ecchymosis  Incisions:   Tubes: HPI:  58 yo F PMHx of, HTN, HLD, Right pontine CVA (February 7, 2021), intellectual disability, hearing/speech impairment, DM, CKD stage IV, mild AS, former smoker, PVD s/p left femoral artery-posterior tibial artery bypass with autologous venous tissue and left heel ulcer debridement 11/21 presented to ED for abd pain x2d.     Pain is located in right upper and lower quadrants and L lateral abd, mild-mod (unable to give number), radiating to b/l le and r arm, described as crampy, intermittent, worse at night, a/w nausea, nbnb vomitus, constipation, dec po intake, generalized weakness. Brother in law at bedside corroborated history. Endorsed, subjective fevers, chills, rhinorrhea. Pt denies H/A, sob, cough, c/p, palpitations, burning, frequency.     In the ED, VS T(F): 98.5 HR: 76 BP: 185/78 RR: 17 SpO2: 98% in RA. Labs notable for Cr 4.2 (bl 2.8), K 5.3, Alk phos 249. CT A/P noncont unremarkable.     Admitted for CHATA and abd pain. Med rec from son  in law   (09 May 2022 19:08)    T(C): 36.8 (05-10-22 @ 06:34), Max: 37 (05-09-22 @ 20:07)  HR: 82 (05-10-22 @ 06:34) (75 - 89)  BP: 176/82 (05-10-22 @ 06:34) (174/73 - 213/86)  RR: 20 (05-10-22 @ 06:34) (17 - 20)  SpO2: 99% (05-10-22 @ 01:11) (98% - 99%)  REVIEW OF SYSTEMS: see cc/HPI   CONSTITUTIONAL: No weakness, fevers or chills  EYES/ENT: No visual changes;  No vertigo or throat pain   NECK: No pain or stiffness  RESPIRATORY: No cough, wheezing, hemoptysis; No shortness of breath  CARDIOVASCULAR: No chest pain or palpitations  GASTROINTESTINAL: (+) abdominal RUQ/ epigastric pain. No nausea, vomiting, or hematemesis; No diarrhea or constipation. No melena or hematochezia.  GENITOURINARY: No dysuria, frequency or hematuria  NEUROLOGICAL: No numbness or weakness  SKIN: No itching, rashes    Physical Exam: ASL / Patient deaf   General: WN/WD NAD  Neurology: A&Ox3, nonfocal, follows commands  Eyes: PERRLA/ EOMI  ENT/Neck: Neck supple, trachea midline, No JVD  Respiratory: CTA B/L, No wheezing, rales, rhonchi  CV: Normal rate regular rhythm, S1S2, no murmurs, rubs or gallops  Abdominal: Soft, NT, ND +BS,   Extremities: No edema, + peripheral pulses, L LE surgical scar s/p fem-pop bypass  Skin: No Rashes, Hematoma, Ecchymosis  Incisions:   Tubes:    A/p  Abdominal pain of ??etiology   -IV PPI   -GI eval   -PRN pain Rx     CHATA on CKD IV - possible 2/2 to poor oral intake  HTN - urgency vs. crisis   -agree w/ gentle hydration  -adequate pain control   -titrate hydralazine and/or Metoprolol PRN   -c/w other outpatient Rx     DM type II  -F/S monitoring and Lispro PRN     s/p CVA   Hearing and speech impairment - able to use ASL / family at bedside   Intellectual disability   -c/w ASA/statin/ plavix    PVD   -c/w ASA/Plavix     DVT prophylaxis    SEEN by ATTENDING 5/9/22 ( note revised 5/10)

## 2022-05-09 NOTE — H&P ADULT - ASSESSMENT
56 yo F PMHx of HTN, HLD, Right pontine CVA (February 7, 2021), intellectual disability, hearing/speech impairment, CKD stage IV, mild AS, former smoker, PVD s/p left femoral artery-posterior tibial artery bypass with autologous venous tissue and left heel ulcer debridement presented to ED for   abd pain x1d.     # Abd pain   - Unclear etiology  - CT A/P unremarkable  - lipase neg   - pain control with   - PT eval     # CHATA on CKD stage IV  - Cr 4.2 (bl 2.8)  - Avoid nephrotoxic agents    # HTN  - C/w     # HLD  - C/w     # Right pontine CVA (February 7, 2021)  # Intellectual disability  # hearing/speech impairment  - Pt eval   - ASA and Statin     # PVD  - s/p left femoral artery-posterior tibial artery bypass with autologous venous tissue and left heel ulcer debridement    DVT ppx: hep sq  GI ppx: none   Diet: DASH  Activity: IAT  Dispo: Admit to med 56 yo F PMHx of HTN, HLD, Right pontine CVA (February 7, 2021), intellectual disability, hearing/speech impairment, CKD stage IV, mild AS, former smoker, PVD s/p left femoral artery-posterior tibial artery bypass with autologous venous tissue and left heel ulcer debridement presented to ED for   abd pain x1d.     # Abd pain   - Unclear etiology  - CT A/P unremarkable  - lipase neg, 40  - pain control with Tylenol. Can add Dilaudid   - PT eval for weakness   - F/u routine labs     # CHATA on CKD stage IV  - Cr 4.2 (bl 2.8)  - Avoid nephrotoxic agents    # HTN  - C/w     # HLD  - C/w     # Right pontine CVA (February 7, 2021)  # Intellectual disability  # hearing/speech impairment  - Pt eval   - ASA and Statin     # PVD  - s/p left femoral artery-posterior tibial artery bypass with autologous venous tissue and left heel ulcer debridement    DVT ppx: hep sq  GI ppx: none   Diet: DASH  Activity: IAT  Dispo: Admit to med 58 yo F PMHx of HTN, HLD, Right pontine CVA (February 7, 2021), intellectual disability, hearing/speech impairment, CKD stage IV, mild AS, former smoker, PVD s/p left femoral artery-posterior tibial artery bypass with autologous venous tissue and left heel ulcer debridement presented to ED for   abd pain x1d.     # Abd pain   - Unclear etiology. Viral gastritis vs constipation vs cramps vs fatty liver ?  - Physical with focal tenderness to RUQ   - CT A/P unremarkable  - lipase neg, 40  - Alk phos elevated 249  - pain control with Tylenol. Can add Dilaudid   - PT eval for weakness   - F/u routine labs   - F/u RUQ U/S     # CHATA on CKD stage IV  - Cr 4.2 (bl 2.8)  - Avoid nephrotoxic agents  - C/w Renvela, Sodium bicarb, calcitriol  - Gentle hydration with NS @50cc/hr    # HTN  - C/w Toprol, Hydralazine     # HLD  - C/w Atorvastatin     # Right pontine CVA (February 7, 2021)  # Intellectual disability  # hearing/speech impairment  - Pt eval   - ASA, Statin, Plavix      # PVD  - s/p left femoral artery-posterior tibial artery bypass with autologous venous tissue and left heel ulcer debridement    DVT ppx: hep sq  GI ppx: none   Diet: DASH  Activity: IAT  Dispo: Admit to med 56 yo F PMHx of HTN, HLD, Right pontine CVA (February 7, 2021), intellectual disability, hearing/speech impairment, CKD stage IV, mild AS, former smoker, PVD s/p left femoral artery-posterior tibial artery bypass with autologous venous tissue and left heel ulcer debridement presented to ED for   abd pain x1d.     # Abd pain   - Unclear etiology. Viral gastritis vs constipation vs cramps vs fatty liver ?  - Physical with focal tenderness to RUQ   - CT A/P unremarkable  - lipase neg, 40  - Alk phos elevated 249  - pain control with Tylenol. Can add Dilaudid   - PT eval for weakness   - F/u routine labs   - F/u RUQ U/S     # CHATA on CKD stage IV  - Cr 4.2 (bl 2.8)  - Avoid nephrotoxic agents  - C/w Renvela, Sodium bicarb, calcitriol  - Gentle hydration with NS @50cc/hr    # DM   - HA1c 7.1% in 11/21  - Monitor FS  - Start basal bolus if FS persistently >180  - F/u A1c     # HTN  - C/w Toprol, Hydralazine     # HLD  - C/w Atorvastatin     # Right pontine CVA (February 7, 2021)  # Intellectual disability  # hearing/speech impairment  - Pt eval   - ASA, Statin, Plavix      # PVD  - s/p left femoral artery-posterior tibial artery bypass with autologous venous tissue and left heel ulcer debridement 11/21    DVT ppx: hep sq  GI ppx: none   Diet: DASH  Activity: IAT  Dispo: Admit to med 58 yo F PMHx of HTN, HLD, Right pontine CVA (February 7, 2021), intellectual disability, hearing/speech impairment, CKD stage IV, mild AS, former smoker, PVD s/p left femoral artery-posterior tibial artery bypass with autologous venous tissue and left heel ulcer debridement presented to ED for   abd pain x1d.     # Abd pain   - Unclear etiology. Viral gastritis vs constipation vs cramps vs fatty liver ?  - Physical with focal tenderness to RUQ   - CT A/P unremarkable  - lipase neg, 40  - Alk phos elevated 249  - pain control with Tylenol. Can add Dilaudid   - PT eval for weakness   - F/u routine labs   - F/u RUQ U/S     # CHATA on CKD stage IV  - Cr 4.2 (bl 2.8)  - Avoid nephrotoxic agents  - C/w Renvela, Sodium bicarb, calcitriol  - Gentle hydration with NS @50cc/hr    # DM   - HA1c 7.1% in 11/21  - Monitor FS  - Start basal bolus if FS persistently >180  - F/u A1c     # HTN  - C/w Toprol, Hydralazine     # HLD  - C/w Atorvastatin     # Right pontine CVA (February 7, 2021)  # Intellectual disability  # hearing/speech impairment  - Pt eval   - ASA, Statin, Plavix      # PVD  - s/p left femoral artery-posterior tibial artery bypass with autologous venous tissue and left heel ulcer debridement 11/21  - ASA, Plavix, Statin     DVT ppx: hep sq  GI ppx: none   Diet: DASH  Activity: IAT  Dispo: Admit to med

## 2022-05-09 NOTE — ED ADULT NURSE NOTE - NSIMPLEMENTINTERV_GEN_ALL_ED
Implemented All Fall Risk Interventions:  [place] to call system. Call bell, personal items and telephone within reach. Instruct patient to call for assistance. Room bathroom lighting operational. Non-slip footwear when patient is off stretcher. Physically safe environment: no spills, clutter or unnecessary equipment. Stretcher in lowest position, wheels locked, appropriate side rails in place. Provide visual cue, wrist band, yellow gown, etc. Monitor gait and stability. Monitor for mental status changes and reorient to person, place, and time. Review medications for side effects contributing to fall risk. Reinforce activity limits and safety measures with patient and family.

## 2022-05-09 NOTE — ED PROVIDER NOTE - OBJECTIVE STATEMENT
58 yo F with PMH PVD, Former smoker, HTN, HLD. right pontine CVA (February 7, 2021), Intellectual disability, hearing/speech impairment, CKD stage IV, mild aortic stenosis, s/p left femoral artery-posterior tibial artery bypass who presents with abdominal pain x1 day.  Abd pain is mid, radiating to back.  Intermittent. Did not take HTN medications 2/2 nausea.  Pt also endorses LE edema and generalized weakness.   used.  Endorsed palpitations. Pt denies fevers, chills, chest pain, SOB, numbness, tingling, headache.

## 2022-05-09 NOTE — ED PROVIDER NOTE - NS ED ROS FT
Review of Systems:  CONSTITUTIONAL: No fever, No diaphoresis, + generalized weakness  SKIN: No rash  EYES: No eye pain, No blurred vision  ENT: No change in hearing, No sore throat, No neck pain, No rhinorrhea, No ear pain  RESPIRATORY: No shortness of breath, No cough  CARDIAC: No chest pain, No palpitations  GI: + abdominal pain, + nausea, No vomiting, No diarrhea, No constipation, No bright red blood per rectum or melena. No flank pain  : No dysuria, frequency, hematuria  MUSCULOSKELETAL: No joint paint, No swelling, No back pain  NEUROLOGIC: No numbness, No focal weakness, No headache, No dizziness  All other systems negative, unless specified in HPI

## 2022-05-10 ENCOUNTER — APPOINTMENT (OUTPATIENT)
Dept: VASCULAR SURGERY | Facility: CLINIC | Age: 57
End: 2022-05-10

## 2022-05-10 ENCOUNTER — APPOINTMENT (OUTPATIENT)
Dept: PODIATRY | Facility: CLINIC | Age: 57
End: 2022-05-10

## 2022-05-10 LAB
A1C WITH ESTIMATED AVERAGE GLUCOSE RESULT: 6.1 % — HIGH (ref 4–5.6)
ALBUMIN SERPL ELPH-MCNC: 3.2 G/DL — LOW (ref 3.5–5.2)
ALP SERPL-CCNC: 225 U/L — HIGH (ref 30–115)
ALT FLD-CCNC: 9 U/L — SIGNIFICANT CHANGE UP (ref 0–41)
ANION GAP SERPL CALC-SCNC: 14 MMOL/L — SIGNIFICANT CHANGE UP (ref 7–14)
AST SERPL-CCNC: 12 U/L — SIGNIFICANT CHANGE UP (ref 0–41)
BASOPHILS # BLD AUTO: 0.06 K/UL — SIGNIFICANT CHANGE UP (ref 0–0.2)
BASOPHILS NFR BLD AUTO: 1 % — SIGNIFICANT CHANGE UP (ref 0–1)
BILIRUB SERPL-MCNC: <0.2 MG/DL — SIGNIFICANT CHANGE UP (ref 0.2–1.2)
BUN SERPL-MCNC: 56 MG/DL — HIGH (ref 10–20)
CALCIUM SERPL-MCNC: 7.7 MG/DL — LOW (ref 8.5–10.1)
CHLORIDE SERPL-SCNC: 106 MMOL/L — SIGNIFICANT CHANGE UP (ref 98–110)
CO2 SERPL-SCNC: 20 MMOL/L — SIGNIFICANT CHANGE UP (ref 17–32)
CREAT SERPL-MCNC: 4.7 MG/DL — CRITICAL HIGH (ref 0.7–1.5)
CULTURE RESULTS: SIGNIFICANT CHANGE UP
EGFR: 10 ML/MIN/1.73M2 — LOW
EOSINOPHIL # BLD AUTO: 0.52 K/UL — SIGNIFICANT CHANGE UP (ref 0–0.7)
EOSINOPHIL NFR BLD AUTO: 8.3 % — HIGH (ref 0–8)
ESTIMATED AVERAGE GLUCOSE: 128 MG/DL — HIGH (ref 68–114)
FOLATE SERPL-MCNC: 6 NG/ML — SIGNIFICANT CHANGE UP
GLUCOSE BLDC GLUCOMTR-MCNC: 145 MG/DL — HIGH (ref 70–99)
GLUCOSE BLDC GLUCOMTR-MCNC: 190 MG/DL — HIGH (ref 70–99)
GLUCOSE BLDC GLUCOMTR-MCNC: 205 MG/DL — HIGH (ref 70–99)
GLUCOSE BLDC GLUCOMTR-MCNC: 235 MG/DL — HIGH (ref 70–99)
GLUCOSE SERPL-MCNC: 128 MG/DL — HIGH (ref 70–99)
HCT VFR BLD CALC: 27.5 % — LOW (ref 37–47)
HGB BLD-MCNC: 9.1 G/DL — LOW (ref 12–16)
IMM GRANULOCYTES NFR BLD AUTO: 0.3 % — SIGNIFICANT CHANGE UP (ref 0.1–0.3)
LYMPHOCYTES # BLD AUTO: 1.76 K/UL — SIGNIFICANT CHANGE UP (ref 1.2–3.4)
LYMPHOCYTES # BLD AUTO: 28 % — SIGNIFICANT CHANGE UP (ref 20.5–51.1)
MAGNESIUM SERPL-MCNC: 2.2 MG/DL — SIGNIFICANT CHANGE UP (ref 1.8–2.4)
MCHC RBC-ENTMCNC: 29.5 PG — SIGNIFICANT CHANGE UP (ref 27–31)
MCHC RBC-ENTMCNC: 33.1 G/DL — SIGNIFICANT CHANGE UP (ref 32–37)
MCV RBC AUTO: 89.3 FL — SIGNIFICANT CHANGE UP (ref 81–99)
MONOCYTES # BLD AUTO: 0.52 K/UL — SIGNIFICANT CHANGE UP (ref 0.1–0.6)
MONOCYTES NFR BLD AUTO: 8.3 % — SIGNIFICANT CHANGE UP (ref 1.7–9.3)
NEUTROPHILS # BLD AUTO: 3.4 K/UL — SIGNIFICANT CHANGE UP (ref 1.4–6.5)
NEUTROPHILS NFR BLD AUTO: 54.1 % — SIGNIFICANT CHANGE UP (ref 42.2–75.2)
NRBC # BLD: 0 /100 WBCS — SIGNIFICANT CHANGE UP (ref 0–0)
PLATELET # BLD AUTO: 217 K/UL — SIGNIFICANT CHANGE UP (ref 130–400)
POTASSIUM SERPL-MCNC: 4.4 MMOL/L — SIGNIFICANT CHANGE UP (ref 3.5–5)
POTASSIUM SERPL-SCNC: 4.4 MMOL/L — SIGNIFICANT CHANGE UP (ref 3.5–5)
PROCALCITONIN SERPL-MCNC: 0.12 NG/ML — HIGH (ref 0.02–0.1)
PROT SERPL-MCNC: 5.4 G/DL — LOW (ref 6–8)
RBC # BLD: 3.08 M/UL — LOW (ref 4.2–5.4)
RBC # FLD: 13.4 % — SIGNIFICANT CHANGE UP (ref 11.5–14.5)
SODIUM SERPL-SCNC: 140 MMOL/L — SIGNIFICANT CHANGE UP (ref 135–146)
SPECIMEN SOURCE: SIGNIFICANT CHANGE UP
TSH SERPL-MCNC: 2.09 UIU/ML — SIGNIFICANT CHANGE UP (ref 0.27–4.2)
VIT B12 SERPL-MCNC: 448 PG/ML — SIGNIFICANT CHANGE UP (ref 232–1245)
WBC # BLD: 6.28 K/UL — SIGNIFICANT CHANGE UP (ref 4.8–10.8)
WBC # FLD AUTO: 6.28 K/UL — SIGNIFICANT CHANGE UP (ref 4.8–10.8)

## 2022-05-10 PROCEDURE — 99233 SBSQ HOSP IP/OBS HIGH 50: CPT

## 2022-05-10 PROCEDURE — 76705 ECHO EXAM OF ABDOMEN: CPT | Mod: 26

## 2022-05-10 RX ORDER — SENNA PLUS 8.6 MG/1
2 TABLET ORAL AT BEDTIME
Refills: 0 | Status: DISCONTINUED | OUTPATIENT
Start: 2022-05-10 | End: 2022-05-11

## 2022-05-10 RX ORDER — POLYETHYLENE GLYCOL 3350 17 G/17G
17 POWDER, FOR SOLUTION ORAL DAILY
Refills: 0 | Status: DISCONTINUED | OUTPATIENT
Start: 2022-05-10 | End: 2022-05-16

## 2022-05-10 RX ORDER — LISINOPRIL 2.5 MG/1
20 TABLET ORAL AT BEDTIME
Refills: 0 | Status: DISCONTINUED | OUTPATIENT
Start: 2022-05-10 | End: 2022-05-11

## 2022-05-10 RX ORDER — HYDRALAZINE HCL 50 MG
10 TABLET ORAL ONCE
Refills: 0 | Status: COMPLETED | OUTPATIENT
Start: 2022-05-10 | End: 2022-05-10

## 2022-05-10 RX ORDER — LABETALOL HCL 100 MG
10 TABLET ORAL ONCE
Refills: 0 | Status: COMPLETED | OUTPATIENT
Start: 2022-05-10 | End: 2022-05-10

## 2022-05-10 RX ORDER — METOPROLOL TARTRATE 50 MG
50 TABLET ORAL DAILY
Refills: 0 | Status: DISCONTINUED | OUTPATIENT
Start: 2022-05-10 | End: 2022-05-11

## 2022-05-10 RX ORDER — SUCRALFATE 1 G
1 TABLET ORAL
Refills: 0 | Status: DISCONTINUED | OUTPATIENT
Start: 2022-05-10 | End: 2022-05-16

## 2022-05-10 RX ADMIN — CALCITRIOL 0.25 MICROGRAM(S): 0.5 CAPSULE ORAL at 11:49

## 2022-05-10 RX ADMIN — HEPARIN SODIUM 5000 UNIT(S): 5000 INJECTION INTRAVENOUS; SUBCUTANEOUS at 06:15

## 2022-05-10 RX ADMIN — Medication 650 MILLIGRAM(S): at 21:09

## 2022-05-10 RX ADMIN — Medication 650 MILLIGRAM(S): at 06:16

## 2022-05-10 RX ADMIN — CLOPIDOGREL BISULFATE 75 MILLIGRAM(S): 75 TABLET, FILM COATED ORAL at 11:49

## 2022-05-10 RX ADMIN — Medication 1 GRAM(S): at 21:09

## 2022-05-10 RX ADMIN — Medication 10 MILLIGRAM(S): at 19:32

## 2022-05-10 RX ADMIN — Medication 25 MILLIGRAM(S): at 06:16

## 2022-05-10 RX ADMIN — SEVELAMER CARBONATE 800 MILLIGRAM(S): 2400 POWDER, FOR SUSPENSION ORAL at 08:50

## 2022-05-10 RX ADMIN — SENNA PLUS 2 TABLET(S): 8.6 TABLET ORAL at 21:09

## 2022-05-10 RX ADMIN — Medication 325 MILLIGRAM(S): at 11:50

## 2022-05-10 RX ADMIN — POLYETHYLENE GLYCOL 3350 17 GRAM(S): 17 POWDER, FOR SOLUTION ORAL at 11:56

## 2022-05-10 RX ADMIN — ONDANSETRON 4 MILLIGRAM(S): 8 TABLET, FILM COATED ORAL at 12:01

## 2022-05-10 RX ADMIN — Medication 10 MILLIGRAM(S): at 16:10

## 2022-05-10 RX ADMIN — Medication 500 MILLIGRAM(S): at 11:49

## 2022-05-10 RX ADMIN — SEVELAMER CARBONATE 800 MILLIGRAM(S): 2400 POWDER, FOR SUSPENSION ORAL at 17:46

## 2022-05-10 RX ADMIN — ATORVASTATIN CALCIUM 80 MILLIGRAM(S): 80 TABLET, FILM COATED ORAL at 21:09

## 2022-05-10 RX ADMIN — Medication 650 MILLIGRAM(S): at 14:23

## 2022-05-10 RX ADMIN — Medication 81 MILLIGRAM(S): at 11:49

## 2022-05-10 RX ADMIN — SEVELAMER CARBONATE 800 MILLIGRAM(S): 2400 POWDER, FOR SUSPENSION ORAL at 11:50

## 2022-05-10 RX ADMIN — Medication 50 MILLIGRAM(S): at 06:15

## 2022-05-10 RX ADMIN — LISINOPRIL 20 MILLIGRAM(S): 2.5 TABLET ORAL at 21:09

## 2022-05-10 RX ADMIN — HEPARIN SODIUM 5000 UNIT(S): 5000 INJECTION INTRAVENOUS; SUBCUTANEOUS at 17:46

## 2022-05-10 NOTE — PATIENT PROFILE ADULT - FALL HARM RISK - HARM RISK INTERVENTIONS

## 2022-05-10 NOTE — PROGRESS NOTE ADULT - ASSESSMENT
58 yo F PMHx of HTN, HLD, Right pontine CVA (February 7, 2021), intellectual disability, hearing/speech impairment, CKD stage IV, mild AS, former smoker, PVD s/p left femoral artery-posterior tibial artery bypass with autologous venous tissue and left heel ulcer debridement presented to ED for abd pain x1d.     # Abd pain   - Unclear etiology with non specific characteristics and symptoms that are very generic and disconnected  - possible Viral gastritis vs constipation vs cramps vs fatty liver ?  - Physical with focal tenderness to RUQ   - CT A/P unremarkable  - RUQ also unremarkable for focal findings  - lipase neg, 40  - Alk phos elevated 249  - pain control with Tylenol. Can add Dilaudid   - PT eval for weakness       # CHATA on CKD stage IV  - Cr 4.2 (bl 2.8)  - likely pre-renal etiology in setting of decreased PO intake  - Avoid nephrotoxic agents  - C/w Renvela, Sodium bicarb, calcitriol  - s/p 1L bolus of LR x1    # DM   - HA1c 7.1% in 11/21  - Monitor FS  - Start basal bolus if FS persistently >180  - F/u A1c     # HTN  - C/w Toprol, Hydralazine     # HLD  - C/w Atorvastatin     # Right pontine CVA (February 7, 2021)  # Intellectual disability  # hearing/speech impairment  - Pt eval   - ASA, Statin, Plavix      # PVD  - s/p left femoral artery-posterior tibial artery bypass with autologous venous tissue and left heel ulcer debridement 11/21  - ASA, Plavix, Statin     DVT ppx: hep sq  GI ppx: none   Diet: DASH  Activity: IAT  Dispo: Admit to med 58 yo F PMHx of HTN, HLD, Right pontine CVA (February 7, 2021), intellectual disability, hearing/speech impairment, CKD stage IV, mild AS, former smoker, PVD s/p left femoral artery-posterior tibial artery bypass with autologous venous tissue and left heel ulcer debridement presented to ED for abd pain x1d.     # Abd pain   - Unclear etiology with non specific characteristics and symptoms that are very generic and disconnected  - possible Viral gastritis vs constipation vs cramps vs fatty liver ?  - Physical with focal tenderness to RUQ   - CT A/P unremarkable  - RUQ also unremarkable for focal findings  - lipase neg, 40  - Alk phos elevated 249  - pain control with Tylenol. Can add Dilaudid   - PT eval for weakness       # CHATA on CKD stage IV  - Cr 4.2 (bl 2.8)  - likely pre-renal etiology in setting of decreased PO intake  - Avoid nephrotoxic agents  - C/w Renvela, Sodium bicarb, calcitriol  - s/p 1L bolus of LR x1    # DM   - HA1c 7.1% in 11/21  - Monitor FS  - Start basal bolus if FS persistently >180  - F/u A1c     # Accelerated HTN  - uncontrolled with systolics ~200  - s/p hydralazine 10mg IV push x1 with good affect  - increasing home metoprolol 25mg XL to 50mg XL  - if still not controlled other considerations are to add on lisinopril 20mg daily  - PRN labetalol 10mg IV PRN systolics >180; hold parameters if HR <60    # HLD  - C/w Atorvastatin     # Right pontine CVA (February 7, 2021)  # Intellectual disability  # hearing/speech impairment  - Pt eval   - ASA, Statin, Plavix      # PVD  - s/p left femoral artery-posterior tibial artery bypass with autologous venous tissue and left heel ulcer debridement 11/21  - ASA, Plavix, Statin     DVT ppx: hep sq  GI ppx: none   Diet: DASH  Activity: IAT  Dispo: Admit to med

## 2022-05-11 LAB
ANION GAP SERPL CALC-SCNC: 14 MMOL/L — SIGNIFICANT CHANGE UP (ref 7–14)
BUN SERPL-MCNC: 60 MG/DL — HIGH (ref 10–20)
CALCIUM SERPL-MCNC: 7.5 MG/DL — LOW (ref 8.5–10.1)
CHLORIDE SERPL-SCNC: 108 MMOL/L — SIGNIFICANT CHANGE UP (ref 98–110)
CK SERPL-CCNC: 144 U/L — SIGNIFICANT CHANGE UP (ref 0–225)
CO2 SERPL-SCNC: 18 MMOL/L — SIGNIFICANT CHANGE UP (ref 17–32)
CREAT ?TM UR-MCNC: 38 MG/DL — SIGNIFICANT CHANGE UP
CREAT SERPL-MCNC: 5.1 MG/DL — CRITICAL HIGH (ref 0.7–1.5)
EGFR: 9 ML/MIN/1.73M2 — LOW
GLUCOSE BLDC GLUCOMTR-MCNC: 167 MG/DL — HIGH (ref 70–99)
GLUCOSE BLDC GLUCOMTR-MCNC: 174 MG/DL — HIGH (ref 70–99)
GLUCOSE BLDC GLUCOMTR-MCNC: 185 MG/DL — HIGH (ref 70–99)
GLUCOSE BLDC GLUCOMTR-MCNC: 191 MG/DL — HIGH (ref 70–99)
GLUCOSE BLDC GLUCOMTR-MCNC: 194 MG/DL — HIGH (ref 70–99)
GLUCOSE SERPL-MCNC: 163 MG/DL — HIGH (ref 70–99)
HCT VFR BLD CALC: 25.3 % — LOW (ref 37–47)
HGB BLD-MCNC: 8.3 G/DL — LOW (ref 12–16)
MCHC RBC-ENTMCNC: 29.6 PG — SIGNIFICANT CHANGE UP (ref 27–31)
MCHC RBC-ENTMCNC: 32.8 G/DL — SIGNIFICANT CHANGE UP (ref 32–37)
MCV RBC AUTO: 90.4 FL — SIGNIFICANT CHANGE UP (ref 81–99)
NRBC # BLD: 0 /100 WBCS — SIGNIFICANT CHANGE UP (ref 0–0)
PLATELET # BLD AUTO: 198 K/UL — SIGNIFICANT CHANGE UP (ref 130–400)
POTASSIUM SERPL-MCNC: 4.7 MMOL/L — SIGNIFICANT CHANGE UP (ref 3.5–5)
POTASSIUM SERPL-SCNC: 4.7 MMOL/L — SIGNIFICANT CHANGE UP (ref 3.5–5)
RBC # BLD: 2.8 M/UL — LOW (ref 4.2–5.4)
RBC # FLD: 13.5 % — SIGNIFICANT CHANGE UP (ref 11.5–14.5)
SODIUM SERPL-SCNC: 140 MMOL/L — SIGNIFICANT CHANGE UP (ref 135–146)
SODIUM UR-SCNC: 70 MMOL/L — SIGNIFICANT CHANGE UP
WBC # BLD: 7.3 K/UL — SIGNIFICANT CHANGE UP (ref 4.8–10.8)
WBC # FLD AUTO: 7.3 K/UL — SIGNIFICANT CHANGE UP (ref 4.8–10.8)

## 2022-05-11 PROCEDURE — 76770 US EXAM ABDO BACK WALL COMP: CPT | Mod: 26

## 2022-05-11 PROCEDURE — 99222 1ST HOSP IP/OBS MODERATE 55: CPT

## 2022-05-11 PROCEDURE — 99233 SBSQ HOSP IP/OBS HIGH 50: CPT

## 2022-05-11 PROCEDURE — 93975 VASCULAR STUDY: CPT | Mod: 26

## 2022-05-11 RX ORDER — INSULIN LISPRO 100/ML
VIAL (ML) SUBCUTANEOUS
Refills: 0 | Status: DISCONTINUED | OUTPATIENT
Start: 2022-05-11 | End: 2022-05-14

## 2022-05-11 RX ORDER — METOPROLOL TARTRATE 50 MG
50 TABLET ORAL ONCE
Refills: 0 | Status: COMPLETED | OUTPATIENT
Start: 2022-05-11 | End: 2022-05-11

## 2022-05-11 RX ORDER — GLUCAGON INJECTION, SOLUTION 0.5 MG/.1ML
1 INJECTION, SOLUTION SUBCUTANEOUS ONCE
Refills: 0 | Status: DISCONTINUED | OUTPATIENT
Start: 2022-05-11 | End: 2022-05-14

## 2022-05-11 RX ORDER — DEXTROSE 50 % IN WATER 50 %
25 SYRINGE (ML) INTRAVENOUS ONCE
Refills: 0 | Status: DISCONTINUED | OUTPATIENT
Start: 2022-05-11 | End: 2022-05-14

## 2022-05-11 RX ORDER — CYCLOBENZAPRINE HYDROCHLORIDE 10 MG/1
5 TABLET, FILM COATED ORAL AT BEDTIME
Refills: 0 | Status: DISCONTINUED | OUTPATIENT
Start: 2022-05-11 | End: 2022-05-16

## 2022-05-11 RX ORDER — SODIUM CHLORIDE 9 MG/ML
500 INJECTION, SOLUTION INTRAVENOUS
Refills: 0 | Status: DISCONTINUED | OUTPATIENT
Start: 2022-05-11 | End: 2022-05-11

## 2022-05-11 RX ORDER — SODIUM CHLORIDE 9 MG/ML
1000 INJECTION, SOLUTION INTRAVENOUS
Refills: 0 | Status: DISCONTINUED | OUTPATIENT
Start: 2022-05-11 | End: 2022-05-14

## 2022-05-11 RX ORDER — DEXTROSE 50 % IN WATER 50 %
15 SYRINGE (ML) INTRAVENOUS ONCE
Refills: 0 | Status: DISCONTINUED | OUTPATIENT
Start: 2022-05-11 | End: 2022-05-14

## 2022-05-11 RX ORDER — SODIUM CHLORIDE 9 MG/ML
500 INJECTION, SOLUTION INTRAVENOUS
Refills: 0 | Status: DISCONTINUED | OUTPATIENT
Start: 2022-05-11 | End: 2022-05-12

## 2022-05-11 RX ORDER — SODIUM CHLORIDE 9 MG/ML
1000 INJECTION, SOLUTION INTRAVENOUS ONCE
Refills: 0 | Status: COMPLETED | OUTPATIENT
Start: 2022-05-11 | End: 2022-05-11

## 2022-05-11 RX ORDER — HYDRALAZINE HCL 50 MG
75 TABLET ORAL EVERY 8 HOURS
Refills: 0 | Status: DISCONTINUED | OUTPATIENT
Start: 2022-05-11 | End: 2022-05-15

## 2022-05-11 RX ORDER — METOPROLOL TARTRATE 50 MG
100 TABLET ORAL DAILY
Refills: 0 | Status: DISCONTINUED | OUTPATIENT
Start: 2022-05-12 | End: 2022-05-16

## 2022-05-11 RX ORDER — SENNA PLUS 8.6 MG/1
1 TABLET ORAL AT BEDTIME
Refills: 0 | Status: DISCONTINUED | OUTPATIENT
Start: 2022-05-11 | End: 2022-05-16

## 2022-05-11 RX ORDER — DEXTROSE 50 % IN WATER 50 %
12.5 SYRINGE (ML) INTRAVENOUS ONCE
Refills: 0 | Status: DISCONTINUED | OUTPATIENT
Start: 2022-05-11 | End: 2022-05-14

## 2022-05-11 RX ORDER — HYDRALAZINE HCL 50 MG
10 TABLET ORAL ONCE
Refills: 0 | Status: DISCONTINUED | OUTPATIENT
Start: 2022-05-11 | End: 2022-05-11

## 2022-05-11 RX ORDER — HYDRALAZINE HCL 50 MG
75 TABLET ORAL EVERY 8 HOURS
Refills: 0 | Status: DISCONTINUED | OUTPATIENT
Start: 2022-05-11 | End: 2022-05-11

## 2022-05-11 RX ADMIN — Medication 650 MILLIGRAM(S): at 17:03

## 2022-05-11 RX ADMIN — HEPARIN SODIUM 5000 UNIT(S): 5000 INJECTION INTRAVENOUS; SUBCUTANEOUS at 17:04

## 2022-05-11 RX ADMIN — Medication 50 MILLIGRAM(S): at 17:03

## 2022-05-11 RX ADMIN — Medication 1 GRAM(S): at 12:16

## 2022-05-11 RX ADMIN — SODIUM CHLORIDE 1000 MILLILITER(S): 9 INJECTION, SOLUTION INTRAVENOUS at 12:22

## 2022-05-11 RX ADMIN — CALCITRIOL 0.25 MICROGRAM(S): 0.5 CAPSULE ORAL at 12:15

## 2022-05-11 RX ADMIN — SEVELAMER CARBONATE 800 MILLIGRAM(S): 2400 POWDER, FOR SUSPENSION ORAL at 12:15

## 2022-05-11 RX ADMIN — CLOPIDOGREL BISULFATE 75 MILLIGRAM(S): 75 TABLET, FILM COATED ORAL at 12:15

## 2022-05-11 RX ADMIN — SEVELAMER CARBONATE 800 MILLIGRAM(S): 2400 POWDER, FOR SUSPENSION ORAL at 08:00

## 2022-05-11 RX ADMIN — Medication 650 MILLIGRAM(S): at 21:25

## 2022-05-11 RX ADMIN — Medication 325 MILLIGRAM(S): at 12:15

## 2022-05-11 RX ADMIN — Medication 75 MILLIGRAM(S): at 21:25

## 2022-05-11 RX ADMIN — POLYETHYLENE GLYCOL 3350 17 GRAM(S): 17 POWDER, FOR SOLUTION ORAL at 12:15

## 2022-05-11 RX ADMIN — Medication 81 MILLIGRAM(S): at 12:16

## 2022-05-11 RX ADMIN — Medication 500 MILLIGRAM(S): at 12:16

## 2022-05-11 RX ADMIN — SODIUM CHLORIDE 75 MILLILITER(S): 9 INJECTION, SOLUTION INTRAVENOUS at 19:01

## 2022-05-11 RX ADMIN — Medication 50 MILLIGRAM(S): at 06:11

## 2022-05-11 RX ADMIN — Medication 1 GRAM(S): at 06:12

## 2022-05-11 RX ADMIN — Medication 650 MILLIGRAM(S): at 14:22

## 2022-05-11 RX ADMIN — Medication 650 MILLIGRAM(S): at 06:11

## 2022-05-11 RX ADMIN — ATORVASTATIN CALCIUM 80 MILLIGRAM(S): 80 TABLET, FILM COATED ORAL at 21:25

## 2022-05-11 RX ADMIN — SEVELAMER CARBONATE 800 MILLIGRAM(S): 2400 POWDER, FOR SUSPENSION ORAL at 17:04

## 2022-05-11 RX ADMIN — SENNA PLUS 1 TABLET(S): 8.6 TABLET ORAL at 21:25

## 2022-05-11 RX ADMIN — HEPARIN SODIUM 5000 UNIT(S): 5000 INJECTION INTRAVENOUS; SUBCUTANEOUS at 06:11

## 2022-05-11 RX ADMIN — Medication 1 GRAM(S): at 17:04

## 2022-05-11 NOTE — PROGRESS NOTE ADULT - ASSESSMENT
58 yo F PMHx of HTN, HLD, Right pontine CVA (February 7, 2021), intellectual disability, hearing/speech impairment, CKD stage IV, mild AS, former smoker, PVD s/p left femoral artery-posterior tibial artery bypass with autologous venous tissue and left heel ulcer debridement presented to ED for abd pain x1d.     # Abd pain  # diffuse MSK pain   - Unclear etiology with non specific characteristics and symptoms that are very generic and disconnected  - possible Viral gastritis vs constipation vs cramps vs fatty liver ?  - Physical with focal tenderness to RUQ   - CT A/P unremarkable  - RUQ also unremarkable for focal findings  - lipase neg, 40  - Alk phos elevated 249  - pain control with Tylenol. Can add Dilaudid   - PT eval for weakness   - obtaining CPK level  - added carafate 1g qid      # CHATA on CKD stage IV  # Metabolic Acidosis  - Cr 4.2 (bl 2.8)-->5.1  - likely pre-renal etiology in setting of decreased PO intake vs ANKITA nephropathy  - Avoid nephrotoxic agents  - C/w Renvela, Sodium bicarb, calcitriol  - s/p 1L bolus of LR x1  - Nephrology following        Bladder Sono for PVR - if >200 cc, place a Henriquez       start 1/2 NS 75 cc/hr       check CPK, phos, iPTH,       UA with proteinuria 600, check Spot protein creat ratio       SPEP UPEP Serum and Urine IF, FLC in serum       Strict Is and  OS        Na bicarb 650 po tid      # DM   - HA1c 7.1% in 11/21  - Monitor FS  - Start basal bolus if FS persistently >180  - F/u A1c -->6.1    # Accelerated HTN  - uncontrolled with systolics ~200  - s/p hydralazine 10mg IV push x1 with good affect  - increasing home metoprolol 25mg XL to 50mg XL; improved but not at goal yet. Will give additional 50mg XL today and start 100mg XL tomorrow morning  -  lisinopril 20mg qhs  - PRN labetalol 10mg IV PRN systolics >180; hold parameters if HR <60    # HLD  - C/w Atorvastatin     # Right pontine CVA (February 7, 2021)  # Intellectual disability  # hearing/speech impairment  - Pt eval   - ASA, Statin, Plavix      # PVD  - s/p left femoral artery-posterior tibial artery bypass with autologous venous tissue and left heel ulcer debridement 11/21  - ASA, Plavix, Statin     DVT ppx: hep sq  GI ppx: none   Diet: DASH  Activity: IAT  Dispo: Admit to med    #Progress Note Handoff  Pending (specify):  Neph follow up, resolution of CHATA, better pain control?  Family discussion: patient and brother updated at bedside.   Disposition: Unknown at this time________

## 2022-05-11 NOTE — PHYSICAL THERAPY INITIAL EVALUATION ADULT - THERAPY FREQUENCY, PT EVAL
Pt can cont amb with NSG staff as tolerated with RW, has wheelchair in room. Pt does not require further skilled b/s PT intervention at this time.

## 2022-05-11 NOTE — CONSULT NOTE ADULT - ASSESSMENT
56 yo F PMHx of HTN, HLD, Right pontine CVA (February 7, 2021), intellectual disability, hearing/speech impairment, CKD stage IV, mild AS, former smoker, PVD s/p left femoral artery-posterior tibial artery bypass with autologous venous tissue and left heel ulcer debridement presented to ED for abd pain x1d.   Renal was called for CHATA on CKD 4    #CHATA - likely prerenal, possible ANKITA (IC 5/10/22) pt with poor po intake  -r/o retention  - not on IVF  -Bladder Sono for PVR - if >200 cc, place a Henriquez  -start 1/2 NS 75 cc/hr  -check CPK, phos, iPTH,  UA with proteinuria 600, check Spot protein creat ratio  -SPEP UPEP Serum and Urine IF, FLC in serum  Strict Is and  OS  - CTAP - no hydro, nonobstructing Rt renal calculi  - cont Renvela, Calcitriol  #Met acidosis - Na bicarb 650 po tid  #CKD - creat was 2.7 - 3.0 in Nov 2021    # Abd pain   - Unclear etiology with non specific characteristics and symptoms that are very generic and disconnected  - possible Viral gastritis vs constipation vs cramps - Check PCPK  - Physical with focal tenderness to RUQ   - CT A/P unremarkable  - RUQ also unremarkable for focal findings  - lipase neg, 40  - Alk phos elevated 249  #Anemia - check iron stores, will need HANNAH and Venofer if low    # Accelerated HTN - uncontrolled with systolics ~200  - s/p hydralazine 10mg IV push x1 with good affect  - increasing home metoprolol 25mg XL to 50mg XL  - increase Hydralazine 75 mg q8  check RAD r/o ZULEYKA  no ACEi or ARB in CHATA    # Right pontine CVA (February 7, 2021)  # Intellectual disability  # hearing/speech impairment

## 2022-05-11 NOTE — PHYSICAL THERAPY INITIAL EVALUATION ADULT - PERTINENT HX OF CURRENT PROBLEM, REHAB EVAL
58 yo F PMHx of, HTN, HLD, Right pontine CVA (February 7, 2021), intellectual disability, hearing/speech impairment, DM, CKD stage IV, mild AS, former smoker, PVD s/p left femoral artery-posterior tibial artery bypass with autologous venous tissue and left heel ulcer debridement 11/21 presented to ED for abd pain x2d. Pain is located in right upper and lower quadrants and L lateral abd, mild-mod radiating to b/l LE and R arm, crampy, intermittent, worse at night, a/w nausea, generalized weakness

## 2022-05-11 NOTE — PHYSICAL THERAPY INITIAL EVALUATION ADULT - GENERAL OBSERVATIONS, REHAB EVAL
[id number] Pt rec semi-aguilera in bed, in NAD. Pt is hearing/speech impaired,  services used for American Sign Language ID#[id number].

## 2022-05-11 NOTE — CONSULT NOTE ADULT - SUBJECTIVE AND OBJECTIVE BOX
NEPHROLOGY CONSULTATION NOTE  As per EMR:  58 yo F PMHx of, HTN, HLD, Right pontine CVA (2021), intellectual disability, hearing/speech impairment, DM, CKD stage IV, mild AS, former smoker, PVD s/p left femoral artery-posterior tibial artery bypass with autologous venous tissue and left heel ulcer debridement  presented to ED for abd pain x2d.     Pain is located in right upper and lower quadrants and L lateral abd, mild-mod (unable to give number), radiating to b/l le and r arm, described as crampy, intermittent, worse at night, a/w nausea, nbnb vomitus, constipation, dec po intake, generalized weakness. Brother in law at bedside corroborated history. Endorsed, subjective fevers, chills, rhinorrhea. Pt denies H/A, sob, cough, c/p, palpitations, burning, frequency.     In the ED, VS T(F): 98.5 HR: 76 BP: 185/78 RR: 17 SpO2: 98% in RA. Labs notable for Cr 4.2 (bl 2.8), K 5.3, Alk phos 249. CT A/P noncont unremarkable.     Admitted for CHATA and abd pain. Med rec from son  in law    Renal was called for CHATA on CKD - creat was 2.7 in 2021    PAST MEDICAL & SURGICAL HISTORY:  PVD (peripheral vascular disease)      Benign essential HTN      Intellectual disability      Hearing impaired      HLD (hyperlipidemia)        Allergies:  penicillin (Rash)    Home Medications Reviewed  Hospital Medications:   MEDICATIONS  (STANDING):  ascorbic acid 500 milliGRAM(s) Oral daily  aspirin enteric coated 81 milliGRAM(s) Oral daily  atorvastatin 80 milliGRAM(s) Oral at bedtime  calcitriol   Capsule 0.25 MICROGram(s) Oral daily  chlorhexidine 4% Liquid 1 Application(s) Topical <User Schedule>  clopidogrel Tablet 75 milliGRAM(s) Oral daily  ferrous    sulfate 325 milliGRAM(s) Oral daily  heparin   Injectable 5000 Unit(s) SubCutaneous every 12 hours  hydrALAZINE 50 milliGRAM(s) Oral daily  lisinopril 20 milliGRAM(s) Oral at bedtime  metoprolol succinate ER 50 milliGRAM(s) Oral daily  polyethylene glycol 3350 17 Gram(s) Oral daily  senna 2 Tablet(s) Oral at bedtime  sevelamer carbonate 800 milliGRAM(s) Oral three times a day with meals  sodium bicarbonate 650 milliGRAM(s) Oral three times a day  sucralfate 1 Gram(s) Oral four times a day      SOCIAL HISTORY:  Denies ETOH,Smoking,   FAMILY HISTORY:        REVIEW OF SYSTEMS:  as above    VITALS:  T(F): 97.3 (22 @ 14:49), Max: 98.2 (05-10-22 @ 21:06)  HR: 72 (22 @ 14:49)  BP: 171/76 (22 @ 14:49)  RR: 18 (22 @ 14:49)  SpO2: --        I&O's Detail        PHYSICAL EXAM:  Constitutional: NAD  HEENT: anicteric sclera,  Neck: No JVD  Respiratory: CTAB, no wheezes, rales or rhonchi  Cardiovascular: S1, S2, RRR  Gastrointestinal: BS+, soft, NT/ND  Extremities: No peripheral edema  Neurological: Awake  Psychiatric: Normal mood, normal affect  : No CVA tenderness. No mcgrath.   Skin: No rashes  Vascular Access:    LABS:      140  |  108  |  60<H>  ----------------------------<  163<H>  4.7   |  18  |  5.1<HH>    Ca    7.5<L>      11 May 2022 07:08  Mg     2.2     05-10    TPro  5.4<L>  /  Alb  3.2<L>  /  TBili  <0.2  /  DBili      /  AST  12  /  ALT  9   /  AlkPhos  225<H>  10    Creatinine Trend: 5.1 <--, 4.7 <--, 4.2 <--                        8.3    7.30  )-----------( 198      ( 11 May 2022 07:08 )             25.3     Urine Studies:  Urinalysis Basic - ( 09 May 2022 13:14 )    Color: Light Yellow / Appearance: Clear / S.013 / pH:   Gluc:  / Ketone: Negative  / Bili: Negative / Urobili: <2 mg/dL   Blood:  / Protein: >600 mg/dL / Nitrite: Negative   Leuk Esterase: Negative / RBC: 3 /HPF / WBC 2 /HPF   Sq Epi:  / Non Sq Epi: 3 /HPF / Bacteria: Negative                RADIOLOGY & ADDITIONAL STUDIES:    < from: US Abdomen Upper Quadrant Right (05.10.22 @ 10:31) >  FINDINGS:    This study limited by overlying bowel gas    Liver: Limited evaluation by sonography  Bile ducts: CBD measures 5 mm, nondilated.  Gallbladder: Within normal limits.  Pancreas: Poorly visualized.  Right kidney: Poorly visualized. Approximately 8.8 cm in length. No   definite hydronephrosis.  Ascites: None.      IMPRESSION:    < end of copied text >  < from: CT Abdomen and Pelvis w/ IV Cont (22 @ 14:40) >    KIDNEYS: Symmetric renal enhancement. Nonobstructing right renal calculi.   No hydronephrosis.    ABDOMINOPELVIC NODES: Unremarkable.    PELVIC ORGANS: Unremarkable.    PERITONEUM/MESENTERY/BOWEL: No bowel wall thickening or obstruction. No   pneumoperitoneum or ascites.    BONES/SOFT TISSUES: Degenerative changes of the spine. Postsurgical   changes within the left femoral region. Small fat-containing umbilical   hernia.    OTHER: Atherosclerotic calcifications.      IMPRESSION:    No CT evidence for acute abdominopelvic pathology.    < end of copied text >

## 2022-05-11 NOTE — PHYSICAL THERAPY INITIAL EVALUATION ADULT - ADDITIONAL COMMENTS
As per pt, she has a RW she previously used to ambulate. Pt has WC present in room, unable to obtain history of use. Difficulty obtaining full pt history of PLOF. As per , pt does not use same exact signing as ASL.

## 2022-05-11 NOTE — PHYSICAL THERAPY INITIAL EVALUATION ADULT - SPECIFY REASON(S)
Pt. attempted to be seen for PT f/u this PM.  #[id number] for ASL used for entire session. Pt.'s BP running high today. Retaken on each arm: RUE: 216/90mmHG and LUE: 193/83mmHg
Pt can cont amb with NSG staff as tolerated with RW, has wheelchair in room. Pt does not require further skilled b/s PT intervention at this time.

## 2022-05-11 NOTE — PHYSICAL THERAPY INITIAL EVALUATION ADULT - GAIT DEVIATIONS NOTED, PT EVAL
Small steps c decreased step length; Dec knee/hip flexion/decreased step length/decreased stride length

## 2022-05-11 NOTE — PHYSICAL THERAPY INITIAL EVALUATION ADULT - DID THE PATIENT HAVE SURGERY?
Pt.'s BP too high to continue PT IE today, pt. educated on positioning and ther ex in supine. Will f/u when patient's bp is medically controlled
n/a

## 2022-05-12 LAB
ANION GAP SERPL CALC-SCNC: 12 MMOL/L — SIGNIFICANT CHANGE UP (ref 7–14)
APPEARANCE UR: CLEAR — SIGNIFICANT CHANGE UP
BACTERIA # UR AUTO: NEGATIVE — SIGNIFICANT CHANGE UP
BILIRUB UR-MCNC: NEGATIVE — SIGNIFICANT CHANGE UP
BUN SERPL-MCNC: 55 MG/DL — HIGH (ref 10–20)
CALCIUM SERPL-MCNC: 7.7 MG/DL — LOW (ref 8.5–10.1)
CALCIUM SERPL-MCNC: 7.8 MG/DL — LOW (ref 8.4–10.5)
CHLORIDE SERPL-SCNC: 107 MMOL/L — SIGNIFICANT CHANGE UP (ref 98–110)
CK SERPL-CCNC: 142 U/L — SIGNIFICANT CHANGE UP (ref 0–225)
CO2 SERPL-SCNC: 20 MMOL/L — SIGNIFICANT CHANGE UP (ref 17–32)
COLOR SPEC: COLORLESS — SIGNIFICANT CHANGE UP
CREAT ?TM UR-MCNC: 36 MG/DL — SIGNIFICANT CHANGE UP
CREAT ?TM UR-MCNC: 40 MG/DL — SIGNIFICANT CHANGE UP
CREAT SERPL-MCNC: 4.9 MG/DL — CRITICAL HIGH (ref 0.7–1.5)
DIFF PNL FLD: NEGATIVE — SIGNIFICANT CHANGE UP
EGFR: 10 ML/MIN/1.73M2 — LOW
EPI CELLS # UR: 2 /HPF — SIGNIFICANT CHANGE UP (ref 0–5)
FERRITIN SERPL-MCNC: 97 NG/ML — SIGNIFICANT CHANGE UP (ref 15–150)
GLUCOSE BLDC GLUCOMTR-MCNC: 132 MG/DL — HIGH (ref 70–99)
GLUCOSE BLDC GLUCOMTR-MCNC: 161 MG/DL — HIGH (ref 70–99)
GLUCOSE BLDC GLUCOMTR-MCNC: 163 MG/DL — HIGH (ref 70–99)
GLUCOSE BLDC GLUCOMTR-MCNC: 173 MG/DL — HIGH (ref 70–99)
GLUCOSE SERPL-MCNC: 143 MG/DL — HIGH (ref 70–99)
GLUCOSE UR QL: ABNORMAL
HCT VFR BLD CALC: 26.3 % — LOW (ref 37–47)
HGB BLD-MCNC: 8.8 G/DL — LOW (ref 12–16)
HYALINE CASTS # UR AUTO: 1 /LPF — SIGNIFICANT CHANGE UP (ref 0–7)
IRON SATN MFR SERPL: 51 % — HIGH (ref 15–50)
IRON SATN MFR SERPL: 99 UG/DL — SIGNIFICANT CHANGE UP (ref 35–150)
KETONES UR-MCNC: NEGATIVE — SIGNIFICANT CHANGE UP
LEUKOCYTE ESTERASE UR-ACNC: NEGATIVE — SIGNIFICANT CHANGE UP
MAGNESIUM SERPL-MCNC: 2.2 MG/DL — SIGNIFICANT CHANGE UP (ref 1.8–2.4)
MCHC RBC-ENTMCNC: 29.8 PG — SIGNIFICANT CHANGE UP (ref 27–31)
MCHC RBC-ENTMCNC: 33.5 G/DL — SIGNIFICANT CHANGE UP (ref 32–37)
MCV RBC AUTO: 89.2 FL — SIGNIFICANT CHANGE UP (ref 81–99)
NITRITE UR-MCNC: NEGATIVE — SIGNIFICANT CHANGE UP
NRBC # BLD: 0 /100 WBCS — SIGNIFICANT CHANGE UP (ref 0–0)
PH UR: 8 — SIGNIFICANT CHANGE UP (ref 5–8)
PHOSPHATE SERPL-MCNC: 3.6 MG/DL — SIGNIFICANT CHANGE UP (ref 2.1–4.9)
PLATELET # BLD AUTO: 197 K/UL — SIGNIFICANT CHANGE UP (ref 130–400)
POTASSIUM SERPL-MCNC: 4.8 MMOL/L — SIGNIFICANT CHANGE UP (ref 3.5–5)
POTASSIUM SERPL-SCNC: 4.8 MMOL/L — SIGNIFICANT CHANGE UP (ref 3.5–5)
PROT ?TM UR-MCNC: 378 MG/DLG/24H — SIGNIFICANT CHANGE UP
PROT ?TM UR-MCNC: 415 MG/DLG/24H — SIGNIFICANT CHANGE UP
PROT SERPL-MCNC: 5 G/DL — LOW (ref 6–8.3)
PROT SERPL-MCNC: 5 G/DL — LOW (ref 6–8.3)
PROT UR-MCNC: ABNORMAL
PROT/CREAT UR-RTO: 10.4 RATIO — HIGH (ref 0–0.2)
PROT/CREAT UR-RTO: 10.5 RATIO — HIGH (ref 0–0.2)
PTH-INTACT FLD-MCNC: 275 PG/ML — HIGH (ref 15–65)
RBC # BLD: 2.95 M/UL — LOW (ref 4.2–5.4)
RBC # FLD: 13.4 % — SIGNIFICANT CHANGE UP (ref 11.5–14.5)
RBC CASTS # UR COMP ASSIST: 0 /HPF — SIGNIFICANT CHANGE UP (ref 0–4)
SODIUM SERPL-SCNC: 139 MMOL/L — SIGNIFICANT CHANGE UP (ref 135–146)
SP GR SPEC: 1.01 — LOW (ref 1.01–1.03)
TIBC SERPL-MCNC: 195 UG/DL — LOW (ref 220–430)
TRANSFERRIN SERPL-MCNC: 160 MG/DL — LOW (ref 200–360)
UIBC SERPL-MCNC: 96 UG/DL — LOW (ref 110–370)
UROBILINOGEN FLD QL: SIGNIFICANT CHANGE UP
WBC # BLD: 7.1 K/UL — SIGNIFICANT CHANGE UP (ref 4.8–10.8)
WBC # FLD AUTO: 7.1 K/UL — SIGNIFICANT CHANGE UP (ref 4.8–10.8)
WBC UR QL: 1 /HPF — SIGNIFICANT CHANGE UP (ref 0–5)

## 2022-05-12 PROCEDURE — 99233 SBSQ HOSP IP/OBS HIGH 50: CPT

## 2022-05-12 RX ORDER — SODIUM CHLORIDE 9 MG/ML
1000 INJECTION INTRAMUSCULAR; INTRAVENOUS; SUBCUTANEOUS
Refills: 0 | Status: DISCONTINUED | OUTPATIENT
Start: 2022-05-12 | End: 2022-05-14

## 2022-05-12 RX ORDER — HYDRALAZINE HCL 50 MG
50 TABLET ORAL ONCE
Refills: 0 | Status: COMPLETED | OUTPATIENT
Start: 2022-05-12 | End: 2022-05-12

## 2022-05-12 RX ORDER — AMLODIPINE BESYLATE 2.5 MG/1
5 TABLET ORAL DAILY
Refills: 0 | Status: DISCONTINUED | OUTPATIENT
Start: 2022-05-12 | End: 2022-05-13

## 2022-05-12 RX ADMIN — CALCITRIOL 0.25 MICROGRAM(S): 0.5 CAPSULE ORAL at 11:43

## 2022-05-12 RX ADMIN — Medication 650 MILLIGRAM(S): at 11:42

## 2022-05-12 RX ADMIN — Medication 75 MILLIGRAM(S): at 21:07

## 2022-05-12 RX ADMIN — Medication 650 MILLIGRAM(S): at 13:39

## 2022-05-12 RX ADMIN — Medication 1 GRAM(S): at 00:01

## 2022-05-12 RX ADMIN — CLOPIDOGREL BISULFATE 75 MILLIGRAM(S): 75 TABLET, FILM COATED ORAL at 11:43

## 2022-05-12 RX ADMIN — Medication 1: at 11:45

## 2022-05-12 RX ADMIN — HEPARIN SODIUM 5000 UNIT(S): 5000 INJECTION INTRAVENOUS; SUBCUTANEOUS at 17:15

## 2022-05-12 RX ADMIN — Medication 650 MILLIGRAM(S): at 21:08

## 2022-05-12 RX ADMIN — SEVELAMER CARBONATE 800 MILLIGRAM(S): 2400 POWDER, FOR SUSPENSION ORAL at 11:43

## 2022-05-12 RX ADMIN — Medication 325 MILLIGRAM(S): at 11:43

## 2022-05-12 RX ADMIN — AMLODIPINE BESYLATE 5 MILLIGRAM(S): 2.5 TABLET ORAL at 17:14

## 2022-05-12 RX ADMIN — POLYETHYLENE GLYCOL 3350 17 GRAM(S): 17 POWDER, FOR SOLUTION ORAL at 11:45

## 2022-05-12 RX ADMIN — Medication 650 MILLIGRAM(S): at 06:33

## 2022-05-12 RX ADMIN — Medication 1 GRAM(S): at 06:32

## 2022-05-12 RX ADMIN — Medication 100 MILLIGRAM(S): at 06:33

## 2022-05-12 RX ADMIN — SENNA PLUS 1 TABLET(S): 8.6 TABLET ORAL at 21:07

## 2022-05-12 RX ADMIN — SEVELAMER CARBONATE 800 MILLIGRAM(S): 2400 POWDER, FOR SUSPENSION ORAL at 17:14

## 2022-05-12 RX ADMIN — Medication 81 MILLIGRAM(S): at 11:43

## 2022-05-12 RX ADMIN — Medication 650 MILLIGRAM(S): at 13:14

## 2022-05-12 RX ADMIN — HEPARIN SODIUM 5000 UNIT(S): 5000 INJECTION INTRAVENOUS; SUBCUTANEOUS at 06:33

## 2022-05-12 RX ADMIN — Medication 50 MILLIGRAM(S): at 19:13

## 2022-05-12 RX ADMIN — Medication 500 MILLIGRAM(S): at 11:43

## 2022-05-12 RX ADMIN — Medication 75 MILLIGRAM(S): at 13:36

## 2022-05-12 RX ADMIN — Medication 1 GRAM(S): at 17:06

## 2022-05-12 RX ADMIN — ATORVASTATIN CALCIUM 80 MILLIGRAM(S): 80 TABLET, FILM COATED ORAL at 21:07

## 2022-05-12 RX ADMIN — ONDANSETRON 4 MILLIGRAM(S): 8 TABLET, FILM COATED ORAL at 08:19

## 2022-05-12 RX ADMIN — Medication 75 MILLIGRAM(S): at 06:33

## 2022-05-12 RX ADMIN — Medication 1 GRAM(S): at 11:43

## 2022-05-12 RX ADMIN — SEVELAMER CARBONATE 800 MILLIGRAM(S): 2400 POWDER, FOR SUSPENSION ORAL at 08:05

## 2022-05-12 RX ADMIN — SODIUM CHLORIDE 75 MILLILITER(S): 9 INJECTION INTRAMUSCULAR; INTRAVENOUS; SUBCUTANEOUS at 08:57

## 2022-05-12 RX ADMIN — Medication 1 GRAM(S): at 23:35

## 2022-05-12 NOTE — PROGRESS NOTE ADULT - ATTENDING COMMENTS
***My note supersedes any discrepancies that may be above in the resident's note***    56 yo F PMHx of HTN, HLD, Right pontine CVA (February 7, 2021), intellectual disability, hearing/speech impairment, CKD stage IV, mild AS, former smoker, PVD s/p left femoral artery-posterior tibial artery bypass with autologous venous tissue and left heel ulcer debridement presented to ED for abd pain x1d.     #Abd pain, IMPROVED  #diffuse MSK pain   - Unclear etiology with non specific characteristics and symptoms that are very generic and disconnected  - possible Viral gastritis vs constipation vs cramps vs fatty liver ?  - Today patient has left flank pain, possible kidney stone? prior CT with nonobstructive right kidney stones  - Repeat UA  - RUQ also unremarkable for focal findings  - lipase neg, 40  - Alk phos elevated 249  - CPK wnl  - pain control with Tylenol. Can add Dilaudid   - PT eval for weakness   - added carafate 1g qid    # CHATA on CKD stage IV  # Metabolic Acidosis  - Cr baseline 2.8  - likely pre-renal etiology in setting of decreased PO intake vs ANKITA nephropathy  - Avoid nephrotoxic agents  - C/w Renvela, Sodium bicarb, calcitriol  - s/p 1L bolus of LR x1  - Nephrology following          on IVF - creat is trending down         kidney sono smaller size kidneys with increased echogenicity neg ZULEYKA         no retention PVR 70 cc only         started 1/2 NS 75 cc/hr         CPK, phos wnl , iPTH pending         UA with proteinuria 600 -> 300,   Spot protein creat ratio of 10 gr is likely false + because of concentrated specimen         now protein 300, please repeat SPC ratio          h/o DM? Hb A1C 6.1%         SPEP UPEP Serum and Urine IF, FLC in serum    # DM   - HA1c 7.1% in 11/21  - Monitor FS  - Start basal bolus if FS persistently >180  - F/u A1c -->6.1    # Accelerated HTN  - uncontrolled with systolics ~200  - s/p hydralazine 10mg IV push x1 with good affect  - increased metoprolol to 100mg XL daily  - hydralazine 75mg q8h  - starting amlodipine 5mg daily  - lisinopril 20mg being held  - PRN labetalol 10mg IV PRN systolics >180; hold parameters if HR <60    # HLD  - C/w Atorvastatin     # Right pontine CVA (February 7, 2021)  # Intellectual disability  # hearing/speech impairment  - Pt eval   - ASA, Statin, Plavix      # PVD  - s/p left femoral artery-posterior tibial artery bypass with autologous venous tissue and left heel ulcer debridement 11/21  - ASA, Plavix, Statin     DVT ppx: hep sq  GI ppx: none   Diet: DASH  Activity: IAT  Dispo: acute    #Progress Note Handoff  Pending (specify):  CHATA work up, HTN control  Family discussion: updated.   Disposition: Unknown at this time________

## 2022-05-12 NOTE — PROGRESS NOTE ADULT - ASSESSMENT
56 yo F PMHx of HTN, HLD, Right pontine CVA (February 7, 2021), intellectual disability, hearing/speech impairment, CKD stage IV, mild AS, former smoker, PVD s/p left femoral artery-posterior tibial artery bypass with autologous venous tissue and left heel ulcer debridement presented to ED for abd pain x1d.     #Abd pain  #diffuse MSK pain   - Unclear etiology with non specific characteristics and symptoms that are very generic and disconnected  - possible Viral gastritis vs constipation vs cramps vs fatty liver ?  - Today patient has left flank pain, possible kidney stone? prior CT with nonobstructive right kidney stones  - Repeat UA  - RUQ also unremarkable for focal findings  - lipase neg, 40  - Alk phos elevated 249  - CPK wnl  - pain control with Tylenol. Can add Dilaudid   - PT eval for weakness   - added carafate 1g qid    # CHATA on CKD stage IV  # Metabolic Acidosis  - Cr baseline 2.8  - likely pre-renal etiology in setting of decreased PO intake vs ANKITA nephropathy  - Avoid nephrotoxic agents  - C/w Renvela, Sodium bicarb, calcitriol  - s/p 1L bolus of LR x1  - Nephrology following        Bladder Sono for PVR: not retaining       Cont IVF       check CPK, phos, iPTH,       UA with proteinuria 600, check Spot protein creat ratio       SPEP UPEP Serum and Urine IF, FLC in serum       Strict Is and  OS        Na bicarb 650 po tid    # DM   - HA1c 7.1% in 11/21  - Monitor FS  - Start basal bolus if FS persistently >180  - F/u A1c -->6.1    # Accelerated HTN  - uncontrolled with systolics ~200  - s/p hydralazine 10mg IV push x1 with good affect  - increased metoprolol to 100mg XL daily  - lisinopril 20mg qhs  - PRN labetalol 10mg IV PRN systolics >180; hold parameters if HR <60    # HLD  - C/w Atorvastatin     # Right pontine CVA (February 7, 2021)  # Intellectual disability  # hearing/speech impairment  - Pt eval   - ASA, Statin, Plavix      # PVD  - s/p left femoral artery-posterior tibial artery bypass with autologous venous tissue and left heel ulcer debridement 11/21  - ASA, Plavix, Statin     DVT ppx: hep sq  GI ppx: none   Diet: DASH  Activity: IAT  Dispo: acute

## 2022-05-12 NOTE — PROGRESS NOTE ADULT - ASSESSMENT
56 yo F PMHx of HTN, HLD, Right pontine CVA (February 7, 2021), intellectual disability, hearing/speech impairment, CKD stage IV, mild AS, former smoker, PVD s/p left femoral artery-posterior tibial artery bypass with autologous venous tissue and left heel ulcer debridement presented to ED for abd pain x1d.   Renal was called for CHATA on CKD 4    #CHATA - likely prerenal, possible ANKITA (IC 5/10/22) pt with poor po intake  - on IVF - creat is trending down  -kidney sono smaller size kidneys with increased echogenicity neg ZULEYKA  -no retention PVR 70 cc only  -start 1/2 NS 75 cc/hr  -CPK, phos wnl , iPTH pending  UA with proteinuria 600 -> 300,   Spot protein creat ratio of 10 gr is likely false + because of concentrated specimen  now protein 300, please repeat SPC ratio  h/o DM? Hb A1C 6.1%  -SPEP UPEP Serum and Urine IF, FLC in serum  Strict Is and  OS  - CTAP - no hydro, nonobstructive Rt renal calculi  - cont Renvela, Calcitriol  #Met acidosis - Na bicarb 650 po tid  #CKD - creat was 2.7 - 3.0 in Nov 2021  - Alk phos elevated 249  #Anemia - iron stores ok , Tsat  high, will need HANNAH f/u ferritin    # Accelerated HTN - uncontrolled with systolics ~200 now 160/80  - s/p hydralazine 10mg IV push x1 with good affect  - increasing home metoprolol 25mg XL to 50mg XL  - cont Hydralazine 75 mg q8  RAD neg for ZULEYKA  no ACEi or ARB in CHATA    # Right pontine CVA (February 7, 2021)  # Intellectual disability  # hearing/speech impairment

## 2022-05-13 LAB
ALBUMIN SERPL ELPH-MCNC: 3 G/DL — LOW (ref 3.5–5.2)
ALP SERPL-CCNC: 191 U/L — HIGH (ref 30–115)
ALT FLD-CCNC: 11 U/L — SIGNIFICANT CHANGE UP (ref 0–41)
ANION GAP SERPL CALC-SCNC: 9 MMOL/L — SIGNIFICANT CHANGE UP (ref 7–14)
AST SERPL-CCNC: 14 U/L — SIGNIFICANT CHANGE UP (ref 0–41)
BASOPHILS # BLD AUTO: 0.04 K/UL — SIGNIFICANT CHANGE UP (ref 0–0.2)
BASOPHILS NFR BLD AUTO: 0.5 % — SIGNIFICANT CHANGE UP (ref 0–1)
BILIRUB SERPL-MCNC: <0.2 MG/DL — SIGNIFICANT CHANGE UP (ref 0.2–1.2)
BUN SERPL-MCNC: 56 MG/DL — HIGH (ref 10–20)
CALCIUM SERPL-MCNC: 7.9 MG/DL — LOW (ref 8.5–10.1)
CHLORIDE SERPL-SCNC: 108 MMOL/L — SIGNIFICANT CHANGE UP (ref 98–110)
CO2 SERPL-SCNC: 23 MMOL/L — SIGNIFICANT CHANGE UP (ref 17–32)
CREAT SERPL-MCNC: 5.1 MG/DL — CRITICAL HIGH (ref 0.7–1.5)
EGFR: 9 ML/MIN/1.73M2 — LOW
EOSINOPHIL # BLD AUTO: 0.55 K/UL — SIGNIFICANT CHANGE UP (ref 0–0.7)
EOSINOPHIL NFR BLD AUTO: 7.5 % — SIGNIFICANT CHANGE UP (ref 0–8)
GLUCOSE BLDC GLUCOMTR-MCNC: 153 MG/DL — HIGH (ref 70–99)
GLUCOSE BLDC GLUCOMTR-MCNC: 162 MG/DL — HIGH (ref 70–99)
GLUCOSE BLDC GLUCOMTR-MCNC: 176 MG/DL — HIGH (ref 70–99)
GLUCOSE BLDC GLUCOMTR-MCNC: 192 MG/DL — HIGH (ref 70–99)
GLUCOSE SERPL-MCNC: 164 MG/DL — HIGH (ref 70–99)
HCT VFR BLD CALC: 27.5 % — LOW (ref 37–47)
HGB BLD-MCNC: 9 G/DL — LOW (ref 12–16)
IMM GRANULOCYTES NFR BLD AUTO: 0.3 % — SIGNIFICANT CHANGE UP (ref 0.1–0.3)
KAPPA LC SER QL IFE: 10.04 MG/DL — HIGH (ref 0.33–1.94)
KAPPA/LAMBDA FREE LIGHT CHAIN RATIO, SERUM: 1.12 RATIO — SIGNIFICANT CHANGE UP (ref 0.26–1.65)
LAMBDA LC SER QL IFE: 8.96 MG/DL — HIGH (ref 0.57–2.63)
LYMPHOCYTES # BLD AUTO: 1.6 K/UL — SIGNIFICANT CHANGE UP (ref 1.2–3.4)
LYMPHOCYTES # BLD AUTO: 21.7 % — SIGNIFICANT CHANGE UP (ref 20.5–51.1)
MAGNESIUM SERPL-MCNC: 2.1 MG/DL — SIGNIFICANT CHANGE UP (ref 1.8–2.4)
MCHC RBC-ENTMCNC: 29.4 PG — SIGNIFICANT CHANGE UP (ref 27–31)
MCHC RBC-ENTMCNC: 32.7 G/DL — SIGNIFICANT CHANGE UP (ref 32–37)
MCV RBC AUTO: 89.9 FL — SIGNIFICANT CHANGE UP (ref 81–99)
MONOCYTES # BLD AUTO: 0.55 K/UL — SIGNIFICANT CHANGE UP (ref 0.1–0.6)
MONOCYTES NFR BLD AUTO: 7.5 % — SIGNIFICANT CHANGE UP (ref 1.7–9.3)
NEUTROPHILS # BLD AUTO: 4.62 K/UL — SIGNIFICANT CHANGE UP (ref 1.4–6.5)
NEUTROPHILS NFR BLD AUTO: 62.5 % — SIGNIFICANT CHANGE UP (ref 42.2–75.2)
NRBC # BLD: 0 /100 WBCS — SIGNIFICANT CHANGE UP (ref 0–0)
PLATELET # BLD AUTO: 191 K/UL — SIGNIFICANT CHANGE UP (ref 130–400)
POTASSIUM SERPL-MCNC: 5 MMOL/L — SIGNIFICANT CHANGE UP (ref 3.5–5)
POTASSIUM SERPL-SCNC: 5 MMOL/L — SIGNIFICANT CHANGE UP (ref 3.5–5)
PROT SERPL-MCNC: 5.3 G/DL — LOW (ref 6–8)
RBC # BLD: 3.06 M/UL — LOW (ref 4.2–5.4)
RBC # FLD: 13.3 % — SIGNIFICANT CHANGE UP (ref 11.5–14.5)
SODIUM SERPL-SCNC: 140 MMOL/L — SIGNIFICANT CHANGE UP (ref 135–146)
WBC # BLD: 7.38 K/UL — SIGNIFICANT CHANGE UP (ref 4.8–10.8)
WBC # FLD AUTO: 7.38 K/UL — SIGNIFICANT CHANGE UP (ref 4.8–10.8)

## 2022-05-13 PROCEDURE — 99233 SBSQ HOSP IP/OBS HIGH 50: CPT

## 2022-05-13 RX ORDER — CALCITRIOL 0.5 UG/1
0.5 CAPSULE ORAL DAILY
Refills: 0 | Status: DISCONTINUED | OUTPATIENT
Start: 2022-05-13 | End: 2022-05-16

## 2022-05-13 RX ORDER — AMLODIPINE BESYLATE 2.5 MG/1
10 TABLET ORAL DAILY
Refills: 0 | Status: DISCONTINUED | OUTPATIENT
Start: 2022-05-13 | End: 2022-05-15

## 2022-05-13 RX ADMIN — Medication 650 MILLIGRAM(S): at 21:29

## 2022-05-13 RX ADMIN — Medication 81 MILLIGRAM(S): at 12:04

## 2022-05-13 RX ADMIN — Medication 1: at 11:10

## 2022-05-13 RX ADMIN — SEVELAMER CARBONATE 800 MILLIGRAM(S): 2400 POWDER, FOR SUSPENSION ORAL at 18:05

## 2022-05-13 RX ADMIN — Medication 650 MILLIGRAM(S): at 14:07

## 2022-05-13 RX ADMIN — HEPARIN SODIUM 5000 UNIT(S): 5000 INJECTION INTRAVENOUS; SUBCUTANEOUS at 05:51

## 2022-05-13 RX ADMIN — POLYETHYLENE GLYCOL 3350 17 GRAM(S): 17 POWDER, FOR SOLUTION ORAL at 12:05

## 2022-05-13 RX ADMIN — CALCITRIOL 0.25 MICROGRAM(S): 0.5 CAPSULE ORAL at 12:02

## 2022-05-13 RX ADMIN — ATORVASTATIN CALCIUM 80 MILLIGRAM(S): 80 TABLET, FILM COATED ORAL at 21:28

## 2022-05-13 RX ADMIN — Medication 1: at 08:09

## 2022-05-13 RX ADMIN — CLOPIDOGREL BISULFATE 75 MILLIGRAM(S): 75 TABLET, FILM COATED ORAL at 12:03

## 2022-05-13 RX ADMIN — Medication 75 MILLIGRAM(S): at 21:29

## 2022-05-13 RX ADMIN — AMLODIPINE BESYLATE 5 MILLIGRAM(S): 2.5 TABLET ORAL at 06:24

## 2022-05-13 RX ADMIN — Medication 325 MILLIGRAM(S): at 12:03

## 2022-05-13 RX ADMIN — Medication 1: at 18:14

## 2022-05-13 RX ADMIN — Medication 500 MILLIGRAM(S): at 12:02

## 2022-05-13 RX ADMIN — CHLORHEXIDINE GLUCONATE 1 APPLICATION(S): 213 SOLUTION TOPICAL at 05:50

## 2022-05-13 RX ADMIN — Medication 100 MILLIGRAM(S): at 05:51

## 2022-05-13 RX ADMIN — Medication 1 GRAM(S): at 18:11

## 2022-05-13 RX ADMIN — SEVELAMER CARBONATE 800 MILLIGRAM(S): 2400 POWDER, FOR SUSPENSION ORAL at 12:02

## 2022-05-13 RX ADMIN — Medication 1 GRAM(S): at 12:03

## 2022-05-13 RX ADMIN — Medication 75 MILLIGRAM(S): at 14:07

## 2022-05-13 RX ADMIN — Medication 650 MILLIGRAM(S): at 05:52

## 2022-05-13 RX ADMIN — HEPARIN SODIUM 5000 UNIT(S): 5000 INJECTION INTRAVENOUS; SUBCUTANEOUS at 18:06

## 2022-05-13 RX ADMIN — SODIUM CHLORIDE 75 MILLILITER(S): 9 INJECTION INTRAMUSCULAR; INTRAVENOUS; SUBCUTANEOUS at 18:05

## 2022-05-13 RX ADMIN — Medication 1 GRAM(S): at 05:52

## 2022-05-13 RX ADMIN — SEVELAMER CARBONATE 800 MILLIGRAM(S): 2400 POWDER, FOR SUSPENSION ORAL at 08:08

## 2022-05-13 RX ADMIN — Medication 75 MILLIGRAM(S): at 05:51

## 2022-05-13 NOTE — PROGRESS NOTE ADULT - TIME BILLING
charting, face to face with patient, and interdisciplinary planning.
charting, face to face with patient and interdisciplinary planning.
charting, resident teaching rounds, and interdisciplinary planning.

## 2022-05-13 NOTE — PROGRESS NOTE ADULT - ASSESSMENT
58 yo F PMHx of HTN, HLD, Right pontine CVA (February 7, 2021), intellectual disability, hearing/speech impairment, CKD stage IV, mild AS, former smoker, PVD s/p left femoral artery-posterior tibial artery bypass with autologous venous tissue and left heel ulcer debridement presented to ED for abd pain x1d.   Renal was called for CHATA on CKD 4    #CHATA - likely  ANKITA (IC 5/9/22), baseline creat 2.7 in Nov 2021  - on IVF - creat is trending up again  5.1 today  -kidney sono (5/11/22)  smaller size kidneys with increased echogenicity neg ZULEYKA (5/11/22)  -no retention PVR 70 cc only  -start 1/2 NS 75 cc/hr  -CPK, phos wnl , iPTH 278  - increase Calcitriol 0.5 mcg po qd   UA with proteinuria 600 -> 300,   Spot protein creat ratio of 10 gr   SPEP in Nov 2021 - Gamma migrating protein. UPEP no Bence Howard  protein, proteinuria was 300 then, OLVIN 1:80  -SPEP UPEP Serum and Urine IF, FLC in serum, DNA, ANCA, PLA2R Ab, Hep B and  C profile  REPEAT BLADDER SCAN DAILY to r/o retention  place a primafit  Strict Is and  OS  - CTAP - no hydro, nonobstructive Rt renal calculi  #Met acidosis - Na bicarb 650 po tid  #CKD - creat was 2.7 - 3.0 in Nov 2021    #Anemia - iron stores ok , Tsat  high, will need HANNAH     # Accelerated HTN - uncontrolled with systolics ~200 now 160/80  - BP is better controlled now  - cont Hydralazine 75 mg q8  RAD neg for ZULEYKA  no ACEi or ARB in CHATA    # Right pontine CVA (February 7, 2021)  # Intellectual disability  # hearing/speech impairment    Approaching RRT  D/W HS  will discuss with family

## 2022-05-13 NOTE — PROGRESS NOTE ADULT - ASSESSMENT
56 yo F PMHx of HTN, HLD, Right pontine CVA (February 7, 2021), intellectual disability, hearing/speech impairment, CKD stage IV, mild AS, former smoker, PVD s/p left femoral artery-posterior tibial artery bypass with autologous venous tissue and left heel ulcer debridement presented to ED for abd pain x1d.     #Abd pain--resolved  #diffuse MSK pain   - Unclear etiology with non specific characteristics and symptoms that are very generic and disconnected  - possible Viral gastritis vs constipation vs cramps vs fatty liver ?  - Pain-free this morning  - RUQ also unremarkable for focal findings  - lipase neg, 40  - Alk phos elevated 249  - CPK wnl  - pain control with Tylenol. Can add Dilaudid   - added carafate 1g qid    #CHATA on CKD stage IV  #Metabolic Acidosis  #Proteinuria  - Cr baseline 2.8  - pre-renal in setting of decreased PO intake vs ANKITA nephropathy vs nephrotic syndrome vs glomerulonephritis   - Spot protein:cr ratio 10.4 (estimated ~10 g/day proteinuria?)  - f/u SPEP UPEP Serum and Urine IF, FLC in serum  - C/w Renvela, Sodium bicarb, calcitriol  - Nephrology following     # DM   - HA1c 7.1% in 11/21  - Monitor FS  - Start basal bolus if FS persistently >180  - F/u A1c -->6.1    # Accelerated HTN  - uncontrolled with systolics ~200  - s/p hydralazine 10mg IV push x1 with good affect  - increased metoprolol to 100mg XL daily  - lisinopril 20mg qhs  - PRN labetalol 10mg IV PRN systolics >180; hold parameters if HR <60    # HLD  - C/w Atorvastatin     # Right pontine CVA (February 7, 2021)  # Intellectual disability  # hearing/speech impairment  - Pt eval   - ASA, Statin, Plavix      # PVD  - s/p left femoral artery-posterior tibial artery bypass with autologous venous tissue and left heel ulcer debridement 11/21  - ASA, Plavix, Statin     DVT ppx: hep sq  GI ppx: none   Diet: DASH  Activity: IAT  Dispo: acute

## 2022-05-13 NOTE — PROGRESS NOTE ADULT - ATTENDING COMMENTS
***My note supersedes any discrepancies that may be above in the resident's note***    56 yo F PMHx of HTN, HLD, Right pontine CVA (February 7, 2021), intellectual disability, hearing/speech impairment, CKD stage IV, mild AS, former smoker, PVD s/p left femoral artery-posterior tibial artery bypass with autologous venous tissue and left heel ulcer debridement presented to ED for abd pain x1d.     #Abd pain--resolved  #diffuse MSK pain , RESOLVED  - Unclear etiology with non specific characteristics and symptoms that are very generic and disconnected  - possible Viral gastritis vs constipation vs cramps vs fatty liver ?  - Pain-free this morning  - RUQ also unremarkable for focal findings  - lipase neg, 40  - Alk phos elevated 249  - CPK wnl  - pain control with Tylenol. Can add Dilaudid   - added carafate 1g qid    #CHATA on CKD stage IV  #Metabolic Acidosis  #Proteinuria  - Cr baseline 2.8-->5.1 currently  - pre-renal in setting of decreased PO intake vs ANKITA nephropathy vs nephrotic syndrome vs glomerulonephritis   - Spot protein:cr ratio 10.4 (estimated ~10 g/day proteinuria?)  - f/u SPEP UPEP Serum and Urine IF, FLC in serum  - C/w Renvela, Sodium bicarb, calcitriol  - Nephrology following     # DM   - HA1c 7.1% in 11/21  - Monitor FS  - Start basal bolus if FS persistently >180  - F/u A1c -->6.1    # Accelerated HTN  - uncontrolled with systolics ~200  - s/p hydralazine 10mg IV push x1 with good affect  - increased metoprolol to 100mg XL daily  - increased amlodpine 5mg to 10mg on 5/13  - PRN labetalol 10mg IV PRN systolics >180; hold parameters if HR <60    # HLD  - C/w Atorvastatin     # Right pontine CVA (February 7, 2021)  # Intellectual disability  # hearing/speech impairment  - Pt eval   - ASA, Statin, Plavix      # PVD  - s/p left femoral artery-posterior tibial artery bypass with autologous venous tissue and left heel ulcer debridement 11/21  - ASA, Plavix, Statin     DVT ppx: hep sq  GI ppx: none   Diet: DASH  Activity: IAT  Dispo: acute ***My note supersedes any discrepancies that may be above in the resident's note***    58 yo F PMHx of HTN, HLD, Right pontine CVA (February 7, 2021), intellectual disability, hearing/speech impairment, CKD stage IV, mild AS, former smoker, PVD s/p left femoral artery-posterior tibial artery bypass with autologous venous tissue and left heel ulcer debridement presented to ED for abd pain x1d.     #Abd pain--resolved  #diffuse MSK pain , RESOLVED  - Unclear etiology with non specific characteristics and symptoms that are very generic and disconnected  - possible Viral gastritis vs constipation vs cramps vs fatty liver ?  - Pain-free this morning  - RUQ also unremarkable for focal findings  - lipase neg, 40  - Alk phos elevated 249  - CPK wnl  - pain control with Tylenol. Can add Dilaudid   - added carafate 1g qid    #CHATA on CKD stage IV  #Metabolic Acidosis  #Proteinuria  - Cr baseline 2.8-->5.1 currently  - pre-renal in setting of decreased PO intake vs ANKITA nephropathy vs nephrotic syndrome vs glomerulonephritis   - Spot protein:cr ratio 10.4 (estimated ~10 g/day proteinuria?)  - f/u SPEP UPEP Serum and Urine IF, FLC in serum  - C/w Renvela, Sodium bicarb, calcitriol  - Nephrology following     # DM   - HA1c 7.1% in 11/21  - Monitor FS  - Start basal bolus if FS persistently >180  - F/u A1c -->6.1    # Accelerated HTN  - uncontrolled with systolics ~200  - s/p hydralazine 10mg IV push x1 with good affect  - increased metoprolol to 100mg XL daily  - increased amlodpine 5mg to 10mg on 5/13  - PRN labetalol 10mg IV PRN systolics >180; hold parameters if HR <60    # HLD  - C/w Atorvastatin     # Right pontine CVA (February 7, 2021)  # Intellectual disability  # hearing/speech impairment  - Pt eval   - ASA, Statin, Plavix      # PVD  - s/p left femoral artery-posterior tibial artery bypass with autologous venous tissue and left heel ulcer debridement 11/21  - ASA, Plavix, Statin     DVT ppx: hep sq  GI ppx: none   Diet: DASH  Activity: IAT  Dispo: acute    #Progress Note Handoff  Pending (specify):  CHATA work up  Family discussion: updated.   Disposition: /Unknown at this time________

## 2022-05-14 LAB
ALBUMIN SERPL ELPH-MCNC: 3.2 G/DL — LOW (ref 3.5–5.2)
ALP SERPL-CCNC: 203 U/L — HIGH (ref 30–115)
ALT FLD-CCNC: 13 U/L — SIGNIFICANT CHANGE UP (ref 0–41)
ANION GAP SERPL CALC-SCNC: 12 MMOL/L — SIGNIFICANT CHANGE UP (ref 7–14)
AST SERPL-CCNC: 15 U/L — SIGNIFICANT CHANGE UP (ref 0–41)
BASOPHILS # BLD AUTO: 0.05 K/UL — SIGNIFICANT CHANGE UP (ref 0–0.2)
BASOPHILS NFR BLD AUTO: 0.7 % — SIGNIFICANT CHANGE UP (ref 0–1)
BILIRUB SERPL-MCNC: <0.2 MG/DL — SIGNIFICANT CHANGE UP (ref 0.2–1.2)
BUN SERPL-MCNC: 60 MG/DL — HIGH (ref 10–20)
CALCIUM SERPL-MCNC: 8.1 MG/DL — LOW (ref 8.5–10.1)
CHLORIDE SERPL-SCNC: 110 MMOL/L — SIGNIFICANT CHANGE UP (ref 98–110)
CO2 SERPL-SCNC: 19 MMOL/L — SIGNIFICANT CHANGE UP (ref 17–32)
CREAT SERPL-MCNC: 4.9 MG/DL — CRITICAL HIGH (ref 0.7–1.5)
EGFR: 10 ML/MIN/1.73M2 — LOW
EOSINOPHIL # BLD AUTO: 0.53 K/UL — SIGNIFICANT CHANGE UP (ref 0–0.7)
EOSINOPHIL NFR BLD AUTO: 7.3 % — SIGNIFICANT CHANGE UP (ref 0–8)
GLUCOSE BLDC GLUCOMTR-MCNC: 143 MG/DL — HIGH (ref 70–99)
GLUCOSE BLDC GLUCOMTR-MCNC: 162 MG/DL — HIGH (ref 70–99)
GLUCOSE BLDC GLUCOMTR-MCNC: 166 MG/DL — HIGH (ref 70–99)
GLUCOSE BLDC GLUCOMTR-MCNC: 206 MG/DL — HIGH (ref 70–99)
GLUCOSE SERPL-MCNC: 152 MG/DL — HIGH (ref 70–99)
HAV IGM SER-ACNC: SIGNIFICANT CHANGE UP
HBV CORE IGM SER-ACNC: SIGNIFICANT CHANGE UP
HBV SURFACE AG SER-ACNC: SIGNIFICANT CHANGE UP
HCT VFR BLD CALC: 26.2 % — LOW (ref 37–47)
HCV AB S/CO SERPL IA: 0.16 S/CO — SIGNIFICANT CHANGE UP (ref 0–0.99)
HCV AB SERPL-IMP: SIGNIFICANT CHANGE UP
HGB BLD-MCNC: 8.6 G/DL — LOW (ref 12–16)
IMM GRANULOCYTES NFR BLD AUTO: 0.3 % — SIGNIFICANT CHANGE UP (ref 0.1–0.3)
LYMPHOCYTES # BLD AUTO: 1.75 K/UL — SIGNIFICANT CHANGE UP (ref 1.2–3.4)
LYMPHOCYTES # BLD AUTO: 24.1 % — SIGNIFICANT CHANGE UP (ref 20.5–51.1)
MAGNESIUM SERPL-MCNC: 2 MG/DL — SIGNIFICANT CHANGE UP (ref 1.8–2.4)
MCHC RBC-ENTMCNC: 29.7 PG — SIGNIFICANT CHANGE UP (ref 27–31)
MCHC RBC-ENTMCNC: 32.8 G/DL — SIGNIFICANT CHANGE UP (ref 32–37)
MCV RBC AUTO: 90.3 FL — SIGNIFICANT CHANGE UP (ref 81–99)
MONOCYTES # BLD AUTO: 0.49 K/UL — SIGNIFICANT CHANGE UP (ref 0.1–0.6)
MONOCYTES NFR BLD AUTO: 6.7 % — SIGNIFICANT CHANGE UP (ref 1.7–9.3)
NEUTROPHILS # BLD AUTO: 4.43 K/UL — SIGNIFICANT CHANGE UP (ref 1.4–6.5)
NEUTROPHILS NFR BLD AUTO: 60.9 % — SIGNIFICANT CHANGE UP (ref 42.2–75.2)
NRBC # BLD: 0 /100 WBCS — SIGNIFICANT CHANGE UP (ref 0–0)
PLATELET # BLD AUTO: 185 K/UL — SIGNIFICANT CHANGE UP (ref 130–400)
POTASSIUM SERPL-MCNC: 5.1 MMOL/L — HIGH (ref 3.5–5)
POTASSIUM SERPL-SCNC: 5.1 MMOL/L — HIGH (ref 3.5–5)
PROT ?TM UR-MCNC: 431 MG/DL — HIGH (ref 0–12)
PROT SERPL-MCNC: 5.4 G/DL — LOW (ref 6–8)
RBC # BLD: 2.9 M/UL — LOW (ref 4.2–5.4)
RBC # FLD: 13.4 % — SIGNIFICANT CHANGE UP (ref 11.5–14.5)
SODIUM SERPL-SCNC: 141 MMOL/L — SIGNIFICANT CHANGE UP (ref 135–146)
WBC # BLD: 7.27 K/UL — SIGNIFICANT CHANGE UP (ref 4.8–10.8)
WBC # FLD AUTO: 7.27 K/UL — SIGNIFICANT CHANGE UP (ref 4.8–10.8)

## 2022-05-14 PROCEDURE — 99233 SBSQ HOSP IP/OBS HIGH 50: CPT

## 2022-05-14 RX ORDER — SODIUM CHLORIDE 9 MG/ML
1000 INJECTION, SOLUTION INTRAVENOUS
Refills: 0 | Status: DISCONTINUED | OUTPATIENT
Start: 2022-05-14 | End: 2022-05-16

## 2022-05-14 RX ORDER — DEXTROSE 50 % IN WATER 50 %
25 SYRINGE (ML) INTRAVENOUS ONCE
Refills: 0 | Status: DISCONTINUED | OUTPATIENT
Start: 2022-05-14 | End: 2022-05-16

## 2022-05-14 RX ORDER — DEXTROSE 50 % IN WATER 50 %
15 SYRINGE (ML) INTRAVENOUS ONCE
Refills: 0 | Status: DISCONTINUED | OUTPATIENT
Start: 2022-05-14 | End: 2022-05-16

## 2022-05-14 RX ORDER — DEXTROSE 50 % IN WATER 50 %
12.5 SYRINGE (ML) INTRAVENOUS ONCE
Refills: 0 | Status: DISCONTINUED | OUTPATIENT
Start: 2022-05-14 | End: 2022-05-16

## 2022-05-14 RX ORDER — GLUCAGON INJECTION, SOLUTION 0.5 MG/.1ML
1 INJECTION, SOLUTION SUBCUTANEOUS ONCE
Refills: 0 | Status: DISCONTINUED | OUTPATIENT
Start: 2022-05-14 | End: 2022-05-16

## 2022-05-14 RX ORDER — INSULIN LISPRO 100/ML
VIAL (ML) SUBCUTANEOUS
Refills: 0 | Status: DISCONTINUED | OUTPATIENT
Start: 2022-05-14 | End: 2022-05-16

## 2022-05-14 RX ADMIN — Medication 500 MILLIGRAM(S): at 21:35

## 2022-05-14 RX ADMIN — Medication 75 MILLIGRAM(S): at 21:41

## 2022-05-14 RX ADMIN — Medication 650 MILLIGRAM(S): at 05:44

## 2022-05-14 RX ADMIN — Medication 650 MILLIGRAM(S): at 00:50

## 2022-05-14 RX ADMIN — AMLODIPINE BESYLATE 10 MILLIGRAM(S): 2.5 TABLET ORAL at 05:43

## 2022-05-14 RX ADMIN — Medication 650 MILLIGRAM(S): at 01:30

## 2022-05-14 RX ADMIN — Medication 100 MILLIGRAM(S): at 05:44

## 2022-05-14 RX ADMIN — ATORVASTATIN CALCIUM 80 MILLIGRAM(S): 80 TABLET, FILM COATED ORAL at 22:37

## 2022-05-14 RX ADMIN — SEVELAMER CARBONATE 800 MILLIGRAM(S): 2400 POWDER, FOR SUSPENSION ORAL at 19:14

## 2022-05-14 RX ADMIN — POLYETHYLENE GLYCOL 3350 17 GRAM(S): 17 POWDER, FOR SOLUTION ORAL at 21:41

## 2022-05-14 RX ADMIN — CHLORHEXIDINE GLUCONATE 1 APPLICATION(S): 213 SOLUTION TOPICAL at 05:45

## 2022-05-14 RX ADMIN — Medication 81 MILLIGRAM(S): at 21:36

## 2022-05-14 RX ADMIN — HEPARIN SODIUM 5000 UNIT(S): 5000 INJECTION INTRAVENOUS; SUBCUTANEOUS at 05:44

## 2022-05-14 RX ADMIN — CALCITRIOL 0.5 MICROGRAM(S): 0.5 CAPSULE ORAL at 21:37

## 2022-05-14 RX ADMIN — Medication 75 MILLIGRAM(S): at 05:43

## 2022-05-14 RX ADMIN — Medication 75 MILLIGRAM(S): at 22:37

## 2022-05-14 RX ADMIN — SEVELAMER CARBONATE 800 MILLIGRAM(S): 2400 POWDER, FOR SUSPENSION ORAL at 21:39

## 2022-05-14 RX ADMIN — Medication 1 GRAM(S): at 21:40

## 2022-05-14 RX ADMIN — Medication 1 GRAM(S): at 19:12

## 2022-05-14 RX ADMIN — CYCLOBENZAPRINE HYDROCHLORIDE 5 MILLIGRAM(S): 10 TABLET, FILM COATED ORAL at 00:50

## 2022-05-14 RX ADMIN — SEVELAMER CARBONATE 800 MILLIGRAM(S): 2400 POWDER, FOR SUSPENSION ORAL at 17:39

## 2022-05-14 RX ADMIN — HEPARIN SODIUM 5000 UNIT(S): 5000 INJECTION INTRAVENOUS; SUBCUTANEOUS at 19:12

## 2022-05-14 RX ADMIN — SENNA PLUS 1 TABLET(S): 8.6 TABLET ORAL at 22:37

## 2022-05-14 RX ADMIN — CLOPIDOGREL BISULFATE 75 MILLIGRAM(S): 75 TABLET, FILM COATED ORAL at 21:37

## 2022-05-14 RX ADMIN — Medication 650 MILLIGRAM(S): at 19:11

## 2022-05-14 RX ADMIN — Medication 650 MILLIGRAM(S): at 22:37

## 2022-05-14 RX ADMIN — Medication 1 GRAM(S): at 23:00

## 2022-05-14 NOTE — PROGRESS NOTE ADULT - ASSESSMENT
58 yo F PMHx of HTN, HLD, Right pontine CVA (February 7, 2021), intellectual disability, hearing/speech impairment, CKD stage IV, mild AS, former smoker, PVD s/p left femoral artery-posterior tibial artery bypass with autologous venous tissue and left heel ulcer debridement presented to ED for abd pain x1d.   Renal was called for CHATA on CKD 4  #CHATA - likely  ANKITA (IC 5/9/22), baseline creat 2.7 in Nov 2021  - d/c iv fluids   - low k diet if repeat k > 5.5 start lokelma 10 q 12  - creatinine was trenidng up/ check repeat today   -kidney sono (5/11/22)  smaller size kidneys with increased echogenicity neg ZULEYKA (5/11/22)  -no retention PVR 70 cc only  -CPK, phos wnl , iPTH 278  - continue  Calcitriol 0.5 mcg po qd   UA with proteinuria 600 -> 300,   Spot protein creat ratio of 10 gr   SPEP in Nov 2021 - Gamma migrating protein. UPEP no Bence Howard  protein, proteinuria was 300 then, OLVIN 1:80  - check SPEP UPEP Serum and Urine IF, FLC in serum, DNA, ANCA, PLA2R Ab, Hep B and  C profile  REPEAT BLADDER SCAN DAILY to r/o retention  #Met acidosis -  continue Na bicarb 650 po tid  #Anemia - iron stores ok , Tsat  high, will need HANNAH once BP better controlled  d/c feso4   # Accelerated HTN - uncontrolled with systolics ~200 now 160/80  - if BP remains elevated increase hydralzine to 100  mg q8  RAD neg for ZULEYKA  if no improvement will place TDC and initiate hd next week/ no acute indication

## 2022-05-14 NOTE — PROGRESS NOTE ADULT - ASSESSMENT
58 yo F PMHx of HTN, HLD, Right pontine CVA (February 7, 2021), intellectual disability, hearing/speech impairment, CKD stage IV, mild AS, former smoker, PVD s/p left femoral artery-posterior tibial artery bypass with autologous venous tissue and left heel ulcer debridement presented to ED for abd pain x1d.     #Abd pain--resolved  #diffuse MSK pain   - Unclear etiology with non specific characteristics and symptoms that are very generic and disconnected  - possible Viral gastritis vs constipation vs cramps vs fatty liver ?  - remains Pain-free   - RUQ also unremarkable for focal findings  - lipase neg, 40  - Alk phos elevated 249  - CPK wnl  - pain control with Tylenol.   - added carafate 1g qid    #CHATA on CKD stage IV  #Metabolic Acidosis  #Proteinuria  - Cr baseline 2.8  - pre-renal in setting of decreased PO intake vs ANKITA nephropathy vs nephrotic syndrome vs glomerulonephritis   - Spot protein:cr ratio 10.4 (estimated ~10 g/day proteinuria?)  - f/u SPEP UPEP Serum and Urine IF, FLC in serum  - C/w Renvela, Sodium bicarb, calcitriol  - Nephrology following     # DM   - HA1c 7.1% in 11/21  - Monitor FS  - Start basal bolus if FS persistently >180  - F/u A1c -->6.1    # Accelerated HTN  - uncontrolled with systolics ~200  - s/p hydralazine 10mg IV push x1 with good affect  - increased metoprolol to 100mg XL daily  - lisinopril 20mg qhs  - PRN labetalol 10mg IV PRN systolics >180; hold parameters if HR <60    Consider switching Amlodipine 10MG  to Nifedipine ER 60,     # HLD  - C/w Atorvastatin     # Right pontine CVA (February 7, 2021)  # Intellectual disability  # hearing/speech impairment  - Pt eval   - ASA, Statin, Plavix      # PVD  - s/p left femoral artery-posterior tibial artery bypass with autologous venous tissue and left heel ulcer debridement 11/21  - ASA, Plavix, Statin     DVT ppx: hep sq  GI ppx: none   Diet: DASH  Activity: IAT  Dispo:     #Progress Note Handoff  Pending (specify):  Clinical improvement with CHATA /   Family discussion:  Disposition: Home_

## 2022-05-15 LAB
ALBUMIN SERPL ELPH-MCNC: 3 G/DL — LOW (ref 3.5–5.2)
ALP SERPL-CCNC: 202 U/L — HIGH (ref 30–115)
ALT FLD-CCNC: 15 U/L — SIGNIFICANT CHANGE UP (ref 0–41)
ANION GAP SERPL CALC-SCNC: 13 MMOL/L — SIGNIFICANT CHANGE UP (ref 7–14)
AST SERPL-CCNC: 17 U/L — SIGNIFICANT CHANGE UP (ref 0–41)
BASOPHILS # BLD AUTO: 0.04 K/UL — SIGNIFICANT CHANGE UP (ref 0–0.2)
BASOPHILS NFR BLD AUTO: 0.6 % — SIGNIFICANT CHANGE UP (ref 0–1)
BILIRUB SERPL-MCNC: <0.2 MG/DL — SIGNIFICANT CHANGE UP (ref 0.2–1.2)
BUN SERPL-MCNC: 63 MG/DL — CRITICAL HIGH (ref 10–20)
CALCIUM SERPL-MCNC: 7.5 MG/DL — LOW (ref 8.5–10.1)
CHLORIDE SERPL-SCNC: 108 MMOL/L — SIGNIFICANT CHANGE UP (ref 98–110)
CO2 SERPL-SCNC: 19 MMOL/L — SIGNIFICANT CHANGE UP (ref 17–32)
CREAT SERPL-MCNC: 4.8 MG/DL — CRITICAL HIGH (ref 0.7–1.5)
EGFR: 10 ML/MIN/1.73M2 — LOW
EOSINOPHIL # BLD AUTO: 0.48 K/UL — SIGNIFICANT CHANGE UP (ref 0–0.7)
EOSINOPHIL NFR BLD AUTO: 7 % — SIGNIFICANT CHANGE UP (ref 0–8)
GLUCOSE BLDC GLUCOMTR-MCNC: 170 MG/DL — HIGH (ref 70–99)
GLUCOSE BLDC GLUCOMTR-MCNC: 173 MG/DL — HIGH (ref 70–99)
GLUCOSE BLDC GLUCOMTR-MCNC: 178 MG/DL — HIGH (ref 70–99)
GLUCOSE SERPL-MCNC: 156 MG/DL — HIGH (ref 70–99)
HCT VFR BLD CALC: 25.5 % — LOW (ref 37–47)
HGB BLD-MCNC: 8.4 G/DL — LOW (ref 12–16)
IMM GRANULOCYTES NFR BLD AUTO: 0.3 % — SIGNIFICANT CHANGE UP (ref 0.1–0.3)
LYMPHOCYTES # BLD AUTO: 1.69 K/UL — SIGNIFICANT CHANGE UP (ref 1.2–3.4)
LYMPHOCYTES # BLD AUTO: 24.7 % — SIGNIFICANT CHANGE UP (ref 20.5–51.1)
MAGNESIUM SERPL-MCNC: 2 MG/DL — SIGNIFICANT CHANGE UP (ref 1.8–2.4)
MCHC RBC-ENTMCNC: 29.6 PG — SIGNIFICANT CHANGE UP (ref 27–31)
MCHC RBC-ENTMCNC: 32.9 G/DL — SIGNIFICANT CHANGE UP (ref 32–37)
MCV RBC AUTO: 89.8 FL — SIGNIFICANT CHANGE UP (ref 81–99)
MONOCYTES # BLD AUTO: 0.52 K/UL — SIGNIFICANT CHANGE UP (ref 0.1–0.6)
MONOCYTES NFR BLD AUTO: 7.6 % — SIGNIFICANT CHANGE UP (ref 1.7–9.3)
NEUTROPHILS # BLD AUTO: 4.09 K/UL — SIGNIFICANT CHANGE UP (ref 1.4–6.5)
NEUTROPHILS NFR BLD AUTO: 59.8 % — SIGNIFICANT CHANGE UP (ref 42.2–75.2)
NRBC # BLD: 0 /100 WBCS — SIGNIFICANT CHANGE UP (ref 0–0)
PHOSPHATE SERPL-MCNC: 4.1 MG/DL — SIGNIFICANT CHANGE UP (ref 2.1–4.9)
PLATELET # BLD AUTO: 172 K/UL — SIGNIFICANT CHANGE UP (ref 130–400)
POTASSIUM SERPL-MCNC: 4.6 MMOL/L — SIGNIFICANT CHANGE UP (ref 3.5–5)
POTASSIUM SERPL-SCNC: 4.6 MMOL/L — SIGNIFICANT CHANGE UP (ref 3.5–5)
PROT SERPL-MCNC: 5.2 G/DL — LOW (ref 6–8)
RBC # BLD: 2.84 M/UL — LOW (ref 4.2–5.4)
RBC # FLD: 13.2 % — SIGNIFICANT CHANGE UP (ref 11.5–14.5)
SODIUM SERPL-SCNC: 140 MMOL/L — SIGNIFICANT CHANGE UP (ref 135–146)
WBC # BLD: 6.84 K/UL — SIGNIFICANT CHANGE UP (ref 4.8–10.8)
WBC # FLD AUTO: 6.84 K/UL — SIGNIFICANT CHANGE UP (ref 4.8–10.8)

## 2022-05-15 PROCEDURE — 99233 SBSQ HOSP IP/OBS HIGH 50: CPT

## 2022-05-15 RX ORDER — NIFEDIPINE 30 MG
60 TABLET, EXTENDED RELEASE 24 HR ORAL DAILY
Refills: 0 | Status: DISCONTINUED | OUTPATIENT
Start: 2022-05-16 | End: 2022-05-16

## 2022-05-15 RX ORDER — HYDRALAZINE HCL 50 MG
25 TABLET ORAL ONCE
Refills: 0 | Status: COMPLETED | OUTPATIENT
Start: 2022-05-15 | End: 2022-05-15

## 2022-05-15 RX ORDER — HYDRALAZINE HCL 50 MG
100 TABLET ORAL EVERY 8 HOURS
Refills: 0 | Status: DISCONTINUED | OUTPATIENT
Start: 2022-05-15 | End: 2022-05-15

## 2022-05-15 RX ORDER — HYDRALAZINE HCL 50 MG
75 TABLET ORAL EVERY 8 HOURS
Refills: 0 | Status: DISCONTINUED | OUTPATIENT
Start: 2022-05-15 | End: 2022-05-16

## 2022-05-15 RX ADMIN — SEVELAMER CARBONATE 800 MILLIGRAM(S): 2400 POWDER, FOR SUSPENSION ORAL at 09:43

## 2022-05-15 RX ADMIN — ATORVASTATIN CALCIUM 80 MILLIGRAM(S): 80 TABLET, FILM COATED ORAL at 22:00

## 2022-05-15 RX ADMIN — Medication 1 GRAM(S): at 23:58

## 2022-05-15 RX ADMIN — Medication 2: at 12:27

## 2022-05-15 RX ADMIN — SENNA PLUS 1 TABLET(S): 8.6 TABLET ORAL at 21:59

## 2022-05-15 RX ADMIN — Medication 25 MILLIGRAM(S): at 09:42

## 2022-05-15 RX ADMIN — Medication 2: at 09:40

## 2022-05-15 RX ADMIN — Medication 81 MILLIGRAM(S): at 12:34

## 2022-05-15 RX ADMIN — Medication 650 MILLIGRAM(S): at 19:50

## 2022-05-15 RX ADMIN — Medication 650 MILLIGRAM(S): at 21:59

## 2022-05-15 RX ADMIN — POLYETHYLENE GLYCOL 3350 17 GRAM(S): 17 POWDER, FOR SOLUTION ORAL at 12:35

## 2022-05-15 RX ADMIN — AMLODIPINE BESYLATE 10 MILLIGRAM(S): 2.5 TABLET ORAL at 06:58

## 2022-05-15 RX ADMIN — Medication 1 GRAM(S): at 12:32

## 2022-05-15 RX ADMIN — CALCITRIOL 0.5 MICROGRAM(S): 0.5 CAPSULE ORAL at 12:34

## 2022-05-15 RX ADMIN — Medication 75 MILLIGRAM(S): at 19:49

## 2022-05-15 RX ADMIN — Medication 650 MILLIGRAM(S): at 06:58

## 2022-05-15 RX ADMIN — Medication 1 GRAM(S): at 06:58

## 2022-05-15 RX ADMIN — Medication 100 MILLIGRAM(S): at 06:58

## 2022-05-15 RX ADMIN — SEVELAMER CARBONATE 800 MILLIGRAM(S): 2400 POWDER, FOR SUSPENSION ORAL at 17:58

## 2022-05-15 RX ADMIN — Medication 500 MILLIGRAM(S): at 12:33

## 2022-05-15 RX ADMIN — Medication 1 GRAM(S): at 17:58

## 2022-05-15 RX ADMIN — HEPARIN SODIUM 5000 UNIT(S): 5000 INJECTION INTRAVENOUS; SUBCUTANEOUS at 18:00

## 2022-05-15 RX ADMIN — Medication 75 MILLIGRAM(S): at 22:00

## 2022-05-15 RX ADMIN — Medication 2: at 17:54

## 2022-05-15 RX ADMIN — SEVELAMER CARBONATE 800 MILLIGRAM(S): 2400 POWDER, FOR SUSPENSION ORAL at 12:35

## 2022-05-15 RX ADMIN — Medication 75 MILLIGRAM(S): at 06:58

## 2022-05-15 RX ADMIN — HEPARIN SODIUM 5000 UNIT(S): 5000 INJECTION INTRAVENOUS; SUBCUTANEOUS at 06:58

## 2022-05-15 RX ADMIN — CLOPIDOGREL BISULFATE 75 MILLIGRAM(S): 75 TABLET, FILM COATED ORAL at 12:32

## 2022-05-15 NOTE — PROGRESS NOTE ADULT - ASSESSMENT
CHATA on CKD , creatinine at 4.8 , no improvement at this point , continue to monitor , no dialysis at this point yet , K 4.6 hemoglobin 8.4 , hearing and speech deficits  , mention  , history CVA noted , antibiotics noted immuno studies noted , /77 adjust meds if remains high.

## 2022-05-15 NOTE — PROGRESS NOTE ADULT - ASSESSMENT
56 yo F PMHx of HTN, HLD, Right pontine CVA (February 7, 2021), intellectual disability, hearing/speech impairment, CKD stage IV, mild AS, former smoker, PVD s/p left femoral artery-posterior tibial artery bypass with autologous venous tissue and left heel ulcer debridement presented to ED for abd pain x1d.     #Abd pain--resolved  #diffuse MSK pain   - Unclear etiology with non specific characteristics and symptoms that are very generic and disconnected  - possible Viral gastritis vs constipation vs cramps vs fatty liver ?  - remains Pain-free   - RUQ also unremarkable for focal findings  - lipase neg, 40  - Alk phos elevated 249  - CPK wnl  - pain control with Tylenol.   - added carafate 1g qid    #CHATA on CKD stage IV  #Metabolic Acidosis  #Proteinuria  - Cr baseline 2.8  - pre-renal in setting of decreased PO intake vs ANKITA nephropathy vs nephrotic syndrome vs glomerulonephritis   - Spot protein:cr ratio 10.4 (estimated ~10 g/day proteinuria?)  - f/u SPEP UPEP Serum and Urine IF, FLC in serum  - C/w Renvela, Sodium bicarb, calcitriol  - Nephrology following     # DM   - HA1c 7.1% in 11/21  - Monitor FS  - Start basal bolus if FS persistently >180  - F/u A1c -->6.1    # Accelerated HTN  - uncontrolled with systolics ~200  - s/p hydralazine 10mg IV push x1 with good affect  - increased metoprolol to 100mg XL daily  - lisinopril 20mg qhs  - PRN labetalol 10mg IV PRN systolics >180; hold parameters if HR <60    Consider switching Amlodipine 10MG  to Nifedipine ER 60,     # HLD  - C/w Atorvastatin     # Right pontine CVA (February 7, 2021)  # Intellectual disability  # hearing/speech impairment  - Pt eval   - ASA, Statin, Plavix      # PVD  - s/p left femoral artery-posterior tibial artery bypass with autologous venous tissue and left heel ulcer debridement 11/21  - ASA, Plavix, Statin     DVT ppx: hep sq  GI ppx: none   Diet: DASH  Activity: IAT  Dispo:     #Progress Note Handoff  Pending (specify):  Clinical improvement with CHATA / HTN  Family discussion:  Disposition: Home_

## 2022-05-16 ENCOUNTER — TRANSCRIPTION ENCOUNTER (OUTPATIENT)
Age: 57
End: 2022-05-16

## 2022-05-16 VITALS
SYSTOLIC BLOOD PRESSURE: 104 MMHG | DIASTOLIC BLOOD PRESSURE: 53 MMHG | RESPIRATION RATE: 18 BRPM | TEMPERATURE: 97 F | HEART RATE: 61 BPM

## 2022-05-16 LAB
ALBUMIN SERPL ELPH-MCNC: 3.4 G/DL — LOW (ref 3.5–5.2)
ALP SERPL-CCNC: 222 U/L — HIGH (ref 30–115)
ALT FLD-CCNC: 19 U/L — SIGNIFICANT CHANGE UP (ref 0–41)
ANION GAP SERPL CALC-SCNC: 14 MMOL/L — SIGNIFICANT CHANGE UP (ref 7–14)
AST SERPL-CCNC: 23 U/L — SIGNIFICANT CHANGE UP (ref 0–41)
BASOPHILS # BLD AUTO: 0.06 K/UL — SIGNIFICANT CHANGE UP (ref 0–0.2)
BASOPHILS NFR BLD AUTO: 0.8 % — SIGNIFICANT CHANGE UP (ref 0–1)
BILIRUB SERPL-MCNC: <0.2 MG/DL — SIGNIFICANT CHANGE UP (ref 0.2–1.2)
BUN SERPL-MCNC: 69 MG/DL — CRITICAL HIGH (ref 10–20)
CALCIUM SERPL-MCNC: 8.2 MG/DL — LOW (ref 8.5–10.1)
CHLORIDE SERPL-SCNC: 107 MMOL/L — SIGNIFICANT CHANGE UP (ref 98–110)
CO2 SERPL-SCNC: 19 MMOL/L — SIGNIFICANT CHANGE UP (ref 17–32)
CREAT SERPL-MCNC: 4.8 MG/DL — CRITICAL HIGH (ref 0.7–1.5)
EGFR: 10 ML/MIN/1.73M2 — LOW
EOSINOPHIL # BLD AUTO: 0.51 K/UL — SIGNIFICANT CHANGE UP (ref 0–0.7)
EOSINOPHIL NFR BLD AUTO: 7.1 % — SIGNIFICANT CHANGE UP (ref 0–8)
GLUCOSE BLDC GLUCOMTR-MCNC: 146 MG/DL — HIGH (ref 70–99)
GLUCOSE BLDC GLUCOMTR-MCNC: 210 MG/DL — HIGH (ref 70–99)
GLUCOSE BLDC GLUCOMTR-MCNC: 211 MG/DL — HIGH (ref 70–99)
GLUCOSE BLDC GLUCOMTR-MCNC: 71 MG/DL — SIGNIFICANT CHANGE UP (ref 70–99)
GLUCOSE BLDC GLUCOMTR-MCNC: 75 MG/DL — SIGNIFICANT CHANGE UP (ref 70–99)
GLUCOSE SERPL-MCNC: 147 MG/DL — HIGH (ref 70–99)
HCT VFR BLD CALC: 26.5 % — LOW (ref 37–47)
HGB BLD-MCNC: 8.5 G/DL — LOW (ref 12–16)
IMM GRANULOCYTES NFR BLD AUTO: 0.3 % — SIGNIFICANT CHANGE UP (ref 0.1–0.3)
LYMPHOCYTES # BLD AUTO: 2.03 K/UL — SIGNIFICANT CHANGE UP (ref 1.2–3.4)
LYMPHOCYTES # BLD AUTO: 28.2 % — SIGNIFICANT CHANGE UP (ref 20.5–51.1)
MAGNESIUM SERPL-MCNC: 2 MG/DL — SIGNIFICANT CHANGE UP (ref 1.8–2.4)
MCHC RBC-ENTMCNC: 28.7 PG — SIGNIFICANT CHANGE UP (ref 27–31)
MCHC RBC-ENTMCNC: 32.1 G/DL — SIGNIFICANT CHANGE UP (ref 32–37)
MCV RBC AUTO: 89.5 FL — SIGNIFICANT CHANGE UP (ref 81–99)
MONOCYTES # BLD AUTO: 0.52 K/UL — SIGNIFICANT CHANGE UP (ref 0.1–0.6)
MONOCYTES NFR BLD AUTO: 7.2 % — SIGNIFICANT CHANGE UP (ref 1.7–9.3)
NEUTROPHILS # BLD AUTO: 4.06 K/UL — SIGNIFICANT CHANGE UP (ref 1.4–6.5)
NEUTROPHILS NFR BLD AUTO: 56.4 % — SIGNIFICANT CHANGE UP (ref 42.2–75.2)
NRBC # BLD: 0 /100 WBCS — SIGNIFICANT CHANGE UP (ref 0–0)
PHOSPHATE SERPL-MCNC: 3.7 MG/DL — SIGNIFICANT CHANGE UP (ref 2.1–4.9)
PLATELET # BLD AUTO: 188 K/UL — SIGNIFICANT CHANGE UP (ref 130–400)
POTASSIUM SERPL-MCNC: 4.4 MMOL/L — SIGNIFICANT CHANGE UP (ref 3.5–5)
POTASSIUM SERPL-SCNC: 4.4 MMOL/L — SIGNIFICANT CHANGE UP (ref 3.5–5)
PROT SERPL-MCNC: 5.3 G/DL — LOW (ref 6–8)
RBC # BLD: 2.96 M/UL — LOW (ref 4.2–5.4)
RBC # FLD: 13.1 % — SIGNIFICANT CHANGE UP (ref 11.5–14.5)
SODIUM SERPL-SCNC: 140 MMOL/L — SIGNIFICANT CHANGE UP (ref 135–146)
WBC # BLD: 7.2 K/UL — SIGNIFICANT CHANGE UP (ref 4.8–10.8)
WBC # FLD AUTO: 7.2 K/UL — SIGNIFICANT CHANGE UP (ref 4.8–10.8)

## 2022-05-16 PROCEDURE — 99239 HOSP IP/OBS DSCHRG MGMT >30: CPT

## 2022-05-16 RX ORDER — POLYETHYLENE GLYCOL 3350 17 G/17G
17 POWDER, FOR SOLUTION ORAL
Qty: 119 | Refills: 0
Start: 2022-05-16 | End: 2022-05-22

## 2022-05-16 RX ORDER — METOPROLOL TARTRATE 50 MG
1 TABLET ORAL
Qty: 0 | Refills: 0 | DISCHARGE

## 2022-05-16 RX ORDER — HYDRALAZINE HCL 50 MG
1 TABLET ORAL
Qty: 0 | Refills: 2 | DISCHARGE

## 2022-05-16 RX ORDER — CYCLOBENZAPRINE HYDROCHLORIDE 10 MG/1
1 TABLET, FILM COATED ORAL
Qty: 7 | Refills: 0
Start: 2022-05-16 | End: 2022-05-22

## 2022-05-16 RX ORDER — HYDRALAZINE HCL 50 MG
1 TABLET ORAL
Qty: 90 | Refills: 1
Start: 2022-05-16 | End: 2022-07-14

## 2022-05-16 RX ORDER — METOPROLOL TARTRATE 50 MG
1 TABLET ORAL
Qty: 30 | Refills: 1
Start: 2022-05-16 | End: 2022-07-14

## 2022-05-16 RX ORDER — ONDANSETRON 8 MG/1
1 TABLET, FILM COATED ORAL
Qty: 0 | Refills: 0 | DISCHARGE
Start: 2022-05-16

## 2022-05-16 RX ORDER — NIFEDIPINE 30 MG
1 TABLET, EXTENDED RELEASE 24 HR ORAL
Qty: 30 | Refills: 0
Start: 2022-05-16 | End: 2022-06-14

## 2022-05-16 RX ORDER — SENNA PLUS 8.6 MG/1
1 TABLET ORAL
Qty: 30 | Refills: 1
Start: 2022-05-16 | End: 2022-07-14

## 2022-05-16 RX ORDER — HYDRALAZINE HCL 50 MG
100 TABLET ORAL EVERY 8 HOURS
Refills: 0 | Status: DISCONTINUED | OUTPATIENT
Start: 2022-05-16 | End: 2022-05-16

## 2022-05-16 RX ORDER — METOPROLOL TARTRATE 50 MG
0.5 TABLET ORAL
Qty: 0 | Refills: 1 | DISCHARGE
Start: 2022-05-16 | End: 2022-07-14

## 2022-05-16 RX ORDER — SUCRALFATE 1 G
1 TABLET ORAL
Qty: 56 | Refills: 0
Start: 2022-05-16 | End: 2022-05-29

## 2022-05-16 RX ORDER — NIFEDIPINE 30 MG
1 TABLET, EXTENDED RELEASE 24 HR ORAL
Qty: 30 | Refills: 1
Start: 2022-05-16 | End: 2022-07-14

## 2022-05-16 RX ADMIN — SEVELAMER CARBONATE 800 MILLIGRAM(S): 2400 POWDER, FOR SUSPENSION ORAL at 08:21

## 2022-05-16 RX ADMIN — Medication 4: at 14:29

## 2022-05-16 RX ADMIN — Medication 500 MILLIGRAM(S): at 11:09

## 2022-05-16 RX ADMIN — Medication 650 MILLIGRAM(S): at 06:08

## 2022-05-16 RX ADMIN — Medication 650 MILLIGRAM(S): at 15:31

## 2022-05-16 RX ADMIN — Medication 100 MILLIGRAM(S): at 15:36

## 2022-05-16 RX ADMIN — Medication 1 GRAM(S): at 18:12

## 2022-05-16 RX ADMIN — CLOPIDOGREL BISULFATE 75 MILLIGRAM(S): 75 TABLET, FILM COATED ORAL at 11:09

## 2022-05-16 RX ADMIN — Medication 60 MILLIGRAM(S): at 06:08

## 2022-05-16 RX ADMIN — SEVELAMER CARBONATE 800 MILLIGRAM(S): 2400 POWDER, FOR SUSPENSION ORAL at 18:12

## 2022-05-16 RX ADMIN — SEVELAMER CARBONATE 800 MILLIGRAM(S): 2400 POWDER, FOR SUSPENSION ORAL at 14:15

## 2022-05-16 RX ADMIN — Medication 1 GRAM(S): at 11:09

## 2022-05-16 RX ADMIN — POLYETHYLENE GLYCOL 3350 17 GRAM(S): 17 POWDER, FOR SOLUTION ORAL at 14:20

## 2022-05-16 RX ADMIN — Medication 81 MILLIGRAM(S): at 11:09

## 2022-05-16 RX ADMIN — Medication 1 GRAM(S): at 06:08

## 2022-05-16 RX ADMIN — HEPARIN SODIUM 5000 UNIT(S): 5000 INJECTION INTRAVENOUS; SUBCUTANEOUS at 06:09

## 2022-05-16 RX ADMIN — Medication 100 MILLIGRAM(S): at 06:09

## 2022-05-16 RX ADMIN — CALCITRIOL 0.5 MICROGRAM(S): 0.5 CAPSULE ORAL at 11:09

## 2022-05-16 RX ADMIN — Medication 75 MILLIGRAM(S): at 06:08

## 2022-05-16 NOTE — PROGRESS NOTE ADULT - ATTENDING COMMENTS
***My note supersedes any discrepancies that may be above in the resident's note***    58 yo F PMHx of HTN, HLD, Right pontine CVA (February 7, 2021), intellectual disability, hearing/speech impairment, CKD stage IV, mild AS, former smoker, PVD s/p left femoral artery-posterior tibial artery bypass with autologous venous tissue and left heel ulcer debridement presented to ED for abd pain x1d.     #Abd pain--resolved  #diffuse MSK pain   - Unclear etiology with non specific characteristics and symptoms that are very generic and disconnected  - possible Viral gastritis vs constipation vs cramps vs fatty liver ?  - remains Pain-free   - RUQ also unremarkable for focal findings  - lipase neg, 40  - Alk phos elevated 249  - CPK wnl  - pain control with Tylenol.   - c/w carafate 1g qid    #CHATA on CKD stage IV  #Metabolic Acidosis  #Proteinuria  - Cr baseline 2.8  - pre-renal in setting of decreased PO intake vs ANKITA nephropathy vs nephrotic syndrome vs glomerulonephritis   - Spot protein:cr ratio 10.4 (estimated ~10 g/day proteinuria?)  - C/w Renvela, Sodium bicarb, calcitriol  - f/u Nephrology       - since renal function is stable and no urgency to start RRT, pt can be discharged with close monthly follow up with outpatient nephrology (sees Dr. Huddleston in [facility]) with plan for AV access placement asap to forgo catheter      # DM   - HA1c 7.1% in 11/21  - Monitor FS  - Start basal bolus if FS persistently >180  - F/u A1c -->6.1    # Accelerated HTN  - uncontrolled with systolics ~200  - c/w metoprolol to 100mg XL daily  - holding lisinopril 20mg qhs  - c/w Nifedipine ER 60,   - increased hydralazine 100mg Q8H    # HLD  - C/w Atorvastatin     # Right pontine CVA (February 7, 2021)  # Intellectual disability  # hearing/speech impairment  - Pt eval   - ASA, Statin, Plavix      # PVD  - s/p left femoral artery-posterior tibial artery bypass with autologous venous tissue and left heel ulcer debridement 11/21  - ASA, Plavix, Statin     DVT ppx: hep sq  GI ppx: none   Diet: DASH  Activity: IAT    #Progress Note Handoff  Pending (specify): Nephrology f/u / better HTN control   Family discussion: spoke to brother in law over facetime, updated about medical plan  Disposition: Home ***My note supersedes any discrepancies that may be above in the resident's note***    56 yo F PMHx of HTN, HLD, Right pontine CVA (February 7, 2021), intellectual disability, hearing/speech impairment, CKD stage IV, mild AS, former smoker, PVD s/p left femoral artery-posterior tibial artery bypass with autologous venous tissue and left heel ulcer debridement presented to ED for abd pain x1d.     #Abd pain--resolved  #diffuse MSK pain   - Unclear etiology with non specific characteristics and symptoms that are very generic and disconnected  - possible Viral gastritis vs constipation vs cramps vs fatty liver ?  - remains Pain-free   - RUQ also unremarkable for focal findings  - lipase neg, 40  - Alk phos elevated 249  - CPK wnl  - pain control with Tylenol.   - c/w carafate 1g qid    #CHATA on CKD stage IV, STABLE  #Metabolic Acidosis  #Proteinuria  - Cr baseline 2.8  - pre-renal in setting of decreased PO intake vs ANKITA nephropathy vs nephrotic syndrome vs glomerulonephritis   - Spot protein:cr ratio 10.4 (estimated ~10 g/day proteinuria?)  - C/w Renvela, Sodium bicarb, calcitriol  - f/u Nephrology       - since renal function is stable and no urgency to start RRT, pt can be discharged with close monthly follow up with outpatient nephrology (sees Dr. Huddleston in [facility]) with plan for AV access placement asap to forgo catheter      # DM   - HA1c 7.1% in 11/21  - Monitor FS  - Start basal bolus if FS persistently >180  - F/u A1c -->6.1    # Accelerated HTN  - uncontrolled with systolics ~200  - c/w metoprolol to 100mg XL daily  - holding lisinopril 20mg qhs  - c/w Nifedipine ER 60,   - increased hydralazine 100mg Q8H    # HLD  - C/w Atorvastatin     # Right pontine CVA (February 7, 2021)  # Intellectual disability  # hearing/speech impairment  - Pt eval   - ASA, Statin, Plavix      # PVD  - s/p left femoral artery-posterior tibial artery bypass with autologous venous tissue and left heel ulcer debridement 11/21  - ASA, Plavix, Statin     DVT ppx: hep sq  GI ppx: none   Diet: DASH  Activity: IAT    #Progress Note Handoff  Pending (specify): BP control  Family discussion: spoke to brother in law over facetime, updated about medical plan  Disposition: anticipate home tomorrow with outpatient follow up with Nephrology

## 2022-05-16 NOTE — PROGRESS NOTE ADULT - PROVIDER SPECIALTY LIST ADULT
Hospitalist
Internal Medicine
Internal Medicine
Podiatry
Hospitalist
Hospitalist
Internal Medicine
Nephrology
Hospitalist
Nephrology

## 2022-05-16 NOTE — DISCHARGE NOTE NURSING/CASE MANAGEMENT/SOCIAL WORK - NSDCPEFALRISK_GEN_ALL_CORE
For information on Fall & Injury Prevention, visit: [email or web address] OR  [email or web address] OR  https://www.cdc.gov/steadi/patient.html

## 2022-05-16 NOTE — DISCHARGE NOTE PROVIDER - NSDCCPCAREPLAN_GEN_ALL_CORE_FT
PRINCIPAL DISCHARGE DIAGNOSIS  Diagnosis: Weakness  Assessment and Plan of Treatment: improved with increased PO intake      SECONDARY DISCHARGE DIAGNOSES  Diagnosis: CHATA (acute kidney injury)  Assessment and Plan of Treatment: CHATA likely reflects new baseline creatinine but requires outpatient work up and monitoring to confirm. will see nephrology after discharge

## 2022-05-16 NOTE — PROGRESS NOTE ADULT - ASSESSMENT
56 yo F PMHx of HTN, HLD, Right pontine CVA (February 7, 2021), intellectual disability, hearing/speech impairment, CKD stage IV, mild AS, former smoker, PVD s/p left femoral artery-posterior tibial artery bypass with autologous venous tissue and left heel ulcer debridement presented to ED for abd pain x1d.   Renal was called for CHATA on CKD 4    CHATA on CKD 4 / progression of CKD / last creat 3.5 in 3/2022, previously 2.7-3.0 11/2021 / ANKITA may be contributing  Nephrotic range proteinuria/ likely due to diabetic kidney disease/ HTN  Normocytic anemia / CKD    - no need for iv fluids  - maintain on renal low Na, low phos, low K diet  - kidney sono (5/11/22), small R kidney R<<L with increased echogenicity neg ZULEYKA (5/11/22), no retention PVR 70 cc only  - CPK, phos wnl , iPTH 278  - increase sodium bicarb po to 1300mg q8h  - maintain on torsemide 20mg po daily to avoid hypervolemia  - decrease hydralazine to 75mg po q8h to avoid hypotension (can keep at 100mg if BP is elevated)  - pt needs AV access placement/ hopefully can hold off on starting RRT until AVF can mature, creat so far stable, pt w/o ssx of uremia, hypervolemia, K level wnl  - continue  Calcitriol 0.5 mcg po qd   - serologic w/u so far: weak positive OLVIN, please check anti-dsDNA, serum flc ratio wnl, SPEP in Nov 2021 with gamma migrating protein (can f/u with hem/onc outpatient), hep B/C neg  - please check ANCA (doubtful w/o hematuria currently), PLA2R Ab, HIV, C3, C4  - monitor daily bladder scan to r/o rentention  - document urine output  - iron replete, start darbepoetin 20mcg wkly (as long as BP is controlled)  - since renal function is stable and no urgency to start RRT, pt can be discharged with close monthly follow up with outpatient nephrology (sees Dr. Huddleston in [facility]) with plan for AV access placement asap to forgo catheter    D/w pt with assistance of  Jasmina  d/w pt's brother in law with  #[id number] / answered all questions

## 2022-05-16 NOTE — DISCHARGE NOTE PROVIDER - HOSPITAL COURSE
History of Present Illness:   56 yo F PMHx of, HTN, HLD, Right pontine CVA (February 7, 2021), intellectual disability, hearing/speech impairment, DM, CKD stage IV, mild AS, former smoker, PVD s/p left femoral artery-posterior tibial artery bypass with autologous venous tissue and left heel ulcer debridement 11/21 presented to ED for abd pain x2d.     Pain is located in right upper and lower quadrants and L lateral abd, mild-mod (unable to give number), radiating to b/l le and r arm, described as crampy, intermittent, worse at night, a/w nausea, nbnb vomitus, constipation, dec po intake, generalized weakness. Brother in law at bedside corroborated history. Endorsed, subjective fevers, chills, rhinorrhea. Pt denies H/A, sob, cough, c/p, palpitations, burning, frequency.     In the ED, VS T(F): 98.5 HR: 76 BP: 185/78 RR: 17 SpO2: 98% in RA. Labs notable for Cr 4.2 (bl 2.8), K 5.3, Alk phos 249. CT A/P noncont unremarkable.     Admitted for CHATA and abd pain. Med rec from son  in law.      #Abd pain--resolved  #diffuse MSK pain   - Unclear etiology with non specific characteristics and symptoms that are very generic and disconnected  - possible Viral gastritis vs constipation vs cramps vs fatty liver ?  - remains Pain-free without any intervention taken  - RUQ also unremarkable for focal findings other than carafate  - lipase neg, 40  - Alk phos elevated 249  - CPK wnl  - pain control with Tylenol.       #CHATA on CKD stage IV, STABLE  #Metabolic Acidosis  #Proteinuria  - Cr baseline 2.8  - pre-renal in setting of decreased PO intake vs ANKITA nephropathy vs nephrotic syndrome vs glomerulonephritis   - Spot protein:cr ratio 10.4 (estimated ~10 g/day proteinuria?)  - C/w Renvela, Sodium bicarb, calcitriol  - f/u Nephrology       - since renal function is stable and no urgency to start RRT, pt can be discharged with close monthly follow up with outpatient nephrology (sees Dr. Huddleston in [facility]) with plan for AV access placement asap to forgo catheter      # DM   - HA1c 7.1% in 11/21  - Monitor FS  - Start basal bolus if FS persistently >180  - F/u A1c -->6.1    # Accelerated HTN  - uncontrolled with systolics ~200  - c/w metoprolol to 100mg XL daily  - holding lisinopril 20mg qhs  - c/w Nifedipine ER 60,   - increased hydralazine 100mg Q8H    # HLD  - C/w Atorvastatin     # Right pontine CVA (February 7, 2021)  # Intellectual disability  # hearing/speech impairment  - Pt eval   - ASA, Statin, Plavix      # PVD  - s/p left femoral artery-posterior tibial artery bypass with autologous venous tissue and left heel ulcer debridement 11/21  - ASA, Plavix, Statin

## 2022-05-16 NOTE — DISCHARGE NOTE NURSING/CASE MANAGEMENT/SOCIAL WORK - PATIENT PORTAL LINK FT
You can access the FollowMyHealth Patient Portal offered by [facility] by registering at the following website: [email or web address] By joining [facility]’s FollowMyHealth portal, you will also be able to view your health information using other applications (apps) compatible with our system.

## 2022-05-16 NOTE — DISCHARGE NOTE PROVIDER - NSDCMRMEDTOKEN_GEN_ALL_CORE_FT
ascorbic acid 500 mg oral tablet: 1 tab(s) orally once a day  aspirin 81 mg oral delayed release tablet: 1 tab(s) orally once a day   atorvastatin 80 mg oral tablet: 1 tab(s) orally once a day  Basaglar KwikPen 100 units/mL subcutaneous solution: 10 unit(s) subcutaneous once a day (at bedtime)   calcitriol 0.25 mcg oral capsule: 1 cap(s) orally once a day  Colace 100 mg oral capsule: 1 cap(s) orally 2 times a day   cyclobenzaprine 5 mg oral tablet: 1 tab(s) orally once a day (at bedtime), As needed, Muscle Spasm  ferrous sulfate 325 mg (65 mg elemental iron) oral tablet: 1 tab(s) orally once a day  hydrALAZINE 100 mg oral tablet: 1 tab(s) orally every 8 hours  insulin lispro 100 units/mL injectable solution: 4 unit(s) injectable 3 times a day (before meals)  metoprolol succinate 100 mg oral tablet, extended release: 1 tab(s) orally once a day  NIFEdipine 60 mg oral tablet, extended release: 1 tab(s) orally once a day  ondansetron 4 mg oral tablet: 1 tab(s) orally every 6 hours, As needed, Nausea and/or Vomiting  Plavix 75 mg oral tablet: 1 tab(s) orally once a day  polyethylene glycol 3350 oral powder for reconstitution: 17 gram(s) orally once a day  Protonix 40 mg oral delayed release tablet: 1 tab(s) orally once a day, As Needed  senna oral tablet: 1 tab(s) orally once a day (at bedtime)  sevelamer carbonate 800 mg oral tablet: 1 tab(s) orally 3 times a day (with meals)  sodium bicarbonate 650 mg oral tablet: 2 tab(s) orally 3 times a day  sucralfate 1 g oral tablet: 1 tab(s) orally 4 times a day   ascorbic acid 500 mg oral tablet: 1 tab(s) orally once a day  aspirin 81 mg oral delayed release tablet: 1 tab(s) orally once a day   atorvastatin 80 mg oral tablet: 1 tab(s) orally once a day  Basaglar KwikPen 100 units/mL subcutaneous solution: 10 unit(s) subcutaneous once a day (at bedtime)   calcitriol 0.25 mcg oral capsule: 1 cap(s) orally once a day  Colace 100 mg oral capsule: 1 cap(s) orally 2 times a day   cyclobenzaprine 5 mg oral tablet: 1 tab(s) orally once a day (at bedtime), As needed, Muscle Spasm  ferrous sulfate 325 mg (65 mg elemental iron) oral tablet: 1 tab(s) orally once a day  hydrALAZINE 100 mg oral tablet: 1 tab(s) orally every 8 hours  insulin lispro 100 units/mL injectable solution: 4 unit(s) injectable 3 times a day (before meals)  metoprolol succinate 100 mg oral tablet, extended release: 1 tab(s) orally once a day  NIFEdipine 30 mg oral tablet, extended release: 1 tab(s) orally once a day   ondansetron 4 mg oral tablet: 1 tab(s) orally every 6 hours, As needed, Nausea and/or Vomiting  Plavix 75 mg oral tablet: 1 tab(s) orally once a day  polyethylene glycol 3350 oral powder for reconstitution: 17 gram(s) orally once a day  Protonix 40 mg oral delayed release tablet: 1 tab(s) orally once a day, As Needed  senna oral tablet: 1 tab(s) orally once a day (at bedtime)  sevelamer carbonate 800 mg oral tablet: 1 tab(s) orally 3 times a day (with meals)  sodium bicarbonate 650 mg oral tablet: 2 tab(s) orally 3 times a day  sucralfate 1 g oral tablet: 1 tab(s) orally 4 times a day

## 2022-05-16 NOTE — DISCHARGE NOTE PROVIDER - ATTENDING DISCHARGE PHYSICAL EXAMINATION:
GENERAL: middle-age F, appears older than stated age, NAD  ENT: hearing dimished, oropharynx clear  EYES: anicteric sclera, non injected conjunctiva, EOMI  PSYCH: no agitation, baseline mentation  NERVOUS SYSTEM:  Alert & Oriented X3, CN 2-12 grossly intact  PULMONARY: Clear to percussion bilaterally; No rales, rhonchi, wheezing, or rubs  CARDIOVASCULAR: Regular rate and rhythm; No murmurs, rubs, or gallops  GI: RUQ ttp, nabs+  EXTREMITIES:  2+ Peripheral Pulses, No clubbing, cyanosis, or edema  SKIN: No rashes or lesions

## 2022-05-16 NOTE — PROGRESS NOTE ADULT - ASSESSMENT
58 yo F PMHx of HTN, HLD, Right pontine CVA (February 7, 2021), intellectual disability, hearing/speech impairment, CKD stage IV, mild AS, former smoker, PVD s/p left femoral artery-posterior tibial artery bypass with autologous venous tissue and left heel ulcer debridement presented to ED for abd pain x1d.     #Abd pain--resolved  #diffuse MSK pain   - Unclear etiology with non specific characteristics and symptoms that are very generic and disconnected  - possible Viral gastritis vs constipation vs cramps vs fatty liver ?  - remains Pain-free   - RUQ also unremarkable for focal findings  - lipase neg, 40  - Alk phos elevated 249  - CPK wnl  - pain control with Tylenol.   - c/w carafate 1g qid    #CHATA on CKD stage IV  #Metabolic Acidosis  #Proteinuria  - Cr baseline 2.8  - pre-renal in setting of decreased PO intake vs ANKITA nephropathy vs nephrotic syndrome vs glomerulonephritis   - Spot protein:cr ratio 10.4 (estimated ~10 g/day proteinuria?)  - C/w Renvela, Sodium bicarb, calcitriol  - f/u Nephrology     # DM   - HA1c 7.1% in 11/21  - Monitor FS  - Start basal bolus if FS persistently >180  - F/u A1c -->6.1    # Accelerated HTN  - uncontrolled with systolics ~200  - c/w metoprolol to 100mg XL daily  - holding lisinopril 20mg qhs  - c/w Nifedipine ER 60,   - increased hydralazine 100mg Q8H    # HLD  - C/w Atorvastatin     # Right pontine CVA (February 7, 2021)  # Intellectual disability  # hearing/speech impairment  - Pt eval   - ASA, Statin, Plavix      # PVD  - s/p left femoral artery-posterior tibial artery bypass with autologous venous tissue and left heel ulcer debridement 11/21  - ASA, Plavix, Statin     DVT ppx: hep sq  GI ppx: none   Diet: DASH  Activity: IAT    #Progress Note Handoff  Pending (specify): Nephrology f/u / better HTN control   Family discussion: spoke to brother in law over facetime, updated about medical plan  Disposition: Home

## 2022-05-16 NOTE — DISCHARGE NOTE PROVIDER - CARE PROVIDER_API CALL
Hill Franklin)  Internal Medicine; Nephrology  [address] [place] [address]  Phone: [phone]  Fax: [phone]  Established Patient  Follow Up Time: 1-3 days

## 2022-05-17 LAB
AUTO DIFF PNL BLD: NEGATIVE — SIGNIFICANT CHANGE UP
C-ANCA SER-ACNC: NEGATIVE — SIGNIFICANT CHANGE UP
DSDNA AB SER-ACNC: <12 IU/ML — SIGNIFICANT CHANGE UP
INTERPRETATION SERPL IFE-IMP: SIGNIFICANT CHANGE UP
MPO AB + PR3 PNL SER: SIGNIFICANT CHANGE UP
P-ANCA SER-ACNC: NEGATIVE — SIGNIFICANT CHANGE UP
PHOSPHOLIPASE A2 RECEPTOR ELISA: <1.8 RU/ML — SIGNIFICANT CHANGE UP (ref 0–19.9)

## 2022-05-18 LAB
% ALBUMIN: 47.9 % — SIGNIFICANT CHANGE UP
% ALPHA 1: 5.8 % — SIGNIFICANT CHANGE UP
% ALPHA 2: 21.8 % — SIGNIFICANT CHANGE UP
% BETA: 11.4 % — SIGNIFICANT CHANGE UP
% GAMMA: 13.1 % — SIGNIFICANT CHANGE UP
% M SPIKE: 2.3 % — SIGNIFICANT CHANGE UP
ALBUMIN SERPL ELPH-MCNC: 2.4 G/DL — LOW (ref 3.6–5.5)
ALBUMIN/GLOB SERPL ELPH: 0.9 RATIO — SIGNIFICANT CHANGE UP
ALPHA1 GLOB SERPL ELPH-MCNC: 0.3 G/DL — SIGNIFICANT CHANGE UP (ref 0.1–0.4)
ALPHA2 GLOB SERPL ELPH-MCNC: 1.1 G/DL — HIGH (ref 0.5–1)
B-GLOBULIN SERPL ELPH-MCNC: 0.6 G/DL — SIGNIFICANT CHANGE UP (ref 0.5–1)
GAMMA GLOBULIN: 0.7 G/DL — SIGNIFICANT CHANGE UP (ref 0.6–1.6)
INTERPRETATION 24H UR IFE-IMP: SIGNIFICANT CHANGE UP
M-SPIKE: 0.1 G/DL — HIGH (ref 0–0)
PROT PATTERN SERPL ELPH-IMP: SIGNIFICANT CHANGE UP

## 2022-05-23 DIAGNOSIS — R74.8 ABNORMAL LEVELS OF OTHER SERUM ENZYMES: ICD-10-CM

## 2022-05-23 DIAGNOSIS — L97.429 NON-PRESSURE CHRONIC ULCER OF LEFT HEEL AND MIDFOOT WITH UNSPECIFIED SEVERITY: ICD-10-CM

## 2022-05-23 DIAGNOSIS — N17.9 ACUTE KIDNEY FAILURE, UNSPECIFIED: ICD-10-CM

## 2022-05-23 DIAGNOSIS — E87.2 ACIDOSIS: ICD-10-CM

## 2022-05-23 DIAGNOSIS — E11.22 TYPE 2 DIABETES MELLITUS WITH DIABETIC CHRONIC KIDNEY DISEASE: ICD-10-CM

## 2022-05-23 DIAGNOSIS — R80.9 PROTEINURIA, UNSPECIFIED: ICD-10-CM

## 2022-05-23 DIAGNOSIS — D64.9 ANEMIA, UNSPECIFIED: ICD-10-CM

## 2022-05-23 DIAGNOSIS — M79.10 MYALGIA, UNSPECIFIED SITE: ICD-10-CM

## 2022-05-23 DIAGNOSIS — N18.4 CHRONIC KIDNEY DISEASE, STAGE 4 (SEVERE): ICD-10-CM

## 2022-05-23 DIAGNOSIS — I73.9 PERIPHERAL VASCULAR DISEASE, UNSPECIFIED: ICD-10-CM

## 2022-05-23 DIAGNOSIS — R47.9 UNSPECIFIED SPEECH DISTURBANCES: ICD-10-CM

## 2022-05-23 DIAGNOSIS — E78.5 HYPERLIPIDEMIA, UNSPECIFIED: ICD-10-CM

## 2022-05-23 DIAGNOSIS — F79 UNSPECIFIED INTELLECTUAL DISABILITIES: ICD-10-CM

## 2022-05-23 DIAGNOSIS — Z88.0 ALLERGY STATUS TO PENICILLIN: ICD-10-CM

## 2022-05-23 DIAGNOSIS — I12.9 HYPERTENSIVE CHRONIC KIDNEY DISEASE WITH STAGE 1 THROUGH STAGE 4 CHRONIC KIDNEY DISEASE, OR UNSPECIFIED CHRONIC KIDNEY DISEASE: ICD-10-CM

## 2022-05-23 DIAGNOSIS — K59.00 CONSTIPATION, UNSPECIFIED: ICD-10-CM

## 2022-05-23 DIAGNOSIS — Z86.73 PERSONAL HISTORY OF TRANSIENT ISCHEMIC ATTACK (TIA), AND CEREBRAL INFARCTION WITHOUT RESIDUAL DEFICITS: ICD-10-CM

## 2022-05-23 DIAGNOSIS — H91.93 UNSPECIFIED HEARING LOSS, BILATERAL: ICD-10-CM

## 2022-05-23 DIAGNOSIS — R10.9 UNSPECIFIED ABDOMINAL PAIN: ICD-10-CM

## 2022-05-23 DIAGNOSIS — E11.51 TYPE 2 DIABETES MELLITUS WITH DIABETIC PERIPHERAL ANGIOPATHY WITHOUT GANGRENE: ICD-10-CM

## 2022-05-23 DIAGNOSIS — A08.4 VIRAL INTESTINAL INFECTION, UNSPECIFIED: ICD-10-CM

## 2022-05-24 ENCOUNTER — OUTPATIENT (OUTPATIENT)
Dept: OUTPATIENT SERVICES | Facility: HOSPITAL | Age: 57
LOS: 1 days | Discharge: HOME | End: 2022-05-24

## 2022-05-24 ENCOUNTER — APPOINTMENT (OUTPATIENT)
Dept: NEPHROLOGY | Facility: CLINIC | Age: 57
End: 2022-05-24
Payer: MEDICAID

## 2022-05-24 ENCOUNTER — APPOINTMENT (OUTPATIENT)
Dept: VASCULAR SURGERY | Facility: CLINIC | Age: 57
End: 2022-05-24
Payer: SUBSIDIZED

## 2022-05-24 ENCOUNTER — APPOINTMENT (OUTPATIENT)
Dept: PODIATRY | Facility: CLINIC | Age: 57
End: 2022-05-24
Payer: MEDICAID

## 2022-05-24 VITALS — BODY MASS INDEX: 32.39 KG/M2 | WEIGHT: 165 LBS | HEIGHT: 60 IN

## 2022-05-24 VITALS
DIASTOLIC BLOOD PRESSURE: 67 MMHG | WEIGHT: 159 LBS | SYSTOLIC BLOOD PRESSURE: 150 MMHG | HEIGHT: 60 IN | OXYGEN SATURATION: 99 % | BODY MASS INDEX: 31.22 KG/M2 | HEART RATE: 98 BPM

## 2022-05-24 VITALS — SYSTOLIC BLOOD PRESSURE: 145 MMHG | DIASTOLIC BLOOD PRESSURE: 71 MMHG

## 2022-05-24 DIAGNOSIS — L97.509 NON-PRESSURE CHRONIC ULCER OF OTHER PART OF UNSPECIFIED FOOT WITH UNSPECIFIED SEVERITY: ICD-10-CM

## 2022-05-24 DIAGNOSIS — I96 GANGRENE, NOT ELSEWHERE CLASSIFIED: ICD-10-CM

## 2022-05-24 DIAGNOSIS — M79.672 PAIN IN LEFT FOOT: ICD-10-CM

## 2022-05-24 PROCEDURE — 99214 OFFICE O/P EST MOD 30 MIN: CPT | Mod: GC

## 2022-05-24 PROCEDURE — 99213 OFFICE O/P EST LOW 20 MIN: CPT

## 2022-05-24 PROCEDURE — 99214 OFFICE O/P EST MOD 30 MIN: CPT | Mod: NC,1L,GC

## 2022-05-24 PROCEDURE — 93970 EXTREMITY STUDY: CPT

## 2022-05-24 PROCEDURE — 99214 OFFICE O/P EST MOD 30 MIN: CPT

## 2022-05-24 RX ORDER — SEVELAMER CARBONATE 800 MG/1
800 TABLET, FILM COATED ORAL
Qty: 270 | Refills: 3 | Status: ACTIVE | COMMUNITY
Start: 2022-05-03

## 2022-05-24 RX ORDER — GABAPENTIN 300 MG
300 TABLET ORAL 3 TIMES DAILY
Refills: 0 | Status: ACTIVE | COMMUNITY

## 2022-05-24 RX ORDER — CALCITRIOL 0.25 UG/1
0.25 CAPSULE, LIQUID FILLED ORAL DAILY
Qty: 30 | Refills: 6 | Status: ACTIVE | COMMUNITY
Start: 2022-05-24 | End: 1900-01-01

## 2022-05-24 RX ORDER — PANTOPRAZOLE SODIUM 40 MG/1
40 GRANULE, DELAYED RELEASE ORAL
Refills: 0 | Status: DISCONTINUED | COMMUNITY
End: 2022-05-24

## 2022-05-24 RX ORDER — ATORVASTATIN CALCIUM 80 MG/1
80 TABLET, FILM COATED ORAL DAILY
Qty: 90 | Refills: 3 | Status: ACTIVE | COMMUNITY
Start: 2022-05-24

## 2022-05-24 RX ORDER — SILVER SULFADIAZINE 10 MG/G
1 CREAM TOPICAL DAILY
Qty: 1 | Refills: 1 | Status: DISCONTINUED | COMMUNITY
Start: 2021-12-21 | End: 2022-05-24

## 2022-05-24 RX ORDER — LORATADINE 5 MG
17 TABLET,CHEWABLE ORAL DAILY
Refills: 0 | Status: ACTIVE | COMMUNITY
Start: 2022-05-24

## 2022-05-24 RX ORDER — AMMONIUM LACTATE 12 %
12 LOTION (GRAM) TOPICAL
Qty: 1 | Refills: 4 | Status: ACTIVE | COMMUNITY
Start: 2022-05-24 | End: 1900-01-01

## 2022-05-24 RX ORDER — COLLAGENASE SANTYL 250 [ARB'U]/G
250 OINTMENT TOPICAL DAILY
Qty: 1 | Refills: 4 | Status: DISCONTINUED | COMMUNITY
Start: 2021-11-29 | End: 2022-05-24

## 2022-05-24 NOTE — END OF VISIT
[] : Resident [FreeTextEntry3] : Patient seen and examined with renal resident \par Agree with note A and plan \par # CKD 5/ hyperphosphatemia/ edema low ext/ weak Gamma migrating paraprotein on testing \par follow up with vascular today / will need AVF creation\par continue binders \par repeat BMP IP \par BP noted will increase nifedipine \par rtc in 2 weeks

## 2022-05-24 NOTE — ASSESSMENT
[Verbal] : verbal [Patient] : patient [Good - alert, interested, motivated] : Good - alert, interested, motivated [Demonstrates independently] : demonstrates independently [FreeTextEntry1] : Assessment:\par -Diabetes mellitus\par -Diabetic foot ulcer L heel\par \par Plan:\par -Pt seen & evaluated. Pt chart reviewed\par - wound healed \par - Pt rx ammonium lactate to be applied twice daily for B/L xerosis\par -Discussed offloading and shoes in detail\par -Patient will follow-up with PT for offloading of left lower extremity\par

## 2022-05-24 NOTE — ASSESSMENT
[FreeTextEntry1] : #CKD IV\par eGFR = 10 creat 4.8, likely progression to CKD V vs CHATA\par - f/u CMP 1 week to assess for creatinine and acid base disorders / bicarb was 19 \par - phosphate at target 3.7 /  Ca 8.2 C/W ckd c/w sevelamer\par - c/w 0.25 Calcitriol once daily\par - Proteinuria at 5 g already on ACE inh \par - SPEP showing Gamma migrating para-protein w/ M-spike, refer to heme/onc rule out MGRS \par - vascular appointment today to plan for AV fistula creation\par - f/u w Nephro in 2 weeks\par \par #Normocytic anemia\par iron studies as above\par likely 2/2 CKD IV however need further studies to determine\par -f/u CBC last Hb 10.4 \par -if persistent anemia patient will need Hematology referral and may need Iron supplementation \par \par # HTN uncontrolled on metoprolol, nifedipine, and hydralizine\par - 150/67 today\par - increase nifedipine to 60 daily, c/w other meds\par \par rtc in 2 weeks. Nearing RRT\par

## 2022-05-24 NOTE — HISTORY OF PRESENT ILLNESS
[FreeTextEntry1] : 55 y/o female with h/o stroke, with left heel gangrene and left SFA occlusion, underwent left femoral to AT reverse saphenous vein bypass on 11/8/21. \par \par She was sent in today for evaluation of AVF creation, has worsening renal function, not on HD yet.

## 2022-05-24 NOTE — REVIEW OF SYSTEMS
[Fever] : no fever [Chills] : no chills [Feeling Tired] : not feeling tired [Earache] : no earache [Loss Of Hearing] : no hearing loss [Chest Pain] : no chest pain [Palpitations] : no palpitations [Shortness Of Breath] : no shortness of breath [Wheezing] : no wheezing [Cough] : no cough [SOB on Exertion] : no shortness of breath during exertion [Abdominal Pain] : no abdominal pain [Vomiting] : no vomiting [Constipation] : no constipation [Diarrhea] : no diarrhea [Heartburn] : no heartburn [Dysuria] : no dysuria [Incontinence] : no incontinence [Joint Swelling] : no joint swelling

## 2022-05-24 NOTE — HISTORY OF PRESENT ILLNESS
[Wheelchair] : a wheelchair [FreeTextEntry1] : CC: "Wound check"\par HPI:\par -56F who presents w/son &  for f/u after recent hospitalization ([facility] 11/24/21)\par -S/p dbx of heel wound\par \par -Son present (sign language)\par -Denies pain\par -No other pedal complaints\par \par 3/3/22 - Returns for continued care

## 2022-05-24 NOTE — ASSESSMENT
[FreeTextEntry1] : 56 y/o female with h/o stroke, hearing and speech impaired, recently with left heel gangrene and left SFA occlusion, underwent left femoral to AT reverse saphenous vein bypass on 11/8/21. She presents today for AVF creation.\par \par Vein mapping studies done today showed no evidence of adequate vein conduit.\par \par I offered her AV graft placement and discussed the surgical procedure with her. She is scheduled for left upper extremity AV graft placement on June 22, 2022.\par \par She can continue on Aspirin and Plavix.\par \par I spent 40 minutes with patient performing physical exam, reviewing duplex results, discussing treatment plan and followup.\par \par : Patricia Fermin.\par \par

## 2022-05-24 NOTE — REASON FOR VISIT
[Time Spent: ____ minutes] : Total time spent using  services: [unfilled] minutes. The patient's primary language is not English thus required  services. [Follow-Up] : a follow-up visit [Family Member] : family member [Interpreters_FullName] : Jasmina Pretty [TWNoteComboBox1] : American Sign Language

## 2022-05-24 NOTE — PHYSICAL EXAM
[General Appearance - Alert] : alert [General Appearance - In No Acute Distress] : in no acute distress [General Appearance - Well Nourished] : well nourished [Ankle Swelling (On Exam)] : present [Ankle Swelling On The Left] : of the left ankle [Varicose Veins Of The Left Leg] : on the left foot/ankle [] : on the left lower extremity [1+] : left foot dorsalis pedis 1+ [Foot Ulcer] : foot ulcer [Vibration Dec.] : diminished vibratory sensation at the level of the toes [Diminished Throughout Left Foot] : diminished sensation with monofilament testing throughout left foot [Oriented To Time, Place, And Person] : oriented to person, place, and time [Impaired Insight] : insight and judgment were intact [Affect] : the affect was normal [Delayed in the Left Toes] : capillary refills normal in the left toes [FreeTextEntry3] : non palpable pulses left foot [de-identified] : Left 3rd digit shortened, contracted. 4th digit amputation.\par No TTP to wound site [FreeTextEntry1] : Left plantarmedial heel ulcer; fibrogranular wound base down to heel tissue and fascia; 40% fibrous, 60% granular. \par Mild serous drainage. Skin thin, atrophic, tense. \par No malodor, no erythema, no warmth

## 2022-05-24 NOTE — HISTORY OF PRESENT ILLNESS
[FreeTextEntry1] : 58 YO Hearing-Impaired woman w a PMH of CVA (2/2021 on ASA+ Plavix)), DM w peripheral neuropathy(on gabapentin) , peripheral artery disease (s/p vascular repair), dyslipidemia (on lipitor and losartan) here to follow up post hospitalization, currently feeling well having no specific complaints. Reports improving diet, water intake, and decrease salt intake, improvement of nausea and vomiting.  Vascular appointment today at one o'clock. No Pain no SOB / no other complaints. Patient interviewed with the help of  Michaela.

## 2022-05-24 NOTE — PHYSICAL EXAM
[General Appearance - Alert] : alert [General Appearance - In No Acute Distress] : in no acute distress [General Appearance - Well Nourished] : well nourished [General Appearance - Well Developed] : well developed [General Appearance - Well-Appearing] : healthy appearing [Sclera] : the sclera and conjunctiva were normal [PERRL With Normal Accommodation] : pupils were equal in size, round, and reactive to light [Neck Appearance] : the appearance of the neck was normal [Jugular Venous Distention Increased] : there was no jugular-venous distention [] : no respiratory distress [Respiration, Rhythm And Depth] : normal respiratory rhythm and effort [Exaggerated Use Of Accessory Muscles For Inspiration] : no accessory muscle use [Auscultation Breath Sounds / Voice Sounds] : lungs were clear to auscultation bilaterally [Heart Rate And Rhythm] : heart rate was normal and rhythm regular [Heart Sounds] : normal S1 and S2 [Murmurs] : no murmurs [Bowel Sounds] : normal bowel sounds [Abdomen Soft] : soft [Abdomen Tenderness] : non-tender [No CVA Tenderness] : no ~M costovertebral angle tenderness [Abnormal Walk] : normal gait [Motor Tone] : muscle strength and tone were normal [Oriented To Time, Place, And Person] : oriented to person, place, and time [Affect] : the affect was normal [No Spinal Tenderness] : no spinal tenderness [FreeTextEntry1] : trace L > R LE edema

## 2022-05-27 ENCOUNTER — LABORATORY RESULT (OUTPATIENT)
Age: 57
End: 2022-05-27

## 2022-05-29 ENCOUNTER — EMERGENCY (EMERGENCY)
Facility: HOSPITAL | Age: 57
LOS: 0 days | Discharge: HOME | End: 2022-05-29
Attending: EMERGENCY MEDICINE | Admitting: EMERGENCY MEDICINE
Payer: MEDICAID

## 2022-05-29 VITALS
RESPIRATION RATE: 18 BRPM | HEART RATE: 75 BPM | OXYGEN SATURATION: 100 % | DIASTOLIC BLOOD PRESSURE: 79 MMHG | SYSTOLIC BLOOD PRESSURE: 169 MMHG

## 2022-05-29 VITALS
RESPIRATION RATE: 20 BRPM | DIASTOLIC BLOOD PRESSURE: 75 MMHG | HEART RATE: 89 BPM | SYSTOLIC BLOOD PRESSURE: 185 MMHG | HEIGHT: 64 IN | OXYGEN SATURATION: 100 % | WEIGHT: 164.02 LBS | TEMPERATURE: 98 F

## 2022-05-29 DIAGNOSIS — M31.4 AORTIC ARCH SYNDROME [TAKAYASU]: ICD-10-CM

## 2022-05-29 DIAGNOSIS — Z20.822 CONTACT WITH AND (SUSPECTED) EXPOSURE TO COVID-19: ICD-10-CM

## 2022-05-29 DIAGNOSIS — R10.30 LOWER ABDOMINAL PAIN, UNSPECIFIED: ICD-10-CM

## 2022-05-29 DIAGNOSIS — R11.0 NAUSEA: ICD-10-CM

## 2022-05-29 DIAGNOSIS — Z95.820 PERIPHERAL VASCULAR ANGIOPLASTY STATUS WITH IMPLANTS AND GRAFTS: ICD-10-CM

## 2022-05-29 DIAGNOSIS — Z86.73 PERSONAL HISTORY OF TRANSIENT ISCHEMIC ATTACK (TIA), AND CEREBRAL INFARCTION WITHOUT RESIDUAL DEFICITS: ICD-10-CM

## 2022-05-29 DIAGNOSIS — E11.22 TYPE 2 DIABETES MELLITUS WITH DIABETIC CHRONIC KIDNEY DISEASE: ICD-10-CM

## 2022-05-29 DIAGNOSIS — N32.89 OTHER SPECIFIED DISORDERS OF BLADDER: ICD-10-CM

## 2022-05-29 DIAGNOSIS — Z79.4 LONG TERM (CURRENT) USE OF INSULIN: ICD-10-CM

## 2022-05-29 DIAGNOSIS — M79.89 OTHER SPECIFIED SOFT TISSUE DISORDERS: ICD-10-CM

## 2022-05-29 DIAGNOSIS — Z87.891 PERSONAL HISTORY OF NICOTINE DEPENDENCE: ICD-10-CM

## 2022-05-29 DIAGNOSIS — I12.9 HYPERTENSIVE CHRONIC KIDNEY DISEASE WITH STAGE 1 THROUGH STAGE 4 CHRONIC KIDNEY DISEASE, OR UNSPECIFIED CHRONIC KIDNEY DISEASE: ICD-10-CM

## 2022-05-29 DIAGNOSIS — E78.5 HYPERLIPIDEMIA, UNSPECIFIED: ICD-10-CM

## 2022-05-29 DIAGNOSIS — R63.0 ANOREXIA: ICD-10-CM

## 2022-05-29 DIAGNOSIS — N18.4 CHRONIC KIDNEY DISEASE, STAGE 4 (SEVERE): ICD-10-CM

## 2022-05-29 DIAGNOSIS — Z79.02 LONG TERM (CURRENT) USE OF ANTITHROMBOTICS/ANTIPLATELETS: ICD-10-CM

## 2022-05-29 DIAGNOSIS — Z88.0 ALLERGY STATUS TO PENICILLIN: ICD-10-CM

## 2022-05-29 DIAGNOSIS — F79 UNSPECIFIED INTELLECTUAL DISABILITIES: ICD-10-CM

## 2022-05-29 DIAGNOSIS — E11.51 TYPE 2 DIABETES MELLITUS WITH DIABETIC PERIPHERAL ANGIOPATHY WITHOUT GANGRENE: ICD-10-CM

## 2022-05-29 LAB
ALBUMIN SERPL ELPH-MCNC: 3.6 G/DL — SIGNIFICANT CHANGE UP (ref 3.5–5.2)
ALP SERPL-CCNC: 222 U/L — HIGH (ref 30–115)
ALT FLD-CCNC: 12 U/L — SIGNIFICANT CHANGE UP (ref 0–41)
ANION GAP SERPL CALC-SCNC: 13 MMOL/L — SIGNIFICANT CHANGE UP (ref 7–14)
APPEARANCE UR: CLEAR — SIGNIFICANT CHANGE UP
AST SERPL-CCNC: 12 U/L — SIGNIFICANT CHANGE UP (ref 0–41)
BACTERIA # UR AUTO: NEGATIVE — SIGNIFICANT CHANGE UP
BASE EXCESS BLDV CALC-SCNC: -1.1 MMOL/L — SIGNIFICANT CHANGE UP (ref -2–3)
BASOPHILS # BLD AUTO: 0.04 K/UL — SIGNIFICANT CHANGE UP (ref 0–0.2)
BASOPHILS NFR BLD AUTO: 0.5 % — SIGNIFICANT CHANGE UP (ref 0–1)
BILIRUB SERPL-MCNC: 0.2 MG/DL — SIGNIFICANT CHANGE UP (ref 0.2–1.2)
BILIRUB UR-MCNC: NEGATIVE — SIGNIFICANT CHANGE UP
BUN SERPL-MCNC: 57 MG/DL — HIGH (ref 10–20)
CA-I SERPL-SCNC: 1.11 MMOL/L — LOW (ref 1.15–1.33)
CALCIUM SERPL-MCNC: 8.2 MG/DL — LOW (ref 8.5–10.1)
CHLORIDE SERPL-SCNC: 98 MMOL/L — SIGNIFICANT CHANGE UP (ref 98–110)
CO2 SERPL-SCNC: 20 MMOL/L — SIGNIFICANT CHANGE UP (ref 17–32)
COLOR SPEC: COLORLESS — SIGNIFICANT CHANGE UP
CREAT SERPL-MCNC: 4.7 MG/DL — CRITICAL HIGH (ref 0.7–1.5)
DIFF PNL FLD: NEGATIVE — SIGNIFICANT CHANGE UP
EGFR: 10 ML/MIN/1.73M2 — LOW
EOSINOPHIL # BLD AUTO: 0.84 K/UL — HIGH (ref 0–0.7)
EOSINOPHIL NFR BLD AUTO: 10.2 % — HIGH (ref 0–8)
EPI CELLS # UR: 1 /HPF — SIGNIFICANT CHANGE UP (ref 0–5)
GAS PNL BLDV: 129 MMOL/L — LOW (ref 136–145)
GAS PNL BLDV: SIGNIFICANT CHANGE UP
GLUCOSE SERPL-MCNC: 113 MG/DL — HIGH (ref 70–99)
GLUCOSE UR QL: SIGNIFICANT CHANGE UP
HCO3 BLDV-SCNC: 24 MMOL/L — SIGNIFICANT CHANGE UP (ref 22–29)
HCT VFR BLD CALC: 25 % — LOW (ref 37–47)
HCT VFR BLDA CALC: 28 % — LOW (ref 39–51)
HGB BLD CALC-MCNC: 9.4 G/DL — LOW (ref 12.6–17.4)
HGB BLD-MCNC: 8.5 G/DL — LOW (ref 12–16)
HYALINE CASTS # UR AUTO: 0 /LPF — SIGNIFICANT CHANGE UP (ref 0–7)
IMM GRANULOCYTES NFR BLD AUTO: 0.2 % — SIGNIFICANT CHANGE UP (ref 0.1–0.3)
KETONES UR-MCNC: NEGATIVE — SIGNIFICANT CHANGE UP
LACTATE BLDV-MCNC: 0.7 MMOL/L — SIGNIFICANT CHANGE UP (ref 0.5–2)
LACTATE SERPL-SCNC: 0.8 MMOL/L — SIGNIFICANT CHANGE UP (ref 0.7–2)
LEUKOCYTE ESTERASE UR-ACNC: NEGATIVE — SIGNIFICANT CHANGE UP
LIDOCAIN IGE QN: 37 U/L — SIGNIFICANT CHANGE UP (ref 7–60)
LYMPHOCYTES # BLD AUTO: 1.56 K/UL — SIGNIFICANT CHANGE UP (ref 1.2–3.4)
LYMPHOCYTES # BLD AUTO: 18.9 % — LOW (ref 20.5–51.1)
MCHC RBC-ENTMCNC: 30 PG — SIGNIFICANT CHANGE UP (ref 27–31)
MCHC RBC-ENTMCNC: 34 G/DL — SIGNIFICANT CHANGE UP (ref 32–37)
MCV RBC AUTO: 88.3 FL — SIGNIFICANT CHANGE UP (ref 81–99)
MONOCYTES # BLD AUTO: 0.61 K/UL — HIGH (ref 0.1–0.6)
MONOCYTES NFR BLD AUTO: 7.4 % — SIGNIFICANT CHANGE UP (ref 1.7–9.3)
NEUTROPHILS # BLD AUTO: 5.18 K/UL — SIGNIFICANT CHANGE UP (ref 1.4–6.5)
NEUTROPHILS NFR BLD AUTO: 62.8 % — SIGNIFICANT CHANGE UP (ref 42.2–75.2)
NITRITE UR-MCNC: NEGATIVE — SIGNIFICANT CHANGE UP
NRBC # BLD: 0 /100 WBCS — SIGNIFICANT CHANGE UP (ref 0–0)
PCO2 BLDV: 38 MMHG — LOW (ref 39–42)
PH BLDV: 7.4 — SIGNIFICANT CHANGE UP (ref 7.32–7.43)
PH UR: 7.5 — SIGNIFICANT CHANGE UP (ref 5–8)
PLATELET # BLD AUTO: 233 K/UL — SIGNIFICANT CHANGE UP (ref 130–400)
PO2 BLDV: 59 MMHG — SIGNIFICANT CHANGE UP
POTASSIUM BLDV-SCNC: 4.4 MMOL/L — SIGNIFICANT CHANGE UP (ref 3.5–5.1)
POTASSIUM SERPL-MCNC: 4.3 MMOL/L — SIGNIFICANT CHANGE UP (ref 3.5–5)
POTASSIUM SERPL-SCNC: 4.3 MMOL/L — SIGNIFICANT CHANGE UP (ref 3.5–5)
PROT SERPL-MCNC: 6 G/DL — SIGNIFICANT CHANGE UP (ref 6–8)
PROT UR-MCNC: ABNORMAL
RBC # BLD: 2.83 M/UL — LOW (ref 4.2–5.4)
RBC # FLD: 12.6 % — SIGNIFICANT CHANGE UP (ref 11.5–14.5)
RBC CASTS # UR COMP ASSIST: 1 /HPF — SIGNIFICANT CHANGE UP (ref 0–4)
SAO2 % BLDV: 90.4 % — SIGNIFICANT CHANGE UP
SARS-COV-2 RNA SPEC QL NAA+PROBE: SIGNIFICANT CHANGE UP
SODIUM SERPL-SCNC: 131 MMOL/L — LOW (ref 135–146)
SP GR SPEC: 1.01 — LOW (ref 1.01–1.03)
TROPONIN T SERPL-MCNC: <0.01 NG/ML — SIGNIFICANT CHANGE UP
UROBILINOGEN FLD QL: SIGNIFICANT CHANGE UP
WBC # BLD: 8.25 K/UL — SIGNIFICANT CHANGE UP (ref 4.8–10.8)
WBC # FLD AUTO: 8.25 K/UL — SIGNIFICANT CHANGE UP (ref 4.8–10.8)
WBC UR QL: 1 /HPF — SIGNIFICANT CHANGE UP (ref 0–5)

## 2022-05-29 PROCEDURE — 99283 EMERGENCY DEPT VISIT LOW MDM: CPT

## 2022-05-29 PROCEDURE — 71045 X-RAY EXAM CHEST 1 VIEW: CPT | Mod: 26

## 2022-05-29 PROCEDURE — 99285 EMERGENCY DEPT VISIT HI MDM: CPT

## 2022-05-29 PROCEDURE — 93010 ELECTROCARDIOGRAM REPORT: CPT

## 2022-05-29 PROCEDURE — 93970 EXTREMITY STUDY: CPT | Mod: 26

## 2022-05-29 RX ORDER — ACETAMINOPHEN 500 MG
650 TABLET ORAL ONCE
Refills: 0 | Status: COMPLETED | OUTPATIENT
Start: 2022-05-29 | End: 2022-05-29

## 2022-05-29 RX ORDER — ONDANSETRON 8 MG/1
4 TABLET, FILM COATED ORAL ONCE
Refills: 0 | Status: COMPLETED | OUTPATIENT
Start: 2022-05-29 | End: 2022-05-29

## 2022-05-29 RX ADMIN — ONDANSETRON 4 MILLIGRAM(S): 8 TABLET, FILM COATED ORAL at 11:10

## 2022-05-29 RX ADMIN — Medication 650 MILLIGRAM(S): at 11:01

## 2022-05-29 NOTE — CONSULT NOTE ADULT - ASSESSMENT
ASSESSMENT:   58 yo F  w PMH of HTN, HLD, DM, Stroke (2 y ago), intellectual disability, CKD stage IV (scheduled to have an AVF placed w Dr. Muñoz on June 22), hearing impaired, AAS, PVD, former smoker, and PSH of left fem-tib bypass done on 11/8/21 w Dr. Muñoz, admitted earlier this month for complains of abdominal pain and dysuria, came to ED complaining of abdominal pain w mild nausea, dysuria for a few days and new left leg swelling, tightness that started yesterday in the evening. Denies chest pain, SOB, fever, chills, vomiting, recent travel or trauma.  No left lower extremity paresthesias or motor weakness.   LLE femoral artery to posterior tibial bypass appears to be patent with doppler + distal pulses     Vascular surgery consulted for LLE pain    PLAN:   - Continue care per primary team  - BL LE venous duplex  - Vascular Surgery will follow up    - Patient seen & examined, plan discussed with Fellow, Dr. Dela Cruz  - Plan to be discussed with Attending, Dr. Fink    Vascular Surgery spectra [phone]

## 2022-05-29 NOTE — CONSULT NOTE ADULT - SUBJECTIVE AND OBJECTIVE BOX
VASCULAR SURGERY CONSULT NOTE      HPI:  56 yo F  w PMH of HTN, HLD, DM, Stroke (2 y ago), intellectual disability, CKD stage IV (scheduled to have an AVF placed w Dr. Muñoz on June 22), hearing impaired, AAS, PVD, former smoker, and PSH of left fem-tib bypass done on 11/8/21 w Dr. Muñoz, admitted earlier this month for complains of abdominal pain and dysuria, came to ED complaining of abdominal pain w mild nausea, dysuria for a few days and new left leg swelling, tightness that started yesterday in the evening. Denies chest pain, SOB, fever, chills, vomiting, recent travel or trauma.  No left lower extremity paresthesias or motor weakness.   (Conversation was assisted w  )    PAST MEDICAL & SURGICAL HISTORY:  PVD (peripheral vascular disease)      Benign essential HTN      Intellectual disability      Hearing impaired      HLD (hyperlipidemia)        penicillin (Rash)    Home Medications:  atorvastatin 80 mg oral tablet: 1 tab(s) orally once a day (01 Nov 2021 16:06)  calcitriol 0.25 mcg oral capsule: 1 cap(s) orally once a day (01 Nov 2021 16:05)  insulin lispro 100 units/mL injectable solution: 4 unit(s) injectable 3 times a day (before meals) (17 Nov 2021 13:20)  ondansetron 4 mg oral tablet: 1 tab(s) orally every 6 hours, As needed, Nausea and/or Vomiting (16 May 2022 16:53)  Plavix 75 mg oral tablet: 1 tab(s) orally once a day (01 Nov 2021 16:07)  Protonix 40 mg oral delayed release tablet: 1 tab(s) orally once a day, As Needed (09 May 2022 20:08)    No permtinent family history of PVD    REVIEW OF SYSTEMS:  GENERAL:                                         negative  SKIN:                                                 negative  OPTHALMOLOGIC:                          negative  ENMT:                                               negative  RESPIRATORY AND THORAX:        negative  CARDIOVASCULAR:                         negative  GASTROINTESTINAL:                       negative  NEPHROLOGY:                                  negative  MUSCULOSKELETAL:                       negative  NEUROLOGIC:                                   negative  PSYCHIATRIC:                                    negative  HEMATOLOGY/LYMPHATICS:         negative  ENDOCRINE:                                     negative  ALLERGIC/IMMUNOLOGIC:            negative    12 point ROS otherwise normal except as stated in HPI    PHYSICAL EXAM  Vital Signs Last 24 Hrs  T(C): 36.8 (29 May 2022 08:03), Max: 36.8 (29 May 2022 08:03)  T(F): 98.2 (29 May 2022 08:03), Max: 98.2 (29 May 2022 08:03)  HR: 89 (29 May 2022 08:03) (89 - 89)  BP: 185/75 (29 May 2022 08:03) (185/75 - 185/75)  BP(mean): --  RR: 20 (29 May 2022 08:03) (20 - 20)  SpO2: 100% (29 May 2022 08:03) (100% - 100%)    Appearance: NAD	  HEENT:   Normal oral mucosa, PERRL, EOMI	  Neck: Supple, - JVD;   Cardiovascular: Normal S1 S2,   Respiratory: Equal chest excursion  Gastrointestinal:  Soft, Non-tender	  Skin: No rashes, No ecchymoses, No cyanosis  Extremities: Normal range of motion, No clubbing, cyanosis or edema except LLE: is mildly edematous in comparison w RLE, L heel has chronic appearing healing wound  Vascular: Peripheral pulses palpable 2+ bilaterally  Neurologic: Non-focal  Psychiatry: A & O x 3, Mood & affect appropriate      PULSES:  Dorsal Pedal: Palpable BL  Posterior Tibial: Palpable BL    LAB/STUDIES:                        8.5    8.25  )-----------( 233      ( 29 May 2022 10:30 )             25.0     05-29    131<L>  |  98  |  57<H>  ----------------------------<  113<H>  4.3   |  20  |  4.7<HH>    Ca    8.2<L>      29 May 2022 10:30    TPro  6.0  /  Alb  3.6  /  TBili  0.2  /  DBili  x   /  AST  12  /  ALT  12  /  AlkPhos  222<H>  05-29      LIVER FUNCTIONS - ( 29 May 2022 10:30 )  Alb: 3.6 g/dL / Pro: 6.0 g/dL / ALK PHOS: 222 U/L / ALT: 12 U/L / AST: 12 U/L / GGT: x               CARDIAC MARKERS ( 29 May 2022 10:30 )  x     / <0.01 ng/mL / x     / x     / x

## 2022-05-29 NOTE — ED PROVIDER NOTE - PHYSICAL EXAMINATION
CONSTITUTIONAL: NAD, + Andreafski. + Intellectual Disability.  HEAD: Normocephalic; atraumatic.   EYES: PERRL; EOM intact. Conjunctiva normal B/L.   ENT: Normal pharynx with no tonsillar hypertrophy. MMM.  NECK: Supple; non-tender; no cervical lymphadenopathy.   CHEST: Normal chest excursion with respiration.   CARDIOVASCULAR: Normal S1, S2; no murmurs, rubs, or gallops.   RESPIRATORY: Normal chest excursion with respiration; breath sounds clear and equal bilaterally; no wheezes, rhonchi, or rales.  GI/: Normal bowel sounds; non-distended; non-tender.  BACK: No evidence of trauma or deformity. Non-tender to palpation. No CVA tenderness.   EXT: + edema of LLE form mid thigh to ankle. + TTP. Surgical scars well healed. DP pulse not palpable. + dopplerable. + Mild L foot edema.   SKIN: Normal for age and race; warm; dry; good turgor.  NEURO: A & O x 4; CN 2-12 intact. Grossly unremarkable.

## 2022-05-29 NOTE — ED PROVIDER NOTE - OBJECTIVE STATEMENT
57-year-old female past medical history significant for hypertension, dyslipidemia, intellectual disability and, diabetes, CKD stage IV (scheduled to have an AV fistula placed later this month), hearing impaired, AAS, PVD, former smoker, status post left fem-tib bypass, admitted earlier this month with abdominal pain and CHATA, now complaining of left leg swelling, tightness", and pain.  Left leg pain and swelling starts from the groin and radiates down to the foot.  No recent injury or trauma.  No left lower extremity paresthesias or motor weakness.  Patient will complete also complaining of bilateral lower abdominal pain, nausea, anorexia and decreased urine output.  No dysuria.  No fever, chills.  No chest pain, shortness of breath, cough or URI symptoms.  No vomiting or diarrhea.

## 2022-05-29 NOTE — ED PROVIDER NOTE - ATTENDING CONTRIBUTION TO CARE
I personally evaluated the patient. I reviewed the Resident’s or Physician Assistant’s note (as assigned above), and agree with the findings and plan except as documented in my note.   57-year-old female past medical history significant for hypertension, dyslipidemia, intellectual disability and, diabetes, CKD stage IV (scheduled to have an AV fistula placed later this month), hearing impaired, AAS, PVD, former smoker, status post left fem-tib bypass, admitted earlier this month with abdominal pain and CHATA, now complaining of left leg swelling, tightness", and pain.  Left leg pain and swelling starts from the groin and radiates down to the foot.  No recent injury or trauma.  No left lower extremity paresthesias or motor weakness.  Patient will complete also complaining of bilateral lower abdominal pain, nausea, anorexia and decreased urine output.  No dysuria.  No fever, chills.  No chest pain, shortness of breath, cough or URI symptoms.  No vomiting or diarrhea.  Vitals noted. I personally evaluated the patient. I reviewed the Resident’s or Physician Assistant’s note (as assigned above), and agree with the findings and plan except as documented in my note.   57-year-old female past medical history significant for hypertension, dyslipidemia, intellectual disability and, diabetes, CKD stage IV (scheduled to have an AV fistula placed later this month), hearing impaired, AAS, PVD, former smoker, status post left fem-tib bypass, admitted earlier this month with abdominal pain and CHATA, now complaining of left leg swelling, tightness", and pain.  Left leg pain and swelling starts from the groin and radiates down to the foot.  No recent injury or trauma.  No left lower extremity paresthesias or motor weakness.  Patient will complete also complaining of bilateral lower abdominal pain, nausea, anorexia and decreased urine output.  No dysuria.  No fever, chills.  No chest pain, shortness of breath, cough or URI symptoms.  No vomiting or diarrhea.  Vitals noted.  CONSTITUTIONAL: NAD, + [place]. + Intellectual Disability.  HEAD: Normocephalic; atraumatic.   EYES: PERRL; EOM intact. Conjunctiva normal B/L.   ENT: Normal pharynx with no tonsillar hypertrophy. MMM.  NECK: Supple; non-tender; no cervical lymphadenopathy.   CHEST: Normal chest excursion with respiration.   CARDIOVASCULAR: Normal S1, S2; no murmurs, rubs, or gallops.   RESPIRATORY: Normal chest excursion with respiration; breath sounds clear and equal bilaterally; no wheezes, rhonchi, or rales.  GI/: Normal bowel sounds; non-distended; non-tender.  BACK: No evidence of trauma or deformity. Non-tender to palpation. No CVA tenderness.   EXT: + edema of LLE form mid thigh to ankle. + TTP. Surgical scars well healed. DP pulse not palpable. + dopplerable. + Mild L foot edema.   SKIN: Normal for age and race; warm; dry; good turgor.  NEURO: A & O x 4; CN 2-12 intact. Grossly unremarkable. H&P with JULIAN Briseno.     I personally evaluated the patient. I reviewed the Resident’s or Physician Assistant’s note (as assigned above), and agree with the findings and plan except as documented in my note.   57-year-old female past medical history significant for hypertension, dyslipidemia, intellectual disability and, diabetes, CKD stage IV (scheduled to have an AV fistula placed later this month), hearing impaired, AAS, PVD, former smoker, status post left fem-tib bypass, admitted earlier this month with abdominal pain and CHATA, now complaining of left leg swelling, tightness", and pain.  Left leg pain and swelling starts from the groin and radiates down to the foot.  No recent injury or trauma.  No left lower extremity paresthesias or motor weakness.  Patient will complete also complaining of bilateral lower abdominal pain, nausea, anorexia and decreased urine output.  No dysuria.  No fever, chills.  No chest pain, shortness of breath, cough or URI symptoms.  No vomiting or diarrhea.  Vitals noted.  CONSTITUTIONAL: NAD, + [place]. + Intellectual Disability.  HEAD: Normocephalic; atraumatic.   EYES: PERRL; EOM intact. Conjunctiva normal B/L.   ENT: Normal pharynx with no tonsillar hypertrophy. MMM.  NECK: Supple; non-tender; no cervical lymphadenopathy.   CHEST: Normal chest excursion with respiration.   CARDIOVASCULAR: Normal S1, S2; no murmurs, rubs, or gallops.   RESPIRATORY: Normal chest excursion with respiration; breath sounds clear and equal bilaterally; no wheezes, rhonchi, or rales.  GI/: Normal bowel sounds; non-distended; non-tender.  BACK: No evidence of trauma or deformity. Non-tender to palpation. No CVA tenderness.   EXT: + edema of LLE form mid thigh to ankle. + TTP. Surgical scars well healed. DP pulse not palpable. + dopplerable. + Mild L foot edema.   SKIN: Normal for age and race; warm; dry; good turgor.  NEURO: A & O x 4; CN 2-12 intact. Grossly unremarkable.

## 2022-05-29 NOTE — ED PROVIDER NOTE - PROVIDER TOKENS
PROVIDER:[TOKEN:[[id number]],FOLLOWUP:[4-6 Days],ESTABLISHEDPATIENT:[T]],PROVIDER:[TOKEN:[[id number]],FOLLOWUP:[4-6 Days],ESTABLISHEDPATIENT:[T]]

## 2022-05-29 NOTE — ED PROVIDER NOTE - PATIENT PORTAL LINK FT
You can access the FollowMyHealth Patient Portal offered by [facility] by registering at the following website: [email or web address]. By joining [employer]’s FollowMyHealth portal, you will also be able to view your health information using other applications (apps) compatible with our system.

## 2022-05-29 NOTE — ED PROVIDER NOTE - PROGRESS NOTE DETAILS
VASCULAR CONSULTED. TD: Bedside sono demonstrated 1400mL bladder volume before voiding and pt complained of discomfort on palpation of lower abdomen. After voiding, bladder volume reduced to approx. 500mL. Encouraged pt to void one more time and to empty completely, and after urinating a second time post void volume 144mL. Pt then had no TTP in lower abdomen, appeared more comfortable. Discussed results of testing with pt using , as well as f/u and return precautions. Pt ready for d/c.

## 2022-05-29 NOTE — ED PROVIDER NOTE - CLINICAL SUMMARY MEDICAL DECISION MAKING FREE TEXT BOX
57-year-old female past medical history as documented complaining of left leg swelling tightness and pain rating down to the foot.  All diagnostic testing reviewed.  Lower extremity duplex with no DVT.  Patient evaluated by vascular surgery and no acute issues.  Patient discharged to follow-up with vascular and PMD.

## 2022-05-29 NOTE — ED ADULT TRIAGE NOTE - CHIEF COMPLAINT QUOTE
patient reports left lower back pain radiating down to leg with b/l leg pain. patient also reports decrease in urine - supposed to be placed on dialysis-  contacted

## 2022-05-29 NOTE — ED PROVIDER NOTE - CARE PROVIDERS DIRECT ADDRESSES
[email or web address]

## 2022-05-29 NOTE — ED PROVIDER NOTE - CARE PLAN
Principal Discharge DX:	Bladder distended  Secondary Diagnosis:	Pain and swelling of left lower leg  Secondary Diagnosis:	Chronic kidney disease (CKD)   1

## 2022-05-29 NOTE — ED PROVIDER NOTE - NS ED ROS FT
CONSTITUTIONAL:  see HPI  SKIN:  no skin rash  EYES:  no visual changes  ENMT: no neck pain or stiffness  CARD:  no chest pain  RESP:  no cough or respiratory distress  ABD:  + lower abdominal pain; no nausea, vomiting, or diarrhea  :  no dysuria, frequency or burning  MSK:  + lower back pain radiating down L leg; + L leg swelling  NEURO:  no headache, numbness/tingling/weakness or confusion  Except as documented in the HPI,  all other systems are negative

## 2022-05-29 NOTE — ED ADULT NURSE REASSESSMENT NOTE - NS ED NURSE REASSESS COMMENT FT1
pt refusing ton stay in bed. pt walking with family member despite bed alarm on and being told not to walk because of back pain. pt walking around hunched over with family member despite reeducation on reasons to stay in bed

## 2022-05-29 NOTE — ED PROVIDER NOTE - CARE PROVIDER_API CALL
Hill Franklin)  Internal Medicine; Nephrology  [address] [place] [address]  Phone: [phone]  Fax: [phone]  Established Patient  Follow Up Time: 4-6 Days    Lexx Muñoz)  Surgery; Vascular Surgery  [address] [place] [address]  Phone: [phone]  Fax: [phone]  Established Patient  Follow Up Time: 4-6 Days

## 2022-05-30 LAB
CULTURE RESULTS: SIGNIFICANT CHANGE UP
SPECIMEN SOURCE: SIGNIFICANT CHANGE UP

## 2022-06-06 DIAGNOSIS — N18.5 CHRONIC KIDNEY DISEASE, STAGE 5: ICD-10-CM

## 2022-06-06 DIAGNOSIS — E11.42 TYPE 2 DIABETES MELLITUS WITH DIABETIC POLYNEUROPATHY: ICD-10-CM

## 2022-06-09 ENCOUNTER — LABORATORY RESULT (OUTPATIENT)
Age: 57
End: 2022-06-09

## 2022-06-09 ENCOUNTER — APPOINTMENT (OUTPATIENT)
Dept: HEMATOLOGY ONCOLOGY | Facility: CLINIC | Age: 57
End: 2022-06-09
Payer: MEDICAID

## 2022-06-09 ENCOUNTER — RESULT REVIEW (OUTPATIENT)
Age: 57
End: 2022-06-09

## 2022-06-09 ENCOUNTER — OUTPATIENT (OUTPATIENT)
Dept: OUTPATIENT SERVICES | Facility: HOSPITAL | Age: 57
LOS: 1 days | Discharge: HOME | End: 2022-06-09
Payer: SUBSIDIZED

## 2022-06-09 VITALS
HEIGHT: 60 IN | RESPIRATION RATE: 14 BRPM | DIASTOLIC BLOOD PRESSURE: 74 MMHG | SYSTOLIC BLOOD PRESSURE: 182 MMHG | WEIGHT: 154 LBS | HEART RATE: 77 BPM | OXYGEN SATURATION: 98 % | BODY MASS INDEX: 30.23 KG/M2 | TEMPERATURE: 97.2 F

## 2022-06-09 VITALS
SYSTOLIC BLOOD PRESSURE: 183 MMHG | OXYGEN SATURATION: 100 % | DIASTOLIC BLOOD PRESSURE: 76 MMHG | TEMPERATURE: 97 F | RESPIRATION RATE: 18 BRPM | HEART RATE: 81 BPM | WEIGHT: 154.1 LBS | HEIGHT: 64 IN

## 2022-06-09 DIAGNOSIS — Z01.818 ENCOUNTER FOR OTHER PREPROCEDURAL EXAMINATION: ICD-10-CM

## 2022-06-09 DIAGNOSIS — N18.6 END STAGE RENAL DISEASE: ICD-10-CM

## 2022-06-09 DIAGNOSIS — Z98.890 OTHER SPECIFIED POSTPROCEDURAL STATES: Chronic | ICD-10-CM

## 2022-06-09 LAB
A1C WITH ESTIMATED AVERAGE GLUCOSE RESULT: 6.1 % — HIGH (ref 4–5.6)
ALBUMIN SERPL ELPH-MCNC: 3.7 G/DL — SIGNIFICANT CHANGE UP (ref 3.5–5.2)
ALP SERPL-CCNC: 210 U/L — HIGH (ref 30–115)
ALT FLD-CCNC: 12 U/L — SIGNIFICANT CHANGE UP (ref 0–41)
ANION GAP SERPL CALC-SCNC: 14 MMOL/L — SIGNIFICANT CHANGE UP (ref 7–14)
APTT BLD: 32.9 SEC — SIGNIFICANT CHANGE UP (ref 27–39.2)
AST SERPL-CCNC: 12 U/L — SIGNIFICANT CHANGE UP (ref 0–41)
BASOPHILS # BLD AUTO: 0.03 K/UL — SIGNIFICANT CHANGE UP (ref 0–0.2)
BASOPHILS NFR BLD AUTO: 0.5 % — SIGNIFICANT CHANGE UP (ref 0–1)
BILIRUB SERPL-MCNC: <0.2 MG/DL — SIGNIFICANT CHANGE UP (ref 0.2–1.2)
BUN SERPL-MCNC: 62 MG/DL — CRITICAL HIGH (ref 10–20)
CALCIUM SERPL-MCNC: 7.8 MG/DL — LOW (ref 8.5–10.1)
CHLORIDE SERPL-SCNC: 109 MMOL/L — SIGNIFICANT CHANGE UP (ref 98–110)
CO2 SERPL-SCNC: 20 MMOL/L — SIGNIFICANT CHANGE UP (ref 17–32)
CREAT SERPL-MCNC: 5.6 MG/DL — CRITICAL HIGH (ref 0.7–1.5)
EGFR: 8 ML/MIN/1.73M2 — LOW
EOSINOPHIL # BLD AUTO: 0.6 K/UL — SIGNIFICANT CHANGE UP (ref 0–0.7)
EOSINOPHIL NFR BLD AUTO: 9.4 % — HIGH (ref 0–8)
ESTIMATED AVERAGE GLUCOSE: 128 MG/DL — HIGH (ref 68–114)
GLUCOSE SERPL-MCNC: 169 MG/DL — HIGH (ref 70–99)
HCT VFR BLD CALC: 25.8 %
HCT VFR BLD CALC: 26.4 % — LOW (ref 37–47)
HGB BLD-MCNC: 8.2 G/DL
HGB BLD-MCNC: 8.3 G/DL — LOW (ref 12–16)
IMM GRANULOCYTES NFR BLD AUTO: 0.5 % — HIGH (ref 0.1–0.3)
INR BLD: 0.89 RATIO — SIGNIFICANT CHANGE UP (ref 0.65–1.3)
LDH SERPL-CCNC: 182
LYMPHOCYTES # BLD AUTO: 1.41 K/UL — SIGNIFICANT CHANGE UP (ref 1.2–3.4)
LYMPHOCYTES # BLD AUTO: 22 % — SIGNIFICANT CHANGE UP (ref 20.5–51.1)
MCHC RBC-ENTMCNC: 29 PG — SIGNIFICANT CHANGE UP (ref 27–31)
MCHC RBC-ENTMCNC: 29.7 PG
MCHC RBC-ENTMCNC: 31.4 G/DL — LOW (ref 32–37)
MCHC RBC-ENTMCNC: 31.8 G/DL
MCV RBC AUTO: 92.3 FL — SIGNIFICANT CHANGE UP (ref 81–99)
MCV RBC AUTO: 93.5 FL
MONOCYTES # BLD AUTO: 0.45 K/UL — SIGNIFICANT CHANGE UP (ref 0.1–0.6)
MONOCYTES NFR BLD AUTO: 7 % — SIGNIFICANT CHANGE UP (ref 1.7–9.3)
NEUTROPHILS # BLD AUTO: 3.89 K/UL — SIGNIFICANT CHANGE UP (ref 1.4–6.5)
NEUTROPHILS NFR BLD AUTO: 60.6 % — SIGNIFICANT CHANGE UP (ref 42.2–75.2)
NRBC # BLD: 0 /100 WBCS — SIGNIFICANT CHANGE UP (ref 0–0)
PLATELET # BLD AUTO: 220 K/UL
PLATELET # BLD AUTO: 227 K/UL — SIGNIFICANT CHANGE UP (ref 130–400)
PMV BLD: 11.6 FL
POTASSIUM SERPL-MCNC: 4.6 MMOL/L — SIGNIFICANT CHANGE UP (ref 3.5–5)
POTASSIUM SERPL-SCNC: 4.6 MMOL/L — SIGNIFICANT CHANGE UP (ref 3.5–5)
PROT SERPL-MCNC: 6.1 G/DL — SIGNIFICANT CHANGE UP (ref 6–8)
PROTHROM AB SERPL-ACNC: 10.3 SEC — SIGNIFICANT CHANGE UP (ref 9.95–12.87)
RBC # BLD: 2.76 M/UL
RBC # BLD: 2.86 M/UL — LOW (ref 4.2–5.4)
RBC # FLD: 13 %
RBC # FLD: 13.1 % — SIGNIFICANT CHANGE UP (ref 11.5–14.5)
RETICS # AUTO: 0.9 %
RETICS AGGREG/RBC NFR: 25.9 K/UL
SODIUM SERPL-SCNC: 143 MMOL/L — SIGNIFICANT CHANGE UP (ref 135–146)
WBC # BLD: 6.41 K/UL — SIGNIFICANT CHANGE UP (ref 4.8–10.8)
WBC # FLD AUTO: 6.41 K/UL — SIGNIFICANT CHANGE UP (ref 4.8–10.8)
WBC # FLD AUTO: 6.46 K/UL

## 2022-06-09 PROCEDURE — 71046 X-RAY EXAM CHEST 2 VIEWS: CPT | Mod: 26

## 2022-06-09 PROCEDURE — 99244 OFF/OP CNSLTJ NEW/EST MOD 40: CPT

## 2022-06-09 PROCEDURE — 93010 ELECTROCARDIOGRAM REPORT: CPT

## 2022-06-09 NOTE — H&P PST ADULT - HIV STATUS
OPERATIVE REPORT  PATIENT NAME: Chico Huber    :  1952  MRN: [id number]  Pt Location: [facility] OR ROOM 02    SURGERY DATE: 2019    Surgeon(s) and Role:     Claude Poole MD - Primary    Preop Diagnosis:  Personal history of malignant neoplasm of breast [Z85 3]    Post-Op Diagnosis Codes:     * Personal history of malignant neoplasm of breast [Z85 3]    Procedure(s) (LRB):  CAPSULECTOMY (Bilateral)  IMPLANT EXPANDER EXCHANGE (Bilateral)    Specimen(s):  ID Type Source Tests Collected by Time Destination   1 : Left breast capsule Tissue Breast, Left TISSUE EXAM Varun Vasquez MD 2019 1433    2 : Right breast capsule Tissue Breast, Right TISSUE EXAM Varun Vasquez MD 2019 1433        Estimated Blood Loss:   Minimal    Drains:  Closed/Suction Drain Right;Medial Breast 15 Fr  (Active)   Number of days: 231       Closed/Suction Drain Right;Lateral Breast 15 Fr  (Active)   Number of days: 231       Closed/Suction Drain Left;Medial Breast 15 Fr  (Active)   Number of days: 231       Closed/Suction Drain Left;Lateral Breast Bulb 15 Fr  (Active)   Number of days: 231       Anesthesia Type:   General    Operative Indications:  Personal history of malignant neoplasm of breast [Z85 3]      Operative Findings:      Complications:   None    Procedure and Technique:  The patient was marked while standing prior to surgery  The patient was brought to the operating room and placed supine on the operating room table  Time out procedure was performed, SCDs were applied and IV antibiotics were given  The chest was prepped and draped using standard surgical technique  Attention was turned to the right breast  The previous scar was excised and dissection was carried down to the capsule using electrocautery  The capsule was found to be very thick and stiff  The capsule was opened and the fluid was removed from the expander with a kaye suction  The expander was removed   Due to the thick capsule the decision was made to perform a capsulectomy  A total capsulectomy was performed deep to the pectoralis major muscle and superficial to the pectoralis minor muscle  The capsule was sent as a specimen  The medial pectoralis major was re-elevated to the medial origin  The pocket was opened to fit the exact dimensions of the chosen implant  Attention was turned to the left breast  The previous scar was excised and dissection was carried down to the capsule using electrocautery  The capsule was found to be very thick and stiff  The capsule was opened and the fluid was removed from the expander with a kaye suction  The expander was removed  Due to the thick capsule the decision was made to perform a capsulectomy  A total capsulectomy was performed deep to the pectoralis major muscle and superficial to the pectoralis minor muscle  The capsule was sent as a specimen  The medial pectoralis major was re-elevated to the medial origin  The pocket was opened to fit the exact dimensions of the chosen implant  The pockets were irrigated with antibiotic solution which was allowed to dwell for 5 minutes  Excellent hemostasis was achieved  15 round lakeisha drains were placed into the subpectoral pockets, tunneled to the lateral chest wall and secured with a 2-0 nylon suture  The skin was re prepped, all surgical team members changed their gloves  Silicone sizers were prepared  The sizers were placed into the subpectoral pockets and excellent shape and contour was confirmed  [place] high profile [id number] silicone implants were placed into the subpectoral pockets  The orientation markers were used to confirm anatomic position of the implant  The pectoralis muscle was re approximated using 2-0 PDS suture in running technique  The skin was closed using 3-0 vicryl and 3-0 PDS suture in interrupted deep dermal technique  The superficial skin was closed using 3-0 monocryl suture in running subcuticular technique   The wounds were cleaned and dried  Benzoin, steri strips and skin glue were applied  Biopatches were placed around the drain sites  Sterile gauze and a surgical bra was placed  The patient tolerated the procedure well and was taken to the recovery room in stable condition         I was present for the entire procedure and A qualified resident physician was not available    Patient Disposition:  hemodynamically stable and extubated and stable    SIGNATURE: Sebastián Sanchez MD  DATE: September 27, 2019  TIME: 3:35 PM Negative/Unknown

## 2022-06-09 NOTE — H&P PST ADULT - REASON FOR ADMISSION
Patient is a 57 year old  female presenting to PAST in preparation for LEFT UPPER EXTREMITY ARTERIO VENOUS GRAFT PLACEMENT  on  6/22/22 under lsb anesthesia by Dr. beltran

## 2022-06-09 NOTE — HISTORY OF PRESENT ILLNESS
[de-identified] : 57 year old female referred for anemia . History obtained mainly from chart , patient with intellectual disability , hearing impaired , used sign  . she has extensive PMH including smoking ,  hypertension , diabetes , CKD , PAD s/p left fem-tibial bypass, admitted recently with CHATA , abdominal pain , acute on chronic anemia , Hgb : 3.4 , last Hgb : 8.5 on 5/29 . \par She has a myriad of complaints including back pain , vomiting , dizziness, blurred vision . \par Scheduled for AVF for dialysis . \par Lab work significant for M spike :0.1

## 2022-06-09 NOTE — H&P PST ADULT - HISTORY OF PRESENT ILLNESS
pt with ckd  for planned procedure for dialysis       PATIENT CURRENTLY DENIES CHEST PAIN  SHORTNESS OF BREATH  PALPITATIONS,  DYSURIA, OR UPPER RESPIRATORY INFECTION IN PAST 2 WEEKS  EXERCISE  TOLERANCE  1-full block  WITHOUT SHORTNESS OF BREATH  denies travel outside the [place] in the past 30 days  Patient denies any signs or symptoms of COVID 19 and denies contact with known positive individuals.  They have an appointment for repeat COVID testing pre-procedure and acknowledge its time and place.  They were instructed to quarantine pre-procedure, practice exposure control measures, continue to self-monitor and report any concerns to their proceduralist.  pt advised self quarantine till day of procedure  Anesthesia Alert  NO--Difficult Airway  NO--History of neck surgery or radiation  NO--Limited ROM of neck  NO--History of Malignant hyperthermia  NO--No personal or family history of Pseudocholinesterase deficiency.  NO--Prior Anesthesia Complication  NO--Latex Allergy  NO--Loose teeth  NO--History of Rheumatoid Arthritis  NO--Bleeding risk  NO--DAJA  NO--Other_____    PT DENIES ANY RASHES, ABRASION, OR OPEN WOUNDS OR CUTS      AS PER THE PT, THIS IS HIS/HER COMPLETE MEDICAL AND SURGICAL HX, INCLUDING MEDICATIONS PRESCRIBED AND OVER THE COUNTER    Patient verbalized understanding of instructions and was given the opportunity to ask questions and have them answered.    pt denies any suicidal ideation or thoughts, pt states not a threat to self or others    N18.6 64849    End-stage renal disease    Encounter for other preprocedural examination    ^N18.6 92142    No pertinent family history in first degree relatives    End-stage renal disease    Encounter for other preprocedural examination    No pertinent past medical history    PVD (peripheral vascular disease)    Benign essential HTN    Intellectual disability    Hearing impaired    HLD (hyperlipidemia)    No significant past surgical history

## 2022-06-09 NOTE — PHYSICAL EXAM
[Normal] : normoactive bowel sounds, soft and nontender, no hepatosplenomegaly or masses appreciated [de-identified] : grade 2/6 ESM  [de-identified] : no edema.

## 2022-06-09 NOTE — ASSESSMENT
[FreeTextEntry1] : Acute on chronic anemia likely due to CKD , rule out GI bleeding .\par Monoclonal gammopathy . Back pain ? \par Plan: anemia and myeloma work up . \par          iron infusion if needed \par          EPO if not given by renal .

## 2022-06-09 NOTE — H&P PST ADULT - NSICDXPASTMEDICALHX_GEN_ALL_CORE_FT
PAST MEDICAL HISTORY:  Benign essential HTN     Chronic kidney disease, unspecified CKD stage     Hearing impaired     HLD (hyperlipidemia)     Intellectual disability     PVD (peripheral vascular disease)

## 2022-06-10 LAB
DEPRECATED KAPPA LC FREE/LAMBDA SER: 1.26 RATIO
FERRITIN SERPL-MCNC: 106 NG/ML
IGA SER QL IEP: 126 MG/DL
IGG SER QL IEP: 665 MG/DL
IGM SER QL IEP: 151 MG/DL
KAPPA LC CSF-MCNC: 9.96 MG/DL
KAPPA LC SERPL-MCNC: 12.51 MG/DL
VIT B12 SERPL-MCNC: 500 PG/ML

## 2022-06-13 LAB
ALBUMIN MFR SERPL ELPH: 52.8 %
ALBUMIN SERPL-MCNC: 3 G/DL
ALBUMIN/GLOB SERPL: 1.1 RATIO
ALPHA1 GLOB MFR SERPL ELPH: 5.4 %
ALPHA1 GLOB SERPL ELPH-MCNC: 0.3 G/DL
ALPHA2 GLOB MFR SERPL ELPH: 18.7 %
ALPHA2 GLOB SERPL ELPH-MCNC: 1.1 G/DL
B-GLOBULIN MFR SERPL ELPH: 10.5 %
B-GLOBULIN SERPL ELPH-MCNC: 0.6 G/DL
GAMMA GLOB FLD ELPH-MCNC: 0.7 G/DL
GAMMA GLOB MFR SERPL ELPH: 12.6 %
INTERPRETATION SERPL IEP-IMP: NORMAL
M PROTEIN MFR SERPL ELPH: 2.2 %
M PROTEIN SPEC IFE-MCNC: NORMAL
MONOCLON BAND OBS SERPL: 0.1 G/DL
PROT SERPL-MCNC: 5.7 G/DL
PROT SERPL-MCNC: 5.7 G/DL

## 2022-06-14 ENCOUNTER — OUTPATIENT (OUTPATIENT)
Dept: OUTPATIENT SERVICES | Facility: HOSPITAL | Age: 57
LOS: 1 days | Discharge: HOME | End: 2022-06-14

## 2022-06-14 ENCOUNTER — APPOINTMENT (OUTPATIENT)
Dept: PODIATRY | Facility: CLINIC | Age: 57
End: 2022-06-14
Payer: MEDICAID

## 2022-06-14 ENCOUNTER — APPOINTMENT (OUTPATIENT)
Dept: NEPHROLOGY | Facility: CLINIC | Age: 57
End: 2022-06-14
Payer: MEDICAID

## 2022-06-14 VITALS
HEART RATE: 83 BPM | DIASTOLIC BLOOD PRESSURE: 70 MMHG | BODY MASS INDEX: 29.84 KG/M2 | WEIGHT: 152 LBS | TEMPERATURE: 96.1 F | SYSTOLIC BLOOD PRESSURE: 186 MMHG | OXYGEN SATURATION: 99 % | HEIGHT: 60 IN

## 2022-06-14 DIAGNOSIS — Z98.890 OTHER SPECIFIED POSTPROCEDURAL STATES: Chronic | ICD-10-CM

## 2022-06-14 DIAGNOSIS — R33.9 RETENTION OF URINE, UNSPECIFIED: ICD-10-CM

## 2022-06-14 PROBLEM — N18.9 CHRONIC KIDNEY DISEASE, UNSPECIFIED: Chronic | Status: ACTIVE | Noted: 2022-06-09

## 2022-06-14 PROCEDURE — 99214 OFFICE O/P EST MOD 30 MIN: CPT | Mod: NC,GC

## 2022-06-14 PROCEDURE — 99213 OFFICE O/P EST LOW 20 MIN: CPT | Mod: NC

## 2022-06-14 NOTE — END OF VISIT
[] : Resident [FreeTextEntry3] : Patient seen and examined with medical resident \par Agree with note A and plan\par # CKD 5  progressing/ will need AVG / discussed with vascular surgery/ surgery next week / may need TDC to start RRT soon\par has nausea not taking meds hence BP is high \par # anemia chronic seen by hem onc \par asked to go to ED if worsening nausea or poor po intake \par will repeat blood work today and follow up in 1-2 weeks

## 2022-06-14 NOTE — REVIEW OF SYSTEMS
[Vomiting] : vomiting [Negative] : Musculoskeletal [Fever] : no fever [Chills] : no chills [Shortness Of Breath] : no shortness of breath [Cough] : no cough [Constipation] : no constipation [Diarrhea] : no diarrhea [Dysuria] : no dysuria [Confused] : no confusion

## 2022-06-14 NOTE — ASSESSMENT
[FreeTextEntry1] : 56 YO Hearing-Impaired woman w a PMH of CKD V, CVA (2/2021 on ASA+ Plavix), DM w peripheral neuropathy(on gabapentin) , peripheral artery disease (s/p vascular repair), dyslipidemia here for scheduled 2 week follow up. \par \par #CKD V\par - Progression of CKD; on repeat labs Cr clarice to 5.6 and GFR 8 \par - phosphate at target 3.7 /  Ca 8.2 C/W ckd c/w sevelamer\par - c/w 0.25 Calcitriol once daily\par - Proteinuria at 5 g already on ACE inh \par - SPEP showing Gamma migrating para-protein w/ C-htumo-[id number] with Heme Onc\par - Saw Vascular, plan for LUE AV graft on 6/22\par - Will ask vascular to place TDC at the same time as the patient may need dialysis before AVF maturation\par - Repeat CMP, phos\par - f/u w Nephro in 2 weeks\par \par #Acute on chronic normocytic anemia\par - Hgb 10.4 > 8.6\par - Likely due to CKD\par - Following Dr. Castanon; anemia workup and EPO per Heme Onc\par \par # HTN \par - uncontrolled on metoprolol, nifedipine, and hydralizine\par - 186/70 today\par - Patient stopped taking all of her meds because she thinks they are making her nauseous\par - Counselled on the importance of BP control and advised to resume her BP meds.\par \par Patient counselled to go to the ED if she develops confusion, pain, fever, anuria, urinary retention\par \par RTC in 2 weeks. Plan for AV graft, TDC on 6/22. Will likely need dialysis soon\par

## 2022-06-14 NOTE — PHYSICAL EXAM
[General Appearance - Alert] : alert [General Appearance - In No Acute Distress] : in no acute distress [Sclera] : the sclera and conjunctiva were normal [Neck Appearance] : the appearance of the neck was normal [Respiration, Rhythm And Depth] : normal respiratory rhythm and effort [Auscultation Breath Sounds / Voice Sounds] : lungs were clear to auscultation bilaterally [Heart Sounds] : normal S1 and S2 [Heart Rate And Rhythm] : heart rate was normal and rhythm regular [Bowel Sounds] : normal bowel sounds [Abdomen Soft] : soft [FreeTextEntry1] : Left flank tenderness

## 2022-06-14 NOTE — HISTORY OF PRESENT ILLNESS
[FreeTextEntry1] : 58 YO Hearing-Impaired woman w a PMH of CKD V, CVA (2/2021 on ASA+ Plavix), DM w peripheral neuropathy(on gabapentin) , peripheral artery disease (s/p vascular repair), dyslipidemia here for scheduled 2 week follow up. She was last seen in May after hospitalization and was found to have progression of CKD, referred to vascular surgery for vein mapping and AVF planning. She saw surgery and AVF creation planned on 6/22. She went to the ED on 5/29 for leg swelling and there labs still showed increasing Cr, renal sono showed that she was retaining 1.4 L but with training she was able to void by herself. \par Today she complains of nausea and vomiting that she attributes to her medications and she has completely stopped taking all meds. BP elevated  today.\par She denies fever, chills, hematuria or dysuria, confusion, chest pain or SOB. Patient interviewed with  Maria E,  present.

## 2022-06-15 NOTE — DISCHARGE NOTE PROVIDER - NSDCADMDATE_GEN_ALL_CORE_FT
Central Venous Line:    A central venous line was placed using ultrasound guidance, in the OR for the following indication(s): central venous access. Sterility preparation included the following: hand hygiene performed prior to procedure, maximum sterile barriers used and sterile technique used to drape from head to toe. The patient was placed in Trendelenburg position. The right internal jugular vein was prepped. The site was prepped with Chloraprep. A 8.5 Fr (size), introducer SLIC was placed. During the procedure, the following specific steps were taken: target vein identified, needle advanced into vein and blood aspirated and guidewire advanced into vein. Intravenous verification was obtained by ultrasound and venous blood return. Post insertion care included: all ports aspirated, all ports flushed easily, guidewire removed intact, Biopatch applied, line sutured in place and dressing applied. During the procedure the patient experienced: patient tolerated procedure well with no complications.       Real-time US image taken/store: yes  Anesthesia type: local..No  Staffing  Performed: Anesthesiologist   Anesthesiologist: Jeannie Eaton DO  Preanesthetic Checklist  Completed: patient identified, IV checked, site marked, risks and benefits discussed, surgical/procedural consents, equipment checked, pre-op evaluation, timeout performed, anesthesia consent given, oxygen available and monitors applied/VS acknowledged 09-May-2022 16:41

## 2022-06-15 NOTE — PROCEDURE
[] : A mold was applied. [FreeTextEntry1] : pt c/o pain to left heel at achilles insertion.mild debridement of callous to left heel.orthotics fabricated with heel lift to place achilles on mild slack. pt expressed pain relief with same. advised pt any problems to remove orthotics and put in original inserts and to return here. pt to see podiatry next month.

## 2022-06-16 ENCOUNTER — APPOINTMENT (OUTPATIENT)
Dept: INFUSION THERAPY | Facility: CLINIC | Age: 57
End: 2022-06-16

## 2022-06-16 DIAGNOSIS — R33.9 RETENTION OF URINE, UNSPECIFIED: ICD-10-CM

## 2022-06-16 DIAGNOSIS — D64.9 ANEMIA, UNSPECIFIED: ICD-10-CM

## 2022-06-16 DIAGNOSIS — I10 ESSENTIAL (PRIMARY) HYPERTENSION: ICD-10-CM

## 2022-06-16 DIAGNOSIS — N18.5 CHRONIC KIDNEY DISEASE, STAGE 5: ICD-10-CM

## 2022-06-16 RX ORDER — IRON SUCROSE 20 MG/ML
200 INJECTION, SOLUTION INTRAVENOUS ONCE
Refills: 0 | Status: COMPLETED | OUTPATIENT
Start: 2022-06-16 | End: 2022-06-16

## 2022-06-16 RX ADMIN — IRON SUCROSE 220 MILLIGRAM(S): 20 INJECTION, SOLUTION INTRAVENOUS at 16:16

## 2022-06-19 ENCOUNTER — LABORATORY RESULT (OUTPATIENT)
Age: 57
End: 2022-06-19

## 2022-06-21 ENCOUNTER — LABORATORY RESULT (OUTPATIENT)
Age: 57
End: 2022-06-21

## 2022-06-21 ENCOUNTER — APPOINTMENT (OUTPATIENT)
Dept: INFUSION THERAPY | Facility: CLINIC | Age: 57
End: 2022-06-21

## 2022-06-21 LAB
ALBUMIN SERPL ELPH-MCNC: 3.6 G/DL
ALP BLD-CCNC: 203 U/L
ALT SERPL-CCNC: 12 U/L
ANION GAP SERPL CALC-SCNC: 14 MMOL/L
AST SERPL-CCNC: 13 U/L
BASOPHILS # BLD AUTO: 0.05 K/UL
BASOPHILS NFR BLD AUTO: 0.7 %
BILIRUB SERPL-MCNC: <0.2 MG/DL
BUN SERPL-MCNC: 59 MG/DL
CALCIUM SERPL-MCNC: 7.9 MG/DL
CHLORIDE SERPL-SCNC: 111 MMOL/L
CO2 SERPL-SCNC: 16 MMOL/L
CREAT SERPL-MCNC: 5.5 MG/DL
EGFR: 8 ML/MIN/1.73M2
EOSINOPHIL # BLD AUTO: 0.7 K/UL
EOSINOPHIL NFR BLD AUTO: 10.2 %
GLUCOSE SERPL-MCNC: 107 MG/DL
HCT VFR BLD CALC: 27.2 %
HCT VFR BLD CALC: 27.7 %
HGB BLD-MCNC: 8.8 G/DL
HGB BLD-MCNC: 9 G/DL
IMM GRANULOCYTES NFR BLD AUTO: 0.1 %
LYMPHOCYTES # BLD AUTO: 1.77 K/UL
LYMPHOCYTES NFR BLD AUTO: 25.7 %
MAN DIFF?: NORMAL
MCHC RBC-ENTMCNC: 29 PG
MCHC RBC-ENTMCNC: 29.8 PG
MCHC RBC-ENTMCNC: 31.8 G/DL
MCHC RBC-ENTMCNC: 33.1 G/DL
MCV RBC AUTO: 90.1 FL
MCV RBC AUTO: 91.4 FL
MONOCYTES # BLD AUTO: 0.42 K/UL
MONOCYTES NFR BLD AUTO: 6.1 %
NEUTROPHILS # BLD AUTO: 3.93 K/UL
NEUTROPHILS NFR BLD AUTO: 57.2 %
PHOSPHATE SERPL-MCNC: 6.1 MG/DL
PLATELET # BLD AUTO: 250 K/UL
PLATELET # BLD AUTO: 302 K/UL
PMV BLD: 11.1 FL
POTASSIUM SERPL-SCNC: 4.8 MMOL/L
PROT SERPL-MCNC: 6.3 G/DL
RBC # BLD: 3.02 M/UL
RBC # BLD: 3.03 M/UL
RBC # FLD: 12.8 %
RBC # FLD: 13 %
SODIUM SERPL-SCNC: 141 MMOL/L
WBC # FLD AUTO: 6.88 K/UL
WBC # FLD AUTO: 7.25 K/UL

## 2022-06-21 RX ORDER — IRON SUCROSE 20 MG/ML
200 INJECTION, SOLUTION INTRAVENOUS ONCE
Refills: 0 | Status: COMPLETED | OUTPATIENT
Start: 2022-06-21 | End: 2022-06-21

## 2022-06-21 RX ADMIN — IRON SUCROSE 220 MILLIGRAM(S): 20 INJECTION, SOLUTION INTRAVENOUS at 16:01

## 2022-06-21 NOTE — ASU PATIENT PROFILE, ADULT - FALL HARM RISK - RISK INTERVENTIONS

## 2022-06-22 ENCOUNTER — TRANSCRIPTION ENCOUNTER (OUTPATIENT)
Age: 57
End: 2022-06-22

## 2022-06-22 ENCOUNTER — OUTPATIENT (OUTPATIENT)
Dept: OUTPATIENT SERVICES | Facility: HOSPITAL | Age: 57
LOS: 1 days | Discharge: HOME | End: 2022-06-22
Payer: SUBSIDIZED

## 2022-06-22 ENCOUNTER — APPOINTMENT (OUTPATIENT)
Dept: VASCULAR SURGERY | Facility: HOSPITAL | Age: 57
End: 2022-06-22

## 2022-06-22 VITALS
SYSTOLIC BLOOD PRESSURE: 137 MMHG | WEIGHT: 149.03 LBS | RESPIRATION RATE: 17 BRPM | HEIGHT: 61 IN | HEART RATE: 70 BPM | DIASTOLIC BLOOD PRESSURE: 65 MMHG | OXYGEN SATURATION: 100 % | TEMPERATURE: 99 F

## 2022-06-22 VITALS
HEART RATE: 79 BPM | DIASTOLIC BLOOD PRESSURE: 68 MMHG | RESPIRATION RATE: 16 BRPM | SYSTOLIC BLOOD PRESSURE: 142 MMHG | OXYGEN SATURATION: 97 %

## 2022-06-22 DIAGNOSIS — Z98.890 OTHER SPECIFIED POSTPROCEDURAL STATES: Chronic | ICD-10-CM

## 2022-06-22 LAB — GLUCOSE BLDC GLUCOMTR-MCNC: 111 MG/DL — HIGH (ref 70–99)

## 2022-06-22 PROCEDURE — 36830 ARTERY-VEIN NONAUTOGRAFT: CPT

## 2022-06-22 RX ORDER — PANTOPRAZOLE SODIUM 20 MG/1
1 TABLET, DELAYED RELEASE ORAL
Qty: 0 | Refills: 0 | DISCHARGE

## 2022-06-22 RX ORDER — ACETAMINOPHEN 500 MG
2 TABLET ORAL
Qty: 24 | Refills: 0
Start: 2022-06-22 | End: 2022-06-24

## 2022-06-22 RX ORDER — OXYCODONE AND ACETAMINOPHEN 5; 325 MG/1; MG/1
1 TABLET ORAL ONCE
Refills: 0 | Status: DISCONTINUED | OUTPATIENT
Start: 2022-06-22 | End: 2022-06-22

## 2022-06-22 RX ORDER — ONDANSETRON 8 MG/1
4 TABLET, FILM COATED ORAL ONCE
Refills: 0 | Status: DISCONTINUED | OUTPATIENT
Start: 2022-06-22 | End: 2022-07-06

## 2022-06-22 RX ORDER — HYDROMORPHONE HYDROCHLORIDE 2 MG/ML
0.5 INJECTION INTRAMUSCULAR; INTRAVENOUS; SUBCUTANEOUS
Refills: 0 | Status: DISCONTINUED | OUTPATIENT
Start: 2022-06-22 | End: 2022-06-22

## 2022-06-22 RX ORDER — SODIUM CHLORIDE 9 MG/ML
1000 INJECTION INTRAMUSCULAR; INTRAVENOUS; SUBCUTANEOUS
Refills: 0 | Status: DISCONTINUED | OUTPATIENT
Start: 2022-06-22 | End: 2022-07-06

## 2022-06-22 RX ORDER — MORPHINE SULFATE 50 MG/1
2 CAPSULE, EXTENDED RELEASE ORAL
Refills: 0 | Status: DISCONTINUED | OUTPATIENT
Start: 2022-06-22 | End: 2022-06-22

## 2022-06-22 NOTE — BRIEF OPERATIVE NOTE - NSICDXBRIEFPROCEDURE_GEN_ALL_CORE_FT
PROCEDURES:  Creation of arteriovenous fistula using bovine or polytetrafluoroethylene (PTFE) graft 22-Jun-2022 13:03:59  Sivakumar Dela Cruz

## 2022-06-22 NOTE — ASU DISCHARGE PLAN (ADULT/PEDIATRIC) - NS MD DC FALL RISK RISK
For information on Fall & Injury Prevention, visit: [email or web address] OR  [email or web address] OR  https://www.cdc.gov/steadi/patient.html

## 2022-06-22 NOTE — ASU DISCHARGE PLAN (ADULT/PEDIATRIC) - DISCHARGE TO
Please notify Mr. Burnette that both of his polyps were benign, clinically meaningless hyperplastic polyps -- no pre-cancerous changes or cancer cells.  Due to his family history of colon cancer, I recommend a repeat colonoscopy for polyp surveillance in 5 years -- update \"HM\".    PCP notified.    Dirk Thrasher MD   Home

## 2022-06-22 NOTE — ASU DISCHARGE PLAN (ADULT/PEDIATRIC) - CARE PROVIDER_API CALL
Lexx Muñoz)  Surgery; Vascular Surgery  [address] [place] [address]  Phone: [phone]  Fax: [phone]  Established Patient  Follow Up Time: 2 weeks

## 2022-06-23 DIAGNOSIS — D64.9 ANEMIA, UNSPECIFIED: ICD-10-CM

## 2022-06-23 DIAGNOSIS — N18.5 CHRONIC KIDNEY DISEASE, STAGE 5: ICD-10-CM

## 2022-06-27 DIAGNOSIS — E78.00 PURE HYPERCHOLESTEROLEMIA, UNSPECIFIED: ICD-10-CM

## 2022-06-27 DIAGNOSIS — N18.6 END STAGE RENAL DISEASE: ICD-10-CM

## 2022-06-27 DIAGNOSIS — I12.0 HYPERTENSIVE CHRONIC KIDNEY DISEASE WITH STAGE 5 CHRONIC KIDNEY DISEASE OR END STAGE RENAL DISEASE: ICD-10-CM

## 2022-06-28 ENCOUNTER — APPOINTMENT (OUTPATIENT)
Dept: INFUSION THERAPY | Facility: CLINIC | Age: 57
End: 2022-06-28

## 2022-06-28 ENCOUNTER — OUTPATIENT (OUTPATIENT)
Dept: OUTPATIENT SERVICES | Facility: HOSPITAL | Age: 57
LOS: 1 days | Discharge: HOME | End: 2022-06-28

## 2022-06-28 ENCOUNTER — APPOINTMENT (OUTPATIENT)
Dept: NEPHROLOGY | Facility: CLINIC | Age: 57
End: 2022-06-28

## 2022-06-28 ENCOUNTER — LABORATORY RESULT (OUTPATIENT)
Age: 57
End: 2022-06-28

## 2022-06-28 VITALS
OXYGEN SATURATION: 99 % | DIASTOLIC BLOOD PRESSURE: 65 MMHG | HEIGHT: 60 IN | WEIGHT: 147 LBS | HEART RATE: 72 BPM | SYSTOLIC BLOOD PRESSURE: 147 MMHG | BODY MASS INDEX: 28.86 KG/M2

## 2022-06-28 DIAGNOSIS — R30.0 DYSURIA: ICD-10-CM

## 2022-06-28 DIAGNOSIS — Z98.890 OTHER SPECIFIED POSTPROCEDURAL STATES: Chronic | ICD-10-CM

## 2022-06-28 LAB
HCT VFR BLD CALC: 27.1 %
HGB BLD-MCNC: 9.2 G/DL
MCHC RBC-ENTMCNC: 30.4 PG
MCHC RBC-ENTMCNC: 33.9 G/DL
MCV RBC AUTO: 89.4 FL
PLATELET # BLD AUTO: 260 K/UL
PMV BLD: 11.6 FL
RBC # BLD: 3.03 M/UL
RBC # FLD: 13.1 %
WBC # FLD AUTO: 12.21 K/UL

## 2022-06-28 PROCEDURE — 99214 OFFICE O/P EST MOD 30 MIN: CPT | Mod: GC

## 2022-06-28 RX ORDER — ERYTHROPOIETIN 10000 [IU]/ML
10000 INJECTION, SOLUTION INTRAVENOUS; SUBCUTANEOUS ONCE
Refills: 0 | Status: COMPLETED | OUTPATIENT
Start: 2022-06-28 | End: 2022-06-28

## 2022-06-28 RX ADMIN — ERYTHROPOIETIN 10000 UNIT(S): 10000 INJECTION, SOLUTION INTRAVENOUS; SUBCUTANEOUS at 11:26

## 2022-06-28 NOTE — HISTORY OF PRESENT ILLNESS
[FreeTextEntry1] : 56 YO Hearing-Impaired woman w a PMH of CKD V due to diabetes, CVA (2/2021 on ASA+ Plavix), DM w peripheral neuropathy(on gabapentin) , peripheral artery disease (s/p vascular repair), dyslipidemia here for scheduled for follow up.\par \par Sign  Patricia assisted with history taking.\par \par Patient reports having multiple episodes of nausea, nonbilious emesis, watery, non- mucopurulent, non-bloody diarrhea, without abdominal pain, or constitutional symptoms.\par Patient also reports burning on urination over the past couple of days, along with urgency.\par Patient reports no abdominal pain, chest pain, shortness of breath, cough, no changes in bowel habits.\par \par Patient had an AVG  placed last week, no symptoms of local infection, no issues reported with the AVG.\par \par Patient is going to start EpO injections today, for the Hb is around 8-9, related to the CKD.

## 2022-06-28 NOTE — REVIEW OF SYSTEMS
[Vomiting] : vomiting [Diarrhea] : diarrhea [Negative] : Integumentary [Abdominal Pain] : no abdominal pain

## 2022-06-28 NOTE — PHYSICAL EXAM
[General Appearance - Alert] : alert [Sclera] : the sclera and conjunctiva were normal [Outer Ear] : the ears and nose were normal in appearance [Both Tympanic Membranes Were Examined] : both tympanic membranes were normal [Neck Appearance] : the appearance of the neck was normal [Jugular Venous Distention Increased] : there was no jugular-venous distention [] : no respiratory distress [Respiration, Rhythm And Depth] : normal respiratory rhythm and effort [Auscultation Breath Sounds / Voice Sounds] : lungs were clear to auscultation bilaterally [Apical Impulse] : the apical impulse was normal [Heart Sounds] : normal S1 and S2 [Arterial Pulses Carotid] : carotid pulses were normal with no bruits [Bowel Sounds] : normal bowel sounds [Abdomen Soft] : soft [Abdomen Tenderness] : non-tender [___ (cm) Fistula] : [unfilled] (cm) fistula [Bruit] : a bruit was present [Thrill] : a thrill was present [Skin Color & Pigmentation] : normal skin color and pigmentation [Cranial Nerves] : cranial nerves 2-12 were intact [Deep Tendon Reflexes (DTR)] : deep tendon reflexes were 2+ and symmetric [Sensation] : the sensory exam was normal to light touch and pinprick

## 2022-06-28 NOTE — END OF VISIT
[] : Resident [FreeTextEntry3] : Patient seen and examined with renal resident\par Agree with note A and plan\par # CKD 5 sp AVG anemia chronic\par may have uremic symptoms \par will repeat CMP today along with hep profile\par check UA and CS \par rtc in 2-3 weeks

## 2022-06-28 NOTE — ASSESSMENT
[FreeTextEntry1] : 58 YO Hearing-Impaired woman w a PMH of CKD V due to diabetes, CVA (2/2021 on ASA+ Plavix), DM w peripheral neuropathy(on gabapentin) , peripheral artery disease (s/p vascular repair), dyslipidemia here for scheduled for follow up, after placement an AVG  in the week before.\par \par #  CKD 5 sp AVG placement\par \par No signs of infections, or local issues\par Thrill present \par Continue monitoring, and prepare for dialysis when matures in 3-6 months\par Will send CMP, phosphorus, hepatitis B and C panel\par will wait for repeat blood work before initiation of RRT \par to start RRT at [address] [place] [address] once ready \par \par # Dysuria\par \par Irritative, non-obstructive LUTS, no fevers, chills, no CVA tenderness\par Send UA and urine cultures\par .\par \par # Normocytic anemia\par \par Last Hb=9, related to the progression of the CKD\par Will start on EpO today weekly by hem onc \par \par # HTN\par \par BP in the clinic= 147/65\par C/w current medications\par

## 2022-06-29 ENCOUNTER — APPOINTMENT (OUTPATIENT)
Dept: INFUSION THERAPY | Facility: CLINIC | Age: 57
End: 2022-06-29

## 2022-06-29 DIAGNOSIS — R30.0 DYSURIA: ICD-10-CM

## 2022-06-29 DIAGNOSIS — I10 ESSENTIAL (PRIMARY) HYPERTENSION: ICD-10-CM

## 2022-06-29 DIAGNOSIS — N18.5 CHRONIC KIDNEY DISEASE, STAGE 5: ICD-10-CM

## 2022-07-05 ENCOUNTER — LABORATORY RESULT (OUTPATIENT)
Age: 57
End: 2022-07-05

## 2022-07-05 ENCOUNTER — APPOINTMENT (OUTPATIENT)
Dept: VASCULAR SURGERY | Facility: CLINIC | Age: 57
End: 2022-07-05

## 2022-07-05 DIAGNOSIS — Z95.828 PRESENCE OF OTHER VASCULAR IMPLANTS AND GRAFTS: ICD-10-CM

## 2022-07-05 LAB
ALBUMIN SERPL ELPH-MCNC: 3.5 G/DL
ALP BLD-CCNC: 193 U/L
ALT SERPL-CCNC: 16 U/L
ANION GAP SERPL CALC-SCNC: 17 MMOL/L
AST SERPL-CCNC: 18 U/L
BILIRUB SERPL-MCNC: <0.2 MG/DL
BUN SERPL-MCNC: 74 MG/DL
CALCIUM SERPL-MCNC: 7.3 MG/DL
CHLORIDE SERPL-SCNC: 108 MMOL/L
CO2 SERPL-SCNC: 15 MMOL/L
CREAT SERPL-MCNC: 6.2 MG/DL
EGFR: 7 ML/MIN/1.73M2
GLUCOSE SERPL-MCNC: 117 MG/DL
HBV CORE IGG+IGM SER QL: NONREACTIVE
HBV SURFACE AB SER QL: NONREACTIVE
HBV SURFACE AG SER QL: NONREACTIVE
HCV AB SER QL: NONREACTIVE
HCV S/CO RATIO: 0.16 S/CO
PHOSPHATE SERPL-MCNC: 5.6 MG/DL
POTASSIUM SERPL-SCNC: 4.9 MMOL/L
PROT SERPL-MCNC: 6 G/DL
SODIUM SERPL-SCNC: 140 MMOL/L

## 2022-07-05 PROCEDURE — 93990 DOPPLER FLOW TESTING: CPT

## 2022-07-05 PROCEDURE — 99024 POSTOP FOLLOW-UP VISIT: CPT

## 2022-07-05 NOTE — DATA REVIEWED
[FreeTextEntry1] : I performed an arterial duplex which was medically necessary to evaluate for patency of the AV graft. It showed patent LUE AV graft.\par

## 2022-07-05 NOTE — HISTORY OF PRESENT ILLNESS
[FreeTextEntry1] : 58 y/o female with h/o stroke, with left heel gangrene and left SFA occlusion, underwent left femoral to AT reverse saphenous vein bypass on 11/8/21. \par \par She has worsening renal function, not on HD yet, underwent left brachial artery to axillary vein AV graft placement on 6/22/22.

## 2022-07-05 NOTE — ASSESSMENT
[FreeTextEntry1] : 58 y/o female with h/o stroke, hearing and speech impaired, recently with left heel gangrene and left SFA occlusion, underwent left femoral to AT reverse saphenous vein bypass on 11/8/21. She presents today for AVF creation.\par \par She has worsening renal function, not on HD yet, underwent left brachial artery to axillary vein AV graft placement on 6/22/22.\par \par The LUE AVG is patent on duplex today. It is ready for access for HD if needed. She c/o dysuria and I am sending her for UA and urine culture. I will see her back in one month for lower extremity arterial duplex to evaluate the LLE  bypass graft.\par

## 2022-07-06 DIAGNOSIS — N39.0 URINARY TRACT INFECTION, SITE NOT SPECIFIED: ICD-10-CM

## 2022-07-06 LAB
APPEARANCE: ABNORMAL
BILIRUBIN URINE: NEGATIVE
BLOOD URINE: ABNORMAL
COLOR: YELLOW
GLUCOSE QUALITATIVE U: NEGATIVE
KETONES URINE: NEGATIVE
LEUKOCYTE ESTERASE URINE: ABNORMAL
NITRITE URINE: NEGATIVE
PH URINE: 6.5
PROTEIN URINE: ABNORMAL
SPECIFIC GRAVITY URINE: 1.01
UROBILINOGEN URINE: NORMAL

## 2022-07-07 ENCOUNTER — LABORATORY RESULT (OUTPATIENT)
Age: 57
End: 2022-07-07

## 2022-07-07 ENCOUNTER — APPOINTMENT (OUTPATIENT)
Dept: INFUSION THERAPY | Facility: CLINIC | Age: 57
End: 2022-07-07

## 2022-07-07 LAB
HCT VFR BLD CALC: 27.4 %
HGB BLD-MCNC: 9.2 G/DL
MCHC RBC-ENTMCNC: 29.7 PG
MCHC RBC-ENTMCNC: 33.6 G/DL
MCV RBC AUTO: 88.4 FL
PLATELET # BLD AUTO: 243 K/UL
PMV BLD: 11.1 FL
RBC # BLD: 3.1 M/UL
RBC # FLD: 13.5 %
WBC # FLD AUTO: 8.71 K/UL

## 2022-07-07 RX ORDER — ERYTHROPOIETIN 10000 [IU]/ML
10000 INJECTION, SOLUTION INTRAVENOUS; SUBCUTANEOUS ONCE
Refills: 0 | Status: COMPLETED | OUTPATIENT
Start: 2022-07-07 | End: 2022-07-07

## 2022-07-07 RX ADMIN — ERYTHROPOIETIN 10000 UNIT(S): 10000 INJECTION, SOLUTION INTRAVENOUS; SUBCUTANEOUS at 17:48

## 2022-07-08 ENCOUNTER — NON-APPOINTMENT (OUTPATIENT)
Age: 57
End: 2022-07-08

## 2022-07-08 LAB — BACTERIA UR CULT: ABNORMAL

## 2022-07-14 ENCOUNTER — APPOINTMENT (OUTPATIENT)
Dept: INFUSION THERAPY | Facility: CLINIC | Age: 57
End: 2022-07-14

## 2022-07-14 ENCOUNTER — LABORATORY RESULT (OUTPATIENT)
Age: 57
End: 2022-07-14

## 2022-07-14 RX ORDER — ERYTHROPOIETIN 10000 [IU]/ML
10000 INJECTION, SOLUTION INTRAVENOUS; SUBCUTANEOUS ONCE
Refills: 0 | Status: COMPLETED | OUTPATIENT
Start: 2022-07-14 | End: 2022-07-14

## 2022-07-14 RX ADMIN — ERYTHROPOIETIN 10000 UNIT(S): 10000 INJECTION, SOLUTION INTRAVENOUS; SUBCUTANEOUS at 17:38

## 2022-07-15 LAB
HCT VFR BLD CALC: 26.8 %
HGB BLD-MCNC: 8.8 G/DL
MCHC RBC-ENTMCNC: 29.8 PG
MCHC RBC-ENTMCNC: 32.8 G/DL
MCV RBC AUTO: 90.8 FL
PLATELET # BLD AUTO: 198 K/UL
PMV BLD: 11.6 FL
RBC # BLD: 2.95 M/UL
RBC # FLD: 13.9 %
WBC # FLD AUTO: 6.96 K/UL

## 2022-07-19 ENCOUNTER — OUTPATIENT (OUTPATIENT)
Dept: OUTPATIENT SERVICES | Facility: HOSPITAL | Age: 57
LOS: 1 days | Discharge: HOME | End: 2022-07-19

## 2022-07-19 ENCOUNTER — APPOINTMENT (OUTPATIENT)
Dept: PODIATRY | Facility: CLINIC | Age: 57
End: 2022-07-19

## 2022-07-19 ENCOUNTER — RESULT REVIEW (OUTPATIENT)
Age: 57
End: 2022-07-19

## 2022-07-19 DIAGNOSIS — M79.672 PAIN IN LEFT FOOT: ICD-10-CM

## 2022-07-19 DIAGNOSIS — Z98.890 OTHER SPECIFIED POSTPROCEDURAL STATES: Chronic | ICD-10-CM

## 2022-07-19 PROCEDURE — 73630 X-RAY EXAM OF FOOT: CPT | Mod: 26,LT

## 2022-07-19 PROCEDURE — 99213 OFFICE O/P EST LOW 20 MIN: CPT

## 2022-07-19 NOTE — PHYSICAL EXAM
[General Appearance - Alert] : alert [General Appearance - In No Acute Distress] : in no acute distress [General Appearance - Well Nourished] : well nourished [Ankle Swelling (On Exam)] : present [Ankle Swelling On The Left] : of the left ankle [Varicose Veins Of The Left Leg] : on the left foot/ankle [] : on the left lower extremity [1+] : left foot dorsalis pedis 1+ [Skin Lesions] : no skin lesions [Foot Ulcer] : foot ulcer [Vibration Dec.] : diminished vibratory sensation at the level of the toes [Diminished Throughout Left Foot] : diminished sensation with monofilament testing throughout left foot [Oriented To Time, Place, And Person] : oriented to person, place, and time [Impaired Insight] : insight and judgment were intact [Affect] : the affect was normal [Delayed in the Left Toes] : capillary refills normal in the left toes [FreeTextEntry3] : non palpable pulses left foot [de-identified] : Left 3rd digit shortened, contracted. 4th digit amputation.\par Moderate TTP to left midfoot at site of 4th digit ampuation\par  [FreeTextEntry1] : Left plantarmedial heel ulcer; Hyperkeratotic build up \par \par No drainage. Skin thin, atrophic, tense. \par No malodor, no erythema, no warmth

## 2022-07-19 NOTE — HISTORY OF PRESENT ILLNESS
[Sneakers] : lana [Walker] : a walker [FreeTextEntry1] : CC: "Wound check and left midfoot pain "\par HPI:\par -56F who presents w/son &  for f/u after recent hospitalization ([facility] 11/24/21)\par -S/p dbx of heel wound\par \par -Son present (sign language). \par - States her left midfoot is very painful to palpation and while walking; the pain started a few weeks ago \par Has no other pedal complaints at this time. \par \par \par

## 2022-07-19 NOTE — ASSESSMENT
[Verbal] : verbal [Patient] : patient [Good - alert, interested, motivated] : Good - alert, interested, motivated [Demonstrates independently] : demonstrates independently [FreeTextEntry1] : Assessment:\par -Diabetes mellitus\par -Diabetic foot ulcer L heel\par - Left foot pain \par \par Plan:\par -Pt seen & evaluated. Pt chart reviewed\par - wound healed with hyperkeratotic overlay\par -Discussed offloading and shoes in detail; \par - Rx left foot cam boot \par - ordered XR-Left foot to be completed upon leaving clinic today; r/o fractures \par - Ordered Ultrasound -Left foot to be completed upon leaving clinic today; r/o tendonitis/tear/rupture\par \par -Patient f/u 2 weeks in clinic \par

## 2022-07-19 NOTE — END OF VISIT
[] : Resident [Resident] : Resident [FreeTextEntry3] : agree with above note.  \par Was present and instructing resident during visit. \par All Procedure performed under direct supervision.  \par LUCILA Llamas DPM\par  [FreeTextEntry2] : agree with above note.  \par Was present and instructing resident during visit. \par All Procedure performed under direct supervision.  \par LUCILA Llamas DPM\par

## 2022-07-20 DIAGNOSIS — M84.376A STRESS FRACTURE, UNSPECIFIED FOOT, INITIAL ENCOUNTER FOR FRACTURE: ICD-10-CM

## 2022-07-20 DIAGNOSIS — M77.9 ENTHESOPATHY, UNSPECIFIED: ICD-10-CM

## 2022-07-20 DIAGNOSIS — M79.672 PAIN IN LEFT FOOT: ICD-10-CM

## 2022-07-21 ENCOUNTER — LABORATORY RESULT (OUTPATIENT)
Age: 57
End: 2022-07-21

## 2022-07-21 ENCOUNTER — APPOINTMENT (OUTPATIENT)
Dept: INFUSION THERAPY | Facility: CLINIC | Age: 57
End: 2022-07-21

## 2022-07-21 RX ORDER — ERYTHROPOIETIN 10000 [IU]/ML
10000 INJECTION, SOLUTION INTRAVENOUS; SUBCUTANEOUS ONCE
Refills: 0 | Status: COMPLETED | OUTPATIENT
Start: 2022-07-21 | End: 2022-07-21

## 2022-07-21 RX ADMIN — ERYTHROPOIETIN 10000 UNIT(S): 10000 INJECTION, SOLUTION INTRAVENOUS; SUBCUTANEOUS at 17:53

## 2022-07-22 LAB
HCT VFR BLD CALC: 31.4 %
HGB BLD-MCNC: 9.9 G/DL
MCHC RBC-ENTMCNC: 29.3 PG
MCHC RBC-ENTMCNC: 31.5 G/DL
MCV RBC AUTO: 92.9 FL
PLATELET # BLD AUTO: 225 K/UL
PMV BLD: 11.6 FL
RBC # BLD: 3.38 M/UL
RBC # FLD: 14.2 %
WBC # FLD AUTO: 6.78 K/UL

## 2022-07-24 NOTE — ASU PATIENT PROFILE, ADULT - CAREGIVER

## 2022-07-26 ENCOUNTER — OUTPATIENT (OUTPATIENT)
Dept: OUTPATIENT SERVICES | Facility: HOSPITAL | Age: 57
LOS: 1 days | Discharge: HOME | End: 2022-07-26

## 2022-07-26 ENCOUNTER — APPOINTMENT (OUTPATIENT)
Dept: NEPHROLOGY | Facility: CLINIC | Age: 57
End: 2022-07-26

## 2022-07-26 VITALS
SYSTOLIC BLOOD PRESSURE: 169 MMHG | BODY MASS INDEX: 28.66 KG/M2 | HEART RATE: 73 BPM | WEIGHT: 146 LBS | DIASTOLIC BLOOD PRESSURE: 77 MMHG | HEIGHT: 60 IN | OXYGEN SATURATION: 99 %

## 2022-07-26 DIAGNOSIS — Z98.890 OTHER SPECIFIED POSTPROCEDURAL STATES: Chronic | ICD-10-CM

## 2022-07-26 PROCEDURE — 99214 OFFICE O/P EST MOD 30 MIN: CPT | Mod: GC

## 2022-07-26 NOTE — HISTORY OF PRESENT ILLNESS
[FreeTextEntry1] : 58 YO Hearing-Impaired woman w a PMH of CKD V s/p AVG placement in June 2022 due to diabetes, CVA (2/2021 on ASA+ Plavix), DM w peripheral neuropathy(on gabapentin), normocytic anemia, peripheral artery disease (s/p vascular repair), dyslipidemia here for scheduled for follow up. Patient needs to establish care for RRT at [address] [place] [address] at 4PM on Tuesday.

## 2022-07-26 NOTE — PHYSICAL EXAM
[General Appearance - Alert] : alert [General Appearance - In No Acute Distress] : in no acute distress [Sclera] : the sclera and conjunctiva were normal [Extraocular Movements] : extraocular movements were intact [Neck Appearance] : the appearance of the neck was normal [Respiration, Rhythm And Depth] : normal respiratory rhythm and effort [Exaggerated Use Of Accessory Muscles For Inspiration] : no accessory muscle use [Auscultation Breath Sounds / Voice Sounds] : lungs were clear to auscultation bilaterally [Heart Rate And Rhythm] : heart rate was normal and rhythm regular [Heart Sounds] : normal S1 and S2 [Veins - Varicosity Changes] : there were no varicosital changes [Bowel Sounds] : normal bowel sounds [Abdomen Soft] : soft [Abnormal Walk] : normal gait [Motor Tone] : muscle strength and tone were normal [Graft] : graft [] : no rash [Skin Lesions] : no skin lesions [Affect] : the affect was normal [Mood] : the mood was normal [Thrill] : a thrill was present [FreeTextEntry1] : Left upper extremity

## 2022-07-26 NOTE — ASSESSMENT
[FreeTextEntry1] : 58 YO Hearing-Impaired woman w a PMH of CKD V due to diabetes, CVA (2/2021 on ASA+ Plavix), DM w peripheral neuropathy(on gabapentin) , peripheral artery disease (s/p vascular repair), dyslipidemia here for follow up\par \par # CKD 5 sp AVG placement by Dr. Muñoz on 6/22/22\par - No signs of infections, or local issues, Thrill present \par - Cr 6.2, GFR 7, Phos 5.8, hepatitis B and C panel negative \par - to start RRT at [address] [place] [address] -> appointment to establish care Tuesday morning 10 AM \par \par #Normocytic Anemia \par - can get EPO injections with RRT \par - hgb 9.9 \par \par

## 2022-07-26 NOTE — REASON FOR VISIT
[Follow-Up] : a follow-up visit [Family Member] : family member [Time Spent: ____ minutes] : Total time spent using  services: [unfilled] minutes. The patient's primary language is not English thus required  services. [Interpreters_FullName] : Jasmina  [TWNoteComboBox1] : American Sign Language

## 2022-07-26 NOTE — REVIEW OF SYSTEMS
[Loss Of Hearing] : hearing loss [Vomiting] : vomiting [Fever] : no fever [Chills] : no chills [Eye Pain] : no eye pain [Red Eyes] : eyes not red [Nasal Discharge] : no nasal discharge [Chest Pain] : no chest pain [Palpitations] : no palpitations [Shortness Of Breath] : no shortness of breath [Cough] : no cough [Constipation] : no constipation [Skin Lesions] : no skin lesions [Skin Wound] : no skin wound

## 2022-07-28 ENCOUNTER — APPOINTMENT (OUTPATIENT)
Dept: INFUSION THERAPY | Facility: CLINIC | Age: 57
End: 2022-07-28

## 2022-07-28 ENCOUNTER — LABORATORY RESULT (OUTPATIENT)
Age: 57
End: 2022-07-28

## 2022-07-28 LAB
HCT VFR BLD CALC: 33.4 %
HGB BLD-MCNC: 10.8 G/DL
MCHC RBC-ENTMCNC: 29.5 PG
MCHC RBC-ENTMCNC: 32.3 G/DL
MCV RBC AUTO: 91.3 FL
PLATELET # BLD AUTO: 218 K/UL
PMV BLD: 11.1 FL
RBC # BLD: 3.66 M/UL
RBC # FLD: 14.8 %
WBC # FLD AUTO: 5.48 K/UL

## 2022-08-04 ENCOUNTER — APPOINTMENT (OUTPATIENT)
Dept: INFUSION THERAPY | Facility: CLINIC | Age: 57
End: 2022-08-04

## 2022-08-04 ENCOUNTER — LABORATORY RESULT (OUTPATIENT)
Age: 57
End: 2022-08-04

## 2022-08-04 LAB
HCT VFR BLD CALC: 33.2 %
HGB BLD-MCNC: 10.9 G/DL
MCHC RBC-ENTMCNC: 29.5 PG
MCHC RBC-ENTMCNC: 32.8 G/DL
MCV RBC AUTO: 90 FL
PLATELET # BLD AUTO: 191 K/UL
PMV BLD: 12.1 FL
RBC # BLD: 3.69 M/UL
RBC # FLD: 14.5 %
WBC # FLD AUTO: 7.47 K/UL

## 2022-08-04 RX ORDER — ERYTHROPOIETIN 10000 [IU]/ML
10000 INJECTION, SOLUTION INTRAVENOUS; SUBCUTANEOUS ONCE
Refills: 0 | Status: COMPLETED | OUTPATIENT
Start: 2022-08-04 | End: 2022-08-04

## 2022-08-04 RX ADMIN — ERYTHROPOIETIN 10000 UNIT(S): 10000 INJECTION, SOLUTION INTRAVENOUS; SUBCUTANEOUS at 17:40

## 2022-08-04 NOTE — DISCHARGE NOTE PROVIDER - NSDCHHBASESERVICE_GEN_ALL_CORE
Detail Level: Detailed Detail Level: Generalized Detail Level: Zone Nursing/Occupational therapy/Physical therapy

## 2022-08-08 DIAGNOSIS — D64.9 ANEMIA, UNSPECIFIED: ICD-10-CM

## 2022-08-08 DIAGNOSIS — N18.9 CHRONIC KIDNEY DISEASE, UNSPECIFIED: ICD-10-CM

## 2022-08-09 ENCOUNTER — APPOINTMENT (OUTPATIENT)
Dept: PODIATRY | Facility: CLINIC | Age: 57
End: 2022-08-09

## 2022-08-09 ENCOUNTER — OUTPATIENT (OUTPATIENT)
Dept: OUTPATIENT SERVICES | Facility: HOSPITAL | Age: 57
LOS: 1 days | Discharge: HOME | End: 2022-08-09

## 2022-08-09 ENCOUNTER — APPOINTMENT (OUTPATIENT)
Dept: VASCULAR SURGERY | Facility: CLINIC | Age: 57
End: 2022-08-09

## 2022-08-09 VITALS — DIASTOLIC BLOOD PRESSURE: 66 MMHG | SYSTOLIC BLOOD PRESSURE: 120 MMHG

## 2022-08-09 VITALS — WEIGHT: 146 LBS | BODY MASS INDEX: 28.66 KG/M2 | HEIGHT: 60 IN

## 2022-08-09 DIAGNOSIS — I70.262 ATHEROSCLEROSIS OF NATIVE ARTERIES OF EXTREMITIES WITH GANGRENE, LEFT LEG: ICD-10-CM

## 2022-08-09 DIAGNOSIS — L97.523 NON-PRESSURE CHRONIC ULCER OF OTHER PART OF LEFT FOOT WITH NECROSIS OF MUSCLE: ICD-10-CM

## 2022-08-09 DIAGNOSIS — R26.2 DIFFICULTY IN WALKING, NOT ELSEWHERE CLASSIFIED: ICD-10-CM

## 2022-08-09 DIAGNOSIS — M79.672 PAIN IN LEFT FOOT: ICD-10-CM

## 2022-08-09 DIAGNOSIS — E11.42 TYPE 2 DIABETES MELLITUS WITH DIABETIC POLYNEUROPATHY: ICD-10-CM

## 2022-08-09 DIAGNOSIS — Z98.890 OTHER SPECIFIED POSTPROCEDURAL STATES: Chronic | ICD-10-CM

## 2022-08-09 DIAGNOSIS — S92.009A UNSPECIFIED FRACTURE OF UNSPECIFIED CALCANEUS, INITIAL ENCOUNTER FOR CLOSED FRACTURE: ICD-10-CM

## 2022-08-09 PROCEDURE — 99024 POSTOP FOLLOW-UP VISIT: CPT

## 2022-08-09 PROCEDURE — 99214 OFFICE O/P EST MOD 30 MIN: CPT | Mod: NC

## 2022-08-09 PROCEDURE — 93926 LOWER EXTREMITY STUDY: CPT

## 2022-08-09 NOTE — HISTORY OF PRESENT ILLNESS
[FreeTextEntry1] : 58 y/o female with h/o stroke, with left heel gangrene and left SFA occlusion, underwent left femoral to AT reverse saphenous vein bypass on 11/8/21. \par \par She has worsening renal function, not on HD yet, underwent left brachial artery to axillary vein AV graft placement on 6/22/22.\par \par today she presents for followup evaluation. She complains of some tenderness in the left plantar aspect of her foot.

## 2022-08-09 NOTE — DATA REVIEWED
[FreeTextEntry1] : arterial duplex the common femoral artery to anterior tibial artery vein bypass graft is patent with a velocity range of 100-70 cm/s. There is some flow disturbance noted in the midsegment of the graft with a velocity of 200 cm/s. Both anastomoses are patent. The inflow and outflow patent\par

## 2022-08-09 NOTE — ASSESSMENT
[FreeTextEntry1] : 58 y/o female with h/o stroke, hearing and speech impaired, recently with left heel gangrene and left SFA occlusion, underwent left femoral to AT reverse saphenous vein bypass on 11/8/21. She presents today for  fu on her AVG\par \par She has worsening renal function, not on HD yet, underwent left brachial artery to axillary vein AV graft placement on 6/22/22.\par \par The LUE AVG is patent  and  It is ready for access for HD if needed. the patient complained of some left plantar foot pain. I performed an arterial duplex which shows a patent common femoral to AT. vein graft bypass. Patient has no active wounds present or tissue loss. I recommend she followup with podiatry for evaluation of her foot

## 2022-08-11 ENCOUNTER — LABORATORY RESULT (OUTPATIENT)
Age: 57
End: 2022-08-11

## 2022-08-11 ENCOUNTER — APPOINTMENT (OUTPATIENT)
Dept: INFUSION THERAPY | Facility: CLINIC | Age: 57
End: 2022-08-11

## 2022-08-12 LAB
HCT VFR BLD CALC: 33.3 %
HGB BLD-MCNC: 11.2 G/DL
MCHC RBC-ENTMCNC: 29.9 PG
MCHC RBC-ENTMCNC: 33.6 G/DL
MCV RBC AUTO: 89 FL
PLATELET # BLD AUTO: 244 K/UL
PMV BLD: 11.3 FL
RBC # BLD: 3.74 M/UL
RBC # FLD: 14.6 %
WBC # FLD AUTO: 7.68 K/UL

## 2022-08-15 ENCOUNTER — OUTPATIENT (OUTPATIENT)
Dept: OUTPATIENT SERVICES | Facility: HOSPITAL | Age: 57
LOS: 1 days | Discharge: HOME | End: 2022-08-15

## 2022-08-15 ENCOUNTER — RESULT REVIEW (OUTPATIENT)
Age: 57
End: 2022-08-15

## 2022-08-15 DIAGNOSIS — Z98.890 OTHER SPECIFIED POSTPROCEDURAL STATES: Chronic | ICD-10-CM

## 2022-08-15 DIAGNOSIS — M79.672 PAIN IN LEFT FOOT: ICD-10-CM

## 2022-08-15 PROCEDURE — 76882 US LMTD JT/FCL EVL NVASC XTR: CPT | Mod: 26,LT

## 2022-08-18 ENCOUNTER — APPOINTMENT (OUTPATIENT)
Dept: INFUSION THERAPY | Facility: CLINIC | Age: 57
End: 2022-08-18

## 2022-08-25 ENCOUNTER — LABORATORY RESULT (OUTPATIENT)
Age: 57
End: 2022-08-25

## 2022-08-25 ENCOUNTER — OUTPATIENT (OUTPATIENT)
Dept: OUTPATIENT SERVICES | Facility: HOSPITAL | Age: 57
LOS: 1 days | Discharge: HOME | End: 2022-08-25

## 2022-08-25 ENCOUNTER — APPOINTMENT (OUTPATIENT)
Dept: INFUSION THERAPY | Facility: CLINIC | Age: 57
End: 2022-08-25

## 2022-08-25 DIAGNOSIS — Z98.890 OTHER SPECIFIED POSTPROCEDURAL STATES: Chronic | ICD-10-CM

## 2022-08-25 DIAGNOSIS — D64.9 ANEMIA, UNSPECIFIED: ICD-10-CM

## 2022-08-25 LAB
HCT VFR BLD CALC: 32.6 %
HGB BLD-MCNC: 10.7 G/DL
MCHC RBC-ENTMCNC: 29.3 PG
MCHC RBC-ENTMCNC: 32.8 G/DL
MCV RBC AUTO: 89.3 FL
PLATELET # BLD AUTO: 137 K/UL
PMV BLD: 12.3 FL
RBC # BLD: 3.65 M/UL
RBC # FLD: 14.2 %
WBC # FLD AUTO: 6.84 K/UL

## 2022-08-25 RX ORDER — ERYTHROPOIETIN 10000 [IU]/ML
10000 INJECTION, SOLUTION INTRAVENOUS; SUBCUTANEOUS ONCE
Refills: 0 | Status: COMPLETED | OUTPATIENT
Start: 2022-08-25 | End: 2022-08-25

## 2022-08-25 RX ADMIN — ERYTHROPOIETIN 10000 UNIT(S): 10000 INJECTION, SOLUTION INTRAVENOUS; SUBCUTANEOUS at 17:46

## 2022-08-25 NOTE — ED ADULT NURSE NOTE - COVID-19 ORDERING FACILITY
Vargas returned call.  Asked Vargas to clarify how he is taking his coumadin.  Vargas reports that he has been taking 4mg on day then 5 mg the next.  According to instructions from anticoagulation clinic, Vargas is to be taking 4mg/6mg alternating.  Informed Vargas to take the dosage instructed.  Call placed to anticoagulation clinic but they report they are not handling coumadin on Vargas, that Dr Patel's office wanted to handle his coumadin.    
[facility]

## 2022-08-30 ENCOUNTER — APPOINTMENT (OUTPATIENT)
Dept: INFUSION THERAPY | Facility: CLINIC | Age: 57
End: 2022-08-30

## 2022-08-30 NOTE — ASSESSMENT
[Verbal] : verbal [Patient] : patient [Good - alert, interested, motivated] : Good - alert, interested, motivated [Demonstrates independently] : demonstrates independently [FreeTextEntry1] : Assessment:\par -Diabetes mellitus\par -Diabetic foot ulcer L heel\par - Left foot pain \par \par Plan:\par -Pt seen & evaluated. Pt chart reviewed\par -pt xray reviewed and discussed with pt\par -wound healed with hyperkeratotic overlay\par -Discussed offloading and shoes in detail;  \par - Ordered Ultrasound -Left foot to be completed upon leaving clinic today; r/o tendonitis/tear/ruptur\par -RX diclofenac\par -Patient f/u 2 weeks in clinic \par

## 2022-08-30 NOTE — HISTORY OF PRESENT ILLNESS
[Sneakers] : lana [Walker] : a walker [FreeTextEntry1] : CC: "Wound check and left midfoot pain "\par HPI:\par -56F who presents w/son &  for f/u after recent hospitalization ([facility] 11/24/21)\par -S/p dbx of heel wound\par \par -Son present (sign language). \par - States her left midfoot is very painful to palpation and while walking; the pain started about a month and half ago\par Has no other pedal complaints at this time. Pt got the xrays done but not the MRI. Pt was unable to get the cam boot as prescribed. Pt will try and get boot before next visit\par \par

## 2022-08-30 NOTE — PHYSICAL EXAM
[General Appearance - Alert] : alert [General Appearance - In No Acute Distress] : in no acute distress [General Appearance - Well Nourished] : well nourished [Ankle Swelling (On Exam)] : present [Ankle Swelling On The Left] : of the left ankle [Varicose Veins Of The Left Leg] : on the left foot/ankle [] : on the left lower extremity [1+] : left foot dorsalis pedis 1+ [Skin Lesions] : no skin lesions [Foot Ulcer] : foot ulcer [Sensation] : the sensory exam was normal to light touch and pinprick [No Focal Deficits] : no focal deficits [Deep Tendon Reflexes (DTR)] : deep tendon reflexes were 2+ and symmetric [Motor Exam] : the motor exam was normal [Vibration Dec.] : diminished vibratory sensation at the level of the toes [Diminished Throughout Left Foot] : diminished sensation with monofilament testing throughout left foot [Oriented To Time, Place, And Person] : oriented to person, place, and time [Impaired Insight] : insight and judgment were intact [Affect] : the affect was normal [Delayed in the Left Toes] : capillary refills normal in the left toes [FreeTextEntry3] : non palpable pulses left foot [de-identified] : Left 3rd digit shortened, contracted. 4th digit amputation.\par Moderate TTP to left midfoot at site of 4th digit ampuation\par \par Xray\par Osteopenia. Avulsion fracture of the posterior calcaneal tuberosity. No additional fracture. No dislocation.\par Post amputation of the fourth digit to the distal metatarsal. Post resection of the third PIP joint. Calcaneal heel spur. Surgical clips in the medial soft tissues of the distal leg.\par \par  [FreeTextEntry1] : Left plantarmedial heel ulcer; Hyperkeratotic build up \par \par No drainage. Skin thin, atrophic, tense. \par No malodor, no erythema, no warmth [TWNoteComboBox1] : False [TWNoteComboBox2] : False

## 2022-09-08 ENCOUNTER — APPOINTMENT (OUTPATIENT)
Dept: INFUSION THERAPY | Facility: CLINIC | Age: 57
End: 2022-09-08

## 2022-09-12 ENCOUNTER — APPOINTMENT (OUTPATIENT)
Dept: HEMATOLOGY ONCOLOGY | Facility: CLINIC | Age: 57
End: 2022-09-12

## 2022-09-15 ENCOUNTER — APPOINTMENT (OUTPATIENT)
Dept: INFUSION THERAPY | Facility: CLINIC | Age: 57
End: 2022-09-15

## 2022-09-16 ENCOUNTER — APPOINTMENT (OUTPATIENT)
Dept: PODIATRY | Facility: CLINIC | Age: 57
End: 2022-09-16

## 2022-09-16 ENCOUNTER — OUTPATIENT (OUTPATIENT)
Dept: OUTPATIENT SERVICES | Facility: HOSPITAL | Age: 57
LOS: 1 days | Discharge: HOME | End: 2022-09-16

## 2022-09-16 DIAGNOSIS — Z98.890 OTHER SPECIFIED POSTPROCEDURAL STATES: Chronic | ICD-10-CM

## 2022-09-16 PROCEDURE — 99213 OFFICE O/P EST LOW 20 MIN: CPT

## 2022-09-16 NOTE — ASSESSMENT
[FreeTextEntry1] : Assessment: L heel pain due to weight distribution\par Plan:\par Pt seen and eval\par L heel callus was not debrided due to pts request\par Reviewed FXR-L; bony prominence on L heel noted;\par Need Custom made orthotic; Mr. Ho to follow up w/ possible heelcup or forefoot posting for even distribution;\par Rx Diabetic shoe w/ extra wide extra length platizote place x 3, oxford style\par F/u w/ Dr. Llamas in 3 months, and Mr. Ho as soon as possible

## 2022-09-16 NOTE — PHYSICAL EXAM
[General Appearance - Alert] : alert [General Appearance - In No Acute Distress] : in no acute distress [General Appearance - Well Nourished] : well nourished [General Appearance - Well Developed] : well developed [Sensation] : the sensory exam was normal to light touch and pinprick [No Focal Deficits] : no focal deficits [de-identified] : Bony prominence noted on L heel on palpation [FreeTextEntry1] : L heel callus, pain on palpation

## 2022-09-16 NOTE — HISTORY OF PRESENT ILLNESS
[FreeTextEntry1] : CC: "I have pain on my left heel"\par HPI:\par Mrs Aldair verde is a 57 yr old female pt who presents w/ L heel pain, unable to speak so came in w/ her  and ; \par Eval L foot

## 2022-09-19 DIAGNOSIS — E11.42 TYPE 2 DIABETES MELLITUS WITH DIABETIC POLYNEUROPATHY: ICD-10-CM

## 2022-09-19 DIAGNOSIS — M79.672 PAIN IN LEFT FOOT: ICD-10-CM

## 2022-09-19 DIAGNOSIS — I70.492 OTHER ATHEROSCLEROSIS OF AUTOLOGOUS VEIN BYPASS GRAFT(S) OF THE EXTREMITIES, LEFT LEG: ICD-10-CM

## 2022-09-19 DIAGNOSIS — E11.621 TYPE 2 DIABETES MELLITUS WITH FOOT ULCER: ICD-10-CM

## 2022-09-22 ENCOUNTER — APPOINTMENT (OUTPATIENT)
Dept: INFUSION THERAPY | Facility: CLINIC | Age: 57
End: 2022-09-22

## 2022-09-29 ENCOUNTER — APPOINTMENT (OUTPATIENT)
Dept: INFUSION THERAPY | Facility: CLINIC | Age: 57
End: 2022-09-29

## 2022-10-03 ENCOUNTER — OUTPATIENT (OUTPATIENT)
Dept: OUTPATIENT SERVICES | Facility: HOSPITAL | Age: 57
LOS: 1 days | Discharge: HOME | End: 2022-10-03

## 2022-10-03 ENCOUNTER — APPOINTMENT (OUTPATIENT)
Dept: PODIATRY | Facility: CLINIC | Age: 57
End: 2022-10-03

## 2022-10-03 DIAGNOSIS — Z98.890 OTHER SPECIFIED POSTPROCEDURAL STATES: Chronic | ICD-10-CM

## 2022-10-03 PROCEDURE — 99213 OFFICE O/P EST LOW 20 MIN: CPT

## 2022-10-05 DIAGNOSIS — E11.42 TYPE 2 DIABETES MELLITUS WITH DIABETIC POLYNEUROPATHY: ICD-10-CM

## 2022-10-05 DIAGNOSIS — E11.9 TYPE 2 DIABETES MELLITUS WITHOUT COMPLICATIONS: ICD-10-CM

## 2022-10-05 NOTE — PROCEDURE
[FreeTextEntry1] : nails clipped, callosities trimmed sharply bilateral feet. good skin integrity throughout. evaluated new shoes with recently fabricated orthotics. pt expressed comfort with same. went over wearing precautions. pt to return in 3 to 4 weeks.

## 2022-10-06 ENCOUNTER — APPOINTMENT (OUTPATIENT)
Dept: INFUSION THERAPY | Facility: CLINIC | Age: 57
End: 2022-10-06

## 2022-10-07 ENCOUNTER — OUTPATIENT (OUTPATIENT)
Dept: OUTPATIENT SERVICES | Facility: HOSPITAL | Age: 57
LOS: 1 days | Discharge: HOME | End: 2022-10-07

## 2022-10-07 ENCOUNTER — APPOINTMENT (OUTPATIENT)
Dept: OPHTHALMOLOGY | Facility: CLINIC | Age: 57
End: 2022-10-07

## 2022-10-07 DIAGNOSIS — Z98.890 OTHER SPECIFIED POSTPROCEDURAL STATES: Chronic | ICD-10-CM

## 2022-10-07 PROCEDURE — 92134 CPTRZ OPH DX IMG PST SGM RTA: CPT | Mod: 26

## 2022-10-07 PROCEDURE — 92004 COMPRE OPH EXAM NEW PT 1/>: CPT

## 2022-10-07 PROCEDURE — 92201 OPSCPY EXTND RTA DRAW UNI/BI: CPT

## 2022-10-13 ENCOUNTER — APPOINTMENT (OUTPATIENT)
Dept: INFUSION THERAPY | Facility: CLINIC | Age: 57
End: 2022-10-13

## 2022-10-13 ENCOUNTER — APPOINTMENT (OUTPATIENT)
Dept: HEMATOLOGY ONCOLOGY | Facility: CLINIC | Age: 57
End: 2022-10-13

## 2022-10-31 ENCOUNTER — OUTPATIENT (OUTPATIENT)
Dept: OUTPATIENT SERVICES | Facility: HOSPITAL | Age: 57
LOS: 1 days | Discharge: HOME | End: 2022-10-31

## 2022-10-31 ENCOUNTER — APPOINTMENT (OUTPATIENT)
Dept: PODIATRY | Facility: CLINIC | Age: 57
End: 2022-10-31

## 2022-10-31 ENCOUNTER — RESULT REVIEW (OUTPATIENT)
Age: 57
End: 2022-10-31

## 2022-10-31 DIAGNOSIS — Z98.890 OTHER SPECIFIED POSTPROCEDURAL STATES: Chronic | ICD-10-CM

## 2022-10-31 DIAGNOSIS — M79.672 PAIN IN LEFT FOOT: ICD-10-CM

## 2022-10-31 PROCEDURE — 99213 OFFICE O/P EST LOW 20 MIN: CPT

## 2022-10-31 PROCEDURE — 73630 X-RAY EXAM OF FOOT: CPT | Mod: 26,LT

## 2022-11-02 NOTE — PROCEDURE
[] : A mold was applied. [FreeTextEntry1] : pt c/o foot,heel pain. debridement of callous to medial heel, pin point bleed treated with silver nitrate,bacitracin. orthotic fabricated for left shoe. pt expressed pain relief  with same. pt given referral for pain management. pt to return here in 2 months.

## 2022-11-03 DIAGNOSIS — E11.9 TYPE 2 DIABETES MELLITUS WITHOUT COMPLICATIONS: ICD-10-CM

## 2022-11-03 DIAGNOSIS — E11.42 TYPE 2 DIABETES MELLITUS WITH DIABETIC POLYNEUROPATHY: ICD-10-CM

## 2022-11-03 DIAGNOSIS — E11.621 TYPE 2 DIABETES MELLITUS WITH FOOT ULCER: ICD-10-CM

## 2022-11-21 ENCOUNTER — INPATIENT (INPATIENT)
Facility: HOSPITAL | Age: 57
LOS: 5 days | Discharge: HOME HEALTH | End: 2022-11-27
Attending: HOSPITALIST | Admitting: HOSPITALIST
Payer: MEDICAID

## 2022-11-21 ENCOUNTER — APPOINTMENT (OUTPATIENT)
Dept: VASCULAR SURGERY | Facility: CLINIC | Age: 57
End: 2022-11-21

## 2022-11-21 VITALS
OXYGEN SATURATION: 95 % | TEMPERATURE: 98 F | HEART RATE: 94 BPM | RESPIRATION RATE: 18 BRPM | SYSTOLIC BLOOD PRESSURE: 210 MMHG | DIASTOLIC BLOOD PRESSURE: 95 MMHG

## 2022-11-21 DIAGNOSIS — N18.6 END STAGE RENAL DISEASE: ICD-10-CM

## 2022-11-21 DIAGNOSIS — Z99.2 DEPENDENCE ON RENAL DIALYSIS: ICD-10-CM

## 2022-11-21 DIAGNOSIS — D63.1 ANEMIA IN CHRONIC KIDNEY DISEASE: ICD-10-CM

## 2022-11-21 DIAGNOSIS — Z88.0 ALLERGY STATUS TO PENICILLIN: ICD-10-CM

## 2022-11-21 DIAGNOSIS — I24.8 OTHER FORMS OF ACUTE ISCHEMIC HEART DISEASE: ICD-10-CM

## 2022-11-21 DIAGNOSIS — E78.5 HYPERLIPIDEMIA, UNSPECIFIED: ICD-10-CM

## 2022-11-21 DIAGNOSIS — E11.22 TYPE 2 DIABETES MELLITUS WITH DIABETIC CHRONIC KIDNEY DISEASE: ICD-10-CM

## 2022-11-21 DIAGNOSIS — I13.2 HYPERTENSIVE HEART AND CHRONIC KIDNEY DISEASE WITH HEART FAILURE AND WITH STAGE 5 CHRONIC KIDNEY DISEASE, OR END STAGE RENAL DISEASE: ICD-10-CM

## 2022-11-21 DIAGNOSIS — I27.20 PULMONARY HYPERTENSION, UNSPECIFIED: ICD-10-CM

## 2022-11-21 DIAGNOSIS — Z86.73 PERSONAL HISTORY OF TRANSIENT ISCHEMIC ATTACK (TIA), AND CEREBRAL INFARCTION WITHOUT RESIDUAL DEFICITS: ICD-10-CM

## 2022-11-21 DIAGNOSIS — I16.1 HYPERTENSIVE EMERGENCY: ICD-10-CM

## 2022-11-21 DIAGNOSIS — H91.93 UNSPECIFIED HEARING LOSS, BILATERAL: ICD-10-CM

## 2022-11-21 DIAGNOSIS — J96.00 ACUTE RESPIRATORY FAILURE, UNSPECIFIED WHETHER WITH HYPOXIA OR HYPERCAPNIA: ICD-10-CM

## 2022-11-21 DIAGNOSIS — J81.0 ACUTE PULMONARY EDEMA: ICD-10-CM

## 2022-11-21 DIAGNOSIS — H91.90 UNSPECIFIED HEARING LOSS, UNSPECIFIED EAR: ICD-10-CM

## 2022-11-21 DIAGNOSIS — E11.51 TYPE 2 DIABETES MELLITUS WITH DIABETIC PERIPHERAL ANGIOPATHY WITHOUT GANGRENE: ICD-10-CM

## 2022-11-21 DIAGNOSIS — J34.89 OTHER SPECIFIED DISORDERS OF NOSE AND NASAL SINUSES: ICD-10-CM

## 2022-11-21 DIAGNOSIS — I34.0 NONRHEUMATIC MITRAL (VALVE) INSUFFICIENCY: ICD-10-CM

## 2022-11-21 DIAGNOSIS — R25.2 CRAMP AND SPASM: ICD-10-CM

## 2022-11-21 DIAGNOSIS — I50.33 ACUTE ON CHRONIC DIASTOLIC (CONGESTIVE) HEART FAILURE: ICD-10-CM

## 2022-11-21 DIAGNOSIS — F79 UNSPECIFIED INTELLECTUAL DISABILITIES: ICD-10-CM

## 2022-11-21 DIAGNOSIS — J02.8 ACUTE PHARYNGITIS DUE TO OTHER SPECIFIED ORGANISMS: ICD-10-CM

## 2022-11-21 DIAGNOSIS — Z98.890 OTHER SPECIFIED POSTPROCEDURAL STATES: Chronic | ICD-10-CM

## 2022-11-21 LAB
ALBUMIN SERPL ELPH-MCNC: 3.6 G/DL — SIGNIFICANT CHANGE UP (ref 3.5–5.2)
ALP SERPL-CCNC: 154 U/L — HIGH (ref 30–115)
ALT FLD-CCNC: 14 U/L — SIGNIFICANT CHANGE UP (ref 0–41)
ANION GAP SERPL CALC-SCNC: 16 MMOL/L — HIGH (ref 7–14)
APPEARANCE UR: CLEAR — SIGNIFICANT CHANGE UP
AST SERPL-CCNC: 18 U/L — SIGNIFICANT CHANGE UP (ref 0–41)
BACTERIA # UR AUTO: ABNORMAL
BASOPHILS # BLD AUTO: 0.05 K/UL — SIGNIFICANT CHANGE UP (ref 0–0.2)
BASOPHILS NFR BLD AUTO: 0.6 % — SIGNIFICANT CHANGE UP (ref 0–1)
BILIRUB SERPL-MCNC: 0.3 MG/DL — SIGNIFICANT CHANGE UP (ref 0.2–1.2)
BILIRUB UR-MCNC: NEGATIVE — SIGNIFICANT CHANGE UP
BUN SERPL-MCNC: 54 MG/DL — HIGH (ref 10–20)
CALCIUM SERPL-MCNC: 8 MG/DL — LOW (ref 8.4–10.5)
CHLORIDE SERPL-SCNC: 99 MMOL/L — SIGNIFICANT CHANGE UP (ref 98–110)
CO2 SERPL-SCNC: 21 MMOL/L — SIGNIFICANT CHANGE UP (ref 17–32)
COLOR SPEC: SIGNIFICANT CHANGE UP
CREAT SERPL-MCNC: 7 MG/DL — CRITICAL HIGH (ref 0.7–1.5)
DIFF PNL FLD: ABNORMAL
EGFR: 6 ML/MIN/1.73M2 — LOW
EOSINOPHIL # BLD AUTO: 0.63 K/UL — SIGNIFICANT CHANGE UP (ref 0–0.7)
EOSINOPHIL NFR BLD AUTO: 7.5 % — SIGNIFICANT CHANGE UP (ref 0–8)
EPI CELLS # UR: 5 /HPF — SIGNIFICANT CHANGE UP (ref 0–5)
FLUAV AG NPH QL: SIGNIFICANT CHANGE UP
FLUBV AG NPH QL: SIGNIFICANT CHANGE UP
GLUCOSE BLDC GLUCOMTR-MCNC: 136 MG/DL — HIGH (ref 70–99)
GLUCOSE BLDC GLUCOMTR-MCNC: 139 MG/DL — HIGH (ref 70–99)
GLUCOSE SERPL-MCNC: 87 MG/DL — SIGNIFICANT CHANGE UP (ref 70–99)
GLUCOSE UR QL: SIGNIFICANT CHANGE UP
HCT VFR BLD CALC: 24.7 % — LOW (ref 37–47)
HGB BLD-MCNC: 8.3 G/DL — LOW (ref 12–16)
HYALINE CASTS # UR AUTO: 1 /LPF — SIGNIFICANT CHANGE UP (ref 0–7)
IMM GRANULOCYTES NFR BLD AUTO: 0.2 % — SIGNIFICANT CHANGE UP (ref 0.1–0.3)
KETONES UR-MCNC: NEGATIVE — SIGNIFICANT CHANGE UP
LACTATE SERPL-SCNC: 0.6 MMOL/L — LOW (ref 0.7–2)
LEUKOCYTE ESTERASE UR-ACNC: NEGATIVE — SIGNIFICANT CHANGE UP
LIDOCAIN IGE QN: 18 U/L — SIGNIFICANT CHANGE UP (ref 7–60)
LYMPHOCYTES # BLD AUTO: 1.54 K/UL — SIGNIFICANT CHANGE UP (ref 1.2–3.4)
LYMPHOCYTES # BLD AUTO: 18.2 % — LOW (ref 20.5–51.1)
MCHC RBC-ENTMCNC: 28.9 PG — SIGNIFICANT CHANGE UP (ref 27–31)
MCHC RBC-ENTMCNC: 33.6 G/DL — SIGNIFICANT CHANGE UP (ref 32–37)
MCV RBC AUTO: 86.1 FL — SIGNIFICANT CHANGE UP (ref 81–99)
MONOCYTES # BLD AUTO: 0.52 K/UL — SIGNIFICANT CHANGE UP (ref 0.1–0.6)
MONOCYTES NFR BLD AUTO: 6.2 % — SIGNIFICANT CHANGE UP (ref 1.7–9.3)
NEUTROPHILS # BLD AUTO: 5.68 K/UL — SIGNIFICANT CHANGE UP (ref 1.4–6.5)
NEUTROPHILS NFR BLD AUTO: 67.3 % — SIGNIFICANT CHANGE UP (ref 42.2–75.2)
NITRITE UR-MCNC: NEGATIVE — SIGNIFICANT CHANGE UP
NRBC # BLD: 0 /100 WBCS — SIGNIFICANT CHANGE UP (ref 0–0)
PH UR: 7.5 — SIGNIFICANT CHANGE UP (ref 5–8)
PLATELET # BLD AUTO: 252 K/UL — SIGNIFICANT CHANGE UP (ref 130–400)
POTASSIUM SERPL-MCNC: 5.2 MMOL/L — HIGH (ref 3.5–5)
POTASSIUM SERPL-SCNC: 5.2 MMOL/L — HIGH (ref 3.5–5)
PROT SERPL-MCNC: 6.1 G/DL — SIGNIFICANT CHANGE UP (ref 6–8)
PROT UR-MCNC: ABNORMAL
RBC # BLD: 2.87 M/UL — LOW (ref 4.2–5.4)
RBC # FLD: 14.6 % — HIGH (ref 11.5–14.5)
RBC CASTS # UR COMP ASSIST: 3 /HPF — SIGNIFICANT CHANGE UP (ref 0–4)
RSV RNA NPH QL NAA+NON-PROBE: SIGNIFICANT CHANGE UP
SARS-COV-2 RNA SPEC QL NAA+PROBE: SIGNIFICANT CHANGE UP
SODIUM SERPL-SCNC: 136 MMOL/L — SIGNIFICANT CHANGE UP (ref 135–146)
SP GR SPEC: 1.01 — SIGNIFICANT CHANGE UP (ref 1.01–1.03)
TROPONIN T SERPL-MCNC: 0.03 NG/ML — CRITICAL HIGH
TROPONIN T SERPL-MCNC: 0.03 NG/ML — CRITICAL HIGH
UROBILINOGEN FLD QL: SIGNIFICANT CHANGE UP
WBC # BLD: 8.44 K/UL — SIGNIFICANT CHANGE UP (ref 4.8–10.8)
WBC # FLD AUTO: 8.44 K/UL — SIGNIFICANT CHANGE UP (ref 4.8–10.8)
WBC UR QL: 12 /HPF — HIGH (ref 0–5)

## 2022-11-21 PROCEDURE — 99221 1ST HOSP IP/OBS SF/LOW 40: CPT | Mod: GC

## 2022-11-21 PROCEDURE — 74177 CT ABD & PELVIS W/CONTRAST: CPT | Mod: 26,MA

## 2022-11-21 PROCEDURE — 71045 X-RAY EXAM CHEST 1 VIEW: CPT | Mod: 26

## 2022-11-21 PROCEDURE — 93010 ELECTROCARDIOGRAM REPORT: CPT

## 2022-11-21 PROCEDURE — 93308 TTE F-UP OR LMTD: CPT | Mod: 26

## 2022-11-21 PROCEDURE — 93926 LOWER EXTREMITY STUDY: CPT

## 2022-11-21 PROCEDURE — 99285 EMERGENCY DEPT VISIT HI MDM: CPT | Mod: 25

## 2022-11-21 RX ORDER — ONDANSETRON 8 MG/1
4 TABLET, FILM COATED ORAL EVERY 8 HOURS
Refills: 0 | Status: DISCONTINUED | OUTPATIENT
Start: 2022-11-21 | End: 2022-11-27

## 2022-11-21 RX ORDER — SODIUM BICARBONATE 1 MEQ/ML
1300 SYRINGE (ML) INTRAVENOUS THREE TIMES A DAY
Refills: 0 | Status: DISCONTINUED | OUTPATIENT
Start: 2022-11-21 | End: 2022-11-23

## 2022-11-21 RX ORDER — SUCRALFATE 1 G
1 TABLET ORAL
Refills: 0 | Status: DISCONTINUED | OUTPATIENT
Start: 2022-11-21 | End: 2022-11-27

## 2022-11-21 RX ORDER — FUROSEMIDE 40 MG
60 TABLET ORAL
Refills: 0 | Status: DISCONTINUED | OUTPATIENT
Start: 2022-11-21 | End: 2022-11-27

## 2022-11-21 RX ORDER — INSULIN LISPRO 100/ML
5 VIAL (ML) SUBCUTANEOUS
Refills: 0 | Status: DISCONTINUED | OUTPATIENT
Start: 2022-11-21 | End: 2022-11-27

## 2022-11-21 RX ORDER — GABAPENTIN 400 MG/1
1 CAPSULE ORAL
Qty: 0 | Refills: 0 | DISCHARGE

## 2022-11-21 RX ORDER — SEVELAMER CARBONATE 2400 MG/1
800 POWDER, FOR SUSPENSION ORAL
Refills: 0 | Status: DISCONTINUED | OUTPATIENT
Start: 2022-11-21 | End: 2022-11-27

## 2022-11-21 RX ORDER — HYDRALAZINE HCL 50 MG
100 TABLET ORAL ONCE
Refills: 0 | Status: COMPLETED | OUTPATIENT
Start: 2022-11-21 | End: 2022-11-21

## 2022-11-21 RX ORDER — ASPIRIN/CALCIUM CARB/MAGNESIUM 324 MG
81 TABLET ORAL DAILY
Refills: 0 | Status: DISCONTINUED | OUTPATIENT
Start: 2022-11-21 | End: 2022-11-27

## 2022-11-21 RX ORDER — NIFEDIPINE 30 MG
60 TABLET, EXTENDED RELEASE 24 HR ORAL DAILY
Refills: 0 | Status: DISCONTINUED | OUTPATIENT
Start: 2022-11-21 | End: 2022-11-27

## 2022-11-21 RX ORDER — ATORVASTATIN CALCIUM 80 MG/1
80 TABLET, FILM COATED ORAL AT BEDTIME
Refills: 0 | Status: DISCONTINUED | OUTPATIENT
Start: 2022-11-21 | End: 2022-11-27

## 2022-11-21 RX ORDER — FUROSEMIDE 40 MG
80 TABLET ORAL ONCE
Refills: 0 | Status: COMPLETED | OUTPATIENT
Start: 2022-11-21 | End: 2022-11-21

## 2022-11-21 RX ORDER — HYDRALAZINE HCL 50 MG
50 TABLET ORAL THREE TIMES A DAY
Refills: 0 | Status: DISCONTINUED | OUTPATIENT
Start: 2022-11-21 | End: 2022-11-27

## 2022-11-21 RX ORDER — METOPROLOL TARTRATE 50 MG
100 TABLET ORAL ONCE
Refills: 0 | Status: COMPLETED | OUTPATIENT
Start: 2022-11-21 | End: 2022-11-21

## 2022-11-21 RX ORDER — METOPROLOL TARTRATE 50 MG
50 TABLET ORAL DAILY
Refills: 0 | Status: DISCONTINUED | OUTPATIENT
Start: 2022-11-21 | End: 2022-11-27

## 2022-11-21 RX ORDER — MORPHINE SULFATE 50 MG/1
2 CAPSULE, EXTENDED RELEASE ORAL ONCE
Refills: 0 | Status: DISCONTINUED | OUTPATIENT
Start: 2022-11-21 | End: 2022-11-21

## 2022-11-21 RX ORDER — NIFEDIPINE 30 MG
30 TABLET, EXTENDED RELEASE 24 HR ORAL ONCE
Refills: 0 | Status: COMPLETED | OUTPATIENT
Start: 2022-11-21 | End: 2022-11-21

## 2022-11-21 RX ORDER — CALCITRIOL 0.5 UG/1
0.25 CAPSULE ORAL DAILY
Refills: 0 | Status: DISCONTINUED | OUTPATIENT
Start: 2022-11-21 | End: 2022-11-27

## 2022-11-21 RX ORDER — ACETAMINOPHEN 500 MG
650 TABLET ORAL EVERY 6 HOURS
Refills: 0 | Status: DISCONTINUED | OUTPATIENT
Start: 2022-11-21 | End: 2022-11-27

## 2022-11-21 RX ORDER — CLOPIDOGREL BISULFATE 75 MG/1
75 TABLET, FILM COATED ORAL DAILY
Refills: 0 | Status: DISCONTINUED | OUTPATIENT
Start: 2022-11-21 | End: 2022-11-27

## 2022-11-21 RX ORDER — INSULIN GLARGINE 100 [IU]/ML
10 INJECTION, SOLUTION SUBCUTANEOUS AT BEDTIME
Refills: 0 | Status: DISCONTINUED | OUTPATIENT
Start: 2022-11-21 | End: 2022-11-27

## 2022-11-21 RX ORDER — LANOLIN ALCOHOL/MO/W.PET/CERES
3 CREAM (GRAM) TOPICAL AT BEDTIME
Refills: 0 | Status: DISCONTINUED | OUTPATIENT
Start: 2022-11-21 | End: 2022-11-27

## 2022-11-21 RX ORDER — HEPARIN SODIUM 5000 [USP'U]/ML
5000 INJECTION INTRAVENOUS; SUBCUTANEOUS EVERY 8 HOURS
Refills: 0 | Status: DISCONTINUED | OUTPATIENT
Start: 2022-11-21 | End: 2022-11-26

## 2022-11-21 RX ORDER — INSULIN LISPRO 100/ML
VIAL (ML) SUBCUTANEOUS
Refills: 0 | Status: DISCONTINUED | OUTPATIENT
Start: 2022-11-21 | End: 2022-11-27

## 2022-11-21 RX ORDER — ACETAMINOPHEN 500 MG
650 TABLET ORAL ONCE
Refills: 0 | Status: COMPLETED | OUTPATIENT
Start: 2022-11-21 | End: 2022-11-21

## 2022-11-21 RX ADMIN — Medication 50 MILLIGRAM(S): at 21:59

## 2022-11-21 RX ADMIN — Medication 60 MILLIGRAM(S): at 20:49

## 2022-11-21 RX ADMIN — Medication 100 MILLIGRAM(S): at 17:20

## 2022-11-21 RX ADMIN — Medication 80 MILLIGRAM(S): at 14:23

## 2022-11-21 RX ADMIN — Medication 650 MILLIGRAM(S): at 20:43

## 2022-11-21 RX ADMIN — ONDANSETRON 4 MILLIGRAM(S): 8 TABLET, FILM COATED ORAL at 20:44

## 2022-11-21 RX ADMIN — ATORVASTATIN CALCIUM 80 MILLIGRAM(S): 80 TABLET, FILM COATED ORAL at 21:58

## 2022-11-21 RX ADMIN — Medication 650 MILLIGRAM(S): at 12:15

## 2022-11-21 RX ADMIN — HEPARIN SODIUM 5000 UNIT(S): 5000 INJECTION INTRAVENOUS; SUBCUTANEOUS at 21:58

## 2022-11-21 RX ADMIN — Medication 30 MILLIGRAM(S): at 17:20

## 2022-11-21 RX ADMIN — MORPHINE SULFATE 2 MILLIGRAM(S): 50 CAPSULE, EXTENDED RELEASE ORAL at 14:25

## 2022-11-21 RX ADMIN — Medication 1300 MILLIGRAM(S): at 21:58

## 2022-11-21 NOTE — H&P ADULT - NSHPLABSRESULTS_GEN_ALL_CORE
LABS:                        8.3    8.44  )-----------( 252      ( 2022 13:00 )             24.7     Hb Trend: 8.3<--  WBC Trend: 8.44<--  Plt Trend: <--              136  |  99  |  54<H>  ----------------------------<  87  5.2<H>   |  21  |  7.0<HH>    Ca    8.0<L>      2022 13:00    TPro  6.1  /  Alb  3.6  /  TBili  0.3  /  DBili  x   /  AST  18  /  ALT  14  /  AlkPhos  154<H>      Troponin T, Serum: 0.03 ng/mL *HH* (22 @ 13:00)      Urinalysis Basic - ( 2022 16:58 )    Color: Light Yellow / Appearance: Clear / S.014 / pH: x  Gluc: x / Ketone: Negative  / Bili: Negative / Urobili: <2 mg/dL   Blood: x / Protein: >600 mg/dL / Nitrite: Negative   Leuk Esterase: Negative / RBC: 3 /HPF / WBC 12 /HPF   Sq Epi: x / Non Sq Epi: 5 /HPF / Bacteria: Few          IMAGING:  reviewed

## 2022-11-21 NOTE — ED ADULT TRIAGE NOTE - CHIEF COMPLAINT QUOTE
C/o hypertension, abd pain, chest pain, fever, and sore throat. Pt due for dialysis today was unable to go to her treatment.

## 2022-11-21 NOTE — H&P ADULT - ASSESSMENT
58 yo female w/ PMH of HTN, HLD, IDD, DM, CKD stage IV on dialysis (sees Dr. Solis Borja), deafness presents for abdominal pain x 4 days. Per family, pt has been complaining of pain in LLQ.  Also has been having cough, sore throat, subjective fevers, and loose stool. HPI obtained from family, HPI limited due to IDD.    In the ED, /95, HR 94, RR 18, T 97.5, SpO2 95% on RA. EKG showed NSR. CTAP No evidence of abdominal or pelvic mass or inflammatory process. There are moderate bilateral pleural effusions with atelectatic changes at both lung bases. CXR showed New diffuse bilateral interstitial opacities, and bibasilar opacities/small pleural effusions. Labs were significant for Hgb 8.3, Cr 7 (on dialysis), trop 0.03. UA dirty. Patient was treated with antihypertensives and lasix 80mg x1     Patient is being admitted for further management       #Acute CHF exacerbation - new onset  - TTE (11/04): biplane EF of 75 %.  - Start IV Lasix   - Strict I&O, Daily weights  - continue low salt diet  - f/u Echocardiogram  ACEi, ARB, or ARNI (Entresto) if EF < 40 %  ACE or ARB contraindicated due to  hyperkalemia, hypotension, acute renal dysfunction   Beta Blocker if EF < 40%   Aldosterone Antagonist for severe CHF (New York Heart Association Class III-IV)  Aldosterone Antagonist contraindicated due to eGFR < 30, hyperkalemia, hypotension   PT consult    #Abd pain--resolved  #diffuse MSK pain   - Unclear etiology with non specific characteristics and symptoms that are very generic and disconnected  - possible Viral gastritis vs constipation vs cramps vs fatty liver ?  - remains Pain-free   - CTAP No evidence of abdominal or pelvic mass or inflammatory process  - pain control with Tylenol.   - c/w carafate 1g qid    #Hypertensive Emergency   - BP on adm 210/95  - s/p Hydralazine 100mg, Metoprolol 100, Nifedipine 30mg and Lasix 80mg xq1  - No evidence of end organ damage  - Resume all home meds except contraindicated    # CKD stage IV on HD   #Proteinuria  - Cr 7,   - Nephrology eval for HD   - C/w Renvela, Sodium bicarb, calcitriol  - Monitor BMP     # DM   - Monitor FS  - Start basal bolus if FS persistently >180  - F/u A1c    #Troponemia   *can be elevated in CKD vs 2/2 demand ischemia   - trop 0.03   - EKG: no ischemic changes   - check TTE, trend troponin     # Accelerated HTN  - uncontrolled with systolics ~200  - PRN labetalol 10mg IV PRN systolics >180; hold parameters if HR <60    Consider switching Amlodipine 10MG  to Nifedipine ER 60,   - c/w metoprolol to 100mg XL daily  - holding lisinopril 20mg qhs  - c/w Nifedipine ER 60,   - increased hydralazine 100mg Q8H    # HLD  - C/w Atorvastatin     #PAD  - LLE femoral artery to posterior tibial bypass appears to be patent with doppler + distal pulses   - s/p left fem-tib bypass done on 11/8/21 w Dr. Muñoz  - o/p follow up     # Right pontine CVA (February 7, 2021)  # Intellectual disability  # hearing/speech impairment  - Pt eval   - ASA, Statin, Plavix      # PVD  - s/p left femoral artery-posterior tibial artery bypass with autologous venous tissue and left heel ulcer debridement 11/21  - ASA, Plavix, Statin     DVT ppx: hep sq  GI ppx: none   Diet: DASH  Activity: IAT       ***HPI obtained from family, HPI limited due to IDD.  can be used *    56 yo female w/ PMH of HTN, HLD, IDD, DM, CKD stage IV on dialysis (sees Dr. Solis Borja), deafness presents for abdominal pain x 4 days. Per family, pt has been complaining of pain in LLQ.  She missed one dialysis session afer which family noticed a non-productive cough,  sore throat, subjective fevers, and loose stool. denies any cp, palpitations, chills, headaches, dizziness, nausea or vomiting     In the ED, /95, HR 94, RR 18, T 97.5, SpO2 95% on RA. EKG showed NSR. CTAP No evidence of abdominal or pelvic mass or inflammatory process. There are moderate bilateral pleural effusions with atelectatic changes at both lung bases. CXR showed New diffuse bilateral interstitial opacities, and bibasilar opacities/small pleural effusions. Labs were significant for Hgb 8.3, Cr 7 (on dialysis), trop 0.03. UA dirty. Patient was treated with antihypertensives and lasix 80mg x1     Patient is being admitted for further management     #Pulmonary edema in setting of missed dialysis session vs Acute CHF exacerbation - new onset  - TTE (11/04): biplane EF of 75 %.  - Start IV Lasix 60mg bid   - Strict I&O, Daily weights  - continue low salt diet  - c/w Toprol 50mg daily   - f/u Echocardiogram    #Abd pain  #Possible viralinfection (subjective fever, looso bm, abd pain)  *possible Viral gastritis vs constipation vs cramps vs fatty liver ?   * pt has been evaluated for this in past admission (05/22), Unclear etiology  - CTAP (11/21):  No evidence of abdominal or pelvic mass or inflammatory process  - pain control with Tylenol.   - c/w carafate 1g qid    #Hypertensive Emergency   - BP on adm 210/95  - s/p Hydralazine 100mg, Metoprolol 100, Nifedipine 30mg and Lasix 80mg xq1  - increase nifedipine to 60mg and will continue toprol and lasix   - pt previously on hydralazine but no longer taking; ill restart hydralazine 50mg   - No evidence of end organ damage  - Resume all home meds except contraindicated    # CKD stage IV on HD   #Proteinuria  - Cr 7,   - Nephrology eval for HD   - C/w Renvela, Sodium bicarb, calcitriol  - Monitor BMP     # DM   - Monitor FS  - Start basal bolus if FS persistently >180  - F/u A1c    #Troponemia   *can be elevated in CKD vs 2/2 demand ischemia   - trop 0.03   - EKG: no ischemic changes   - check TTE, trend troponin     # HLD  - C/w Atorvastatin       # Right pontine CVA (February 7, 2021)  # Intellectual disability  # hearing/speech impairment  - Pt eval   - ASA, Statin, Plavix      # PVD  - s/p left femoral artery-posterior tibial artery bypass with autologous venous tissue and left heel ulcer debridement 11/21  - c/w ASA, Plavix and statin    DVT ppx: hep sq  GI ppx: none   Diet: DASH  Activity: IAT

## 2022-11-21 NOTE — ED ADULT NURSE NOTE - NS ED NURSE LEVEL OF CONSCIOUSNESS SPEECH
1. BP is well controlled in office today, 116/60.   2. Continue hydralazine 25 mg prn when BP is above 120 mmHg systolic.   3. Continue metoprolol tartrate 12.5 mg bid.   3. Continue midodrine 10 mg prn due to orthostatic drop in BP.    Speaking Coherently

## 2022-11-21 NOTE — ED ADULT NURSE NOTE - OBJECTIVE STATEMENT
Pt a+ox4 co abdominal pain X 4 days with fever and chest pain, SOB today. Pt on dialysis and unable to go to session today

## 2022-11-21 NOTE — H&P ADULT - ATTENDING COMMENTS
HPI:  ***HPI obtained from family, HPI limited due to IDD.  can be used *    58 yo female w/ PMH of HTN, HLD, IDD, DM, CKD stage IV on dialysis (sees Dr. Solis Borja), deafness presents for abdominal pain x 4 days. Per family, pt has been complaining of pain in LLQ.  She missed one dialysis session afer which family noticed a non-productive cough,  sore throat, subjective fevers, and loose stool. denies any cp, palpitations, chills, headaches, dizziness, nausea or vomiting     In the ED, /95, HR 94, RR 18, T 97.5, SpO2 95% on RA. EKG showed NSR. CTAP No evidence of abdominal or pelvic mass or inflammatory process. There are moderate bilateral pleural effusions with atelectatic changes at both lung bases. CXR showed New diffuse bilateral interstitial opacities, and bibasilar opacities/small pleural effusions. Labs were significant for Hgb 8.3, Cr 7 (on dialysis), trop 0.03. UA dirty. Patient was treated with antihypertensives and lasix 80mg x1     Patient is being admitted for further management      (21 Nov 2022 18:52)    REVIEW OF SYSTEMS: see cc/HPI   CONSTITUTIONAL: No weakness, fevers or chills  EYES/ENT: No visual changes;  No vertigo or throat pain   NECK: No pain or stiffness  RESPIRATORY: No cough, wheezing, hemoptysis; No shortness of breath  CARDIOVASCULAR: No chest pain or palpitations  GASTROINTESTINAL: No abdominal or epigastric pain. No nausea, vomiting, or hematemesis; No diarrhea or constipation. No melena or hematochezia.  GENITOURINARY: No dysuria, frequency or hematuria  NEUROLOGICAL: No numbness or weakness  SKIN: No itching, rashes    Physical Exam:   General: WN/WD NAD  Neurology: A&Ox3, nonfocal, follows commands  Eyes: PERRLA/ EOMI  ENT/Neck: Neck supple, trachea midline, No JVD  Respiratory: CTA B/L, No wheezing, rales, rhonchi  CV: Normal rate regular rhythm, S1S2, no murmurs, rubs or gallops  Abdominal: Soft, NT, ND +BS,   Extremities: No edema, + peripheral pulses  Skin: No Rashes, Hematoma, Ecchymosis  Incisions:   Tubes: HPI:  ***HPI obtained from family, HPI limited due to IDD.  can be used *    58 yo female w/ PMH of HTN, HLD, IDD, DM, CKD stage IV on dialysis (sees Dr. Solis Borja), deafness presents for abdominal pain x 4 days. Per family, pt has been complaining of pain in LLQ.  She missed one dialysis session afer which family noticed a non-productive cough,  sore throat, subjective fevers, and loose stool. denies any cp, palpitations, chills, headaches, dizziness, nausea or vomiting     In the ED, /95, HR 94, RR 18, T 97.5, SpO2 95% on RA. EKG showed NSR. CTAP No evidence of abdominal or pelvic mass or inflammatory process. There are moderate bilateral pleural effusions with atelectatic changes at both lung bases. CXR showed New diffuse bilateral interstitial opacities, and bibasilar opacities/small pleural effusions. Labs were significant for Hgb 8.3, Cr 7 (on dialysis), trop 0.03. UA dirty. Patient was treated with antihypertensives and lasix 80mg x1     Patient is being admitted for further management      (21 Nov 2022 18:52)    REVIEW OF SYSTEMS: see cc/HPI   CONSTITUTIONAL: No weakness, fevers or chills  EYES/ENT: No visual changes;  No vertigo or throat pain   NECK: No pain or stiffness  RESPIRATORY: No cough, wheezing, hemoptysis; No shortness of breath  CARDIOVASCULAR: No chest pain or palpitations  GASTROINTESTINAL: No abdominal or epigastric pain. No nausea, vomiting, or hematemesis; No diarrhea or constipation. No melena or hematochezia.  GENITOURINARY: No dysuria, frequency or hematuria  NEUROLOGICAL: No numbness or weakness  SKIN: No itching, rashes    Physical Exam:   General: WN/WD NAD  Neurology: A&Ox3, nonfocal, follows commands  Eyes: PERRLA/ EOMI  ENT/Neck: Neck supple, trachea midline, No JVD  Respiratory: CTA B/L, No wheezing, rales, rhonchi  CV: Normal rate regular rhythm, S1S2, no murmurs, rubs or gallops  Abdominal: Soft, NT, ND +BS,   Extremities: No edema, + peripheral pulses  Skin: No Rashes, Hematoma, Ecchymosis  Incisions:   Tubes:  A/p  Hypertensive urgency vs. Emergency - Acute Pulm edema 2/2 miss HD session   Acute respiratory failure - improved w/ initial ED intervention   -c/w IV lasix / Toprol at 50mg   -Is and Os / fluid and Na+ restriction   -Nephrology for HD  -Pulse ox monitoring and O2 PRN    ESRD on HD   -c/w HD as per Nephrology   -c/w current Rx     Abdominal pain - CT negative and resolved at time of eval   -observation and PRN pain control   -surgical eval PRN     DM type II - stable blood glucose   -F/S monitoring and PRN Insulin     Dyslipidemia - on statin     H/o CVA  Speech / hearing impairment   -c/w ASA / plavix   -     PAD - statin/ASA/ Plavix    DVT prophylaxis     PATIENT SEEN by ATTENDING 11/21/22 ( note revised 11/22)   -

## 2022-11-21 NOTE — H&P ADULT - NSHPPHYSICALEXAM_GEN_ALL_CORE
GENERAL: NAD, speaks in full sentences, no signs of respiratory distress  HEAD:  Atraumatic, Normocephalic  EYES: EOMI, PERRLA, anicteric sclera  NECK: Supple, No JVD  CHEST/LUNG: Coarse breath sounds   HEART: Regular rate and rhythm; No murmurs;   ABDOMEN: Soft, Nontender, Nondistended; Bowel sounds present; No guarding  EXTREMITIES:  B/L LE EDEMA  PSYCH: AAOx3  NEUROLOGY: non-focal  SKIN: No rashes or lesions GENERAL: NAD,, no signs of respiratory distress  HEAD:  Atraumatic, Normocephalic  EYES: EOMI, PERRLA, anicteric sclera  NECK: Supple, No JVD  CHEST/LUNG: b/l crackles    HEART: Regular rate and rhythm; No murmurs;   ABDOMEN: Soft, Nontender, Nondistended; Bowel sounds present; voluntary  guarding  EXTREMITIES:  B/L LE EDEMA  PSYCH: AAOx3  NEUROLOGY: non-focal  SKIN: No rashes or lesions

## 2022-11-21 NOTE — ED PROVIDER NOTE - CARE PLAN
1 Principal Discharge DX:	Pulmonary edema  Secondary Diagnosis:	Depressed left ventricular ejection fraction  Secondary Diagnosis:	Admission for dialysis

## 2022-11-21 NOTE — ED PROVIDER NOTE - OBJECTIVE STATEMENT
58 yo female w/ PMH of HTN, HLD, IDD, DM, CKD stage IV on dialysis (sees Dr. Solis Borja), deafness presents for abdominal pain x 4 days. Per family, pt has been complaining of pain in LLQ.  Also has been having cough, sore throat, subjective fevers, and loose stool. HPI obtained from family, HPI limited due to IDD.

## 2022-11-21 NOTE — H&P ADULT - HISTORY OF PRESENT ILLNESS
58 yo female w/ PMH of HTN, HLD, IDD, DM, CKD stage IV on dialysis (sees Dr. Solis Borja), deafness presents for abdominal pain x 4 days. Per family, pt has been complaining of pain in LLQ.  Also has been having cough, sore throat, subjective fevers, and loose stool. HPI obtained from family, HPI limited due to IDD.    In the ED, /95, HR 94, RR 18, T 97.5, SpO2 95% on RA. EKG showed NSR. CTAP No evidence of abdominal or pelvic mass or inflammatory process. There are moderate bilateral pleural effusions with atelectatic changes at both lung bases. CXR showed New diffuse bilateral interstitial opacities, and bibasilar opacities/small pleural effusions. Labs were significant for Hgb 8.3, Cr 7 (on dialysis), trop 0.03. UA dirty. Patient was treated with antihypertensives and lasix 80mg x1     Patient is being admitted for further management      ***HPI obtained from family, HPI limited due to IDD.  can be used *    56 yo female w/ PMH of HTN, HLD, IDD, DM, CKD stage IV on dialysis (sees Dr. Solis Borja), deafness presents for abdominal pain x 4 days. Per family, pt has been complaining of pain in LLQ.  She missed one dialysis session afer which family noticed a non-productive cough,  sore throat, subjective fevers, and loose stool. denies any cp, palpitations, chills, headaches, dizziness, nausea or vomiting     In the ED, /95, HR 94, RR 18, T 97.5, SpO2 95% on RA. EKG showed NSR. CTAP No evidence of abdominal or pelvic mass or inflammatory process. There are moderate bilateral pleural effusions with atelectatic changes at both lung bases. CXR showed New diffuse bilateral interstitial opacities, and bibasilar opacities/small pleural effusions. Labs were significant for Hgb 8.3, Cr 7 (on dialysis), trop 0.03. UA dirty. Patient was treated with antihypertensives and lasix 80mg x1     Patient is being admitted for further management

## 2022-11-21 NOTE — ED PROVIDER NOTE - PHYSICAL EXAMINATION
GENERAL: NAD   SKIN: warm, dry  HEAD: Normocephalic; atraumatic.  EYES: PERRLA, EOMI  CARD: S1, S2 normal; no murmurs, gallops, or rubs. Regular rate and rhythm.   RESP: Coarse breath sounds, no increased WOB noted.   ABD: LLQ TTP. Soft and nondistended  BACK: no CVA tenderness   EXT: Bilateral LE pitting edema   NEURO: No focal deficits

## 2022-11-21 NOTE — ED PROVIDER NOTE - CLINICAL SUMMARY MEDICAL DECISION MAKING FREE TEXT BOX
57-year-old female with past medical history of IDD, hypertension, hyperlipidemia, diabetes, CKD stage IV, hemodialysis who presents to the ER for left lower abdominal pain for the past 4 days.  Associated with sore throat, nonproductive cough, subjective fever, loose stool.  Denies vomiting, bloody or melanotic stool.  Vitals noted, triage note reviewed, and exam as documented above.  Labs and imaging personally reviewed.  CT without acute process to explain focal left lower quadrant pain however grossly fluid overload state appreciated.  Patient was admitted for dialysis and further treatment.  She still makes urine and was given Lasix IV in the ED.

## 2022-11-21 NOTE — ED ADULT NURSE REASSESSMENT NOTE - NS ED NURSE REASSESS COMMENT FT1
Patient transferred to ED 3 room 8 in NAD. Report endorsed to YOANA Mccrary and pt placed on CCM at bedside.

## 2022-11-22 LAB
A1C WITH ESTIMATED AVERAGE GLUCOSE RESULT: 6 % — HIGH (ref 4–5.6)
ALBUMIN SERPL ELPH-MCNC: 3.1 G/DL — LOW (ref 3.5–5.2)
ALP SERPL-CCNC: 138 U/L — HIGH (ref 30–115)
ALT FLD-CCNC: 11 U/L — SIGNIFICANT CHANGE UP (ref 0–41)
ANION GAP SERPL CALC-SCNC: 14 MMOL/L — SIGNIFICANT CHANGE UP (ref 7–14)
ANION GAP SERPL CALC-SCNC: 18 MMOL/L — HIGH (ref 7–14)
AST SERPL-CCNC: 10 U/L — SIGNIFICANT CHANGE UP (ref 0–41)
BASOPHILS # BLD AUTO: 0.04 K/UL — SIGNIFICANT CHANGE UP (ref 0–0.2)
BASOPHILS NFR BLD AUTO: 0.5 % — SIGNIFICANT CHANGE UP (ref 0–1)
BILIRUB SERPL-MCNC: <0.2 MG/DL — SIGNIFICANT CHANGE UP (ref 0.2–1.2)
BUN SERPL-MCNC: 27 MG/DL — HIGH (ref 10–20)
BUN SERPL-MCNC: 61 MG/DL — CRITICAL HIGH (ref 10–20)
CALCIUM SERPL-MCNC: 7.6 MG/DL — LOW (ref 8.4–10.5)
CALCIUM SERPL-MCNC: 7.9 MG/DL — LOW (ref 8.4–10.5)
CHLORIDE SERPL-SCNC: 102 MMOL/L — SIGNIFICANT CHANGE UP (ref 98–110)
CHLORIDE SERPL-SCNC: 104 MMOL/L — SIGNIFICANT CHANGE UP (ref 98–110)
CHOLEST SERPL-MCNC: 158 MG/DL — SIGNIFICANT CHANGE UP
CO2 SERPL-SCNC: 19 MMOL/L — SIGNIFICANT CHANGE UP (ref 17–32)
CO2 SERPL-SCNC: 26 MMOL/L — SIGNIFICANT CHANGE UP (ref 17–32)
CREAT SERPL-MCNC: 4.6 MG/DL — CRITICAL HIGH (ref 0.7–1.5)
CREAT SERPL-MCNC: 7.4 MG/DL — CRITICAL HIGH (ref 0.7–1.5)
EGFR: 11 ML/MIN/1.73M2 — LOW
EGFR: 6 ML/MIN/1.73M2 — LOW
EOSINOPHIL # BLD AUTO: 0.62 K/UL — SIGNIFICANT CHANGE UP (ref 0–0.7)
EOSINOPHIL NFR BLD AUTO: 8.1 % — HIGH (ref 0–8)
ESTIMATED AVERAGE GLUCOSE: 126 MG/DL — HIGH (ref 68–114)
GLUCOSE BLDC GLUCOMTR-MCNC: 116 MG/DL — HIGH (ref 70–99)
GLUCOSE BLDC GLUCOMTR-MCNC: 122 MG/DL — HIGH (ref 70–99)
GLUCOSE BLDC GLUCOMTR-MCNC: 140 MG/DL — HIGH (ref 70–99)
GLUCOSE BLDC GLUCOMTR-MCNC: 169 MG/DL — HIGH (ref 70–99)
GLUCOSE SERPL-MCNC: 171 MG/DL — HIGH (ref 70–99)
GLUCOSE SERPL-MCNC: 91 MG/DL — SIGNIFICANT CHANGE UP (ref 70–99)
HCT VFR BLD CALC: 26.3 % — LOW (ref 37–47)
HDLC SERPL-MCNC: 61 MG/DL — SIGNIFICANT CHANGE UP
HGB BLD-MCNC: 8.5 G/DL — LOW (ref 12–16)
IMM GRANULOCYTES NFR BLD AUTO: 0.3 % — SIGNIFICANT CHANGE UP (ref 0.1–0.3)
LACTATE SERPL-SCNC: 1.5 MMOL/L — SIGNIFICANT CHANGE UP (ref 0.7–2)
LIPID PNL WITH DIRECT LDL SERPL: 70 MG/DL — SIGNIFICANT CHANGE UP
LYMPHOCYTES # BLD AUTO: 1.28 K/UL — SIGNIFICANT CHANGE UP (ref 1.2–3.4)
LYMPHOCYTES # BLD AUTO: 16.8 % — LOW (ref 20.5–51.1)
MCHC RBC-ENTMCNC: 28.2 PG — SIGNIFICANT CHANGE UP (ref 27–31)
MCHC RBC-ENTMCNC: 32.3 G/DL — SIGNIFICANT CHANGE UP (ref 32–37)
MCV RBC AUTO: 87.4 FL — SIGNIFICANT CHANGE UP (ref 81–99)
MONOCYTES # BLD AUTO: 0.46 K/UL — SIGNIFICANT CHANGE UP (ref 0.1–0.6)
MONOCYTES NFR BLD AUTO: 6 % — SIGNIFICANT CHANGE UP (ref 1.7–9.3)
NEUTROPHILS # BLD AUTO: 5.2 K/UL — SIGNIFICANT CHANGE UP (ref 1.4–6.5)
NEUTROPHILS NFR BLD AUTO: 68.3 % — SIGNIFICANT CHANGE UP (ref 42.2–75.2)
NON HDL CHOLESTEROL: 97 MG/DL — SIGNIFICANT CHANGE UP
NRBC # BLD: 0 /100 WBCS — SIGNIFICANT CHANGE UP (ref 0–0)
PLATELET # BLD AUTO: 258 K/UL — SIGNIFICANT CHANGE UP (ref 130–400)
POTASSIUM SERPL-MCNC: 4.2 MMOL/L — SIGNIFICANT CHANGE UP (ref 3.5–5)
POTASSIUM SERPL-MCNC: 5.4 MMOL/L — HIGH (ref 3.5–5)
POTASSIUM SERPL-SCNC: 4.2 MMOL/L — SIGNIFICANT CHANGE UP (ref 3.5–5)
POTASSIUM SERPL-SCNC: 5.4 MMOL/L — HIGH (ref 3.5–5)
PROT SERPL-MCNC: 5.5 G/DL — LOW (ref 6–8)
RBC # BLD: 3.01 M/UL — LOW (ref 4.2–5.4)
RBC # FLD: 14.6 % — HIGH (ref 11.5–14.5)
SODIUM SERPL-SCNC: 141 MMOL/L — SIGNIFICANT CHANGE UP (ref 135–146)
SODIUM SERPL-SCNC: 142 MMOL/L — SIGNIFICANT CHANGE UP (ref 135–146)
TRIGL SERPL-MCNC: 134 MG/DL — SIGNIFICANT CHANGE UP
TROPONIN T SERPL-MCNC: 0.03 NG/ML — CRITICAL HIGH
WBC # BLD: 7.62 K/UL — SIGNIFICANT CHANGE UP (ref 4.8–10.8)
WBC # FLD AUTO: 7.62 K/UL — SIGNIFICANT CHANGE UP (ref 4.8–10.8)

## 2022-11-22 PROCEDURE — 99233 SBSQ HOSP IP/OBS HIGH 50: CPT

## 2022-11-22 PROCEDURE — 93978 VASCULAR STUDY: CPT | Mod: 26

## 2022-11-22 RX ORDER — BENZOCAINE 10 %
1 GEL (GRAM) MUCOUS MEMBRANE ONCE
Refills: 0 | Status: DISCONTINUED | OUTPATIENT
Start: 2022-11-22 | End: 2022-11-27

## 2022-11-22 RX ORDER — SODIUM ZIRCONIUM CYCLOSILICATE 10 G/10G
10 POWDER, FOR SUSPENSION ORAL ONCE
Refills: 0 | Status: COMPLETED | OUTPATIENT
Start: 2022-11-22 | End: 2022-11-22

## 2022-11-22 RX ORDER — SODIUM ZIRCONIUM CYCLOSILICATE 10 G/10G
10 POWDER, FOR SUSPENSION ORAL THREE TIMES A DAY
Refills: 0 | Status: DISCONTINUED | OUTPATIENT
Start: 2022-11-22 | End: 2022-11-23

## 2022-11-22 RX ADMIN — SEVELAMER CARBONATE 800 MILLIGRAM(S): 2400 POWDER, FOR SUSPENSION ORAL at 17:27

## 2022-11-22 RX ADMIN — Medication 5 UNIT(S): at 17:30

## 2022-11-22 RX ADMIN — Medication 1 GRAM(S): at 05:43

## 2022-11-22 RX ADMIN — Medication 60 MILLIGRAM(S): at 17:27

## 2022-11-22 RX ADMIN — Medication 1300 MILLIGRAM(S): at 22:22

## 2022-11-22 RX ADMIN — Medication 2: at 17:01

## 2022-11-22 RX ADMIN — INSULIN GLARGINE 10 UNIT(S): 100 INJECTION, SOLUTION SUBCUTANEOUS at 22:21

## 2022-11-22 RX ADMIN — Medication 50 MILLIGRAM(S): at 17:27

## 2022-11-22 RX ADMIN — Medication 1300 MILLIGRAM(S): at 05:43

## 2022-11-22 RX ADMIN — Medication 1 GRAM(S): at 17:26

## 2022-11-22 RX ADMIN — CLOPIDOGREL BISULFATE 75 MILLIGRAM(S): 75 TABLET, FILM COATED ORAL at 17:24

## 2022-11-22 RX ADMIN — Medication 50 MILLIGRAM(S): at 22:22

## 2022-11-22 RX ADMIN — HEPARIN SODIUM 5000 UNIT(S): 5000 INJECTION INTRAVENOUS; SUBCUTANEOUS at 05:43

## 2022-11-22 RX ADMIN — CALCITRIOL 0.25 MICROGRAM(S): 0.5 CAPSULE ORAL at 18:57

## 2022-11-22 RX ADMIN — Medication 1 GRAM(S): at 01:36

## 2022-11-22 RX ADMIN — SODIUM ZIRCONIUM CYCLOSILICATE 10 GRAM(S): 10 POWDER, FOR SUSPENSION ORAL at 17:05

## 2022-11-22 RX ADMIN — Medication 650 MILLIGRAM(S): at 10:15

## 2022-11-22 RX ADMIN — Medication 81 MILLIGRAM(S): at 17:18

## 2022-11-22 RX ADMIN — Medication 1 GRAM(S): at 22:22

## 2022-11-22 RX ADMIN — Medication 650 MILLIGRAM(S): at 17:26

## 2022-11-22 RX ADMIN — HEPARIN SODIUM 5000 UNIT(S): 5000 INJECTION INTRAVENOUS; SUBCUTANEOUS at 17:28

## 2022-11-22 RX ADMIN — Medication 1300 MILLIGRAM(S): at 17:26

## 2022-11-22 RX ADMIN — Medication 50 MILLIGRAM(S): at 05:26

## 2022-11-22 RX ADMIN — SODIUM ZIRCONIUM CYCLOSILICATE 10 GRAM(S): 10 POWDER, FOR SUSPENSION ORAL at 08:33

## 2022-11-22 RX ADMIN — ATORVASTATIN CALCIUM 80 MILLIGRAM(S): 80 TABLET, FILM COATED ORAL at 22:22

## 2022-11-22 RX ADMIN — Medication 60 MILLIGRAM(S): at 05:43

## 2022-11-22 RX ADMIN — Medication 5 UNIT(S): at 08:25

## 2022-11-22 RX ADMIN — Medication 50 MILLIGRAM(S): at 05:43

## 2022-11-22 RX ADMIN — SEVELAMER CARBONATE 800 MILLIGRAM(S): 2400 POWDER, FOR SUSPENSION ORAL at 08:32

## 2022-11-22 RX ADMIN — SODIUM ZIRCONIUM CYCLOSILICATE 10 GRAM(S): 10 POWDER, FOR SUSPENSION ORAL at 22:22

## 2022-11-22 RX ADMIN — HEPARIN SODIUM 5000 UNIT(S): 5000 INJECTION INTRAVENOUS; SUBCUTANEOUS at 22:22

## 2022-11-22 NOTE — CONSULT NOTE ADULT - ASSESSMENT
58 yo female w/ PMH of HTN, HLD, IDD, DM, CKD stage IV on dialysis (sees Dr. Solis Borja), deafness presents for abdominal pain x 4 days along with flu like symptoms. CT a/p negative for acute pathologies. Found to be in hypertensive emergency s/p treatment. Nephro eval for ESRD.    Assessment and plan  ESRD on HD/ Hypertensive emergency/ DM  Due today for dialysis  Opti 160 3 hrs 2 L UF 2 K bath  Hypertensive emergency resolved, bp controlled now  c/w home anti hypertensive regimen  monitor BP post HD  Check P levels and iron studies  will follow

## 2022-11-22 NOTE — PROGRESS NOTE ADULT - SUBJECTIVE AND OBJECTIVE BOX
Pt seen and examined at bedside. No overnight events. As per family pt has been having sore throat and cold like symtpoms       PAST MEDICAL & SURGICAL HISTORY:  PVD (peripheral vascular disease)    Benign essential HTN    Intellectual disability    Hearing impaired    HLD (hyperlipidemia)    Chronic kidney disease, unspecified CKD stage    H/O vascular surgery  left leg        VITAL SIGNS (Last 24 hrs):  T(C): 36.1 (11-22-22 @ 14:54), Max: 36.7 (11-21-22 @ 22:31)  HR: 85 (11-22-22 @ 14:54) (59 - 85)  BP: 138/63 (11-22-22 @ 14:54) (95/45 - 193/86)  RR: 18 (11-22-22 @ 14:54) (16 - 22)  SpO2: 95% (11-22-22 @ 08:00) (95% - 97%)  Wt(kg): --  Daily     Daily     I&O's Summary    22 Nov 2022 07:01  -  22 Nov 2022 17:07  --------------------------------------------------------  IN: 0 mL / OUT: 1000 mL / NET: -1000 mL        PHYSICAL EXAM:  GENERAL: NAD, deaf  HEAD:  Atraumatic, Normocephalic  EYES: EOMI, PERRLA, conjunctiva and sclera clear  NECK: Supple, No JVD  CHEST/LUNG: Clear to auscultation bilaterally; No wheeze  HEART: Regular rate and rhythm; No murmurs, rubs, or gallops  ABDOMEN: Soft, Nontender, Nondistended; Bowel sounds present  EXTREMITIES:  2+ Peripheral Pulses, No clubbing, cyanosis, or edema  PSYCH: AAOx3  NEUROLOGY: non-focal  SKIN: No rashes or lesions    Labs Reviewed  Spoke to patient in regards to abnormal labs.    CBC Full  -  ( 22 Nov 2022 06:31 )  WBC Count : 7.62 K/uL  Hemoglobin : 8.5 g/dL  Hematocrit : 26.3 %  Platelet Count - Automated : 258 K/uL  Mean Cell Volume : 87.4 fL  Mean Cell Hemoglobin : 28.2 pg  Mean Cell Hemoglobin Concentration : 32.3 g/dL  Auto Neutrophil # : 5.20 K/uL  Auto Lymphocyte # : 1.28 K/uL  Auto Monocyte # : 0.46 K/uL  Auto Eosinophil # : 0.62 K/uL  Auto Basophil # : 0.04 K/uL  Auto Neutrophil % : 68.3 %  Auto Lymphocyte % : 16.8 %  Auto Monocyte % : 6.0 %  Auto Eosinophil % : 8.1 %  Auto Basophil % : 0.5 %    BMP:    11-22 @ 06:31    Blood Urea Nitrogen - 61  Calcium - 7.6  Carbond Dioxide - 19  Chloride - 104  Creatinine - 7.4  Glucose - 91  Potassium - 5.4  Sodium - 141      Hemoglobin A1c -     Urine Culture:        COVID Labs  CRP:      D-Dimer:            Imaging reviewed independently and reviewed read      < from: [facility] Doppler Mesenteric Limited (11.22.22 @ 10:58) >  Impression:    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 Normal flow   visualized within the superior mesenteric artery.    Comparison was made with recent CT abdomen 5 mm cuts with celiac and SMA   vessels appearing to be widely patent. Low suspicion for chronic   mesenteric ischemia.    < end of copied text >    < from: CT Abdomen and Pelvis w/ IV Cont (11.21.22 @ 17:09) >      IMPRESSION:    No evidence of abdominal or pelvic mass or inflammatory process.    There are moderate bilateral pleural effusions with atelectatic changes   at both lung bases.    < end of copied text >  MEDICATIONS  (STANDING):  aspirin enteric coated 81 milliGRAM(s) Oral daily  atorvastatin 80 milliGRAM(s) Oral at bedtime  calcitriol   Capsule 0.25 MICROGram(s) Oral daily  clopidogrel Tablet 75 milliGRAM(s) Oral daily  furosemide   Injectable 60 milliGRAM(s) IV Push two times a day  heparin   Injectable 5000 Unit(s) SubCutaneous every 8 hours  hydrALAZINE 50 milliGRAM(s) Oral three times a day  insulin glargine Injectable (LANTUS) 10 Unit(s) SubCutaneous at bedtime  insulin lispro (ADMELOG) corrective regimen sliding scale   SubCutaneous three times a day before meals  insulin lispro Injectable (ADMELOG) 5 Unit(s) SubCutaneous three times a day before meals  metoprolol succinate ER 50 milliGRAM(s) Oral daily  NIFEdipine XL 60 milliGRAM(s) Oral daily  sevelamer carbonate 800 milliGRAM(s) Oral three times a day with meals  sodium bicarbonate 1300 milliGRAM(s) Oral three times a day  sodium zirconium cyclosilicate 10 Gram(s) Oral three times a day  sucralfate 1 Gram(s) Oral four times a day    MEDICATIONS  (PRN):  acetaminophen     Tablet .. 650 milliGRAM(s) Oral every 6 hours PRN Temp greater or equal to 38C (100.4F), Mild Pain (1 - 3)  aluminum hydroxide/magnesium hydroxide/simethicone Suspension 30 milliLiter(s) Oral every 4 hours PRN Dyspepsia  melatonin 3 milliGRAM(s) Oral at bedtime PRN Insomnia  ondansetron Injectable 4 milliGRAM(s) IV Push every 8 hours PRN Nausea and/or Vomiting       Pt seen and examined at bedside. No overnight events. As per family pt has been having sore throat and cold like symtpoms       PAST MEDICAL & SURGICAL HISTORY:  PVD (peripheral vascular disease)    Benign essential HTN    Intellectual disability    Hearing impaired    HLD (hyperlipidemia)    Chronic kidney disease, unspecified CKD stage    H/O vascular surgery  left leg        VITAL SIGNS (Last 24 hrs):  T(C): 36.1 (11-22-22 @ 14:54), Max: 36.7 (11-21-22 @ 22:31)  HR: 85 (11-22-22 @ 14:54) (59 - 85)  BP: 138/63 (11-22-22 @ 14:54) (95/45 - 193/86)  RR: 18 (11-22-22 @ 14:54) (16 - 22)  SpO2: 95% (11-22-22 @ 08:00) (95% - 97%)  Wt(kg): --  Daily     Daily     I&O's Summary    22 Nov 2022 07:01  -  22 Nov 2022 17:07  --------------------------------------------------------  IN: 0 mL / OUT: 1000 mL / NET: -1000 mL        PHYSICAL EXAM:  GENERAL: NAD, deaf  HEAD:  Atraumatic, Normocephalic  EYES: EOMI, PERRLA, conjunctiva and sclera clear  NECK: Supple, No JVD  Throat: no redness, no deviation on uvula, speaking full sentences   CHEST/LUNG: Clear to auscultation bilaterally; No wheeze  HEART: Regular rate and rhythm; No murmurs, rubs, or gallops  ABDOMEN: Soft, Nontender, Nondistended; Bowel sounds present  EXTREMITIES:  2+ Peripheral Pulses, No clubbing, cyanosis, or edema  PSYCH: AAOx3  NEUROLOGY: non-focal  SKIN: No rashes or lesions    Labs Reviewed  Spoke to patient in regards to abnormal labs.    CBC Full  -  ( 22 Nov 2022 06:31 )  WBC Count : 7.62 K/uL  Hemoglobin : 8.5 g/dL  Hematocrit : 26.3 %  Platelet Count - Automated : 258 K/uL  Mean Cell Volume : 87.4 fL  Mean Cell Hemoglobin : 28.2 pg  Mean Cell Hemoglobin Concentration : 32.3 g/dL  Auto Neutrophil # : 5.20 K/uL  Auto Lymphocyte # : 1.28 K/uL  Auto Monocyte # : 0.46 K/uL  Auto Eosinophil # : 0.62 K/uL  Auto Basophil # : 0.04 K/uL  Auto Neutrophil % : 68.3 %  Auto Lymphocyte % : 16.8 %  Auto Monocyte % : 6.0 %  Auto Eosinophil % : 8.1 %  Auto Basophil % : 0.5 %    BMP:    11-22 @ 06:31    Blood Urea Nitrogen - 61  Calcium - 7.6  Carbond Dioxide - 19  Chloride - 104  Creatinine - 7.4  Glucose - 91  Potassium - 5.4  Sodium - 141      Hemoglobin A1c -     Urine Culture:        COVID Labs  CRP:      D-Dimer:            Imaging reviewed independently and reviewed read      < from: [facility] Doppler Mesenteric Limited (11.22.22 @ 10:58) >  Impression:    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 Normal flow   visualized within the superior mesenteric artery.    Comparison was made with recent CT abdomen 5 mm cuts with celiac and SMA   vessels appearing to be widely patent. Low suspicion for chronic   mesenteric ischemia.    < end of copied text >    < from: CT Abdomen and Pelvis w/ IV Cont (11.21.22 @ 17:09) >      IMPRESSION:    No evidence of abdominal or pelvic mass or inflammatory process.    There are moderate bilateral pleural effusions with atelectatic changes   at both lung bases.    < end of copied text >  MEDICATIONS  (STANDING):  aspirin enteric coated 81 milliGRAM(s) Oral daily  atorvastatin 80 milliGRAM(s) Oral at bedtime  calcitriol   Capsule 0.25 MICROGram(s) Oral daily  clopidogrel Tablet 75 milliGRAM(s) Oral daily  furosemide   Injectable 60 milliGRAM(s) IV Push two times a day  heparin   Injectable 5000 Unit(s) SubCutaneous every 8 hours  hydrALAZINE 50 milliGRAM(s) Oral three times a day  insulin glargine Injectable (LANTUS) 10 Unit(s) SubCutaneous at bedtime  insulin lispro (ADMELOG) corrective regimen sliding scale   SubCutaneous three times a day before meals  insulin lispro Injectable (ADMELOG) 5 Unit(s) SubCutaneous three times a day before meals  metoprolol succinate ER 50 milliGRAM(s) Oral daily  NIFEdipine XL 60 milliGRAM(s) Oral daily  sevelamer carbonate 800 milliGRAM(s) Oral three times a day with meals  sodium bicarbonate 1300 milliGRAM(s) Oral three times a day  sodium zirconium cyclosilicate 10 Gram(s) Oral three times a day  sucralfate 1 Gram(s) Oral four times a day    MEDICATIONS  (PRN):  acetaminophen     Tablet .. 650 milliGRAM(s) Oral every 6 hours PRN Temp greater or equal to 38C (100.4F), Mild Pain (1 - 3)  aluminum hydroxide/magnesium hydroxide/simethicone Suspension 30 milliLiter(s) Oral every 4 hours PRN Dyspepsia  melatonin 3 milliGRAM(s) Oral at bedtime PRN Insomnia  ondansetron Injectable 4 milliGRAM(s) IV Push every 8 hours PRN Nausea and/or Vomiting

## 2022-11-22 NOTE — PATIENT PROFILE ADULT - FUNCTIONAL ASSESSMENT - BASIC MOBILITY 5.
Assumed care of pt [id number].    A&Ox4, slow to respond, writer noted mild word finding difficulty and slow speech. VSS on RA. K+ 3.1, scheduled K+ given and redraw scheduled for 1445 - per pharmacy ok to follow protocol with redraw. Encouraged pt to think about TCU options. Upright in chair for breakfast. Scheduled meds given.    Sana Palacios RN    
3 = A little assistance

## 2022-11-22 NOTE — PATIENT PROFILE ADULT - FALL HARM RISK - HARM RISK INTERVENTIONS

## 2022-11-22 NOTE — CONSULT NOTE ADULT - SUBJECTIVE AND OBJECTIVE BOX
NEPHROLOGY CONSULTATION NOTE    58 yo female w/ PMH of HTN, HLD, IDD, DM, CKD stage IV on dialysis (sees Dr. Solis Borja), deafness presents for abdominal pain x 4 days along with flu like symptoms. CT a/p negative for acute pathologies. Found to be in hypertensive emergency s/p treatment.  Patient seen at bedside, still complaining of pain in her lower quadrants.  VS within acceptable range.     PAST MEDICAL & SURGICAL HISTORY:  PVD (peripheral vascular disease)      Benign essential HTN      Intellectual disability      Hearing impaired      HLD (hyperlipidemia)      Chronic kidney disease, unspecified CKD stage      H/O vascular surgery  left leg        Allergies:  NSAIDs (Nephrotoxicity)  penicillin (Rash)    Home Medications Reviewed  Hospital Medications:   MEDICATIONS  (STANDING):  aspirin enteric coated 81 milliGRAM(s) Oral daily  atorvastatin 80 milliGRAM(s) Oral at bedtime  calcitriol   Capsule 0.25 MICROGram(s) Oral daily  clopidogrel Tablet 75 milliGRAM(s) Oral daily  furosemide   Injectable 60 milliGRAM(s) IV Push two times a day  heparin   Injectable 5000 Unit(s) SubCutaneous every 8 hours  hydrALAZINE 50 milliGRAM(s) Oral three times a day  insulin glargine Injectable (LANTUS) 10 Unit(s) SubCutaneous at bedtime  insulin lispro (ADMELOG) corrective regimen sliding scale   SubCutaneous three times a day before meals  insulin lispro Injectable (ADMELOG) 5 Unit(s) SubCutaneous three times a day before meals  metoprolol succinate ER 50 milliGRAM(s) Oral daily  NIFEdipine XL 60 milliGRAM(s) Oral daily  sevelamer carbonate 800 milliGRAM(s) Oral three times a day with meals  sodium bicarbonate 1300 milliGRAM(s) Oral three times a day  sodium zirconium cyclosilicate 10 Gram(s) Oral three times a day  sucralfate 1 Gram(s) Oral four times a day    SOCIAL HISTORY:  Denies ETOH,Smoking,   FAMILY HISTORY:      REVIEW OF SYSTEMS:  CONSTITUTIONAL: No weakness, fevers or chills  EYES/ENT: No visual changes;  No vertigo or throat pain   NECK: No pain or stiffness  RESPIRATORY: No cough, wheezing, hemoptysis; No shortness of breath  CARDIOVASCULAR: No chest pain or palpitations.  GASTROINTESTINAL: abdominal pain in the lower quadrants  GENITOURINARY: No dysuria, frequency, foamy urine, urinary urgency, incontinence or hematuria  NEUROLOGICAL: No numbness or weakness  SKIN: No itching, burning, rashes, or lesions   VASCULAR: No bilateral lower extremity edema.   All other review of systems is negative unless indicated above.    VITALS:  Vital Signs Last 24 Hrs  T(C): 36.1 (2022 10:11), Max: 36.7 (2022 22:31)  T(F): 97 (2022 10:11), Max: 98.1 (2022 22:31)  HR: 66 (2022 10:11) (64 - 84)  BP: 126/58 (2022 10:11) (103/57 - 214/97)  BP(mean): --  RR: 20 (2022 10:11) (16 - 20)  SpO2: 95% (2022 08:00) (95% - 97%)    Parameters below as of 2022 22:31  Patient On (Oxygen Delivery Method): room air          Weight (kg): 73.3 ( @ 10:11)  PHYSICAL EXAM:  Constitutional: NAD  HEENT: anicteric sclera, oropharynx clear, MMM  Neck: No JVD  Respiratory: CTAB, b/l rales   Cardiovascular: S1, S2, RRR  Gastrointestinal: BS+, soft, NT/ND  Extremities: No cyanosis or clubbing. No peripheral edema  Neurological: A/O x 3, no focal deficits, deaf  : No CVA tenderness.   Skin: No rashes  Vascular Access: left UE fistula   LABS:      141  |  104  |  61<HH>  ----------------------------<  91  5.4<H>   |  19  |  7.4<HH>    Ca    7.6<L>      2022 06:31    TPro  5.5<L>  /  Alb  3.1<L>  /  TBili  <0.2  /  DBili      /  AST  10  /  ALT  11  /  AlkPhos  138<H>      Creatinine Trend: 7.4 <--, 7.0 <--                        8.5    7.62  )-----------( 258      ( 2022 06:31 )             26.3     Urine Studies:  Urinalysis Basic - ( 2022 16:58 )    Color: Light Yellow / Appearance: Clear / S.014 / pH:   Gluc:  / Ketone: Negative  / Bili: Negative / Urobili: <2 mg/dL   Blood:  / Protein: >600 mg/dL / Nitrite: Negative   Leuk Esterase: Negative / RBC: 3 /HPF / WBC 12 /HPF   Sq Epi:  / Non Sq Epi: 5 /HPF / Bacteria: Few

## 2022-11-23 LAB
ALBUMIN SERPL ELPH-MCNC: 3.4 G/DL — LOW (ref 3.5–5.2)
ALP SERPL-CCNC: 127 U/L — HIGH (ref 30–115)
ALT FLD-CCNC: 10 U/L — SIGNIFICANT CHANGE UP (ref 0–41)
ANION GAP SERPL CALC-SCNC: 13 MMOL/L — SIGNIFICANT CHANGE UP (ref 7–14)
AST SERPL-CCNC: 11 U/L — SIGNIFICANT CHANGE UP (ref 0–41)
BILIRUB SERPL-MCNC: 0.2 MG/DL — SIGNIFICANT CHANGE UP (ref 0.2–1.2)
BUN SERPL-MCNC: 34 MG/DL — HIGH (ref 10–20)
CALCIUM SERPL-MCNC: 7.5 MG/DL — LOW (ref 8.4–10.5)
CHLORIDE SERPL-SCNC: 101 MMOL/L — SIGNIFICANT CHANGE UP (ref 98–110)
CO2 SERPL-SCNC: 28 MMOL/L — SIGNIFICANT CHANGE UP (ref 17–32)
CREAT SERPL-MCNC: 5.6 MG/DL — CRITICAL HIGH (ref 0.7–1.5)
CULTURE RESULTS: SIGNIFICANT CHANGE UP
EGFR: 8 ML/MIN/1.73M2 — LOW
GLUCOSE BLDC GLUCOMTR-MCNC: 121 MG/DL — HIGH (ref 70–99)
GLUCOSE BLDC GLUCOMTR-MCNC: 122 MG/DL — HIGH (ref 70–99)
GLUCOSE BLDC GLUCOMTR-MCNC: 133 MG/DL — HIGH (ref 70–99)
GLUCOSE BLDC GLUCOMTR-MCNC: 145 MG/DL — HIGH (ref 70–99)
GLUCOSE BLDC GLUCOMTR-MCNC: 63 MG/DL — LOW (ref 70–99)
GLUCOSE SERPL-MCNC: 116 MG/DL — HIGH (ref 70–99)
HCT VFR BLD CALC: 24.4 % — LOW (ref 37–47)
HGB BLD-MCNC: 7.8 G/DL — LOW (ref 12–16)
MAGNESIUM SERPL-MCNC: 2.2 MG/DL — SIGNIFICANT CHANGE UP (ref 1.8–2.4)
MCHC RBC-ENTMCNC: 28 PG — SIGNIFICANT CHANGE UP (ref 27–31)
MCHC RBC-ENTMCNC: 32 G/DL — SIGNIFICANT CHANGE UP (ref 32–37)
MCV RBC AUTO: 87.5 FL — SIGNIFICANT CHANGE UP (ref 81–99)
NRBC # BLD: 0 /100 WBCS — SIGNIFICANT CHANGE UP (ref 0–0)
PLATELET # BLD AUTO: 231 K/UL — SIGNIFICANT CHANGE UP (ref 130–400)
POTASSIUM SERPL-MCNC: 4.1 MMOL/L — SIGNIFICANT CHANGE UP (ref 3.5–5)
POTASSIUM SERPL-SCNC: 4.1 MMOL/L — SIGNIFICANT CHANGE UP (ref 3.5–5)
PROT SERPL-MCNC: 5.4 G/DL — LOW (ref 6–8)
RBC # BLD: 2.79 M/UL — LOW (ref 4.2–5.4)
RBC # FLD: 14.5 % — SIGNIFICANT CHANGE UP (ref 11.5–14.5)
SODIUM SERPL-SCNC: 142 MMOL/L — SIGNIFICANT CHANGE UP (ref 135–146)
SPECIMEN SOURCE: SIGNIFICANT CHANGE UP
WBC # BLD: 6.33 K/UL — SIGNIFICANT CHANGE UP (ref 4.8–10.8)
WBC # FLD AUTO: 6.33 K/UL — SIGNIFICANT CHANGE UP (ref 4.8–10.8)

## 2022-11-23 PROCEDURE — 99232 SBSQ HOSP IP/OBS MODERATE 35: CPT

## 2022-11-23 RX ADMIN — Medication 50 MILLIGRAM(S): at 05:18

## 2022-11-23 RX ADMIN — HEPARIN SODIUM 5000 UNIT(S): 5000 INJECTION INTRAVENOUS; SUBCUTANEOUS at 23:00

## 2022-11-23 RX ADMIN — SEVELAMER CARBONATE 800 MILLIGRAM(S): 2400 POWDER, FOR SUSPENSION ORAL at 08:00

## 2022-11-23 RX ADMIN — Medication 1 GRAM(S): at 12:25

## 2022-11-23 RX ADMIN — Medication 60 MILLIGRAM(S): at 05:18

## 2022-11-23 RX ADMIN — Medication 1300 MILLIGRAM(S): at 05:18

## 2022-11-23 RX ADMIN — Medication 81 MILLIGRAM(S): at 12:25

## 2022-11-23 RX ADMIN — SEVELAMER CARBONATE 800 MILLIGRAM(S): 2400 POWDER, FOR SUSPENSION ORAL at 17:03

## 2022-11-23 RX ADMIN — Medication 60 MILLIGRAM(S): at 05:19

## 2022-11-23 RX ADMIN — Medication 5 UNIT(S): at 08:27

## 2022-11-23 RX ADMIN — HEPARIN SODIUM 5000 UNIT(S): 5000 INJECTION INTRAVENOUS; SUBCUTANEOUS at 14:15

## 2022-11-23 RX ADMIN — INSULIN GLARGINE 10 UNIT(S): 100 INJECTION, SOLUTION SUBCUTANEOUS at 23:02

## 2022-11-23 RX ADMIN — Medication 1 GRAM(S): at 23:00

## 2022-11-23 RX ADMIN — Medication 50 MILLIGRAM(S): at 14:15

## 2022-11-23 RX ADMIN — SODIUM ZIRCONIUM CYCLOSILICATE 10 GRAM(S): 10 POWDER, FOR SUSPENSION ORAL at 05:17

## 2022-11-23 RX ADMIN — Medication 5 UNIT(S): at 17:43

## 2022-11-23 RX ADMIN — Medication 1 GRAM(S): at 05:18

## 2022-11-23 RX ADMIN — CLOPIDOGREL BISULFATE 75 MILLIGRAM(S): 75 TABLET, FILM COATED ORAL at 12:26

## 2022-11-23 RX ADMIN — ATORVASTATIN CALCIUM 80 MILLIGRAM(S): 80 TABLET, FILM COATED ORAL at 23:00

## 2022-11-23 RX ADMIN — Medication 50 MILLIGRAM(S): at 23:00

## 2022-11-23 RX ADMIN — CALCITRIOL 0.25 MICROGRAM(S): 0.5 CAPSULE ORAL at 12:29

## 2022-11-23 RX ADMIN — SEVELAMER CARBONATE 800 MILLIGRAM(S): 2400 POWDER, FOR SUSPENSION ORAL at 12:34

## 2022-11-23 RX ADMIN — Medication 1 GRAM(S): at 17:03

## 2022-11-23 RX ADMIN — HEPARIN SODIUM 5000 UNIT(S): 5000 INJECTION INTRAVENOUS; SUBCUTANEOUS at 05:19

## 2022-11-23 RX ADMIN — Medication 60 MILLIGRAM(S): at 14:15

## 2022-11-23 NOTE — PHYSICAL THERAPY INITIAL EVALUATION ADULT - GENERAL OBSERVATIONS, REHAB EVAL
11:32-12:05 33 min pt rec in bed in NAD, brother in law at the b/s, , Jasmina Monahan ([phone]), used during IE, brother in law was asking for assistance to fill out handicap parking permit application, PT contacted  ([phone]) on his behalf,  to come to pt's bedside

## 2022-11-23 NOTE — PROGRESS NOTE ADULT - SUBJECTIVE AND OBJECTIVE BOX
SEN LING 57y Female  MRN#: [id number]   Hospital Day: 2d    SUBJECTIVE  Patient is a 57y old Female who presents with a chief complaint of Pulmonary edema (2022 13:34)  Currently admitted to medicine with the primary diagnosis of Pulmonary edema      INTERVAL HPI AND OVERNIGHT EVENTS:  Patient was examined and seen at bedside. This morning she is resting comfortably in bed and reports no issues or overnight events. Patient presented after developing cough, sore throat, and abdominal pain after missing a session of HD. Also presented in HTN emergency in the ED after missing home BP meds, resolved after receiving her meds. Patient is deaf and communicates in sign language ASL. Communicated to patient with family member at the bedside in the afternoon. Patient reports resolution of symptoms for which she presented, and is feeling better now that she had HD the other day. No fevers, chills, chest pain, shortness of breath, nausea, vomiting, or diarrhea. No acute events. Plan is for patient to be discharged in the AM after her HD session. Hemodynamically stable.     REVIEW OF SYMPTOMS:  Relevant ros per above    OBJECTIVE  PAST MEDICAL & SURGICAL HISTORY  PVD (peripheral vascular disease)    Benign essential HTN    Intellectual disability    Hearing impaired    HLD (hyperlipidemia)    Chronic kidney disease, unspecified CKD stage    H/O vascular surgery  left leg      ALLERGIES:  NSAIDs (Nephrotoxicity)  penicillin (Rash)    MEDICATIONS:  STANDING MEDICATIONS  aspirin enteric coated 81 milliGRAM(s) Oral daily  atorvastatin 80 milliGRAM(s) Oral at bedtime  benzocaine 20% Spray 1 Spray(s) Topical once  calcitriol   Capsule 0.25 MICROGram(s) Oral daily  clopidogrel Tablet 75 milliGRAM(s) Oral daily  furosemide   Injectable 60 milliGRAM(s) IV Push two times a day  heparin   Injectable 5000 Unit(s) SubCutaneous every 8 hours  hydrALAZINE 50 milliGRAM(s) Oral three times a day  insulin glargine Injectable (LANTUS) 10 Unit(s) SubCutaneous at bedtime  insulin lispro (ADMELOG) corrective regimen sliding scale   SubCutaneous three times a day before meals  insulin lispro Injectable (ADMELOG) 5 Unit(s) SubCutaneous three times a day before meals  metoprolol succinate ER 50 milliGRAM(s) Oral daily  NIFEdipine XL 60 milliGRAM(s) Oral daily  sevelamer carbonate 800 milliGRAM(s) Oral three times a day with meals  sucralfate 1 Gram(s) Oral four times a day    PRN MEDICATIONS  acetaminophen     Tablet .. 650 milliGRAM(s) Oral every 6 hours PRN  aluminum hydroxide/magnesium hydroxide/simethicone Suspension 30 milliLiter(s) Oral every 4 hours PRN  melatonin 3 milliGRAM(s) Oral at bedtime PRN  ondansetron Injectable 4 milliGRAM(s) IV Push every 8 hours PRN      VITAL SIGNS: Last 24 Hours  T(C): 36.1 (2022 13:01), Max: 36.3 (2022 19:51)  T(F): 97 (2022 13:01), Max: 97.4 (2022 19:51)  HR: 65 (:01) (65 - 86)  BP: 117/56 (2022 13:01) (117/56 - 164/71)  BP(mean): --  RR: 18 (2022 13:01) (18 - 18)  SpO2: 95% (2022 20:51) (95% - 95%)    LABS:                        7.8    6.33  )-----------( 231      ( 2022 06:17 )             24.4     11-23    142  |  101  |  34<H>  ----------------------------<  116<H>  4.1   |  28  |  5.6<HH>    Ca    7.5<L>      2022 06:17  Mg     2.2     -    TPro  5.4<L>  /  Alb  3.4<L>  /  TBili  0.2  /  DBili  x   /  AST  11  /  ALT  10  /  AlkPhos  127<H>  11-23      Urinalysis Basic - ( 2022 16:58 )    Color: Light Yellow / Appearance: Clear / S.014 / pH: x  Gluc: x / Ketone: Negative  / Bili: Negative / Urobili: <2 mg/dL   Blood: x / Protein: >600 mg/dL / Nitrite: Negative   Leuk Esterase: Negative / RBC: 3 /HPF / WBC 12 /HPF   Sq Epi: x / Non Sq Epi: 5 /HPF / Bacteria: Few        Lactate, Blood: 1.5 mmol/L (22 @ 19:02)      Culture - Urine (collected 2022 16:58)  Source: Clean Catch Clean Catch (Midstream)  Final Report (2022 10:50):    Normal Urogenital na present      CARDIAC MARKERS ( 2022 06:31 )  x     / 0.03 ng/mL / x     / x     / x      CARDIAC MARKERS ( 2022 21:57 )  x     / 0.03 ng/mL / x     / x     / x          RADIOLOGY:      PHYSICAL EXAM:  CONSTITUTIONAL: No acute distress, well-developed, well-groomed, AAOx3, communicates in sign language  HEAD: Atraumatic, normocephalic  EYES: EOM intact, conjunctiva and sclera clear  ENT: Supple, no JVD; moist mucous membranes  PULMONARY: Clear to auscultation bilaterally; no wheezes, rales, or rhonchi  CARDIOVASCULAR: Regular rate and rhythm; no murmurs, rubs, or gallops  GASTROINTESTINAL: Soft, non-tender, non-distended; bowel sounds present  MUSCULOSKELETAL: no clubbing, no cyanosis, no edema  NEUROLOGY: non-focal, no facial droop, moving all extremities  SKIN: No rashes or lesions; warm and dry

## 2022-11-23 NOTE — PHYSICAL THERAPY INITIAL EVALUATION ADULT - PERTINENT HX OF CURRENT PROBLEM, REHAB EVAL
pt adm for pulmonary edema, depressed L ventricular EF, pt with h/o CKD on HD, also with h/o of multiple LE surgeries

## 2022-11-23 NOTE — PROGRESS NOTE ADULT - SUBJECTIVE AND OBJECTIVE BOX
Nephrology Progress Note    SEN LING  MRN-[id number]  57y  Female    S:  Patient is seen and examined, events over the last 24h noted.    O:  Allergies:  NSAIDs (Nephrotoxicity)  penicillin (Rash)    Hospital Medications:   MEDICATIONS  (STANDING):  aspirin enteric coated 81 milliGRAM(s) Oral daily  atorvastatin 80 milliGRAM(s) Oral at bedtime  benzocaine 20% Spray 1 Spray(s) Topical once  calcitriol   Capsule 0.25 MICROGram(s) Oral daily  clopidogrel Tablet 75 milliGRAM(s) Oral daily  furosemide   Injectable 60 milliGRAM(s) IV Push two times a day  heparin   Injectable 5000 Unit(s) SubCutaneous every 8 hours  hydrALAZINE 50 milliGRAM(s) Oral three times a day  insulin glargine Injectable (LANTUS) 10 Unit(s) SubCutaneous at bedtime  insulin lispro (ADMELOG) corrective regimen sliding scale   SubCutaneous three times a day before meals  insulin lispro Injectable (ADMELOG) 5 Unit(s) SubCutaneous three times a day before meals  metoprolol succinate ER 50 milliGRAM(s) Oral daily  NIFEdipine XL 60 milliGRAM(s) Oral daily  sevelamer carbonate 800 milliGRAM(s) Oral three times a day with meals  sodium bicarbonate 1300 milliGRAM(s) Oral three times a day  sodium zirconium cyclosilicate 10 Gram(s) Oral three times a day  sucralfate 1 Gram(s) Oral four times a day    MEDICATIONS  (PRN):  acetaminophen     Tablet .. 650 milliGRAM(s) Oral every 6 hours PRN Temp greater or equal to 38C (100.4F), Mild Pain (1 - 3)  aluminum hydroxide/magnesium hydroxide/simethicone Suspension 30 milliLiter(s) Oral every 4 hours PRN Dyspepsia  melatonin 3 milliGRAM(s) Oral at bedtime PRN Insomnia  ondansetron Injectable 4 milliGRAM(s) IV Push every 8 hours PRN Nausea and/or Vomiting    Home Medications:  atorvastatin 80 mg oral tablet: 1 tab(s) orally once a day (2022 19:38)  calcitriol 0.25 mcg oral capsule: 1 cap(s) orally once a day (2022 19:38)  insulin lispro 100 units/mL injectable solution: 4 unit(s) injectable 3 times a day (before meals) (2022 19:38)  metoprolol succinate 100 mg oral tablet, extended release: 0.5 tab(s) orally once a day (2022 19:38)  Plavix 75 mg oral tablet: 1 tab(s) orally once a day (2022 19:38)      VITALS:  T(F): 97 (22 @ 13:01), Max: 97.4 (22 @ 19:51)  HR: 65 (22 @ 13:)  BP: 117/56 (22 @ 13:01)  RR: 18 (22 @ 13:)  SpO2: 95% (22 @ 20:51)  Wt(kg): --  I&O's Detail    2022 07:  -  2022 07:00  --------------------------------------------------------  IN:    Oral Fluid: 760 mL  Total IN: 760 mL    OUT:    Other (mL): 1000 mL    Voided (mL): 500 mL  Total OUT: 1500 mL    Total NET: -740 mL      2022 07:  -  2022 13:34  --------------------------------------------------------  IN:    Oral Fluid: 480 mL  Total IN: 480 mL    OUT:  Total OUT: 0 mL    Total NET: 480 mL        I&O's Summary    2022 07:  -  2022 07:00  --------------------------------------------------------  IN: 760 mL / OUT: 1500 mL / NET: -740 mL    2022 07:01  -  2022 13:34  --------------------------------------------------------  IN: 480 mL / OUT: 0 mL / NET: 480 mL          PHYSICAL EXAM:  Gen: NAD  Resp: CTAB  Card: S1/S2  Abd: soft  Extremities: edema  Vascular access: LUE AVG +thrill      LABS:          142  |  101  |  34<H>  ----------------------------<  116<H>  4.1   |  28  |  5.6<HH>    Ca    7.5<L>      2022 06:17  Mg     2.2         TPro  5.4<L>  /  Alb  3.4<L>  /  TBili  0.2  /  DBili      /  AST  11  /  ALT  10  /  AlkPhos  127<H>                                7.8    6.33  )-----------( 231      ( 2022 06:17 )             24.4     Mean Cell Volume: 87.5 fL (22 @ 06:17)        Urine Studies:  Urinalysis Basic - ( 2022 16:58 )    Color: Light Yellow / Appearance: Clear / S.014 / pH:   Gluc:  / Ketone: Negative  / Bili: Negative / Urobili: <2 mg/dL   Blood:  / Protein: >600 mg/dL / Nitrite: Negative   Leuk Esterase: Negative / RBC: 3 /HPF / WBC 12 /HPF   Sq Epi:  / Non Sq Epi: 5 /HPF / Bacteria: Few      Creatinine trend:  Creatinine, Serum: 5.6 mg/dL (22 @ 06:17)  Creatinine, Serum: 4.6 mg/dL (22 @ 19:02)  Creatinine, Serum: 7.4 mg/dL (22 @ 06:31)  Creatinine, Serum: 7.0 mg/dL (22 @ 13:00)  Creatinine, Serum: 5.6 mg/dL (22 @ 11:32)  Creatinine, Serum: 4.7 mg/dL (22 @ 10:30)

## 2022-11-23 NOTE — PROGRESS NOTE ADULT - SUBJECTIVE AND OBJECTIVE BOX
Pt seen and examined at bedside. family present at bedside. communicated with patient with help of sign      PAST MEDICAL & SURGICAL HISTORY:  PVD (peripheral vascular disease)    Benign essential HTN    Intellectual disability    Hearing impaired    HLD (hyperlipidemia)    Chronic kidney disease, unspecified CKD stage    H/O vascular surgery  left leg        VITAL SIGNS (Last 24 hrs):  Vital Signs Last 24 Hrs  T(C): 36.1 (23 Nov 2022 13:01), Max: 36.3 (22 Nov 2022 19:51)  T(F): 97 (23 Nov 2022 13:01), Max: 97.4 (22 Nov 2022 19:51)  HR: 65 (23 Nov 2022 13:01) (65 - 86)  BP: 117/56 (23 Nov 2022 13:01) (117/56 - 164/71)  BP(mean): --  RR: 18 (23 Nov 2022 13:01) (18 - 19)  SpO2: 95% (22 Nov 2022 20:51) (95% - 96%)    Parameters below as of 22 Nov 2022 20:51  Patient On (Oxygen Delivery Method): room air        I&O's Summary    22 Nov 2022 07:01  -  22 Nov 2022 17:07  --------------------------------------------------------  IN: 0 mL / OUT: 1000 mL / NET: -1000 mL        PHYSICAL EXAM:  GENERAL: NAD, deaf  HEAD:  Atraumatic, Normocephalic  EYES: EOMI, PERRLA, conjunctiva and sclera clear  NECK: Supple, No JVD  Throat: no redness, no deviation on uvula, speaking full sentences   CHEST/LUNG: Clear to auscultation bilaterally; No wheeze  HEART: Regular rate and rhythm; No murmurs, rubs, or gallops  ABDOMEN: Soft, Nontender, Nondistended; Bowel sounds present  EXTREMITIES:  2+ Peripheral Pulses, No clubbing, cyanosis, or edema  PSYCH: AAOx3  NEUROLOGY: non-focal  SKIN: No rashes or lesions    Labs Reviewed    LABS:                        7.8    6.33  )-----------( 231      ( 23 Nov 2022 06:17 )             24.4     11-23    142  |  101  |  34<H>  ----------------------------<  116<H>  4.1   |  28  |  5.6<HH>    Ca    7.5<L>      23 Nov 2022 06:17  Mg     2.2     11-23    TPro  5.4<L>  /  Alb  3.4<L>  /  TBili  0.2  /  DBili  x   /  AST  11  /  ALT  10  /  AlkPhos  127<H>  11-23                  Hemoglobin A1c -     Urine Culture:        COVID Labs  CRP:      D-Dimer:            Imaging reviewed independently and reviewed read      < from: [facility] Doppler Mesenteric Limited (11.22.22 @ 10:58) >  Impression:    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 Normal flow   visualized within the superior mesenteric artery.    Comparison was made with recent CT abdomen 5 mm cuts with celiac and SMA   vessels appearing to be widely patent. Low suspicion for chronic   mesenteric ischemia.    < end of copied text >    < from: CT Abdomen and Pelvis w/ IV Cont (11.21.22 @ 17:09) >      IMPRESSION:    No evidence of abdominal or pelvic mass or inflammatory process.    There are moderate bilateral pleural effusions with atelectatic changes   at both lung bases.    < end of copied text >  MEDICATIONS  (STANDING):  aspirin enteric coated 81 milliGRAM(s) Oral daily  atorvastatin 80 milliGRAM(s) Oral at bedtime  calcitriol   Capsule 0.25 MICROGram(s) Oral daily  clopidogrel Tablet 75 milliGRAM(s) Oral daily  furosemide   Injectable 60 milliGRAM(s) IV Push two times a day  heparin   Injectable 5000 Unit(s) SubCutaneous every 8 hours  hydrALAZINE 50 milliGRAM(s) Oral three times a day  insulin glargine Injectable (LANTUS) 10 Unit(s) SubCutaneous at bedtime  insulin lispro (ADMELOG) corrective regimen sliding scale   SubCutaneous three times a day before meals  insulin lispro Injectable (ADMELOG) 5 Unit(s) SubCutaneous three times a day before meals  metoprolol succinate ER 50 milliGRAM(s) Oral daily  NIFEdipine XL 60 milliGRAM(s) Oral daily  sevelamer carbonate 800 milliGRAM(s) Oral three times a day with meals  sodium bicarbonate 1300 milliGRAM(s) Oral three times a day  sodium zirconium cyclosilicate 10 Gram(s) Oral three times a day  sucralfate 1 Gram(s) Oral four times a day    MEDICATIONS  (PRN):  acetaminophen     Tablet .. 650 milliGRAM(s) Oral every 6 hours PRN Temp greater or equal to 38C (100.4F), Mild Pain (1 - 3)  aluminum hydroxide/magnesium hydroxide/simethicone Suspension 30 milliLiter(s) Oral every 4 hours PRN Dyspepsia  melatonin 3 milliGRAM(s) Oral at bedtime PRN Insomnia  ondansetron Injectable 4 milliGRAM(s) IV Push every 8 hours PRN Nausea and/or Vomiting

## 2022-11-23 NOTE — PHYSICAL THERAPY INITIAL EVALUATION ADULT - PLANNED THERAPY INTERVENTIONS, PT EVAL
SUBJECTIVE/OBJECTIVE:                Cayetano Lunsford is a 74 year old male called for a follow-up visit for Medication Therapy Management.  He was referred to me from Dr. Debbie Lewis.     Chief Complaint: Follow up from our visit on 2/21/18.  He had cold and was put on prednisone and azithromycin on 4/2/18. Blood sugars were higher when he was on prednisone for 5 days.    - He had a follow up with GI on 3/20/18, they did a bunch of blood work.  No medicine changes at this visit.    - He also followed up with pulmonologist and discussed my recommendations but he didn't want to change anything.    Tobacco: No tobacco use  Alcohol: not currently using    Medication Adherence/Access:  Missed a couple days of tamsulosin in the last month but not the others.    Diabetes:  Pt currently taking metformin bid, Novolin N 5 units AM before breakfast and 15 units in PM-at dinner, Novolin R-5 units with breakfast, between 80 and 120, reduce your R insulin by 2 units and 2 units of R at dinner if blood sugars are over 200. If below 70 skip dose. He gives his shots in his arms and stomach but most of the time in the arms.  He is now writing the date on the Novolin R and discarding after 42 days.   SMBG: 3-4 times daily. Ranges (per pt report): see chart below.   Symptoms of low blood sugar? Fingers and lips tingle, shaky and sweaty. Frequency of hypoglycemia? Has not had one in a while. Treating with small amount of orange juice.  Recent symptoms of high blood sugar? none.  Eye exam: up to date  Foot exam: up to date  Microalbumin is not < 30 mg/g. Pt is taking an ACEi/ARB not at max dose.  Aspirin: Not taking due to anticoagulation therapy and increased risk of bleeding  Diet/Exercise:  No change from last visit. He repots he has been walking more - 20 minutes twice daily.  Diet is about the same from last time. He is eating something for lunch every day.  Dinner is his largest meal. He will eat a dessert a couple times a week.  Breakfast is usually cereal +/- blueberries or a banana. He sometimes snacks after dinner but it is usually some cheezits. Tries to stay away from high sugar content foods at this time.     Ave: 7 day: 130, 14 day: 138, 30 day: 150    Date FBG/ 2hours post Lunch/2hours post Dinner /2hours post Before bed   4/23/18 100/102      4/22 80/107  88 92   4/21 147/161  208 (2 R) 124   4/20 200/172 (R 7)  105 173   4/19 87/105 (no R)  134 123   4/18 178/161  209 (2 R) 75 (3 oz juice)   4/17 127/130  111 103   4/16 149/132  103 182   4/15 120/122  174 140   4/14 104  151 178   4/13 120/107  108 108   4/12 163  174 188   4/11 153  187 160   4/10 176  152 125   4/9 201 (R 7)  151 164   4/8 169  130    4/7 144  206 (2 R) birthday party this day 141       From last Visit:    Ave: 7 day: 140, 14 day: 134, 30 day: 145    Date FBG/ 2hours post Lunch/2hours post Dinner /2hours post Before bed (5 hours after dinner   2/21/18 98      2/20 153  120 (no R) 82   2/19 148  140 163   2/18 116  306 (2 R) - ate out  132   2/17 219 ( 6 R) had chips and dip the night before  76 before dinner at 5:12 had 3 oz OJ then at 6:06pm 151  81 at 10:51 pm, ate a single serve pudding cups, then at 11:39pm 97   2/16 100  138 198 (1 R with snack)   2/15 199 (R 6)  81 167   2/14 153  60 (no R) not enough lunch 71 - didn't feel it, 3-4 oz OJ    2/13 105 ( May or may not have used R)  65 - 3-4 oz OJ then 30 minutes later 87. Make active earlier in the day. 116   2/12 128  114 155   2/11 144  84 143   2/10 132  160 198 - had pizza and beer(2 R with snack)   2/09 97  194 (2 R) - had a couple of fun size candy bars 168   2/8 123  219 (2 R) 113   2/7 189  120 92   2/6 132  222 (2R)/181 118   2/5 153  110 174   2/4 155  201 (2 R) 116   2/3 130  162 89 - ate something before bed   2/2 213 (7 R)  142    2/1 128  5:08pm it was 78, 3-4 oz OJ then 90 212 (2 R and ate snack)    1/31 165  213 (2 R) ate apple pie in afternoon 105     Tremor:  Current medications include:  propranolol 120 mg twice daily and primidone 150 mg three times daily.  Sometimes he thinks the primidone has helped and other times it is still challenging to write out checks and other things. Dr. Sen had asked him to check with Dr. Lewis if she thought it would be ok to increase the morning propranolol to 160 mg. He is still able to draw up his insulin.     COPD: Current medications: Albuterol MDI, Ipratropium/albuterol Nebs and Stiolto 2 puffs once daily.     Pt is not experiencing side effects.   Pt reports the following symptoms: feels his breathing has gotten better.  Occasionally he will have some SOB when walking but not all the time.  Pt does not have an COPD Action Plan on file.   Has spirometry been completed: Yes     Afib:  Current medications include: warfarin, propafenone 150 mg twice daily.  Pt reports no usual bleeding or bruising.     Ulcerative Colitis:  Current medications include: sulfasalazine 500 mg three times daily and pantoprazole 40 mg once daily.  Patient reports he has had no issues on the regimen.     Hypertension: Current medications include losartan 100 mg once daily.  Patient does self-monitor BP. Home BP monitoring in range of 120-130's systolic over 60-70's diastolic.  Patient reports no current medication side effects.    Allergic Rhinitis:  Current medications include: azelastine 0.1% 1-2 sprays twice daily and benadryl 3-4 times a week when his nose starts dripping.      Current labs include:  Today's Vitals: There were no vitals taken for this visit. - phone visit    BP Readings from Last 3 Encounters:   04/02/18 126/80   03/06/18 122/60   12/05/17 116/58     Lab Results   Component Value Date    A1C 6.0 11/02/2017    A1C 5.8 05/08/2017    A1C 6.9 10/17/2016    A1C 6.2 07/22/2016    A1C 6.7 03/28/2016     Lab Results   Component Value Date    CHOL 159 05/08/2017     Lab Results   Component Value Date    TRIG 112 05/08/2017     Lab Results   Component Value Date    HDL  54 05/08/2017     Lab Results   Component Value Date    LDL 83 05/08/2017       Liver Function Studies -   Recent Labs   Lab Test  03/13/17   1050   PROTTOTAL  7.0   ALBUMIN  3.8   BILITOTAL  0.4   ALKPHOS  60   AST  20   ALT  22       Lab Results   Component Value Date    UCRR 40 06/20/2017    MICROL 24 06/20/2017    UMALCR 61.31 (H) 06/20/2017       Last Basic Metabolic Panel:  Lab Results   Component Value Date     03/13/2017      Lab Results   Component Value Date    POTASSIUM 4.6 03/13/2017     Lab Results   Component Value Date    CHLORIDE 100 03/13/2017     Lab Results   Component Value Date    BUN 16 03/13/2017     Lab Results   Component Value Date    CR 0.76 03/13/2017     GFR Estimate   Date Value Ref Range Status   03/13/2017 >90  Non  GFR Calc   >60 mL/min/1.7m2 Final   01/09/2017 >90 >60 mL/min/1.7m2 Final   10/03/2016 >60 >60 ml/min/1.73m2 Final     Most Recent Immunizations   Administered Date(s) Administered     HepB 08/05/2014     Influenza (H1N1) 01/08/2010     Influenza (High Dose) 3 valent vaccine 11/02/2017     Influenza (IIV3) PF 09/11/2009     Pneumo Conj 13-V (2010&after) 11/10/2015     Pneumococcal 23 valent 07/20/2010     TD (ADULT, 7+) 08/08/2008     TDAP Vaccine (Adacel) 08/05/2014     Zoster vaccine recombinant adjuvanted (SHINGRIX) 04/18/2018     Zoster vaccine, live 06/13/2008       ASSESSMENT:              Current medications were reviewed today as discussed above.      Medication Adherence: good, no issues identified    Diabetes: Stable. Patient is meeting A1c goal of < 7%. Pt would benefit from no changes today. Microalbumin is not at goal < 30 mg/g. Taking an ARB at max dose. Due for annual foot exam. Aspirin therapy is not indicated in this patient due to anticoagulant/antiplatelet therapy.     Tremor:  Stable. Asked him to discuss increasing propranolol with Dr. Lewis. Propranolol is not cardioselective so could worsen breathing so would need to  monitor.    COPD: Stable. Patient would benefit from no changes at this time but completed a COPD Action Plan.    Afib:  Stable    Ulcerative Colitis:  Stable    Hypertension: Stable. Patient is meeting BP goal of < 140/90mmHg.     Allergic Rhinitis:  Needs Improvement. Recommended he try loratadine in place of benadryl as it is safer at his age.     PLAN:                  1. Try taking loratadine 10 mg once daily in place of benadryl. It is safer for you.    2. If Dr. Lewis does say it is ok to increase the propranolol just watch your blood pressure and any worsening in breathing.    I spent 60 minutes with this patient today. All changes were made via collaborative practice agreement with Debbie Lewis. A copy of the visit note was provided to the patient's primary care provider.     Will follow up in 3 months.    The patient was mailed a summary of these recommendations as an after visit summary.    Tanna Diaz PharmD  Medication Therapy Management Pharmacist    Chart documentation was completed in part with Dragon voice-recognition software. Even though reviewed, some grammatical, spelling, and word errors may remain.       bed mobility training/gait training/transfer training

## 2022-11-24 ENCOUNTER — TRANSCRIPTION ENCOUNTER (OUTPATIENT)
Age: 57
End: 2022-11-24

## 2022-11-24 LAB
ALBUMIN SERPL ELPH-MCNC: 3.5 G/DL — SIGNIFICANT CHANGE UP (ref 3.5–5.2)
ALP SERPL-CCNC: 161 U/L — HIGH (ref 30–115)
ALT FLD-CCNC: 15 U/L — SIGNIFICANT CHANGE UP (ref 0–41)
ANION GAP SERPL CALC-SCNC: 15 MMOL/L — HIGH (ref 7–14)
AST SERPL-CCNC: 15 U/L — SIGNIFICANT CHANGE UP (ref 0–41)
BILIRUB SERPL-MCNC: 0.2 MG/DL — SIGNIFICANT CHANGE UP (ref 0.2–1.2)
BUN SERPL-MCNC: 43 MG/DL — HIGH (ref 10–20)
CALCIUM SERPL-MCNC: 7.7 MG/DL — LOW (ref 8.4–10.5)
CHLORIDE SERPL-SCNC: 100 MMOL/L — SIGNIFICANT CHANGE UP (ref 98–110)
CO2 SERPL-SCNC: 26 MMOL/L — SIGNIFICANT CHANGE UP (ref 17–32)
CREAT SERPL-MCNC: 7.1 MG/DL — CRITICAL HIGH (ref 0.7–1.5)
EGFR: 6 ML/MIN/1.73M2 — LOW
GLUCOSE BLDC GLUCOMTR-MCNC: 124 MG/DL — HIGH (ref 70–99)
GLUCOSE BLDC GLUCOMTR-MCNC: 125 MG/DL — HIGH (ref 70–99)
GLUCOSE BLDC GLUCOMTR-MCNC: 140 MG/DL — HIGH (ref 70–99)
GLUCOSE BLDC GLUCOMTR-MCNC: 71 MG/DL — SIGNIFICANT CHANGE UP (ref 70–99)
GLUCOSE SERPL-MCNC: 74 MG/DL — SIGNIFICANT CHANGE UP (ref 70–99)
HCT VFR BLD CALC: 24.6 % — LOW (ref 37–47)
HGB BLD-MCNC: 7.9 G/DL — LOW (ref 12–16)
MAGNESIUM SERPL-MCNC: 2.2 MG/DL — SIGNIFICANT CHANGE UP (ref 1.8–2.4)
MCHC RBC-ENTMCNC: 28.2 PG — SIGNIFICANT CHANGE UP (ref 27–31)
MCHC RBC-ENTMCNC: 32.1 G/DL — SIGNIFICANT CHANGE UP (ref 32–37)
MCV RBC AUTO: 87.9 FL — SIGNIFICANT CHANGE UP (ref 81–99)
NRBC # BLD: 0 /100 WBCS — SIGNIFICANT CHANGE UP (ref 0–0)
PLATELET # BLD AUTO: 256 K/UL — SIGNIFICANT CHANGE UP (ref 130–400)
POTASSIUM SERPL-MCNC: 4.1 MMOL/L — SIGNIFICANT CHANGE UP (ref 3.5–5)
POTASSIUM SERPL-SCNC: 4.1 MMOL/L — SIGNIFICANT CHANGE UP (ref 3.5–5)
PROT SERPL-MCNC: 5.6 G/DL — LOW (ref 6–8)
RBC # BLD: 2.8 M/UL — LOW (ref 4.2–5.4)
RBC # FLD: 14.6 % — HIGH (ref 11.5–14.5)
SODIUM SERPL-SCNC: 141 MMOL/L — SIGNIFICANT CHANGE UP (ref 135–146)
WBC # BLD: 9.34 K/UL — SIGNIFICANT CHANGE UP (ref 4.8–10.8)
WBC # FLD AUTO: 9.34 K/UL — SIGNIFICANT CHANGE UP (ref 4.8–10.8)

## 2022-11-24 PROCEDURE — 93306 TTE W/DOPPLER COMPLETE: CPT | Mod: 26

## 2022-11-24 PROCEDURE — 99232 SBSQ HOSP IP/OBS MODERATE 35: CPT

## 2022-11-24 RX ORDER — HYDRALAZINE HCL 50 MG
1 TABLET ORAL
Qty: 90 | Refills: 0
Start: 2022-11-24 | End: 2022-12-23

## 2022-11-24 RX ADMIN — Medication 60 MILLIGRAM(S): at 05:20

## 2022-11-24 RX ADMIN — SEVELAMER CARBONATE 800 MILLIGRAM(S): 2400 POWDER, FOR SUSPENSION ORAL at 12:07

## 2022-11-24 RX ADMIN — Medication 5 UNIT(S): at 17:29

## 2022-11-24 RX ADMIN — Medication 50 MILLIGRAM(S): at 05:20

## 2022-11-24 RX ADMIN — Medication 1 GRAM(S): at 12:05

## 2022-11-24 RX ADMIN — SEVELAMER CARBONATE 800 MILLIGRAM(S): 2400 POWDER, FOR SUSPENSION ORAL at 08:04

## 2022-11-24 RX ADMIN — Medication 81 MILLIGRAM(S): at 12:04

## 2022-11-24 RX ADMIN — Medication 60 MILLIGRAM(S): at 15:26

## 2022-11-24 RX ADMIN — Medication 650 MILLIGRAM(S): at 12:06

## 2022-11-24 RX ADMIN — HEPARIN SODIUM 5000 UNIT(S): 5000 INJECTION INTRAVENOUS; SUBCUTANEOUS at 05:20

## 2022-11-24 RX ADMIN — Medication 1 GRAM(S): at 17:29

## 2022-11-24 RX ADMIN — HEPARIN SODIUM 5000 UNIT(S): 5000 INJECTION INTRAVENOUS; SUBCUTANEOUS at 15:26

## 2022-11-24 RX ADMIN — CALCITRIOL 0.25 MICROGRAM(S): 0.5 CAPSULE ORAL at 12:04

## 2022-11-24 RX ADMIN — SEVELAMER CARBONATE 800 MILLIGRAM(S): 2400 POWDER, FOR SUSPENSION ORAL at 17:29

## 2022-11-24 RX ADMIN — Medication 50 MILLIGRAM(S): at 15:26

## 2022-11-24 RX ADMIN — Medication 1 GRAM(S): at 05:19

## 2022-11-24 RX ADMIN — Medication 5 UNIT(S): at 12:05

## 2022-11-24 RX ADMIN — CLOPIDOGREL BISULFATE 75 MILLIGRAM(S): 75 TABLET, FILM COATED ORAL at 12:05

## 2022-11-24 NOTE — DISCHARGE NOTE PROVIDER - PROVIDER TOKENS
06-Mar-2019
PROVIDER:[TOKEN:[[id number]],FOLLOWUP:[1 week],ESTABLISHEDPATIENT:[T]],PROVIDER:[TOKEN:[[id number]],FOLLOWUP:[2 weeks],ESTABLISHEDPATIENT:[T]]

## 2022-11-24 NOTE — DISCHARGE NOTE PROVIDER - CARE PROVIDERS DIRECT ADDRESSES
,[email or web address]

## 2022-11-24 NOTE — DISCHARGE NOTE PROVIDER - NSDCFUSCHEDAPPT_GEN_ALL_CORE_FT
[facility]  PODIATRY [address]  Scheduled Appointment: 12/16/2022    [facility]  PAINMGT [address]  Scheduled Appointment: 01/04/2023    Lexx Muñoz  [facility]  VASCULAR [address]  Scheduled Appointment: 02/07/2023

## 2022-11-24 NOTE — DISCHARGE NOTE PROVIDER - CARE PROVIDER_API CALL
Hill Franklin)  Internal Medicine; Nephrology  [address] [place] [address]  Phone: [phone]  Fax: [phone]  Established Patient  Follow Up Time: 1 week    Shira Damon)  Internal Medicine  [address] [facility], [address] [place] [address]  Phone: [phone]  Fax: [phone]  Established Patient  Follow Up Time: 2 weeks

## 2022-11-24 NOTE — DISCHARGE NOTE PROVIDER - NSDCFUADDINST_GEN_ALL_CORE_FT
Follow up in hemodialysis as currently scheduled. See your primary care doctor in 2 weeks, and your nephrology team as scheduled after your discharge.

## 2022-11-24 NOTE — DISCHARGE NOTE PROVIDER - HOSPITAL COURSE
58 yo female w/ PMH of HTN, HLD, IDD, DM, CKD stage IV on dialysis (sees Dr. Solis Borja), deafness presents for abdominal pain x 4 days. Per family, pt has been complaining of pain in LLQ.  She missed one dialysis session afer which family noticed a non-productive cough,  sore throat, subjective fevers, and loose stool. denies any cp, palpitations, chills, headaches, dizziness, nausea or vomiting. History was obtained with use of sign- and family member to interpret at the bedside.     In the ED, /95, HR 94, RR 18, T 97.5, SpO2 95% on RA. EKG showed NSR. CTAP No evidence of abdominal or pelvic mass or inflammatory process. There are moderate bilateral pleural effusions with atelectatic changes at both lung bases. CXR showed New diffuse bilateral interstitial opacities, and bibasilar opacities/small pleural effusions. Labs were significant for Hgb 8.3, Cr 7 (on dialysis), trop 0.03. UA dirty. Patient was given Hydralazine 100mg, Metoprolol 100, Nifedipine 30mg and Lasix 80mg xq1, and Nifedipine increased to 60mg in the ED at this time. The patient's BP responded and has been stable since restarting anti-hypertensives.     Patient was admitted to [address] [place] [address] for monitoring. She clinically improved after restarting her on home BP meds, and after receiving HD on day of admission. She again went for HD on Thursday, 11/24. Patient reported improved abdominal pain, cough, and no shortness of breath or chest pain. Patient and family expressed desire to go home after HD session on 11/24, and the patient reported resolution of symptoms for which. she presented. BP was stabilized after restarting home meds. Hemodynamically stable, no chest pain, shortness of breath, abdominal pain, or respiratory distress. Patient was medically cleared for discharge with outpatient follow-up.  No other acute events occurred. 58 yo female w/ PMH of HTN, HLD, IDD, DM, CKD stage IV on dialysis (sees Dr. Solis Borja), deafness presents for abdominal pain x 4 days. Per family, pt has been complaining of pain in LLQ.  She missed one dialysis session afer which family noticed a non-productive cough,  sore throat, subjective fevers, and loose stool. denies any cp, palpitations, chills, headaches, dizziness, nausea or vomiting. History was obtained with use of sign- and family member to interpret at the bedside.     In the ED, /95, HR 94, RR 18, T 97.5, SpO2 95% on RA. EKG showed NSR. CTAP No evidence of abdominal or pelvic mass or inflammatory process. There are moderate bilateral pleural effusions with atelectatic changes at both lung bases. CXR showed New diffuse bilateral interstitial opacities, and bibasilar opacities/small pleural effusions. Labs were significant for Hgb 8.3, Cr 7 (on dialysis), trop 0.03. UA dirty. Patient was given Hydralazine 100mg, Metoprolol 100, Nifedipine 30mg and Lasix 80mg xq1, and Nifedipine increased to 60mg in the ED at this time. The patient's BP responded and has been stable since restarting anti-hypertensives.     Patient was admitted to [address] [place] [address] for monitoring. She clinically improved after restarting her on home BP meds, and after receiving HD on day of admission. She again went for HD on Thursday, 11/24. Patient reported improved abdominal pain, cough, and no shortness of breath or chest pain. Patient and family expressed desire to go home after HD session on 11/24, and the patient reported resolution of symptoms for which. she presented. BP was stabilized after restarting home meds.   Patient was followed by nephrology, who recommended dc sodium bicarb, and to start torsemide 40mg PO on non-dialysis days  Hemodynamically stable, no chest pain, shortness of breath, abdominal pain, or respiratory distress. Patient was medically cleared for discharge with outpatient follow-up.  No other acute events occurred.

## 2022-11-24 NOTE — PROGRESS NOTE ADULT - SUBJECTIVE AND OBJECTIVE BOX
ERIK FOLLOW UP NOTE  --------------------------------------------------------------------------------  Chief Complaint:    24 hour events/subjective:        PAST HISTORY  --------------------------------------------------------------------------------  No significant changes to PMH, PSH, FHx, SHx, unless otherwise noted    ALLERGIES & MEDICATIONS  --------------------------------------------------------------------------------  Allergies    NSAIDs (Nephrotoxicity)  penicillin (Rash)    Intolerances      Standing Inpatient Medications  aspirin enteric coated 81 milliGRAM(s) Oral daily  atorvastatin 80 milliGRAM(s) Oral at bedtime  benzocaine 20% Spray 1 Spray(s) Topical once  calcitriol   Capsule 0.25 MICROGram(s) Oral daily  clopidogrel Tablet 75 milliGRAM(s) Oral daily  furosemide   Injectable 60 milliGRAM(s) IV Push two times a day  heparin   Injectable 5000 Unit(s) SubCutaneous every 8 hours  hydrALAZINE 50 milliGRAM(s) Oral three times a day  insulin glargine Injectable (LANTUS) 10 Unit(s) SubCutaneous at bedtime  insulin lispro (ADMELOG) corrective regimen sliding scale   SubCutaneous three times a day before meals  insulin lispro Injectable (ADMELOG) 5 Unit(s) SubCutaneous three times a day before meals  metoprolol succinate ER 50 milliGRAM(s) Oral daily  NIFEdipine XL 60 milliGRAM(s) Oral daily  sevelamer carbonate 800 milliGRAM(s) Oral three times a day with meals  sucralfate 1 Gram(s) Oral four times a day    PRN Inpatient Medications  acetaminophen     Tablet .. 650 milliGRAM(s) Oral every 6 hours PRN  aluminum hydroxide/magnesium hydroxide/simethicone Suspension 30 milliLiter(s) Oral every 4 hours PRN  melatonin 3 milliGRAM(s) Oral at bedtime PRN  ondansetron Injectable 4 milliGRAM(s) IV Push every 8 hours PRN      REVIEW OF SYSTEMS  --------------------------------------------------------------------------------  Gen: No weight changes, fatigue, fevers/chills, weakness  Skin: No rashes  Head/Eyes/Ears/Mouth: No headache; Normal hearing; Normal vision w/o blurriness; No sinus pain/discomfort, sore throat  Respiratory: No dyspnea, cough, wheezing, hemoptysis  CV: No chest pain, PND, orthopnea  GI: No abdominal pain, diarrhea, constipation, nausea, vomiting, melena, hematochezia  : No increased frequency, dysuria, hematuria, nocturia  MSK: No joint pain/swelling; no back pain; no edema  Neuro: No dizziness/lightheadedness, weakness, seizures, numbness, tingling  Heme: No easy bruising or bleeding  Endo: No heat/cold intolerance  Psych: No significant nervousness, anxiety, stress, depression    All other systems were reviewed and are negative, except as noted.    VITALS/PHYSICAL EXAM  --------------------------------------------------------------------------------  T(C): 36.4 (11-24-22 @ 05:33), Max: 36.6 (11-23-22 @ 20:15)  HR: 79 (11-24-22 @ 05:33) (65 - 79)  BP: 164/71 (11-24-22 @ 05:33) (117/56 - 164/71)  RR: 18 (11-23-22 @ 20:15) (18 - 18)  SpO2: --  Wt(kg): --    Weight (kg): 73.3 (11-22-22 @ 10:11)      11-23-22 @ 07:01  -  11-24-22 @ 07:00  --------------------------------------------------------  IN: 480 mL / OUT: 0 mL / NET: 480 mL      Physical Exam:  	Gen: NAD, well-appearing  	HEENT: PERRL, supple neck, clear oropharynx  	Pulm: CTA B/L  	CV: RRR, S1S2; no rub  	Back: No spinal or CVA tenderness; no sacral edema  	Abd: +BS, soft, nontender/nondistended  	: No suprapubic tenderness  	UE: Warm, FROM, no clubbing, intact strength; no edema; no asterixis  	LE: Warm, FROM, no clubbing, intact strength; no edema  	Neuro: No focal deficits, intact gait  	Psych: Normal affect and mood  	Skin: Warm, without rashes  	Vascular access:    LABS/STUDIES  --------------------------------------------------------------------------------              7.9    9.34  >-----------<  256      [11-24-22 @ 05:42]              24.6     141  |  100  |  43  ----------------------------<  74      [11-24-22 @ 05:42]  4.1   |  26  |  7.1        Ca     7.7     [11-24-22 @ 05:42]      Mg     2.2     [11-24-22 @ 05:42]    TPro  5.6  /  Alb  3.5  /  TBili  0.2  /  DBili  x   /  AST  15  /  ALT  15  /  AlkPhos  161  [11-24-22 @ 05:42]          Creatinine Trend:  SCr 7.1 [11-24 @ 05:42]  SCr 5.6 [11-23 @ 06:17]  SCr 4.6 [11-22 @ 19:02]  SCr 7.4 [11-22 @ 06:31]  SCr 7.0 [11-21 @ 13:00]    Urinalysis - [11-21-22 @ 16:58]      Color Light Yellow / Appearance Clear / SG 1.014 / pH 7.5      Gluc Trace / Ketone Negative  / Bili Negative / Urobili <2 mg/dL       Blood Trace / Protein >600 mg/dL / Leuk Est Negative / Nitrite Negative      RBC 3 / WBC 12 / Hyaline 1 / Gran  / Sq Epi  / Non Sq Epi 5 / Bacteria Few      Iron 99, TIBC 195, %sat 51      [05-12-22 @ 08:21]  Ferritin 97      [05-12-22 @ 08:21]  PTH -- (Ca 7.8)      [05-12-22 @ 08:21]   275  TSH 2.09      [05-10-22 @ 09:04]  Lipid: chol 158, , HDL 61, LDL --      [11-22-22 @ 06:31]

## 2022-11-24 NOTE — CHART NOTE - NSCHARTNOTEFT_GEN_A_CORE
Explained to the patient that the dialysis unit is closed today and she needs to stay till tomorrow to do dialysis.  Her schedule is Tuesday, Thursday, and Saturday. Family and patient would like to maintain this schedule.  Also, please make sure she goes early in the morning for her dialysis session.
Consult received; CHF    Medical:  58 yo female w/ PMH of HTN, HLD, IDD, DM, CKD stage IV on dialysis (sees Dr. Solis Borja), deafness presents for abdominal pain x 4 days. Per family, pt has been complaining of pain in LLQ.  She missed one dialysis session afer which family noticed a non-productive cough,  sore throat, subjective fevers, and loose stool. denies any cp, palpitations, chills, headaches, dizziness, nausea or vomiting     Nutrition:  Per RN, patient eating adequately, family bringing patient food in addition  no family at bedside, unable to obtain -- will attempt to f/u with services prior to patients d/c  patient would benefit from outpatient nutrition counseling after d/c may be more appropriate than inpatient setting     Diet, Regular:   DASH/TLC {Sodium & Cholesterol Restricted} (22 @ 19:53) [Active]    *no noted edema in flowsheets  *no pressure injuries  Anthropometrics: 73.3kg weight  height: unknown  BMI: unknown  *patient appears well nourished    LABS:                          7.8    6.33  )-----------( 231      ( 2022 06:17 )             24.4     11-    142  |  101  |  34<H>  ----------------------------<  116<H>  4.1   |  28  |  5.6<HH>    Ca    7.5<L>      2022 06:17  Mg     2.2     -    TPro  5.4<L>  /  Alb  3.4<L>  /  TBili  0.2  /  DBili  x   /  AST  11  /  ALT  10  /  AlkPhos  127<H>  11-23    LIVER FUNCTIONS - ( 2022 06:17 )  Alb: 3.4 g/dL / Pro: 5.4 g/dL / ALK PHOS: 127 U/L / ALT: 10 U/L / AST: 11 U/L / GGT: x             Urinalysis Basic - ( 2022 16:58 )    Color: Light Yellow / Appearance: Clear / S.014 / pH: x  Gluc: x / Ketone: Negative  / Bili: Negative / Urobili: <2 mg/dL   Blood: x / Protein: >600 mg/dL / Nitrite: Negative   Leuk Esterase: Negative / RBC: 3 /HPF / WBC 12 /HPF   Sq Epi: x / Non Sq Epi: 5 /HPF / Bacteria: Few    Continue DASH/TLC diet      Amargo Couture [id number] or via teams

## 2022-11-24 NOTE — DISCHARGE NOTE PROVIDER - NSDCCPCAREPLAN_GEN_ALL_CORE_FT
PRINCIPAL DISCHARGE DIAGNOSIS  Diagnosis: Pulmonary edema  Assessment and Plan of Treatment: You were admitted with difficulty breathing, abdominal pain, and cough. This may have occurred from fluid overload into your lungs following a missed dialysis session. Please see your nephrology team in dialysis as currently scheduled and be sure not to miss dialysis sessions. You were also admitted with high blood pressure, known as hypertensive emergency and you had a systolic BP of over 200. This may have occurred after missing a home dose of your normal medication regimen. Your blood pressure improved after restarting your home medications in the ED for high blood pressure treatment. Please take all of your medications as directed by your medical team, and see your primary care doctor 2 weeks after your hospital discharge.   If you experience any new-onset shortness of breath, trouble breathing, chest pain, lightheadedness or worsening abdominal pain, please return to the ED for treatment.      SECONDARY DISCHARGE DIAGNOSES  Diagnosis: Depressed left ventricular ejection fraction  Assessment and Plan of Treatment:     Diagnosis: Admission for dialysis  Assessment and Plan of Treatment:      PRINCIPAL DISCHARGE DIAGNOSIS  Diagnosis: Pulmonary edema  Assessment and Plan of Treatment: You were admitted with difficulty breathing, abdominal pain, and cough. This may have occurred from fluid overload into your lungs following a missed dialysis session. Please see your nephrology team in dialysis as currently scheduled and be sure not to miss dialysis sessions. You were also admitted with high blood pressure, known as hypertensive emergency and you had a systolic BP of over 200. This may have occurred after missing a home dose of your normal medication regimen. Your blood pressure improved after restarting your home medications in the ED for high blood pressure treatment. Please take all of your medications as directed by your medical team, and see your primary care doctor 2 weeks after your hospital discharge.   If you experience any new-onset shortness of breath, trouble breathing, chest pain, lightheadedness or worsening abdominal pain, please return to the ED for treatment.

## 2022-11-24 NOTE — PROGRESS NOTE ADULT - SUBJECTIVE AND OBJECTIVE BOX
SUBJECTIVE:    Patient is a 57y old Female who presents with a chief complaint of Pulmonary edema (24 Nov 2022 09:53)    Currently admitted to medicine with the primary diagnosis of Pulmonary edema       Today is hospital day 3d. This morning she is resting comfortably in bed and reports no new issues or overnight events.     PAST MEDICAL & SURGICAL HISTORY  PVD (peripheral vascular disease)    Benign essential HTN    Intellectual disability    Hearing impaired    HLD (hyperlipidemia)    Chronic kidney disease, unspecified CKD stage    H/O vascular surgery  left leg      SOCIAL HISTORY:  Negative for smoking/alcohol/drug use.     ALLERGIES:  NSAIDs (Nephrotoxicity)  penicillin (Rash)    MEDICATIONS:  STANDING MEDICATIONS  aspirin enteric coated 81 milliGRAM(s) Oral daily  atorvastatin 80 milliGRAM(s) Oral at bedtime  benzocaine 20% Spray 1 Spray(s) Topical once  calcitriol   Capsule 0.25 MICROGram(s) Oral daily  clopidogrel Tablet 75 milliGRAM(s) Oral daily  furosemide   Injectable 60 milliGRAM(s) IV Push two times a day  heparin   Injectable 5000 Unit(s) SubCutaneous every 8 hours  hydrALAZINE 50 milliGRAM(s) Oral three times a day  insulin glargine Injectable (LANTUS) 10 Unit(s) SubCutaneous at bedtime  insulin lispro (ADMELOG) corrective regimen sliding scale   SubCutaneous three times a day before meals  insulin lispro Injectable (ADMELOG) 5 Unit(s) SubCutaneous three times a day before meals  metoprolol succinate ER 50 milliGRAM(s) Oral daily  NIFEdipine XL 60 milliGRAM(s) Oral daily  sevelamer carbonate 800 milliGRAM(s) Oral three times a day with meals  sucralfate 1 Gram(s) Oral four times a day    PRN MEDICATIONS  acetaminophen     Tablet .. 650 milliGRAM(s) Oral every 6 hours PRN  aluminum hydroxide/magnesium hydroxide/simethicone Suspension 30 milliLiter(s) Oral every 4 hours PRN  melatonin 3 milliGRAM(s) Oral at bedtime PRN  ondansetron Injectable 4 milliGRAM(s) IV Push every 8 hours PRN    VITALS:   T(F): 97.2  HR: 70  BP: 131/61  RR: 18  SpO2: --    PHYSICAL EXAM:  GEN: No acute distress  LUNGS: Clear to auscultation bilaterally   HEART: S1/S2 present  ABD: Soft, non-tender, non-distended. Bowel sounds present      LABS:                        7.9    9.34  )-----------( 256      ( 24 Nov 2022 05:42 )             24.6     11-24    141  |  100  |  43<H>  ----------------------------<  74  4.1   |  26  |  7.1<HH>    Ca    7.7<L>      24 Nov 2022 05:42  Mg     2.2     11-24    TPro  5.6<L>  /  Alb  3.5  /  TBili  0.2  /  DBili  x   /  AST  15  /  ALT  15  /  AlkPhos  161<H>  11-24                Culture - Urine (collected 21 Nov 2022 16:58)  Source: Clean Catch Clean Catch (Midstream)  Final Report (23 Nov 2022 10:50):    Normal Urogenital na present            RADIOLOGY:

## 2022-11-25 LAB
ALBUMIN SERPL ELPH-MCNC: 3.2 G/DL — LOW (ref 3.5–5.2)
ALP SERPL-CCNC: 168 U/L — HIGH (ref 30–115)
ALT FLD-CCNC: 13 U/L — SIGNIFICANT CHANGE UP (ref 0–41)
ANION GAP SERPL CALC-SCNC: 14 MMOL/L — SIGNIFICANT CHANGE UP (ref 7–14)
AST SERPL-CCNC: 13 U/L — SIGNIFICANT CHANGE UP (ref 0–41)
BILIRUB SERPL-MCNC: 0.2 MG/DL — SIGNIFICANT CHANGE UP (ref 0.2–1.2)
BUN SERPL-MCNC: 53 MG/DL — HIGH (ref 10–20)
CALCIUM SERPL-MCNC: 7.9 MG/DL — LOW (ref 8.4–10.5)
CHLORIDE SERPL-SCNC: 98 MMOL/L — SIGNIFICANT CHANGE UP (ref 98–110)
CO2 SERPL-SCNC: 26 MMOL/L — SIGNIFICANT CHANGE UP (ref 17–32)
CREAT SERPL-MCNC: 7.7 MG/DL — CRITICAL HIGH (ref 0.7–1.5)
EGFR: 6 ML/MIN/1.73M2 — LOW
GLUCOSE BLDC GLUCOMTR-MCNC: 108 MG/DL — HIGH (ref 70–99)
GLUCOSE BLDC GLUCOMTR-MCNC: 135 MG/DL — HIGH (ref 70–99)
GLUCOSE BLDC GLUCOMTR-MCNC: 149 MG/DL — HIGH (ref 70–99)
GLUCOSE BLDC GLUCOMTR-MCNC: 173 MG/DL — HIGH (ref 70–99)
GLUCOSE SERPL-MCNC: 119 MG/DL — HIGH (ref 70–99)
HCT VFR BLD CALC: 24.5 % — LOW (ref 37–47)
HGB BLD-MCNC: 7.9 G/DL — LOW (ref 12–16)
IRON SATN MFR SERPL: 32 % — SIGNIFICANT CHANGE UP (ref 15–50)
IRON SATN MFR SERPL: 67 UG/DL — SIGNIFICANT CHANGE UP (ref 35–150)
MAGNESIUM SERPL-MCNC: 2.2 MG/DL — SIGNIFICANT CHANGE UP (ref 1.8–2.4)
MCHC RBC-ENTMCNC: 28.2 PG — SIGNIFICANT CHANGE UP (ref 27–31)
MCHC RBC-ENTMCNC: 32.2 G/DL — SIGNIFICANT CHANGE UP (ref 32–37)
MCV RBC AUTO: 87.5 FL — SIGNIFICANT CHANGE UP (ref 81–99)
NRBC # BLD: 0 /100 WBCS — SIGNIFICANT CHANGE UP (ref 0–0)
PLATELET # BLD AUTO: 249 K/UL — SIGNIFICANT CHANGE UP (ref 130–400)
POTASSIUM SERPL-MCNC: 4.6 MMOL/L — SIGNIFICANT CHANGE UP (ref 3.5–5)
POTASSIUM SERPL-SCNC: 4.6 MMOL/L — SIGNIFICANT CHANGE UP (ref 3.5–5)
PROT SERPL-MCNC: 5.5 G/DL — LOW (ref 6–8)
RBC # BLD: 2.8 M/UL — LOW (ref 4.2–5.4)
RBC # FLD: 14.4 % — SIGNIFICANT CHANGE UP (ref 11.5–14.5)
SODIUM SERPL-SCNC: 138 MMOL/L — SIGNIFICANT CHANGE UP (ref 135–146)
TIBC SERPL-MCNC: 210 UG/DL — LOW (ref 220–430)
UIBC SERPL-MCNC: 143 UG/DL — SIGNIFICANT CHANGE UP (ref 110–370)
WBC # BLD: 7.25 K/UL — SIGNIFICANT CHANGE UP (ref 4.8–10.8)
WBC # FLD AUTO: 7.25 K/UL — SIGNIFICANT CHANGE UP (ref 4.8–10.8)

## 2022-11-25 PROCEDURE — 99233 SBSQ HOSP IP/OBS HIGH 50: CPT

## 2022-11-25 PROCEDURE — 71045 X-RAY EXAM CHEST 1 VIEW: CPT | Mod: 26

## 2022-11-25 RX ORDER — HYDROCORTISONE 1 %
1 OINTMENT (GRAM) TOPICAL
Refills: 0 | Status: DISCONTINUED | OUTPATIENT
Start: 2022-11-25 | End: 2022-11-27

## 2022-11-25 RX ORDER — DIPHENHYDRAMINE HCL 50 MG
25 CAPSULE ORAL EVERY 4 HOURS
Refills: 0 | Status: DISCONTINUED | OUTPATIENT
Start: 2022-11-25 | End: 2022-11-27

## 2022-11-25 RX ORDER — DARBEPOETIN ALFA IN POLYSORBAT 200MCG/0.4
30 PEN INJECTOR (ML) SUBCUTANEOUS
Refills: 0 | Status: DISCONTINUED | OUTPATIENT
Start: 2022-11-25 | End: 2022-11-27

## 2022-11-25 RX ADMIN — Medication 50 MILLIGRAM(S): at 05:49

## 2022-11-25 RX ADMIN — HEPARIN SODIUM 5000 UNIT(S): 5000 INJECTION INTRAVENOUS; SUBCUTANEOUS at 13:33

## 2022-11-25 RX ADMIN — Medication 1 GRAM(S): at 13:40

## 2022-11-25 RX ADMIN — Medication 1 GRAM(S): at 05:50

## 2022-11-25 RX ADMIN — CALCITRIOL 0.25 MICROGRAM(S): 0.5 CAPSULE ORAL at 13:31

## 2022-11-25 RX ADMIN — Medication 81 MILLIGRAM(S): at 13:32

## 2022-11-25 RX ADMIN — Medication 50 MILLIGRAM(S): at 13:32

## 2022-11-25 RX ADMIN — Medication 60 MILLIGRAM(S): at 13:31

## 2022-11-25 RX ADMIN — SEVELAMER CARBONATE 800 MILLIGRAM(S): 2400 POWDER, FOR SUSPENSION ORAL at 08:10

## 2022-11-25 RX ADMIN — HEPARIN SODIUM 5000 UNIT(S): 5000 INJECTION INTRAVENOUS; SUBCUTANEOUS at 21:54

## 2022-11-25 RX ADMIN — HEPARIN SODIUM 5000 UNIT(S): 5000 INJECTION INTRAVENOUS; SUBCUTANEOUS at 05:50

## 2022-11-25 RX ADMIN — CLOPIDOGREL BISULFATE 75 MILLIGRAM(S): 75 TABLET, FILM COATED ORAL at 13:31

## 2022-11-25 RX ADMIN — Medication 5 UNIT(S): at 08:09

## 2022-11-25 RX ADMIN — ATORVASTATIN CALCIUM 80 MILLIGRAM(S): 80 TABLET, FILM COATED ORAL at 21:54

## 2022-11-25 RX ADMIN — Medication 1 GRAM(S): at 23:57

## 2022-11-25 RX ADMIN — SEVELAMER CARBONATE 800 MILLIGRAM(S): 2400 POWDER, FOR SUSPENSION ORAL at 17:22

## 2022-11-25 RX ADMIN — Medication 60 MILLIGRAM(S): at 05:49

## 2022-11-25 RX ADMIN — Medication 50 MILLIGRAM(S): at 21:54

## 2022-11-25 RX ADMIN — Medication 60 MILLIGRAM(S): at 05:50

## 2022-11-25 RX ADMIN — Medication 1 APPLICATION(S): at 22:45

## 2022-11-25 RX ADMIN — Medication 30 MICROGRAM(S): at 11:36

## 2022-11-25 RX ADMIN — Medication 1 GRAM(S): at 17:34

## 2022-11-25 RX ADMIN — SEVELAMER CARBONATE 800 MILLIGRAM(S): 2400 POWDER, FOR SUSPENSION ORAL at 13:40

## 2022-11-25 RX ADMIN — Medication 5 UNIT(S): at 17:21

## 2022-11-25 RX ADMIN — INSULIN GLARGINE 10 UNIT(S): 100 INJECTION, SOLUTION SUBCUTANEOUS at 21:55

## 2022-11-25 RX ADMIN — Medication 50 MILLIGRAM(S): at 05:50

## 2022-11-25 NOTE — PROGRESS NOTE ADULT - SUBJECTIVE AND OBJECTIVE BOX
Nephrology progress note    Patient is seen and examined, events over the last 24 h noted .  Seen on HD, comfortable  Allergies:  NSAIDs (Nephrotoxicity)  penicillin (Rash)    Hospital Medications:   MEDICATIONS  (STANDING):  aspirin enteric coated 81 milliGRAM(s) Oral daily  atorvastatin 80 milliGRAM(s) Oral at bedtime  benzocaine 20% Spray 1 Spray(s) Topical once  calcitriol   Capsule 0.25 MICROGram(s) Oral daily  clopidogrel Tablet 75 milliGRAM(s) Oral daily  furosemide   Injectable 60 milliGRAM(s) IV Push two times a day  heparin   Injectable 5000 Unit(s) SubCutaneous every 8 hours  hydrALAZINE 50 milliGRAM(s) Oral three times a day  insulin glargine Injectable (LANTUS) 10 Unit(s) SubCutaneous at bedtime  insulin lispro (ADMELOG) corrective regimen sliding scale   SubCutaneous three times a day before meals  insulin lispro Injectable (ADMELOG) 5 Unit(s) SubCutaneous three times a day before meals  metoprolol succinate ER 50 milliGRAM(s) Oral daily  NIFEdipine XL 60 milliGRAM(s) Oral daily  sevelamer carbonate 800 milliGRAM(s) Oral three times a day with meals  sucralfate 1 Gram(s) Oral four times a day        VITALS:  T(F): 98.1 (22 @ 05:35), Max: 98.1 (22 @ 05:35)  HR: 76 (22 @ 08:15)  BP: 169/79 (22 @ 08:15)  RR: 17 (22 @ 08:15)  SpO2: --  Wt(kg): --     @ 07:  -   @ 07:00  --------------------------------------------------------  IN: 480 mL / OUT: 0 mL / NET: 480 mL     @ 07:01  -   @ 07:00  --------------------------------------------------------  IN: 320 mL / OUT: 0 mL / NET: 320 mL          PHYSICAL EXAM:  Constitutional: NAD  HEENT: anicteric sclera  Neck: No JVD  Respiratory: CTAB, no wheezes, rales or rhonchi  Cardiovascular: S1, S2, RRR  Gastrointestinal: BS+, soft, NT/ND  Extremities: No peripheral edema  Neurological: A/O x 3, no focal deficits  : No CVA tenderness. No mcgrath.   Skin: No rashes  Vascular Access: AVG    LABS:      138  |  98  |  53<H>  ----------------------------<  119<H>  4.6   |  26  |  7.7<HH>    Ca    7.9<L>      2022 06:57  Mg     2.2         TPro  5.5<L>  /  Alb  3.2<L>  /  TBili  0.2  /  DBili      /  AST  13  /  ALT  13  /  AlkPhos  168<H>                            7.9    7.25  )-----------( 249      ( 2022 06:57 )             24.5       Urine Studies:  Urinalysis Basic - ( 2022 16:58 )    Color: Light Yellow / Appearance: Clear / S.014 / pH:   Gluc:  / Ketone: Negative  / Bili: Negative / Urobili: <2 mg/dL   Blood:  / Protein: >600 mg/dL / Nitrite: Negative   Leuk Esterase: Negative / RBC: 3 /HPF / WBC 12 /HPF   Sq Epi:  / Non Sq Epi: 5 /HPF / Bacteria: Few        RADIOLOGY & ADDITIONAL STUDIES:  < from: Xray Chest 1 View- PORTABLE-Urgent (22 @ 12:57) >    Impression:    New diffuse bilateral interstitial opacities, and bibasilar   opacities/small pleural effusions.    < end of copied text >  < from: CT Abdomen and Pelvis w/ IV Cont (22 @ 17:09) >  IMPRESSION:    No evidence of abdominal or pelvic mass or inflammatory process.    There are moderate bilateral pleural effusions with atelectatic changes   at both lung bases.    < end of copied text >

## 2022-11-25 NOTE — PROGRESS NOTE ADULT - SUBJECTIVE AND OBJECTIVE BOX
SEN LING  57y Female    INTERVAL HPI/OVERNIGHT EVENTS:    spoke to pt via  this morning after HD  brother in law at bedside  pt felt SOB last night but today felt better after HD  no fever, chest pain, N/V, abdominal pain  s/p HD today and 2L removed  she could not tolerate a lot of fluid removal during outpt session per family  ROS negative    T(F): 98.3 (11-25-22 @ 12:45), Max: 98.3 (11-25-22 @ 12:45)  HR: 75 (11-25-22 @ 16:13) (72 - 77)  BP: 172/72 (11-25-22 @ 16:13) (169/79 - 175/70)  RR: 18 (11-25-22 @ 12:45) (17 - 18)  SpO2: --    I&O's Summary    24 Nov 2022 07:01  -  25 Nov 2022 07:00  --------------------------------------------------------  IN: 320 mL / OUT: 0 mL / NET: 320 mL    25 Nov 2022 07:01  -  25 Nov 2022 18:41  --------------------------------------------------------  IN: 0 mL / OUT: 2000 mL / NET: -2000 mL      CAPILLARY BLOOD GLUCOSE      POCT Blood Glucose.: 149 mg/dL (25 Nov 2022 17:05)  POCT Blood Glucose.: 108 mg/dL (25 Nov 2022 12:27)  POCT Blood Glucose.: 135 mg/dL (25 Nov 2022 08:01)  POCT Blood Glucose.: 124 mg/dL (24 Nov 2022 21:38)        PHYSICAL EXAM:  GENERAL: NAD  HEAD:  Normocephalic  EYES:  conjunctiva and sclera clear  ENMT: Moist mucous membranes  NECK: Supple  NERVOUS SYSTEM:  Alert, awake, Good concentration  CHEST/LUNG: + crackles of lower lung fields  HEART: Regular rate and rhythm  ABDOMEN: Soft, Nontender, Nondistended; Bowel sounds present  EXTREMITIES: 1+ LE  edema b/l  left arm AV fistula with thrill  SKIN: warm, dry    Consultant(s) Notes Reviewed:  [x ] YES  [ ] NO  Care Discussed with Consultants/Other Providers [ x] YES  [ ] NO    MEDICATIONS  (STANDING):  aspirin enteric coated 81 milliGRAM(s) Oral daily  atorvastatin 80 milliGRAM(s) Oral at bedtime  benzocaine 20% Spray 1 Spray(s) Topical once  calcitriol   Capsule 0.25 MICROGram(s) Oral daily  clopidogrel Tablet 75 milliGRAM(s) Oral daily  darbepoetin Injectable Syringe 30 MICROGram(s) IV Push <User Schedule>  furosemide   Injectable 60 milliGRAM(s) IV Push two times a day  heparin   Injectable 5000 Unit(s) SubCutaneous every 8 hours  hydrALAZINE 50 milliGRAM(s) Oral three times a day  hydrocortisone 2.5% Lotion 1 Application(s) Topical two times a day  insulin glargine Injectable (LANTUS) 10 Unit(s) SubCutaneous at bedtime  insulin lispro (ADMELOG) corrective regimen sliding scale   SubCutaneous three times a day before meals  insulin lispro Injectable (ADMELOG) 5 Unit(s) SubCutaneous three times a day before meals  metoprolol succinate ER 50 milliGRAM(s) Oral daily  NIFEdipine XL 60 milliGRAM(s) Oral daily  sevelamer carbonate 800 milliGRAM(s) Oral three times a day with meals  sucralfate 1 Gram(s) Oral four times a day    MEDICATIONS  (PRN):  acetaminophen     Tablet .. 650 milliGRAM(s) Oral every 6 hours PRN Temp greater or equal to 38C (100.4F), Mild Pain (1 - 3)  aluminum hydroxide/magnesium hydroxide/simethicone Suspension 30 milliLiter(s) Oral every 4 hours PRN Dyspepsia  diphenhydrAMINE 25 milliGRAM(s) Oral every 4 hours PRN Rash and/or Itching  melatonin 3 milliGRAM(s) Oral at bedtime PRN Insomnia  ondansetron Injectable 4 milliGRAM(s) IV Push every 8 hours PRN Nausea and/or Vomiting      Telemetry reviewed by me    LABS:                        7.9    7.25  )-----------( 249      ( 25 Nov 2022 06:57 )             24.5     11-25    138  |  98  |  53<H>  ----------------------------<  119<H>  4.6   |  26  |  7.7<HH>    Ca    7.9<L>      25 Nov 2022 06:57  Mg     2.2     11-25    TPro  5.5<L>  /  Alb  3.2<L>  /  TBili  0.2  /  DBili  x   /  AST  13  /  ALT  13  /  AlkPhos  168<H>  11-25              RADIOLOGY & ADDITIONAL TESTS:    Imaging and report Personally Reviewed:  [x ] YES  [ ] NO    < from: Xray Chest 1 View- PORTABLE-Routine (Xray Chest 1 View- PORTABLE-Routine .) (11.25.22 @ 13:56) >  Impression:    Bilateral opacifications and effusions.    Unchanged.    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 Normal flow   visualized within the superior mesenteric artery.    Comparison was made with recent CT abdomen 5 mm cuts with celiac and SMA   vessels appearing to be widely patent. Low suspicion for chronic   mesenteric ischemia.    < end of copied text >      < from: CT Abdomen and Pelvis w/ IV Cont (11.21.22 @ 17:09) >  IMPRESSION:    No evidence of abdominal or pelvic mass or inflammatory process.    There are moderate bilateral pleural effusions with atelectatic changes   at both lung bases.    < end of copied text >      < from: TTE Echo Complete w/ Contrast w/ Doppler (11.24.22 @ 08:32) >    Summary:   1. LV Ejection Fraction by Soto's Method with a biplane EF of 62 %.   2. Elevated mean left atrial pressure.   3. Moderate concentric left ventricular hypertrophy.   4. Spectral Doppler shows pseudonormal pattern of left ventricular   myocardial filling (Grade II diastolic dysfunction).   5. Mildly enlarged left atrium.   6. Normal right atrial size.   7. Mild thickening of the anterior and posterior mitral valve leaflets.   8. Moderate mitral valve regurgitation.   9. Moderate tricuspid regurgitation.  10. Mild aortic regurgitation.  11. Estimated pulmonary artery systolic pressure is 40.7 mmHg assuming a   right atrial pressure of 3 mmHg, which is consistent with mild pulmonary   hypertension.    < end of copied text >      Case discussed with residents and RN on rounds today    Care discussed with pt and family

## 2022-11-26 LAB
ALBUMIN SERPL ELPH-MCNC: 3.4 G/DL — LOW (ref 3.5–5.2)
ALP SERPL-CCNC: 140 U/L — HIGH (ref 30–115)
ALT FLD-CCNC: 13 U/L — SIGNIFICANT CHANGE UP (ref 0–41)
ANION GAP SERPL CALC-SCNC: 13 MMOL/L — SIGNIFICANT CHANGE UP (ref 7–14)
AST SERPL-CCNC: 14 U/L — SIGNIFICANT CHANGE UP (ref 0–41)
BILIRUB SERPL-MCNC: 0.2 MG/DL — SIGNIFICANT CHANGE UP (ref 0.2–1.2)
BUN SERPL-MCNC: 32 MG/DL — HIGH (ref 10–20)
CALCIUM SERPL-MCNC: 8.2 MG/DL — LOW (ref 8.4–10.4)
CHLORIDE SERPL-SCNC: 101 MMOL/L — SIGNIFICANT CHANGE UP (ref 98–110)
CO2 SERPL-SCNC: 27 MMOL/L — SIGNIFICANT CHANGE UP (ref 17–32)
CREAT SERPL-MCNC: 5 MG/DL — CRITICAL HIGH (ref 0.7–1.5)
EGFR: 10 ML/MIN/1.73M2 — LOW
FERRITIN SERPL-MCNC: 135 NG/ML — SIGNIFICANT CHANGE UP (ref 15–150)
GLUCOSE BLDC GLUCOMTR-MCNC: 115 MG/DL — HIGH (ref 70–99)
GLUCOSE BLDC GLUCOMTR-MCNC: 136 MG/DL — HIGH (ref 70–99)
GLUCOSE BLDC GLUCOMTR-MCNC: 172 MG/DL — HIGH (ref 70–99)
GLUCOSE BLDC GLUCOMTR-MCNC: 84 MG/DL — SIGNIFICANT CHANGE UP (ref 70–99)
GLUCOSE SERPL-MCNC: 109 MG/DL — HIGH (ref 70–99)
HCT VFR BLD CALC: 24.5 % — LOW (ref 37–47)
HGB BLD-MCNC: 8 G/DL — LOW (ref 12–16)
MAGNESIUM SERPL-MCNC: 1.9 MG/DL — SIGNIFICANT CHANGE UP (ref 1.8–2.4)
MCHC RBC-ENTMCNC: 28.4 PG — SIGNIFICANT CHANGE UP (ref 27–31)
MCHC RBC-ENTMCNC: 32.7 G/DL — SIGNIFICANT CHANGE UP (ref 32–37)
MCV RBC AUTO: 86.9 FL — SIGNIFICANT CHANGE UP (ref 81–99)
NRBC # BLD: 0 /100 WBCS — SIGNIFICANT CHANGE UP (ref 0–0)
PLATELET # BLD AUTO: 232 K/UL — SIGNIFICANT CHANGE UP (ref 130–400)
POTASSIUM SERPL-MCNC: 3.8 MMOL/L — SIGNIFICANT CHANGE UP (ref 3.5–5)
POTASSIUM SERPL-SCNC: 3.8 MMOL/L — SIGNIFICANT CHANGE UP (ref 3.5–5)
PROT SERPL-MCNC: 5.6 G/DL — LOW (ref 6–8)
RBC # BLD: 2.82 M/UL — LOW (ref 4.2–5.4)
RBC # FLD: 14.4 % — SIGNIFICANT CHANGE UP (ref 11.5–14.5)
SODIUM SERPL-SCNC: 141 MMOL/L — SIGNIFICANT CHANGE UP (ref 135–146)
WBC # BLD: 6.56 K/UL — SIGNIFICANT CHANGE UP (ref 4.8–10.8)
WBC # FLD AUTO: 6.56 K/UL — SIGNIFICANT CHANGE UP (ref 4.8–10.8)

## 2022-11-26 PROCEDURE — 99406 BEHAV CHNG SMOKING 3-10 MIN: CPT

## 2022-11-26 RX ORDER — HEPARIN SODIUM 5000 [USP'U]/ML
5000 INJECTION INTRAVENOUS; SUBCUTANEOUS EVERY 12 HOURS
Refills: 0 | Status: DISCONTINUED | OUTPATIENT
Start: 2022-11-26 | End: 2022-11-27

## 2022-11-26 RX ADMIN — Medication 1 GRAM(S): at 05:13

## 2022-11-26 RX ADMIN — INSULIN GLARGINE 10 UNIT(S): 100 INJECTION, SOLUTION SUBCUTANEOUS at 21:57

## 2022-11-26 RX ADMIN — Medication 60 MILLIGRAM(S): at 05:12

## 2022-11-26 RX ADMIN — Medication 50 MILLIGRAM(S): at 21:44

## 2022-11-26 RX ADMIN — HEPARIN SODIUM 5000 UNIT(S): 5000 INJECTION INTRAVENOUS; SUBCUTANEOUS at 05:13

## 2022-11-26 RX ADMIN — ATORVASTATIN CALCIUM 80 MILLIGRAM(S): 80 TABLET, FILM COATED ORAL at 21:44

## 2022-11-26 RX ADMIN — CLOPIDOGREL BISULFATE 75 MILLIGRAM(S): 75 TABLET, FILM COATED ORAL at 11:59

## 2022-11-26 RX ADMIN — Medication 50 MILLIGRAM(S): at 05:12

## 2022-11-26 RX ADMIN — Medication 60 MILLIGRAM(S): at 15:41

## 2022-11-26 RX ADMIN — Medication 50 MILLIGRAM(S): at 15:42

## 2022-11-26 RX ADMIN — Medication 5 UNIT(S): at 17:27

## 2022-11-26 RX ADMIN — Medication 81 MILLIGRAM(S): at 11:59

## 2022-11-26 RX ADMIN — Medication 1 APPLICATION(S): at 05:30

## 2022-11-26 RX ADMIN — Medication 1 GRAM(S): at 17:26

## 2022-11-26 RX ADMIN — Medication 5 UNIT(S): at 11:59

## 2022-11-26 RX ADMIN — Medication 650 MILLIGRAM(S): at 02:08

## 2022-11-26 RX ADMIN — Medication 5 UNIT(S): at 08:10

## 2022-11-26 RX ADMIN — CALCITRIOL 0.25 MICROGRAM(S): 0.5 CAPSULE ORAL at 15:41

## 2022-11-26 RX ADMIN — Medication 1 GRAM(S): at 11:59

## 2022-11-26 RX ADMIN — Medication 1 APPLICATION(S): at 17:26

## 2022-11-26 RX ADMIN — SEVELAMER CARBONATE 800 MILLIGRAM(S): 2400 POWDER, FOR SUSPENSION ORAL at 17:26

## 2022-11-26 RX ADMIN — HEPARIN SODIUM 5000 UNIT(S): 5000 INJECTION INTRAVENOUS; SUBCUTANEOUS at 17:27

## 2022-11-26 RX ADMIN — Medication 650 MILLIGRAM(S): at 01:35

## 2022-11-26 RX ADMIN — Medication 60 MILLIGRAM(S): at 05:13

## 2022-11-26 RX ADMIN — SEVELAMER CARBONATE 800 MILLIGRAM(S): 2400 POWDER, FOR SUSPENSION ORAL at 08:11

## 2022-11-26 RX ADMIN — SEVELAMER CARBONATE 800 MILLIGRAM(S): 2400 POWDER, FOR SUSPENSION ORAL at 11:58

## 2022-11-26 NOTE — PROGRESS NOTE ADULT - SUBJECTIVE AND OBJECTIVE BOX
Pt seen and examined at bedside.   Feels better. No more SOB.     VITAL SIGNS (Last 24 hrs):  T(C): 36.8 (22 @ 12:46), Max: 37.2 (22 @ 20:07)  HR: 66 (22 @ 12:46) (66 - 75)  BP: 139/65 (22 @ 12:46) (139/65 - 175/72)  RR: 18 (22 @ 12:46) (18 - 18)  SpO2: 96% (22 @ 20:07) (96% - 96%)  Wt(kg): --  Daily     Daily Weight in k (2022 05:00)    I&O's Summary    2022 07:01  -  2022 07:00  --------------------------------------------------------  IN: 0 mL / OUT: 2000 mL / NET: -2000 mL    2022 07:01  -  2022 14:10  --------------------------------------------------------  IN: 536 mL / OUT: 0 mL / NET: 536 mL        PHYSICAL EXAM:  GENERAL: NAD   HEAD:  Atraumatic, Normocephalic  EYES:  conjunctiva and sclera clear  NECK: Supple, No JVD  CHEST/LUNG: Clear to auscultation bilaterally; No wheeze  HEART: Regular rate and rhythm; No murmurs, rubs, or gallops  ABDOMEN: Soft, Nontender, Nondistended; Bowel sounds present  EXTREMITIES:  2+ Peripheral Pulses, No clubbing, cyanosis, or edema  PSYCH: AAOx3  NEUROLOGY: non-focal  SKIN: No rashes or lesions    Labs Reviewed       CBC Full  -  ( 2022 07:51 )  WBC Count : 6.56 K/uL  Hemoglobin : 8.0 g/dL  Hematocrit : 24.5 %  Platelet Count - Automated : 232 K/uL  Mean Cell Volume : 86.9 fL  Mean Cell Hemoglobin : 28.4 pg  Mean Cell Hemoglobin Concentration : 32.7 g/dL  Auto Neutrophil # : x  Auto Lymphocyte # : x  Auto Monocyte # : x  Auto Eosinophil # : x  Auto Basophil # : x  Auto Neutrophil % : x  Auto Lymphocyte % : x  Auto Monocyte % : x  Auto Eosinophil % : x  Auto Basophil % : x    BMP:     @ 07:51    Blood Urea Nitrogen - 32  Calcium - 8.2  Carbon Dioxide - 27  Chloride - 101  Creatinine - 5.0  Glucose - 109  Potassium - 3.8  Sodium - 141         Urine Culture:   @ 16:58 Urine culture: --    Culture Results:   Normal Urogenital na present  Method Type: --  Organism: --  Organism Identification: --  Specimen Source: Clean Catch Clean Catch (Midstream)       MEDICATIONS  (STANDING):  aspirin enteric coated 81 milliGRAM(s) Oral daily  atorvastatin 80 milliGRAM(s) Oral at bedtime  benzocaine 20% Spray 1 Spray(s) Topical once  calcitriol   Capsule 0.25 MICROGram(s) Oral daily  clopidogrel Tablet 75 milliGRAM(s) Oral daily  darbepoetin Injectable Syringe 30 MICROGram(s) IV Push <User Schedule>  furosemide   Injectable 60 milliGRAM(s) IV Push two times a day  heparin   Injectable 5000 Unit(s) SubCutaneous every 8 hours  hydrALAZINE 50 milliGRAM(s) Oral three times a day  hydrocortisone 2.5% Lotion 1 Application(s) Topical two times a day  insulin glargine Injectable (LANTUS) 10 Unit(s) SubCutaneous at bedtime  insulin lispro (ADMELOG) corrective regimen sliding scale   SubCutaneous three times a day before meals  insulin lispro Injectable (ADMELOG) 5 Unit(s) SubCutaneous three times a day before meals  metoprolol succinate ER 50 milliGRAM(s) Oral daily  NIFEdipine XL 60 milliGRAM(s) Oral daily  sevelamer carbonate 800 milliGRAM(s) Oral three times a day with meals  sucralfate 1 Gram(s) Oral four times a day    MEDICATIONS  (PRN):  acetaminophen     Tablet .. 650 milliGRAM(s) Oral every 6 hours PRN Temp greater or equal to 38C (100.4F), Mild Pain (1 - 3)  aluminum hydroxide/magnesium hydroxide/simethicone Suspension 30 milliLiter(s) Oral every 4 hours PRN Dyspepsia  diphenhydrAMINE 25 milliGRAM(s) Oral every 4 hours PRN Rash and/or Itching  melatonin 3 milliGRAM(s) Oral at bedtime PRN Insomnia  ondansetron Injectable 4 milliGRAM(s) IV Push every 8 hours PRN Nausea and/or Vomiting

## 2022-11-26 NOTE — PROGRESS NOTE ADULT - SUBJECTIVE AND OBJECTIVE BOX
Nephrology progress note    THIS IS AN INCOMPLETE NOTE . FULL NOTE TO FOLLOW SHORTLY    Patient is seen and examined, events over the last 24 h noted .    Allergies:  NSAIDs (Nephrotoxicity)  penicillin (Rash)    Hospital Medications:   MEDICATIONS  (STANDING):  aspirin enteric coated 81 milliGRAM(s) Oral daily  atorvastatin 80 milliGRAM(s) Oral at bedtime  benzocaine 20% Spray 1 Spray(s) Topical once  calcitriol   Capsule 0.25 MICROGram(s) Oral daily  clopidogrel Tablet 75 milliGRAM(s) Oral daily  darbepoetin Injectable Syringe 30 MICROGram(s) IV Push <User Schedule>  furosemide   Injectable 60 milliGRAM(s) IV Push two times a day  heparin   Injectable 5000 Unit(s) SubCutaneous every 8 hours  hydrALAZINE 50 milliGRAM(s) Oral three times a day  hydrocortisone 2.5% Lotion 1 Application(s) Topical two times a day  insulin glargine Injectable (LANTUS) 10 Unit(s) SubCutaneous at bedtime  insulin lispro (ADMELOG) corrective regimen sliding scale   SubCutaneous three times a day before meals  insulin lispro Injectable (ADMELOG) 5 Unit(s) SubCutaneous three times a day before meals  metoprolol succinate ER 50 milliGRAM(s) Oral daily  NIFEdipine XL 60 milliGRAM(s) Oral daily  sevelamer carbonate 800 milliGRAM(s) Oral three times a day with meals  sucralfate 1 Gram(s) Oral four times a day        VITALS:  T(F): 96.8 (22 @ 05:00), Max: 98.9 (22 @ 20:07)  HR: 74 (22 @ 05:00)  BP: 175/72 (22 @ 05:00)  RR: 18 (22 @ 05:00)  SpO2: 96% (22 @ 20:07)  Wt(kg): --     07:01  -   @ 07:00  --------------------------------------------------------  IN: 320 mL / OUT: 0 mL / NET: 320 mL     07:01  -   @ 07:00  --------------------------------------------------------  IN: 0 mL / OUT: 2000 mL / NET: -2000 mL          PHYSICAL EXAM:  Constitutional: NAD  HEENT: anicteric sclera, oropharynx clear, MMM  Neck: No JVD  Respiratory: CTAB, no wheezes, rales or rhonchi  Cardiovascular: S1, S2, RRR  Gastrointestinal: BS+, soft, NT/ND  Extremities: No cyanosis or clubbing. No peripheral edema  :  No mcgrath.   Skin: No rashes    LABS:      141  |  101  |  32<H>  ----------------------------<  109<H>  3.8   |  27  |  5.0<HH>    Ca    8.2<L>      2022 07:51  Mg     1.9         TPro  5.6<L>  /  Alb  3.4<L>  /  TBili  0.2  /  DBili      /  AST  14  /  ALT  13  /  AlkPhos  140<H>                            8.0    6.56  )-----------( 232      ( 2022 07:51 )             24.5       Urine Studies:  Urinalysis Basic - ( 2022 16:58 )    Color: Light Yellow / Appearance: Clear / S.014 / pH:   Gluc:  / Ketone: Negative  / Bili: Negative / Urobili: <2 mg/dL   Blood:  / Protein: >600 mg/dL / Nitrite: Negative   Leuk Esterase: Negative / RBC: 3 /HPF / WBC 12 /HPF   Sq Epi:  / Non Sq Epi: 5 /HPF / Bacteria: Few          Iron 67, TIBC 210, %sat 32      [22 @ 10:50]  Ferritin 135      [22 @ 10:50]  PTH -- (Ca 7.8)      [22 @ 08:21]   275  TSH 2.09      [05-10-22 @ 09:04]  Lipid: chol 158, , HDL 61, LDL --      [22 @ 06:31]    HBsAg Nonreact      [22 @ 18:00]  HCV 0.16, Nonreact      [22 @ 18:00]    OLVIN: titer 1:80, pattern Speckled      [21 @ 12:47]  dsDNA <12      [22 @ 18:00]  ANCA: cANCA Negative, pANCA Negative, atypical ANCA Negative      [22 @ 18:00]  PLA2R: MARIA GUADALUPE <1.8, IFA --      [22 @ 18:00]  Free Light Chains: kappa 10.04, lambda 8.96, ratio = 1.12      [ @ 08:21]  Immunofixation Serum:   IgM Lambda Band Identified    Reference Range: None Detected      [22 @ 10:55]  SPEP Interpretation: Gamma-Migrating Paraprotein Identified      [22 @ 08:21]  Immunofixation Urine: Weak Bence Howard protein Lambda type      [22 @ 20:07]  UPEP Interpretation: Mild Selective (Glomerular) Proteinuria      [21 @ 12:40]      RADIOLOGY & ADDITIONAL STUDIES:   Nephrology progress note  Patient is seen and examined, events over the last 24 h noted .  No events feels well    at bedside     Allergies:  NSAIDs (Nephrotoxicity)  penicillin (Rash)    Hospital Medications:   MEDICATIONS  (STANDING):  aspirin enteric coated 81 milliGRAM(s) Oral daily  atorvastatin 80 milliGRAM(s) Oral at bedtime  benzocaine 20% Spray 1 Spray(s) Topical once  calcitriol   Capsule 0.25 MICROGram(s) Oral daily  clopidogrel Tablet 75 milliGRAM(s) Oral daily  darbepoetin Injectable Syringe 30 MICROGram(s) IV Push <User Schedule>  furosemide   Injectable 60 milliGRAM(s) IV Push two times a day  heparin   Injectable 5000 Unit(s) SubCutaneous every 8 hours  hydrALAZINE 50 milliGRAM(s) Oral three times a day  hydrocortisone 2.5% Lotion 1 Application(s) Topical two times a day  insulin glargine Injectable (LANTUS) 10 Unit(s) SubCutaneous at bedtime  insulin lispro (ADMELOG) corrective regimen sliding scale   SubCutaneous three times a day before meals  insulin lispro Injectable (ADMELOG) 5 Unit(s) SubCutaneous three times a day before meals  metoprolol succinate ER 50 milliGRAM(s) Oral daily  NIFEdipine XL 60 milliGRAM(s) Oral daily  sevelamer carbonate 800 milliGRAM(s) Oral three times a day with meals  sucralfate 1 Gram(s) Oral four times a day        VITALS:  T(F): 96.8 (22 @ 05:00), Max: 98.9 (22 @ 20:07)  HR: 74 (22 @ 05:00)  BP: 175/72 (22 @ 05:00)  RR: 18 (22 @ 05:00)  SpO2: 96% (22 @ 20:07)       @ 07:01  -   @ 07:00  --------------------------------------------------------  IN: 320 mL / OUT: 0 mL / NET: 320 mL     @ 07:01  -   @ 07:00  --------------------------------------------------------  IN: 0 mL / OUT: 2000 mL / NET: -2000 mL          PHYSICAL EXAM:  Constitutional: NAD  Neck: No JVD  Respiratory: CTAB,  Cardiovascular: S1, S2, RRR  Gastrointestinal: BS+, soft, NT/ND  Extremities: No cyanosis or clubbing. No peripheral edema  :  No mcgrath.   Skin: No rashes    LABS:      141  |  101  |  32<H>  ----------------------------<  109<H>  3.8   |  27  |  5.0<HH>    Ca    8.2<L>      2022 07:51  Mg     1.9         TPro  5.6<L>  /  Alb  3.4<L>  /  TBili  0.2  /  DBili      /  AST  14  /  ALT  13  /  AlkPhos  140<H>                            8.0    6.56  )-----------( 232      ( 2022 07:51 )             24.5       Urine Studies:  Urinalysis Basic - ( 2022 16:58 )    Color: Light Yellow / Appearance: Clear / S.014 / pH:   Gluc:  / Ketone: Negative  / Bili: Negative / Urobili: <2 mg/dL   Blood:  / Protein: >600 mg/dL / Nitrite: Negative   Leuk Esterase: Negative / RBC: 3 /HPF / WBC 12 /HPF   Sq Epi:  / Non Sq Epi: 5 /HPF / Bacteria: Few          Iron 67, TIBC 210, %sat 32      [22 @ 10:50]  Ferritin 135      [22 @ 10:50]  PTH -- (Ca 7.8)      [22 @ 08:21]   275  TSH 2.09      [05-10-22 @ 09:04]  Lipid: chol 158, , HDL 61, LDL --      [22 @ 06:31]    HBsAg Nonreact      [22 @ 18:00]  HCV 0.16, Nonreact      [22 @ 18:00]    OLVIN: titer 1:80, pattern Speckled      [21 @ 12:47]  dsDNA <12      [22 @ 18:00]  ANCA: cANCA Negative, pANCA Negative, atypical ANCA Negative      [22 @ 18:00]  PLA2R: MARIA GUADALUPE <1.8, IFA --      [22 @ 18:00]  Free Light Chains: kappa 10.04, lambda 8.96, ratio = 1.12      [ @ 08:21]  Immunofixation Serum:   IgM Lambda Band Identified    Reference Range: None Detected      [22 @ 10:55]  SPEP Interpretation: Gamma-Migrating Paraprotein Identified      [22 @ 08:21]  Immunofixation Urine: Weak Bence Howard protein Lambda type      [22 @ 20:07]  UPEP Interpretation: Mild Selective (Glomerular) Proteinuria      [21 @ 12:40]      RADIOLOGY & ADDITIONAL STUDIES:  < from: Xray Chest 1 View- PORTABLE-Routine (Xray Chest 1 View- PORTABLE-Routine .) (22 @ 13:56) >    Impression:    Bilateral opacifications and effusions.    Unchanged.    < end of copied text >

## 2022-11-26 NOTE — PROGRESS NOTE ADULT - SUBJECTIVE AND OBJECTIVE BOX
SEN LING 57y Female  MRN#: [id number]     Admission Date: 11-21-22  Hospital Day: 5d    Pt is currently admitted with the primary diagnosis of pulmonary edema     SUBJECTIVE  Hospital Course  58 yo female w/ PMH of HTN, HLD, IDD, DM, CKD stage IV on dialysis (sees Dr. Solis Borja), deafness presents for abdominal pain x 4 days. Per family, pt has been complaining of pain in LLQ.  She missed one dialysis session afer which family noticed a non-productive cough,  sore throat, subjective fevers, and loose stool. denies any cp, palpitations, chills, headaches, dizziness, nausea or vomiting. History was obtained with use of sign- and family member to interpret at the bedside.     In the ED, /95, HR 94, RR 18, T 97.5, SpO2 95% on RA. EKG showed NSR. CTAP No evidence of abdominal or pelvic mass or inflammatory process. There are moderate bilateral pleural effusions with atelectatic changes at both lung bases. CXR showed New diffuse bilateral interstitial opacities, and bibasilar opacities/small pleural effusions. Labs were significant for Hgb 8.3, Cr 7 (on dialysis), trop 0.03. UA dirty. Patient was given Hydralazine 100mg, Metoprolol 100, Nifedipine 30mg and Lasix 80mg xq1, and Nifedipine increased to 60mg in the ED at this time. The patient's BP responded and has been stable since restarting anti-hypertensives.     Patient was admitted to [address] [place] [address] for monitoring. She clinically improved after restarting her on home BP meds, and after receiving HD on day of admission. She again went for HD on Thursday, 11/24. Patient reported improved abdominal pain, cough, and no shortness of breath or chest pain. Patient and family expressed desire to go home after HD session on 11/24, and the patient reported resolution of symptoms for which. she presented. BP was stabilized after restarting home meds.   Patient was followed by nephrology, who recommended dc sodium bicarb, and to start torsemide 40mg PO on non-dialysis days  Hemodynamically stable, no chest pain, shortness of breath, abdominal pain, or respiratory distress. Patient was medically cleared for discharge with outpatient follow-up.  No other acute events occurred.     Overnight events  None significant     Subjective complaints  Denies - states she is feeling better today, only mild SOB w laying flat.       T(C): 36.8 (11-26-22 @ 12:46)  T(F): 98.2 (11-26-22 @ 12:46)  HR: 66 (11-26-22 @ 12:46)  BP: 139/65 (11-26-22 @ 12:46)  RR: 18 (11-26-22 @ 12:46)  SpO2: 96% (11-25-22 @ 20:07)      PHYSICAL EXAM:  GENERAL: NAD, lying in bed comfortably  HEAD:  Atraumatic, Normocephalic  EYES: EOMI, sclera clear  ENT: Moist mucous membranes  NECK: Supple, trachea midline  CHEST/LUNG: (+) crackles b/l posterior bases up through mid lungs   HEART: Regular rate and rhythm. (+) systolic murmur   ABDOMEN: (+)BS; Soft, nontender, nondistended  EXTREMITIES:  2+ Radial Pulses, brisk capillary refill. (+) thrill at AVF of LUE  NERVOUS SYSTEM:  A&Ox3, no noted facial droop. Moving all extremities w/o difficulty.   SKIN: No rashes or lesions to b/l forearm, face.                                                OBJECTIVE  PAST MEDICAL & SURGICAL HISTORY  PVD (peripheral vascular disease)    Benign essential HTN    Intellectual disability    Hearing impaired    HLD (hyperlipidemia)    Chronic kidney disease, unspecified CKD stage    H/O vascular surgery  left leg                                                ALLERGIES:  NSAIDs (Nephrotoxicity)  penicillin (Rash)                           HOME MEDICATIONS  Home Medications:  atorvastatin 80 mg oral tablet: 1 tab(s) orally once a day (21 Nov 2022 19:38)  calcitriol 0.25 mcg oral capsule: 1 cap(s) orally once a day (21 Nov 2022 19:38)  insulin lispro 100 units/mL injectable solution: 4 unit(s) injectable 3 times a day (before meals) (21 Nov 2022 19:38)  metoprolol succinate 100 mg oral tablet, extended release: 0.5 tab(s) orally once a day (21 Nov 2022 19:38)  Plavix 75 mg oral tablet: 1 tab(s) orally once a day (21 Nov 2022 19:38)                           MEDICATIONS:  STANDING MEDICATIONS  aspirin enteric coated 81 milliGRAM(s) Oral daily  atorvastatin 80 milliGRAM(s) Oral at bedtime  benzocaine 20% Spray 1 Spray(s) Topical once  calcitriol   Capsule 0.25 MICROGram(s) Oral daily  clopidogrel Tablet 75 milliGRAM(s) Oral daily  darbepoetin Injectable Syringe 30 MICROGram(s) IV Push <User Schedule>  furosemide   Injectable 60 milliGRAM(s) IV Push two times a day  heparin   Injectable 5000 Unit(s) SubCutaneous every 12 hours  hydrALAZINE 50 milliGRAM(s) Oral three times a day  hydrocortisone 2.5% Lotion 1 Application(s) Topical two times a day  insulin glargine Injectable (LANTUS) 10 Unit(s) SubCutaneous at bedtime  insulin lispro (ADMELOG) corrective regimen sliding scale   SubCutaneous three times a day before meals  insulin lispro Injectable (ADMELOG) 5 Unit(s) SubCutaneous three times a day before meals  metoprolol succinate ER 50 milliGRAM(s) Oral daily  NIFEdipine XL 60 milliGRAM(s) Oral daily  sevelamer carbonate 800 milliGRAM(s) Oral three times a day with meals  sucralfate 1 Gram(s) Oral four times a day    PRN MEDICATIONS  acetaminophen     Tablet .. 650 milliGRAM(s) Oral every 6 hours PRN  aluminum hydroxide/magnesium hydroxide/simethicone Suspension 30 milliLiter(s) Oral every 4 hours PRN  diphenhydrAMINE 25 milliGRAM(s) Oral every 4 hours PRN  melatonin 3 milliGRAM(s) Oral at bedtime PRN  ondansetron Injectable 4 milliGRAM(s) IV Push every 8 hours PRN                                            ------------------------------------------------------------  T(C): 36.8 (11-26-22 @ 12:46), Max: 37.2 (11-25-22 @ 20:07)  T(F): 98.2 (11-26-22 @ 12:46), Max: 98.9 (11-25-22 @ 20:07)  HR: 66 (11-26-22 @ 12:46) (66 - 74)  BP: 139/65 (11-26-22 @ 12:46) (139/65 - 175/72)  RR: 18 (11-26-22 @ 12:46) (18 - 18)  SpO2: 96% (11-25-22 @ 20:07) (96% - 96%)      11-25-22 @ 07:01  -  11-26-22 @ 07:00  --------------------------------------------------------  IN: 0 mL / OUT: 2000 mL / NET: -2000 mL    11-26-22 @ 07:01  -  11-26-22 @ 19:30  --------------------------------------------------------  IN: 937 mL / OUT: 0 mL / NET: 937 mL                                               LABS:                        8.0    6.56  )-----------( 232      ( 26 Nov 2022 07:51 )             24.5     11-26    141  |  101  |  32<H>  ----------------------------<  109<H>  3.8   |  27  |  5.0<HH>    Ca    8.2<L>      26 Nov 2022 07:51  Mg     1.9     11-26    TPro  5.6<L>  /  Alb  3.4<L>  /  TBili  0.2  /  DBili  x   /  AST  14  /  ALT  13  /  AlkPhos  140<H>  11-26

## 2022-11-27 ENCOUNTER — TRANSCRIPTION ENCOUNTER (OUTPATIENT)
Age: 57
End: 2022-11-27

## 2022-11-27 VITALS
HEART RATE: 66 BPM | SYSTOLIC BLOOD PRESSURE: 135 MMHG | RESPIRATION RATE: 18 BRPM | TEMPERATURE: 97 F | DIASTOLIC BLOOD PRESSURE: 63 MMHG

## 2022-11-27 LAB
ALBUMIN SERPL ELPH-MCNC: 3.9 G/DL — SIGNIFICANT CHANGE UP (ref 3.5–5.2)
ALP SERPL-CCNC: 159 U/L — HIGH (ref 30–115)
ALT FLD-CCNC: 14 U/L — SIGNIFICANT CHANGE UP (ref 0–41)
ANION GAP SERPL CALC-SCNC: 13 MMOL/L — SIGNIFICANT CHANGE UP (ref 7–14)
AST SERPL-CCNC: 15 U/L — SIGNIFICANT CHANGE UP (ref 0–41)
BILIRUB SERPL-MCNC: 0.2 MG/DL — SIGNIFICANT CHANGE UP (ref 0.2–1.2)
BUN SERPL-MCNC: 42 MG/DL — HIGH (ref 10–20)
CALCIUM SERPL-MCNC: 8.4 MG/DL — SIGNIFICANT CHANGE UP (ref 8.4–10.5)
CHLORIDE SERPL-SCNC: 102 MMOL/L — SIGNIFICANT CHANGE UP (ref 98–110)
CO2 SERPL-SCNC: 26 MMOL/L — SIGNIFICANT CHANGE UP (ref 17–32)
CREAT SERPL-MCNC: 6.3 MG/DL — CRITICAL HIGH (ref 0.7–1.5)
EGFR: 7 ML/MIN/1.73M2 — LOW
GLUCOSE BLDC GLUCOMTR-MCNC: 108 MG/DL — HIGH (ref 70–99)
GLUCOSE SERPL-MCNC: 67 MG/DL — LOW (ref 70–99)
HCT VFR BLD CALC: 25.7 % — LOW (ref 37–47)
HGB BLD-MCNC: 8 G/DL — LOW (ref 12–16)
MAGNESIUM SERPL-MCNC: 2 MG/DL — SIGNIFICANT CHANGE UP (ref 1.8–2.4)
MCHC RBC-ENTMCNC: 27.6 PG — SIGNIFICANT CHANGE UP (ref 27–31)
MCHC RBC-ENTMCNC: 31.1 G/DL — LOW (ref 32–37)
MCV RBC AUTO: 88.6 FL — SIGNIFICANT CHANGE UP (ref 81–99)
NRBC # BLD: 0 /100 WBCS — SIGNIFICANT CHANGE UP (ref 0–0)
PLATELET # BLD AUTO: 256 K/UL — SIGNIFICANT CHANGE UP (ref 130–400)
POTASSIUM SERPL-MCNC: 4.2 MMOL/L — SIGNIFICANT CHANGE UP (ref 3.5–5)
POTASSIUM SERPL-SCNC: 4.2 MMOL/L — SIGNIFICANT CHANGE UP (ref 3.5–5)
PROT SERPL-MCNC: 6 G/DL — SIGNIFICANT CHANGE UP (ref 6–8)
RBC # BLD: 2.9 M/UL — LOW (ref 4.2–5.4)
RBC # FLD: 14.5 % — SIGNIFICANT CHANGE UP (ref 11.5–14.5)
SODIUM SERPL-SCNC: 141 MMOL/L — SIGNIFICANT CHANGE UP (ref 135–146)
WBC # BLD: 9.23 K/UL — SIGNIFICANT CHANGE UP (ref 4.8–10.8)
WBC # FLD AUTO: 9.23 K/UL — SIGNIFICANT CHANGE UP (ref 4.8–10.8)

## 2022-11-27 PROCEDURE — 99239 HOSP IP/OBS DSCHRG MGMT >30: CPT

## 2022-11-27 RX ORDER — NIFEDIPINE 30 MG
1 TABLET, EXTENDED RELEASE 24 HR ORAL
Qty: 30 | Refills: 0
Start: 2022-11-27 | End: 2022-12-26

## 2022-11-27 RX ORDER — SUCRALFATE 1 G
1 TABLET ORAL
Qty: 0 | Refills: 0 | DISCHARGE
Start: 2022-11-27

## 2022-11-27 RX ADMIN — Medication 1 GRAM(S): at 05:55

## 2022-11-27 RX ADMIN — Medication 650 MILLIGRAM(S): at 01:23

## 2022-11-27 RX ADMIN — Medication 50 MILLIGRAM(S): at 05:55

## 2022-11-27 RX ADMIN — Medication 60 MILLIGRAM(S): at 05:55

## 2022-11-27 RX ADMIN — Medication 1 GRAM(S): at 00:12

## 2022-11-27 RX ADMIN — Medication 5 UNIT(S): at 12:12

## 2022-11-27 RX ADMIN — Medication 81 MILLIGRAM(S): at 12:13

## 2022-11-27 RX ADMIN — Medication 1 APPLICATION(S): at 05:56

## 2022-11-27 RX ADMIN — Medication 1 GRAM(S): at 12:13

## 2022-11-27 RX ADMIN — SEVELAMER CARBONATE 800 MILLIGRAM(S): 2400 POWDER, FOR SUSPENSION ORAL at 12:13

## 2022-11-27 RX ADMIN — HEPARIN SODIUM 5000 UNIT(S): 5000 INJECTION INTRAVENOUS; SUBCUTANEOUS at 05:54

## 2022-11-27 RX ADMIN — CLOPIDOGREL BISULFATE 75 MILLIGRAM(S): 75 TABLET, FILM COATED ORAL at 12:16

## 2022-11-27 RX ADMIN — CALCITRIOL 0.25 MICROGRAM(S): 0.5 CAPSULE ORAL at 12:13

## 2022-11-27 NOTE — DISCHARGE NOTE NURSING/CASE MANAGEMENT/SOCIAL WORK - PATIENT PORTAL LINK FT
You can access the FollowMyHealth Patient Portal offered by [facility] by registering at the following website: [email or web address]. By joining [employer]’s FollowMyHealth portal, you will also be able to view your health information using other applications (apps) compatible with our system.

## 2022-11-27 NOTE — PROGRESS NOTE ADULT - SUBJECTIVE AND OBJECTIVE BOX
Nephrology progress note    Patient is seen and examined, events over the last 24 h noted .  she still points to her chest   no SOB    Allergies:  NSAIDs (Nephrotoxicity)  penicillin (Rash)    Hospital Medications:   MEDICATIONS  (STANDING):  aspirin enteric coated 81 milliGRAM(s) Oral daily  atorvastatin 80 milliGRAM(s) Oral at bedtime  benzocaine 20% Spray 1 Spray(s) Topical once  calcitriol   Capsule 0.25 MICROGram(s) Oral daily  clopidogrel Tablet 75 milliGRAM(s) Oral daily  darbepoetin Injectable Syringe 30 MICROGram(s) IV Push <User Schedule>  furosemide   Injectable 60 milliGRAM(s) IV Push two times a day  heparin   Injectable 5000 Unit(s) SubCutaneous every 12 hours  hydrALAZINE 50 milliGRAM(s) Oral three times a day  hydrocortisone 2.5% Lotion 1 Application(s) Topical two times a day  insulin glargine Injectable (LANTUS) 10 Unit(s) SubCutaneous at bedtime  insulin lispro (ADMELOG) corrective regimen sliding scale   SubCutaneous three times a day before meals  insulin lispro Injectable (ADMELOG) 5 Unit(s) SubCutaneous three times a day before meals  metoprolol succinate ER 50 milliGRAM(s) Oral daily  NIFEdipine XL 60 milliGRAM(s) Oral daily  sevelamer carbonate 800 milliGRAM(s) Oral three times a day with meals  sucralfate 1 Gram(s) Oral four times a day        VITALS:  T(F): 98.3 (22 @ 05:38), Max: 98.3 (22 @ 05:38)  HR: 77 (22 @ 08:10)  BP: 161/75 (22 @ 08:10)  RR: 18 (22 @ 08:10)  SpO2: 99% (22 @ 21:48)     @ 07:01  -   @ 07:00  --------------------------------------------------------  IN: 0 mL / OUT: 2000 mL / NET: -2000 mL     @ 07:01  -   @ 07:00  --------------------------------------------------------  IN: 1580 mL / OUT: 0 mL / NET: 1580 mL      PHYSICAL EXAM:  Constitutional: NAD  Neck: No JVD  Respiratory: CTAB,   Cardiovascular: S1, S2, RRR  Gastrointestinal: BS+, soft, NT/ND  Extremities: No cyanosis or clubbing. PLUS ONE edema   :  No mcgrath.   Skin: No rashes    LABS:      141  |  101  |  32<H>  ----------------------------<  109<H>  3.8   |  27  |  5.0<HH>    Ca    8.2<L>      2022 07:51  Mg     1.9         TPro  5.6<L>  /  Alb  3.4<L>  /  TBili  0.2  /  DBili      /  AST  14  /  ALT  13  /  AlkPhos  140<H>                            8.0    9.23  )-----------( 256      ( 2022 06:46 )             25.7       Urine Studies:  Urinalysis Basic - ( 2022 16:58 )    Color: Light Yellow / Appearance: Clear / S.014 / pH:   Gluc:  / Ketone: Negative  / Bili: Negative / Urobili: <2 mg/dL   Blood:  / Protein: >600 mg/dL / Nitrite: Negative   Leuk Esterase: Negative / RBC: 3 /HPF / WBC 12 /HPF   Sq Epi:  / Non Sq Epi: 5 /HPF / Bacteria: Few          Iron 67, TIBC 210, %sat 32      [22 @ 10:50]  Ferritin 135      [22 @ 10:50]  PTH -- (Ca 7.8)      [22 @ 08:21]   275  TSH 2.09      [05-10-22 @ 09:04]  Lipid: chol 158, , HDL 61, LDL --      [22 @ 06:31]    HBsAg Nonreact      [22 @ 18:00]  HCV 0.16, Nonreact      [22 @ 18:00]      Reference Range: None Detected      [22 @ 10:55]  SPEP Interpretation: Gamma-Migrating Paraprotein Identified      [22 @ 08:21]  Immunofixation Urine: Weak Bence Howard protein Lambda type      [22 @ 20:07]  UPEP Interpretation: Mild Selective (Glomerular) Proteinuria      [21 @ 12:40]      RADIOLOGY & ADDITIONAL STUDIES:

## 2022-11-27 NOTE — PROGRESS NOTE ADULT - REASON FOR ADMISSION
Pulmonary edema
never

## 2022-11-27 NOTE — PROGRESS NOTE ADULT - ASSESSMENT
56 yo female w/ PMH of HTN, HLD, IDD, DM, CKD stage IV on dialysis (sees Dr. Solis Borja), deafness presents for abdominal pain x 4 days. Per family, pt has been complaining of pain in LLQ.  She missed one dialysis session afer which family noticed a non-productive cough,  sore throat, subjective fevers, and loose stool. denies any cp, palpitations, chills, headaches, dizziness, nausea or vomiting     In the ED, /95, HR 94, RR 18, T 97.5, SpO2 95% on RA. EKG showed NSR. CTAP No evidence of abdominal or pelvic mass or inflammatory process. There are moderate bilateral pleural effusions with atelectatic changes at both lung bases. CXR showed New diffuse bilateral interstitial opacities, and bibasilar opacities/small pleural effusions. Labs were significant for Hgb 8.3, Cr 7 (on dialysis), trop 0.03. UA dirty. Patient was treated with antihypertensives and lasix 80mg x1     Patient is being admitted for further management     #sore throat, rhinorrhea- viral prodrome  -RVP neg   -supportive care    #Pulmonary edema in setting of missed dialysis session vs Acute CHF exacerbation - new onset  - TTE (11/04): biplane EF of 75 %.  -  IV Lasix 60mg bid   - Strict I&O, Daily weights  - continue low salt diet  - c/w Toprol 50mg daily   - f/u Echo  - follow nephrology recommendations     #Abd pain, resolved   - chronic issure  - no evidence of mesenteric ischemia   - CTAP (11/21):  No evidence of abdominal or pelvic mass or inflammatory process  - pain control with Tylenol.   - c/w carafate 1g qid    #Hypertensive Emergency , resolved   - BP on adm 210/95  - s/p Hydralazine 100mg, Metoprolol 100, Nifedipine 30mg and Lasix 80mg xq1  -continue  nifedipine to 60mg and will continue toprol and lasix   - pt previously on hydralazine but no longer taking; ill restart hydralazine 50mg   - No evidence of end organ damage  - Resume all home meds except contraindicated    # CKD stage IV on HD   #Proteinuria  - Cr 7,   - Nephrology eval for HD   - C/w Renvela, Sodium bicarb, calcitriol  - Monitor BMP   -patient/family reported that she wants to be discharged after dialysis tomorrow as her HD place will be closed     # DM   - Monitor FS  - Start basal bolus if FS persistently >180  - F/u A1c    #Troponemia   *can be elevated in CKD vs 2/2 demand ischemia   - trop 0.03, stable  - EKG: no ischemic changes   - check TTE, trend troponin     # HLD  - C/w Atorvastatin       # Right pontine CVA (February 7, 2021)  # Intellectual disability  # hearing/speech impairment  - Pt eval   - ASA, Statin, Plavix      # PVD  - s/p left femoral artery-posterior tibial artery bypass with autologous venous tissue and left heel ulcer debridement 11/21  - c/w ASA, Plavix and statin    #Progress Note Handoff  Pending (specify):  follow up echo, PT,   Family discussion: house staff updated pt family, -patient/family reported that she wants to be discharged after dialysis tomorrow as her HD place will be closed , follow nephrology. currently on iv lasix bid  Disposition: home  For d/c today    Esteban Tejeda MD  Attending Hospitalist 
56 yo female w/ PMH of HTN, HLD, IDD, DM, CKD stage IV on dialysis (sees Dr. Solis Borja), deafness presents for abdominal pain x 4 days along with flu like symptoms. CT a/p negative for acute pathologies. Found to be in hypertensive emergency s/p treatment. Nephro eval for ESRD.    ESRD on HD / missed HD   Hypertensive emergency / pulmonary edema  Hx of DM    - hd today 3h opti 160 3k UF 2l as tolerated   -pt came in with pulmonary edema, has moderate pleural effusions on CT, but did not tolerate higher UF last tx b/o severe muscle cramps/ will follow closely today   - non oliguric,switch to  torsemide 20 mg po on non dialysis days  - BP elevated check after HD   - check phos level  - check iron stores - on HANNAH with HD +/- venofer    will follow   
58 y/o woman with PMH of HTN, hyperlipidemia, DM, CKD stage IV on dialysis (sees Dr. Solis Borja), PVD, CVA and deafness presented for abdominal pain x 4 days. Per family, she was complaining of pain in LLQ.  She missed one dialysis session and then the family noticed a non-productive cough, sore throat, subjective fevers, and loose stool. In the ED, she had hypertensive urgency and pulmonary edema. CT scan of abd/pelvis negative for acute process.     #Pulmonary edema  pt remains volume overloaded today - HD tomorrow 11/27/22 then d/c as per nephro   CXR 11/25: CHF with b/l pleural effusions (improved from admission)  ECHO: EF 62%, LVH, grade 2DD, moderate MR, mild Pulm HTN  s/p HD on 11/22 with 1L removed and 11/25 with 2L removed  renal f/u appreciated  continue lasix 60mg IV q12hr  I's and O's, daily wts  low sodium diet  pt had difficulty tolerating a lot of fluid removal as outpt per family  on room air - monitor pulse ox    #Abdominal pain - now resolved  - low suspicion for chronic mesenteric ischemia on duplex  - CTAP (11/21):  No evidence of abdominal or pelvic mass or inflammatory process  - pain control with Tylenol  - c/w carafate 1g qid    #Hypertensive urgency - resolved  HTN - better controlled than on admission  on hydralazine 50mg tid, metoprolol ER 50mg daily and nifedipine XL 60mg daily    #CKD 4 on HD  HD per renal  on sevelamer     #DM - controlled  A1C 6  on lantus and lispro and monitor FS    #Mildly elevated troponin of 0.03  could be due to demand ischemia  EkG with NSR  ECHO report as above  tele - no events    #h/o Right pontine CVA (February 2021)  on ASA, Plavix, statin    # PVD  s/p left femoral artery-posterior tibial artery bypass with autologous venous tissue and left heel ulcer debridement 11/21  on ASA, Plavix and statin    # Intellectual disability  hearing/speech impairment  - communicates via sign language    #DVT prophylaxis - on heparin SQ    # Chronic anemia - normocytic  iron studies and ferritin level reviewed  on HANNAH    full code  guarded prognosis      Progress Note Handoff  Pending (specify): renal f/u re: further HD, continue diuresis    discussed plan of care to pt and her family at bedside    Disposition: home once volume status is improved    
58 y/o woman with PMH of HTN, hyperlipidemia, DM, CKD stage IV on dialysis (sees Dr. Solis Borja), PVD, CVA and deafness presented for abdominal pain x 4 days. Per family, she was complaining of pain in LLQ.  She missed one dialysis session and then the family noticed a non-productive cough, sore throat, subjective fevers, and loose stool. In the ED, she had hypertensive urgency and pulmonary edema. CT scan of abd/pelvis negative for acute process.     1. Pulmonary edema  pt remains volume overloaded today  CXR 11/25: CHF with b/l pleural effusions (improved from admission)  ECHO: EF 62%, LVH, grade 2DD, moderate MR, mild Pulm HTN  s/p HD on 11/22 with 1L removed and 11/25 with 2L removed  renal f/u appreciated  continue lasix 60mg IV q12hr  I's and O's, daily wts  low sodium diet  consider another HD session tomorrow  pt had difficulty tolerating a lot of fluid removal as outpt per family  on room air - monitor pulse ox    2. Abdominal pain - now resolved  - low suspicion for chronic mesenteric ischemia on duplex  - CTAP (11/21):  No evidence of abdominal or pelvic mass or inflammatory process  - pain control with Tylenol  - c/w carafate 1g qid    3. Hypertensive urgency - resolved  HTN - better controlled than on admission  on hydralazine 50mg tid, metoprolol ER 50mg daily and nifedipine XL 60mg daily    4. CKD 4 on HD  HD per renal  on sevelamer     5. DM - controlled  A1C 6  on lantus and lispro and monitor FS    6. Mildly elevated troponin of 0.03  could be due to demand ischemia  EkG with NSR  ECHO report as above  tele - no events    7. h/o Right pontine CVA (February 2021)  on ASA, Plavix, statin    8. PVD  s/p left femoral artery-posterior tibial artery bypass with autologous venous tissue and left heel ulcer debridement 11/21  on ASA, Plavix and statin    9. Intellectual disability  hearing/speech impairment  - communicates via sign language    10. DVT prophylaxis - on heparin SQ    11. Chronic anemia - normocytic  iron studies and ferritin level reviewed  on HANNAH    full code  guarded prognosis      Progress Note Handoff  Pending (specify): renal f/u re: further HD, continue diuresis    discussed plan of care to pt and her family at bedside    Disposition: home once volume status is improved  
58 y/o woman with PMH of HTN, hyperlipidemia, DM, CKD stage IV on dialysis (sees Dr. Solis Borja), PVD, CVA and deafness presented for abdominal pain x 4 days. Per family, she was complaining of pain in LLQ.  She missed one dialysis session and then the family noticed a non-productive cough, sore throat, subjective fevers, and loose stool. In the ED, she had hypertensive urgency and pulmonary edema. CT scan of abd/pelvis negative for acute process.     1. Pulmonary edema  pt remains volume overloaded today  CXR 11/25: CHF with b/l pleural effusions (improved from admission)  ECHO: EF 62%, LVH, grade 2DD, moderate MR, mild Pulm HTN  s/p HD on 11/22 with 1L removed and 11/25 with 2L removed  renal f/u appreciated  continue lasix 60mg IV q12hr  I's and O's, daily wts  low sodium diet  planned for another HD session tomorrow  pt had difficulty tolerating a lot of fluid removal as outpt per family  on room air - monitor pulse ox    2. Abdominal pain - now resolved  - low suspicion for chronic mesenteric ischemia on duplex  - CTAP (11/21):  No evidence of abdominal or pelvic mass or inflammatory process  - pain control with Tylenol  - c/w carafate 1g qid    3. Hypertensive urgency - resolved  HTN - better controlled than on admission  on hydralazine 50mg tid, metoprolol ER 50mg daily and nifedipine XL 60mg daily    4. CKD 4 on HD  HD per renal  on sevelamer     5. DM - controlled  A1C 6  on lantus and lispro and monitor FS    6. Mildly elevated troponin of 0.03  could be due to demand ischemia  EkG with NSR  ECHO report as above  tele - no events    7. h/o Right pontine CVA (February 2021)  on ASA, Plavix, statin    8. PVD  s/p left femoral artery-posterior tibial artery bypass with autologous venous tissue and left heel ulcer debridement 11/21  on ASA, Plavix and statin    9. Intellectual disability  hearing/speech impairment  - communicates via sign language    10. DVT prophylaxis - on heparin SQ    11. Chronic anemia - normocytic  iron studies and ferritin level reviewed  on HANNAH    full code  guarded prognosis      Progress Note Handoff  Pending (specify): per renal dc home tomorrow after HD     discussed plan of care to pt and her family at bedside    Disposition: home tomorrow 
58 yo female w/ PMH of HTN, HLD, IDD, DM, CKD stage IV on dialysis (sees Dr. Solis Borja), deafness presents for abdominal pain x 4 days along with flu like symptoms. CT a/p negative for acute pathologies. Found to be in hypertensive emergency s/p treatment. Nephro eval for ESRD.    ESRD on HD / missed HD   Hypertensive emergency / pulmonary edema  Hx of DM    - HD today 3 hrs 2 K bath  UF 2 L as ajit  -pt came in with pulmonary edema, has moderate pleural effusions on CT, but did not tolerate higher UF last tx b/o severe muscle cramps  - repeat CXR post HD today - may do an extra tx this week  - non oliguric, cont iv lasix until next HD treatment then maintain on torsemide 40mg po on non dialysis days  - BP well controlled now  - check phos level  - check iron stores - will start HANNAH with HD +/- venofer    - d/c lokelma   
58 yo female w/ PMH of HTN, HLD, IDD, DM, CKD stage IV on dialysis (sees Dr. Solis Borja), deafness presents for abdominal pain x 4 days along with flu like symptoms. CT a/p negative for acute pathologies. Found to be in hypertensive emergency s/p treatment. Nephro eval for ESRD.    ESRD on HD / missed HD   Hypertensive emergency / pulmonary edema  Hx of DM    - s/p HD yesterday, breathing comfortably, plan for next HD on Friday   - non oliguric, cont iv lasix until next HD treatment then maintain on torsemide 40mg po on non dialysis days  - BP well controlled now  - check phos level  - check iron stores - needs HANNAH with HD +/- venofer  - d/c sodium bicarb  - d/c lokelma   
· Assessment	  ***HPI obtained from family, HPI limited due to IDD.  can be used *    Assessment:   58 yo female w/ PMH of HTN, HLD, IDD, DM, CKD stage IV on dialysis (sees Dr. Solis Borja), deafness presents for abdominal pain x 4 days. Per family, pt has been complaining of pain in LLQ.  She missed one dialysis session afer which family noticed a non-productive cough,  sore throat, subjective fevers, and loose stool. denies any cp, palpitations, chills, headaches, dizziness, nausea or vomiting     Hospital course:    In the ED, /95, HR 94, RR 18, T 97.5, SpO2 95% on RA. EKG showed NSR. CTAP No evidence of abdominal or pelvic mass or inflammatory process. There are moderate bilateral pleural effusions with atelectatic changes at both lung bases. CXR showed New diffuse bilateral interstitial opacities, and bibasilar opacities/small pleural effusions. Labs were significant for Hgb 8.3, Cr 7 (on dialysis), trop 0.03. UA dirty. Patient was given Hydralazine 100mg, Metoprolol 100, Nifedipine 30mg and Lasix 80mg xq1, and Nifedipine increased to 60mg in the ED at this time. The patient's BP responded and has been stable since restarting anti-hypertensives.     Patient was admitted to [address] [place] [address] for monitoring. She clinically improved after restarting her on home BP meds, and after receiving HD on day of admission. She again went for HD on Thursday, 11/24. Patient reported improved abdominal pain, cough, and no shortness of breath or chest pain. Patient felt ready to retutrn home on Thursday after HD. No other acute events occurred.     Plan:    #Pulmonary edema in setting of missed dialysis session vs Acute CHF exacerbation - new onset  - TTE (11/04): biplane EF of 75 %.  - Start IV Lasix 60mg bid   - Strict I&O, Daily weights  - continue low salt diet  - c/w Toprol 50mg daily   - f/u Echocardiogram    #Abd pain  #Possible viralinfection (subjective fever, looso bm, abd pain)  *possible Viral gastritis vs constipation vs cramps vs fatty liver ?   * pt has been evaluated for this in past admission (05/22), Unclear etiology  - CTAP (11/21):  No evidence of abdominal or pelvic mass or inflammatory process  - pain control with Tylenol.   - c/w carafate 1g qid  - Improved on 11/23    #Hypertensive Emergency - improved  - BP on adm 210/95  - s/p Hydralazine 100mg, Metoprolol 100, Nifedipine 30mg and Lasix 80mg xq1  - increase nifedipine to 60mg and will continue toprol and lasix   - pt previously on hydralazine but no longer taking; restart hydralazine 50mg   - No evidence of end organ damage  - Resume all home meds except contraindicated  - BP stable on 3C, 117-130's/ 50's-60's    # CKD stage IV on HD   #Proteinuria  - Cr 7 -> 4  - Nephrology eval for HD   - C/w Renvela, Sodium bicarb, calcitriol  - Monitor BMP     # DM   - Monitor FS  - Start basal bolus if FS persistently >180  - F/u A1c    #Troponemia   *can be elevated in CKD vs 2/2 demand ischemia   - trop 0.03   - EKG: no ischemic changes   - check TTE, trend troponin     # HLD  - C/w Atorvastatin       # Right pontine CVA (February 7, 2021) - stable  # Intellectual disability  # hearing/speech impairment  - Pt eval   - ASA, Statin, Plavix      # PVD - stable  - s/p left femoral artery-posterior tibial artery bypass with autologous venous tissue and left heel ulcer debridement 11/21  - c/w ASA, Plavix and statin    DVT ppx: hep sq  GI ppx: none   Diet: DASH  Activity: IAT
***HPI obtained from family, HPI limited due to IDD.  can be used *    58 yo female w/ PMH of HTN, HLD, IDD, DM, CKD stage IV on dialysis (sees Dr. Solis Borja), deafness presents for abdominal pain x 4 days. Per family, pt has been complaining of pain in LLQ.  She missed one dialysis session afer which family noticed a non-productive cough,  sore throat, subjective fevers, and loose stool. denies any cp, palpitations, chills, headaches, dizziness, nausea or vomiting     In the ED, /95, HR 94, RR 18, T 97.5, SpO2 95% on RA. EKG showed NSR. CTAP No evidence of abdominal or pelvic mass or inflammatory process. There are moderate bilateral pleural effusions with atelectatic changes at both lung bases. CXR showed New diffuse bilateral interstitial opacities, and bibasilar opacities/small pleural effusions. Labs were significant for Hgb 8.3, Cr 7 (on dialysis), trop 0.03. UA dirty. Patient was treated with antihypertensives and lasix 80mg x1     Patient is being admitted for further management     #sore throat, rhinorrhea- viral prodrome  -RVP neg   -supportive care    #Pulmonary edema in setting of missed dialysis session vs Acute CHF exacerbation - new onset  - TTE (11/04): biplane EF of 75 %.  -  IV Lasix 60mg bid   - Strict I&O, Daily weights  - continue low salt diet  - c/w Toprol 50mg daily   - f/u Echocardiogram last one 2021    #Abd pain  - chronic issure  - no evidence of mesenteric ischemia   - CTAP (11/21):  No evidence of abdominal or pelvic mass or inflammatory process  - pain control with Tylenol.   - c/w carafate 1g qid    #Hypertensive Emergency   - BP on adm 210/95  - s/p Hydralazine 100mg, Metoprolol 100, Nifedipine 30mg and Lasix 80mg xq1  -continue  nifedipine to 60mg and will continue toprol and lasix   - pt previously on hydralazine but no longer taking; ill restart hydralazine 50mg   - No evidence of end organ damage  - Resume all home meds except contraindicated    # CKD stage IV on HD   #Proteinuria  - Cr 7,   - Nephrology eval for HD   - C/w Renvela, Sodium bicarb, calcitriol  - Monitor BMP     # DM   - Monitor FS  - Start basal bolus if FS persistently >180  - F/u A1c    #Troponemia   *can be elevated in CKD vs 2/2 demand ischemia   - trop 0.03, stable  - EKG: no ischemic changes   - check TTE, trend troponin     # HLD  - C/w Atorvastatin       # Right pontine CVA (February 7, 2021)  # Intellectual disability  # hearing/speech impairment  - Pt eval   - ASA, Statin, Plavix      # PVD  - s/p left femoral artery-posterior tibial artery bypass with autologous venous tissue and left heel ulcer debridement 11/21  - c/w ASA, Plavix and statin    #Progress Note Handoff  Pending (specify):  follow up echo, PT  Family discussion: house staff updated pt family  Disposition: home  Anticipated date: 24 hrs      
ESRD pt , deaf , CHF will require UF on dialysis, Hemoglobin still low 7.9  , will increase HANNAH on dialysis and monitor , /71 no change in meds but to increase fluid remove , may require diuretics on dialysis till stabilized 
***HPI obtained from family, HPI limited due to IDD.  can be used *    56 yo female w/ PMH of HTN, HLD, IDD, DM, CKD stage IV on dialysis (sees Dr. Solis Borja), deafness presents for abdominal pain x 4 days. Per family, pt has been complaining of pain in LLQ.  She missed one dialysis session afer which family noticed a non-productive cough,  sore throat, subjective fevers, and loose stool. denies any cp, palpitations, chills, headaches, dizziness, nausea or vomiting     In the ED, /95, HR 94, RR 18, T 97.5, SpO2 95% on RA. EKG showed NSR. CTAP No evidence of abdominal or pelvic mass or inflammatory process. There are moderate bilateral pleural effusions with atelectatic changes at both lung bases. CXR showed New diffuse bilateral interstitial opacities, and bibasilar opacities/small pleural effusions. Labs were significant for Hgb 8.3, Cr 7 (on dialysis), trop 0.03. UA dirty. Patient was treated with antihypertensives and lasix 80mg x1     Patient is being admitted for further management     #sore throat, rhinorrhea- viral prodrome  -RVP neg   -supportive care    #Pulmonary edema in setting of missed dialysis session vs Acute CHF exacerbation - new onset  - TTE (11/04): biplane EF of 75 %.  -  IV Lasix 60mg bid   - Strict I&O, Daily weights  - continue low salt diet  - c/w Toprol 50mg daily   - f/u Echo  - follow nephrology recommendations     #Abd pain, resolved   - chronic issure  - no evidence of mesenteric ischemia   - CTAP (11/21):  No evidence of abdominal or pelvic mass or inflammatory process  - pain control with Tylenol.   - c/w carafate 1g qid    #Hypertensive Emergency , resolved   - BP on adm 210/95  - s/p Hydralazine 100mg, Metoprolol 100, Nifedipine 30mg and Lasix 80mg xq1  -continue  nifedipine to 60mg and will continue toprol and lasix   - pt previously on hydralazine but no longer taking; ill restart hydralazine 50mg   - No evidence of end organ damage  - Resume all home meds except contraindicated    # CKD stage IV on HD   #Proteinuria  - Cr 7,   - Nephrology eval for HD   - C/w Renvela, Sodium bicarb, calcitriol  - Monitor BMP   -patient/family reported that she wants to be discharged after dialysis tomorrow as her HD place will be closed     # DM   - Monitor FS  - Start basal bolus if FS persistently >180  - F/u A1c    #Troponemia   *can be elevated in CKD vs 2/2 demand ischemia   - trop 0.03, stable  - EKG: no ischemic changes   - check TTE, trend troponin     # HLD  - C/w Atorvastatin       # Right pontine CVA (February 7, 2021)  # Intellectual disability  # hearing/speech impairment  - Pt eval   - ASA, Statin, Plavix      # PVD  - s/p left femoral artery-posterior tibial artery bypass with autologous venous tissue and left heel ulcer debridement 11/21  - c/w ASA, Plavix and statin    #Progress Note Handoff  Pending (specify):  follow up echo, PT,   Family discussion: house staff updated pt family, -patient/family reported that she wants to be discharged after dialysis tomorrow as her HD place will be closed , follow nephrology. currently on iv lasix bid  Disposition: home  Anticipated date: 24 hrs      
56 yo female w/ PMH of HTN, HLD, IDD, DM, CKD stage IV on dialysis (sees Dr. Solis Borja), deafness presents for abdominal pain x 4 days along with flu like symptoms. CT a/p negative for acute pathologies. Found to be in hypertensive emergency s/p treatment. Nephro eval for ESRD.    ESRD on HD / missed HD   Hypertensive emergency / pulmonary edema  Hx of DM    - sp HD yesterday / feeling better today / next ttt in AM   -pt came in with pulmonary edema, has moderate pleural effusions on CT, but did not tolerate higher UF last tx b/o severe muscle cramps  - repeat CXR post HD today - may do an extra tx this week  - non oliguric,switch to  torsemide 20 mg po on non dialysis days  - BP well controlled now  - check phos level  - check iron stores - on HANNAH with HD +/- venofer    will follow

## 2022-11-27 NOTE — PROGRESS NOTE ADULT - PROVIDER SPECIALTY LIST ADULT
Internal Medicine
Nephrology
Hospitalist
Hospitalist
Internal Medicine
Internal Medicine
Nephrology
Hospitalist
Nephrology
Hospitalist

## 2022-12-07 NOTE — CDI QUERY NOTE - NSCDIOTHERTXTBX_GEN_ALL_CORE_HH
Query:  1.	Based on your professional judgement and the clinical indicators, please clarify if the “CHF” can be further specified as:   a.	Acute diastolic CHF  b.	Acute on chronic diastolic CHF  c.	Other (please specify):   d.	Unable to determine the acuity and the CHF type.     Clinical indicators:    TTE (11/24)  Summary:   1. LV Ejection Fraction by Soto's Method with a biplane EF of 62 %.   2. Elevated mean left atrial pressure.   3. Moderate concentric left ventricular hypertrophy.   4. Spectral Doppler shows pseudonormal pattern of left ventricular myocardial filling (Grade II diastolic dysfunction).   5. Mildly enlarged left atrium.   6. Normal right atrial size.   7. Mild thickening of the anterior and posterior mitral valve leaflets.   8. Moderate mitral valve regurgitation.   9. Moderate tricuspid regurgitation.  10. Mild aortic regurgitation.  11. Estimated pulmonary artery systolic pressure is 40.7 mmHg assuming a right atrial pressure of 3 mmHg, which is consistent with mild pulmonary hypertension.    Progress Note Adult-Hospitalist Fellow (11/24) Esteban Tejeda)  (Signed 24-Nov-2022 12:57)  Pulmonary edema in setting of missed dialysis session vs Acute CHF exacerbation - new onset  - TTE (11/04): biplane EF of 75 %.  -  IV Lasix 60mg bid   - Strict I&O, Daily weights  - continue low salt diet  - c/w Toprol 50mg daily   - f/u Echo    Progress Note Adult-Internal Medicine Attending (11/26): Jonathon Billings)  (Signed 26-Nov-2022 14:11)  •	56 y/o woman with PMH of HTN, hyperlipidemia, DM, CKD stage IV on dialysis (sees Dr. Solis Borja), PVD, CVA and deafness presented for abdominal pain x 4 days. Per family, she was complaining of pain in LLQ.  She missed one dialysis session and then the family noticed a non-productive cough, sore throat, subjective fevers, and loose stool. In the ED, she had hypertensive urgency and pulmonary edema. CT scan of abd/pelvis negative for acute process.  o	Pulmonary edema  ?	pt remains volume overloaded today  ?	CXR 11/25: CHF with b/l pleural effusions (improved from admission)  ?	ECHO: EF 62%, LVH, grade 2DD, moderate MR, mild Pulm HTN  ?	s/p HD on 11/22 with 1L removed and 11/25 with 2L removed  ?	renal f/u appreciated

## 2022-12-07 NOTE — CDI QUERY NOTE - NSCDI_DOCCLARIFY2_GEN_ALL_CORE_FT
Documentation clarification is required for accuracy in coding and billing, claim validation and reporting severity of illness, quality data and risk of mortality. [facility]  [address] [place] [address]  Phone: [phone]  Fax: [phone]  Follow Up Time: 1-3 Days

## 2022-12-16 ENCOUNTER — OUTPATIENT (OUTPATIENT)
Dept: OUTPATIENT SERVICES | Facility: HOSPITAL | Age: 57
LOS: 1 days | Discharge: HOME | End: 2022-12-16

## 2022-12-16 ENCOUNTER — APPOINTMENT (OUTPATIENT)
Dept: PODIATRY | Facility: CLINIC | Age: 57
End: 2022-12-16
Payer: SUBSIDIZED

## 2022-12-16 DIAGNOSIS — I63.9 CEREBRAL INFARCTION, UNSPECIFIED: ICD-10-CM

## 2022-12-16 DIAGNOSIS — Z98.890 OTHER SPECIFIED POSTPROCEDURAL STATES: Chronic | ICD-10-CM

## 2022-12-16 PROCEDURE — 99213 OFFICE O/P EST LOW 20 MIN: CPT

## 2022-12-19 ENCOUNTER — OUTPATIENT (OUTPATIENT)
Dept: OUTPATIENT SERVICES | Facility: HOSPITAL | Age: 57
LOS: 1 days | Discharge: HOME | End: 2022-12-19

## 2022-12-19 ENCOUNTER — APPOINTMENT (OUTPATIENT)
Dept: INTERNAL MEDICINE | Facility: CLINIC | Age: 57
End: 2022-12-19
Payer: SUBSIDIZED

## 2022-12-19 VITALS
HEART RATE: 98 BPM | WEIGHT: 156 LBS | DIASTOLIC BLOOD PRESSURE: 80 MMHG | SYSTOLIC BLOOD PRESSURE: 188 MMHG | OXYGEN SATURATION: 98 % | HEIGHT: 60 IN | TEMPERATURE: 97.1 F | BODY MASS INDEX: 30.63 KG/M2

## 2022-12-19 DIAGNOSIS — Z98.890 OTHER SPECIFIED POSTPROCEDURAL STATES: Chronic | ICD-10-CM

## 2022-12-19 DIAGNOSIS — H91.90 UNSPECIFIED HEARING LOSS, UNSPECIFIED EAR: ICD-10-CM

## 2022-12-19 PROCEDURE — 99214 OFFICE O/P EST MOD 30 MIN: CPT | Mod: GC

## 2022-12-19 RX ORDER — SODIUM BICARBONATE 650 MG/1
650 TABLET ORAL EVERY 8 HOURS
Refills: 0 | Status: DISCONTINUED | COMMUNITY
Start: 2022-04-05 | End: 2022-12-19

## 2022-12-19 NOTE — REVIEW OF SYSTEMS
[Fatigue] : fatigue [Hearing Loss] : hearing loss [Nasal Discharge] : nasal discharge [Sore Throat] : sore throat [Shortness Of Breath] : shortness of breath [Abdominal Pain] : abdominal pain [Nausea] : nausea [Vomiting] : vomiting [Fever] : no fever [Chills] : no chills [Headache] : no headache [Fainting] : no fainting [Confusion] : no confusion

## 2022-12-19 NOTE — HISTORY OF PRESENT ILLNESS
[Family Member] : family member [FreeTextEntry8] : 58 YO Hearing-Impaired woman w a PMH of ESRD on HD V s/p AVG placement in June 2022 due to diabetes, CVA (2/2021 on ASA+ Plavix), DM w peripheral neuropathy(on gabapentin), normocytic anemia, peripheral artery disease (s/p vascular repair 11/8/21), dyslipidemia here for scheduled for new patient visit after hospital admission for pulmonary edema. She has congestion, nausea, and indigestion for the past 4 days. She feels that food gets stuck in her throat and she has been vomiting after eating or drinking. Feels fatigued and weak. Patient has right sided abdominal pain and along with back pain. Denies fever chills.\par \par A video  was used to help collect the history and exam this office visit.

## 2022-12-19 NOTE — PLAN
[FreeTextEntry1] : 58 YO Hearing-Impaired woman w a PMH of CKD V due to diabetes, CVA (2/2021 on ASA+ Plavix), DM w peripheral neuropathy(on gabapentin) , peripheral artery disease (s/p vascular repair), dyslipidemia here for follow up after hospitalization.\par \par #Gastro esophageal reflux disease\par - Abdominal pain, nausea and vomiting worsened by eating \par - afebrile, sclera white\par - c/w sucralfate\par - Patient referral to follow up with gastroenterology\par \par # CKD 5 on Hemodialysis TTS sp AVG placement by Dr. Muñoz on 6/22/22\par - No signs of infections, or local issues, Thrill present \par - Recent admission for pulmonary edema\par - c/w torsemide\par - Cr 6.2, GFR 7, Phos 5.8, hepatitis B and C panel negative July 2022\par - follow up with nephrology for hemodialysis\par \par #Normocytic Anemia \par - can get EPO injections with RRT \par - hgb 10.7 in august 2022\par \par RTC in 3 months and prn

## 2022-12-19 NOTE — PHYSICAL EXAM
[No Acute Distress] : no acute distress [Well-Appearing] : well-appearing [Normal Sclera/Conjunctiva] : normal sclera/conjunctiva [Normal Outer Ear/Nose] : the outer ears and nose were normal in appearance [No Respiratory Distress] : no respiratory distress  [No Accessory Muscle Use] : no accessory muscle use [Clear to Auscultation] : lungs were clear to auscultation bilaterally [Normal Rate] : normal rate  [No Edema] : there was no peripheral edema [No Extremity Clubbing/Cyanosis] : no extremity clubbing/cyanosis [Soft] : abdomen soft [Non-distended] : non-distended [No Focal Deficits] : no focal deficits [Normal Affect] : the affect was normal [de-identified] : tender

## 2022-12-20 PROBLEM — I63.9 STROKE: Status: ACTIVE | Noted: 2021-10-27

## 2022-12-20 NOTE — REVIEW OF SYSTEMS
[Arthralgias] : arthralgias [Limb Pain] : limb pain [Limb Weakness] : limb weakness [Difficulty Walking] : difficulty walking [Negative] : Integumentary [Joint Swelling] : no joint swelling [Joint Stiffness] : no joint stiffness [Limb Swelling] : no limb swelling

## 2022-12-20 NOTE — ASSESSMENT
[Verbal] : verbal [Patient] : patient [Good - alert, interested, motivated] : Good - alert, interested, motivated [Demonstrates independently] : demonstrates independently [FreeTextEntry1] : Assessment:\par -L heel pain\par -Callus, b/l heel\par \par Plan:\par -Pt seen & evaluated\par -Aseptic dbx of plantar heel calluses w/sterile curette to normotrophic tissue; pt tolerated procedure well w/no complications\par -Continue orthotics b/l shoes\par -Rx topical Diclofenac for dorsal L foot pain\par -F/u Pain Management pt given referral for pain management. pt to return here in 3 months.

## 2022-12-20 NOTE — PHYSICAL EXAM
[General Appearance - Alert] : alert [General Appearance - In No Acute Distress] : in no acute distress [General Appearance - Well Nourished] : well nourished [General Appearance - Well Developed] : well developed [No Joint Swelling] : no joint swelling [Skin Induration] : skin induration [Diminished Throughout Right Foot] : diminished sensation with monofilament testing throughout right foot [Diminished Throughout Left Foot] : diminished sensation with monofilament testing throughout left foot [Oriented To Time, Place, And Person] : oriented to person, place, and time [Impaired Insight] : insight and judgment were intact [Affect] : the affect was normal [Ankle Swelling Bilaterally] : bilaterally  [1+] : left foot dorsalis pedis 1+ [Ankle Swelling (On Exam)] : not present [Varicose Veins Of Lower Extremities] : not present [] : not present [Delayed in the Right Toes] : capillary refills normal in right toes [Delayed in the Left Toes] : capillary refills normal in the left toes [de-identified] : No ROM, R ankle\par No pain w/passive & active ROM, L ankle\par Muscle strength 3/5 L foot, 4/5 R foot [Foot Ulcer] : no foot ulcer [FreeTextEntry1] : Callus plantar medial R heel\par Mildly hypertrophic tissue w/hyperpigmentation, contracture & induration to medial heel \par Skin thin & atrophic b/l feet

## 2022-12-20 NOTE — HISTORY OF PRESENT ILLNESS
[Diabetic Shoes] : diabetic shoes [Walker] : a walker [FreeTextEntry1] : CC: "Checkup"\par HPI:\par -57F presents to clinic for 2 mo f/u L heel painful callus\par -Pt wearing ACE wrap to legs to keep them warm\par -Pain Management appt upcoming for 1/4/23. \par -Pt using orthotics, reports back pain; unclear if back pain is 2/2 orthotics\par -States L heel feeling better; rated 3/10\par -Also reports pain to top of L foot; minimal; unable to rate on 0-10 scale\par -No other pedal complaints

## 2022-12-20 NOTE — REASON FOR VISIT
[Follow-Up Visit] : a follow-up visit for [Other:___] : [unfilled] [Family Member] : family member [Interpreters_IDNumber] : T [Interpreters_FullName] : Shannan

## 2022-12-22 DIAGNOSIS — I63.9 CEREBRAL INFARCTION, UNSPECIFIED: ICD-10-CM

## 2022-12-22 DIAGNOSIS — I10 ESSENTIAL (PRIMARY) HYPERTENSION: ICD-10-CM

## 2022-12-22 DIAGNOSIS — H91.90 UNSPECIFIED HEARING LOSS, UNSPECIFIED EAR: ICD-10-CM

## 2022-12-22 DIAGNOSIS — M79.89 OTHER SPECIFIED SOFT TISSUE DISORDERS: ICD-10-CM

## 2022-12-22 DIAGNOSIS — E11.9 TYPE 2 DIABETES MELLITUS WITHOUT COMPLICATIONS: ICD-10-CM

## 2022-12-22 DIAGNOSIS — E11.42 TYPE 2 DIABETES MELLITUS WITH DIABETIC POLYNEUROPATHY: ICD-10-CM

## 2022-12-22 DIAGNOSIS — M79.672 PAIN IN LEFT FOOT: ICD-10-CM

## 2022-12-22 DIAGNOSIS — N18.9 CHRONIC KIDNEY DISEASE, UNSPECIFIED: ICD-10-CM

## 2022-12-28 ENCOUNTER — APPOINTMENT (OUTPATIENT)
Dept: INTERNAL MEDICINE | Facility: CLINIC | Age: 57
End: 2022-12-28

## 2023-01-03 NOTE — H&P PST ADULT - BLOOD TRANSFUSION, PREVIOUS, PROFILE
yes Rinvoq Pregnancy And Lactation Text: Based on animal studies, Rinvoq may cause embryo-fetal harm when administered to pregnant women.  The medication should not be used in pregnancy.  Breastfeeding is not recommended during treatment and for 6 days after the last dose.

## 2023-01-04 ENCOUNTER — APPOINTMENT (OUTPATIENT)
Dept: PAIN MANAGEMENT | Facility: CLINIC | Age: 58
End: 2023-01-04

## 2023-02-07 ENCOUNTER — APPOINTMENT (OUTPATIENT)
Dept: VASCULAR SURGERY | Facility: CLINIC | Age: 58
End: 2023-02-07
Payer: COMMERCIAL

## 2023-02-07 VITALS — HEIGHT: 60 IN | BODY MASS INDEX: 30.63 KG/M2 | WEIGHT: 156 LBS

## 2023-02-07 PROCEDURE — 99212 OFFICE O/P EST SF 10 MIN: CPT

## 2023-02-07 PROCEDURE — 93990 DOPPLER FLOW TESTING: CPT

## 2023-02-07 PROCEDURE — 93925 LOWER EXTREMITY STUDY: CPT | Mod: 59

## 2023-02-07 NOTE — DATA REVIEWED
[FreeTextEntry1] : Arterial duplex\par Right diffuse atherosclerotic plaque noted throughout the femoral-popliteal and tibial arteries.  Absence of color flow and signals consistent with arterial occlusion in the mid to distal superficial femoral artery.  The popliteal artery is patent with diminished flow.  Left history of common femoral to AT vein bypass graft.  The common femoral to AT vein bypass graft is patent with a velocity range of 122 to 100 cm/s.  There is a flow disturbance noted in the proximal anastomosis possible due to vessel geometry.  The distal anastomosis are within normal limits.  The outflow vessels are diffusely calcified with a velocity of 52 cm/s\par \par \par \par Hemodialysis access\par The brachial artery axillary vein AV graft is patent with no evidence of significant stenosis to 86 227 times per second.  Both anastomosis are within normal limits.  The inflow and outflow vessels are patent

## 2023-02-07 NOTE — HISTORY OF PRESENT ILLNESS
[FreeTextEntry1] : The patient is a 57-year-old female with a known history for stroke left heel gangrene status post left common femoral to anterior tibial reverse saphenous vein bypass on November 8, 2021.  The patient is also currently on hemodialysis and underwent a left brachial artery to axillary vein AV graft placement on June 22, 2022.  The patient presents today complaining of a recent fall in which she landed on her left arm and complains of pain.  Patient also is complaining of bilateral leg pain and swelling.  She presents with her brother-in-law who interprets for her.  The patient is hearing impaired and uses sign language to communicate

## 2023-02-07 NOTE — ASSESSMENT
[FreeTextEntry1] : The patient is a 57-year-old female with a history of a left axillary artery to vein AV fistula graft.  Patient also had a history of left leg vein bypass from the common femoral to anterior tibial vein.  The bypass is patent the fistula is working well.  The patient has some diffuse lower extremity swelling present.  She is currently on hemodialysis and limited in her fluid.  She states she has been drinking and not urinating.  I recommend she follow-up with nephrology for evaluation of her fluid balance.  She also complains of some burning with urination.  The patient is to follow-up with her primary care physician this week for medical evaluation.  From my standpoint her bypass is working her fistula is working no vascular surgery intervention at this time

## 2023-02-07 NOTE — REASON FOR VISIT
[Acute] : an acute visit [FreeTextEntry1] : Bilateral leg pain.  Right leg swelling.  And status post fall to her left arm.

## 2023-02-11 ENCOUNTER — INPATIENT (INPATIENT)
Facility: HOSPITAL | Age: 58
LOS: 3 days | Discharge: HOME | DRG: 194 | End: 2023-02-15
Attending: STUDENT IN AN ORGANIZED HEALTH CARE EDUCATION/TRAINING PROGRAM | Admitting: STUDENT IN AN ORGANIZED HEALTH CARE EDUCATION/TRAINING PROGRAM
Payer: MEDICAID

## 2023-02-11 VITALS
OXYGEN SATURATION: 97 % | HEART RATE: 84 BPM | TEMPERATURE: 99 F | RESPIRATION RATE: 19 BRPM | WEIGHT: 171.08 LBS | SYSTOLIC BLOOD PRESSURE: 214 MMHG | DIASTOLIC BLOOD PRESSURE: 95 MMHG

## 2023-02-11 DIAGNOSIS — Z98.890 OTHER SPECIFIED POSTPROCEDURAL STATES: Chronic | ICD-10-CM

## 2023-02-11 DIAGNOSIS — R10.9 UNSPECIFIED ABDOMINAL PAIN: ICD-10-CM

## 2023-02-11 LAB
ALBUMIN SERPL ELPH-MCNC: 3.6 G/DL — SIGNIFICANT CHANGE UP (ref 3.5–5.2)
ALP SERPL-CCNC: 202 U/L — HIGH (ref 30–115)
ALT FLD-CCNC: 16 U/L — SIGNIFICANT CHANGE UP (ref 0–41)
ANION GAP SERPL CALC-SCNC: 13 MMOL/L — SIGNIFICANT CHANGE UP (ref 7–14)
APPEARANCE UR: CLEAR — SIGNIFICANT CHANGE UP
AST SERPL-CCNC: 31 U/L — SIGNIFICANT CHANGE UP (ref 0–41)
BACTERIA # UR AUTO: ABNORMAL
BASE EXCESS BLDV CALC-SCNC: 7.1 MMOL/L — HIGH (ref -2–3)
BASOPHILS # BLD AUTO: 0.04 K/UL — SIGNIFICANT CHANGE UP (ref 0–0.2)
BASOPHILS NFR BLD AUTO: 0.7 % — SIGNIFICANT CHANGE UP (ref 0–1)
BILIRUB SERPL-MCNC: 0.6 MG/DL — SIGNIFICANT CHANGE UP (ref 0.2–1.2)
BILIRUB UR-MCNC: NEGATIVE — SIGNIFICANT CHANGE UP
BUN SERPL-MCNC: 27 MG/DL — HIGH (ref 10–20)
CA-I SERPL-SCNC: 1.11 MMOL/L — LOW (ref 1.15–1.33)
CALCIUM SERPL-MCNC: 8.4 MG/DL — SIGNIFICANT CHANGE UP (ref 8.4–10.4)
CHLORIDE SERPL-SCNC: 95 MMOL/L — LOW (ref 98–110)
CO2 SERPL-SCNC: 29 MMOL/L — SIGNIFICANT CHANGE UP (ref 17–32)
COLOR SPEC: YELLOW — SIGNIFICANT CHANGE UP
CREAT SERPL-MCNC: 4.5 MG/DL — CRITICAL HIGH (ref 0.7–1.5)
DIFF PNL FLD: ABNORMAL
EGFR: 11 ML/MIN/1.73M2 — LOW
EOSINOPHIL # BLD AUTO: 0.21 K/UL — SIGNIFICANT CHANGE UP (ref 0–0.7)
EOSINOPHIL NFR BLD AUTO: 3.7 % — SIGNIFICANT CHANGE UP (ref 0–8)
EPI CELLS # UR: 4 /HPF — SIGNIFICANT CHANGE UP (ref 0–5)
GAS PNL BLDV: 132 MMOL/L — LOW (ref 136–145)
GAS PNL BLDV: SIGNIFICANT CHANGE UP
GLUCOSE BLDC GLUCOMTR-MCNC: 115 MG/DL — HIGH (ref 70–99)
GLUCOSE SERPL-MCNC: 115 MG/DL — HIGH (ref 70–99)
GLUCOSE UR QL: ABNORMAL
HCG SERPL QL: NEGATIVE — SIGNIFICANT CHANGE UP
HCO3 BLDV-SCNC: 32 MMOL/L — HIGH (ref 22–29)
HCT VFR BLD CALC: 31.7 % — LOW (ref 37–47)
HCT VFR BLDA CALC: 31 % — LOW (ref 39–51)
HGB BLD CALC-MCNC: 10.2 G/DL — LOW (ref 12.6–17.4)
HGB BLD-MCNC: 10.3 G/DL — LOW (ref 12–16)
HYALINE CASTS # UR AUTO: 1 /LPF — SIGNIFICANT CHANGE UP (ref 0–7)
IMM GRANULOCYTES NFR BLD AUTO: 0.5 % — HIGH (ref 0.1–0.3)
KETONES UR-MCNC: SIGNIFICANT CHANGE UP
LACTATE BLDV-MCNC: 1.1 MMOL/L — SIGNIFICANT CHANGE UP (ref 0.5–2)
LACTATE SERPL-SCNC: 1.1 MMOL/L — SIGNIFICANT CHANGE UP (ref 0.7–2)
LEUKOCYTE ESTERASE UR-ACNC: NEGATIVE — SIGNIFICANT CHANGE UP
LIDOCAIN IGE QN: 14 U/L — SIGNIFICANT CHANGE UP (ref 7–60)
LYMPHOCYTES # BLD AUTO: 1.06 K/UL — LOW (ref 1.2–3.4)
LYMPHOCYTES # BLD AUTO: 18.5 % — LOW (ref 20.5–51.1)
MCHC RBC-ENTMCNC: 30.1 PG — SIGNIFICANT CHANGE UP (ref 27–31)
MCHC RBC-ENTMCNC: 32.5 G/DL — SIGNIFICANT CHANGE UP (ref 32–37)
MCV RBC AUTO: 92.7 FL — SIGNIFICANT CHANGE UP (ref 81–99)
MONOCYTES # BLD AUTO: 0.56 K/UL — SIGNIFICANT CHANGE UP (ref 0.1–0.6)
MONOCYTES NFR BLD AUTO: 9.8 % — HIGH (ref 1.7–9.3)
NEUTROPHILS # BLD AUTO: 3.84 K/UL — SIGNIFICANT CHANGE UP (ref 1.4–6.5)
NEUTROPHILS NFR BLD AUTO: 66.8 % — SIGNIFICANT CHANGE UP (ref 42.2–75.2)
NITRITE UR-MCNC: NEGATIVE — SIGNIFICANT CHANGE UP
NRBC # BLD: 0 /100 WBCS — SIGNIFICANT CHANGE UP (ref 0–0)
NT-PROBNP SERPL-SCNC: HIGH PG/ML (ref 0–300)
PCO2 BLDV: 46 MMHG — HIGH (ref 39–42)
PH BLDV: 7.45 — HIGH (ref 7.32–7.43)
PH UR: 8.5 — HIGH (ref 5–8)
PLATELET # BLD AUTO: 126 K/UL — LOW (ref 130–400)
PO2 BLDV: 45 MMHG — SIGNIFICANT CHANGE UP
POTASSIUM BLDV-SCNC: 3.9 MMOL/L — SIGNIFICANT CHANGE UP (ref 3.5–5.1)
POTASSIUM SERPL-MCNC: 4.5 MMOL/L — SIGNIFICANT CHANGE UP (ref 3.5–5)
POTASSIUM SERPL-SCNC: 4.5 MMOL/L — SIGNIFICANT CHANGE UP (ref 3.5–5)
PROT SERPL-MCNC: 6.2 G/DL — SIGNIFICANT CHANGE UP (ref 6–8)
PROT UR-MCNC: ABNORMAL
RBC # BLD: 3.42 M/UL — LOW (ref 4.2–5.4)
RBC # FLD: 20.4 % — HIGH (ref 11.5–14.5)
RBC CASTS # UR COMP ASSIST: 8 /HPF — HIGH (ref 0–4)
SAO2 % BLDV: 74.4 % — SIGNIFICANT CHANGE UP
SARS-COV-2 RNA SPEC QL NAA+PROBE: SIGNIFICANT CHANGE UP
SODIUM SERPL-SCNC: 137 MMOL/L — SIGNIFICANT CHANGE UP (ref 135–146)
SP GR SPEC: 1.02 — SIGNIFICANT CHANGE UP (ref 1.01–1.03)
TROPONIN T SERPL-MCNC: 0.02 NG/ML — HIGH
TROPONIN T SERPL-MCNC: 0.04 NG/ML — CRITICAL HIGH
UROBILINOGEN FLD QL: SIGNIFICANT CHANGE UP
WBC # BLD: 5.74 K/UL — SIGNIFICANT CHANGE UP (ref 4.8–10.8)
WBC # FLD AUTO: 5.74 K/UL — SIGNIFICANT CHANGE UP (ref 4.8–10.8)
WBC UR QL: 2 /HPF — SIGNIFICANT CHANGE UP (ref 0–5)

## 2023-02-11 PROCEDURE — 99223 1ST HOSP IP/OBS HIGH 75: CPT

## 2023-02-11 PROCEDURE — 85025 COMPLETE CBC W/AUTO DIFF WBC: CPT

## 2023-02-11 PROCEDURE — 93925 LOWER EXTREMITY STUDY: CPT | Mod: 26

## 2023-02-11 PROCEDURE — 36415 COLL VENOUS BLD VENIPUNCTURE: CPT

## 2023-02-11 PROCEDURE — 74177 CT ABD & PELVIS W/CONTRAST: CPT | Mod: 26,MA

## 2023-02-11 PROCEDURE — 93970 EXTREMITY STUDY: CPT | Mod: 26

## 2023-02-11 PROCEDURE — 99252 IP/OBS CONSLTJ NEW/EST SF 35: CPT

## 2023-02-11 PROCEDURE — 70450 CT HEAD/BRAIN W/O DYE: CPT

## 2023-02-11 PROCEDURE — 90935 HEMODIALYSIS ONE EVALUATION: CPT

## 2023-02-11 PROCEDURE — 93306 TTE W/DOPPLER COMPLETE: CPT

## 2023-02-11 PROCEDURE — 71045 X-RAY EXAM CHEST 1 VIEW: CPT | Mod: 26

## 2023-02-11 PROCEDURE — 80053 COMPREHEN METABOLIC PANEL: CPT

## 2023-02-11 PROCEDURE — 71045 X-RAY EXAM CHEST 1 VIEW: CPT

## 2023-02-11 PROCEDURE — 93005 ELECTROCARDIOGRAM TRACING: CPT

## 2023-02-11 PROCEDURE — 83880 ASSAY OF NATRIURETIC PEPTIDE: CPT

## 2023-02-11 PROCEDURE — 84484 ASSAY OF TROPONIN QUANT: CPT

## 2023-02-11 PROCEDURE — 83735 ASSAY OF MAGNESIUM: CPT

## 2023-02-11 PROCEDURE — 82962 GLUCOSE BLOOD TEST: CPT

## 2023-02-11 RX ORDER — HYDROMORPHONE HYDROCHLORIDE 2 MG/ML
0.5 INJECTION INTRAMUSCULAR; INTRAVENOUS; SUBCUTANEOUS ONCE
Refills: 0 | Status: DISCONTINUED | OUTPATIENT
Start: 2023-02-11 | End: 2023-02-11

## 2023-02-11 RX ORDER — GABAPENTIN 400 MG/1
300 CAPSULE ORAL THREE TIMES A DAY
Refills: 0 | Status: DISCONTINUED | OUTPATIENT
Start: 2023-02-11 | End: 2023-02-15

## 2023-02-11 RX ORDER — ACETAMINOPHEN 500 MG
650 TABLET ORAL EVERY 6 HOURS
Refills: 0 | Status: DISCONTINUED | OUTPATIENT
Start: 2023-02-11 | End: 2023-02-15

## 2023-02-11 RX ORDER — ONDANSETRON 8 MG/1
4 TABLET, FILM COATED ORAL ONCE
Refills: 0 | Status: COMPLETED | OUTPATIENT
Start: 2023-02-11 | End: 2023-02-11

## 2023-02-11 RX ORDER — LABETALOL HCL 100 MG
10 TABLET ORAL ONCE
Refills: 0 | Status: COMPLETED | OUTPATIENT
Start: 2023-02-11 | End: 2023-02-11

## 2023-02-11 RX ORDER — CLOPIDOGREL BISULFATE 75 MG/1
75 TABLET, FILM COATED ORAL DAILY
Refills: 0 | Status: DISCONTINUED | OUTPATIENT
Start: 2023-02-11 | End: 2023-02-15

## 2023-02-11 RX ORDER — FERROUS SULFATE 325(65) MG
325 TABLET ORAL DAILY
Refills: 0 | Status: DISCONTINUED | OUTPATIENT
Start: 2023-02-11 | End: 2023-02-15

## 2023-02-11 RX ORDER — ACETAMINOPHEN 500 MG
650 TABLET ORAL ONCE
Refills: 0 | Status: COMPLETED | OUTPATIENT
Start: 2023-02-11 | End: 2023-02-11

## 2023-02-11 RX ORDER — SUCRALFATE 1 G
1 TABLET ORAL
Refills: 0 | Status: DISCONTINUED | OUTPATIENT
Start: 2023-02-11 | End: 2023-02-15

## 2023-02-11 RX ORDER — FUROSEMIDE 40 MG
40 TABLET ORAL ONCE
Refills: 0 | Status: COMPLETED | OUTPATIENT
Start: 2023-02-11 | End: 2023-02-11

## 2023-02-11 RX ORDER — CHLORHEXIDINE GLUCONATE 213 G/1000ML
1 SOLUTION TOPICAL
Refills: 0 | Status: DISCONTINUED | OUTPATIENT
Start: 2023-02-11 | End: 2023-02-15

## 2023-02-11 RX ORDER — NIFEDIPINE 30 MG
60 TABLET, EXTENDED RELEASE 24 HR ORAL DAILY
Refills: 0 | Status: DISCONTINUED | OUTPATIENT
Start: 2023-02-11 | End: 2023-02-15

## 2023-02-11 RX ORDER — HEPARIN SODIUM 5000 [USP'U]/ML
5000 INJECTION INTRAVENOUS; SUBCUTANEOUS EVERY 8 HOURS
Refills: 0 | Status: DISCONTINUED | OUTPATIENT
Start: 2023-02-11 | End: 2023-02-15

## 2023-02-11 RX ORDER — MORPHINE SULFATE 50 MG/1
4 CAPSULE, EXTENDED RELEASE ORAL ONCE
Refills: 0 | Status: DISCONTINUED | OUTPATIENT
Start: 2023-02-11 | End: 2023-02-11

## 2023-02-11 RX ORDER — SEVELAMER CARBONATE 2400 MG/1
800 POWDER, FOR SUSPENSION ORAL
Refills: 0 | Status: DISCONTINUED | OUTPATIENT
Start: 2023-02-11 | End: 2023-02-15

## 2023-02-11 RX ORDER — SODIUM BICARBONATE 1 MEQ/ML
1300 SYRINGE (ML) INTRAVENOUS THREE TIMES A DAY
Refills: 0 | Status: DISCONTINUED | OUTPATIENT
Start: 2023-02-11 | End: 2023-02-12

## 2023-02-11 RX ORDER — METHOCARBAMOL 500 MG/1
500 TABLET, FILM COATED ORAL
Refills: 0 | Status: DISCONTINUED | OUTPATIENT
Start: 2023-02-11 | End: 2023-02-15

## 2023-02-11 RX ORDER — HYDRALAZINE HCL 50 MG
25 TABLET ORAL
Refills: 0 | Status: DISCONTINUED | OUTPATIENT
Start: 2023-02-11 | End: 2023-02-15

## 2023-02-11 RX ORDER — ASPIRIN/CALCIUM CARB/MAGNESIUM 324 MG
81 TABLET ORAL DAILY
Refills: 0 | Status: DISCONTINUED | OUTPATIENT
Start: 2023-02-11 | End: 2023-02-15

## 2023-02-11 RX ORDER — ATORVASTATIN CALCIUM 80 MG/1
80 TABLET, FILM COATED ORAL AT BEDTIME
Refills: 0 | Status: DISCONTINUED | OUTPATIENT
Start: 2023-02-11 | End: 2023-02-15

## 2023-02-11 RX ORDER — CALCITRIOL 0.5 UG/1
0.25 CAPSULE ORAL DAILY
Refills: 0 | Status: DISCONTINUED | OUTPATIENT
Start: 2023-02-11 | End: 2023-02-15

## 2023-02-11 RX ADMIN — HYDROMORPHONE HYDROCHLORIDE 0.5 MILLIGRAM(S): 2 INJECTION INTRAMUSCULAR; INTRAVENOUS; SUBCUTANEOUS at 19:00

## 2023-02-11 RX ADMIN — HYDROMORPHONE HYDROCHLORIDE 0.5 MILLIGRAM(S): 2 INJECTION INTRAMUSCULAR; INTRAVENOUS; SUBCUTANEOUS at 23:08

## 2023-02-11 RX ADMIN — ONDANSETRON 4 MILLIGRAM(S): 8 TABLET, FILM COATED ORAL at 22:27

## 2023-02-11 RX ADMIN — Medication 10 MILLIGRAM(S): at 21:15

## 2023-02-11 RX ADMIN — Medication 1300 MILLIGRAM(S): at 21:56

## 2023-02-11 RX ADMIN — MORPHINE SULFATE 4 MILLIGRAM(S): 50 CAPSULE, EXTENDED RELEASE ORAL at 04:09

## 2023-02-11 RX ADMIN — HYDROMORPHONE HYDROCHLORIDE 0.5 MILLIGRAM(S): 2 INJECTION INTRAMUSCULAR; INTRAVENOUS; SUBCUTANEOUS at 11:54

## 2023-02-11 RX ADMIN — ATORVASTATIN CALCIUM 80 MILLIGRAM(S): 80 TABLET, FILM COATED ORAL at 21:56

## 2023-02-11 RX ADMIN — GABAPENTIN 300 MILLIGRAM(S): 400 CAPSULE ORAL at 21:56

## 2023-02-11 RX ADMIN — HEPARIN SODIUM 5000 UNIT(S): 5000 INJECTION INTRAVENOUS; SUBCUTANEOUS at 21:56

## 2023-02-11 RX ADMIN — HYDROMORPHONE HYDROCHLORIDE 0.5 MILLIGRAM(S): 2 INJECTION INTRAMUSCULAR; INTRAVENOUS; SUBCUTANEOUS at 18:49

## 2023-02-11 RX ADMIN — Medication 650 MILLIGRAM(S): at 12:00

## 2023-02-11 RX ADMIN — Medication 650 MILLIGRAM(S): at 11:17

## 2023-02-11 RX ADMIN — Medication 40 MILLIGRAM(S): at 23:08

## 2023-02-11 NOTE — ED PROVIDER NOTE - CLINICAL SUMMARY MEDICAL DECISION MAKING FREE TEXT BOX
Patient presented with worsening abdominal pain and distention over the past few days as well as bilateral lower extremity swelling.  On arrival patient afebrile, hypertensive but overall hemodynamically stable, normal O2 saturation on room air but signs of significant fluid overload on exam.  Abdomen diffusely tender and appears edematous.  EKG obtained and nonspecific but no evidence of STEMI.  Labs obtained and remarkable for proBNP over 70,000 as well as a troponin of 0.02, known end-stage renal disease but no hyperkalemia or metabolic acidosis.  CT of the abdomen and pelvis revealed diffuse anasarca as well as new ascites which confirmed likely fluid overload.  It was difficult to obtain pulses on patient's bilateral lower extremities due to significant swelling and tender to palpation bilaterally, and therefore vascular team had been consulted by overnight team and evaluated the patient in the ED.  Per vascular, nothing to do from their standpoint but will follow.  Nephrology also consulted as per night team and they will follow for end-stage renal disease, although no acute interventions at this time.  Given worsening fluid overload, patient will require admission for further management.  Hemodynamically stable at time of admission.

## 2023-02-11 NOTE — H&P ADULT - ASSESSMENT
58yo F w/ pmhx of ESRD (HD TTS), HLD, HTN, PVD, CVA, hearing impaired presenting to the ED for abdominal pain & distension. Admitted for fluid overload and was taken to dialysis.     #fluid overload  #ESRD on HD TTS  #possible new HFpEF   - did not miss any dialysis sessions  - ecg wnl, BNP > 70K, trop 0.02   - CT a/p: diffuse anasarca, R cardiac dysfunction, moderate b/l pleural effusions   - previous TTE 11/24/22: EF 62%, G2DD  - get CXR, get TTE, trend troponins   - cardio eval after TTE   - HD today, f/u with nephro   - strict i/o, daily weight, fluid restriction   - c/w sevelamer, sodium bicarb     #abdominal pain   - CT a/p unrevealing for any other causes of abd pain; lipase neg   - possibly secondary to ascites, will monitor for resolution/improvement after HD   - had this compliant last admission in November 2022 but unclear cause and symptoms improved   - c/w home carafate 1g QID    #Hypertensive urgency  - c/w hydralazine 25mg bid   - patient was taken off nifedipine 90mg; will continue at 60mg qd     #DM - controlled  - A1C 6  - not on insulin at home, unclear if taken off?     #h/o Right pontine CVA (February 2021)  - c/w ASA, Plavix, statin  - needs o/p neuro follow up    # PVD  - s/p left femoral artery-posterior tibial artery bypass with autologous venous tissue and left heel ulcer debridement 11/21  - there was concern that pulses were no dopplerable in LE, both va duplex/art duplex 2/11/23 in ED were normal   - f/u o/p Dr. Muñoz, needs o/p vasc (and neuro) follow up if DAPT still needed     # Intellectual disability  #hearing/speech impairment  - communicates via sign language    # Chronic anemia - normocytic  - c/w ferosul 325mg qd     DVT ppx: heparin subq   GI ppx: none  Diet: DASH/Renal/CC/fluid restriction  Code Status: full code  Dispo: from home; reval after HD, likely will need to stay until repeat HD on monday        58yo F w/ pmhx of ESRD (HD TTS), HLD, HTN, PVD, CVA, hearing impaired presenting to the ED for abdominal pain & distension. Admitted for fluid overload and was taken to dialysis.     #fluid overload  #ESRD on HD TTS  #possible new HFpEF   - did not miss any dialysis sessions  - ecg wnl, BNP > 70K, trop 0.02   - CT a/p: diffuse anasarca, R cardiac dysfunction, moderate b/l pleural effusions   - previous TTE 11/24/22: EF 62%, G2DD  - get CXR, get TTE, trend troponins   - cardio eval after TTE   - HD today, f/u with nephro   - strict i/o, daily weight, fluid restriction   - c/w sevelamer, sodium bicarb     #abdominal pain   - CT a/p unrevealing for any other causes of abd pain; lipase neg   - possibly secondary to ascites, will monitor for resolution/improvement after HD   - had this compliant last admission in November 2022 but unclear cause and symptoms improved   - c/w home carafate 1g QID    #Hypertensive urgency  - c/w hydralazine 25mg bid   - patient was taken off nifedipine 90mg; will continue at 60mg qd     #bacteruria   - patient having abd pain but unlikely related to UTI   - u/a: bact many; WBC, YELENA, nitrites negative   - CT a/p: Urinary bladder demonstrates circumferential wall thickening.   - will hold abx for now and f/u urine culture     #DM - controlled  - A1C 6  - not on insulin at home, unclear if taken off?     #h/o Right pontine CVA (February 2021)  - c/w ASA, Plavix, statin  - needs o/p neuro follow up    # PVD  - s/p left femoral artery-posterior tibial artery bypass with autologous venous tissue and left heel ulcer debridement 11/21  - there was concern that pulses were no dopplerable in LE, both va duplex/art duplex 2/11/23 in ED were normal   - f/u o/p Dr. Muñoz, needs o/p vasc (and neuro) follow up if DAPT still needed     # Intellectual disability  #hearing/speech impairment  - communicates via sign language    # Chronic anemia - normocytic  - c/w ferosul 325mg qd     DVT ppx: heparin subq   GI ppx: none  Diet: DASH/Renal/CC/fluid restriction  Code Status: full code  Dispo: from home; reval after HD, likely will need to stay until repeat HD on monday

## 2023-02-11 NOTE — CONSULT NOTE ADULT - SUBJECTIVE AND OBJECTIVE BOX
57 year old female with pmh of CKD (gets dialysis T/TH/Sat), HLD, HTN, PVD, hearing impaired presenting to the ED for abdominal pain & distension. Patient last received dialysis this past Thursday  also as per family member , increase edema of LE   --------------------------------------------------------------------------------  PAST MEDICAL & SURGICAL HISTORY:  PVD (peripheral vascular disease)      Benign essential HTN      Intellectual disability      Hearing impaired      HLD (hyperlipidemia)      Chronic kidney disease, unspecified CKD stage      H/O vascular surgery  left leg        FAMILY HISTORY:    PAST SOCIAL HISTORY:    ALLERGIES & MEDICATIONS  --------------------------------------------------------------------------------  Allergies    NSAIDs (Nephrotoxicity)  penicillin (Rash)    Intolerances      Standing Inpatient Medications  acetaminophen     Tablet .. 650 milliGRAM(s) Oral once        VITALS/PHYSICAL EXAM  --------------------------------------------------------------------------------  T(C): 36.5 (02-11-23 @ 07:23), Max: 37.1 (02-11-23 @ 01:38)  HR: 77 (02-11-23 @ 07:23) (77 - 84)  BP: 219/94 (02-11-23 @ 07:23) (214/95 - 219/94)  RR: 18 (02-11-23 @ 07:23) (18 - 19)  SpO2: 96% (02-11-23 @ 07:23) (96% - 97%)  Wt(kg): --    Weight (kg): 77.6 (02-11-23 @ 01:38)      Physical Exam:  	Gen: NAD,   	Pulm: decrease BS B/L  	CV: S1S2; no rub  	Abd: , tender/distended  	LE:  edema  	Vascular access:av     LABS/STUDIES  --------------------------------------------------------------------------------              10.3   5.74  >-----------<  126      [02-11-23 @ 03:23]              31.7     137  |  95  |  27  ----------------------------<  115      [02-11-23 @ 03:23]  4.5   |  29  |  4.5        Ca     8.4     [02-11-23 @ 03:23]    TPro  6.2  /  Alb  3.6  /  TBili  0.6  /  DBili  x   /  AST  31  /  ALT  16  /  AlkPhos  202  [02-11-23 @ 03:23]      Troponin 0.02      [02-11-23 @ 03:23]    Creatinine Trend:  SCr 4.5 [02-11 @ 03:23]    Urinalysis - [02-11-23 @ 03:25]      Color Yellow / Appearance Clear / SG 1.018 / pH 8.5      Gluc 200 mg/dL / Ketone Trace  / Bili Negative / Urobili <2 mg/dL       Blood Trace / Protein >600 mg/dL / Leuk Est Negative / Nitrite Negative      RBC 8 / WBC 2 / Hyaline 1 / Gran  / Sq Epi  / Non Sq Epi 4 / Bacteria Many      Iron 67, TIBC 210, %sat 32      [11-25-22 @ 10:50]  Ferritin 135      [11-25-22 @ 10:50]  PTH -- (Ca 7.8)      [05-12-22 @ 08:21]   275  TSH 2.09      [05-10-22 @ 09:04]  Lipid: chol 158, , HDL 61, LDL --      [11-22-22 @ 06:31]    HBsAg Nonreact      [05-13-22 @ 18:00]  HCV 0.16, Nonreact      [05-13-22 @ 18:00]    OLVIN: titer 1:80, pattern Speckled      [11-04-21 @ 12:47]  dsDNA <12      [05-13-22 @ 18:00]  ANCA: cANCA Negative, pANCA Negative, atypical ANCA Negative      [05-13-22 @ 18:00]  PLA2R: MARIA GUADALUPE <1.8, IFA --      [05-13-22 @ 18:00]  Free Light Chains: kappa 10.04, lambda 8.96, ratio = 1.12      [05-12 @ 08:21]    Diffuse anasarca and new ascites.    Evidence of right cardiac dysfunction    Urinary bladder demonstrates circumferential wall thickening. Although it   is partially distended.    Prominent nonspecific inguinal and external iliac lymph nodes may be   reactive.

## 2023-02-11 NOTE — H&P ADULT - NSHPLABSRESULTS_GEN_ALL_CORE
LABS:  cret                        10.3   5.74  )-----------( 126      ( 11 Feb 2023 03:23 )             31.7     02-11    137  |  95<L>  |  27<H>  ----------------------------<  115<H>  4.5   |  29  |  4.5<HH>    Ca    8.4      11 Feb 2023 03:23    TPro  6.2  /  Alb  3.6  /  TBili  0.6  /  DBili  x   /  AST  31  /  ALT  16  /  AlkPhos  202<H>  02-11    imaging:   - ECG: NSR, HR 84, no signs of ischemia   - CT a/p: Diffuse anasarca and new ascites.Evidence of right cardiac dysfunction. Urinary bladder demonstrates circumferential wall thickening. Although it is partially distended. Prominent nonspecific inguinal and external iliac lymph nodes may be reactive. Moderate b/l pleural effusions. LABS:  cret                        10.3   5.74  )-----------( 126      ( 11 Feb 2023 03:23 )             31.7     02-11    137  |  95<L>  |  27<H>  ----------------------------<  115<H>  4.5   |  29  |  4.5<HH>    Ca    8.4      11 Feb 2023 03:23    TPro  6.2  /  Alb  3.6  /  TBili  0.6  /  DBili  x   /  AST  31  /  ALT  16  /  AlkPhos  202<H>  02-11    imaging:   - ECG: NSR, HR 84, no signs of ischemia   - CT a/p: Diffuse anasarca and new ascites. Evidence of right cardiac dysfunction. Urinary bladder demonstrates circumferential wall thickening. Although it is partially distended. Prominent nonspecific inguinal and external iliac lymph nodes may be reactive. Moderate b/l pleural effusions.

## 2023-02-11 NOTE — ED PROVIDER NOTE - OBJECTIVE STATEMENT
57 year old female with pmh of CKD (gets dialysis T/TH/Sat), HLD, HTN, PVD, hearing impaired presenting to the ED for abdominal pain & distension. Family member (at bedside) states has been present times past week, worsening. Patient last received dialysis this past Thursday, is due today for it. Denies any fevers/chills, n/v, cp, sob, or palpitations at home.

## 2023-02-11 NOTE — CONSULT NOTE ADULT - SUBJECTIVE AND OBJECTIVE BOX
GENERAL SURGERY CONSULT NOTE    Patient: SEN LING , 57y (65)Female   MRN: [id number]  Location: [facility] ED Hold 031 A  Visit: 23 Inpatient  Date: 23 @ 16:33    HPI:  58yo F w/ pmhx of L heel gangrene, s/p L common femoral to anterior tibial reverse saphenous vein graft 2021, ESRD (HD TTS) via L brachial artery to axillary vein AV graft placement, HLD, HTN, PVD, CVA, hearing impaired presenting to the ED for abdominal pain and distension. Family member (brother-in-law) states this has been worsening for the past week and is assocated with LE edema as well. Patient did NOT miss any dialysis sessions; she went to her last dialysis session on Thursday and was due for it today. Abd pain is located on the right side and associated with nausea but not with food. Denies fever, chills, vomiting, SOB, chest pain, or palpitations.     Vascular was consulted since the patient has been having bilateral lower leg pain and swelling for the past 8 days. The pain is associated with movement of both legs, and the and the brother-in-law states the patient is complaining of her legs being tight. Of note, the patient saw Dr. Muñoz in the office on 23 for the same symptoms where a duplex was performed with no arterial or venous thrombosis noted. The patient was discharged home and was told to attend her HD sessions. An arterial and venous duplex were ordered in the ED, both negative for thrombosis.     ED course:   vitals: /95, HR 84, T 98.7F, O2 97% RA   labs: BNP > 70K, trop 0.02, u/a: bacteria many, no YELENA or nitrites   imaging:   - ECG: NSR, HR 84, no signs of ischemia   - CT a/p: Diffuse anasarca and new ascites. Evidence of right cardiac dysfunction. Urinary bladder demonstrates circumferential wall thickening. Although it is partially distended. Prominent nonspecific inguinal and external iliac lymph nodes may be reactive. Moderate b/l pleural effusions.  progress: taken to dialysis  (2023 14:47)        PAST MEDICAL & SURGICAL HISTORY:  PVD (peripheral vascular disease)      Benign essential HTN      Intellectual disability      Hearing impaired      HLD (hyperlipidemia)      Chronic kidney disease, unspecified CKD stage      H/O vascular surgery  left leg          Home Medications:  atorvastatin 80 mg oral tablet: 1 tab(s) orally once a day (2023 15:05)  calcitriol 0.25 mcg oral capsule: 1 cap(s) orally once a day (2023 15:05)  gabapentin 300 mg oral capsule: 1 cap(s) orally 3 times a day (2023 15:05)  hydrALAZINE 25 mg oral tablet: 1 tab(s) orally 2 times a day (2023 15:05)  Plavix 75 mg oral tablet: 1 tab(s) orally once a day (2023 15:05)  Robaxin 500 mg oral tablet: 1 tab(s) orally 2 times a day (2023 15:05)  sodium bicarbonate 650 mg oral tablet: 2 tab(s) orally 3 times a day (2023 15:05)  sucralfate 1 g oral tablet: 1 tab(s) orally 4 times a day (2023 15:05)        VITALS:  T(F): 97.7 (23 @ 07:23), Max: 98.7 (23 @ 01:38)  HR: 82 (23 @ 15:25) (77 - 84)  BP: 188/77 (23 @ 15:25) (188/77 - 219/94)  RR: 18 (23 @ 15:25) (18 - 19)  SpO2: 96% (23 @ 07:23) (96% - 97%)    PHYSICAL EXAM:  General: NAD, AAOx, moaning in pain  HEENT: NCAT, IRON, EOMI, Trachea ML, Neck supple  Cardiac: RRR S1, S2  Respiratory: Nonlabored breathing  Abdomen: Soft, mildly distended, minimally tender to palpation  Musculoskeletal: Strength 5/5 BL UE/LE, ROM intact, bilateral LE tense to touch  Neuro: Sensation grossly intact and equal throughout, no focal deficits  Vascular: extremities warm to touch, DP and PT pulses difficult to palpate, signals dopplerable but weak, edematous upper and lower extremities   Skin: Warm/dry, slight darker discoloration to R dorsal aspect of foot     MEDICATIONS  (STANDING):  aspirin enteric coated 81 milliGRAM(s) Oral daily  atorvastatin 80 milliGRAM(s) Oral at bedtime  calcitriol   Capsule 0.25 MICROGram(s) Oral daily  chlorhexidine 2% Cloths 1 Application(s) Topical <User Schedule>  clopidogrel Tablet 75 milliGRAM(s) Oral daily  ferrous    sulfate 325 milliGRAM(s) Oral daily  gabapentin 300 milliGRAM(s) Oral three times a day  heparin   Injectable 5000 Unit(s) SubCutaneous every 8 hours  hydrALAZINE 25 milliGRAM(s) Oral two times a day  NIFEdipine XL 60 milliGRAM(s) Oral daily  sevelamer carbonate 800 milliGRAM(s) Oral three times a day with meals  sodium bicarbonate 1300 milliGRAM(s) Oral three times a day    MEDICATIONS  (PRN):  acetaminophen     Tablet .. 650 milliGRAM(s) Oral every 6 hours PRN Temp greater or equal to 38C (100.4F), Mild Pain (1 - 3)  methocarbamol 500 milliGRAM(s) Oral two times a day PRN Muscle Spasm  sucralfate 1 Gram(s) Oral four times a day PRN stomach pain      LAB/STUDIES:                        10.3   5.74  )-----------( 126      ( 2023 03:23 )             31.7     02-11    137  |  95<L>  |  27<H>  ----------------------------<  115<H>  4.5   |  29  |  4.5<HH>    Ca    8.4      2023 03:23    TPro  6.2  /  Alb  3.6  /  TBili  0.6  /  DBili  x   /  AST  31  /  ALT  16  /  AlkPhos  202<H>  02-11      LIVER FUNCTIONS - ( 2023 03:23 )  Alb: 3.6 g/dL / Pro: 6.2 g/dL / ALK PHOS: 202 U/L / ALT: 16 U/L / AST: 31 U/L / GGT: x           Urinalysis Basic - ( 2023 03:25 )    Color: Yellow / Appearance: Clear / S.018 / pH: x  Gluc: x / Ketone: Trace  / Bili: Negative / Urobili: <2 mg/dL   Blood: x / Protein: >600 mg/dL / Nitrite: Negative   Leuk Esterase: Negative / RBC: 8 /HPF / WBC 2 /HPF   Sq Epi: x / Non Sq Epi: 4 /HPF / Bacteria: Many      CARDIAC MARKERS ( 2023 03:23 )  x     / 0.02 ng/mL / x     / x     / x          IMAGING:  < from: CT Abdomen and Pelvis w/ IV Cont (23 @ 04:50) >  Diffuse anasarca and new ascites.    Evidence of right cardiac dysfunction    Urinary bladder demonstrates circumferential wall thickening. Although it   is partially distended.    Prominent nonspecific inguinal and external iliac lymph nodes may be   reactive.    < from: [facility] Duplex Lower Ext Vein Scan, Bilat (23 @ 08:58) >  No evidence of deep venous thrombosis in either lower extremity.    < from: [facility] Duplex Lower Extrem Arterial, Bilat (23 @ 08:59) >    Left lower extremity arterial graft appears to be patent  No evidence of a stenosis or occlusion noted in the right lower extremity   GENERAL SURGERY CONSULT NOTE    Patient: SEN LING , 57y (65)Female   MRN: [id number]  Location: [facility] ED Hold 031 A  Visit: 23 Inpatient  Date: 23 @ 16:33    HPI:  56yo F w/ pmhx of L heel gangrene, s/p L common femoral to anterior tibial reverse saphenous vein bypass 2021, ESRD (HD TTS) via L brachial artery to axillary vein AV graft placement, HLD, HTN, PVD, CVA, hearing impaired presenting to the ED for abdominal pain and distension. Family member (brother-in-law) states this has been worsening for the past week and is assocated with LE edema as well. Patient did NOT miss any dialysis sessions; she went to her last dialysis session on Thursday and was due for it today. Abd pain is located on the right side and associated with nausea but not with food. Denies fever, chills, vomiting, SOB, chest pain, or palpitations.     Vascular was consulted since the patient has been having bilateral lower leg pain and swelling for the past 8 days. The pain is associated with movement of both legs, and the and the brother-in-law states the patient is complaining of her legs being tight. Of note, the patient saw Dr. Muñoz in the office on 23 for the same symptoms where a duplex was performed with no arterial or venous thrombosis noted. The patient was discharged home and was told to attend her HD sessions. An arterial and venous duplex were ordered in the ED, both negative for thrombosis.     ED course:   vitals: /95, HR 84, T 98.7F, O2 97% RA   labs: BNP > 70K, trop 0.02, u/a: bacteria many, no YELENA or nitrites   imaging:   - ECG: NSR, HR 84, no signs of ischemia   - CT a/p: Diffuse anasarca and new ascites. Evidence of right cardiac dysfunction. Urinary bladder demonstrates circumferential wall thickening. Although it is partially distended. Prominent nonspecific inguinal and external iliac lymph nodes may be reactive. Moderate b/l pleural effusions.  progress: taken to dialysis  (2023 14:47)        PAST MEDICAL & SURGICAL HISTORY:  PVD (peripheral vascular disease)      Benign essential HTN      Intellectual disability      Hearing impaired      HLD (hyperlipidemia)      Chronic kidney disease, unspecified CKD stage      H/O vascular surgery  left leg          Home Medications:  atorvastatin 80 mg oral tablet: 1 tab(s) orally once a day (2023 15:05)  calcitriol 0.25 mcg oral capsule: 1 cap(s) orally once a day (2023 15:05)  gabapentin 300 mg oral capsule: 1 cap(s) orally 3 times a day (2023 15:05)  hydrALAZINE 25 mg oral tablet: 1 tab(s) orally 2 times a day (2023 15:05)  Plavix 75 mg oral tablet: 1 tab(s) orally once a day (2023 15:05)  Robaxin 500 mg oral tablet: 1 tab(s) orally 2 times a day (2023 15:05)  sodium bicarbonate 650 mg oral tablet: 2 tab(s) orally 3 times a day (2023 15:05)  sucralfate 1 g oral tablet: 1 tab(s) orally 4 times a day (2023 15:05)        VITALS:  T(F): 97.7 (23 @ 07:23), Max: 98.7 (23 @ 01:38)  HR: 82 (23 @ 15:25) (77 - 84)  BP: 188/77 (23 @ 15:25) (188/77 - 219/94)  RR: 18 (23 @ 15:25) (18 - 19)  SpO2: 96% (23 @ 07:23) (96% - 97%)    PHYSICAL EXAM:  General: NAD, AAOx, moaning in pain  HEENT: NCAT, IRON, EOMI, Trachea ML, Neck supple  Cardiac: RRR S1, S2  Respiratory: Nonlabored breathing  Abdomen: Soft, mildly distended, minimally tender to palpation  Musculoskeletal: Strength 5/5 BL UE/LE, ROM intact, bilateral LE tense to touch  Neuro: Sensation grossly intact and equal throughout, no focal deficits  Vascular: extremities warm to touch, DP and PT pulses difficult to palpate, signals dopplerable but weak, edematous upper and lower extremities   Skin: Warm/dry, slight darker discoloration to R dorsal aspect of foot     MEDICATIONS  (STANDING):  aspirin enteric coated 81 milliGRAM(s) Oral daily  atorvastatin 80 milliGRAM(s) Oral at bedtime  calcitriol   Capsule 0.25 MICROGram(s) Oral daily  chlorhexidine 2% Cloths 1 Application(s) Topical <User Schedule>  clopidogrel Tablet 75 milliGRAM(s) Oral daily  ferrous    sulfate 325 milliGRAM(s) Oral daily  gabapentin 300 milliGRAM(s) Oral three times a day  heparin   Injectable 5000 Unit(s) SubCutaneous every 8 hours  hydrALAZINE 25 milliGRAM(s) Oral two times a day  NIFEdipine XL 60 milliGRAM(s) Oral daily  sevelamer carbonate 800 milliGRAM(s) Oral three times a day with meals  sodium bicarbonate 1300 milliGRAM(s) Oral three times a day    MEDICATIONS  (PRN):  acetaminophen     Tablet .. 650 milliGRAM(s) Oral every 6 hours PRN Temp greater or equal to 38C (100.4F), Mild Pain (1 - 3)  methocarbamol 500 milliGRAM(s) Oral two times a day PRN Muscle Spasm  sucralfate 1 Gram(s) Oral four times a day PRN stomach pain      LAB/STUDIES:                        10.3   5.74  )-----------( 126      ( 2023 03:23 )             31.7     02-11    137  |  95<L>  |  27<H>  ----------------------------<  115<H>  4.5   |  29  |  4.5<HH>    Ca    8.4      2023 03:23    TPro  6.2  /  Alb  3.6  /  TBili  0.6  /  DBili  x   /  AST  31  /  ALT  16  /  AlkPhos  202<H>  02-11      LIVER FUNCTIONS - ( 2023 03:23 )  Alb: 3.6 g/dL / Pro: 6.2 g/dL / ALK PHOS: 202 U/L / ALT: 16 U/L / AST: 31 U/L / GGT: x           Urinalysis Basic - ( 2023 03:25 )    Color: Yellow / Appearance: Clear / S.018 / pH: x  Gluc: x / Ketone: Trace  / Bili: Negative / Urobili: <2 mg/dL   Blood: x / Protein: >600 mg/dL / Nitrite: Negative   Leuk Esterase: Negative / RBC: 8 /HPF / WBC 2 /HPF   Sq Epi: x / Non Sq Epi: 4 /HPF / Bacteria: Many      CARDIAC MARKERS ( 2023 03:23 )  x     / 0.02 ng/mL / x     / x     / x          IMAGING:  < from: CT Abdomen and Pelvis w/ IV Cont (23 @ 04:50) >  Diffuse anasarca and new ascites.    Evidence of right cardiac dysfunction    Urinary bladder demonstrates circumferential wall thickening. Although it   is partially distended.    Prominent nonspecific inguinal and external iliac lymph nodes may be   reactive.    < from: VA Duplex Lower Ext Vein Scan, Bilat (23 @ 08:58) >  No evidence of deep venous thrombosis in either lower extremity.    < from: VA Duplex Lower Extrem Arterial, Bilat (23 @ 08:59) >    Left lower extremity arterial graft appears to be patent  No evidence of a stenosis or occlusion noted in the right lower extremity

## 2023-02-11 NOTE — CONSULT NOTE ADULT - ASSESSMENT
ASSESSMENT:  57yF w/ PMHx of L heel gangrene, s/p L common femoral to anterior tibial reverse saphenous vein graft 11/8/2021, ESRD (HD TTS) via L brachial artery to axillary vein AV graft placement, HLD, HTN, PVD, CVA, hearing impaired  who presented with abdominal pain and bilateral LE leg pain ans swelling. Vascular was consulted for the bilateral LE leg pain and swelling that has been present for the past 8 days. Patient has been attending her HD sessions, and saw Dr. Muñoz on 2/7/23 for the same complaint. No DVT or arterial thrombosis was noted in his office on duplex. Repeat duplex today showed no evidence of DVT/arterial thrombosis again. CT showed diffuse anasarca and new ascites, and the patient was taken for HD. Patient is likely fluid overloaded based on PE and imaging.  Physical exam findings, imaging, and labs as documented above.     PLAN:  -No acute surgical intervention at this time  -HD for fluid overloaded state  -Care per primary team   -Continue hydralazine 25 BID for hypertensive urgency, continue nifedipine 60 mg qd  -Continue with ASA, plavix, and statin  -Monitor vitals  -Maintain BP with systolic goal <150  -Monitor labs, replete as needed  -Will continue to follow    Above plan discussed with Vascular fellow Dr. Arreguin, patient, patient family, and Primary team  02-11-23 @ 16:33 ASSESSMENT:  57yF w/ PMHx of L heel gangrene, s/p L common femoral to anterior tibial reverse saphenous vein bypass 11/8/2021, ESRD (HD TTS) via L brachial artery to axillary vein AV graft placement, HLD, HTN, PVD, CVA, hearing impaired  who presented with abdominal pain and bilateral LE leg pain ans swelling. Vascular was consulted for the bilateral LE leg pain and swelling that has been present for the past 8 days. Patient has been attending her HD sessions, and saw Dr. Muñoz on 2/7/23 for the same complaint. No DVT or arterial thrombosis was noted in his office on duplex. Repeat duplex today showed no evidence of DVT/arterial thrombosis again. CT showed diffuse anasarca and new ascites, and the patient was taken for HD. Patient is likely fluid overloaded based on PE and imaging.  Physical exam findings, imaging, and labs as documented above.     PLAN:  -No acute surgical intervention at this time  -HD for fluid overloaded state  -Care per primary team   -Continue hydralazine 25 BID for hypertensive urgency, continue nifedipine 60 mg qd  -Continue with ASA, plavix, and statin  -Monitor vitals  -Maintain BP with systolic goal <150  -Monitor labs, replete as needed  -Will continue to follow    Above plan discussed with Vascular fellow Dr. Arreguin, patient, patient family, and Primary team  02-11-23 @ 16:33

## 2023-02-11 NOTE — PATIENT PROFILE ADULT - FUNCTIONAL ASSESSMENT - DAILY ACTIVITY SECTION LABEL
Problem: Mobility Impaired (Adult and Pediatric)  Goal: *Acute Goals and Plan of Care (Insert Text)  Description: FUNCTIONAL STATUS PRIOR TO ADMISSION: The patient was functional at the wheelchair level and required maximum assistance for transfers to the chair. HOME SUPPORT PRIOR TO ADMISSION:     Physical Therapy Goals  Initiated 9/18/2021  1. Patient will move from supine to sit and sit to supine , scoot up and down, and roll side to side in bed with minimal assistance/contact guard assist within 7 day(s). 2.  Patient will transfer from bed to chair and chair to bed with minimal assistance/contact guard assist using the least restrictive device within 7 day(s). 3.  Patient will perform sit to stand with moderate assistance  within 7 day(s). Outcome: Progressing Towards Goal   PHYSICAL THERAPY EVALUATION  Patient: Da Yarbrough (57 y.o. male)  Date: 9/18/2021  Primary Diagnosis: Prosthetic hip infection ([facility] [address] [T84.59XA, Z96.649]  Infection associated with internal right hip prosthesis ([address] Marquise Binta  Procedure(s) (LRB):  INCISION AND DRAINAGE OF RIGHT HIP, EXCHANGE OF CONSTRAINED LINER AND FEMORAL HEAD, POSSIBLE RESECTION ARTHROPLASTY (Right) 5 Days Post-Op   Precautions:falls *       ASSESSMENT  Based on the objective data described below, the patient presents with decreased functional mobility, decreased strength and pain limiting movement. Pt with pain on attempts to roll and with total A bed mobility up in bed. Pt complaining of pain in R shoulder and hip. Pt able to perform AROM to LUE and LLE, AAROM to RLE. Current Level of Function Impacting Discharge (mobility/balance): pt with h/o recent falls.   Pt has pain as limiting factor in mobility and little use of RUE     Other factors to consider for discharge: pt with limited use of RUE /dominant hand for assistance with mobility, weakness at baseline     Patient will benefit from skilled therapy intervention to address the above noted impairments. PLAN :  Recommendations and Planned Interventions: bed mobility training, transfer training, therapeutic exercises, and therapeutic activities      Frequency/Duration: Patient will be followed by physical therapy:  3 times a week to address goals. Recommendation for discharge: (in order for the patient to meet his/her long term goals)  Therapy up to 5 days/week in SNF setting    This discharge recommendation:  Has been made in collaboration with the attending provider and/or case management    IF patient discharges home will need the following DME: to be determined (TBD) pt has a wheelchair          SUBJECTIVE:   Patient stated I don't think I can do that I am hurting too much.     OBJECTIVE DATA SUMMARY:   HISTORY:    Past Medical History:   Diagnosis Date    ADD (attention deficit disorder)     Adverse effect of anesthesia     motion sickness resulting in loss of balance    Anemia due to blood loss     Hinson's esophagus with esophagitis     Benign essential tremor     Cancer ([address] 2012    BCC    Chronic pain     Cirrhosis ([address]     Depression     Dislocated hip (HCC)     DJD (degenerative joint disease) of hip     bilat    DM (diabetes mellitus) ([address] 3/17/2010    ED (erectile dysfunction) of organic origin     Esophageal varices determined by endoscopy ([address]     Fall on or from sidewalk curb 9/24/2015    Femur fracture ([address]     Gastritis and duodenitis     GERD (gastroesophageal reflux disease)     resolved after discontinuing diclofenac    Hematuria 6/2016    High cholesterol 03/17/2010    pt denies 6/19    HTN (hypertension) 3/17/2010    Morbid obesity ([address]     Murmur, cardiac 2016    PFO (patent foramen ovale) 7/26/2017    PUD (peptic ulcer disease)     Sepsis ([address]     Shingles 6/2016    RESOLVING- NO RASH (HEAD)    Sleep apnea     doesnt use CPAP anymore     Past Surgical History:   Procedure Laterality Date    COLONOSCOPY N/A 6/18/2019    COLONOSCOPY AND EGD performed by Patience Hutson MD Lon[facility] at [facility] ENDOSCOPY    COLONOSCOPY,DIAGNOSTIC  6/18/2019         ENDOSCOPY, COLON, DIAGNOSTIC      HX CATARACT REMOVAL Bilateral     HX CHOLECYSTECTOMY  1995    HX GI  1980    gastroplasty    HX HERNIA REPAIR  1995    HX HIP REPLACEMENT      Left    HX ORTHOPAEDIC Right 2011    rot cuff repair    HX ORTHOPAEDIC  2016    left broken femur    HX ORTHOPAEDIC Right 08/13/2020    Attempt closed reduction of hip dislocation    HX TONSILLECTOMY  1950    HX VASCULAR ACCESS      picc line and removed after sepsis resolved    IR INSERT TIPS HEPATIC SHUNT  3/28/2020    AK TOTAL HIP ARTHROPLASTY  05/2010    right    AK TOTAL HIP ARTHROPLASTY  08/01/2016    left    UPPER GI ENDOSCOPY,BIOPSY  6/18/2019            Personal factors and/or comorbidities impacting plan of care: wheelchair level PTA    Home Situation  Home Environment: Rehabilitation facility  Living Alone: No  Support Systems: Child(dariusz) (Daughter, Aries Lund \"Lenora\" Myles Gordillo)  Patient Expects to be Discharged to[de-identified] Rehabilitation facility  Current DME Used/Available at Home: Wheelchair, Walker, rolling  Tub or Shower Type:  (reports bathing in bed at SNF)    EXAMINATION/PRESENTATION/DECISION MAKING:   Critical Behavior:  Neurologic State: Alert, Appropriate for age  Orientation Level: Oriented X4  Cognition: Appropriate decision making, Appropriate safety awareness  Safety/Judgement: Awareness of environment  Hearing: Auditory  Auditory Impairment: None  Skin:  intact  Edema:   Range Of Motion:  AROM: Grossly decreased, non-functional           PROM: Generally decreased, functional           Strength:    Strength: Generally decreased, functional (in LUE and LLE; non functional in RUE and LE)         Functional Mobility:  Bed Mobility:  Rolling:  Total assistance   Unable to progress mobility        Therapeutic Exercises:   HIp flexion/extension, quad sets LUE AROm, ankle pumps; needs max assist with RLE         Based on the above components, the patient evaluation is determined to be of the following complexity level: LOW     Pain Ratin/10 hip , 9/10 RUE shoulder    Activity Tolerance:   Poor    After treatment patient left in no apparent distress:   Supine in bed, Call bell within reach, and Side rails x 3    COMMUNICATION/EDUCATION:   The patients plan of care was discussed with: Registered nurse. Fall prevention education was provided and the patient/caregiver indicated understanding., Patient/family have participated as able in goal setting and plan of care. , and Patient/family agree to work toward stated goals and plan of care.     Thank you for this referral.  Moshe Reed, PT   Time Calculation: 33 mins .

## 2023-02-11 NOTE — CONSULT NOTE ADULT - ASSESSMENT
57 year old female with pmh of CKD (gets dialysis T/TH/Sat), HLD, HTN, PVD, hearing impaired presenting to the ED for abdominal pain & distension. Patient last received dialysis this past Thursday  also as per family member , increase edema of LE   ESRD / volume overload/ anasarca / abdominal distension/ ascites   # HD today standard bath uf 3 liters as tolerated   # increase UF goals with HD   # will need hd again on monday   # check echo   # cardiology evaluation   # ascites ? due to volume overload/ heart failure  # BP uncontrolled/ volume related / follow BP readings after HD   # check ph   # h/h at goal, no HANNAH   # Check chest xray  # UA / if non oliguric   # will follow

## 2023-02-11 NOTE — ED PROVIDER NOTE - CONSIDERATION OF ADMISSION OBSERVATION
Patient with fluid overload 2/2 likely known ESRD but also with (+) probnp elevation suggesting possible CHF source. Significantly symptomatic and will require admission. Consideration of Admission/Observation

## 2023-02-11 NOTE — ED PROVIDER NOTE - ATTENDING CONTRIBUTION TO CARE
I personally evaluated the patient. I reviewed the Resident’s or Physician Assistant’s note (as assigned above), and agree with the findings and plan except as documented in my note.  57-year-old female past medical history of ESRD, on HD on Tuesday, Thursday, Friday last HD 2 days ago on Thursday, history of HTN, PVD, deafness with complaints of 8 days of right lower quad abdominal pain with associated bilateral thigh pain.  Patient also states that she has been more swollen than usual but not bilateral legs and abdomen.  Denies missing any dialysis.  Denies fever.  VSS, non toxic appearing, NAD, Head NCAT, MMM, neck supple, normal ROM, normal s1s2, lungs ctab, abd s/tenderness to palpation of the right lower quadrant/nd, no guarding or rebound, extremities with digital amputation of all 10 toes, pulses not palpable, bilateral legs are tense, AAO x 3, GCS 15, neuro grossly normal. No acute skin lesions. Plan is labs, imaging, meds as needed and reassess.

## 2023-02-11 NOTE — PATIENT PROFILE ADULT - FUNCTIONAL ASSESSMENT - BASIC MOBILITY 6.
3-calculated by average/Not able to assess (calculate score using [facility] averaging method)

## 2023-02-11 NOTE — H&P ADULT - ATTENDING COMMENTS
58yo F w/ pmhx of ESRD (HD TTS), HLD, HTN, PVD, CVA, hearing impaired presenting to the ED for abdominal pain & distension. Admitted for fluid overload and was taken to dialysis.     ECHO 2022  Summary:   1. LV Ejection Fraction by Soto's Method with a biplane EF of 62 %.   2. Elevated mean left atrial pressure.   3. Moderate concentric left ventricular hypertrophy.   4. Spectral Doppler shows pseudonormal pattern of left ventricular   myocardial filling (Grade II diastolic dysfunction).   5. Mildly enlarged left atrium.   6. Normal right atrial size.   7. Mild thickening of the anterior and posterior mitral valve leaflets.   8. Moderate mitral valve regurgitation.   9. Moderate tricuspid regurgitation.  10. Mild aortic regurgitation.  11. Estimated pulmonary artery systolic pressure is 40.7 mmHg assuming a   right atrial pressure of 3 mmHg, which is consistent with mild pulmonary   hypertension.        IMPRESSION   Diastolic Heart Failure  Secondary to volume Overload in  the setting of ESRD Associated with HTN Urgency   Hx ESRD  TTS  >Physical Exam anasarca  abdominal distension  >ECG NSR NO ischemic changes. BNP > 70K, trop 0.02 Stable   >CT a/p: diffuse anasarca, R cardiac dysfunction, moderate b/l pleural effusions   >previous TTE 22: EF 62%, G2DD  >F/u CXR,  TTE, trend troponin   >cardio eval after TTE   >HD today, nephro following   >strict i/o, daily weight, fluid restriction   >c/w sevelamer, sodium bicarb   HTN  Urgency Secondary to Volume  Overload - c/w hydralazine   Abdominal Pain - cw Carafate   > CT Urinary bladder demonstrates circumferential wall thickening. Although it is partially distended. Prominent nonspecific inguinal and external iliac lymph nodes may be reactive  > Pain could possibly Secondary abdominal distention   > no signs of sepsis nop rebound tenderness   > UA  Urinalysis Basic - ( 2023 03:25 )  Color: Yellow / Appearance: Clear / S.018 / pH: x  Gluc: x / Ketone: Trace  / Bili: Negative / Urobili: <2 mg/dL   Blood: x / Protein: >600 mg/dL / Nitrite: Negative   Leuk Esterase: Negative / RBC: 8 /HPF / WBC 2 /HPF   Sq Epi: x / Non Sq Epi: 4 /HPF / Bacteria: Many  > Will Likely improved with HD fluid removal   Hx DM  Hold oral meds;  check fs;  Start insulin  regimen if  serum Glucose >180, start insulin  protocol and adjust insulin  Lantus/Lispro,  Hold for Hypoglycemia Goal  Gaol 140-180   Hx PVD  > s/p left femoral artery-posterior tibial artery bypass with autologous venous tissue and left heel ulcer debridement   > there was concern that pulses were no dopplerable in LE, both va duplex/art duplex 23 in ED were normal   > f/u o/p Dr. Muñoz, needs o/p vasc (and neuro)  > c/w  DAPT   Hx Intellectual disability hearing/speech impairment  >communicates via sign language  Hx Chronic anemia - normocytic  >c/w ferosul 325mg qd 56yo F w/ pmhx of ESRD (HD TTS), HLD, HTN, PVD, CVA, hearing impaired presenting to the ED for abdominal pain & distension. Admitted for fluid overload and was taken to dialysis.       VITAL SIGNS: AFebrile, vital signs stable  CONSTITUTIONAL: Well-developed; well-nourished; in no acute distress.  SKIN: Skin exam is warm and dry, no acute rash.  HEAD: Normocephalic; atraumatic.  EYES: Pupils  reactive to light, Extraocular movements intact; conjunctiva and sclera clear.  ENT: No nasal discharge; airway clear. Moist mucus membranes.  NECK: Supple; non tender. No rigidity  CARD: Regular rate and rhythm. Normal S1, S2; no murmurs, gallops, or rubs.  RESP: CT  auscultation bilaterally. No wheezes, rales or rhonchi.  ABD: Abdomen soft; non-tender; non-distended Nausea and vomiting   EXT: Normal ROM. No clubbing, edema. Right lower extremity  swollen tender, B/l  Lower Extremity Post Surgical Scars noted  Diminished LE pulses    NEURO: . No focal deficits.  PSYCH: cooperative, appropriate.     ECHO 2022  Summary:   1. LV Ejection Fraction by Soto's Method with a biplane EF of 62 %.   2. Elevated mean left atrial pressure.   3. Moderate concentric left ventricular hypertrophy.   4. Spectral Doppler shows pseudonormal pattern of left ventricular   myocardial filling (Grade II diastolic dysfunction).   5. Mildly enlarged left atrium.   6. Normal right atrial size.   7. Mild thickening of the anterior and posterior mitral valve leaflets.   8. Moderate mitral valve regurgitation.   9. Moderate tricuspid regurgitation.  10. Mild aortic regurgitation.  11. Estimated pulmonary artery systolic pressure is 40.7 mmHg assuming a   right atrial pressure of 3 mmHg, which is consistent with mild pulmonary   hypertension.        IMPRESSION   Diastolic Heart Failure  Secondary to volume Overload in  the setting of ESRD Associated with HTN Urgency   Hx ESRD  TTS  >Physical Exam anasarca  abdominal distension  >ECG NSR NO ischemic changes. BNP > 70K, trop 0.02 Stable   >CT a/p: diffuse anasarca, R cardiac dysfunction, moderate b/l pleural effusions   >previous TTE 22: EF 62%, G2DD  >F/u CXR,  TTE, trend troponin   >cardio eval after TTE   >HD today, nephro following   >strict i/o, daily weight, fluid restriction   >c/w sevelamer, sodium bicarb   Nausea  Vomiting   HTN  Urgency Secondary to Volume  Overload - c/w hydralazine add nifedipine, PRN Labetalol  will repeat HD  Abdominal Pain Associated with N/V  > CT Urinary bladder demonstrates circumferential wall thickening. Although it is partially distended. Prominent nonspecific inguinal and external iliac lymph nodes may be reactive  > Pain could possibly Secondary abdominal distention   > no signs of sepsis nop rebound tenderness   >  Zofran PRN  Reglan is Necessary   > UA  Urinalysis Basic - ( 2023 03:25 )  Color: Yellow / Appearance: Clear / S.018 / pH: x  Gluc: x / Ketone: Trace  / Bili: Negative / Urobili: <2 mg/dL   Blood: x / Protein: >600 mg/dL / Nitrite: Negative   Leuk Esterase: Negative / RBC: 8 /HPF / WBC 2 /HPF   Sq Epi: x / Non Sq Epi: 4 /HPF / Bacteria: Many  > Will Likely improved with HD fluid removal   Hx DM  Hold oral meds;  check fs;  Start insulin  regimen if  serum Glucose >180, start insulin  protocol and adjust insulin  Lantus/Lispro,  Hold for Hypoglycemia Goal  Gaol 140-180   Hx PVD  > s/p left femoral artery-posterior tibial artery bypass with autologous venous tissue and left heel ulcer debridement   > there was concern that pulses were no dopplerable in LE, both va duplex/art duplex 23 in ED were normal   > f/u o/p Dr. Muñoz, needs o/p vasc (and neuro)  > c/w  DAPT   > no acute intervention as  per vacular   Hx Intellectual disability hearing/speech impairment  >communicates via sign language  Hx Chronic anemia - normocytic  >c/w ferosul 325mg qd    Seen on 23

## 2023-02-11 NOTE — H&P ADULT - NSHPPHYSICALEXAM_GEN_ALL_CORE
PHYSICAL EXAM:  GENERAL: NAD, lying in bed comfortably  HEAD:  Atraumatic, Normocephalic  EYES: EOMI, PERRLA, conjunctiva and sclera clear  ENT: Moist mucous membranes  NECK: Supple, No JVD  CHEST/LUNG: Clear to auscultation bilaterally; No rales, rhonchi, wheezing, or rubs. Unlabored respirations  HEART: Regular rate and rhythm; No murmurs, rubs, or gallops  ABDOMEN: +BS, abdomen distended  EXTREMITIES: 3+ pitting edema up to knees   NERVOUS SYSTEM:  no focal deficits   MSK: FROM all 4 extremities, full and equal strength  SKIN: No rashes or lesions

## 2023-02-11 NOTE — H&P ADULT - HISTORY OF PRESENT ILLNESS
56yo F w/ pmhx of ESRD (HD TTS), HLD, HTN, PVD, CVA, hearing impaired presenting to the ED for abdominal pain & distension. Family member (brother-in-law) states this has been worsening for the past week and is assocated with LE edema as well. Patient did NOT miss any dialysis sessions; she went to her last dialysis session on Thursday and was due for it today. Abd pain is located on the right side and associated with nausea but not with food. Denies fever, chills, vomiting, SOB, chest pain, or palpitations.     ED course:   vitals: /95, HR 84, T 98.7F, O2 97% RA   labs: BNP > 70K, trop 0.02, u/a: bacteria many, no YELENA or nitrites   imaging:   - ECG: NSR, HR 84, no signs of ischemia   - CT a/p: Diffuse anasarca and new ascites.Evidence of right cardiac dysfunction. Urinary bladder demonstrates circumferential wall thickening. Although it is partially distended. Prominent nonspecific inguinal and external iliac lymph nodes may be reactive. Moderate b/l pleural effusions.  progress: taken to dialysis

## 2023-02-11 NOTE — ED ADULT NURSE REASSESSMENT NOTE - NS ED NURSE REASSESS COMMENT FT1
Patient assessed bedside.  A/O x 4,  bedside.  No S/S of acute pain or distress at this time.  Left arm precautions maintained.  Pt admitted to Medicine awaiting bed placement.  Pt safety and comfort maintained.  Call bell within reach, mattress alarm in place.

## 2023-02-11 NOTE — ED PROVIDER NOTE - PHYSICAL EXAMINATION
CONSTITUTIONAL: in nad  SKIN: warm, dry  HEAD: Normocephalic; atraumatic.  EYES: PERRL, EOMI, no conjunctival erythema  ENT: No nasal discharge; airway clear.  NECK: Supple; non tender.  CARD: S1, S2 normal; no murmurs, gallops, or rubs. Regular rate and rhythm.   RESP: No wheezes, rales or rhonchi.  ABD: soft, (+) abd appears mildly distended, diffuse ttp.  EXT: decreased pulses on doppler, swelling to RLE.  NEURO: Alert, oriented, grossly unremarkable  PSYCH: Cooperative, appropriate.

## 2023-02-11 NOTE — ED PROVIDER NOTE - PROGRESS NOTE DETAILS
[place]: Endorsed to Dr. Lora Ndubuisi: b/l pulses not dopplerable. Vascular consulted. Duplex studies ordered TERESE Briones sign out from DR Pat to follow duplex/arterial study and admit for abd pain. Spoke to tech Ranjeet duplex le no dvt, arterial good flow bilateral le. Case signed out to MAR.

## 2023-02-11 NOTE — ED ADULT NURSE NOTE - NSIMPLEMENTINTERV_GEN_ALL_ED
Implemented All Universal Safety Interventions:  [place] to call system. Call bell, personal items and telephone within reach. Instruct patient to call for assistance. Room bathroom lighting operational. Non-slip footwear when patient is off stretcher. Physically safe environment: no spills, clutter or unnecessary equipment. Stretcher in lowest position, wheels locked, appropriate side rails in place.

## 2023-02-11 NOTE — PATIENT PROFILE ADULT - FALL HARM RISK - HARM RISK INTERVENTIONS
Assistance with ambulation/Assistance OOB with selected safe patient handling equipment/Communicate Risk of Fall with Harm to all staff/Discuss with provider need for PT consult/Monitor gait and stability/Provide patient with walking aids - walker, cane, crutches/Reinforce activity limits and safety measures with patient and family/Sit up slowly, dangle for a short time, stand at bedside before walking/Tailored Fall Risk Interventions/Visual Cue: Yellow wristband and red socks/Bed in lowest position, wheels locked, appropriate side rails in place/Call bell, personal items and telephone in reach/Instruct patient to call for assistance before getting out of bed or chair/Non-slip footwear when patient is out of bed/[place] to call system/Physically safe environment - no spills, clutter or unnecessary equipment/Purposeful Proactive Rounding/Room/bathroom lighting operational, light cord in reach

## 2023-02-12 LAB
ALBUMIN SERPL ELPH-MCNC: 3.5 G/DL — SIGNIFICANT CHANGE UP (ref 3.5–5.2)
ALP SERPL-CCNC: 178 U/L — HIGH (ref 30–115)
ALT FLD-CCNC: 13 U/L — SIGNIFICANT CHANGE UP (ref 0–41)
ANION GAP SERPL CALC-SCNC: 14 MMOL/L — SIGNIFICANT CHANGE UP (ref 7–14)
AST SERPL-CCNC: 17 U/L — SIGNIFICANT CHANGE UP (ref 0–41)
BASOPHILS # BLD AUTO: 0.04 K/UL — SIGNIFICANT CHANGE UP (ref 0–0.2)
BASOPHILS NFR BLD AUTO: 0.8 % — SIGNIFICANT CHANGE UP (ref 0–1)
BILIRUB SERPL-MCNC: 0.5 MG/DL — SIGNIFICANT CHANGE UP (ref 0.2–1.2)
BUN SERPL-MCNC: 21 MG/DL — HIGH (ref 10–20)
CALCIUM SERPL-MCNC: 8.5 MG/DL — SIGNIFICANT CHANGE UP (ref 8.4–10.5)
CHLORIDE SERPL-SCNC: 96 MMOL/L — LOW (ref 98–110)
CO2 SERPL-SCNC: 29 MMOL/L — SIGNIFICANT CHANGE UP (ref 17–32)
CREAT SERPL-MCNC: 4 MG/DL — HIGH (ref 0.7–1.5)
CULTURE RESULTS: SIGNIFICANT CHANGE UP
EGFR: 12 ML/MIN/1.73M2 — LOW
EOSINOPHIL # BLD AUTO: 0.28 K/UL — SIGNIFICANT CHANGE UP (ref 0–0.7)
EOSINOPHIL NFR BLD AUTO: 5.8 % — SIGNIFICANT CHANGE UP (ref 0–8)
GLUCOSE BLDC GLUCOMTR-MCNC: 126 MG/DL — HIGH (ref 70–99)
GLUCOSE BLDC GLUCOMTR-MCNC: 152 MG/DL — HIGH (ref 70–99)
GLUCOSE BLDC GLUCOMTR-MCNC: 186 MG/DL — HIGH (ref 70–99)
GLUCOSE BLDC GLUCOMTR-MCNC: 95 MG/DL — SIGNIFICANT CHANGE UP (ref 70–99)
GLUCOSE SERPL-MCNC: 92 MG/DL — SIGNIFICANT CHANGE UP (ref 70–99)
HCT VFR BLD CALC: 30.2 % — LOW (ref 37–47)
HGB BLD-MCNC: 9.9 G/DL — LOW (ref 12–16)
IMM GRANULOCYTES NFR BLD AUTO: 0.4 % — HIGH (ref 0.1–0.3)
LYMPHOCYTES # BLD AUTO: 0.87 K/UL — LOW (ref 1.2–3.4)
LYMPHOCYTES # BLD AUTO: 17.9 % — LOW (ref 20.5–51.1)
MAGNESIUM SERPL-MCNC: 2.1 MG/DL — SIGNIFICANT CHANGE UP (ref 1.8–2.4)
MCHC RBC-ENTMCNC: 31.3 PG — HIGH (ref 27–31)
MCHC RBC-ENTMCNC: 32.8 G/DL — SIGNIFICANT CHANGE UP (ref 32–37)
MCV RBC AUTO: 95.6 FL — SIGNIFICANT CHANGE UP (ref 81–99)
MONOCYTES # BLD AUTO: 0.41 K/UL — SIGNIFICANT CHANGE UP (ref 0.1–0.6)
MONOCYTES NFR BLD AUTO: 8.5 % — SIGNIFICANT CHANGE UP (ref 1.7–9.3)
NEUTROPHILS # BLD AUTO: 3.23 K/UL — SIGNIFICANT CHANGE UP (ref 1.4–6.5)
NEUTROPHILS NFR BLD AUTO: 66.6 % — SIGNIFICANT CHANGE UP (ref 42.2–75.2)
NRBC # BLD: 0 /100 WBCS — SIGNIFICANT CHANGE UP (ref 0–0)
NT-PROBNP SERPL-SCNC: HIGH PG/ML (ref 0–300)
PLATELET # BLD AUTO: 122 K/UL — LOW (ref 130–400)
POTASSIUM SERPL-MCNC: 3.5 MMOL/L — SIGNIFICANT CHANGE UP (ref 3.5–5)
POTASSIUM SERPL-SCNC: 3.5 MMOL/L — SIGNIFICANT CHANGE UP (ref 3.5–5)
PROT SERPL-MCNC: 5.6 G/DL — LOW (ref 6–8)
RBC # BLD: 3.16 M/UL — LOW (ref 4.2–5.4)
RBC # FLD: 20.6 % — HIGH (ref 11.5–14.5)
SODIUM SERPL-SCNC: 139 MMOL/L — SIGNIFICANT CHANGE UP (ref 135–146)
SPECIMEN SOURCE: SIGNIFICANT CHANGE UP
TROPONIN T SERPL-MCNC: 0.06 NG/ML — CRITICAL HIGH
TROPONIN T SERPL-MCNC: 0.06 NG/ML — CRITICAL HIGH
WBC # BLD: 4.85 K/UL — SIGNIFICANT CHANGE UP (ref 4.8–10.8)
WBC # FLD AUTO: 4.85 K/UL — SIGNIFICANT CHANGE UP (ref 4.8–10.8)

## 2023-02-12 PROCEDURE — 71045 X-RAY EXAM CHEST 1 VIEW: CPT | Mod: 26

## 2023-02-12 PROCEDURE — 93306 TTE W/DOPPLER COMPLETE: CPT | Mod: 26

## 2023-02-12 PROCEDURE — 99233 SBSQ HOSP IP/OBS HIGH 50: CPT

## 2023-02-12 PROCEDURE — 93010 ELECTROCARDIOGRAM REPORT: CPT

## 2023-02-12 PROCEDURE — 70450 CT HEAD/BRAIN W/O DYE: CPT | Mod: 26

## 2023-02-12 RX ORDER — FUROSEMIDE 40 MG
80 TABLET ORAL ONCE
Refills: 0 | Status: COMPLETED | OUTPATIENT
Start: 2023-02-12 | End: 2023-02-12

## 2023-02-12 RX ADMIN — Medication 1300 MILLIGRAM(S): at 13:32

## 2023-02-12 RX ADMIN — Medication 60 MILLIGRAM(S): at 05:34

## 2023-02-12 RX ADMIN — HEPARIN SODIUM 5000 UNIT(S): 5000 INJECTION INTRAVENOUS; SUBCUTANEOUS at 13:33

## 2023-02-12 RX ADMIN — SEVELAMER CARBONATE 800 MILLIGRAM(S): 2400 POWDER, FOR SUSPENSION ORAL at 09:20

## 2023-02-12 RX ADMIN — Medication 81 MILLIGRAM(S): at 11:29

## 2023-02-12 RX ADMIN — Medication 650 MILLIGRAM(S): at 02:15

## 2023-02-12 RX ADMIN — GABAPENTIN 300 MILLIGRAM(S): 400 CAPSULE ORAL at 21:15

## 2023-02-12 RX ADMIN — Medication 25 MILLIGRAM(S): at 17:34

## 2023-02-12 RX ADMIN — Medication 325 MILLIGRAM(S): at 11:30

## 2023-02-12 RX ADMIN — METHOCARBAMOL 500 MILLIGRAM(S): 500 TABLET, FILM COATED ORAL at 05:32

## 2023-02-12 RX ADMIN — SEVELAMER CARBONATE 800 MILLIGRAM(S): 2400 POWDER, FOR SUSPENSION ORAL at 17:34

## 2023-02-12 RX ADMIN — CLOPIDOGREL BISULFATE 75 MILLIGRAM(S): 75 TABLET, FILM COATED ORAL at 11:30

## 2023-02-12 RX ADMIN — GABAPENTIN 300 MILLIGRAM(S): 400 CAPSULE ORAL at 13:32

## 2023-02-12 RX ADMIN — HEPARIN SODIUM 5000 UNIT(S): 5000 INJECTION INTRAVENOUS; SUBCUTANEOUS at 05:32

## 2023-02-12 RX ADMIN — HYDROMORPHONE HYDROCHLORIDE 0.5 MILLIGRAM(S): 2 INJECTION INTRAMUSCULAR; INTRAVENOUS; SUBCUTANEOUS at 00:28

## 2023-02-12 RX ADMIN — METHOCARBAMOL 500 MILLIGRAM(S): 500 TABLET, FILM COATED ORAL at 13:40

## 2023-02-12 RX ADMIN — SEVELAMER CARBONATE 800 MILLIGRAM(S): 2400 POWDER, FOR SUSPENSION ORAL at 13:33

## 2023-02-12 RX ADMIN — Medication 650 MILLIGRAM(S): at 03:15

## 2023-02-12 RX ADMIN — Medication 25 MILLIGRAM(S): at 05:34

## 2023-02-12 RX ADMIN — HEPARIN SODIUM 5000 UNIT(S): 5000 INJECTION INTRAVENOUS; SUBCUTANEOUS at 21:15

## 2023-02-12 RX ADMIN — Medication 80 MILLIGRAM(S): at 09:29

## 2023-02-12 RX ADMIN — CALCITRIOL 0.25 MICROGRAM(S): 0.5 CAPSULE ORAL at 11:30

## 2023-02-12 RX ADMIN — Medication 1300 MILLIGRAM(S): at 05:33

## 2023-02-12 RX ADMIN — GABAPENTIN 300 MILLIGRAM(S): 400 CAPSULE ORAL at 05:33

## 2023-02-12 RX ADMIN — ATORVASTATIN CALCIUM 80 MILLIGRAM(S): 80 TABLET, FILM COATED ORAL at 21:15

## 2023-02-12 NOTE — PROGRESS NOTE ADULT - SUBJECTIVE AND OBJECTIVE BOX
SEN LING  57y  Female      Patient is a 57y old  Female who presents with a chief complaint of fluid overload (2023 17:02)      INTERVAL HPI/OVERNIGHT EVENTS:  She is still with leg edema and pain, patient has a family member at bedside helped with sign language.   Vital Signs Last 24 Hrs  T(C): 36.7 (2023 13:27), Max: 36.9 (2023 08:54)  T(F): 98 (2023 13:27), Max: 98.5 (2023 08:54)  HR: 79 (2023 13:) (76 - 92)  BP: 145/66 (2023 13:27) (131/60 - 239/102)  BP(mean): --  RR: 18 (2023 13:27) (18 - 20)  SpO2: 96% (2023 05:27) (94% - 96%)    Parameters below as of 2023 05:27  Patient On (Oxygen Delivery Method): room air          23 @ 07:01  -  23 @ 07:00  --------------------------------------------------------  IN: 0 mL / OUT: 3550 mL / NET: -3550 mL            Consultant(s) Notes Reviewed:  [x ] YES  [ ] NO          MEDICATIONS  (STANDING):  aspirin enteric coated 81 milliGRAM(s) Oral daily  atorvastatin 80 milliGRAM(s) Oral at bedtime  calcitriol   Capsule 0.25 MICROGram(s) Oral daily  chlorhexidine 2% Cloths 1 Application(s) Topical <User Schedule>  clopidogrel Tablet 75 milliGRAM(s) Oral daily  ferrous    sulfate 325 milliGRAM(s) Oral daily  gabapentin 300 milliGRAM(s) Oral three times a day  heparin   Injectable 5000 Unit(s) SubCutaneous every 8 hours  hydrALAZINE 25 milliGRAM(s) Oral two times a day  NIFEdipine XL 60 milliGRAM(s) Oral daily  sevelamer carbonate 800 milliGRAM(s) Oral three times a day with meals  sodium bicarbonate 1300 milliGRAM(s) Oral three times a day    MEDICATIONS  (PRN):  acetaminophen     Tablet .. 650 milliGRAM(s) Oral every 6 hours PRN Temp greater or equal to 38C (100.4F), Mild Pain (1 - 3)  methocarbamol 500 milliGRAM(s) Oral two times a day PRN Muscle Spasm  sucralfate 1 Gram(s) Oral four times a day PRN stomach pain      LABS                          9.9    4.85  )-----------( 122      ( 2023 08:26 )             30.2     02-12    139  |  96<L>  |  21<H>  ----------------------------<  92  3.5   |  29  |  4.0<H>    Ca    8.5      2023 08:26  Mg     2.1     12    TPro  5.6<L>  /  Alb  3.5  /  TBili  0.5  /  DBili  x   /  AST  17  /  ALT  13  /  AlkPhos  178<H>  0212      Urinalysis Basic - ( 2023 03:25 )    Color: Yellow / Appearance: Clear / S.018 / pH: x  Gluc: x / Ketone: Trace  / Bili: Negative / Urobili: <2 mg/dL   Blood: x / Protein: >600 mg/dL / Nitrite: Negative   Leuk Esterase: Negative / RBC: 8 /HPF / WBC 2 /HPF   Sq Epi: x / Non Sq Epi: 4 /HPF / Bacteria: Many        Lactate Trend   @ 05:09 Lactate:1.1     CARDIAC MARKERS ( 2023 08:26 )  x     / 0.06 ng/mL / x     / x     / x      CARDIAC MARKERS ( 2023 16:43 )  x     / 0.04 ng/mL / x     / x     / x      CARDIAC MARKERS ( 2023 03:23 )  x     / 0.02 ng/mL / x     / x     / x          CAPILLARY BLOOD GLUCOSE      POCT Blood Glucose.: 126 mg/dL (2023 11:29)        RADIOLOGY & ADDITIONAL TESTS:    Imaging Personally Reviewed:  [ ] YES  [ ] NO    HEALTH ISSUES - PROBLEM Dx:          PHYSICAL EXAM:  GENERAL: NAD, well-developed.  HEAD:  Atraumatic, Normocephalic.  EYES: EOMI, PERRLA, conjunctiva and sclera clear.  NECK: Supple, No JVD.  CHEST/LUNG: bilateral rales.   HEART: Regular rate and rhythm; S1 S2. S3  ABDOMEN: Soft, Nontender, Nondistended; Bowel sounds present.  EXTREMITIES:  Leg edema 3+  PSYCH: AAOx3.  NEUROLOGY: non-focal.  SKIN: No rashes or lesions.

## 2023-02-13 LAB
ALBUMIN SERPL ELPH-MCNC: 3.6 G/DL — SIGNIFICANT CHANGE UP (ref 3.5–5.2)
ALP SERPL-CCNC: 181 U/L — HIGH (ref 30–115)
ALT FLD-CCNC: 12 U/L — SIGNIFICANT CHANGE UP (ref 0–41)
ANION GAP SERPL CALC-SCNC: 16 MMOL/L — HIGH (ref 7–14)
AST SERPL-CCNC: 15 U/L — SIGNIFICANT CHANGE UP (ref 0–41)
BASOPHILS # BLD AUTO: 0.04 K/UL — SIGNIFICANT CHANGE UP (ref 0–0.2)
BASOPHILS NFR BLD AUTO: 0.7 % — SIGNIFICANT CHANGE UP (ref 0–1)
BILIRUB SERPL-MCNC: 0.3 MG/DL — SIGNIFICANT CHANGE UP (ref 0.2–1.2)
BUN SERPL-MCNC: 29 MG/DL — HIGH (ref 10–20)
CALCIUM SERPL-MCNC: 8.3 MG/DL — LOW (ref 8.4–10.5)
CHLORIDE SERPL-SCNC: 97 MMOL/L — LOW (ref 98–110)
CO2 SERPL-SCNC: 28 MMOL/L — SIGNIFICANT CHANGE UP (ref 17–32)
CREAT SERPL-MCNC: 5.8 MG/DL — CRITICAL HIGH (ref 0.7–1.5)
EGFR: 8 ML/MIN/1.73M2 — LOW
EOSINOPHIL # BLD AUTO: 0.35 K/UL — SIGNIFICANT CHANGE UP (ref 0–0.7)
EOSINOPHIL NFR BLD AUTO: 6 % — SIGNIFICANT CHANGE UP (ref 0–8)
GLUCOSE BLDC GLUCOMTR-MCNC: 133 MG/DL — HIGH (ref 70–99)
GLUCOSE BLDC GLUCOMTR-MCNC: 134 MG/DL — HIGH (ref 70–99)
GLUCOSE BLDC GLUCOMTR-MCNC: 144 MG/DL — HIGH (ref 70–99)
GLUCOSE BLDC GLUCOMTR-MCNC: 175 MG/DL — HIGH (ref 70–99)
GLUCOSE SERPL-MCNC: 120 MG/DL — HIGH (ref 70–99)
HCT VFR BLD CALC: 31.8 % — LOW (ref 37–47)
HGB BLD-MCNC: 10.1 G/DL — LOW (ref 12–16)
IMM GRANULOCYTES NFR BLD AUTO: 0.3 % — SIGNIFICANT CHANGE UP (ref 0.1–0.3)
LYMPHOCYTES # BLD AUTO: 0.92 K/UL — LOW (ref 1.2–3.4)
LYMPHOCYTES # BLD AUTO: 15.8 % — LOW (ref 20.5–51.1)
MAGNESIUM SERPL-MCNC: 2.3 MG/DL — SIGNIFICANT CHANGE UP (ref 1.8–2.4)
MCHC RBC-ENTMCNC: 30.4 PG — SIGNIFICANT CHANGE UP (ref 27–31)
MCHC RBC-ENTMCNC: 31.8 G/DL — LOW (ref 32–37)
MCV RBC AUTO: 95.8 FL — SIGNIFICANT CHANGE UP (ref 81–99)
MONOCYTES # BLD AUTO: 0.49 K/UL — SIGNIFICANT CHANGE UP (ref 0.1–0.6)
MONOCYTES NFR BLD AUTO: 8.4 % — SIGNIFICANT CHANGE UP (ref 1.7–9.3)
NEUTROPHILS # BLD AUTO: 4.02 K/UL — SIGNIFICANT CHANGE UP (ref 1.4–6.5)
NEUTROPHILS NFR BLD AUTO: 68.8 % — SIGNIFICANT CHANGE UP (ref 42.2–75.2)
NRBC # BLD: 0 /100 WBCS — SIGNIFICANT CHANGE UP (ref 0–0)
PLATELET # BLD AUTO: 133 K/UL — SIGNIFICANT CHANGE UP (ref 130–400)
POTASSIUM SERPL-MCNC: 4 MMOL/L — SIGNIFICANT CHANGE UP (ref 3.5–5)
POTASSIUM SERPL-SCNC: 4 MMOL/L — SIGNIFICANT CHANGE UP (ref 3.5–5)
PROT SERPL-MCNC: 5.7 G/DL — LOW (ref 6–8)
RBC # BLD: 3.32 M/UL — LOW (ref 4.2–5.4)
RBC # FLD: 19.9 % — HIGH (ref 11.5–14.5)
SODIUM SERPL-SCNC: 141 MMOL/L — SIGNIFICANT CHANGE UP (ref 135–146)
WBC # BLD: 5.84 K/UL — SIGNIFICANT CHANGE UP (ref 4.8–10.8)
WBC # FLD AUTO: 5.84 K/UL — SIGNIFICANT CHANGE UP (ref 4.8–10.8)

## 2023-02-13 PROCEDURE — 99233 SBSQ HOSP IP/OBS HIGH 50: CPT

## 2023-02-13 PROCEDURE — 93010 ELECTROCARDIOGRAM REPORT: CPT

## 2023-02-13 RX ORDER — TRAMADOL HYDROCHLORIDE 50 MG/1
50 TABLET ORAL THREE TIMES A DAY
Refills: 0 | Status: DISCONTINUED | OUTPATIENT
Start: 2023-02-13 | End: 2023-02-14

## 2023-02-13 RX ADMIN — Medication 25 MILLIGRAM(S): at 05:29

## 2023-02-13 RX ADMIN — Medication 81 MILLIGRAM(S): at 11:28

## 2023-02-13 RX ADMIN — CHLORHEXIDINE GLUCONATE 1 APPLICATION(S): 213 SOLUTION TOPICAL at 05:28

## 2023-02-13 RX ADMIN — GABAPENTIN 300 MILLIGRAM(S): 400 CAPSULE ORAL at 21:50

## 2023-02-13 RX ADMIN — GABAPENTIN 300 MILLIGRAM(S): 400 CAPSULE ORAL at 14:27

## 2023-02-13 RX ADMIN — TRAMADOL HYDROCHLORIDE 50 MILLIGRAM(S): 50 TABLET ORAL at 14:27

## 2023-02-13 RX ADMIN — Medication 325 MILLIGRAM(S): at 11:28

## 2023-02-13 RX ADMIN — CLOPIDOGREL BISULFATE 75 MILLIGRAM(S): 75 TABLET, FILM COATED ORAL at 11:28

## 2023-02-13 RX ADMIN — ATORVASTATIN CALCIUM 80 MILLIGRAM(S): 80 TABLET, FILM COATED ORAL at 21:51

## 2023-02-13 RX ADMIN — Medication 60 MILLIGRAM(S): at 05:29

## 2023-02-13 RX ADMIN — CALCITRIOL 0.25 MICROGRAM(S): 0.5 CAPSULE ORAL at 11:28

## 2023-02-13 RX ADMIN — Medication 25 MILLIGRAM(S): at 17:36

## 2023-02-13 RX ADMIN — GABAPENTIN 300 MILLIGRAM(S): 400 CAPSULE ORAL at 05:29

## 2023-02-13 RX ADMIN — HEPARIN SODIUM 5000 UNIT(S): 5000 INJECTION INTRAVENOUS; SUBCUTANEOUS at 05:33

## 2023-02-13 RX ADMIN — SEVELAMER CARBONATE 800 MILLIGRAM(S): 2400 POWDER, FOR SUSPENSION ORAL at 14:27

## 2023-02-13 RX ADMIN — HEPARIN SODIUM 5000 UNIT(S): 5000 INJECTION INTRAVENOUS; SUBCUTANEOUS at 21:51

## 2023-02-13 RX ADMIN — HEPARIN SODIUM 5000 UNIT(S): 5000 INJECTION INTRAVENOUS; SUBCUTANEOUS at 14:30

## 2023-02-13 RX ADMIN — METHOCARBAMOL 500 MILLIGRAM(S): 500 TABLET, FILM COATED ORAL at 11:30

## 2023-02-13 RX ADMIN — SEVELAMER CARBONATE 800 MILLIGRAM(S): 2400 POWDER, FOR SUSPENSION ORAL at 17:36

## 2023-02-13 NOTE — PROGRESS NOTE ADULT - SUBJECTIVE AND OBJECTIVE BOX
Nephrology progress note    THIS IS AN INCOMPLETE NOTE . FULL NOTE TO FOLLOW SHORTLY    Patient is seen and examined, events over the last 24 h noted .    Allergies:  NSAIDs (Nephrotoxicity)  penicillin (Rash)    Hospital Medications:   MEDICATIONS  (STANDING):  aspirin enteric coated 81 milliGRAM(s) Oral daily  atorvastatin 80 milliGRAM(s) Oral at bedtime  calcitriol   Capsule 0.25 MICROGram(s) Oral daily  chlorhexidine 2% Cloths 1 Application(s) Topical <User Schedule>  clopidogrel Tablet 75 milliGRAM(s) Oral daily  ferrous    sulfate 325 milliGRAM(s) Oral daily  gabapentin 300 milliGRAM(s) Oral three times a day  heparin   Injectable 5000 Unit(s) SubCutaneous every 8 hours  hydrALAZINE 25 milliGRAM(s) Oral two times a day  NIFEdipine XL 60 milliGRAM(s) Oral daily  sevelamer carbonate 800 milliGRAM(s) Oral three times a day with meals        VITALS:  T(F): 99.3 (23 @ 04:36), Max: 99.3 (23 @ 04:36)  HR: 77 (23 @ 04:36)  BP: 155/69 (23 @ 04:36)  RR: 18 (23 @ 04:36)  SpO2: 94% (23 @ 20:33)  Wt(kg): --     @ 07:01  -   @ 07:00  --------------------------------------------------------  IN: 0 mL / OUT: 3550 mL / NET: -3550 mL          PHYSICAL EXAM:  Constitutional: NAD  HEENT: anicteric sclera, oropharynx clear, MMM  Neck: No JVD  Respiratory: CTAB, no wheezes, rales or rhonchi  Cardiovascular: S1, S2, RRR  Gastrointestinal: BS+, soft, NT/ND  Extremities: No cyanosis or clubbing. No peripheral edema  :  No mcgrath.   Skin: No rashes    LABS:      141  |  97<L>  |  29<H>  ----------------------------<  120<H>  4.0   |  28  |  5.8<HH>    Ca    8.3<L>      2023 08:12  Mg     2.3         TPro  5.7<L>  /  Alb  3.6  /  TBili  0.3  /  DBili      /  AST  15  /  ALT  12  /  AlkPhos  181<H>                            10.1   5.84  )-----------( 133      ( 2023 08:12 )             31.8       Urine Studies:  Urinalysis Basic - ( 2023 03:25 )    Color: Yellow / Appearance: Clear / S.018 / pH:   Gluc:  / Ketone: Trace  / Bili: Negative / Urobili: <2 mg/dL   Blood:  / Protein: >600 mg/dL / Nitrite: Negative   Leuk Esterase: Negative / RBC: 8 /HPF / WBC 2 /HPF   Sq Epi:  / Non Sq Epi: 4 /HPF / Bacteria: Many          Iron 67, TIBC 210, %sat 32      [22 @ 10:50]  Ferritin 135      [22 @ 10:50]  PTH -- (Ca 7.8)      [22 @ 08:21]   275  TSH 2.09      [05-10-22 @ 09:04]  Lipid: chol 158, , HDL 61, LDL --      [22 @ 06:31]    HBsAg Nonreact      [22 @ 18:00]  HCV 0.16, Nonreact      [22 @ 18:00]    OLVIN: titer 1:80, pattern Speckled      [21 @ 12:47]  dsDNA <12      [22 @ 18:00]  ANCA: cANCA Negative, pANCA Negative, atypical ANCA Negative      [22 @ 18:00]  PLA2R: MARIA GUADALUPE <1.8, IFA --      [22 @ 18:00]  Free Light Chains: kappa 10.04, lambda 8.96, ratio = 1.12      [ @ 08:21]  Immunofixation Serum:   IgM Lambda Band Identified    Reference Range: None Detected      [22 @ 10:55]  SPEP Interpretation: Gamma-Migrating Paraprotein Identified      [22 @ 08:21]  Immunofixation Urine: Weak Bence Howard protein Lambda type      [22 @ 20:07]  UPEP Interpretation: Mild Selective (Glomerular) Proteinuria      [21 @ 12:40]      RADIOLOGY & ADDITIONAL STUDIES:   Nephrology progress note  Patient is seen and examined, events over the last 24 h noted .  Lying in bed   complained of SOB and peripheral neuropathy     Allergies:  NSAIDs (Nephrotoxicity)  penicillin (Rash)    Hospital Medications:   MEDICATIONS  (STANDING):  aspirin enteric coated 81 milliGRAM(s) Oral daily  atorvastatin 80 milliGRAM(s) Oral at bedtime  calcitriol   Capsule 0.25 MICROGram(s) Oral daily  clopidogrel Tablet 75 milliGRAM(s) Oral daily  ferrous    sulfate 325 milliGRAM(s) Oral daily  gabapentin 300 milliGRAM(s) Oral three times a day  heparin   Injectable 5000 Unit(s) SubCutaneous every 8 hours  hydrALAZINE 25 milliGRAM(s) Oral two times a day  NIFEdipine XL 60 milliGRAM(s) Oral daily  sevelamer carbonate 800 milliGRAM(s) Oral three times a day with meals        VITALS:  T(F): 99.3 (23 @ 04:36), Max: 99.3 (23 @ 04:36)  HR: 77 (23 @ 04:36)  BP: 155/69 (23 @ 04:36)  RR: 18 (23 @ 04:36)  SpO2: 94% (23 @ 20:33)      02 @ 07:01  -   @ 07:00  --------------------------------------------------------  IN: 0 mL / OUT: 3550 mL / NET: -3550 mL          PHYSICAL EXAM:  Constitutional: NAD  Neck: No JVD  Respiratory: CTAB,   Cardiovascular: S1, S2, RRR  Gastrointestinal: BS+, soft, NT/ND  Extremities: No cyanosis or clubbing. plus one pitting edema   :  No mcgrath.   Skin: No rashes    LABS:      141  |  97<L>  |  29<H>  ----------------------------<  120<H>  4.0   |  28  |  5.8<HH>    Ca    8.3<L>      2023 08:12  Mg     2.3     02-13    TPro  5.7<L>  /  Alb  3.6  /  TBili  0.3  /  DBili      /  AST  15  /  ALT  12  /  AlkPhos  181<H>                            10.1   5.84  )-----------( 133      ( 2023 08:12 )             31.8       Urine Studies:  Urinalysis Basic - ( 2023 03:25 )    Color: Yellow / Appearance: Clear / S.018 / pH:   Gluc:  / Ketone: Trace  / Bili: Negative / Urobili: <2 mg/dL   Blood:  / Protein: >600 mg/dL / Nitrite: Negative   Leuk Esterase: Negative / RBC: 8 /HPF / WBC 2 /HPF   Sq Epi:  / Non Sq Epi: 4 /HPF / Bacteria: Many          Iron 67, TIBC 210, %sat 32      [22 @ 10:50]  Ferritin 135      [22 @ 10:50]  PTH -- (Ca 7.8)      [22 @ 08:21]   275  TSH 2.09      [05-10-22 @ 09:04]  Lipid: chol 158, , HDL 61, LDL --      [22 @ 06:31]    HBsAg Nonreact      [22 @ 18:00]  HCV 0.16, Nonreact      [22 @ 18:00]    OLVIN: titer 1:80, pattern Speckled      [21 @ 12:47]  dsDNA <12      [22 @ 18:00]  ANCA: cANCA Negative, pANCA Negative, atypical ANCA Negative      [22 @ 18:00]  PLA2R: MARIA GUADALUPE <1.8, IFA --      [22 @ 18:00]  Free Light Chains: kappa 10.04, lambda 8.96, ratio = 1.12      [ @ 08:21]  Immunofixation Serum:   IgM Lambda Band Identified    Reference Range: None Detected      [22 @ 10:55]  SPEP Interpretation: Gamma-Migrating Paraprotein Identified      [22 @ 08:21]  Immunofixation Urine: Weak Bence Howard protein Lambda type      [22 @ 20:07]  UPEP Interpretation: Mild Selective (Glomerular) Proteinuria      [21 @ 12:40]      RADIOLOGY & ADDITIONAL STUDIES:  < from: TTE Echo Complete w/o Contrast w/ Doppler (23 @ 12:17) >  concentric left ventricular hypertrophy. LV Ejection Fraction by   Soto's Method with a biplane EF of 61 %.   2. Grade II diastolic dysfunction.   3. Mildly enlarged right ventricle (RVEDD=4.2cm) with low-normal   function.   4. Mildly enlarged left atrium.   5. Mildly enlarged right atrium.   6. Moderate tricuspid regurgitation.   7. Severe pulmonary hypertension (PASP = 62mmHg).   8. Trivial pericardial effusion.    < end of copied text >

## 2023-02-13 NOTE — PROGRESS NOTE ADULT - SUBJECTIVE AND OBJECTIVE BOX
SEN LING  57y Female    INTERVAL HPI/OVERNIGHT EVENTS:    spoke to pt via  today  she c/o abdominal distension and LE edema, pain of LE  has not missed HD sessions  per family member at bedside, edema improves but then quickly returns  no fever, vomiting, chest pain, SOB    T(F): 98 (02-13-23 @ 13:33), Max: 99.3 (02-13-23 @ 04:36)  HR: 70 (02-13-23 @ 13:33) (70 - 77)  BP: 144/62 (02-13-23 @ 13:33) (136/63 - 155/69)  RR: 18 (02-13-23 @ 13:33) (18 - 18)  SpO2: 94% (02-12-23 @ 20:33) (94% - 94%) on RA      I&O's Summary    CAPILLARY BLOOD GLUCOSE      POCT Blood Glucose.: 175 mg/dL (13 Feb 2023 16:39)  POCT Blood Glucose.: 133 mg/dL (13 Feb 2023 11:33)  POCT Blood Glucose.: 134 mg/dL (13 Feb 2023 08:05)  POCT Blood Glucose.: 186 mg/dL (12 Feb 2023 21:30)        PHYSICAL EXAM:  GENERAL: NAD  HEAD:  Normocephalic  EYES:  conjunctiva and sclera clear  ENMT: Moist mucous membranes  NERVOUS SYSTEM:  Alert, awake, Good concentration, hearing impaired (communicates via sign language)  CHEST/LUNG: decreased BS b/l  HEART: Regular rate and rhythm  ABDOMEN: Soft, diffusely tender, no guarding, distended; Bowel sounds present  EXTREMITIES:   2+ LE edema  left arm AV fistula  SKIN: warm, dry    Consultant(s) Notes Reviewed:  [x ] YES  [ ] NO  Care Discussed with Consultants/Other Providers [ x] YES  [ ] NO    MEDICATIONS  (STANDING):  aspirin enteric coated 81 milliGRAM(s) Oral daily  atorvastatin 80 milliGRAM(s) Oral at bedtime  calcitriol   Capsule 0.25 MICROGram(s) Oral daily  chlorhexidine 2% Cloths 1 Application(s) Topical <User Schedule>  clopidogrel Tablet 75 milliGRAM(s) Oral daily  ferrous    sulfate 325 milliGRAM(s) Oral daily  gabapentin 300 milliGRAM(s) Oral three times a day  heparin   Injectable 5000 Unit(s) SubCutaneous every 8 hours  hydrALAZINE 25 milliGRAM(s) Oral two times a day  NIFEdipine XL 60 milliGRAM(s) Oral daily  sevelamer carbonate 800 milliGRAM(s) Oral three times a day with meals    MEDICATIONS  (PRN):  acetaminophen     Tablet .. 650 milliGRAM(s) Oral every 6 hours PRN Temp greater or equal to 38C (100.4F), Mild Pain (1 - 3)  methocarbamol 500 milliGRAM(s) Oral two times a day PRN Muscle Spasm  sucralfate 1 Gram(s) Oral four times a day PRN stomach pain  traMADol 50 milliGRAM(s) Oral three times a day PRN Moderate Pain (4 - 6)      LABS:                        10.1   5.84  )-----------( 133      ( 13 Feb 2023 08:12 )             31.8     02-13    141  |  97<L>  |  29<H>  ----------------------------<  120<H>  4.0   |  28  |  5.8<HH>    Ca    8.3<L>      13 Feb 2023 08:12  Mg     2.3     02-13    TPro  5.7<L>  /  Alb  3.6  /  TBili  0.3  /  DBili  x   /  AST  15  /  ALT  12  /  AlkPhos  181<H>  02-13        Culture - Urine (collected 11 Feb 2023 03:25)  Source: Clean Catch Clean Catch (Midstream)  Final Report (12 Feb 2023 15:59):    <10,000 CFU/mL Normal Urogenital Jacque        RADIOLOGY & ADDITIONAL TESTS:    Imaging and report Personally Reviewed:  [x ] YES  [ ] NO    < from: CT Head No Cont (02.12.23 @ 11:05) >    IMPRESSION:    No acute intracranial pathology.    < end of copied text >  < from: Xray Chest 1 View- PORTABLE-Routine (Xray Chest 1 View- PORTABLE-Routine in AM.) (02.12.23 @ 08:24) >    Impression:  Stable bilateral opacities and effusions.      < end of copied text >  < from: VA Duplex Lower Extrem Arterial, Bilat (02.11.23 @ 08:59) >  Impression:    Left lower extremity arterial graft appears to be patent  No evidence of a stenosis or occlusion noted in the right lower extremity    < end of copied text >  < from: VA Duplex Lower Ext Vein Scan, Bilat (02.11.23 @ 08:58) >  IMPRESSION:  No evidence of deep venous thrombosis in either lower extremity.      < end of copied text >      < from: CT Abdomen and Pelvis w/ IV Cont (02.11.23 @ 04:50) >  IMPRESSION:      Diffuse anasarca and new ascites.    Evidence of right cardiac dysfunction    Urinary bladder demonstrates circumferential wall thickening. Although it   is partially distended.    Prominent nonspecific inguinal and external iliac lymph nodes may be   reactive.    < end of copied text >      < from: TTE Echo Complete w/o Contrast w/ Doppler (02.12.23 @ 12:17) >  Summary:   1. Normal global left ventricular systolic function with moderate   concentric left ventricular hypertrophy. LV Ejection Fraction by   Soto's Method with a biplane EF of 61 %.   2. Grade II diastolic dysfunction.   3. Mildly enlarged right ventricle (RVEDD=4.2cm) with low-normal   function.   4. Mildly enlarged left atrium.   5. Mildly enlarged right atrium.   6. Moderate tricuspid regurgitation.   7. Severe pulmonary hypertension (PASP = 62mmHg).   8. Trivial pericardial effusion.    < end of copied text >      Case discussed with residents and RN on rounds today    Care discussed with pt and family

## 2023-02-13 NOTE — PROGRESS NOTE ADULT - SUBJECTIVE AND OBJECTIVE BOX
SEN LING  57y  Female    Patient is a 57y old  Female who presents with a chief complaint of fluid overload (13 Feb 2023 10:07)    INTERVAL HPI/OVERNIGHT EVENTS:  No overnight events -  abdominal pain better     T(C): 37.4 (02-13-23 @ 04:36), Max: 37.4 (02-13-23 @ 04:36)  HR: 77 (02-13-23 @ 04:36) (75 - 79)  BP: 155/69 (02-13-23 @ 04:36) (136/63 - 155/69)  RR: 18 (02-13-23 @ 04:36) (18 - 18)  SpO2: 94% (02-12-23 @ 20:33) (94% - 94%)  Wt(kg): --Vital Signs Last 24 Hrs  T(C): 37.4 (13 Feb 2023 04:36), Max: 37.4 (13 Feb 2023 04:36)  T(F): 99.3 (13 Feb 2023 04:36), Max: 99.3 (13 Feb 2023 04:36)  HR: 77 (13 Feb 2023 04:36) (75 - 79)  BP: 155/69 (13 Feb 2023 04:36) (136/63 - 155/69)  RR: 18 (13 Feb 2023 04:36) (18 - 18)  SpO2: 94% (12 Feb 2023 20:33) (94% - 94%)    Parameters below as of 12 Feb 2023 20:33  Patient On (Oxygen Delivery Method): room air    PHYSICAL EXAM:  GENERAL: NAD, well-groomed, well-developed  NECK: Supple, No JVD, Normal thyroid  NERVOUS SYSTEM:  Alert & Oriented X3, Motor Strength 5/5 B/L upper and lower extremities;   CHEST/LUNG: Clear to percussion bilaterally; No rales, rhonchi, wheezing, or rubs  HEART: Regular rate and rhythm; No murmurs, rubs, or gallops  ABDOMEN: Soft, Nontender, Nondistended; Bowel sounds present  EXTREMITIES:  2+ Peripheral Pulses, No clubbing, cyanosis, or edema    Labs reviewed   Imaging Reviewed     acetaminophen     Tablet .. 650 milliGRAM(s) Oral every 6 hours PRN  aspirin enteric coated 81 milliGRAM(s) Oral daily  atorvastatin 80 milliGRAM(s) Oral at bedtime  calcitriol   Capsule 0.25 MICROGram(s) Oral daily  chlorhexidine 2% Cloths 1 Application(s) Topical <User Schedule>  clopidogrel Tablet 75 milliGRAM(s) Oral daily  ferrous    sulfate 325 milliGRAM(s) Oral daily  gabapentin 300 milliGRAM(s) Oral three times a day  heparin   Injectable 5000 Unit(s) SubCutaneous every 8 hours  hydrALAZINE 25 milliGRAM(s) Oral two times a day  methocarbamol 500 milliGRAM(s) Oral two times a day PRN  NIFEdipine XL 60 milliGRAM(s) Oral daily  sevelamer carbonate 800 milliGRAM(s) Oral three times a day with meals  sucralfate 1 Gram(s) Oral four times a day PRN      ASSESSMENT AND PLAN:   SEN LING  57y  Female    Patient is a 57y old  Female who presents with a chief complaint of fluid overload (13 Feb 2023 10:07)    INTERVAL HPI/OVERNIGHT EVENTS:  No overnight events -  abdominal pain better     T(C): 37.4 (02-13-23 @ 04:36), Max: 37.4 (02-13-23 @ 04:36)  HR: 77 (02-13-23 @ 04:36) (75 - 79)  BP: 155/69 (02-13-23 @ 04:36) (136/63 - 155/69)  RR: 18 (02-13-23 @ 04:36) (18 - 18)  SpO2: 94% (02-12-23 @ 20:33) (94% - 94%)  Wt(kg): --Vital Signs Last 24 Hrs  T(C): 37.4 (13 Feb 2023 04:36), Max: 37.4 (13 Feb 2023 04:36)  T(F): 99.3 (13 Feb 2023 04:36), Max: 99.3 (13 Feb 2023 04:36)  HR: 77 (13 Feb 2023 04:36) (75 - 79)  BP: 155/69 (13 Feb 2023 04:36) (136/63 - 155/69)  RR: 18 (13 Feb 2023 04:36) (18 - 18)  SpO2: 94% (12 Feb 2023 20:33) (94% - 94%)    Parameters below as of 12 Feb 2023 20:33  Patient On (Oxygen Delivery Method): room air    PHYSICAL EXAM:  GENERAL: NAD, well-groomed, well-developed  NECK: Supple, No JVD, Normal thyroid  NERVOUS SYSTEM:  Alert & Oriented X3, Motor Strength 5/5 B/L upper and lower extremities;   CHEST/LUNG: Clear to percussion bilaterally; No rales, rhonchi, wheezing, or rubs  HEART: Regular rate and rhythm; No murmurs, rubs, or gallops  ABDOMEN: Soft, Nontender, Nondistended; Bowel sounds present  EXTREMITIES:  2+ Peripheral Pulses, No clubbing, cyanosis, or edema    Labs reviewed   Imaging Reviewed     acetaminophen     Tablet .. 650 milliGRAM(s) Oral every 6 hours PRN  aspirin enteric coated 81 milliGRAM(s) Oral daily  atorvastatin 80 milliGRAM(s) Oral at bedtime  calcitriol   Capsule 0.25 MICROGram(s) Oral daily  chlorhexidine 2% Cloths 1 Application(s) Topical <User Schedule>  clopidogrel Tablet 75 milliGRAM(s) Oral daily  ferrous    sulfate 325 milliGRAM(s) Oral daily  gabapentin 300 milliGRAM(s) Oral three times a day  heparin   Injectable 5000 Unit(s) SubCutaneous every 8 hours  hydrALAZINE 25 milliGRAM(s) Oral two times a day  methocarbamol 500 milliGRAM(s) Oral two times a day PRN  NIFEdipine XL 60 milliGRAM(s) Oral daily  sevelamer carbonate 800 milliGRAM(s) Oral three times a day with meals  sucralfate 1 Gram(s) Oral four times a day PRN      ASSESSMENT AND PLAN:    56yo F w/ pmhx of ESRD (HD TTS), HLD, HTN, PVD, CVA, hearing impaired presenting to the ED for abdominal pain & distension. Admitted for fluid overload and was taken to dialysis.     #new HFpEF   #Severe pulm. HT   - TTE revealed new PAH and HFpEF  - Rt heart failure  - Cardio consult placed - follow recs   - medical management     #Fluid overload  #ESRD on HD TTS  - did not miss any dialysis sessions  - ecg wnl, BNP > 70K, trop 0.02 ---> 0.06  - CT a/p: diffuse anasarca, R cardiac dysfunction, moderate b/l pleural effusions   - previous TTE 11/24/22: EF 62%, G2DD  - HD as planned , f/u with nephro   - strict i/o, daily weight, fluid restriction   - c/w sevelamer, sodium bicarb     #abdominal pain   - CT a/p unrevealing for any other causes of abd pain; lipase neg   - possibly secondary to ascites, will monitor for resolution/improvement after HD   - had this compliant last admission in November 2022 but unclear cause and symptoms improved   - c/w home carafate 1g QID    #Hypertensive urgency  - c/w hydralazine 25mg bid   - patient was taken off nifedipine 90mg; will continue at 60mg qd     #bacteruria   - patient having abd pain but unlikely related to UTI   - u/a: bact many; WBC, YELENA, nitrites negative   - CT a/p: Urinary bladder demonstrates circumferential wall thickening.   - will hold abx for now and f/u urine culture     #DM - controlled  - A1C 6  - not on insulin at home, unclear if taken off?     #h/o Right pontine CVA (February 2021)  - c/w ASA, Plavix, statin  - needs o/p neuro follow up    # PVD  - s/p left femoral artery-posterior tibial artery bypass with autologous venous tissue and left heel ulcer debridement 11/21  - there was concern that pulses were no dopplerable in LE, both va duplex/art duplex 2/11/23 in ED were normal   - f/u o/p Dr. Muñoz, needs o/p vasc (and neuro) follow up if DAPT still needed     # Intellectual disability  #hearing/speech impairment  - communicates via sign language    # Chronic anemia - normocytic  - c/w ferosul 325mg qd     DVT ppx: heparin subq   GI ppx: none  Diet: DASH/Renal/CC/fluid restriction  Code Status: full code  Dispo: from home; reval after HD, likely will need to stay until repeat HD

## 2023-02-14 LAB
ALBUMIN SERPL ELPH-MCNC: 3.6 G/DL — SIGNIFICANT CHANGE UP (ref 3.5–5.2)
ALP SERPL-CCNC: 165 U/L — HIGH (ref 30–115)
ALT FLD-CCNC: 12 U/L — SIGNIFICANT CHANGE UP (ref 0–41)
ANION GAP SERPL CALC-SCNC: 16 MMOL/L — HIGH (ref 7–14)
AST SERPL-CCNC: 12 U/L — SIGNIFICANT CHANGE UP (ref 0–41)
BASOPHILS # BLD AUTO: 0.03 K/UL — SIGNIFICANT CHANGE UP (ref 0–0.2)
BASOPHILS NFR BLD AUTO: 0.5 % — SIGNIFICANT CHANGE UP (ref 0–1)
BILIRUB SERPL-MCNC: 0.4 MG/DL — SIGNIFICANT CHANGE UP (ref 0.2–1.2)
BUN SERPL-MCNC: 37 MG/DL — HIGH (ref 10–20)
CALCIUM SERPL-MCNC: 8.5 MG/DL — SIGNIFICANT CHANGE UP (ref 8.4–10.5)
CHLORIDE SERPL-SCNC: 94 MMOL/L — LOW (ref 98–110)
CO2 SERPL-SCNC: 28 MMOL/L — SIGNIFICANT CHANGE UP (ref 17–32)
CREAT SERPL-MCNC: 6.8 MG/DL — CRITICAL HIGH (ref 0.7–1.5)
EGFR: 7 ML/MIN/1.73M2 — LOW
EOSINOPHIL # BLD AUTO: 0.31 K/UL — SIGNIFICANT CHANGE UP (ref 0–0.7)
EOSINOPHIL NFR BLD AUTO: 5.5 % — SIGNIFICANT CHANGE UP (ref 0–8)
GLUCOSE BLDC GLUCOMTR-MCNC: 114 MG/DL — HIGH (ref 70–99)
GLUCOSE BLDC GLUCOMTR-MCNC: 133 MG/DL — HIGH (ref 70–99)
GLUCOSE BLDC GLUCOMTR-MCNC: 157 MG/DL — HIGH (ref 70–99)
GLUCOSE SERPL-MCNC: 106 MG/DL — HIGH (ref 70–99)
HCT VFR BLD CALC: 32.8 % — LOW (ref 37–47)
HGB BLD-MCNC: 10.2 G/DL — LOW (ref 12–16)
IMM GRANULOCYTES NFR BLD AUTO: 0.4 % — HIGH (ref 0.1–0.3)
LYMPHOCYTES # BLD AUTO: 0.95 K/UL — LOW (ref 1.2–3.4)
LYMPHOCYTES # BLD AUTO: 16.9 % — LOW (ref 20.5–51.1)
MAGNESIUM SERPL-MCNC: 2.5 MG/DL — HIGH (ref 1.8–2.4)
MCHC RBC-ENTMCNC: 30.3 PG — SIGNIFICANT CHANGE UP (ref 27–31)
MCHC RBC-ENTMCNC: 31.1 G/DL — LOW (ref 32–37)
MCV RBC AUTO: 97.3 FL — SIGNIFICANT CHANGE UP (ref 81–99)
MONOCYTES # BLD AUTO: 0.49 K/UL — SIGNIFICANT CHANGE UP (ref 0.1–0.6)
MONOCYTES NFR BLD AUTO: 8.7 % — SIGNIFICANT CHANGE UP (ref 1.7–9.3)
NEUTROPHILS # BLD AUTO: 3.82 K/UL — SIGNIFICANT CHANGE UP (ref 1.4–6.5)
NEUTROPHILS NFR BLD AUTO: 68 % — SIGNIFICANT CHANGE UP (ref 42.2–75.2)
NRBC # BLD: 0 /100 WBCS — SIGNIFICANT CHANGE UP (ref 0–0)
PLATELET # BLD AUTO: 142 K/UL — SIGNIFICANT CHANGE UP (ref 130–400)
POTASSIUM SERPL-MCNC: 4.4 MMOL/L — SIGNIFICANT CHANGE UP (ref 3.5–5)
POTASSIUM SERPL-SCNC: 4.4 MMOL/L — SIGNIFICANT CHANGE UP (ref 3.5–5)
PROT SERPL-MCNC: 5.8 G/DL — LOW (ref 6–8)
RBC # BLD: 3.37 M/UL — LOW (ref 4.2–5.4)
RBC # FLD: 19.3 % — HIGH (ref 11.5–14.5)
SODIUM SERPL-SCNC: 138 MMOL/L — SIGNIFICANT CHANGE UP (ref 135–146)
WBC # BLD: 5.62 K/UL — SIGNIFICANT CHANGE UP (ref 4.8–10.8)
WBC # FLD AUTO: 5.62 K/UL — SIGNIFICANT CHANGE UP (ref 4.8–10.8)

## 2023-02-14 PROCEDURE — 99253 IP/OBS CNSLTJ NEW/EST LOW 45: CPT

## 2023-02-14 PROCEDURE — 99232 SBSQ HOSP IP/OBS MODERATE 35: CPT

## 2023-02-14 RX ORDER — OXYCODONE HYDROCHLORIDE 5 MG/1
5 TABLET ORAL EVERY 8 HOURS
Refills: 0 | Status: DISCONTINUED | OUTPATIENT
Start: 2023-02-14 | End: 2023-02-15

## 2023-02-14 RX ADMIN — HEPARIN SODIUM 5000 UNIT(S): 5000 INJECTION INTRAVENOUS; SUBCUTANEOUS at 22:18

## 2023-02-14 RX ADMIN — Medication 60 MILLIGRAM(S): at 05:32

## 2023-02-14 RX ADMIN — SEVELAMER CARBONATE 800 MILLIGRAM(S): 2400 POWDER, FOR SUSPENSION ORAL at 14:21

## 2023-02-14 RX ADMIN — ATORVASTATIN CALCIUM 80 MILLIGRAM(S): 80 TABLET, FILM COATED ORAL at 22:14

## 2023-02-14 RX ADMIN — METHOCARBAMOL 500 MILLIGRAM(S): 500 TABLET, FILM COATED ORAL at 14:26

## 2023-02-14 RX ADMIN — Medication 25 MILLIGRAM(S): at 22:12

## 2023-02-14 RX ADMIN — SEVELAMER CARBONATE 800 MILLIGRAM(S): 2400 POWDER, FOR SUSPENSION ORAL at 17:34

## 2023-02-14 RX ADMIN — SEVELAMER CARBONATE 800 MILLIGRAM(S): 2400 POWDER, FOR SUSPENSION ORAL at 07:42

## 2023-02-14 RX ADMIN — Medication 325 MILLIGRAM(S): at 14:21

## 2023-02-14 RX ADMIN — TRAMADOL HYDROCHLORIDE 50 MILLIGRAM(S): 50 TABLET ORAL at 07:42

## 2023-02-14 RX ADMIN — GABAPENTIN 300 MILLIGRAM(S): 400 CAPSULE ORAL at 22:12

## 2023-02-14 RX ADMIN — CHLORHEXIDINE GLUCONATE 1 APPLICATION(S): 213 SOLUTION TOPICAL at 05:31

## 2023-02-14 RX ADMIN — CALCITRIOL 0.25 MICROGRAM(S): 0.5 CAPSULE ORAL at 14:21

## 2023-02-14 RX ADMIN — GABAPENTIN 300 MILLIGRAM(S): 400 CAPSULE ORAL at 14:20

## 2023-02-14 RX ADMIN — TRAMADOL HYDROCHLORIDE 50 MILLIGRAM(S): 50 TABLET ORAL at 08:24

## 2023-02-14 RX ADMIN — HEPARIN SODIUM 5000 UNIT(S): 5000 INJECTION INTRAVENOUS; SUBCUTANEOUS at 14:22

## 2023-02-14 RX ADMIN — Medication 25 MILLIGRAM(S): at 05:32

## 2023-02-14 RX ADMIN — CLOPIDOGREL BISULFATE 75 MILLIGRAM(S): 75 TABLET, FILM COATED ORAL at 14:21

## 2023-02-14 RX ADMIN — HEPARIN SODIUM 5000 UNIT(S): 5000 INJECTION INTRAVENOUS; SUBCUTANEOUS at 05:32

## 2023-02-14 RX ADMIN — Medication 81 MILLIGRAM(S): at 14:21

## 2023-02-14 RX ADMIN — GABAPENTIN 300 MILLIGRAM(S): 400 CAPSULE ORAL at 05:32

## 2023-02-14 NOTE — PROGRESS NOTE ADULT - SUBJECTIVE AND OBJECTIVE BOX
SEN LING  57y Female    INTERVAL HPI/OVERNIGHT EVENTS:    spoke to pt via  today  family at bedside  she is s/p HD with 3.5 L fluid removal  c/o b/l LE pain (pinching sensation) - she was supposed to see pain management as outpt but never did because doctor became unavailable per family  on tylenol and gabapentin without much relief -> has had pain x the past 8 days now  no fever     T(F): 98 (02-14-23 @ 05:09), Max: 98 (02-14-23 @ 05:09)  HR: 70 (02-14-23 @ 12:00) (70 - 77)  BP: 143/69 (02-14-23 @ 12:00) (122/67 - 152/66)  RR: 17 (02-14-23 @ 12:00) (17 - 18)  SpO2: 98% (02-14-23 @ 05:09) (98% - 98%) on RA    I&O's Summary    14 Feb 2023 07:01  -  14 Feb 2023 15:39  --------------------------------------------------------  IN: 0 mL / OUT: 3500 mL / NET: -3500 mL      CAPILLARY BLOOD GLUCOSE      POCT Blood Glucose.: 114 mg/dL (14 Feb 2023 07:56)  POCT Blood Glucose.: 144 mg/dL (13 Feb 2023 21:08)  POCT Blood Glucose.: 175 mg/dL (13 Feb 2023 16:39)        PHYSICAL EXAM:  GENERAL: NAD  HEAD:  Normocephalic  EYES:  conjunctiva and sclera clear  ENMT: Moist mucous membranes  NERVOUS SYSTEM:  Alert, awake, Good concentration, hearing impaired and communicates via sign language  CHEST/LUNG: decreased BS at bases  HEART: Regular rate and rhythm  ABDOMEN: Soft, Nontender, distended; Bowel sounds present  EXTREMITIES:   2+ LE edema  SKIN: warm, dry  left arm AV fistula    Consultant(s) Notes Reviewed:  [x ] YES  [ ] NO  Care Discussed with Consultants/Other Providers [ x] YES  [ ] NO    MEDICATIONS  (STANDING):  aspirin enteric coated 81 milliGRAM(s) Oral daily  atorvastatin 80 milliGRAM(s) Oral at bedtime  calcitriol   Capsule 0.25 MICROGram(s) Oral daily  chlorhexidine 2% Cloths 1 Application(s) Topical <User Schedule>  clopidogrel Tablet 75 milliGRAM(s) Oral daily  ferrous    sulfate 325 milliGRAM(s) Oral daily  gabapentin 300 milliGRAM(s) Oral three times a day  heparin   Injectable 5000 Unit(s) SubCutaneous every 8 hours  hydrALAZINE 25 milliGRAM(s) Oral two times a day  NIFEdipine XL 60 milliGRAM(s) Oral daily  sevelamer carbonate 800 milliGRAM(s) Oral three times a day with meals    MEDICATIONS  (PRN):  acetaminophen     Tablet .. 650 milliGRAM(s) Oral every 6 hours PRN Temp greater or equal to 38C (100.4F), Mild Pain (1 - 3)  methocarbamol 500 milliGRAM(s) Oral two times a day PRN Muscle Spasm  sucralfate 1 Gram(s) Oral four times a day PRN stomach pain      LABS:                        10.2   5.62  )-----------( 142      ( 14 Feb 2023 07:39 )             32.8     02-14    138  |  94<L>  |  37<H>  ----------------------------<  106<H>  4.4   |  28  |  6.8<HH>    Ca    8.5      14 Feb 2023 07:39  Mg     2.5     02-14    TPro  5.8<L>  /  Alb  3.6  /  TBili  0.4  /  DBili  x   /  AST  12  /  ALT  12  /  AlkPhos  165<H>  02-14            Case discussed with residents and RN on rounds today    Care discussed with pt and family

## 2023-02-14 NOTE — PROGRESS NOTE ADULT - SUBJECTIVE AND OBJECTIVE BOX
Nephrology progress note    Patient was seen and examined, events over the last 24 h noted .    Allergies:  NSAIDs (Nephrotoxicity)  penicillin (Rash)    Hospital Medications:   MEDICATIONS  (STANDING):  aspirin enteric coated 81 milliGRAM(s) Oral daily  atorvastatin 80 milliGRAM(s) Oral at bedtime  calcitriol   Capsule 0.25 MICROGram(s) Oral daily  chlorhexidine 2% Cloths 1 Application(s) Topical <User Schedule>  clopidogrel Tablet 75 milliGRAM(s) Oral daily  ferrous    sulfate 325 milliGRAM(s) Oral daily  gabapentin 300 milliGRAM(s) Oral three times a day  heparin   Injectable 5000 Unit(s) SubCutaneous every 8 hours  hydrALAZINE 25 milliGRAM(s) Oral two times a day  NIFEdipine XL 60 milliGRAM(s) Oral daily  sevelamer carbonate 800 milliGRAM(s) Oral three times a day with meals        VITALS:  T(F): 98 (23 @ 05:09), Max: 98 (23 @ 13:33)  HR: 77 (23 @ 05:09)  BP: 152/66 (23 @ 05:09)  RR: 18 (23 @ 05:09)  SpO2: 98% (23 @ 05:09)  Wt(kg): --        PHYSICAL EXAM:  Constitutional: NAD  HEENT: anicteric sclera, oropharynx clear, MMM  Neck: No JVD  Respiratory: CTAB, no wheezes, rales or rhonchi  Cardiovascular: S1, S2, RRR  Gastrointestinal: BS+, soft, NT/ND  Extremities: No cyanosis or clubbing. No peripheral edema  :  No mcgrath.   Skin: No rashes    LABS:      141  |  97<L>  |  29<H>  ----------------------------<  120<H>  4.0   |  28  |  5.8<HH>    Ca    8.3<L>      2023 08:12  Mg     2.3         TPro  5.7<L>  /  Alb  3.6  /  TBili  0.3  /  DBili      /  AST  15  /  ALT  12  /  AlkPhos  181<H>                            10.1   5.84  )-----------( 133      ( 2023 08:12 )             31.8       Urine Studies:  Urinalysis Basic - ( 2023 03:25 )    Color: Yellow / Appearance: Clear / S.018 / pH:   Gluc:  / Ketone: Trace  / Bili: Negative / Urobili: <2 mg/dL   Blood:  / Protein: >600 mg/dL / Nitrite: Negative   Leuk Esterase: Negative / RBC: 8 /HPF / WBC 2 /HPF   Sq Epi:  / Non Sq Epi: 4 /HPF / Bacteria: Many        RADIOLOGY & ADDITIONAL STUDIES:

## 2023-02-14 NOTE — PROGRESS NOTE ADULT - SUBJECTIVE AND OBJECTIVE BOX
SEN LING  57y  Female      Patient is a 57y old  Female who presents with a chief complaint of fluid overload (14 Feb 2023 08:50)      INTERVAL HPI/OVERNIGHT EVENTS:  No overnight events       T(C): 36.7 (02-14-23 @ 05:09), Max: 36.7 (02-14-23 @ 05:09)  HR: 70 (02-14-23 @ 12:00) (70 - 77)  BP: 143/69 (02-14-23 @ 12:00) (122/67 - 152/66)  RR: 17 (02-14-23 @ 12:00) (17 - 18)  SpO2: 98% (02-14-23 @ 05:09) (98% - 98%)  Wt(kg): --Vital Signs Last 24 Hrs  T(C): 36.7 (14 Feb 2023 05:09), Max: 36.7 (14 Feb 2023 05:09)  T(F): 98 (14 Feb 2023 05:09), Max: 98 (14 Feb 2023 05:09)  HR: 70 (14 Feb 2023 12:00) (70 - 77)  BP: 143/69 (14 Feb 2023 12:00) (122/67 - 152/66)  BP(mean): --  RR: 17 (14 Feb 2023 12:00) (17 - 18)  SpO2: 98% (14 Feb 2023 05:09) (98% - 98%)    Parameters below as of 14 Feb 2023 12:00  Patient On (Oxygen Delivery Method): room air      PHYSICAL EXAM:  GENERAL: NAD, well-groomed, well-developed  NECK: Supple, No JVD, Normal thyroid  NERVOUS SYSTEM:  Alert & Oriented X3, Motor Strength 5/5 B/L upper and lower extremities;   CHEST/LUNG: Clear to percussion bilaterally; No rales, rhonchi, wheezing, or rubs  HEART: Regular rate and rhythm; No murmurs, rubs, or gallops  ABDOMEN: Soft, Nontender, Nondistended; Bowel sounds present  EXTREMITIES:  2+ Peripheral Pulses, No clubbing, cyanosis, or edema    Labs reviewed   Imaging Reviewed     acetaminophen     Tablet .. 650 milliGRAM(s) Oral every 6 hours PRN  aspirin enteric coated 81 milliGRAM(s) Oral daily  atorvastatin 80 milliGRAM(s) Oral at bedtime  calcitriol   Capsule 0.25 MICROGram(s) Oral daily  chlorhexidine 2% Cloths 1 Application(s) Topical <User Schedule>  clopidogrel Tablet 75 milliGRAM(s) Oral daily  ferrous    sulfate 325 milliGRAM(s) Oral daily  gabapentin 300 milliGRAM(s) Oral three times a day  heparin   Injectable 5000 Unit(s) SubCutaneous every 8 hours  hydrALAZINE 25 milliGRAM(s) Oral two times a day  methocarbamol 500 milliGRAM(s) Oral two times a day PRN  NIFEdipine XL 60 milliGRAM(s) Oral daily  sevelamer carbonate 800 milliGRAM(s) Oral three times a day with meals  sucralfate 1 Gram(s) Oral four times a day PRN  traMADol 50 milliGRAM(s) Oral three times a day PRN    ASSESSMENT AND PLAN:    58yo F w/ pmhx of ESRD (HD TTS), HLD, HTN, PVD, CVA, hearing impaired presenting to the ED for abdominal pain & distension. Admitted for fluid overload and was taken to dialysis.     #new HFpEF   #Severe pulm. HT   - TTE revealed new PAH and HFpEF  - Rt heart failure  - Cardio consult placed - follow recs   - medical management     #Fluid overload  #ESRD on HD TTS  - did not miss any dialysis sessions  - ecg wnl, BNP > 70K, trop 0.02 ---> 0.06  - CT a/p: diffuse anasarca, R cardiac dysfunction, moderate b/l pleural effusions   - previous TTE 11/24/22: EF 62%, G2DD  - HD as planned , f/u with nephro   - strict i/o, daily weight, fluid restriction   - c/w sevelamer, sodium bicarb     #abdominal pain   - CT a/p unrevealing for any other causes of abd pain; lipase neg   - possibly secondary to ascites, will monitor for resolution/improvement after HD   - had this compliant last admission in November 2022 but unclear cause and symptoms improved   - c/w home carafate 1g QID    #Hypertensive urgency  - c/w hydralazine 25mg bid   - patient was taken off nifedipine 90mg; will continue at 60mg qd     #bacteruria   - patient having abd pain but unlikely related to UTI   - u/a: bact many; WBC, YELENA, nitrites negative   - CT a/p: Urinary bladder demonstrates circumferential wall thickening.   - will hold abx for now and f/u urine culture     #DM - controlled  - A1C 6  - not on insulin at home, unclear if taken off?     #h/o Right pontine CVA (February 2021)  - c/w ASA, Plavix, statin  - needs o/p neuro follow up    # PVD  - s/p left femoral artery-posterior tibial artery bypass with autologous venous tissue and left heel ulcer debridement 11/21  - there was concern that pulses were no dopplerable in LE, both va duplex/art duplex 2/11/23 in ED were normal   - f/u o/p Dr. Muñoz, needs o/p vasc (and neuro) follow up if DAPT still needed     # Intellectual disability  #hearing/speech impairment  - communicates via sign language    # Chronic anemia - normocytic  - c/w ferosul 325mg qd     DVT ppx: heparin subq   GI ppx: none  Diet: DASH/Renal/CC/fluid restriction  Code Status: full code  Dispo: from home

## 2023-02-15 ENCOUNTER — TRANSCRIPTION ENCOUNTER (OUTPATIENT)
Age: 58
End: 2023-02-15

## 2023-02-15 VITALS — HEART RATE: 75 BPM | DIASTOLIC BLOOD PRESSURE: 68 MMHG | SYSTOLIC BLOOD PRESSURE: 155 MMHG

## 2023-02-15 LAB
ALBUMIN SERPL ELPH-MCNC: 3.5 G/DL — SIGNIFICANT CHANGE UP (ref 3.5–5.2)
ALP SERPL-CCNC: 164 U/L — HIGH (ref 30–115)
ALT FLD-CCNC: 11 U/L — SIGNIFICANT CHANGE UP (ref 0–41)
ANION GAP SERPL CALC-SCNC: 12 MMOL/L — SIGNIFICANT CHANGE UP (ref 7–14)
AST SERPL-CCNC: 12 U/L — SIGNIFICANT CHANGE UP (ref 0–41)
BASOPHILS # BLD AUTO: 0.04 K/UL — SIGNIFICANT CHANGE UP (ref 0–0.2)
BASOPHILS NFR BLD AUTO: 0.8 % — SIGNIFICANT CHANGE UP (ref 0–1)
BILIRUB SERPL-MCNC: 0.3 MG/DL — SIGNIFICANT CHANGE UP (ref 0.2–1.2)
BUN SERPL-MCNC: 32 MG/DL — HIGH (ref 10–20)
CALCIUM SERPL-MCNC: 8.4 MG/DL — SIGNIFICANT CHANGE UP (ref 8.4–10.5)
CHLORIDE SERPL-SCNC: 95 MMOL/L — LOW (ref 98–110)
CO2 SERPL-SCNC: 28 MMOL/L — SIGNIFICANT CHANGE UP (ref 17–32)
CREAT SERPL-MCNC: 5.8 MG/DL — CRITICAL HIGH (ref 0.7–1.5)
EGFR: 8 ML/MIN/1.73M2 — LOW
EOSINOPHIL # BLD AUTO: 0.27 K/UL — SIGNIFICANT CHANGE UP (ref 0–0.7)
EOSINOPHIL NFR BLD AUTO: 5.2 % — SIGNIFICANT CHANGE UP (ref 0–8)
GLUCOSE BLDC GLUCOMTR-MCNC: 149 MG/DL — HIGH (ref 70–99)
GLUCOSE BLDC GLUCOMTR-MCNC: 93 MG/DL — SIGNIFICANT CHANGE UP (ref 70–99)
GLUCOSE SERPL-MCNC: 108 MG/DL — HIGH (ref 70–99)
HCT VFR BLD CALC: 30.8 % — LOW (ref 37–47)
HGB BLD-MCNC: 9.9 G/DL — LOW (ref 12–16)
IMM GRANULOCYTES NFR BLD AUTO: 0.2 % — SIGNIFICANT CHANGE UP (ref 0.1–0.3)
LYMPHOCYTES # BLD AUTO: 0.91 K/UL — LOW (ref 1.2–3.4)
LYMPHOCYTES # BLD AUTO: 17.5 % — LOW (ref 20.5–51.1)
MAGNESIUM SERPL-MCNC: 2.3 MG/DL — SIGNIFICANT CHANGE UP (ref 1.8–2.4)
MCHC RBC-ENTMCNC: 30.4 PG — SIGNIFICANT CHANGE UP (ref 27–31)
MCHC RBC-ENTMCNC: 32.1 G/DL — SIGNIFICANT CHANGE UP (ref 32–37)
MCV RBC AUTO: 94.5 FL — SIGNIFICANT CHANGE UP (ref 81–99)
MONOCYTES # BLD AUTO: 0.46 K/UL — SIGNIFICANT CHANGE UP (ref 0.1–0.6)
MONOCYTES NFR BLD AUTO: 8.8 % — SIGNIFICANT CHANGE UP (ref 1.7–9.3)
NEUTROPHILS # BLD AUTO: 3.52 K/UL — SIGNIFICANT CHANGE UP (ref 1.4–6.5)
NEUTROPHILS NFR BLD AUTO: 67.5 % — SIGNIFICANT CHANGE UP (ref 42.2–75.2)
NRBC # BLD: 0 /100 WBCS — SIGNIFICANT CHANGE UP (ref 0–0)
PLATELET # BLD AUTO: 112 K/UL — LOW (ref 130–400)
POTASSIUM SERPL-MCNC: 4.2 MMOL/L — SIGNIFICANT CHANGE UP (ref 3.5–5)
POTASSIUM SERPL-SCNC: 4.2 MMOL/L — SIGNIFICANT CHANGE UP (ref 3.5–5)
PROT SERPL-MCNC: 5.6 G/DL — LOW (ref 6–8)
RBC # BLD: 3.26 M/UL — LOW (ref 4.2–5.4)
RBC # FLD: 19.2 % — HIGH (ref 11.5–14.5)
SODIUM SERPL-SCNC: 135 MMOL/L — SIGNIFICANT CHANGE UP (ref 135–146)
WBC # BLD: 5.21 K/UL — SIGNIFICANT CHANGE UP (ref 4.8–10.8)
WBC # FLD AUTO: 5.21 K/UL — SIGNIFICANT CHANGE UP (ref 4.8–10.8)

## 2023-02-15 PROCEDURE — 99239 HOSP IP/OBS DSCHRG MGMT >30: CPT

## 2023-02-15 RX ORDER — CALCITRIOL 0.5 UG/1
1 CAPSULE ORAL
Qty: 30 | Refills: 0
Start: 2023-02-15 | End: 2023-03-16

## 2023-02-15 RX ORDER — CALCITRIOL 0.5 UG/1
1 CAPSULE ORAL
Qty: 0 | Refills: 0 | DISCHARGE

## 2023-02-15 RX ORDER — SUCRALFATE 1 G
1 TABLET ORAL
Qty: 60 | Refills: 0
Start: 2023-02-15 | End: 2023-03-01

## 2023-02-15 RX ORDER — ATORVASTATIN CALCIUM 80 MG/1
1 TABLET, FILM COATED ORAL
Qty: 30 | Refills: 0
Start: 2023-02-15 | End: 2023-03-16

## 2023-02-15 RX ORDER — ATORVASTATIN CALCIUM 80 MG/1
1 TABLET, FILM COATED ORAL
Qty: 0 | Refills: 0 | DISCHARGE

## 2023-02-15 RX ORDER — ACETAMINOPHEN 500 MG
2 TABLET ORAL
Qty: 80 | Refills: 0
Start: 2023-02-15 | End: 2023-02-24

## 2023-02-15 RX ORDER — CLOPIDOGREL BISULFATE 75 MG/1
1 TABLET, FILM COATED ORAL
Qty: 30 | Refills: 0
Start: 2023-02-15 | End: 2023-03-16

## 2023-02-15 RX ORDER — METHOCARBAMOL 500 MG/1
1 TABLET, FILM COATED ORAL
Qty: 60 | Refills: 0
Start: 2023-02-15 | End: 2023-03-16

## 2023-02-15 RX ORDER — FERROUS SULFATE 325(65) MG
1 TABLET ORAL
Qty: 30 | Refills: 2
Start: 2023-02-15 | End: 2023-05-15

## 2023-02-15 RX ORDER — METHOCARBAMOL 500 MG/1
1 TABLET, FILM COATED ORAL
Qty: 0 | Refills: 0 | DISCHARGE

## 2023-02-15 RX ORDER — ASPIRIN/CALCIUM CARB/MAGNESIUM 324 MG
1 TABLET ORAL
Qty: 30 | Refills: 0
Start: 2023-02-15 | End: 2023-03-16

## 2023-02-15 RX ORDER — OXYCODONE HYDROCHLORIDE 5 MG/1
1 TABLET ORAL
Qty: 30 | Refills: 0
Start: 2023-02-15 | End: 2023-02-24

## 2023-02-15 RX ORDER — SODIUM BICARBONATE 1 MEQ/ML
2 SYRINGE (ML) INTRAVENOUS
Qty: 0 | Refills: 0 | DISCHARGE

## 2023-02-15 RX ORDER — HYDRALAZINE HCL 50 MG
1 TABLET ORAL
Qty: 0 | Refills: 0 | DISCHARGE

## 2023-02-15 RX ORDER — GABAPENTIN 400 MG/1
1 CAPSULE ORAL
Qty: 0 | Refills: 0 | DISCHARGE

## 2023-02-15 RX ORDER — SEVELAMER CARBONATE 2400 MG/1
1 POWDER, FOR SUSPENSION ORAL
Qty: 90 | Refills: 1
Start: 2023-02-15 | End: 2023-04-15

## 2023-02-15 RX ORDER — NIFEDIPINE 30 MG
1 TABLET, EXTENDED RELEASE 24 HR ORAL
Qty: 30 | Refills: 0
Start: 2023-02-15 | End: 2023-03-16

## 2023-02-15 RX ORDER — GABAPENTIN 400 MG/1
1 CAPSULE ORAL
Qty: 90 | Refills: 0
Start: 2023-02-15 | End: 2023-03-16

## 2023-02-15 RX ORDER — SODIUM BICARBONATE 1 MEQ/ML
2 SYRINGE (ML) INTRAVENOUS
Qty: 180 | Refills: 0
Start: 2023-02-15 | End: 2023-03-16

## 2023-02-15 RX ORDER — HYDRALAZINE HCL 50 MG
1 TABLET ORAL
Qty: 60 | Refills: 0
Start: 2023-02-15 | End: 2023-03-16

## 2023-02-15 RX ORDER — CLOPIDOGREL BISULFATE 75 MG/1
1 TABLET, FILM COATED ORAL
Qty: 0 | Refills: 0 | DISCHARGE

## 2023-02-15 RX ADMIN — Medication 81 MILLIGRAM(S): at 11:43

## 2023-02-15 RX ADMIN — Medication 650 MILLIGRAM(S): at 12:15

## 2023-02-15 RX ADMIN — Medication 25 MILLIGRAM(S): at 05:42

## 2023-02-15 RX ADMIN — CLOPIDOGREL BISULFATE 75 MILLIGRAM(S): 75 TABLET, FILM COATED ORAL at 11:43

## 2023-02-15 RX ADMIN — SEVELAMER CARBONATE 800 MILLIGRAM(S): 2400 POWDER, FOR SUSPENSION ORAL at 08:12

## 2023-02-15 RX ADMIN — Medication 60 MILLIGRAM(S): at 05:42

## 2023-02-15 RX ADMIN — GABAPENTIN 300 MILLIGRAM(S): 400 CAPSULE ORAL at 05:42

## 2023-02-15 RX ADMIN — Medication 650 MILLIGRAM(S): at 11:43

## 2023-02-15 RX ADMIN — SEVELAMER CARBONATE 800 MILLIGRAM(S): 2400 POWDER, FOR SUSPENSION ORAL at 11:43

## 2023-02-15 RX ADMIN — HEPARIN SODIUM 5000 UNIT(S): 5000 INJECTION INTRAVENOUS; SUBCUTANEOUS at 05:42

## 2023-02-15 RX ADMIN — Medication 325 MILLIGRAM(S): at 11:43

## 2023-02-15 RX ADMIN — CALCITRIOL 0.25 MICROGRAM(S): 0.5 CAPSULE ORAL at 11:43

## 2023-02-15 NOTE — DISCHARGE NOTE PROVIDER - NSDCFUSCHEDAPPT_GEN_ALL_CORE_FT
Unknown, Doctor  [facility] PreAdmits  Scheduled Appointment: 03/08/2023    [facility] Physician [facility]  GASTRO  [address]  Scheduled Appointment: 03/08/2023    [facility] PreAdmits  Scheduled Appointment: 03/17/2023    [facility]  PODIATRY  [address]  Scheduled Appointment: 03/17/2023    [facility] PreAdmits  Scheduled Appointment: 04/03/2023    Shira Damon  [facility]  INTMED  [address]  Scheduled Appointment: 04/03/2023    [facility] PreAdmits  Scheduled Appointment: 04/07/2023    [facility]  OPHTHALM  [address]  Scheduled Appointment: 04/07/2023

## 2023-02-15 NOTE — DISCHARGE NOTE PROVIDER - CARE PROVIDERS DIRECT ADDRESSES
,DirectAddress_Unknown,DirectAddress_Unknown,[email or web address]

## 2023-02-15 NOTE — DISCHARGE NOTE PROVIDER - NSDCCPCAREPLAN_GEN_ALL_CORE_FT
PRINCIPAL DISCHARGE DIAGNOSIS  Diagnosis: Fluid overload  Assessment and Plan of Treatment: You presented with a state of fluid overload which means your body had more water inside which led to your symptoms of swelling abdominal pain and breathing difficulty. We treated it by increasing the fluid removal through you hemodialysis. While evaluating we found that your heart was burdened due to this fluid overload and hence got a cardiologist opinion. We recommend medical management for now and follow up as outpatient.      SECONDARY DISCHARGE DIAGNOSES  Diagnosis: ESRD on dialysis  Assessment and Plan of Treatment:      PRINCIPAL DISCHARGE DIAGNOSIS  Diagnosis: Fluid overload  Assessment and Plan of Treatment: You presented with a state of fluid overload which means your body had more water inside which led to your symptoms of swelling abdominal pain and breathing difficulty. We treated it by increasing the fluid removal through you hemodialysis. While evaluating we found that your heart was burdened due to this fluid overload and hence got a cardiologist opinion. We recommend medical management for now and follow up as outpatient.      SECONDARY DISCHARGE DIAGNOSES  Diagnosis: ESRD on dialysis  Assessment and Plan of Treatment: Your nephrologist should aim for 3 to 3.5L fluid removal at hemodialysis which was effective here and you tolerated the treatment.

## 2023-02-15 NOTE — PROGRESS NOTE ADULT - PROVIDER SPECIALTY LIST ADULT
Internal Medicine
Internal Medicine
Hospitalist
Hospitalist
Nephrology
Nephrology
Hospitalist
Hospitalist
Nephrology

## 2023-02-15 NOTE — DISCHARGE NOTE NURSING/CASE MANAGEMENT/SOCIAL WORK - PATIENT PORTAL LINK FT
You can access the FollowMyHealth Patient Portal offered by [facility] by registering at the following website: [email or web address]. By joining [facility]’s FollowMyHealth portal, you will also be able to view your health information using other applications (apps) compatible with our system.

## 2023-02-15 NOTE — PROGRESS NOTE ADULT - ASSESSMENT
57 year old female with pmh of CKD (gets dialysis T/TH/Sat), HLD, HTN, PVD, hearing impaired presenting to the ED for abdominal pain & distension. Patient last received dialysis this past Thursday  also as per family member , increase edema of LE   ESRD / volume overload/ anasarca / abdominal distension/ ascites   # HD today, UF as tolerated   # increase UF goals with HD to 3.5 l   # echo noted for TR and pulm HTN   # ascites ? due to volume overload/   # BP uncontrolled/ volume related / follow BP readings after HD   # h/h at goal, no HANNAH   # UA noted with proteinuria / work up doen in the past reviewed/ repeat SPEP UPEP SFLC IF  # will follow     
58 y/o woman with PMH of ESRD on HD T/Th/Sat, hyperlipidemia, HTN, PVD, Left heel gangrene, s/p Left common femoral to anterior tibial reverse saphenous vein bypass 11/8/2021, CVA and hearing impaired presented to the ED for abdominal pain & distension. She was found fluid overloaded and underwent HD on 2/11.     1. Volume overload  Acute HFpEF - Right sided heart failure  Hypertensive Urgency on admission  Patient with severe leg edema, abdominal wall edema, SOB.   CT abdomen and Pelvis showed mod bilateral pleural effusions, new small ascites, diffuse anasarca   CXR showed bilateral interstitial infiltrates and pleural effusions.   Patient is oliguric.   ECHO: EF 61%, grade 2 diastolic dysfunction, mod LVH, mildly enlarged RV with low normal function  troponins 0.06 (higher than her baseline of 0.01)  EKG reviewed by me - NSR, QTc 502  fluid removal with HD -> aiming for higher UF per renal  Fluid restriction 1L/day. Low sodium diet,   cardiology consulted    2. ESRD on HD via left UE AVF.   On Calcitrol and Sevelamer    3. Hypertensive urgency  BP now improved  Continue hydralazine 25mg bid and Nifedipine XL 60mg daily    4. Chronic Normocytic anemia due to CKD     5. Proteinuria >600  >10 g last year  can part of volume overload be explained by nephrotic syndrome?  renal f/u appreciated: repeat SPEP UPEP SFLC IF    6. PVD   s/p left femoral artery-posterior tibial artery bypass with autologous venous tissue.  Arterial Duplex: patent left LE arterial graft  venous duplex of LE negative for DVT  seen by vascular surgery Dr. Muñoz - for outpt f/u  Continue ASA, Plavix and Lipitor  on gabapentin    7. h/o Right pontine CVA (February 2021)  Continue ASA, Plavix and Lipitor.     8. DM - controlled  A1C 6    9. Intellectual disability  hearing/speech impairment  communicates via sign language    10. DVT prophylaxis: Heparin SQ    full code  guarded prognosis      Progress Note Handoff:  Pending (specify): HD in AM, cardiology eval, labs per renal, improved volume status    Disposition: Home  
58 y/o woman with PMH of ESRD on HD T/Th/Sat, hyperlipidemia, HTN, PVD, Left heel gangrene, s/p Left common femoral to anterior tibial reverse saphenous vein bypass 11/8/2021, CVA and hearing impaired presented to the ED for abdominal pain & distension. She was found fluid overloaded and underwent HD on 2/11.     1. Volume overload  Acute HFpEF - Right sided heart failure  Hypertensive Urgency on admission  Patient with severe leg edema, abdominal wall edema, SOB.   CT abdomen and Pelvis showed mod bilateral pleural effusions, new small ascites, diffuse anasarca   CXR showed bilateral interstitial infiltrates and pleural effusions.   Patient is oliguric.   ECHO: EF 61%, grade 2 diastolic dysfunction, mod LVH, mildly enlarged RV with low normal function  troponins 0.06 (higher than her baseline of 0.01)  EKG reviewed by me - NSR, QTc 502  fluid removal with HD -> aiming for higher UF per renal - needs to be continued as outpt  Fluid restriction 1L/day. Low sodium diet,   cardiology consult pending    2. ESRD on HD via left UE AVF - T/Th/Sat  On Calcitrol and Sevelamer  s/p 3.5 L removed on Sat and Today    3. Hypertensive urgency  BP now improved  Continue hydralazine 25mg bid and Nifedipine XL 60mg daily    4. Chronic Normocytic anemia due to CKD     5. Proteinuria >600  >10 g last year  can part of volume overload be explained by nephrotic syndrome?  renal f/u appreciated: repeat SPEP UPEP SFLC IF    6. PVD   s/p left femoral artery-posterior tibial artery bypass with autologous venous tissue.  Arterial Duplex: patent left LE arterial graft  venous duplex of LE negative for DVT  seen by vascular surgery Dr. Muñoz - for outpt f/u  Continue ASA, Plavix and Lipitor    7. h/o Right pontine CVA (February 2021)  Continue ASA, Plavix and Lipitor.     8. DM - controlled  A1C 6    9. Intellectual disability  hearing/speech impairment  communicates via sign language    10. b/l LE pain  seems neuropathic per description  on gabapentin and tylenol without much relief  d/c tramadol  trial of oxycodone 5mg q8hr prn severe pain  needs outpt pain management eval    11. DVT prophylaxis: Heparin SQ      full code  guarded prognosis      Progress Note Handoff  Pending (specify):  cardiology eval, improved pain control    Disposition: Home in 24hrs  
 57 year old female with pmh of CKD (gets dialysis T/TH/Sat), HLD, HTN, PVD, hearing impaired presenting to the ED for abdominal pain & distension. Patient last received dialysis this past Thursday  also as per family member , increase edema of LE   ESRD / volume overload/ anasarca / abdominal distension/ ascites   # next HD tomorrow  # increase UF goals with HD to   # echo noted for TR and pulm HTN   # ascites ? due to volume overload  # BP uncontrolled/ volume related / increase hydralazine to TID  # h/h at goal, no HANNAH   # UA noted with proteinuria / work up done in the past reviewed/ repeat SPEP UPEP SFLC IF  
56yo F w/ pmhx of ESRD (HD TTS), HLD, HTN, PVD, CVA, hearing impaired presenting to the ED for abdominal pain & distension. Admitted for fluid overload and was taken to dialysis.     A/P;   Acute HFpEF: Right heart failure.   Fluid Overload  Hypertensive Urgency:   Patient with severe leg edema, abdominal wall edema, SOB.   CT abdomen and Pelvis showed mod bilateral pleural effusion, small ascites, diffuse anasarca   CXR showed bilateral interstitial infiltrates and pleural effusion.   Patient is oliguric.   Pending echo. Fluid restriction 1L/day. Low sodium diet,   Troponin is increasing. No chest pain, repeat EKG, transfer to Telemetry.   Nephrology follow up, will need more fluid removal during HD.     ESRD on HD via left UE AVF.   Continue HD as above, fluid restriction.   Ca is ok, on Calcitrol, Continue Sevelamer. Discontinue Sodium bicarb.     Hypertensive urgency  Chronic Normocytic anemia due to CKD:   Continue hydralazine 25mg bid, Nifedipine 60mg. Can add Labetalol if needed.     Pyuria:  Patient with abdominal pain but is likely from wall edema, no dysuria   Abdomen CT showed Urinary bladder demonstrates circumferential wall thickening.   Pending urine cx.     Peripheral Vascular disease:   s/p left femoral artery-posterior tibial artery bypass with autologous venous tissue.  Arterial Duplex   Vascular follow up outpatient. Continue ASA, Plavix and Lipitor.     Right pontine CVA (February 2021)  Continue ASA, Plavix and Lipitor.     DM - controlled  A1C 6    Intellectual disability  hearing/speech impairment  communicates via sign language    DVT prophylaxis: Heparin SC>     #Progress Note Handoff:  Pending (specify): Echo, improving volume overload.   Family discussion:  Disposition: Home.  
58 y/o woman with PMH of ESRD on HD T/Th/Sat, hyperlipidemia, HTN, PVD, Left heel gangrene, s/p Left common femoral to anterior tibial reverse saphenous vein bypass 11/8/2021, CVA and hearing impaired presented to the ED for abdominal pain & distension. She was found fluid overloaded and underwent HD on 2/11.     1. Volume overload  Acute HFpEF - Right sided heart failure  Hypertensive Urgency on admission  Patient with severe leg edema, abdominal wall edema, SOB.   CT abdomen and Pelvis showed mod bilateral pleural effusions, new small ascites, diffuse anasarca   CXR showed bilateral interstitial infiltrates and pleural effusions.   Patient is oliguric.   ECHO: EF 61%, grade 2 diastolic dysfunction, mod LVH, mildly enlarged RV with low normal function, severe Pulm HTN  troponins 0.06 (higher than her baseline of 0.01)  EKG reviewed by me - NSR, QTc 502  fluid removal with HD -> aiming for higher UF per renal - needs to be continued as outpt  Fluid restriction 1L/day. Low sodium diet  cardiology consult appreciated - continue fluid removal and pt needs outpt f/u re: severe Pulm HTN    2. ESRD on HD via left UE AVF - T/Th/Sat  On Calcitrol and Sevelamer  s/p 3.5 L removed on Sat and 2/13 and pt tolerated it well  3-3.5 L fluid removal needs to be continued as outpt as BP allows    3. Hypertensive urgency  BP now improved  Continue hydralazine 25mg bid and Nifedipine XL 60mg daily    4. Chronic Normocytic anemia due to CKD     5. Proteinuria >600  >10 g last year  can part of volume overload be explained by nephrotic syndrome?  renal f/u appreciated: repeat SPEP UPEP SFLC IF (outpt)    6. PVD   s/p left femoral artery-posterior tibial artery bypass with autologous venous tissue.  Arterial Duplex: patent left LE arterial graft  venous duplex of LE negative for DVT  seen by vascular surgery Dr. Muñoz - for outpt f/u  Continue ASA, Plavix and Lipitor    7. h/o Right pontine CVA (February 2021)  Continue ASA, Plavix and Lipitor.     8. DM - controlled  A1C 6    9. Intellectual disability  hearing/speech impairment  communicates via sign language    10. b/l LE pain  seems neuropathic per description  on gabapentin and tylenol at home  trial of oxycodone 5mg q8hr prn severe pain (pt never used oxycodone and pain is now better - pain possibly improved with more fluid removal)  outpt pain management eval if recurs    11. DVT prophylaxis: Heparin SQ      full code      med reconciliation and discharge papers reviewed by me  spent 35 minutes on the discharge process of this pt
   57 year old female with pmh of CKD (gets dialysis T/TH/Sat), HLD, HTN, PVD, hearing impaired presenting to the ED for abdominal pain & distension. Patient last received dialysis this past Thursday  also as per family member , increase edema of LE   ESRD / volume overload/ anasarca / abdominal distension/ ascites   # HD in AM / will aim for more UF   # increase UF goals with HD to 3.5 l   # echo noted for TR and pulm HTN   # ascites ? due to volume overload/ HD in AM and UF   # BP uncontrolled/ volume related / follow BP readings after HD in AM   # h/h at goal, no HANNAH   # UA noted with proteinuria / work up doen in the past reviewed/ repeat SPEP UPEP SFLC IF  # will follow

## 2023-02-15 NOTE — DISCHARGE NOTE NURSING/CASE MANAGEMENT/SOCIAL WORK - NSDCPEFALRISK_GEN_ALL_CORE
For information on Fall & Injury Prevention, visit: [email or web address] OR  [email or web address] OR  https://www.cdc.gov/steadi/patient.html

## 2023-02-15 NOTE — CONSULT NOTE ADULT - SUBJECTIVE AND OBJECTIVE BOX
Outpt cardiologist:    HPI:  58yo F w/ pmhx of ESRD (HD TTS), HLD, HTN, PVD, CVA, hearing impaired presenting to the ED for abdominal pain & distension. Family member (brother-in-law) states this has been worsening for the past week and is assocated with LE edema as well. Patient did NOT miss any dialysis sessions; she went to her last dialysis session on Thursday and was due for it today. Abd pain is located on the right side and associated with nausea but not with food. Denies fever, chills, vomiting, SOB, chest pain, or palpitations.     ED course:   vitals: /95, HR 84, T 98.7F, O2 97% RA   labs: BNP > 70K, trop 0.02, u/a: bacteria many, no YELENA or nitrites   imaging:   - ECG: NSR, HR 84, no signs of ischemia   - CT a/p: Diffuse anasarca and new ascites.Evidence of right cardiac dysfunction. Urinary bladder demonstrates circumferential wall thickening. Although it is partially distended. Prominent nonspecific inguinal and external iliac lymph nodes may be reactive. Moderate b/l pleural effusions.  progress: taken to dialysis  (2023 14:47)        PAST MEDICAL & SURGICAL HISTORY  PVD (peripheral vascular disease)    Benign essential HTN    Intellectual disability    Hearing impaired    HLD (hyperlipidemia)    Chronic kidney disease, unspecified CKD stage    H/O vascular surgery  left leg        FAMILY HISTORY:  FAMILY HISTORY:      SOCIAL HISTORY:  Social History:      ALLERGIES:  NSAIDs (Nephrotoxicity)  penicillin (Rash)      MEDICATIONS:  aspirin enteric coated 81 milliGRAM(s) Oral daily  atorvastatin 80 milliGRAM(s) Oral at bedtime  calcitriol   Capsule 0.25 MICROGram(s) Oral daily  chlorhexidine 2% Cloths 1 Application(s) Topical <User Schedule>  clopidogrel Tablet 75 milliGRAM(s) Oral daily  ferrous    sulfate 325 milliGRAM(s) Oral daily  gabapentin 300 milliGRAM(s) Oral three times a day  heparin   Injectable 5000 Unit(s) SubCutaneous every 8 hours  hydrALAZINE 25 milliGRAM(s) Oral two times a day  NIFEdipine XL 60 milliGRAM(s) Oral daily  sevelamer carbonate 800 milliGRAM(s) Oral three times a day with meals    PRN:  acetaminophen     Tablet .. 650 milliGRAM(s) Oral every 6 hours PRN  methocarbamol 500 milliGRAM(s) Oral two times a day PRN  oxyCODONE    IR 5 milliGRAM(s) Oral every 8 hours PRN  sucralfate 1 Gram(s) Oral four times a day PRN      HOME MEDICATIONS:  Home Medications:  atorvastatin 80 mg oral tablet: 1 tab(s) orally once a day (2023 15:05)  calcitriol 0.25 mcg oral capsule: 1 cap(s) orally once a day (2023 15:05)  gabapentin 300 mg oral capsule: 1 cap(s) orally 3 times a day (2023 15:05)  hydrALAZINE 25 mg oral tablet: 1 tab(s) orally 2 times a day (2023 15:05)  Plavix 75 mg oral tablet: 1 tab(s) orally once a day (2023 15:05)  Robaxin 500 mg oral tablet: 1 tab(s) orally 2 times a day (2023 15:05)  sodium bicarbonate 650 mg oral tablet: 2 tab(s) orally 3 times a day (2023 15:05)  sucralfate 1 g oral tablet: 1 tab(s) orally 4 times a day (2023 15:05)      VITALS:   T(F): 96.5 (02-15 @ 05:42), Max: 99.3 ( @ 04:36)  HR: 75 (02-15 @ 05:42) (70 - 81)  BP: 165/74 (02-15 @ 05:42) (122/67 - 195/78)  BP(mean): --  RR: 18 (02-15 @ 05:42) (17 - 20)  SpO2: 98% ( @ 05:09) (94% - 98%)    I&O's Summary    2023 07:01  -  15 Feb 2023 07:00  --------------------------------------------------------  IN: 0 mL / OUT: 3500 mL / NET: -3500 mL        REVIEW OF SYSTEMS:  CONSTITUTIONAL: No weakness, fevers or chills  HEENT: No visual changes, neck/ear pain  RESPIRATORY: No cough, sob  CARDIOVASCULAR: See HPI  GASTROINTESTINAL: No abdominal pain. No nausea, vomiting, diarrhea   GENITOURINARY: No dysuria, frequency or hematuria  NEUROLOGICAL: No new focal deficits  SKIN: No new rashes    PHYSICAL EXAM:  General: Not in distress.  Non-toxic appearing.   HEENT: EOMI  Cardio: regular, S1, S2, no murmur  Pulm: B/L BS.  No wheezing / crackles / rales  Abdomen: Soft, non-tender, non-distended. Normoactive bowel sounds  Extremities: B/L LE edema 3+  Neuro: A&O x3. No focal deficits    LABS:                        10.2   5.62  )-----------( 142      ( 2023 07:39 )             32.8         138  |  94<L>  |  37<H>  ----------------------------<  106<H>  4.4   |  28  |  6.8<HH>    Ca    8.5      2023 07:39  Mg     2.5     -    TPro  5.8<L>  /  Alb  3.6  /  TBili  0.4  /  DBili  x   /  AST  12  /  ALT  12  /  AlkPhos  165<H>                Troponin trend:        COVID-19 PCR: NotDetec (2023 09:34)      RADIOLOGY:  -CXR:  < from: Xray Chest 1 View- PORTABLE-Routine (Xray Chest 1 View- PORTABLE-Routine in AM.) (23 @ 08:24) >  Impression:  Stable bilateral opacities and effusions.    < end of copied text >    -TTE:  < from: TTE Echo Complete w/o Contrast w/ Doppler (23 @ 12:17) >  Summary:   1. Normal global left ventricular systolic function with moderate   concentric left ventricular hypertrophy. LV Ejection Fraction by   Soto's Method with a biplane EF of 61 %.   2. Grade II diastolic dysfunction.   3. Mildly enlarged right ventricle (RVEDD=4.2cm) with low-normal   function.   4. Mildly enlarged left atrium.   5. Mildly enlarged right atrium.   6. Moderate tricuspid regurgitation.   7. Severe pulmonary hypertension (PASP = 62mmHg).   8. Trivial pericardial effusion.    < end of copied text >    -CCTA:  -STRESS TEST:  -CATHETERIZATION:  -OTHER:  < from: CT Head No Cont (23 @ 11:05) >    IMPRESSION:    No acute intracranial pathology.    < end of copied text >    EC Lead ECG:   Ventricular Rate 74 BPM    Atrial Rate 74 BPM    P-R Interval 144 ms    QRS Duration 78 ms    Q-T Interval 416 ms    QTC Calculation(Bazett) 461 ms    P Axis 53 degrees    R Axis 104 degrees    T Axis 86 degrees    Diagnosis Line Normal sinus rhythm  Rightward axis  T wave abnormality, consider anterolateral ischemia  Abnormal ECG    Confirmed by Krystin Wray MD ([id number]) on 2023 6:41:03 PM ( @ 10:37)      TELEMETRY EVENTS:   Outpt cardiologist:    HPI:  56yo F w/ pmhx of ESRD (HD TTS), HLD, HTN, PVD, CVA, hearing impaired presenting to the ED for abdominal pain & distension. Family member (brother-in-law) states this has been worsening for the past week and is assocated with LE edema as well. Patient did NOT miss any dialysis sessions; she went to her last dialysis session on Thursday and was due for it today. Abd pain is located on the right side and associated with nausea but not with food. Denies fever, chills, vomiting, SOB, chest pain, or palpitations.     ED course:   vitals: /95, HR 84, T 98.7F, O2 97% RA   labs: BNP > 70K, trop 0.02, u/a: bacteria many, no YELENA or nitrites   imaging:   - ECG: NSR, HR 84, no signs of ischemia   - CT a/p: Diffuse anasarca and new ascites.Evidence of right cardiac dysfunction. Urinary bladder demonstrates circumferential wall thickening. Although it is partially distended. Prominent nonspecific inguinal and external iliac lymph nodes may be reactive. Moderate b/l pleural effusions.  progress: taken to dialysis  (2023 14:47)        PAST MEDICAL & SURGICAL HISTORY  PVD (peripheral vascular disease)    Benign essential HTN    Intellectual disability    Hearing impaired    HLD (hyperlipidemia)    Chronic kidney disease, unspecified CKD stage    H/O vascular surgery  left leg        FAMILY HISTORY:  FAMILY HISTORY: no family history of premature CAD or SCD      SOCIAL HISTORY:  Social History: no toxic habits      ALLERGIES:  NSAIDs (Nephrotoxicity)  penicillin (Rash)      MEDICATIONS:  aspirin enteric coated 81 milliGRAM(s) Oral daily  atorvastatin 80 milliGRAM(s) Oral at bedtime  calcitriol   Capsule 0.25 MICROGram(s) Oral daily  chlorhexidine 2% Cloths 1 Application(s) Topical <User Schedule>  clopidogrel Tablet 75 milliGRAM(s) Oral daily  ferrous    sulfate 325 milliGRAM(s) Oral daily  gabapentin 300 milliGRAM(s) Oral three times a day  heparin   Injectable 5000 Unit(s) SubCutaneous every 8 hours  hydrALAZINE 25 milliGRAM(s) Oral two times a day  NIFEdipine XL 60 milliGRAM(s) Oral daily  sevelamer carbonate 800 milliGRAM(s) Oral three times a day with meals    PRN:  acetaminophen     Tablet .. 650 milliGRAM(s) Oral every 6 hours PRN  methocarbamol 500 milliGRAM(s) Oral two times a day PRN  oxyCODONE    IR 5 milliGRAM(s) Oral every 8 hours PRN  sucralfate 1 Gram(s) Oral four times a day PRN      HOME MEDICATIONS:  Home Medications:  atorvastatin 80 mg oral tablet: 1 tab(s) orally once a day (2023 15:05)  calcitriol 0.25 mcg oral capsule: 1 cap(s) orally once a day (2023 15:05)  gabapentin 300 mg oral capsule: 1 cap(s) orally 3 times a day (2023 15:05)  hydrALAZINE 25 mg oral tablet: 1 tab(s) orally 2 times a day (2023 15:05)  Plavix 75 mg oral tablet: 1 tab(s) orally once a day (2023 15:05)  Robaxin 500 mg oral tablet: 1 tab(s) orally 2 times a day (2023 15:05)  sodium bicarbonate 650 mg oral tablet: 2 tab(s) orally 3 times a day (2023 15:05)  sucralfate 1 g oral tablet: 1 tab(s) orally 4 times a day (2023 15:05)      VITALS:   T(F): 96.5 (02-15 @ 05:42), Max: 99.3 ( @ 04:36)  HR: 75 (02-15 @ 05:42) (70 - 81)  BP: 165/74 (02-15 @ 05:42) (122/67 - 195/78)  BP(mean): --  RR: 18 (02-15 @ 05:42) (17 - 20)  SpO2: 98% ( @ 05:09) (94% - 98%)    I&O's Summary    2023 07:01  -  15 Feb 2023 07:00  --------------------------------------------------------  IN: 0 mL / OUT: 3500 mL / NET: -3500 mL        REVIEW OF SYSTEMS:  CONSTITUTIONAL: No weakness, fevers or chills  HEENT: No visual changes, neck/ear pain  RESPIRATORY: No cough, sob  CARDIOVASCULAR: See HPI  GASTROINTESTINAL: No abdominal pain. No nausea, vomiting, diarrhea   GENITOURINARY: No dysuria, frequency or hematuria  NEUROLOGICAL: No new focal deficits  SKIN: No new rashes  10 point ROS completed; negative except as stated in HPI    PHYSICAL EXAM:  General: Not in distress.  Non-toxic appearing.   HEENT: EOMI, perrla  NECK: supple, +JVD 12 cm  Cardio: regular, S1, S2, no murmur  Pulm: B/L BS.  No wheezing / crackles / rales  Abdomen: Soft, non-tender, non-distended. Normoactive bowel sounds  Extremities: B/L LE edema 3+  Neuro: A&O x3. No focal deficits    LABS:                        10.2   5.62  )-----------( 142      ( 2023 07:39 )             32.8     02-14    138  |  94<L>  |  37<H>  ----------------------------<  106<H>  4.4   |  28  |  6.8<HH>    Ca    8.5      2023 07:39  Mg     2.5     -    TPro  5.8<L>  /  Alb  3.6  /  TBili  0.4  /  DBili  x   /  AST  12  /  ALT  12  /  AlkPhos  165<H>  02-14              Troponin trend:        COVID-19 PCR: NotDetec (2023 09:34)      RADIOLOGY:  -CXR:  < from: Xray Chest 1 View- PORTABLE-Routine (Xray Chest 1 View- PORTABLE-Routine in AM.) (23 @ 08:24) >  Impression:  Stable bilateral opacities and effusions.    < end of copied text >    -TTE:  < from: TTE Echo Complete w/o Contrast w/ Doppler (23 @ 12:17) >  Summary:   1. Normal global left ventricular systolic function with moderate   concentric left ventricular hypertrophy. LV Ejection Fraction by   Soto's Method with a biplane EF of 61 %.   2. Grade II diastolic dysfunction.   3. Mildly enlarged right ventricle (RVEDD=4.2cm) with low-normal   function.   4. Mildly enlarged left atrium.   5. Mildly enlarged right atrium.   6. Moderate tricuspid regurgitation.   7. Severe pulmonary hypertension (PASP = 62mmHg).   8. Trivial pericardial effusion.    < end of copied text >    -CCTA:  -STRESS TEST:  -CATHETERIZATION:  -OTHER:  < from: CT Head No Cont (23 @ 11:05) >    IMPRESSION:    No acute intracranial pathology.    < end of copied text >    EC Lead ECG:   Ventricular Rate 74 BPM    Atrial Rate 74 BPM    P-R Interval 144 ms    QRS Duration 78 ms    Q-T Interval 416 ms    QTC Calculation(Bazett) 461 ms    P Axis 53 degrees    R Axis 104 degrees    T Axis 86 degrees    Diagnosis Line Normal sinus rhythm  Rightward axis  T wave abnormality, consider anterolateral ischemia  Abnormal ECG    Confirmed by Krystin Wray MD ([id number]) on 2023 6:41:03 PM ( @ 10:37)      TELEMETRY EVENTS:

## 2023-02-15 NOTE — DISCHARGE NOTE PROVIDER - CARE PROVIDER_API CALL
Behuria, Supreeti (MD)  Cardiology; Internal Medicine; Nuclear Cardiology  [address] [place] [address]  Phone: [phone]  Fax: [phone]  Follow Up Time: 2 weeks    Jonny Moscoso)  Internal Medicine; Nephrology  [address] [place] [address]  Phone: [phone]  Fax: [phone]  Follow Up Time: 1 week    Demar Fuentes)  Geriatric Medicine; Internal Medicine  [address], [place] [id number]  Phone: [phone]  Fax: [phone]  Follow Up Time: 1 month

## 2023-02-15 NOTE — DISCHARGE NOTE PROVIDER - PROVIDER TOKENS
PROVIDER:[TOKEN:[[id number]],FOLLOWUP:[2 weeks]],PROVIDER:[TOKEN:[[id number]],FOLLOWUP:[1 week]],PROVIDER:[TOKEN:[[id number]],FOLLOWUP:[1 month]]

## 2023-02-15 NOTE — PROGRESS NOTE ADULT - REASON FOR ADMISSION
fluid overload

## 2023-02-15 NOTE — DISCHARGE NOTE PROVIDER - NSDCMRMEDTOKEN_GEN_ALL_CORE_FT
acetaminophen 325 mg oral tablet: 2 tab(s) orally every 6 hours, As Needed -Temp greater or equal to 38C (100.4F), Mild Pain (1 - 3) - for moderate pain  aspirin 81 mg oral delayed release tablet: 1 tab(s) orally once a day   atorvastatin 80 mg oral tablet: 1 tab(s) orally once a day  calcitriol 0.25 mcg oral capsule: 1 cap(s) orally once a day  ferrous sulfate 325 mg (65 mg elemental iron) oral tablet: 1 tab(s) orally once a day  gabapentin 300 mg oral capsule: 1 cap(s) orally 3 times a day  hydrALAZINE 25 mg oral tablet: 1 tab(s) orally 2 times a day  NIFEdipine 60 mg oral tablet, extended release: 1 tab(s) orally once a day  Oxaydo 5 mg oral tablet: 1 tab(s) orally every 8 hours, As Needed -Severe Pain (7 - 10) - for severe pain MDD:40  Plavix 75 mg oral tablet: 1 tab(s) orally once a day  Robaxin 500 mg oral tablet: 1 tab(s) orally 2 times a day  sevelamer carbonate 800 mg oral tablet: 1 tab(s) orally 3 times a day (with meals)  sodium bicarbonate 650 mg oral tablet: 2 tab(s) orally 3 times a day  sucralfate 1 g oral tablet: 1 tab(s) orally 4 times a day, As Needed -for heartburn - for indigestion    acetaminophen 325 mg oral tablet: 2 tab(s) orally every 6 hours, As Needed -Temp greater or equal to 38C (100.4F), Mild Pain (1 - 3) - for moderate pain  aspirin 81 mg oral delayed release tablet: 1 tab(s) orally once a day   atorvastatin 80 mg oral tablet: 1 tab(s) orally once a day  calcitriol 0.25 mcg oral capsule: 1 cap(s) orally once a day  ferrous sulfate 325 mg (65 mg elemental iron) oral tablet: 1 tab(s) orally once a day  gabapentin 300 mg oral capsule: 1 cap(s) orally 3 times a day  hydrALAZINE 25 mg oral tablet: 1 tab(s) orally 2 times a day  NIFEdipine 60 mg oral tablet, extended release: 1 tab(s) orally once a day  Plavix 75 mg oral tablet: 1 tab(s) orally once a day  Robaxin 500 mg oral tablet: 1 tab(s) orally 2 times a day  sevelamer carbonate 800 mg oral tablet: 1 tab(s) orally 3 times a day (with meals)  sucralfate 1 g oral tablet: 1 tab(s) orally 4 times a day, As Needed -for heartburn - for indigestion

## 2023-02-15 NOTE — PROGRESS NOTE ADULT - SUBJECTIVE AND OBJECTIVE BOX
SEN LING  57y Female    INTERVAL HPI/OVERNIGHT EVENTS:    spoke to pt via  Jasmina  pt feels better today  very little LE pain now  slept well  no SOB  no fever  comfortable in going home today  case discussed with brother in law today     T(F): 96.5 (02-15-23 @ 05:42), Max: 97.2 (02-14-23 @ 20:26)  HR: 75 (02-15-23 @ 05:42) (70 - 77)  BP: 165/74 (02-15-23 @ 05:42) (143/69 - 165/74)  RR: 18 (02-15-23 @ 05:42) (17 - 18)  SpO2: --  I&O's Summary    14 Feb 2023 07:01  -  15 Feb 2023 07:00  --------------------------------------------------------  IN: 0 mL / OUT: 3500 mL / NET: -3500 mL      CAPILLARY BLOOD GLUCOSE      POCT Blood Glucose.: 93 mg/dL (15 Feb 2023 07:38)  POCT Blood Glucose.: 133 mg/dL (14 Feb 2023 21:56)  POCT Blood Glucose.: 157 mg/dL (14 Feb 2023 16:45)        PHYSICAL EXAM:  GENERAL: NAD  HEAD:  Normocephalic  EYES:  conjunctiva and sclera clear  ENMT: Moist mucous membranes  NERVOUS SYSTEM:  Alert, awake, Good concentration  CHEST/LUNG: decreased BS at bases otherwise CTA  HEART: Regular rate and rhythm  ABDOMEN: Soft, Nontender, distended; Bowel sounds present  EXTREMITIES:   2+ LE edema  SKIN: warm, dry  left arm AV fistula with thrill    Consultant(s) Notes Reviewed:  [x ] YES  [ ] NO  Care Discussed with Consultants/Other Providers [ x] YES  [ ] NO    MEDICATIONS  (STANDING):  aspirin enteric coated 81 milliGRAM(s) Oral daily  atorvastatin 80 milliGRAM(s) Oral at bedtime  calcitriol   Capsule 0.25 MICROGram(s) Oral daily  chlorhexidine 2% Cloths 1 Application(s) Topical <User Schedule>  clopidogrel Tablet 75 milliGRAM(s) Oral daily  ferrous    sulfate 325 milliGRAM(s) Oral daily  gabapentin 300 milliGRAM(s) Oral three times a day  heparin   Injectable 5000 Unit(s) SubCutaneous every 8 hours  hydrALAZINE 25 milliGRAM(s) Oral two times a day  NIFEdipine XL 60 milliGRAM(s) Oral daily  sevelamer carbonate 800 milliGRAM(s) Oral three times a day with meals    MEDICATIONS  (PRN):  acetaminophen     Tablet .. 650 milliGRAM(s) Oral every 6 hours PRN Temp greater or equal to 38C (100.4F), Mild Pain (1 - 3)  methocarbamol 500 milliGRAM(s) Oral two times a day PRN Muscle Spasm  oxyCODONE    IR 5 milliGRAM(s) Oral every 8 hours PRN Severe Pain (7 - 10)  sucralfate 1 Gram(s) Oral four times a day PRN stomach pain      LABS:                        9.9    5.21  )-----------( 112      ( 15 Feb 2023 07:29 )             30.8     02-15    135  |  95<L>  |  32<H>  ----------------------------<  108<H>  4.2   |  28  |  5.8<HH>    Ca    8.4      15 Feb 2023 07:29  Mg     2.3     02-15    TPro  5.6<L>  /  Alb  3.5  /  TBili  0.3  /  DBili  x   /  AST  12  /  ALT  11  /  AlkPhos  164<H>  02-15            Case discussed with residents and RN on rounds today    Care discussed with pt and family

## 2023-02-15 NOTE — CONSULT NOTE ADULT - ATTENDING COMMENTS
This is a late entry. Patient was seen with cardiology team on 2/14/23.    Briefly, 57 year old with ESRD on HD. Spoke to her with . She makes very little urine. Volume management as per nephrology and HD. She can have outpatient follow up for pulmonary hypertension with Dr. Gomes.     Thank you for this consult. Please call/MS teams with questions.
s/p bypass with fluid overload  no acute intervention as bypass is patent

## 2023-02-15 NOTE — CONSULT NOTE ADULT - ASSESSMENT
#ESRD on HD - Volume overload  HTN/DLD/VCA  Echo noted. Normal systolic function. GIIDD. Severe pulmonary hypretension  Pt does not make urine.   Dialysis as per Nephro for volume overload  Outpt follow up for sever PH  BP control

## 2023-02-15 NOTE — PROGRESS NOTE ADULT - SUBJECTIVE AND OBJECTIVE BOX
Nephrology Progress Note    SEN LING  MRN-[id number]  57y  Female    S:  Patient is seen and examined, events over the last 24h noted.    O:  Allergies:  NSAIDs (Nephrotoxicity)  penicillin (Rash)    Hospital Medications:   MEDICATIONS  (STANDING):  aspirin enteric coated 81 milliGRAM(s) Oral daily  atorvastatin 80 milliGRAM(s) Oral at bedtime  calcitriol   Capsule 0.25 MICROGram(s) Oral daily  chlorhexidine 2% Cloths 1 Application(s) Topical <User Schedule>  clopidogrel Tablet 75 milliGRAM(s) Oral daily  ferrous    sulfate 325 milliGRAM(s) Oral daily  gabapentin 300 milliGRAM(s) Oral three times a day  heparin   Injectable 5000 Unit(s) SubCutaneous every 8 hours  hydrALAZINE 25 milliGRAM(s) Oral two times a day  NIFEdipine XL 60 milliGRAM(s) Oral daily  sevelamer carbonate 800 milliGRAM(s) Oral three times a day with meals    MEDICATIONS  (PRN):  acetaminophen     Tablet .. 650 milliGRAM(s) Oral every 6 hours PRN Temp greater or equal to 38C (100.4F), Mild Pain (1 - 3)  methocarbamol 500 milliGRAM(s) Oral two times a day PRN Muscle Spasm  oxyCODONE    IR 5 milliGRAM(s) Oral every 8 hours PRN Severe Pain (7 - 10)  sucralfate 1 Gram(s) Oral four times a day PRN stomach pain    Home Medications:      VITALS:  Daily     Daily   T(F): 96.5 (02-15-23 @ 05:42), Max: 97.2 (02-14-23 @ 20:26)  HR: 75 (02-15-23 @ 05:42)  BP: 165/74 (02-15-23 @ 05:42)  RR: 18 (02-15-23 @ 05:42)  SpO2: --  Wt(kg): --  I&O's Detail    14 Feb 2023 07:01  -  15 Feb 2023 07:00  --------------------------------------------------------  IN:  Total IN: 0 mL    OUT:    Other (mL): 3500 mL  Total OUT: 3500 mL    Total NET: -3500 mL        I&O's Summary    14 Feb 2023 07:01  -  15 Feb 2023 07:00  --------------------------------------------------------  IN: 0 mL / OUT: 3500 mL / NET: -3500 mL          PHYSICAL EXAM:  Gen: NAD  Resp: b/l breath sounds  Card: S1/S2  Abd: soft  Extremities: + edema  Vascular access: LUE AVF +thrill      LABS:      02-15    135  |  95<L>  |  32<H>  ----------------------------<  108<H>  4.2   |  28  |  5.8<HH>    Ca    8.4      15 Feb 2023 07:29  Mg     2.3     02-15    TPro  5.6<L>  /  Alb  3.5  /  TBili  0.3  /  DBili      /  AST  12  /  ALT  11  /  AlkPhos  164<H>  02-15    eGFR: 8 mL/min/1.73m2 (02-15-23 @ 07:29)  eGFR: 7 mL/min/1.73m2 (02-14-23 @ 07:39)    Phosphorus Level, Serum: 3.7 mg/dL (05-16-22 @ 07:58)  Phosphorus Level, Serum: 4.1 mg/dL (05-15-22 @ 04:30)                          9.9    5.21  )-----------( 112      ( 15 Feb 2023 07:29 )             30.8     Mean Cell Volume: 94.5 fL (02-15-23 @ 07:29)

## 2023-02-15 NOTE — DISCHARGE NOTE PROVIDER - HOSPITAL COURSE
56 y/o woman with PMH of ESRD on HD T/Th/Sat, hyperlipidemia, HTN, PVD, Left heel gangrene, s/p Left common femoral to anterior tibial reverse saphenous vein bypass 11/8/2021, CVA and hearing impaired presented to the ED for abdominal pain & distension. She was found fluid overloaded and underwent HD on 2/11.     # Volume overload 2/2 Acute HFpEF - Right sided heart failure  # Hypertensive Urgency on admission  Patient with severe leg edema, abdominal wall edema, SOB.   CT abdomen and Pelvis showed mod bilateral pleural effusions, new small ascites, diffuse anasarca; CXR showed bilateral interstitial infiltrates and pleural effusions.   ECHO: EF 61%, grade 2 diastolic dysfunction, mod LVH, mildly enlarged RV with low normal function; troponins 0.06 (higher than her baseline of 0.01)  EKG -  NSR, QTc 502  fluid removal with HD -> aiming for higher UF per renal - needs to be continued as outpt  Fluid restriction 1L/day. Low sodium diet,   cardiology recommendations noted     # ESRD on HD via left UE AVF - T/Th/Sat  On Calcitrol and Sevelamer  s/p 3.5 L removed on Sat and Tuesday - to be continued as OP    # Hypertensive urgency  BP now improved  Continue hydralazine 25mg bid and Nifedipine XL 60mg daily    # Chronic Normocytic anemia due to CKD     # Proteinuria >600  >10 g last year  can part of volume overload be explained by nephrotic syndrome?  renal f/u appreciated - will follow as outpatient     # PVD   s/p left femoral artery-posterior tibial artery bypass with autologous venous tissue.  Arterial Duplex: patent left LE arterial graft  venous duplex of LE negative for DVT  seen by vascular surgery Dr. Muñoz - for outpt f/u  Continue ASA, Plavix and Lipitor    # h/o Right pontine CVA (February 2021)  Continue ASA, Plavix and Lipitor.     # DM - controlled  A1C 6    # Intellectual disability  hearing/speech impairment  communicates via sign language    # b/l LE pain  seems neuropathic per description  on gabapentin and tylenol without much relief  trial of oxycodone 5mg q8hr prn severe pain  needs outpt pain management eval    Hospital course: Patient's was primarily treated with fluid removal via HD. Her symptoms improved after that and  remained stable. Further changes in her echo and lab work were noted and patient was explained about the recommendations and follow up. She remained stable through her course here and was discharged. 58 y/o woman with PMH of ESRD on HD T/Th/Sat, hyperlipidemia, HTN, PVD, Left heel gangrene, s/p Left common femoral to anterior tibial reverse saphenous vein bypass 11/8/2021, CVA and hearing impaired presented to the ED for abdominal pain & distension. She was found fluid overloaded and underwent HD on 2/11.     # Volume overload 2/2 Acute HFpEF - Right sided heart failure  # Hypertensive Urgency on admission  Patient with severe leg edema, abdominal wall edema, SOB.   CT abdomen and Pelvis showed mod bilateral pleural effusions, new small ascites, diffuse anasarca; CXR showed bilateral interstitial infiltrates and pleural effusions.   ECHO: EF 61%, grade 2 diastolic dysfunction, mod LVH, mildly enlarged RV with low normal function; troponins 0.06 (higher than her baseline of 0.01)  EKG -  NSR, QTc 502  fluid removal with HD -> aiming for higher UF per renal - needs to be continued as outpt  Fluid restriction 1L/day. Low sodium diet,   cardiology recommendations noted     # ESRD on HD via left UE AVF - T/Th/Sat  On Calcitrol and Sevelamer  s/p 3.5 L removed on Sat and Tuesday - to be continued as OP    # Hypertensive urgency  BP now improved  Continue hydralazine 25mg bid and Nifedipine XL 60mg daily    # Chronic Normocytic anemia due to CKD     # Proteinuria >600  >10 g last year  can part of volume overload be explained by nephrotic syndrome?  renal f/u appreciated - will follow as outpatient     # PVD   s/p left femoral artery-posterior tibial artery bypass with autologous venous tissue.  Arterial Duplex: patent left LE arterial graft  venous duplex of LE negative for DVT  seen by vascular surgery Dr. Muñoz - for outpt f/u  Continue ASA, Plavix and Lipitor    # h/o Right pontine CVA (February 2021)  Continue ASA, Plavix and Lipitor.     # DM - controlled  A1C 6    # Intellectual disability  hearing/speech impairment  communicates via sign language    # b/l LE pain  seems neuropathic per description  on gabapentin and tylenol without much relief  trial of oxycodone 5mg q8hr prn severe pain (pt never took oxycodone and pain is now better)  outpt pain management eval if recurs    Hospital course: Patient's was primarily treated with fluid removal via HD. Her symptoms improved after that and  remained stable. Further changes in her echo and lab work were noted and patient was explained about the recommendations and follow up. She remained stable through her course here and was discharged.

## 2023-03-02 DIAGNOSIS — N18.6 END STAGE RENAL DISEASE: ICD-10-CM

## 2023-03-02 DIAGNOSIS — F79 UNSPECIFIED INTELLECTUAL DISABILITIES: ICD-10-CM

## 2023-03-02 DIAGNOSIS — E11.51 TYPE 2 DIABETES MELLITUS WITH DIABETIC PERIPHERAL ANGIOPATHY WITHOUT GANGRENE: ICD-10-CM

## 2023-03-02 DIAGNOSIS — Z20.822 CONTACT WITH AND (SUSPECTED) EXPOSURE TO COVID-19: ICD-10-CM

## 2023-03-02 DIAGNOSIS — I16.0 HYPERTENSIVE URGENCY: ICD-10-CM

## 2023-03-02 DIAGNOSIS — Z79.02 LONG TERM (CURRENT) USE OF ANTITHROMBOTICS/ANTIPLATELETS: ICD-10-CM

## 2023-03-02 DIAGNOSIS — D63.1 ANEMIA IN CHRONIC KIDNEY DISEASE: ICD-10-CM

## 2023-03-02 DIAGNOSIS — E11.22 TYPE 2 DIABETES MELLITUS WITH DIABETIC CHRONIC KIDNEY DISEASE: ICD-10-CM

## 2023-03-02 DIAGNOSIS — Z88.6 ALLERGY STATUS TO ANALGESIC AGENT: ICD-10-CM

## 2023-03-02 DIAGNOSIS — E11.40 TYPE 2 DIABETES MELLITUS WITH DIABETIC NEUROPATHY, UNSPECIFIED: ICD-10-CM

## 2023-03-02 DIAGNOSIS — R82.71 BACTERIURIA: ICD-10-CM

## 2023-03-02 DIAGNOSIS — H91.93 UNSPECIFIED HEARING LOSS, BILATERAL: ICD-10-CM

## 2023-03-02 DIAGNOSIS — Z88.0 ALLERGY STATUS TO PENICILLIN: ICD-10-CM

## 2023-03-02 DIAGNOSIS — I27.20 PULMONARY HYPERTENSION, UNSPECIFIED: ICD-10-CM

## 2023-03-02 DIAGNOSIS — Z99.2 DEPENDENCE ON RENAL DIALYSIS: ICD-10-CM

## 2023-03-02 DIAGNOSIS — I13.2 HYPERTENSIVE HEART AND CHRONIC KIDNEY DISEASE WITH HEART FAILURE AND WITH STAGE 5 CHRONIC KIDNEY DISEASE, OR END STAGE RENAL DISEASE: ICD-10-CM

## 2023-03-02 DIAGNOSIS — I36.1 NONRHEUMATIC TRICUSPID (VALVE) INSUFFICIENCY: ICD-10-CM

## 2023-03-02 DIAGNOSIS — Z86.73 PERSONAL HISTORY OF TRANSIENT ISCHEMIC ATTACK (TIA), AND CEREBRAL INFARCTION WITHOUT RESIDUAL DEFICITS: ICD-10-CM

## 2023-03-02 DIAGNOSIS — Z79.4 LONG TERM (CURRENT) USE OF INSULIN: ICD-10-CM

## 2023-03-02 DIAGNOSIS — E78.5 HYPERLIPIDEMIA, UNSPECIFIED: ICD-10-CM

## 2023-03-02 DIAGNOSIS — Z79.82 LONG TERM (CURRENT) USE OF ASPIRIN: ICD-10-CM

## 2023-03-02 DIAGNOSIS — I50.31 ACUTE DIASTOLIC (CONGESTIVE) HEART FAILURE: ICD-10-CM

## 2023-03-02 DIAGNOSIS — R18.8 OTHER ASCITES: ICD-10-CM

## 2023-03-02 DIAGNOSIS — I50.82 BIVENTRICULAR HEART FAILURE: ICD-10-CM

## 2023-03-08 ENCOUNTER — APPOINTMENT (OUTPATIENT)
Dept: GASTROENTEROLOGY | Facility: CLINIC | Age: 58
End: 2023-03-08

## 2023-03-08 ENCOUNTER — APPOINTMENT (OUTPATIENT)
Dept: GASTROENTEROLOGY | Facility: CLINIC | Age: 58
End: 2023-03-08
Payer: COMMERCIAL

## 2023-03-08 ENCOUNTER — RESULT REVIEW (OUTPATIENT)
Age: 58
End: 2023-03-08

## 2023-03-08 ENCOUNTER — OUTPATIENT (OUTPATIENT)
Dept: OUTPATIENT SERVICES | Facility: HOSPITAL | Age: 58
LOS: 1 days | End: 2023-03-08
Payer: COMMERCIAL

## 2023-03-08 VITALS
SYSTOLIC BLOOD PRESSURE: 144 MMHG | WEIGHT: 162 LBS | BODY MASS INDEX: 31.8 KG/M2 | HEIGHT: 60 IN | TEMPERATURE: 97 F | DIASTOLIC BLOOD PRESSURE: 64 MMHG | HEART RATE: 76 BPM | OXYGEN SATURATION: 90 %

## 2023-03-08 DIAGNOSIS — R10.13 EPIGASTRIC PAIN: ICD-10-CM

## 2023-03-08 DIAGNOSIS — Z78.9 OTHER SPECIFIED HEALTH STATUS: ICD-10-CM

## 2023-03-08 DIAGNOSIS — Z00.00 ENCOUNTER FOR GENERAL ADULT MEDICAL EXAMINATION WITHOUT ABNORMAL FINDINGS: ICD-10-CM

## 2023-03-08 DIAGNOSIS — Z98.890 OTHER SPECIFIED POSTPROCEDURAL STATES: Chronic | ICD-10-CM

## 2023-03-08 PROCEDURE — 99204 OFFICE O/P NEW MOD 45 MIN: CPT | Mod: GC

## 2023-03-08 PROCEDURE — 99204 OFFICE O/P NEW MOD 45 MIN: CPT

## 2023-03-08 NOTE — PHYSICAL EXAM

## 2023-03-10 NOTE — END OF VISIT
[] : Fellow [FreeTextEntry3] : Pt presents for evaluation of heartburn and epigastric pain, also due for colonoscopy. Recommend PPI trial and obtain RUQ US. Will schedule EGD and colonoscopy pending neuro and cardiology clearance.

## 2023-03-10 NOTE — ASSESSMENT
[FreeTextEntry1] : 56 y/o FM w/ PMHx of CVA (2021) on ASA/Plavix, T2DM, ESRD on HD T/T/S, PAD s/p Left fem-tibial bypass, HFpEF wth RHF (severe Pulmonary HTN and Moderate TVR) presents for chronic GERD and epigastric pain. Pain is mostly triggered by consumption of food and is only present during the day without nocturnal symptoms. Pain is crampy in nature. Denies N/v/D fever, chills, change in bowel habits ,red blood per rectum or weight loss\par \par #Epigastric pain\par #Colon cancer screening for patient of average risk\par #Normocytic anemia\par - Hb 10 and Ferritin 106\par - CTAp (2/23): signs of RHF. normal echogenic liver. moderate pleural effusion. normal pancreas and no stones\par \par Plan\par - will start protonix 40mg qdaily for 2 months\par - will obtain Stool H. Pylori test\par - will obtain RUQ sono\par - Avoid NSAIDs, spicy and fatty food\par - will benefit from EGD/Colonsocopy will need neurology clearence (plavix will need to be held for 5 days), and Cardio risk stratification (with Dr. Gomes) prior to scheduling

## 2023-03-10 NOTE — HISTORY OF PRESENT ILLNESS
[FreeTextEntry1] : 58 y/o FM w/ PMHx of CVA (2021) on ASA/Plavix, T2DM, ESRD on HD T/T/S, PAD s/p Left fem-tibial bypass, HFpEF wth RHF (severe Pulmonary HTN and Moderate TVR) presents for chronic GERD and epigastric pain. Pain is mostly triggered by consumption of food and is only present during the day without nocturnal symptoms. Pain is crampy in nature. Denies N/v/D fever, chills, change in bowel habits ,red blood per rectum or weight loss

## 2023-03-17 ENCOUNTER — APPOINTMENT (OUTPATIENT)
Dept: INTERNAL MEDICINE | Facility: CLINIC | Age: 58
End: 2023-03-17

## 2023-03-17 ENCOUNTER — APPOINTMENT (OUTPATIENT)
Dept: PODIATRY | Facility: CLINIC | Age: 58
End: 2023-03-17

## 2023-03-24 ENCOUNTER — OUTPATIENT (OUTPATIENT)
Dept: OUTPATIENT SERVICES | Facility: HOSPITAL | Age: 58
LOS: 1 days | End: 2023-03-24
Payer: COMMERCIAL

## 2023-03-24 ENCOUNTER — APPOINTMENT (OUTPATIENT)
Dept: PODIATRY | Facility: CLINIC | Age: 58
End: 2023-03-24
Payer: COMMERCIAL

## 2023-03-24 DIAGNOSIS — Z98.890 OTHER SPECIFIED POSTPROCEDURAL STATES: Chronic | ICD-10-CM

## 2023-03-24 DIAGNOSIS — M79.89 OTHER SPECIFIED SOFT TISSUE DISORDERS: ICD-10-CM

## 2023-03-24 DIAGNOSIS — E11.621 TYPE 2 DIABETES MELLITUS WITH FOOT ULCER: ICD-10-CM

## 2023-03-24 DIAGNOSIS — Z00.00 ENCOUNTER FOR GENERAL ADULT MEDICAL EXAMINATION WITHOUT ABNORMAL FINDINGS: ICD-10-CM

## 2023-03-24 DIAGNOSIS — E11.52 TYPE 2 DIABETES MELLITUS WITH FOOT ULCER: ICD-10-CM

## 2023-03-24 DIAGNOSIS — L97.509 TYPE 2 DIABETES MELLITUS WITH FOOT ULCER: ICD-10-CM

## 2023-03-24 PROCEDURE — 11721 DEBRIDE NAIL 6 OR MORE: CPT

## 2023-03-24 PROCEDURE — 99214 OFFICE O/P EST MOD 30 MIN: CPT | Mod: 25

## 2023-03-24 PROCEDURE — 99214 OFFICE O/P EST MOD 30 MIN: CPT | Mod: 25,95

## 2023-03-24 NOTE — PHYSICAL EXAM
[General Appearance - Alert] : alert [General Appearance - In No Acute Distress] : in no acute distress [General Appearance - Well Nourished] : well nourished [General Appearance - Well Developed] : well developed [Ankle Swelling Bilaterally] : bilaterally  [1+] : left foot dorsalis pedis 1+ [No Joint Swelling] : no joint swelling [Skin Induration] : skin induration [Diminished Throughout Right Foot] : diminished sensation with monofilament testing throughout right foot [Diminished Throughout Left Foot] : diminished sensation with monofilament testing throughout left foot [Oriented To Time, Place, And Person] : oriented to person, place, and time [Impaired Insight] : insight and judgment were intact [Affect] : the affect was normal [Ankle Swelling (On Exam)] : not present [Varicose Veins Of Lower Extremities] : not present [] : not present [Delayed in the Right Toes] : capillary refills normal in right toes [Delayed in the Left Toes] : capillary refills normal in the left toes [de-identified] : No ROM, R ankle\par No pain w/passive & active ROM, L ankle\par Muscle strength 3/5 L foot, 4/5 R foot [Foot Ulcer] : no foot ulcer [FreeTextEntry1] : Callus plantar medial R heel\par Mildly hypertrophic tissue w/hyperpigmentation, contracture & induration to medial heel \par Skin thin & atrophic b/l feet

## 2023-03-24 NOTE — ASSESSMENT
[Verbal] : verbal [Patient] : patient [Good - alert, interested, motivated] : Good - alert, interested, motivated [Demonstrates independently] : demonstrates independently [FreeTextEntry1] : Assessment:\par -L heel pain\par -Callus, b/l heel\par \par Plan:\par -Pt seen & evaluated\par -Aseptic dbx of plantar heel calluses w/sterile curette to normotrophic tissue; pt tolerated procedure well w/no complications\par -Nails 1-10 debrided w/sterile nail nippers w/o incident\par -Continue orthotics b/l shoes\par -Rx topical Diclofenac for dorsal L foot pain\par -RTC 3 months

## 2023-03-24 NOTE — REASON FOR VISIT
[Follow-Up Visit] : a follow-up visit for [Other:___] : [unfilled] [Family Member] : family member [Interpreters_FullName] : Shannan

## 2023-03-24 NOTE — HISTORY OF PRESENT ILLNESS
[Diabetic Shoes] : diabetic shoes [Walker] : a walker [FreeTextEntry1] : CC: "Checkup"\par \par HPI:\par -57F presents to clinic for 2 mo f/u L heel painful callus\par -Pt wearing ACE wrap to legs to keep them warm\par -Pain Management appt upcoming for 1/4/23. \par -Pt using orthotics, reports back pain; unclear if back pain is 2/2 orthotics\par -States L heel feeling better; rated 3/10\par -Also reports pain to top of L foot; minimal; unable to rate on 0-10 scale\par -No other pedal complaints

## 2023-03-27 DIAGNOSIS — E11.621 TYPE 2 DIABETES MELLITUS WITH FOOT ULCER: ICD-10-CM

## 2023-03-27 DIAGNOSIS — M79.672 PAIN IN LEFT FOOT: ICD-10-CM

## 2023-03-27 DIAGNOSIS — M79.89 OTHER SPECIFIED SOFT TISSUE DISORDERS: ICD-10-CM

## 2023-03-27 DIAGNOSIS — R26.2 DIFFICULTY IN WALKING, NOT ELSEWHERE CLASSIFIED: ICD-10-CM

## 2023-03-27 DIAGNOSIS — E11.42 TYPE 2 DIABETES MELLITUS WITH DIABETIC POLYNEUROPATHY: ICD-10-CM

## 2023-03-27 DIAGNOSIS — L97.523 NON-PRESSURE CHRONIC ULCER OF OTHER PART OF LEFT FOOT WITH NECROSIS OF MUSCLE: ICD-10-CM

## 2023-04-03 ENCOUNTER — OUTPATIENT (OUTPATIENT)
Dept: OUTPATIENT SERVICES | Facility: HOSPITAL | Age: 58
LOS: 1 days | End: 2023-04-03
Payer: COMMERCIAL

## 2023-04-03 ENCOUNTER — APPOINTMENT (OUTPATIENT)
Dept: INTERNAL MEDICINE | Facility: CLINIC | Age: 58
End: 2023-04-03
Payer: COMMERCIAL

## 2023-04-03 ENCOUNTER — APPOINTMENT (OUTPATIENT)
Dept: INTERNAL MEDICINE | Facility: CLINIC | Age: 58
End: 2023-04-03

## 2023-04-03 VITALS
DIASTOLIC BLOOD PRESSURE: 89 MMHG | TEMPERATURE: 98.4 F | WEIGHT: 158 LBS | HEART RATE: 80 BPM | BODY MASS INDEX: 31.02 KG/M2 | SYSTOLIC BLOOD PRESSURE: 212 MMHG | HEIGHT: 60 IN | OXYGEN SATURATION: 98 %

## 2023-04-03 DIAGNOSIS — Z98.890 OTHER SPECIFIED POSTPROCEDURAL STATES: Chronic | ICD-10-CM

## 2023-04-03 DIAGNOSIS — Z00.00 ENCOUNTER FOR GENERAL ADULT MEDICAL EXAMINATION WITHOUT ABNORMAL FINDINGS: ICD-10-CM

## 2023-04-03 PROCEDURE — 99213 OFFICE O/P EST LOW 20 MIN: CPT | Mod: GC

## 2023-04-03 PROCEDURE — 99213 OFFICE O/P EST LOW 20 MIN: CPT

## 2023-04-03 NOTE — ASSESSMENT
[FreeTextEntry1] : 56 YO Hearing-Impaired woman w a PMH of ESRD on HD V s/p AVG placement in June 2022 due to diabetes, CVA (2/2021 on ASA+ Plavix), DM w peripheral neuropathy(on gabapentin), normocytic anemia, peripheral artery disease (s/p vascular repair 11/8/21), dyslipidemia here for followup. Pt says she still has abd pain. No SOB\par \par #HTN urgency\par - /89\par - asymptomatic\par - pt's baseline SBP is in 180s\par - takes nifedipine 60mg daily and metoprolol 100mg daily and hydralazine 50mg TID\par - took nifedipine 60mg today morning\par - advised pt to take metoprolol 100mg and hydralazine 50mg today afternoon and recheck BP if it is still elevated, told her to go to ED\par - advised not to miss HD session tomorrow\par \par #Gastro esophageal reflux disease\par - following up with GI\par - c/w protonix 40mg daily\par - need cardio and neuro clearance for scope (has appointments)\par \par # CKD 5 on Hemodialysis TTS sp AVG placement by Dr. Muñoz on 6/22/22\par - follow up with nephrology for hemodialysis on Tu Thurs Sat\par \par RTC in 3 months and prn. \par \par \par

## 2023-04-03 NOTE — HISTORY OF PRESENT ILLNESS
[de-identified] : 58 YO Hearing-Impaired woman w a PMH of ESRD on HD V s/p AVG placement in June 2022 due to diabetes, CVA (2/2021 on ASA+ Plavix), DM w peripheral neuropathy(on gabapentin), normocytic anemia, peripheral artery disease (s/p vascular repair 11/8/21), dyslipidemia here for followup. Pt says she still has abd pain. No SOB

## 2023-04-07 ENCOUNTER — APPOINTMENT (OUTPATIENT)
Dept: OPHTHALMOLOGY | Facility: CLINIC | Age: 58
End: 2023-04-07

## 2023-04-12 ENCOUNTER — OUTPATIENT (OUTPATIENT)
Dept: OUTPATIENT SERVICES | Facility: HOSPITAL | Age: 58
LOS: 1 days | End: 2023-04-12
Payer: COMMERCIAL

## 2023-04-12 DIAGNOSIS — R10.13 EPIGASTRIC PAIN: ICD-10-CM

## 2023-04-12 DIAGNOSIS — D64.9 ANEMIA, UNSPECIFIED: ICD-10-CM

## 2023-04-12 DIAGNOSIS — Z98.890 OTHER SPECIFIED POSTPROCEDURAL STATES: Chronic | ICD-10-CM

## 2023-04-12 DIAGNOSIS — Z00.8 ENCOUNTER FOR OTHER GENERAL EXAMINATION: ICD-10-CM

## 2023-04-12 DIAGNOSIS — K21.9 GASTRO-ESOPHAGEAL REFLUX DISEASE WITHOUT ESOPHAGITIS: ICD-10-CM

## 2023-04-12 DIAGNOSIS — E11.9 TYPE 2 DIABETES MELLITUS WITHOUT COMPLICATIONS: ICD-10-CM

## 2023-04-12 PROCEDURE — 76705 ECHO EXAM OF ABDOMEN: CPT | Mod: 26

## 2023-04-12 PROCEDURE — 76705 ECHO EXAM OF ABDOMEN: CPT

## 2023-04-13 DIAGNOSIS — R10.13 EPIGASTRIC PAIN: ICD-10-CM

## 2023-04-27 NOTE — ED PROVIDER NOTE - HIV OFFER
SURGICAL CONSULT NOTE             Patient: Jaiden Tucker   MRN: [id number]   : 2020     Vitals:  Pulse 107, temperature 98.1 °F (36.7 °C), temperature source Temporal, resp. rate 28, height 3' 4.55\" (1.03 m), weight 16.6 kg (36 lb 9.5 oz), SpO2 100 %.    REASON FOR CONSULT:  Scheduled Sistrunk procedure and right inguinal hernia repair    Referring Provider:  Patient Care Team:  Toussaint, Paul J, MD as PCP - General (Pediatrics)    HISTORY OF PRESENT ILLNESS:  3 yo M here for scheduled Sistrunk procedure and right inguinal hernia repair. With persistent swelling of neck, reportedly now larger per patients. With intermittent right groin swelling. Mother now reports new eye redness treated with eye drops.      Review of Systems:     Constitutional: Negative.    HENT: neck swelling    Eyes: Negative.    Respiratory: Negative.    Cardiovascular: Negative.    Gastrointestinal: Negative for abdominal pain, nausea or vomiting  Endocrine: Negative.    Genitourinary: Negative.    Musculoskeletal: right groin swelling    Past medical hx:   Past Medical History:   Diagnosis Date   • Bilateral inguinal hernia    • Tonsillar hypertrophy        Past surgical hx: History reviewed. No pertinent surgical history.    Family hx: History reviewed. No pertinent family history.     Social hx:   Social History     Tobacco Use   • Smoking status: Never   • Smokeless tobacco: Never        Allergies:   ALLERGIES:   Allergen Reactions   • Cephalexin HIVES   • Penicillins RASH       Medications:   No current facility-administered medications for this encounter.         PHYSICAL EXAM:  Gen: NAD  CV: RR  HEENT: neck mass 1.5 cm left of midline  Resp.: NLB  Abdomen: Soft, non-tender, non-distended, no signs of diffuse peritonitis.  : right inguinal hernia, reducible  Neuro: grossly oriented    LABS:  No results found for this or any previous visit (from the past 24 hour(s)).    IMAGING:  No results found.     ASSESSMENT/PLAN:  3 yo  M here for scheduled Sistrunk procedure and right inguinal hernia repair. With persistent swelling of neck and intermittent right groin swelling. US neck c/w thyroglossal duct cyst.    -OR today for Sistrunk procedure and open right inguinal hernia repair 4/27  -npo  -no abx    D/w attending.     Percy Kruger  PGY2 Surgery  Contact via [employer]      Previously Declined (within the last year)

## 2023-04-28 ENCOUNTER — APPOINTMENT (OUTPATIENT)
Dept: OPHTHALMOLOGY | Facility: CLINIC | Age: 58
End: 2023-04-28

## 2023-05-25 ENCOUNTER — APPOINTMENT (OUTPATIENT)
Dept: PODIATRY | Facility: CLINIC | Age: 58
End: 2023-05-25
Payer: COMMERCIAL

## 2023-05-25 ENCOUNTER — OUTPATIENT (OUTPATIENT)
Dept: OUTPATIENT SERVICES | Facility: HOSPITAL | Age: 58
LOS: 1 days | End: 2023-05-25
Payer: COMMERCIAL

## 2023-05-25 DIAGNOSIS — Z00.00 ENCOUNTER FOR GENERAL ADULT MEDICAL EXAMINATION WITHOUT ABNORMAL FINDINGS: ICD-10-CM

## 2023-05-25 DIAGNOSIS — Z98.890 OTHER SPECIFIED POSTPROCEDURAL STATES: Chronic | ICD-10-CM

## 2023-05-25 PROCEDURE — 11721 DEBRIDE NAIL 6 OR MORE: CPT

## 2023-05-25 PROCEDURE — 99213 OFFICE O/P EST LOW 20 MIN: CPT | Mod: 25

## 2023-05-31 DIAGNOSIS — E11.42 TYPE 2 DIABETES MELLITUS WITH DIABETIC POLYNEUROPATHY: ICD-10-CM

## 2023-05-31 DIAGNOSIS — L29.9 PRURITUS, UNSPECIFIED: ICD-10-CM

## 2023-05-31 DIAGNOSIS — M79.672 PAIN IN LEFT FOOT: ICD-10-CM

## 2023-05-31 DIAGNOSIS — B35.1 TINEA UNGUIUM: ICD-10-CM

## 2023-05-31 DIAGNOSIS — M79.671 PAIN IN RIGHT FOOT: ICD-10-CM

## 2023-05-31 NOTE — REVIEW OF SYSTEMS
[Arthralgias] : arthralgias [Joint Swelling] : no joint swelling [Joint Stiffness] : no joint stiffness [Limb Pain] : limb pain [Limb Swelling] : no limb swelling [Limb Weakness] : limb weakness [Difficulty Walking] : difficulty walking [Negative] : Integumentary

## 2023-05-31 NOTE — ASSESSMENT
[FreeTextEntry1] : Assessment:\par -L heel pain\par -Callus, b/l heel\par \par Plan:\par -Pt seen & evaluated\par -Aseptic dbx of plantar heel calluses w/sterile curette to normotrophic tissue; pt tolerated procedure well w/no complications\par -Nails 1-10 debrided w/sterile nail nippers w/o incident\par -Continue orthotics b/l shoes\par -Rx topical Diclofenac for dorsal L foot pain\par -RTC 3 months [Verbal] : verbal [Patient] : patient [Good - alert, interested, motivated] : Good - alert, interested, motivated [Demonstrates independently] : demonstrates independently

## 2023-05-31 NOTE — PHYSICAL EXAM
[General Appearance - Alert] : alert [General Appearance - In No Acute Distress] : in no acute distress [General Appearance - Well Nourished] : well nourished [General Appearance - Well Developed] : well developed [Ankle Swelling (On Exam)] : not present [Ankle Swelling Bilaterally] : bilaterally  [Varicose Veins Of Lower Extremities] : bilaterally [] : on both lower extremities [Delayed in the Right Toes] : capillary refills normal in right toes [Delayed in the Left Toes] : capillary refills normal in the left toes [1+] : left foot dorsalis pedis 1+ [No Joint Swelling] : no joint swelling [de-identified] : No ROM, R ankle\par No pain w/passive & active ROM, L ankle\par Muscle strength 3/5 L foot, 4/5 R foot [Foot Ulcer] : no foot ulcer [Skin Induration] : skin induration [FreeTextEntry1] : Callus plantar medial R heel\par Mildly hypertrophic tissue w/hyperpigmentation, contracture & induration to medial heel \par Skin thin & atrophic b/l feet [Diminished Throughout Right Foot] : diminished sensation with monofilament testing throughout right foot [Diminished Throughout Left Foot] : diminished sensation with monofilament testing throughout left foot [Oriented To Time, Place, And Person] : oriented to person, place, and time [Impaired Insight] : insight and judgment were intact [Affect] : the affect was normal

## 2023-06-22 ENCOUNTER — INPATIENT (INPATIENT)
Facility: HOSPITAL | Age: 58
LOS: 5 days | Discharge: HOME | DRG: 283 | End: 2023-06-28
Attending: INTERNAL MEDICINE | Admitting: STUDENT IN AN ORGANIZED HEALTH CARE EDUCATION/TRAINING PROGRAM
Payer: MEDICAID

## 2023-06-22 ENCOUNTER — APPOINTMENT (OUTPATIENT)
Dept: PODIATRY | Facility: CLINIC | Age: 58
End: 2023-06-22

## 2023-06-22 VITALS
SYSTOLIC BLOOD PRESSURE: 212 MMHG | DIASTOLIC BLOOD PRESSURE: 102 MMHG | RESPIRATION RATE: 20 BRPM | OXYGEN SATURATION: 98 % | TEMPERATURE: 98 F | HEIGHT: 62 IN | WEIGHT: 146.39 LBS | HEART RATE: 88 BPM

## 2023-06-22 DIAGNOSIS — G89.29 OTHER CHRONIC PAIN: ICD-10-CM

## 2023-06-22 DIAGNOSIS — Z98.890 OTHER SPECIFIED POSTPROCEDURAL STATES: Chronic | ICD-10-CM

## 2023-06-22 LAB
ALBUMIN SERPL ELPH-MCNC: 4.2 G/DL — SIGNIFICANT CHANGE UP (ref 3.5–5.2)
ALP SERPL-CCNC: 314 U/L — HIGH (ref 30–115)
ALT FLD-CCNC: 11 U/L — SIGNIFICANT CHANGE UP (ref 0–41)
ANION GAP SERPL CALC-SCNC: 21 MMOL/L — HIGH (ref 7–14)
AST SERPL-CCNC: 15 U/L — SIGNIFICANT CHANGE UP (ref 0–41)
BASOPHILS # BLD AUTO: 0.06 K/UL — SIGNIFICANT CHANGE UP (ref 0–0.2)
BASOPHILS NFR BLD AUTO: 1.1 % — HIGH (ref 0–1)
BILIRUB DIRECT SERPL-MCNC: 0.4 MG/DL — HIGH (ref 0–0.3)
BILIRUB INDIRECT FLD-MCNC: 0.2 MG/DL — SIGNIFICANT CHANGE UP (ref 0.2–1.2)
BILIRUB SERPL-MCNC: 0.6 MG/DL — SIGNIFICANT CHANGE UP (ref 0.2–1.2)
BUN SERPL-MCNC: 51 MG/DL — HIGH (ref 10–20)
CALCIUM SERPL-MCNC: 8.8 MG/DL — SIGNIFICANT CHANGE UP (ref 8.4–10.5)
CHLORIDE SERPL-SCNC: 92 MMOL/L — LOW (ref 98–110)
CO2 SERPL-SCNC: 23 MMOL/L — SIGNIFICANT CHANGE UP (ref 17–32)
CREAT SERPL-MCNC: 7.8 MG/DL — CRITICAL HIGH (ref 0.7–1.5)
EGFR: 6 ML/MIN/1.73M2 — LOW
EOSINOPHIL # BLD AUTO: 0.14 K/UL — SIGNIFICANT CHANGE UP (ref 0–0.7)
EOSINOPHIL NFR BLD AUTO: 2.5 % — SIGNIFICANT CHANGE UP (ref 0–8)
GLUCOSE SERPL-MCNC: 92 MG/DL — SIGNIFICANT CHANGE UP (ref 70–99)
HCT VFR BLD CALC: 31.1 % — LOW (ref 37–47)
HGB BLD-MCNC: 10.6 G/DL — LOW (ref 12–16)
IMM GRANULOCYTES NFR BLD AUTO: 0.2 % — SIGNIFICANT CHANGE UP (ref 0.1–0.3)
LACTATE SERPL-SCNC: 1.4 MMOL/L — SIGNIFICANT CHANGE UP (ref 0.7–2)
LIDOCAIN IGE QN: 15 U/L — SIGNIFICANT CHANGE UP (ref 7–60)
LYMPHOCYTES # BLD AUTO: 1.17 K/UL — LOW (ref 1.2–3.4)
LYMPHOCYTES # BLD AUTO: 20.6 % — SIGNIFICANT CHANGE UP (ref 20.5–51.1)
MCHC RBC-ENTMCNC: 30.9 PG — SIGNIFICANT CHANGE UP (ref 27–31)
MCHC RBC-ENTMCNC: 34.1 G/DL — SIGNIFICANT CHANGE UP (ref 32–37)
MCV RBC AUTO: 90.7 FL — SIGNIFICANT CHANGE UP (ref 81–99)
MONOCYTES # BLD AUTO: 0.44 K/UL — SIGNIFICANT CHANGE UP (ref 0.1–0.6)
MONOCYTES NFR BLD AUTO: 7.7 % — SIGNIFICANT CHANGE UP (ref 1.7–9.3)
NEUTROPHILS # BLD AUTO: 3.87 K/UL — SIGNIFICANT CHANGE UP (ref 1.4–6.5)
NEUTROPHILS NFR BLD AUTO: 67.9 % — SIGNIFICANT CHANGE UP (ref 42.2–75.2)
NRBC # BLD: 0 /100 WBCS — SIGNIFICANT CHANGE UP (ref 0–0)
NT-PROBNP SERPL-SCNC: HIGH PG/ML (ref 0–300)
PHOSPHATE SERPL-MCNC: 5.4 MG/DL — HIGH (ref 2.1–4.9)
PLATELET # BLD AUTO: 123 K/UL — LOW (ref 130–400)
PMV BLD: 12.9 FL — HIGH (ref 7.4–10.4)
POTASSIUM SERPL-MCNC: 5.2 MMOL/L — HIGH (ref 3.5–5)
POTASSIUM SERPL-SCNC: 5.2 MMOL/L — HIGH (ref 3.5–5)
PROT SERPL-MCNC: 7 G/DL — SIGNIFICANT CHANGE UP (ref 6–8)
RBC # BLD: 3.43 M/UL — LOW (ref 4.2–5.4)
RBC # FLD: 17.6 % — HIGH (ref 11.5–14.5)
SODIUM SERPL-SCNC: 136 MMOL/L — SIGNIFICANT CHANGE UP (ref 135–146)
TROPONIN T SERPL-MCNC: 0.04 NG/ML — CRITICAL HIGH
WBC # BLD: 5.69 K/UL — SIGNIFICANT CHANGE UP (ref 4.8–10.8)
WBC # FLD AUTO: 5.69 K/UL — SIGNIFICANT CHANGE UP (ref 4.8–10.8)

## 2023-06-22 PROCEDURE — 73610 X-RAY EXAM OF ANKLE: CPT | Mod: 26,LT

## 2023-06-22 PROCEDURE — 36415 COLL VENOUS BLD VENIPUNCTURE: CPT

## 2023-06-22 PROCEDURE — 87635 SARS-COV-2 COVID-19 AMP PRB: CPT

## 2023-06-22 PROCEDURE — 99285 EMERGENCY DEPT VISIT HI MDM: CPT

## 2023-06-22 PROCEDURE — 83735 ASSAY OF MAGNESIUM: CPT

## 2023-06-22 PROCEDURE — 80053 COMPREHEN METABOLIC PANEL: CPT

## 2023-06-22 PROCEDURE — 73562 X-RAY EXAM OF KNEE 3: CPT | Mod: 50

## 2023-06-22 PROCEDURE — 82310 ASSAY OF CALCIUM: CPT

## 2023-06-22 PROCEDURE — 93880 EXTRACRANIAL BILAT STUDY: CPT

## 2023-06-22 PROCEDURE — 85027 COMPLETE CBC AUTOMATED: CPT

## 2023-06-22 PROCEDURE — 71045 X-RAY EXAM CHEST 1 VIEW: CPT | Mod: 26

## 2023-06-22 PROCEDURE — 93005 ELECTROCARDIOGRAM TRACING: CPT

## 2023-06-22 PROCEDURE — 82728 ASSAY OF FERRITIN: CPT

## 2023-06-22 PROCEDURE — 83550 IRON BINDING TEST: CPT

## 2023-06-22 PROCEDURE — 82962 GLUCOSE BLOOD TEST: CPT

## 2023-06-22 PROCEDURE — 83540 ASSAY OF IRON: CPT

## 2023-06-22 PROCEDURE — 90935 HEMODIALYSIS ONE EVALUATION: CPT

## 2023-06-22 PROCEDURE — 83605 ASSAY OF LACTIC ACID: CPT

## 2023-06-22 PROCEDURE — 73630 X-RAY EXAM OF FOOT: CPT | Mod: 26,LT

## 2023-06-22 PROCEDURE — 85652 RBC SED RATE AUTOMATED: CPT

## 2023-06-22 PROCEDURE — 93010 ELECTROCARDIOGRAM REPORT: CPT

## 2023-06-22 PROCEDURE — 99053 MED SERV 10PM-8AM 24 HR FAC: CPT

## 2023-06-22 PROCEDURE — 86140 C-REACTIVE PROTEIN: CPT

## 2023-06-22 PROCEDURE — 82977 ASSAY OF GGT: CPT

## 2023-06-22 PROCEDURE — 73130 X-RAY EXAM OF HAND: CPT | Mod: 26,RT

## 2023-06-22 PROCEDURE — 94640 AIRWAY INHALATION TREATMENT: CPT

## 2023-06-22 PROCEDURE — 93926 LOWER EXTREMITY STUDY: CPT | Mod: 26,LT

## 2023-06-22 PROCEDURE — 84100 ASSAY OF PHOSPHORUS: CPT

## 2023-06-22 PROCEDURE — 76705 ECHO EXAM OF ABDOMEN: CPT

## 2023-06-22 PROCEDURE — 93926 LOWER EXTREMITY STUDY: CPT | Mod: LT

## 2023-06-22 PROCEDURE — 83970 ASSAY OF PARATHORMONE: CPT

## 2023-06-22 PROCEDURE — 99223 1ST HOSP IP/OBS HIGH 75: CPT

## 2023-06-22 PROCEDURE — 73590 X-RAY EXAM OF LOWER LEG: CPT | Mod: 26,LT

## 2023-06-22 PROCEDURE — 85025 COMPLETE CBC W/AUTO DIFF WBC: CPT

## 2023-06-22 PROCEDURE — 74177 CT ABD & PELVIS W/CONTRAST: CPT | Mod: 26,MA

## 2023-06-22 PROCEDURE — C9113: CPT

## 2023-06-22 RX ORDER — MORPHINE SULFATE 50 MG/1
4 CAPSULE, EXTENDED RELEASE ORAL ONCE
Refills: 0 | Status: DISCONTINUED | OUTPATIENT
Start: 2023-06-22 | End: 2023-06-22

## 2023-06-22 RX ORDER — HEPARIN SODIUM 5000 [USP'U]/ML
5000 INJECTION INTRAVENOUS; SUBCUTANEOUS EVERY 12 HOURS
Refills: 0 | Status: DISCONTINUED | OUTPATIENT
Start: 2023-06-22 | End: 2023-06-23

## 2023-06-22 RX ORDER — ONDANSETRON 8 MG/1
4 TABLET, FILM COATED ORAL ONCE
Refills: 0 | Status: COMPLETED | OUTPATIENT
Start: 2023-06-22 | End: 2023-06-22

## 2023-06-22 RX ORDER — CALCITRIOL 0.5 UG/1
0.25 CAPSULE ORAL DAILY
Refills: 0 | Status: DISCONTINUED | OUTPATIENT
Start: 2023-06-22 | End: 2023-06-28

## 2023-06-22 RX ORDER — GABAPENTIN 400 MG/1
300 CAPSULE ORAL THREE TIMES A DAY
Refills: 0 | Status: DISCONTINUED | OUTPATIENT
Start: 2023-06-22 | End: 2023-06-23

## 2023-06-22 RX ORDER — HYDRALAZINE HCL 50 MG
25 TABLET ORAL ONCE
Refills: 0 | Status: COMPLETED | OUTPATIENT
Start: 2023-06-22 | End: 2023-06-22

## 2023-06-22 RX ORDER — METHOCARBAMOL 500 MG/1
500 TABLET, FILM COATED ORAL
Refills: 0 | Status: DISCONTINUED | OUTPATIENT
Start: 2023-06-22 | End: 2023-06-28

## 2023-06-22 RX ORDER — HYDRALAZINE HCL 50 MG
25 TABLET ORAL
Refills: 0 | Status: DISCONTINUED | OUTPATIENT
Start: 2023-06-22 | End: 2023-06-26

## 2023-06-22 RX ORDER — SEVELAMER CARBONATE 2400 MG/1
800 POWDER, FOR SUSPENSION ORAL
Refills: 0 | Status: DISCONTINUED | OUTPATIENT
Start: 2023-06-22 | End: 2023-06-28

## 2023-06-22 RX ORDER — HYDROMORPHONE HYDROCHLORIDE 2 MG/ML
2 INJECTION INTRAMUSCULAR; INTRAVENOUS; SUBCUTANEOUS ONCE
Refills: 0 | Status: COMPLETED | OUTPATIENT
Start: 2023-06-22 | End: 2023-06-22

## 2023-06-22 RX ORDER — HYDRALAZINE HCL 50 MG
10 TABLET ORAL ONCE
Refills: 0 | Status: COMPLETED | OUTPATIENT
Start: 2023-06-22 | End: 2023-06-22

## 2023-06-22 RX ORDER — PANTOPRAZOLE SODIUM 20 MG/1
40 TABLET, DELAYED RELEASE ORAL ONCE
Refills: 0 | Status: COMPLETED | OUTPATIENT
Start: 2023-06-22 | End: 2023-06-22

## 2023-06-22 RX ORDER — ACETAMINOPHEN 500 MG
650 TABLET ORAL EVERY 6 HOURS
Refills: 0 | Status: DISCONTINUED | OUTPATIENT
Start: 2023-06-22 | End: 2023-06-28

## 2023-06-22 RX ORDER — ONDANSETRON 8 MG/1
4 TABLET, FILM COATED ORAL EVERY 8 HOURS
Refills: 0 | Status: DISCONTINUED | OUTPATIENT
Start: 2023-06-22 | End: 2023-06-28

## 2023-06-22 RX ORDER — LABETALOL HCL 100 MG
5 TABLET ORAL ONCE
Refills: 0 | Status: COMPLETED | OUTPATIENT
Start: 2023-06-22 | End: 2023-06-22

## 2023-06-22 RX ORDER — LANOLIN ALCOHOL/MO/W.PET/CERES
3 CREAM (GRAM) TOPICAL AT BEDTIME
Refills: 0 | Status: DISCONTINUED | OUTPATIENT
Start: 2023-06-22 | End: 2023-06-28

## 2023-06-22 RX ORDER — ATORVASTATIN CALCIUM 80 MG/1
80 TABLET, FILM COATED ORAL AT BEDTIME
Refills: 0 | Status: DISCONTINUED | OUTPATIENT
Start: 2023-06-22 | End: 2023-06-28

## 2023-06-22 RX ORDER — NIFEDIPINE 30 MG
60 TABLET, EXTENDED RELEASE 24 HR ORAL ONCE
Refills: 0 | Status: COMPLETED | OUTPATIENT
Start: 2023-06-22 | End: 2023-06-22

## 2023-06-22 RX ORDER — CLOPIDOGREL BISULFATE 75 MG/1
75 TABLET, FILM COATED ORAL DAILY
Refills: 0 | Status: DISCONTINUED | OUTPATIENT
Start: 2023-06-22 | End: 2023-06-23

## 2023-06-22 RX ORDER — FERROUS SULFATE 325(65) MG
325 TABLET ORAL DAILY
Refills: 0 | Status: DISCONTINUED | OUTPATIENT
Start: 2023-06-22 | End: 2023-06-24

## 2023-06-22 RX ORDER — DIPHENHYDRAMINE HCL 50 MG
25 CAPSULE ORAL EVERY 6 HOURS
Refills: 0 | Status: DISCONTINUED | OUTPATIENT
Start: 2023-06-22 | End: 2023-06-28

## 2023-06-22 RX ORDER — PANTOPRAZOLE SODIUM 20 MG/1
40 TABLET, DELAYED RELEASE ORAL
Refills: 0 | Status: DISCONTINUED | OUTPATIENT
Start: 2023-06-22 | End: 2023-06-23

## 2023-06-22 RX ORDER — ASPIRIN/CALCIUM CARB/MAGNESIUM 324 MG
81 TABLET ORAL DAILY
Refills: 0 | Status: DISCONTINUED | OUTPATIENT
Start: 2023-06-22 | End: 2023-06-28

## 2023-06-22 RX ORDER — NIFEDIPINE 30 MG
60 TABLET, EXTENDED RELEASE 24 HR ORAL DAILY
Refills: 0 | Status: DISCONTINUED | OUTPATIENT
Start: 2023-06-22 | End: 2023-06-26

## 2023-06-22 RX ORDER — SUCRALFATE 1 G
1 TABLET ORAL
Refills: 0 | Status: DISCONTINUED | OUTPATIENT
Start: 2023-06-22 | End: 2023-06-26

## 2023-06-22 RX ADMIN — Medication 10 MILLIGRAM(S): at 06:42

## 2023-06-22 RX ADMIN — MORPHINE SULFATE 4 MILLIGRAM(S): 50 CAPSULE, EXTENDED RELEASE ORAL at 06:42

## 2023-06-22 RX ADMIN — Medication 60 MILLIGRAM(S): at 17:01

## 2023-06-22 RX ADMIN — MORPHINE SULFATE 4 MILLIGRAM(S): 50 CAPSULE, EXTENDED RELEASE ORAL at 07:21

## 2023-06-22 RX ADMIN — ONDANSETRON 4 MILLIGRAM(S): 8 TABLET, FILM COATED ORAL at 17:56

## 2023-06-22 RX ADMIN — Medication 3 MILLIGRAM(S): at 23:40

## 2023-06-22 RX ADMIN — SEVELAMER CARBONATE 800 MILLIGRAM(S): 2400 POWDER, FOR SUSPENSION ORAL at 16:48

## 2023-06-22 RX ADMIN — Medication 25 MILLIGRAM(S): at 17:08

## 2023-06-22 RX ADMIN — GABAPENTIN 300 MILLIGRAM(S): 400 CAPSULE ORAL at 16:50

## 2023-06-22 RX ADMIN — ONDANSETRON 4 MILLIGRAM(S): 8 TABLET, FILM COATED ORAL at 06:43

## 2023-06-22 RX ADMIN — HEPARIN SODIUM 5000 UNIT(S): 5000 INJECTION INTRAVENOUS; SUBCUTANEOUS at 17:57

## 2023-06-22 RX ADMIN — Medication 1 GRAM(S): at 16:49

## 2023-06-22 RX ADMIN — Medication 1 GRAM(S): at 17:57

## 2023-06-22 RX ADMIN — PANTOPRAZOLE SODIUM 40 MILLIGRAM(S): 20 TABLET, DELAYED RELEASE ORAL at 09:40

## 2023-06-22 RX ADMIN — CLOPIDOGREL BISULFATE 75 MILLIGRAM(S): 75 TABLET, FILM COATED ORAL at 16:48

## 2023-06-22 RX ADMIN — METHOCARBAMOL 500 MILLIGRAM(S): 500 TABLET, FILM COATED ORAL at 18:31

## 2023-06-22 RX ADMIN — Medication 650 MILLIGRAM(S): at 22:35

## 2023-06-22 RX ADMIN — ATORVASTATIN CALCIUM 80 MILLIGRAM(S): 80 TABLET, FILM COATED ORAL at 21:15

## 2023-06-22 RX ADMIN — Medication 5 MILLIGRAM(S): at 17:02

## 2023-06-22 RX ADMIN — Medication 25 MILLIGRAM(S): at 17:57

## 2023-06-22 RX ADMIN — CALCITRIOL 0.25 MICROGRAM(S): 0.5 CAPSULE ORAL at 16:49

## 2023-06-22 RX ADMIN — Medication 10 MILLIGRAM(S): at 20:40

## 2023-06-22 RX ADMIN — Medication 650 MILLIGRAM(S): at 14:21

## 2023-06-22 RX ADMIN — MORPHINE SULFATE 4 MILLIGRAM(S): 50 CAPSULE, EXTENDED RELEASE ORAL at 04:23

## 2023-06-22 RX ADMIN — PANTOPRAZOLE SODIUM 40 MILLIGRAM(S): 20 TABLET, DELAYED RELEASE ORAL at 09:41

## 2023-06-22 RX ADMIN — MORPHINE SULFATE 4 MILLIGRAM(S): 50 CAPSULE, EXTENDED RELEASE ORAL at 05:38

## 2023-06-22 RX ADMIN — GABAPENTIN 300 MILLIGRAM(S): 400 CAPSULE ORAL at 21:15

## 2023-06-22 RX ADMIN — Medication 81 MILLIGRAM(S): at 16:48

## 2023-06-22 RX ADMIN — ONDANSETRON 4 MILLIGRAM(S): 8 TABLET, FILM COATED ORAL at 09:40

## 2023-06-22 NOTE — ED ADULT NURSE NOTE - NSFALLUNIVINTERV_ED_ALL_ED
Bed/Stretcher in lowest position, wheels locked, appropriate side rails in place/Call bell, personal items and telephone in reach/Instruct patient to call for assistance before getting out of bed/chair/stretcher/Non-slip footwear applied when patient is off stretcher/[place] to call system/Physically safe environment - no spills, clutter or unnecessary equipment/Purposeful proactive rounding/Room/bathroom lighting operational, light cord in reach

## 2023-06-22 NOTE — ED PROVIDER NOTE - OBJECTIVE STATEMENT
59 y/o F with PMHx of ESRD on HD T/Th/Sat (follows w nephro Dr Solis Borja), hyperlipidemia, HTN, PVD, CVA and hearing impaired presents to the ED for abdominal pain diffusely for the past several days a/w n/v/d. Patient is scheduled for dialysis later today but due to significant pain came to the emergency department.  Patient also reports bilateral lower extremity pain as well.  Patient is complaining of palpitations associated with chest pain due to significant pain. Denies fever chills cough urinary symptoms. 59 y/o F with PMHx of ESRD on HD T/Th/Sat (follows w nephro Dr Solis Borja), hyperlipidemia, HTN, PVD, CVA and hearing impaired presents to the ED for abdominal pain diffusely for the past several days a/w n/v/d. Patient is scheduled for dialysis later today but due to significant pain came to the emergency department.  Patient also reports bilateral lower extremity pain as well.  Patient is complaining of palpitations associated with chest pain due to significant pain. Denies fever chills cough urinary symptoms.    Sign language ID: [id number]

## 2023-06-22 NOTE — ED PROVIDER NOTE - PROGRESS NOTE DETAILS
Authored by Dr. Stone: spoke to nephrology who will evaluate pt for dialysis, pending arterial duplex, admission team aware. Doppler pulses are faint but present b/l. Pt takes hydralazine for HTN, will order now given that BP remains elevated.

## 2023-06-22 NOTE — H&P ADULT - NSHPLABSRESULTS_GEN_ALL_CORE
(06-22 @ 04:05)                      10.6  5.69 )-----------( 123                 31.1    Neutrophils = 3.87 (67.9%)  Lymphocytes = 1.17 (20.6%)  Eosinophils = 0.14 (2.5%)  Basophils = 0.06 (1.1%)  Monocytes = 0.44 (7.7%)  Bands = --%    06-22    136  |  92<L>  |  51<H>  ----------------------------<  92  5.2<H>   |  23  |  7.8<HH>    Ca    8.8      22 Jun 2023 04:05    TPro  7.0  /  Alb  4.2  /  TBili  0.6  /  DBili  0.4<H>  /  AST  15  /  ALT  11  /  AlkPhos  314<H>  06-22        CARDIAC MARKERS ( 22 Jun 2023 04:05 )  Trop 0.04 ng/mL<HH> / CK x     / CKMB x           Urinalysis Basic - ( 22 Jun 2023 04:05 )  Color: x / Appearance: x / SG: x / pH: x  Gluc: 92 mg/dL / Ketone: x  / Bili: x / Urobili: x   Blood: x / Protein: x / Nitrite: x   Leuk Esterase: x / RBC: x / WBC x   Sq Epi: x / Non Sq Epi: x / Bacteria: x    < from: CT Abdomen and Pelvis w/ IV Cont (06.22.23 @ 04:23) >    IMPRESSION:    Since CT abdomen/pelvis 2/11/2023, no significant change.  1.  Small volume abdominopelvic ascites and anasarca.  2.  Persistent moderate right and small left pleural effusions with   compressive atelectasis.  3.  Findings may reflect fluid overload state on the basis of known ESRD.    --- End of Report ---    < end of copied text >    < from: TTE Echo Complete w/o Contrast w/ Doppler (02.12.23 @ 12:17) >    Summary:   1. Normal global left ventricular systolic function with moderate   concentric left ventricular hypertrophy. LV Ejection Fraction by   Soto's Method with a biplane EF of 61 %.   2. Grade II diastolic dysfunction.   3. Mildly enlarged right ventricle (RVEDD=4.2cm) with low-normal   function.   4. Mildly enlarged left atrium.   5. Mildly enlarged right atrium.   6. Moderate tricuspid regurgitation.   7. Severe pulmonary hypertension (PASP = 62mmHg).   8. Trivial pericardial effusion.    < end of copied text >

## 2023-06-22 NOTE — ED PROVIDER NOTE - PHYSICAL EXAMINATION
VITAL SIGNS: I have reviewed nursing notes and confirm.  CONSTITUTIONAL: + chronically ill appearing, moderate distress due to pain   SKIN: no rash, no petechiae.  EYES: PERRL, EOMI, pink conjunctiva, anicteric  ENT: tongue midline, no exudates  NECK: Supple; no meningismus  CARD: RRR, no murmurs, equal radial pulses bilaterally 2+  RESP: CTAB, no respiratory distress  ABD: Soft, non-distended, + abd ttp w guarding diffusely   EXT: + b/l pitting 2+ edema w chronic skin changes   NEURO: Alert, oriented. no focal deficits VITAL SIGNS: I have reviewed nursing notes and confirm.  CONSTITUTIONAL: + chronically ill appearing, moderate distress due to pain   EYES: PERRL, EOMI, pink conjunctiva, anicteric  ENT: tongue midline, no exudates  NECK: Supple; no meningismus  CARD: RRR, no murmurs, equal radial pulses bilaterally 2+  RESP: CTAB, no respiratory distress  ABD: Soft, non-distended, + abd ttp w guarding diffusely   EXT: + b/l pitting 2+ edema w chronic skin changes a/w ttp, + L heel gangrene, + R thumb mild ttp but FROM to hand and digits   NEURO: Alert, oriented. no focal deficits

## 2023-06-22 NOTE — ED PROVIDER NOTE - ATTENDING CONTRIBUTION TO CARE
58F pmh esrd on HD TThSa (last HD yest/tue, Nephro [facility]), htn, hl, pvd s/p LLE bypass in past (Dr. beltran), chronic L heel gangrene, cva, deafness, p/w abd pain x few days. Accomp by nvd. Secondary palpitations, chest tightness, c/o R thumb pain & LLE pain, knee through calf. Denies f/c, uri sx, sob/senior, flank pain, rash.    PE:  nad  skin warm, dry  ncat  neck supple  rrr nl s1s2 no mrg  ctab no wrr  abd soft ntnd no palpable masses no rgr  back non-tender no cvat  ext- bl LE venous stasis skin changes, +pitting edema bl, LLE skin warm, +doppler pulses, diminished, L heel gangrene  neuro aaox3 grossly nf exam

## 2023-06-22 NOTE — PATIENT PROFILE ADULT - FALL HARM RISK - HARM RISK INTERVENTIONS

## 2023-06-22 NOTE — ED PROVIDER NOTE - CLINICAL SUMMARY MEDICAL DECISION MAKING FREE TEXT BOX
Labs and EKG were ordered and reviewed, where indicated.  Imaging was ordered and reviewed by me, where indicated.  Appropriate medications for patient's presenting complaints were ordered and effects were reassessed, where indicated.  Patient's records (prior hospital, ED visit, and/or nursing home note) were reviewed, if available.  Additional history was obtained from EMS, family, and/or PCP (where available).  Escalation to admission/observation was considered.  Patient requires inpatient hospitalization - monitored setting.     abd pain, fluid overload, LLE pain h/o bypass surgery - abd ct w/fluid overload, lle arterial duplex ordered pending vasc tech in am - all results d/w pt & brother-in-law @ bedside, pt uncomfortable to go home at this time, does not feel well and does not think she can get to HD session today, given signs of fluid overload on imaging will admit for further inpt mgmt

## 2023-06-22 NOTE — H&P ADULT - ATTENDING COMMENTS
58 year old female with PMH of HTN, DM 2, HLD, ESRD on HD,  PVD, CVA and hearing impaired presents to the ED with c/o RUQ abdominal pain for past few weeks, associated with nausea, vomiting and poor po intake. Patient also reports bilateral lower extremity pain as well and pain on right thumb pain. In the ED BP was 212/102, Trops 0.04, BNP >55332; CXR showed bilateral pulmonary congestion and effusions; CT abdomen and pelvis showed - Small volume abdominopelvic ascites and anasarca. Persistent moderate right and small left pleural effusions with compressive atelectasis.    PHYSICAL EXAM:  GENERAL: hard hearing, using language   HEAD:  Atraumatic, Normocephalic.  EYES: EOMI, PERRLA, conjunctiva and sclera clear.  NECK: Supple, No JVD.  CHEST/LUNG: Clear to auscultation bilaterally; No wheeze.  HEART: Regular rate and rhythm; S1 S2.   ABDOMEN: Soft, distended, mild RUQ tenderness  EXTREMITIES: bilateral leg edema 2+, pulse is weak  PSYCH: AAOx3.  NEUROLOGY: non-focal.  SKIN: No rashes or lesions.    A/P:   Abdominal pain, nausea and vomiting:   concerning for acute gastritis vs Gastroparesis vs ischemic enteropathy.   Patient is smell like Ketone, likely from starvation, patient has not been able to tolerate oral diet for few days, and has poor appetite, mild mucous stool.   CT abdomen showed   Small volume abdominopelvic ascites and anasarca. Persistent moderate right and small left pleural effusions with   compressive atelectasis.  Start on Reglan 10mg TID before meals.   GI consult, possible need for EGD and further work up.   Check Lactate level  Continue Pantoprazole and Sucralfate..     Hypertensive Urgency:   continue Hydralazine and Nifedipine, can increase the Nifedipine dose if needed.     ESRD on HD  Patient is volume overload, anasarca, leg edema, oliguric. Compliant with HD  nephrology follow up, need more fluid removal during HD.   Continue Calcitrol, Phos level 5.4    PVD s/p left femoral artery-posterior tibial artery bypass  Leg edema, likely from anasarca, volume overload.   check venous and arterial duplex.

## 2023-06-22 NOTE — H&P ADULT - HISTORY OF PRESENT ILLNESS
57 y/o F with PMHx of ESRD on HD T/Th/Sat (follows w nephro Dr Solis Borja), hyperlipidemia, HTN, PVD, CVA and hearing impaired presents to the ED with c/o RUQ abdominal pain for past few weeks, associated with nausea, vomiting and poor po intake. Patient is scheduled for dialysis later today but due to significant pain came to the emergency department.  Patient also reports bilateral lower extremity pain as well and pain on right hand. Denies any chest pain, shortness of breath, dizziness, fever,  In the ED BP was 212/102 on arrival, otherwise hemodynamically stable. Labs significant for K 5.2, BUN/Cr 51/7.8; Trops 0.04, BNP >04532; CXR showed bilateral pulmonary congestion and effusions; CT abdomen and pelvis showed - Small volume abdominopelvic ascites and anasarca. Persistent moderate right and small left pleural effusions with compressive atelectasis. Findings may reflect fluid overload state on the basis of known ESRD.  Nephro called by ED - patient scheduled for HD today.

## 2023-06-22 NOTE — H&P ADULT - ASSESSMENT
# Abdominal pain/ Nausea/vomiting/diarrhea  - CT abdomen - Small volume ascites/Anasarca; no other acute abdominal pathology    # Volume overload - HFpEF/ESRD    # ESRD on HD via left UE AVF - T/Th/Sat  - HD planned today    # Hypertensive urgency    # Chronic Normocytic anemia due to CKD     # PVD   # b/l LE pain  - Arterial duplex done pending read    # h/o Right pontine CVA (February 2021)    # DM - controlled    # Intellectual disability       59 y/o F with PMHx of ESRD on HD T/Th/Sat (follows w nephro Dr Solis Borja), hyperlipidemia, HTN, PVD, CVA and hearing impaired presents to the ED with c/o RUQ abdominal pain for past few weeks, associated with nausea, vomiting and poor po intake.    # Abdominal pain/ distention / Nausea/vomiting/diarrhea  # ? uremic gastritis vs hepatic congestion/ascites  - CT abdomen - Small volume ascites/Anasarca; no other acute abdominal pathology  - continue with sucralfate  - Start on PPI  - Zofran PRN  - check lactate  - HD per nephro  - GI eval if persistent/worsening    # Volume overload - HFpEF/ESRD  # ESRD on HD via left UE AVF - T/Th/Sat  # hypertensive Urgency  - HD per nephro  - Previous TTE (02/12/23): Gr II Diastolic dysfunction; EF 61%; Severe pulmonary HTN  - Strict I/O, daily weight, fluid restriction, renal restrictions  - continue with sevelamer, Calcitriol  - continue with Nifedipine, Hydralazine  - Hb at goal  - monitor BMP, Phos    # bilateral LE pain/right hand pain  # h/o PVD  - Follow up Arterial duplex/ X-rays read  - Tylenol PRN  - s/p left femoral artery-posterior tibial artery bypass with autologous venous tissue and left heel ulcer debridement 11/21  - continue with DAPT, Statin    # Chronic Normocytic anemia due to CKD/iron def  - Hb at goal  - monitor  - Ferritin - 135 (11/25/22)  - continue with Iron supplement  - check iron panel      # DM - controlled  - A1C 6 (11/22/22)  - off meds  - Monitor FS, Sliding scale insulin    # h/o Right pontine CVA (February 2021)  - c/w ASA, Plavix, statin  - needs o/p neuro follow up    # Intellectual disability  # hearing/speech impairment  - communicates via sign language    # Diet: Renal/Fluid restriction  # Activity: IAT  # GI PPX: PPI  # DVT PPX: heparin  # Full Code  # Dispo: From home

## 2023-06-22 NOTE — ED ADULT TRIAGE NOTE - CHIEF COMPLAINT QUOTE
She has abdominal pain, vomiting, diarrhea x 3 days and left knee and right thumb pain as per family. She has abdominal pain, vomiting, diarrhea x 3 days and left knee and right thumb pain, unable to sleep as per family. Scheduled for hemodialysis today. Pt hearing disability.  # [phone] (Bichri)

## 2023-06-22 NOTE — PATIENT PROFILE ADULT - HAVE YOU RECEIVED AT LEAST TWO PFIZER AND/OR MODERNA VACCINATIONS (IN ANY COMBINATION) AND/OR ONE JOHNSON & JOHNSON VACCINATION?
[Normal Growth] : growth [Normal Development] : development  Yes [No Elimination Concerns] : elimination [Continue Regimen] : feeding [Normal Sleep Pattern] : sleep [Anticipatory Guidance Given] : Anticipatory guidance addressed as per the history of present illness section [Physical Growth and Development] : physical growth and development [Social and Academic Competence] : social and academic competence [Emotional Well-Being] : emotional well-being [Risk Reduction] : risk reduction [Violence and Injury Prevention] : violence and injury prevention [No Medications] : ~He/She~ is not on any medications [Full Activity without restrictions including Physical Education & Athletics] : Full Activity without restrictions including Physical Education & Athletics [] : The components of the vaccine(s) to be administered today are listed in the plan of care. The disease(s) for which the vaccine(s) are intended to prevent and the risks have been discussed with the caretaker.  The risks are also included in the appropriate vaccination information statements which have been provided to the patient's caregiver.  The caregiver has given consent to vaccinate. [FreeTextEntry1] : Continue balanced diet with all food groups. Brush teeth twice a day with toothbrush. Recommend visit to dentist. Maintain consistent daily routines and sleep schedule. Personal hygiene, puberty, and sexual health reviewed. Risky behaviors assessed. School discussed. Limit screen time to no more than 2 hours per day. Encourage physical activity.\par Return 1 year for routine well child check.\par

## 2023-06-22 NOTE — CONSULT NOTE ADULT - SUBJECTIVE AND OBJECTIVE BOX
NEPHROLOGY CONSULTATION NOTE    57 y/o F with PMHx of ESRD on HD T/Th/Sat (follows w nephro Dr Solis Borja), hyperlipidemia, HTN, PVD, CVA and hearing impaired presents to the ED for abdominal pain diffusely for the past several days a/w n/v/d. CT a/p showed no acute intra abdominal pathologies  Patient seen at bedside, complaining still of abdominal pain and generalized pruritis.  VS elevated BP.    PAST MEDICAL & SURGICAL HISTORY:  PVD (peripheral vascular disease)      Benign essential HTN      Intellectual disability      Hearing impaired      HLD (hyperlipidemia)      Chronic kidney disease, unspecified CKD stage      H/O vascular surgery  left leg        Allergies:  NSAIDs (Nephrotoxicity)  penicillin (Rash)    Home Medications Reviewed  Hospital Medications:   MEDICATIONS  (STANDING):  aspirin enteric coated 81 milliGRAM(s) Oral daily  atorvastatin 80 milliGRAM(s) Oral at bedtime  calcitriol   Capsule 0.25 MICROGram(s) Oral daily  clopidogrel Tablet 75 milliGRAM(s) Oral daily  ferrous    sulfate 325 milliGRAM(s) Oral daily  gabapentin 300 milliGRAM(s) Oral three times a day  hydrALAZINE 25 milliGRAM(s) Oral two times a day  methocarbamol 500 milliGRAM(s) Oral two times a day  NIFEdipine XL 60 milliGRAM(s) Oral daily  pantoprazole    Tablet 40 milliGRAM(s) Oral before breakfast  sevelamer carbonate 800 milliGRAM(s) Oral three times a day with meals  sucralfate 1 Gram(s) Oral four times a day    SOCIAL HISTORY:  Denies ETOH,Smoking,   FAMILY HISTORY:      REVIEW OF SYSTEMS:  CONSTITUTIONAL: No weakness, fevers or chills  EYES/ENT: hearing difficulty  NECK: No pain or stiffness  RESPIRATORY: No cough, wheezing, hemoptysis; No shortness of breath  CARDIOVASCULAR: No chest pain or palpitations.  GASTROINTESTINAL: epigastric abdominal pain  GENITOURINARY: No dysuria, frequency, foamy urine, urinary urgency, incontinence or hematuria  NEUROLOGICAL: No numbness or weakness  SKIN: generalized pruritis   VASCULAR: No bilateral lower extremity edema.   All other review of systems is negative unless indicated above.    VITALS:  Vital Signs Last 24 Hrs  T(C): 36.7 (22 Jun 2023 01:57), Max: 36.7 (22 Jun 2023 01:57)  T(F): 98.1 (22 Jun 2023 01:57), Max: 98.1 (22 Jun 2023 01:57)  HR: 84 (22 Jun 2023 07:00) (84 - 88)  BP: 178/75 (22 Jun 2023 07:00) (178/75 - 212/102)  BP(mean): --  RR: 18 (22 Jun 2023 07:00) (18 - 20)  SpO2: 99% (22 Jun 2023 07:00) (98% - 99%)    Parameters below as of 22 Jun 2023 01:57  Patient On (Oxygen Delivery Method): room air        Height (cm): 157.5 (06-22 @ 01:57)  Weight (kg): 66.4 (06-22 @ 01:57)  BMI (kg/m2): 26.8 (06-22 @ 01:57)  BSA (m2): 1.67 (06-22 @ 01:57)  PHYSICAL EXAM:  Constitutional: NAD  HEENT: anicteric sclera, oropharynx clear, MMM  Neck: No JVD  Respiratory: CTAB, no wheezes, rales or rhonchi  Cardiovascular: S1, S2, RRR  Gastrointestinal: BS+, soft, NT/ND  Extremities: No cyanosis or clubbing. b/l YELENA  Neurological: A/O x 3, no focal deficits  : No CVA tenderness. No mcgrath.   Skin: No rashes  Vascular Access: left UE AVF     LABS:  06-22    136  |  92<L>  |  51<H>  ----------------------------<  92  5.2<H>   |  23  |  7.8<HH>    Ca    8.8      22 Jun 2023 04:05    TPro  7.0  /  Alb  4.2  /  TBili  0.6  /  DBili  0.4<H>  /  AST  15  /  ALT  11  /  AlkPhos  314<H>  06-22    Creatinine Trend: 7.8 <--                        10.6   5.69  )-----------( 123      ( 22 Jun 2023 04:05 )             31.1     Urine Studies:  Urinalysis Basic - ( 22 Jun 2023 04:05 )    Color:  / Appearance:  / SG:  / pH:   Gluc: 92 mg/dL / Ketone:   / Bili:  / Urobili:    Blood:  / Protein:  / Nitrite:    Leuk Esterase:  / RBC:  / WBC    Sq Epi:  / Non Sq Epi:  / Bacteria:                            NEPHROLOGY CONSULTATION NOTE    57 y/o F with PMHx of ESRD on HD T/Th/Sat (follows w nephro Dr Solis Borja), hyperlipidemia, HTN, PVD, CVA and hearing impaired presents to the ED for abdominal pain diffusely for the past several days a/w n/v/d. CT a/p showed no acute intra abdominal pathologies  Patient seen at bedside, complaining still of abdominal pain and generalized pruritis.  VS elevated BP.    PAST MEDICAL & SURGICAL HISTORY:  PVD (peripheral vascular disease)      Benign essential HTN      Intellectual disability      Hearing impaired      HLD (hyperlipidemia)      Chronic kidney disease, unspecified CKD stage      H/O vascular surgery  left leg        Allergies:  NSAIDs (Nephrotoxicity)  penicillin (Rash)    Home Medications Reviewed  Hospital Medications:   MEDICATIONS  (STANDING):  aspirin enteric coated 81 milliGRAM(s) Oral daily  atorvastatin 80 milliGRAM(s) Oral at bedtime  calcitriol   Capsule 0.25 MICROGram(s) Oral daily  clopidogrel Tablet 75 milliGRAM(s) Oral daily  ferrous    sulfate 325 milliGRAM(s) Oral daily  gabapentin 300 milliGRAM(s) Oral three times a day  hydrALAZINE 25 milliGRAM(s) Oral two times a day  methocarbamol 500 milliGRAM(s) Oral two times a day  NIFEdipine XL 60 milliGRAM(s) Oral daily  pantoprazole    Tablet 40 milliGRAM(s) Oral before breakfast  sevelamer carbonate 800 milliGRAM(s) Oral three times a day with meals  sucralfate 1 Gram(s) Oral four times a day    SOCIAL HISTORY:  Denies ETOH,Smoking,   FAMILY HISTORY:      REVIEW OF SYSTEMS:  CONSTITUTIONAL: No weakness, fevers or chills  EYES/ENT: hearing difficulty  NECK: No pain or stiffness  RESPIRATORY: No cough, wheezing, hemoptysis; No shortness of breath  CARDIOVASCULAR: No chest pain or palpitations.  GASTROINTESTINAL: epigastric abdominal pain  GENITOURINARY: No dysuria, frequency, foamy urine, urinary urgency, incontinence or hematuria  NEUROLOGICAL: No numbness or weakness  SKIN: generalized pruritis   VASCULAR: No bilateral lower extremity edema.   All other review of systems is negative unless indicated above.    VITALS:  Vital Signs Last 24 Hrs  T(C): 36.7 (22 Jun 2023 01:57), Max: 36.7 (22 Jun 2023 01:57)  T(F): 98.1 (22 Jun 2023 01:57), Max: 98.1 (22 Jun 2023 01:57)  HR: 84 (22 Jun 2023 07:00) (84 - 88)  BP: 178/75 (22 Jun 2023 07:00) (178/75 - 212/102)  BP(mean): --  RR: 18 (22 Jun 2023 07:00) (18 - 20)  SpO2: 99% (22 Jun 2023 07:00) (98% - 99%)    Parameters below as of 22 Jun 2023 01:57  Patient On (Oxygen Delivery Method): room air        Height (cm): 157.5 (06-22 @ 01:57)  Weight (kg): 66.4 (06-22 @ 01:57)  BMI (kg/m2): 26.8 (06-22 @ 01:57)  BSA (m2): 1.67 (06-22 @ 01:57)  PHYSICAL EXAM:  Constitutional: NAD  HEENT: anicteric sclera, oropharynx clear, MMM  Neck: No JVD  Respiratory: CTAB, no wheezes, rales or rhonchi  Cardiovascular: S1, S2, RRR  Gastrointestinal: BS+, soft, NT/ND  Extremities: No cyanosis or clubbing. b/l YELENA  Neurological: A/O x 3, no focal deficits  : No CVA tenderness. No mcgrath.   Skin: No rashes  Vascular Access: left UE AVF     LABS:  06-22    136  |  92<L>  |  51<H>  ----------------------------<  92  5.2<H>   |  23  |  7.8<HH>    Ca    8.8      22 Jun 2023 04:05    TPro  7.0  /  Alb  4.2  /  TBili  0.6  /  DBili  0.4<H>  /  AST  15  /  ALT  11  /  AlkPhos  314<H>  06-22    Creatinine Trend: 7.8 <--                        10.6   5.69  )-----------( 123      ( 22 Jun 2023 04:05 )             31.1     Urine Studies:  Urinalysis Basic - ( 22 Jun 2023 04:05 )    Color:  / Appearance:  / SG:  / pH:   Gluc: 92 mg/dL / Ketone:   / Bili:  / Urobili:    Blood:  / Protein:  / Nitrite:    Leuk Esterase:  / RBC:  / WBC    Sq Epi:  / Non Sq Epi:  / Bacteria:         < from: CT Abdomen and Pelvis w/ IV Cont (06.22.23 @ 04:23) >  1.  Small volume abdominopelvic ascites and anasarca.  2.  Persistent moderate right and small left pleural effusions with   compressive atelectasis.  3.  Findings may reflect fluid overload state on the basis of known ESRD.    < end of copied text >

## 2023-06-22 NOTE — PATIENT PROFILE ADULT - HAVE YOU RECENTLY LOST WEIGHT WITHOUT TRYING?
2/14/2023         RE: Jarod Quesada  [address] [place] [address]        Dear Colleague,    Thank you for referring your patient, Jarod Quesada, to the [facility] [place]. Please see a copy of my visit note below.    [facility]  LIVER TRANSPLANT CLINIC FOLLOW UP       A/P  Mr. Quesada is a 52 Y M s/p SLK 1/14/13 for ETOH.  Medically doing extremely well.    IS Rapa and MMF. No changes to medications today.  Graft function Excellent Labs up to date and normal.     Obesity He has lost over 90 pounds.   Proph Discussed skin cancer screening: UTD on derm  HTN on cozaar and amlodipine. High today in clinic. At home he sees lower numbers. He will monitor at home and d/w Dr. Timmons.  CRC screening Colonoscopy 7/6/20. Lymphoid aggregate  Vaccinations Shingles shot done 2019. Had the flu shot this year. Due for pneumococcal.  Covid vaccine Has had x 3.  RTC 1 y.    Jeannine Biggs MD  Hepatology/ Liver Transplant  [facility]  ==================================================================  PCP: Dr. Gil at [facility] [place].   Nephrology: Dr. Jalen NORIEGA  Mr. Quesada is 52 Y M s/p SLK 1/14/13 for ETOH.    Weight down about 90 pounds. His current weight is 189. He has done this through diet.   He is doing well.     EXPLANT: Cirrhosis, no HCC  IS: SRL and MMF  LABS: Up to date and normal liver tests and CBC    ab Test 01/14/23  0741   PROTTOTAL 6.5   ALBUMIN 4.2   BILITOTAL 1.1   ALKPHOS 47   AST 23   ALT 26     ab Test 01/14/23  0741   WBC 5.6   RBC 5.17   HGB 14.0   HCT 42.9   MCV 83   MCH 27.1   MCHC 32.6   RDW 13.2          Lab Test 01/03/22  0826   PROTTOTAL 6.8   ALBUMIN 3.6   BILITOTAL 1.1   ALKPHOS 57   AST 27   ALT 48     Lab Test 01/03/22  0826   WBC 6.6   RBC 5.08   HGB 13.9   HCT 42.6   MCV 84   MCH 27.4   MCHC 32.6   RDW 13.4        REJECTION: None.  BILIARY ISSUES: None  STENT: No stent on  abdominal imaging post transpalnt  KIDNEY FUNCTION: Sees Dr. Emerson. Stable. No proteinuria          Creatinine   Date Value Ref Range Status   01/14/2023 1.25 (H) 0.67 - 1.17 mg/dL Final   06/18/2021 1.36 (H) 0.66 - 1.25 mg/dL Final     BP: Controlled on amlodipine, metoprolol. Checks at home in the 120s/70s.   PREV: UTD on screening (colonoscopy 6/29/2012 no polyps, area of ulceration. Path showed nonspecific changes. Record is located in Media tab on 9/21/12)   Derm Feb 8/2019  Flu shot had it.  DISEASE RECURRENCE: No alcohol  OTHER ISSUES: Struggles with weight gain. Also has neuropathy, on gabapentin.   HLD  Incisional hernia not worse  NEW ISSUES:  R ROBERTA with Dr. Scott here in early November 2018.     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 He is now a  for a [employer] lab since 2019.  Dtr Fela graduated and is living in [place]. Son is a senior in .  ROS: 10 point ROS neg other than the symptoms noted above in the HPI.  Exam  BP (!) 180/108   Pulse 86   Wt 88 kg (194 lb)   SpO2 98%   BMI 26.31 kg/m      Gen Alert pleasant NAD  Resp No difficulty breathing. No cough  Skin No Jaundice  Eyes No icterus  Neuro STOUT  MSK no muscle wasting  Psyche Pleasant, appropriate. Well groomed.ly History and Medication List.      Again, thank you for allowing me to participate in the care of your patient.        Sincerely,        Jeannine Biggs MD     No (0)

## 2023-06-22 NOTE — ED ADULT NURSE NOTE - CHIEF COMPLAINT QUOTE
She has abdominal pain, vomiting, diarrhea x 3 days and left knee and right thumb pain, unable to sleep as per family. Scheduled for hemodialysis today. Pt hearing disability.  # [phone] (Bichri)

## 2023-06-22 NOTE — ED ADULT NURSE REASSESSMENT NOTE - NSFALLUNIVINTERV_ED_ALL_ED
Bed/Stretcher in lowest position, wheels locked, appropriate side rails in place/Call bell, personal items and telephone in reach/Instruct patient to call for assistance before getting out of bed/chair/stretcher/Non-slip footwear applied when patient is off stretcher/[place] to call system/Physically safe environment - no spills, clutter or unnecessary equipment/Purposeful proactive rounding/Room/bathroom lighting operational, light cord in reach

## 2023-06-22 NOTE — H&P ADULT - NSHPPHYSICALEXAM_GEN_ALL_CORE
Vitals (Last 24 Hrs):  T(C): 36.7 (06-22-23 @ 01:57), Max: 36.7 (06-22-23 @ 01:57)  T(F): 98.1 (06-22-23 @ 01:57), Max: 98.1 (06-22-23 @ 01:57)  HR: 84 (06-22-23 @ 07:00) (84 - 88)  BP: 178/75 (06-22-23 @ 07:00) (178/75 - 212/102)  RR: 18 (06-22-23 @ 07:00) (18 - 20)  SpO2: 99% (06-22-23 @ 07:00) (98% - 99%) Vitals (Last 24 Hrs):  T(C): 36.7 (06-22-23 @ 01:57), Max: 36.7 (06-22-23 @ 01:57)  T(F): 98.1 (06-22-23 @ 01:57), Max: 98.1 (06-22-23 @ 01:57)  HR: 84 (06-22-23 @ 07:00) (84 - 88)  BP: 178/75 (06-22-23 @ 07:00) (178/75 - 212/102)  RR: 18 (06-22-23 @ 07:00) (18 - 20)  SpO2: 99% (06-22-23 @ 07:00) (98% - 99%)    General: In distress due to abdominal pain/vomiting; Pallor (-), Icterus (-), Cyanosis (-), Clubbing (-)  HEENT: Normocephalic, atraumatic, PERRLA, EOMI  PULM: Bilaterally equal breath sounds, wheeze (-), rubs (-), bibasilar crackles (+)  CVS: Normal S1 and S2, murmurs (-), rubs (-), gallops (-)   GI: Soft, diffusely tender more on right side, BS +  MSK: bilateral LE Edema (+), chronic ischemic changes  SKIN: Warm extremities;   NEURO:  Alert; non-verbal but follows commands; moving all extremities

## 2023-06-22 NOTE — CONSULT NOTE ADULT - ASSESSMENT
57 y/o F with PMHx of ESRD on HD T/Th/Sat (follows w nephro Dr Solis Borja), hyperlipidemia, HTN, PVD, CVA and hearing impaired presents to the ED for abdominal pain diffusely for the past several days a/w n/v/d. CT a/p showed no acute intra abdominal pathologies.    Assessment and plan  ESRD on HD/ PVD/ HTN  Last HD on Tuesday, due today  opti 160 3 hrs 2 k bath 2 L UF as tolerated  generalized pruritis and volume overload  check PTH and P levels  start benadryl po 25 mg q6 hrs prn for pruritis  Hgb at target  monitor BP post HD  GI eval   will follow

## 2023-06-22 NOTE — ED PROVIDER NOTE - CARE PLAN
1 Principal Discharge DX:	Chronic intractable pain  Secondary Diagnosis:	ESRD on hemodialysis  Secondary Diagnosis:	PVD (peripheral vascular disease)

## 2023-06-22 NOTE — ED PROVIDER NOTE - CADM POA CENTRAL LINE
Pt was scheduled for 5/24/2017 and 7/17/2017 - she cancelled both appointments. Pt is not scheduled for 7/31/2017. Pt states she is having increasing difficulty swallowing and would like to be seen sooner for her EGD.     Routed to Surgery Scheduling,    No

## 2023-06-23 LAB
ALBUMIN SERPL ELPH-MCNC: 3.9 G/DL — SIGNIFICANT CHANGE UP (ref 3.5–5.2)
ALP SERPL-CCNC: 274 U/L — HIGH (ref 30–115)
ALT FLD-CCNC: 10 U/L — SIGNIFICANT CHANGE UP (ref 0–41)
ANION GAP SERPL CALC-SCNC: 18 MMOL/L — HIGH (ref 7–14)
AST SERPL-CCNC: 26 U/L — SIGNIFICANT CHANGE UP (ref 0–41)
BASOPHILS # BLD AUTO: 0.04 K/UL — SIGNIFICANT CHANGE UP (ref 0–0.2)
BASOPHILS NFR BLD AUTO: 0.8 % — SIGNIFICANT CHANGE UP (ref 0–1)
BILIRUB SERPL-MCNC: 0.7 MG/DL — SIGNIFICANT CHANGE UP (ref 0.2–1.2)
BUN SERPL-MCNC: 27 MG/DL — HIGH (ref 10–20)
CALCIUM SERPL-MCNC: 8.6 MG/DL — SIGNIFICANT CHANGE UP (ref 8.4–10.5)
CHLORIDE SERPL-SCNC: 93 MMOL/L — LOW (ref 98–110)
CO2 SERPL-SCNC: 26 MMOL/L — SIGNIFICANT CHANGE UP (ref 17–32)
CREAT SERPL-MCNC: 5.5 MG/DL — CRITICAL HIGH (ref 0.7–1.5)
EGFR: 8 ML/MIN/1.73M2 — LOW
EOSINOPHIL # BLD AUTO: 0.07 K/UL — SIGNIFICANT CHANGE UP (ref 0–0.7)
EOSINOPHIL NFR BLD AUTO: 1.4 % — SIGNIFICANT CHANGE UP (ref 0–8)
GLUCOSE SERPL-MCNC: 111 MG/DL — HIGH (ref 70–99)
HCT VFR BLD CALC: 28.7 % — LOW (ref 37–47)
HGB BLD-MCNC: 9.5 G/DL — LOW (ref 12–16)
IMM GRANULOCYTES NFR BLD AUTO: 0.4 % — HIGH (ref 0.1–0.3)
IRON SATN MFR SERPL: 191 UG/DL — HIGH (ref 35–150)
IRON SATN MFR SERPL: 82 % — HIGH (ref 15–50)
LYMPHOCYTES # BLD AUTO: 0.71 K/UL — LOW (ref 1.2–3.4)
LYMPHOCYTES # BLD AUTO: 14.2 % — LOW (ref 20.5–51.1)
MAGNESIUM SERPL-MCNC: 2.2 MG/DL — SIGNIFICANT CHANGE UP (ref 1.8–2.4)
MCHC RBC-ENTMCNC: 31.1 PG — HIGH (ref 27–31)
MCHC RBC-ENTMCNC: 33.1 G/DL — SIGNIFICANT CHANGE UP (ref 32–37)
MCV RBC AUTO: 94.1 FL — SIGNIFICANT CHANGE UP (ref 81–99)
MONOCYTES # BLD AUTO: 0.37 K/UL — SIGNIFICANT CHANGE UP (ref 0.1–0.6)
MONOCYTES NFR BLD AUTO: 7.4 % — SIGNIFICANT CHANGE UP (ref 1.7–9.3)
NEUTROPHILS # BLD AUTO: 3.78 K/UL — SIGNIFICANT CHANGE UP (ref 1.4–6.5)
NEUTROPHILS NFR BLD AUTO: 75.8 % — HIGH (ref 42.2–75.2)
NRBC # BLD: 0 /100 WBCS — SIGNIFICANT CHANGE UP (ref 0–0)
PHOSPHATE SERPL-MCNC: 5.4 MG/DL — HIGH (ref 2.1–4.9)
PLATELET # BLD AUTO: 81 K/UL — LOW (ref 130–400)
PMV BLD: 12.2 FL — HIGH (ref 7.4–10.4)
POTASSIUM SERPL-MCNC: 4.5 MMOL/L — SIGNIFICANT CHANGE UP (ref 3.5–5)
POTASSIUM SERPL-SCNC: 4.5 MMOL/L — SIGNIFICANT CHANGE UP (ref 3.5–5)
PROT SERPL-MCNC: 6.2 G/DL — SIGNIFICANT CHANGE UP (ref 6–8)
RBC # BLD: 3.05 M/UL — LOW (ref 4.2–5.4)
RBC # FLD: 18.6 % — HIGH (ref 11.5–14.5)
SODIUM SERPL-SCNC: 137 MMOL/L — SIGNIFICANT CHANGE UP (ref 135–146)
TIBC SERPL-MCNC: 233 UG/DL — SIGNIFICANT CHANGE UP (ref 220–430)
UIBC SERPL-MCNC: 42 UG/DL — LOW (ref 110–370)
WBC # BLD: 4.99 K/UL — SIGNIFICANT CHANGE UP (ref 4.8–10.8)
WBC # FLD AUTO: 4.99 K/UL — SIGNIFICANT CHANGE UP (ref 4.8–10.8)

## 2023-06-23 PROCEDURE — 76705 ECHO EXAM OF ABDOMEN: CPT | Mod: 26

## 2023-06-23 PROCEDURE — 99233 SBSQ HOSP IP/OBS HIGH 50: CPT

## 2023-06-23 PROCEDURE — 93880 EXTRACRANIAL BILAT STUDY: CPT | Mod: 26

## 2023-06-23 PROCEDURE — 99223 1ST HOSP IP/OBS HIGH 75: CPT

## 2023-06-23 RX ORDER — FLUTICASONE PROPIONATE 50 MCG
2 SPRAY, SUSPENSION NASAL
Refills: 0 | Status: DISCONTINUED | OUTPATIENT
Start: 2023-06-23 | End: 2023-06-28

## 2023-06-23 RX ORDER — GABAPENTIN 400 MG/1
300 CAPSULE ORAL DAILY
Refills: 0 | Status: DISCONTINUED | OUTPATIENT
Start: 2023-06-23 | End: 2023-06-28

## 2023-06-23 RX ORDER — LORATADINE 10 MG/1
10 TABLET ORAL DAILY
Refills: 0 | Status: DISCONTINUED | OUTPATIENT
Start: 2023-06-23 | End: 2023-06-28

## 2023-06-23 RX ORDER — SIMETHICONE 80 MG/1
80 TABLET, CHEWABLE ORAL EVERY 8 HOURS
Refills: 0 | Status: DISCONTINUED | OUTPATIENT
Start: 2023-06-23 | End: 2023-06-28

## 2023-06-23 RX ORDER — PANTOPRAZOLE SODIUM 20 MG/1
40 TABLET, DELAYED RELEASE ORAL
Refills: 0 | Status: DISCONTINUED | OUTPATIENT
Start: 2023-06-23 | End: 2023-06-28

## 2023-06-23 RX ORDER — HYDROMORPHONE HYDROCHLORIDE 2 MG/ML
2 INJECTION INTRAMUSCULAR; INTRAVENOUS; SUBCUTANEOUS EVERY 6 HOURS
Refills: 0 | Status: DISCONTINUED | OUTPATIENT
Start: 2023-06-23 | End: 2023-06-28

## 2023-06-23 RX ADMIN — SIMETHICONE 80 MILLIGRAM(S): 80 TABLET, CHEWABLE ORAL at 21:29

## 2023-06-23 RX ADMIN — SEVELAMER CARBONATE 800 MILLIGRAM(S): 2400 POWDER, FOR SUSPENSION ORAL at 09:52

## 2023-06-23 RX ADMIN — PANTOPRAZOLE SODIUM 40 MILLIGRAM(S): 20 TABLET, DELAYED RELEASE ORAL at 06:19

## 2023-06-23 RX ADMIN — HYDROMORPHONE HYDROCHLORIDE 2 MILLIGRAM(S): 2 INJECTION INTRAMUSCULAR; INTRAVENOUS; SUBCUTANEOUS at 21:28

## 2023-06-23 RX ADMIN — Medication 1 GRAM(S): at 17:38

## 2023-06-23 RX ADMIN — HEPARIN SODIUM 5000 UNIT(S): 5000 INJECTION INTRAVENOUS; SUBCUTANEOUS at 06:19

## 2023-06-23 RX ADMIN — SEVELAMER CARBONATE 800 MILLIGRAM(S): 2400 POWDER, FOR SUSPENSION ORAL at 17:38

## 2023-06-23 RX ADMIN — SIMETHICONE 80 MILLIGRAM(S): 80 TABLET, CHEWABLE ORAL at 17:38

## 2023-06-23 RX ADMIN — CALCITRIOL 0.25 MICROGRAM(S): 0.5 CAPSULE ORAL at 12:20

## 2023-06-23 RX ADMIN — Medication 1 GRAM(S): at 12:21

## 2023-06-23 RX ADMIN — Medication 1 GRAM(S): at 03:19

## 2023-06-23 RX ADMIN — PANTOPRAZOLE SODIUM 40 MILLIGRAM(S): 20 TABLET, DELAYED RELEASE ORAL at 17:38

## 2023-06-23 RX ADMIN — METHOCARBAMOL 500 MILLIGRAM(S): 500 TABLET, FILM COATED ORAL at 17:38

## 2023-06-23 RX ADMIN — Medication 3 MILLIGRAM(S): at 21:28

## 2023-06-23 RX ADMIN — Medication 2 SPRAY(S): at 17:37

## 2023-06-23 RX ADMIN — METHOCARBAMOL 500 MILLIGRAM(S): 500 TABLET, FILM COATED ORAL at 06:19

## 2023-06-23 RX ADMIN — Medication 60 MILLIGRAM(S): at 06:18

## 2023-06-23 RX ADMIN — Medication 25 MILLIGRAM(S): at 06:19

## 2023-06-23 RX ADMIN — HYDROMORPHONE HYDROCHLORIDE 2 MILLIGRAM(S): 2 INJECTION INTRAMUSCULAR; INTRAVENOUS; SUBCUTANEOUS at 09:52

## 2023-06-23 RX ADMIN — GABAPENTIN 300 MILLIGRAM(S): 400 CAPSULE ORAL at 12:21

## 2023-06-23 RX ADMIN — Medication 325 MILLIGRAM(S): at 12:20

## 2023-06-23 RX ADMIN — LORATADINE 10 MILLIGRAM(S): 10 TABLET ORAL at 12:20

## 2023-06-23 RX ADMIN — SEVELAMER CARBONATE 800 MILLIGRAM(S): 2400 POWDER, FOR SUSPENSION ORAL at 12:21

## 2023-06-23 RX ADMIN — GABAPENTIN 300 MILLIGRAM(S): 400 CAPSULE ORAL at 06:19

## 2023-06-23 RX ADMIN — Medication 25 MILLIGRAM(S): at 17:38

## 2023-06-23 RX ADMIN — Medication 1 GRAM(S): at 06:18

## 2023-06-23 RX ADMIN — ATORVASTATIN CALCIUM 80 MILLIGRAM(S): 80 TABLET, FILM COATED ORAL at 21:29

## 2023-06-23 RX ADMIN — Medication 81 MILLIGRAM(S): at 12:20

## 2023-06-23 NOTE — PROGRESS NOTE ADULT - ASSESSMENT
59 y/o F with PMHx of ESRD on HD T/Th/Sat (follows w/ nephro Dr Solis Borja), hyperlipidemia, HTN, PVD, CVA and hearing impaired admitted for RUQ pain    # Intellectual disability; hearing/speech impairment (since as baby)  Sign Language Interpreters are available 24/7 (after usual hours, they may need 30-60 minutes to respond)  Ext [phone]    # Abdominal pain of unclear etiology; + acute hernandez CHF; sev PHTN  CT A/P showed small ascites and hepatomegaly  has known pulmonary hypertension, grade 2 diastolic dysfunction, BNP 70K  potentially passive stretch of liver capsule from fluid overload  possible gastritis due to aspirin use > consulted GI for possible EGD  unlikely uremic gastritis because BUN 27  unlikely gastroparesis because no DM  unlikely ischemic because not hypotensive  unlikely constipation because moving bowels  does NOT seem ischemic - BP not low; HCO3 26  pt moving bowels, so unlikely constipation  sucralfate  PPI  aspiring  HOLD plavix  CHF eval: Dr Gomes (and for PHTN) > requested to consult general cardio  Zofran PRN  dilaudid 2mg po q6 prn pain    # incr AP, prob from passive congestion  check GGT  CLD w/u per GI  RUQ U/S per GI    # neck pain; carotid bruits  ordered carotid U/S  robaxin 500mg po q12  dilaudid 2mg po q6 prn  tylenol 650mg po q6 prn    # thrombocytopenia - unclear etiology  plt are usually normal for her (couple of rare and transient decreases in past)  hold heparin for now  poss clumping?  f/u CBC  H/O eval if < 50K    # ESRD; normo anemia of chr dz  on HD via left UE AVG - T/Th/Sat  HD per nephro  daily weight, fluid restriction, renal restrictions  c/w sevelamer 800mg qac, Calcitriol 0.25 q24    # pruritis  benadryl prn po    # Hypertensive Urgency  c/w Nifedipine xl 60 q24  c/w Hydralazine 25 q12    # DLD  atorvastatin 80    # h/o PVD;  - s/p left femoral artery-posterior tibial artery bypass with autologous venous tissue and left heel ulcer debridement 11/21  Arterial duplex: patent LLE bypass; lt inguinal LN to 2.2cm  on DAPT - HOLD Plavix    # Chronic Normocytic anemia due to CKD  iron sat 82%  check ferr (135 in Nov 11/2022)  continue with Iron supplement if Ferr < 500    # DM - controlled  A1C 6 (11/22/22)  off meds  can D/C FS    # h/o Right pontine CVA (February 2021)  aspirin 81 qd  atorvastatin 80 qd  HOLD Plavix  c/w statin                                                                              ----------------------------------------------------  # DVT prophylaxis: none for now  # GI prophylaxis: ppi  # Diet: renal  # Activity: independent  # Code status: full  # Disposition: to home                                                                           --------------------------------------------------------    # Handoff:  f/u carotid us, ruq us  d/c FS  tx BP;   f/u renal/GI/CHF  f/u plt; SCDs  eventually, pt will go home w. cont outpt HD  f/u CM - still acute

## 2023-06-23 NOTE — PROGRESS NOTE ADULT - SUBJECTIVE AND OBJECTIVE BOX
Nephrology progress note    Patient is seen and examined, events over the last 24 h noted .  Abd pain is slightly better  seen by GI  Allergies:  NSAIDs (Nephrotoxicity)  penicillin (Rash)    Hospital Medications:   MEDICATIONS  (STANDING):  aspirin enteric coated 81 milliGRAM(s) Oral daily  atorvastatin 80 milliGRAM(s) Oral at bedtime  calcitriol   Capsule 0.25 MICROGram(s) Oral daily  ferrous    sulfate 325 milliGRAM(s) Oral daily  fluticasone propionate 50 MICROgram(s)/spray Nasal Spray 2 Spray(s) Both Nostrils two times a day  gabapentin 300 milliGRAM(s) Oral daily  hydrALAZINE 25 milliGRAM(s) Oral two times a day  loratadine 10 milliGRAM(s) Oral daily  methocarbamol 500 milliGRAM(s) Oral two times a day  NIFEdipine XL 60 milliGRAM(s) Oral daily  pantoprazole    Tablet 40 milliGRAM(s) Oral before breakfast  sevelamer carbonate 800 milliGRAM(s) Oral three times a day with meals  simethicone 80 milliGRAM(s) Chew every 8 hours  sucralfate 1 Gram(s) Oral four times a day        VITALS:  T(F): 96.1 (06-23-23 @ 12:31), Max: 97.7 (06-22-23 @ 16:38)  HR: 68 (06-23-23 @ 12:31)  BP: 143/64 (06-23-23 @ 12:31)  RR: 18 (06-23-23 @ 04:52)  SpO2: 97% (06-22-23 @ 17:39)  Wt(kg): --    06-22 @ 07:01  -  06-23 @ 07:00  --------------------------------------------------------  IN: 0 mL / OUT: 2000 mL / NET: -2000 mL          PHYSICAL EXAM:  Constitutional: NAD  HEENT: anicteric sclera  Neck: No JVD  Respiratory: CTA  Cardiovascular: S1, S2, RRR  Gastrointestinal: BS+, soft, NT/ND  Extremities: + peripheral edema  Neurological: Awake alert  : No CVA tenderness. No mcgrath.   Skin: No rashes  Vascular Access: AVF    LABS:  06-23    137  |  93<L>  |  27<H>  ----------------------------<  111<H>  4.5   |  26  |  5.5<HH>    Ca    8.6      23 Jun 2023 08:41  Phos  5.4     06-23  Mg     2.2     06-23    TPro  6.2  /  Alb  3.9  /  TBili  0.7  /  DBili      /  AST  26  /  ALT  10  /  AlkPhos  274<H>  06-23                          9.5    4.99  )-----------( 81       ( 23 Jun 2023 08:41 )             28.7       Urine Studies:  Urinalysis Basic - ( 23 Jun 2023 08:41 )    Color:  / Appearance:  / SG:  / pH:   Gluc: 111 mg/dL / Ketone:   / Bili:  / Urobili:    Blood:  / Protein:  / Nitrite:    Leuk Esterase:  / RBC:  / WBC    Sq Epi:  / Non Sq Epi:  / Bacteria:         RADIOLOGY & ADDITIONAL STUDIES:

## 2023-06-23 NOTE — CONSULT NOTE ADULT - SUBJECTIVE AND OBJECTIVE BOX
Gastroenterology Consultation:    Patient is a 58y old  Female who presents with a chief complaint of Abdominal pain, nausea, vomiting (22 Jun 2023 09:42)    Admitted on: 06-22-23    HPI:  57 y/o F with PMHx of ESRD on HD T/Th/Sat (follows w nephsalvador Borja), hyperlipidemia, HTN, PVD, CVA and hearing impaired presents to the ED with c/o RUQ abdominal pain for past few weeks, associated with nausea, vomiting and poor po intake. Patient is scheduled for dialysis later today but due to significant pain came to the emergency department.  Patient also reports bilateral lower extremity pain as well and pain on right hand. Denies any chest pain, shortness of breath, dizziness, fever,  In the ED BP was 212/102 on arrival, otherwise hemodynamically stable. Labs significant for K 5.2, BUN/Cr 51/7.8; Trops 0.04, BNP >21415; CXR showed bilateral pulmonary congestion and effusions; CT abdomen and pelvis showed - Small volume abdominopelvic ascites and anasarca. Persistent moderate right and small left pleural effusions. GI consulted for Right sided abdominal pain for the last 4 weeks associated with NBNB vomiting started yesterday. Pain is constant, not associated with food, worse after dialysis. GI consulted for abdominal pain and vomiting    Prior EGD/ Colonoscopy:  No previous EGD, had previous colonoscopy no documentation available.     PAST MEDICAL & SURGICAL HISTORY:  PVD (peripheral vascular disease)      Benign essential HTN      Intellectual disability      Hearing impaired      HLD (hyperlipidemia)      Chronic kidney disease, unspecified CKD stage      H/O vascular surgery  left leg            FAMILY HISTORY:  No FH of GI cancers      Social History:  Tobacco: none   Alcohol: none  Drugs: none     Home Medications:        MEDICATIONS  (STANDING):  aspirin enteric coated 81 milliGRAM(s) Oral daily  atorvastatin 80 milliGRAM(s) Oral at bedtime  calcitriol   Capsule 0.25 MICROGram(s) Oral daily  ferrous    sulfate 325 milliGRAM(s) Oral daily  fluticasone propionate 50 MICROgram(s)/spray Nasal Spray 2 Spray(s) Both Nostrils two times a day  gabapentin 300 milliGRAM(s) Oral daily  heparin   Injectable 5000 Unit(s) SubCutaneous every 12 hours  hydrALAZINE 25 milliGRAM(s) Oral two times a day  loratadine 10 milliGRAM(s) Oral daily  methocarbamol 500 milliGRAM(s) Oral two times a day  NIFEdipine XL 60 milliGRAM(s) Oral daily  pantoprazole    Tablet 40 milliGRAM(s) Oral before breakfast  sevelamer carbonate 800 milliGRAM(s) Oral three times a day with meals  simethicone 80 milliGRAM(s) Chew every 8 hours  sucralfate 1 Gram(s) Oral four times a day    MEDICATIONS  (PRN):  acetaminophen     Tablet .. 650 milliGRAM(s) Oral every 6 hours PRN Temp greater or equal to 38C (100.4F), Mild Pain (1 - 3)  diphenhydrAMINE 25 milliGRAM(s) Oral every 6 hours PRN Rash and/or Itching  HYDROmorphone   Tablet 2 milliGRAM(s) Oral every 6 hours PRN Severe Pain (7 - 10)  melatonin 3 milliGRAM(s) Oral at bedtime PRN Insomnia  ondansetron Injectable 4 milliGRAM(s) IV Push every 8 hours PRN Nausea and/or Vomiting      Allergies  NSAIDs (Nephrotoxicity)  penicillin (Rash)      Review of Systems:   Constitutional:  No Fever, No Chills  ENT/Mouth:  No Hearing Changes,  No Difficulty Swallowing  Eyes:  No Eye Pain, No Vision Changes  Cardiovascular:  No Chest Pain, No Palpitations  Respiratory:  No Cough, No Dyspnea  Gastrointestinal:  As described in HPI  Musculoskeletal:  No Joint Swelling, No Back Pain  Skin:  No Skin Lesions, No Jaundice  Neuro:  No Syncope, No Dizziness  Heme/Lymph:  No Bruising, No Bleeding.          Physical Examination:  T(C): 36.2 (06-23-23 @ 04:52), Max: 36.5 (06-22-23 @ 16:38)  HR: 69 (06-23-23 @ 04:52) (69 - 94)  BP: 122/57 (06-23-23 @ 04:52) (122/57 - 233/122)  RR: 18 (06-23-23 @ 04:52) (18 - 18)  SpO2: 97% (06-22-23 @ 17:39) (96% - 97%)      06-22-23 @ 07:01  -  06-23-23 @ 07:00  --------------------------------------------------------  IN: 0 mL / OUT: 2000 mL / NET: -2000 mL      GENERAL: no acute distress.  HEAD:  Atraumatic, Normocephalic  EYES: conjunctiva and sclera clear  NECK: Supple, no JVD or thyromegaly  CHEST/LUNG: Clear to auscultation bilaterally  HEART: Regular rate and rhythm; normal S1, S2, No murmurs.  ABDOMEN: Soft, right side tenderness , nondistended  NEUROLOGY: No asterixis or tremor.   SKIN: Intact, no jaundice    Data:                        9.5    4.99  )-----------( 81       ( 23 Jun 2023 08:41 )             28.7     Hgb Trend:  9.5  06-23-23 @ 08:41  10.6  06-22-23 @ 04:05        06-23    137  |  93<L>  |  27<H>  ----------------------------<  111<H>  4.5   |  26  |  5.5<HH>    Ca    8.6      23 Jun 2023 08:41  Phos  5.4     06-23  Mg     2.2     06-23    TPro  6.2  /  Alb  3.9  /  TBili  0.7  /  DBili  x   /  AST  26  /  ALT  10  /  AlkPhos  274<H>  06-23    Liver panel trend:  TBili 0.7   /   AST 26   /   ALT 10   /   AlkP 274   /   Tptn 6.2   /   Alb 3.9    /   DBili --      06-23  TBili 0.6   /   AST 15   /   ALT 11   /   AlkP 314   /   Tptn 7.0   /   Alb 4.2    /   DBili 0.4      06-22              Radiology:    < from: CT Abdomen and Pelvis w/ IV Cont (06.22.23 @ 04:23) >    IMPRESSION:    Since CT abdomen/pelvis 2/11/2023, no significant change.  1.  Small volume abdominopelvic ascites and anasarca.  2.  Persistent moderate right and small left pleural effusions with   compressive atelectasis.  3.  Findings may reflect fluid overload state on the basis of known ESRD.    < end of copied text >  < from: CT Abdomen and Pelvis w/ IV Cont (06.22.23 @ 04:23) >  HEPATOBILIARY: Diffuse hepatic steatosis. Hepatomegaly measuring 20 cm in   cc dimension. Persistent perihepatic ascites. Periportal edema. Patent   portal vein. No ductal dilatation.    < end of copied text >

## 2023-06-23 NOTE — PROGRESS NOTE ADULT - SUBJECTIVE AND OBJECTIVE BOX
SEN LING 58y Female  MRN#: [id number]   CODE STATUS:     Admission Date: 06-22-23  Hospital Day: 1d    Pt is currently admitted with the primary diagnosis of hepatic capsule distension 2/2 pulmonary HTN    SUBJECTIVE  Hospital Course    6/23 day 1 BP improved from admission, evaluating cause of ascites, treating abdominal and msk pain    Admission H&P  HPI:  57 y/o F with PMHx of ESRD on HD T/Th/Sat (follows w nephro Dr Solis Borja), hyperlipidemia, HTN, PVD, CVA and hearing impaired presents to the ED with c/o RUQ abdominal pain for past few weeks, associated with nausea, vomiting and poor po intake. Patient is scheduled for dialysis later today but due to significant pain came to the emergency department.  Patient also reports bilateral lower extremity pain as well and pain on right hand. Denies any chest pain, shortness of breath, dizziness, fever,  In the ED BP was 212/102 on arrival, otherwise hemodynamically stable. Labs significant for K 5.2, BUN/Cr 51/7.8; Trops 0.04, BNP >76714; CXR showed bilateral pulmonary congestion and effusions; CT abdomen and pelvis showed - Small volume abdominopelvic ascites and anasarca. Persistent moderate right and small left pleural effusions with compressive atelectasis. Findings may reflect fluid overload state on the basis of known ESRD.  Nephro called by ED - patient scheduled for HD today.  (22 Jun 2023 08:40)    Overnight events    One episode of NBNB vomiting    Subjective complaints -  history taken from brother in law and  Jasmina  Abdominal pain RUQ improved but persists  Abdominal distension improved  Appetite is poor despite multiple episodes of diarrhea  Itchy upper back  Rhinorrhea  Facial congestion  Pain of 1st digit R hand and B/L legs  Crying from stress of situation  Not ambulating because feels dizzy when she does  Voiding freely       T(C): 35.6 (06-23-23 @ 12:31)  T(F): 96.1 (06-23-23 @ 12:31)  HR: 68 (06-23-23 @ 12:31)  BP: 143/64 (06-23-23 @ 12:31)  RR: 18 (06-23-23 @ 04:52)  SpO2: 97% (06-22-23 @ 17:39)      PHYSICAL EXAM:   GENERAL: #upset  HEAD:  Atraumatic, Normocephalic  NECK: carotid brui  CHEST/LUNG: Clear to auscultation anteriorly bilaterally; No rales, rhonchi, wheezing, or rubs. Unlabored respirations  HEART: Regular rate and rhythm; No appreciable murmurs, rubs, or gallops  ABDOMEN: # tenderness to palpation RUQ (+)BS; Soft, nontender, nondistended  EXTREMITIES: #R charcot deformity, R feel scarring from prior debirdement, scarring R leg from surgery, L leg from pror injury, B/L sensation intact 2+ Radial Pulses, brisk capillary refill. No clubbing, cyanosis, or edema  NERVOUS SYSTEM:  A&Ox3, no noted facial droop. Moving all extremities w/o difficulty.   SKIN: No rashes or lesions to b/l forearm, face. ttp                                            OBJECTIVE  PAST MEDICAL & SURGICAL HISTORY  PVD (peripheral vascular disease)    Benign essential HTN    Intellectual disability    Hearing impaired    HLD (hyperlipidemia)    Chronic kidney disease, unspecified CKD stage    H/O vascular surgery  left leg                                                ALLERGIES:  NSAIDs (Nephrotoxicity)  penicillin (Rash)                           HOME MEDICATIONS  Home Medications:                           MEDICATIONS:  STANDING MEDICATIONS  aspirin enteric coated 81 milliGRAM(s) Oral daily  atorvastatin 80 milliGRAM(s) Oral at bedtime  calcitriol   Capsule 0.25 MICROGram(s) Oral daily  ferrous    sulfate 325 milliGRAM(s) Oral daily  fluticasone propionate 50 MICROgram(s)/spray Nasal Spray 2 Spray(s) Both Nostrils two times a day  gabapentin 300 milliGRAM(s) Oral daily  hydrALAZINE 25 milliGRAM(s) Oral two times a day  loratadine 10 milliGRAM(s) Oral daily  methocarbamol 500 milliGRAM(s) Oral two times a day  NIFEdipine XL 60 milliGRAM(s) Oral daily  pantoprazole    Tablet 40 milliGRAM(s) Oral two times a day  sevelamer carbonate 800 milliGRAM(s) Oral three times a day with meals  simethicone 80 milliGRAM(s) Chew every 8 hours  sucralfate 1 Gram(s) Oral four times a day    PRN MEDICATIONS  acetaminophen     Tablet .. 650 milliGRAM(s) Oral every 6 hours PRN  diphenhydrAMINE 25 milliGRAM(s) Oral every 6 hours PRN  HYDROmorphone   Tablet 2 milliGRAM(s) Oral every 6 hours PRN  melatonin 3 milliGRAM(s) Oral at bedtime PRN  ondansetron Injectable 4 milliGRAM(s) IV Push every 8 hours PRN                                            ------------------------------------------------------------  T(C): 35.6 (06-23-23 @ 12:31), Max: 36.2 (06-23-23 @ 04:52)  T(F): 96.1 (06-23-23 @ 12:31), Max: 97.2 (06-23-23 @ 04:52)  HR: 68 (06-23-23 @ 12:31) (68 - 94)  BP: 143/64 (06-23-23 @ 12:31) (122/57 - 206/76)  RR: 18 (06-23-23 @ 04:52) (18 - 18)  SpO2: 97% (06-22-23 @ 17:39) (97% - 97%)      06-22-23 @ 07:01  -  06-23-23 @ 07:00  --------------------------------------------------------  IN: 0 mL / OUT: 2000 mL / NET: -2000 mL                                               LABS:                        9.5    4.99  )-----------( 81       ( 23 Jun 2023 08:41 )             28.7     06-23    137  |  93<L>  |  27<H>  ----------------------------<  111<H>  4.5   |  26  |  5.5<HH>    Ca    8.6      23 Jun 2023 08:41  Phos  5.4     06-23  Mg     2.2     06-23    TPro  6.2  /  Alb  3.9  /  TBili  0.7  /  DBili  x   /  AST  26  /  ALT  10  /  AlkPhos  274<H>  06-23      Urinalysis Basic - ( 23 Jun 2023 08:41 )    Color: x / Appearance: x / SG: x / pH: x  Gluc: 111 mg/dL / Ketone: x  / Bili: x / Urobili: x   Blood: x / Protein: x / Nitrite: x   Leuk Esterase: x / RBC: x / WBC x   Sq Epi: x / Non Sq Epi: x / Bacteria: x                CARDIAC MARKERS ( 22 Jun 2023 04:05 )  x     / 0.04 ng/mL / x     / x     / x                     SEN LING 58y Female  MRN#: [id number]   CODE STATUS:     Admission Date: 06-22-23  Hospital Day: 1d    Pt is currently admitted with the primary diagnosis of hepatic capsule distension 2/2 pulmonary HTN    SUBJECTIVE  Hospital Course    6/23 day 1 BP improved from admission, evaluating cause of ascites, treating abdominal and msk pain    Admission H&P  HPI:  57 y/o F with PMHx of ESRD on HD T/Th/Sat (follows w nephro Dr Solis Borja), hyperlipidemia, HTN, PVD, CVA and hearing impaired presents to the ED with c/o RUQ abdominal pain for past few weeks, associated with nausea, vomiting and poor po intake. Patient is scheduled for dialysis later today but due to significant pain came to the emergency department.  Patient also reports bilateral lower extremity pain as well and pain on right hand. Denies any chest pain, shortness of breath, dizziness, fever,  In the ED BP was 212/102 on arrival, otherwise hemodynamically stable. Labs significant for K 5.2, BUN/Cr 51/7.8; Trops 0.04, BNP >42601; CXR showed bilateral pulmonary congestion and effusions; CT abdomen and pelvis showed - Small volume abdominopelvic ascites and anasarca. Persistent moderate right and small left pleural effusions with compressive atelectasis. Findings may reflect fluid overload state on the basis of known ESRD.  Nephro called by ED - patient scheduled for HD today.  (22 Jun 2023 08:40)    Patient is a Yazidi but is willing to accept blood transfusion if necessary. Called [facility] and left a message for a call back.    Overnight events    One episode of NBNB vomiting    Subjective complaints -  history taken from brother in law and  Jasmina  Abdominal pain RUQ improved but persists  Abdominal distension improved  Appetite is poor despite multiple episodes of diarrhea  Itchy upper back  Rhinorrhea  Facial congestion  Pain of 1st digit R hand and B/L legs  Crying from stress of situation  Not ambulating because feels dizzy when she does  Voiding freely       T(C): 35.6 (06-23-23 @ 12:31)  T(F): 96.1 (06-23-23 @ 12:31)  HR: 68 (06-23-23 @ 12:31)  BP: 143/64 (06-23-23 @ 12:31)  RR: 18 (06-23-23 @ 04:52)  SpO2: 97% (06-22-23 @ 17:39)      PHYSICAL EXAM:   GENERAL: #upset  HEAD:  Atraumatic, Normocephalic  NECK: carotid brui  CHEST/LUNG: Clear to auscultation anteriorly bilaterally; No rales, rhonchi, wheezing, or rubs. Unlabored respirations  HEART: Regular rate and rhythm; No appreciable murmurs, rubs, or gallops  ABDOMEN: # tenderness to palpation RUQ (+)BS; Soft, nontender, nondistended  EXTREMITIES: #R charcot deformity, R feel scarring from prior debirdement, scarring R leg from surgery, L leg from pror injury, B/L sensation intact 2+ Radial Pulses, brisk capillary refill. No clubbing, cyanosis, or edema  NERVOUS SYSTEM:  A&Ox3, no noted facial droop. Moving all extremities w/o difficulty.   SKIN: No rashes or lesions to b/l forearm, face. ttp                                            OBJECTIVE  PAST MEDICAL & SURGICAL HISTORY  PVD (peripheral vascular disease)    Benign essential HTN    Intellectual disability    Hearing impaired    HLD (hyperlipidemia)    Chronic kidney disease, unspecified CKD stage    H/O vascular surgery  left leg                                                ALLERGIES:  NSAIDs (Nephrotoxicity)  penicillin (Rash)                           HOME MEDICATIONS  Home Medications:                           MEDICATIONS:  STANDING MEDICATIONS  aspirin enteric coated 81 milliGRAM(s) Oral daily  atorvastatin 80 milliGRAM(s) Oral at bedtime  calcitriol   Capsule 0.25 MICROGram(s) Oral daily  ferrous    sulfate 325 milliGRAM(s) Oral daily  fluticasone propionate 50 MICROgram(s)/spray Nasal Spray 2 Spray(s) Both Nostrils two times a day  gabapentin 300 milliGRAM(s) Oral daily  hydrALAZINE 25 milliGRAM(s) Oral two times a day  loratadine 10 milliGRAM(s) Oral daily  methocarbamol 500 milliGRAM(s) Oral two times a day  NIFEdipine XL 60 milliGRAM(s) Oral daily  pantoprazole    Tablet 40 milliGRAM(s) Oral two times a day  sevelamer carbonate 800 milliGRAM(s) Oral three times a day with meals  simethicone 80 milliGRAM(s) Chew every 8 hours  sucralfate 1 Gram(s) Oral four times a day    PRN MEDICATIONS  acetaminophen     Tablet .. 650 milliGRAM(s) Oral every 6 hours PRN  diphenhydrAMINE 25 milliGRAM(s) Oral every 6 hours PRN  HYDROmorphone   Tablet 2 milliGRAM(s) Oral every 6 hours PRN  melatonin 3 milliGRAM(s) Oral at bedtime PRN  ondansetron Injectable 4 milliGRAM(s) IV Push every 8 hours PRN                                            ------------------------------------------------------------  T(C): 35.6 (06-23-23 @ 12:31), Max: 36.2 (06-23-23 @ 04:52)  T(F): 96.1 (06-23-23 @ 12:31), Max: 97.2 (06-23-23 @ 04:52)  HR: 68 (06-23-23 @ 12:31) (68 - 94)  BP: 143/64 (06-23-23 @ 12:31) (122/57 - 206/76)  RR: 18 (06-23-23 @ 04:52) (18 - 18)  SpO2: 97% (06-22-23 @ 17:39) (97% - 97%)      06-22-23 @ 07:01  -  06-23-23 @ 07:00  --------------------------------------------------------  IN: 0 mL / OUT: 2000 mL / NET: -2000 mL                                               LABS:                        9.5    4.99  )-----------( 81       ( 23 Jun 2023 08:41 )             28.7     06-23    137  |  93<L>  |  27<H>  ----------------------------<  111<H>  4.5   |  26  |  5.5<HH>    Ca    8.6      23 Jun 2023 08:41  Phos  5.4     06-23  Mg     2.2     06-23    TPro  6.2  /  Alb  3.9  /  TBili  0.7  /  DBili  x   /  AST  26  /  ALT  10  /  AlkPhos  274<H>  06-23      Urinalysis Basic - ( 23 Jun 2023 08:41 )    Color: x / Appearance: x / SG: x / pH: x  Gluc: 111 mg/dL / Ketone: x  / Bili: x / Urobili: x   Blood: x / Protein: x / Nitrite: x   Leuk Esterase: x / RBC: x / WBC x   Sq Epi: x / Non Sq Epi: x / Bacteria: x                CARDIAC MARKERS ( 22 Jun 2023 04:05 )  x     / 0.04 ng/mL / x     / x     / x

## 2023-06-23 NOTE — CONSULT NOTE ADULT - ATTENDING COMMENTS
chf with preserved ef/volume overload/hd  pulm htn  mx as per fellows note  recall prn
57 y/o F with PMHx of ESRD on HD T/Th/Sat (follows w nephro Dr Solis Borja), hyperlipidemia, HTN, PVD, CVA and hearing impaired presents to the ED for abdominal pain diffusely for the past several days a/w n/v/d. CT a/p showed no acute intra abdominal pathologies.    ESRD on HD/ PVD/ HTN  Last HD on Tuesday, due today  opti 160 3 hrs 2 k bath 3 L UF as tolerated  generalized pruritis and volume overload  check PTH and P levels  start benadryl po 25 mg q6 hrs prn for pruritis  monitor BP post HD  GI eval   will follow

## 2023-06-23 NOTE — PROGRESS NOTE ADULT - ASSESSMENT
57 y/o F with PMHx of ESRD on HD T/Th/Sat (follows w nephro Dr Solis Borja), hyperlipidemia, HTN, PVD, CVA and hearing impaired presents to the ED for abdominal pain diffusely for the past several days a/w n/v/d. CT a/p showed no acute intra abdominal pathologies.    ESRD on HD/ PVD/ HTN  - had HD yesterday, due tomorrow in the am  opti 160 3 hrs 2 k bath 2 L UF as tolerated  check PTH and P levels  start benadryl po 25 mg q6 hrs prn for pruritis  Hgb at target  fluid restrict 1 L daily  Abdominal pain - plan for EGD next week after optimizing volume status  will follow

## 2023-06-23 NOTE — PROGRESS NOTE ADULT - SUBJECTIVE AND OBJECTIVE BOX
SEN LING  58y  Female  ***My note supersedes ALL resident notes that I sign.  My corrections for their notes are in my note.***    I can be reached directly on [employer] [id number]. My office number is [phone]. My personal cell number is [phone].    Sign Lang done by brother    INTERVAL EVENTS: Here for f/u of abd pain. Pt c/o RUQ abd pain and decr ability to eat for 1 mo. Pt does have BMs. Pt also c/o chr neck and head pain.     T(F): 96.1 (06-23-23 @ 12:31), Max: 97.7 (06-22-23 @ 16:38)  HR: 68 (06-23-23 @ 12:31) (68 - 94)  BP: 143/64 (06-23-23 @ 12:31) (122/57 - 233/122)  RR: 18 (06-23-23 @ 04:52) (18 - 18)  SpO2: 97% (06-22-23 @ 17:39) (96% - 97%)    Gen: NAD; deaf  HEENT: PERRL, EOMI, mouth clr, nose clr  Neck: no nodes, no JVD, thyroid nl; + b/l carotid bruits; + neck pain post and laterally  lungs: decr BS bases  hrt: s1 s2 rrr no murmur  abd: soft, ND, + tender in RUQ and across lower abd R & L; no Splenomegaly; + mild hepatomegaly  ext: no edema, no c/c; chr LE stasis changes  lt upper arm AVG: + bruit and thrill  neuro: aa ox3, cn intact, can move all 4 ext    LABS:                       9.5     (    94.1   4.99  )-----------( ---------      81       ( 23 Jun 2023 08:41 )             28.7    (    18.6     Hemoglobin: 9.5 g/dL (06-23 @ 08:41)  Hemoglobin: 10.6 g/dL (06-22 @ 04:05)    Platelet Count - Automated: 81 K/uL (06-23-23 @ 08:41)  Platelet Count - Automated: 123 K/uL (06-22-23 @ 04:05)    137   (   93   (   111      06-23-23 @ 08:41  ----------------------               4.5   (   26   (   27                             -----                        5.5  Ca  8.6   Mg  2.2    P   5.4     LFT  6.2  (  0.7  (  26       06-23-23 @ 08:41  -------------------------  3.9  (  274  (  10    CARDIAC MARKERS ( 22 Jun 2023 04:05 )  x     / 0.04 ng/mL / x     / x     / x        Urinalysis Basic - ( 23 Jun 2023 08:41 )  Color: x / Appearance: x / SG: x / pH: x  Gluc: 111 mg/dL / Ketone: x  / Bili: x / Urobili: x   Blood: x / Protein: x / Nitrite: x   Leuk Esterase: x / RBC: x / WBC x   Sq Epi: x / Non Sq Epi: x / Bacteria: x    RADIOLOGY & ADDITIONAL TESTS:  < from: VA Duplex Low Ext Arterial, Ltd, Left (06.22.23 @ 08:27) >  Impression:    Patent left lower extremity bypass with low flow in the distal segment.   Suspected single vessel runoff via anterior tibial artery. Left inguinal   lymphadenopathy measuring 2.2 x 0.9 cm.    < end of copied text >    < from: Xray Ankle Complete 3 Views, Left (06.22.23 @ 04:55) >  Impression:    No acute fracture or dislocation is identified.  The ankle mortise is   intact. There is no ankle joint effusion. Osteopenia. Projecting bone   fragment posterior to the talus likely chronic old injury versus   degenerative change.    < end of copied text >    < from: Xray Tibia + Fibula 2 Views, Left (06.22.23 @ 04:54) >  IMPRESSION:    No acute fracture or dislocation. Overlying staples. Partially imaged   proximal lower extremity vascular stent.    < end of copied text >    < from: Xray Foot AP + Lateral + Oblique, Left (06.22.23 @ 04:54) >  IMPRESSION:  No acute osseous abnormality seen.    < end of copied text >    < from: Xray Hand 3 Views, Right (06.22.23 @ 04:53) >  IMPRESSION:    No acute fracture or dislocation. Carpal alignment is maintained. Joint   spaces are preserved.    < end of copied text >    < from: Xray Chest 1 View- PORTABLE-Urgent (06.22.23 @ 04:53) >  IMPRESSION:    Bilateral effusion/opacities. No pneumothorax.    Stable cardiomediastinal silhouette.    Unchanged osseous structures.    < end of copied text >    < from: CT Abdomen and Pelvis w/ IV Cont (06.22.23 @ 04:23) >  LOWER CHEST: Persistent moderate right and small left pleural effusions   with compressive atelectasis. No pleural enhancement. Linear subsegmental   atelectasis. Coronary artery calcifications.    HEPATOBILIARY: Diffuse hepatic steatosis. Hepatomegaly measuring 20 cm in   cc dimension. Persistent perihepatic ascites. Periportal edema. Patent   portal vein. No ductal dilatation.    SPLEEN: Unremarkable.    PANCREAS: Unremarkable.    ADRENAL GLANDS: Unremarkable.    KIDNEYS: Redemonstration of bilateral atrophic kidneys. No   hydronephrosis. Nonobstructive bilateral renal calculi.    ABDOMINOPELVIC NODES: Stable mildly prominent pelvic sidewall and   inguinal lymph nodes.    PELVIC ORGANS: Unremarkable.    PERITONEUM/MESENTERY/BOWEL: Small hiatus hernia. No free air or bowel   obstruction. Persistent small abdominopelvic ascites.    BONES/SOFT TISSUES: Degenerative changes of the spine. Diffuse anasarca.   Stable left inguinal postsurgical changes..    VASCULAR: Atherosclerotic vascular calcifications.    IMPRESSION:    Since CT abdomen/pelvis 2/11/2023, no significant change.  1.  Small volume abdominopelvic ascites and anasarca.  2.  Persistent moderate right and small left pleural effusions with   compressive atelectasis.  3.  Findings may reflect fluid overload state on the basis of known ESRD.    < end of copied text >    < from: TTE Echo Complete w/o Contrast w/ Doppler (02.12.23 @ 12:17) >  Summary:   1. Normal global left ventricular systolic function with moderate   concentric left ventricular hypertrophy. LV Ejection Fraction by   Soto's Method with a biplane EF of 61 %.   2. Grade II diastolic dysfunction.   3. Mildly enlarged right ventricle (RVEDD=4.2cm) with low-normal function.   4. Mildly enlarged left atrium.   5. Mildly enlarged right atrium.   6. Moderate tricuspid regurgitation.   7. Severe pulmonary hypertension (PASP = 62mmHg).   8. Trivial pericardial effusion.    < end of copied text >    MEDICATIONS:    acetaminophen     Tablet .. 650 milliGRAM(s) Oral every 6 hours PRN  aspirin enteric coated 81 milliGRAM(s) Oral daily  atorvastatin 80 milliGRAM(s) Oral at bedtime  calcitriol   Capsule 0.25 MICROGram(s) Oral daily  diphenhydrAMINE 25 milliGRAM(s) Oral every 6 hours PRN  ferrous    sulfate 325 milliGRAM(s) Oral daily  fluticasone propionate 50 MICROgram(s)/spray Nasal Spray 2 Spray(s) Both Nostrils two times a day  gabapentin 300 milliGRAM(s) Oral daily  heparin   Injectable 5000 Unit(s) SubCutaneous every 12 hours  hydrALAZINE 25 milliGRAM(s) Oral two times a day  HYDROmorphone   Tablet 2 milliGRAM(s) Oral every 6 hours PRN  loratadine 10 milliGRAM(s) Oral daily  melatonin 3 milliGRAM(s) Oral at bedtime PRN  methocarbamol 500 milliGRAM(s) Oral two times a day  NIFEdipine XL 60 milliGRAM(s) Oral daily  ondansetron Injectable 4 milliGRAM(s) IV Push every 8 hours PRN  pantoprazole    Tablet 40 milliGRAM(s) Oral before breakfast  sevelamer carbonate 800 milliGRAM(s) Oral three times a day with meals  simethicone 80 milliGRAM(s) Chew every 8 hours  sucralfate 1 Gram(s) Oral four times a day

## 2023-06-23 NOTE — CONSULT NOTE ADULT - ASSESSMENT
58 year old female  with PMHx of ESRD on HD T/Th/Sat (follows w nephsalvador Borja), hyperlipidemia, HTN, PVD, CVA and hearing impaired presents to the ED with c/o RUQ abdominal pain for past few weeks, associated with nausea, vomiting and poor po intake. GI consulted for abdominal pain and vomiting.     # Right Sided abdominal pain, nausea and vomiting. CT unremarkable for acute pathology. LIVER FUNCTIONS - ( 23 Jun 2023 08:41 ) Alb: 3.9 g/dL / Pro: 6.2 g/dL / ALK PHOS: 274 U/L / ALT: 10 U/L / AST: 26 U/L / GGT: x. Isolated elevation of Alkphos. Denies NSAIDs. No recent endoscopy.     PLAN:   Monitor LFTs   RUQ-US  If symptoms persist any other workup is unrevealing, will consider EGD after optimization of volume status.   PPI BID   Supportive care for nausea per primary team.   Please send Hepatitis A IgM, Hepatitis B core IgM, core Ab total, surface Ag, surface Ab, HCV antibody, HCV RNA, Anti HEV   Please send OLVIN, AMA, anti-Smooth Muscle Ab type 1, Anti liver-kidney microsomal Ab, Anti-soluble liver Ag, immunoglobulin panel  Please avoid hepatotoxic medications    # Hepatic Steatosis: Please follow up with hepatology as outpatient for further management.  58 year old female  with PMHx of ESRD on HD T/Th/Sat (follows w nephsalvador Borja), hyperlipidemia, HTN, PVD, CVA and hearing impaired presents to the ED with c/o RUQ abdominal pain for past few weeks, associated with nausea, vomiting and poor po intake. GI consulted for abdominal pain and vomiting.     # Right Sided abdominal pain, nausea and vomiting. CT unremarkable for acute pathology. LIVER FUNCTIONS - ( 23 Jun 2023 08:41 ) Alb: 3.9 g/dL / Pro: 6.2 g/dL / ALK PHOS: 274 U/L / ALT: 10 U/L / AST: 26 U/L / GGT: x. Isolated elevation of Alkphos. Denies NSAIDs. No recent endoscopy.     PLAN:   Monitor LFTs   RUQ-US  If symptoms persist any other workup is unrevealing, will consider EGD next week after optimization of volume status.   PPI BID   Supportive care for nausea per primary team.   Please send Hepatitis A IgM, Hepatitis B core IgM, core Ab total, surface Ag, surface Ab, HCV antibody, HCV RNA, Anti HEV   Please send OLVIN, AMA, anti-Smooth Muscle Ab type 1, Anti liver-kidney microsomal Ab, Anti-soluble liver Ag, immunoglobulin panel  Please avoid hepatotoxic medications    # Hepatic Steatosis: Please follow up with hepatology as outpatient for further management.

## 2023-06-23 NOTE — PROGRESS NOTE ADULT - ASSESSMENT
57 y/o F with PMHx of ESRD on HD T/Th/Sat (follows w/ nephro Dr Solis Borja), hyperlipidemia, HTN, PVD, CVA and hearing impaired presents to the ED with c/o RUQ abdominal pain for past few weeks, associated with nausea, vomiting and poor po intake.    # Abdominal pain of unclear etiology; + acute hernandez CHF; sev PHTN  CT A/P:   pt has sm ascites and hepatomeg w/ known sev PHTN, G2 hernandez dysfxn and BNP 70K - could have passive stretch of liver capsule  pt on DAPT - could have gastritis -> GI eval re poss EGD  doubt uremic gastritis (BUN 27 - prob malnourished)  doubt gastroparesis (no DM)  does NOT seem ischemic - BP not low; HCO3 26  pt moving bowels, so unlikely constipation  pt does not think it is gas, but will try simethicone 80mg po q6 anyway  CT A/P: mod Rt/Sm Lt pl eff; atelectasis; coronary calcif; fatty liver; hepatomeg; ascites; periportal edema; atrophic kid; pelvic and inguinal LN; hiatal hernia;   Echo: (2/2023): EF 61%l G2DD; LAE; RAY; mod TR; sev pHTN; triv pericard eff  c/w sucralfate and PPI  c/w asa; HOLD plavix  CHF eval: Dr Gomes (and for PHTN)  Zofran PRN  dilaudid 2mg po q6 prn pain    # neck pain; carotid bruits  carotid U/S  robaxin 500mg po q12  dilaudid 2mg po q6 prn  tylenol 650mg po q6 prn    # ESRD  on HD via left UE AVG - T/Th/Sat  HD per nephro  daily weight, fluid restriction, renal restrictions  c/w sevelamer 800 qac, Calcitriol 0.25 q24    # Hypertensive Urgency  c/w Nifedipine xl 60 q24  c/w Hydralazine 25 q12    # DLD  c/w atorva 80 HS    # h/o PVD;  - s/p left femoral artery-posterior tibial artery bypass with autologous venous tissue and left heel ulcer debridement 11/21  Arterial duplex: patent LLE bypass; lt inguinal LN to 2.2cm  on DAPT - HOLD Plavix    # Chronic Normocytic anemia due to CKD  iron sat 82%  check ferr (135 in Nov 11/2022)  continue with Iron supplement if Ferr < 500    # DM - controlled  A1C 6 (11/22/22)  off meds  can D/C FS    # h/o Right pontine CVA (February 2021)  c/w ASA  HOLD Plavix  c/w statin    # Intellectual disability; hearing/speech impairment (since as baby)  communicates via sign language    # Activity: Independent     # GI PPX: PPI    # DVT PPX: heparin    # Full Code    # Dispo: INCOMPLETE     59 y/o F with PMHx of ESRD on HD T/Th/Sat (follows w/ nephro Dr Solis Borja), hyperlipidemia, HTN, PVD, CVA and hearing impaired presents to the ED with c/o RUQ abdominal pain for past few weeks, associated with nausea, vomiting and poor po intake.    # Intellectual disability; hearing/speech impairment (since as baby)  Sign Language Interpreters are available 24/7 (after usual hours, they may need 30-60 minutes to respond)  Ext [phone]    # Abdominal pain of unclear etiology; + acute hernandez CHF; sev PHTN  CT A/P:   pt has sm ascites and hepatomeg w/ known sev PHTN, G2 hernandez dysfxn and BNP 70K - could have passive stretch of liver capsule  pt on DAPT - could have gastritis -> GI eval re poss EGD  doubt uremic gastritis (BUN 27 - prob malnourished)  doubt gastroparesis (no DM)  does NOT seem ischemic - BP not low; HCO3 26  pt moving bowels, so unlikely constipation  pt does not think it is gas, but will try simethicone 80mg po q6 anyway  CT A/P: mod Rt/Sm Lt pl eff; atelectasis; coronary calcif; fatty liver; hepatomeg; ascites; periportal edema; atrophic kid; pelvic and inguinal LN; hiatal hernia;   Echo: (2/2023): EF 61%l G2DD; LAE; RAY; mod TR; sev pHTN; triv pericard eff  c/w sucralfate and PPI  c/w asa; HOLD plavix  CHF eval: Dr Gomes (and for PHTN)  Zofran PRN  dilaudid 2mg po q6 prn pain    # incr AP, prob from passive congestion  check GGT  CLD w/u per GI  RUQ U/S per GI    # neck pain; carotid bruits  carotid U/S  robaxin 500mg po q12  dilaudid 2mg po q6 prn  tylenol 650mg po q6 prn    # thrombocytopenia - unclear etiology  plt are usually normal for her (couple of rare and transient decreases in past)  hold heparin for now  poss clumping?  f/u CBC  H/O eval if < 50K    # ESRD; normo anemia of chr dz  on HD via left UE AVG - T/Th/Sat  HD per nephro  daily weight, fluid restriction, renal restrictions  c/w sevelamer 800mg qac, Calcitriol 0.25 q24    # pruritis  benadryl prn po    # Hypertensive Urgency  c/w Nifedipine xl 60 q24  c/w Hydralazine 25 q12    # DLD  c/w atorva 80 HS    # h/o PVD;  - s/p left femoral artery-posterior tibial artery bypass with autologous venous tissue and left heel ulcer debridement 11/21  Arterial duplex: patent LLE bypass; lt inguinal LN to 2.2cm  on DAPT - HOLD Plavix    # Chronic Normocytic anemia due to CKD  iron sat 82%  check ferr (135 in Nov 11/2022)  continue with Iron supplement if Ferr < 500    # DM - controlled  A1C 6 (11/22/22)  off meds  can D/C FS    # h/o Right pontine CVA (February 2021)  c/w ASA  HOLD Plavix  c/w statin    # Activity: Independent     # GI PPX: PPI    # DVT PPX: SCDs (low plt - d/c heparin, cannot use fondaparinux in HD)    # Full Code    # Dispo: Carotid U/S; RUQ U/S; d/c FS; tx BP; f/u renal/GI/CHF; f/u plt; SCDs;   eventually, pt will go home w. cont outpt HD - f/u CM - still acute

## 2023-06-23 NOTE — CONSULT NOTE ADULT - ASSESSMENT
IMPRESSION  HFpEF Exacerbation - pt makes little to no urine at baseline per family members  Moderate to severe pulmonary HTN  PAD s/p LE bypass  HTN, HLD  CVA hearing impairment aphasia   ESRD on HD  Thrombocytopenia  Abdominal pain - gastritis?    RECOMMENDATIONS  Per  pt does no make significant urine  c/w HD as per renal, UF as tolerated  repeat TTE when euvolemic for better assessment of pulmonary HTN  clarify DAPT indication - CVA?  c/w antiplatelets, statins  can f/u as outpatient with Dr Gomes for possible RHC depending on TTE findings  management of abdominal pain per primary team/GI

## 2023-06-23 NOTE — CONSULT NOTE ADULT - SUBJECTIVE AND OBJECTIVE BOX
HPI:  59 y/o F with PMHx of ESRD on HD T//Sat (follows w nephro Dr Solis Borja), hyperlipidemia, HTN, PVD, CVA and hearing impaired presents to the ED with c/o RUQ abdominal pain for past few weeks, associated with nausea, vomiting and poor po intake. Patient is scheduled for dialysis later today but due to significant pain came to the emergency department.  Patient also reports bilateral lower extremity pain as well and pain on right hand. Denies any chest pain, shortness of breath, dizziness, fever,  In the ED BP was 212/102 on arrival, otherwise hemodynamically stable. Labs significant for K 5.2, BUN/Cr 51/7.8; Trops 0.04, BNP >43681; CXR showed bilateral pulmonary congestion and effusions; CT abdomen and pelvis showed - Small volume abdominopelvic ascites and anasarca. Persistent moderate right and small left pleural effusions with compressive atelectasis. Findings may reflect fluid overload state on the basis of known ESRD.  Nephro called by ED - patient scheduled for HD today.  (2023 08:40)      ---  Cardio Fellow additional notes:    59 Y/O F with PMHx of HTN, HLD, PAD, LLE bypass, ESRD on HD, Intellectual disability, CVA hearing impairment aphasia presented to the hospital for worsening abdominal pain found to have anasarca ascites, pulmonary edema, possible gastritis. Cardio consulted for HF and pulmonary HTN.       PAST MEDICAL & SURGICAL HISTORY  PVD (peripheral vascular disease)    Benign essential HTN    Intellectual disability    Hearing impaired    HLD (hyperlipidemia)    Chronic kidney disease, unspecified CKD stage    H/O vascular surgery  left leg        FAMILY HISTORY:  FAMILY HISTORY:      SOCIAL HISTORY:  Social History:      ALLERGIES:  NSAIDs (Nephrotoxicity)  penicillin (Rash)      MEDICATIONS:  aspirin enteric coated 81 milliGRAM(s) Oral daily  atorvastatin 80 milliGRAM(s) Oral at bedtime  calcitriol   Capsule 0.25 MICROGram(s) Oral daily  ferrous    sulfate 325 milliGRAM(s) Oral daily  fluticasone propionate 50 MICROgram(s)/spray Nasal Spray 2 Spray(s) Both Nostrils two times a day  gabapentin 300 milliGRAM(s) Oral daily  hydrALAZINE 25 milliGRAM(s) Oral two times a day  loratadine 10 milliGRAM(s) Oral daily  methocarbamol 500 milliGRAM(s) Oral two times a day  NIFEdipine XL 60 milliGRAM(s) Oral daily  pantoprazole    Tablet 40 milliGRAM(s) Oral two times a day  sevelamer carbonate 800 milliGRAM(s) Oral three times a day with meals  simethicone 80 milliGRAM(s) Chew every 8 hours  sucralfate 1 Gram(s) Oral four times a day    PRN:  acetaminophen     Tablet .. 650 milliGRAM(s) Oral every 6 hours PRN  diphenhydrAMINE 25 milliGRAM(s) Oral every 6 hours PRN  HYDROmorphone   Tablet 2 milliGRAM(s) Oral every 6 hours PRN  melatonin 3 milliGRAM(s) Oral at bedtime PRN  ondansetron Injectable 4 milliGRAM(s) IV Push every 8 hours PRN      HOME MEDICATIONS:  Home Medications:      VITALS:   T(F): 96.5 ( @ 19:46), Max: 98.1 ( @ 01:57)  HR: 67 ( @ 19:46) (67 - 94)  BP: 157/67 ( @ 19:46) (122/57 - 233/122)  BP(mean): --  RR: 18 ( @ 19:46) (18 - 20)  SpO2: 97% ( @ 17:39) (96% - 99%)    I&O's Summary    2023 07:01  -  2023 07:00  --------------------------------------------------------  IN: 0 mL / OUT: 2000 mL / NET: -2000 mL        REVIEW OF SYSTEMS:  unable to obtain     PHYSICAL EXAM:  General: Not in distress.    HEENT: EOMI  Cardio: regular, S1, S2   Pulm: B/L BS.    Abdomen: Soft, non-tender, non-distended. Normoactive bowel sounds  Extremities: No edema b/l LE  Neuro: Aphasic    LABS:                        9.5    4.99  )-----------( 81       ( 2023 08:41 )             28.7         137  |  93<L>  |  27<H>  ----------------------------<  111<H>  4.5   |  26  |  5.5<HH>    Ca    8.6      2023 08:41  Phos  5.4       Mg     2.2         TPro  6.2  /  Alb  3.9  /  TBili  0.7  /  DBili  x   /  AST  26  /  ALT  10  /  AlkPhos  274<H>          CARDIAC MARKERS ( 2023 04:05 )  x     / 0.04 ng/mL / x     / x     / x                COVID-19 PCR: NotDetec (2023 09:34)      RADIOLOGY:  -CXR: < from: Xray Chest 1 View- PORTABLE-Urgent (23 @ 04:53) >  IMPRESSION:    Bilateral effusion/opacities. No pneumothorax.    Stable cardiomediastinal silhouette.    Unchanged osseous structures.    --- End of Report ---    < end of copied text >    -TTE: < from: TTE Echo Complete w/o Contrast w/ Doppler (23 @ 12:17) >      Summary:   1. Normal global left ventricular systolic function with moderate   concentric left ventricular hypertrophy. LV Ejection Fraction by   Soto's Method with a biplane EF of 61 %.   2. Grade II diastolic dysfunction.   3. Mildly enlarged right ventricle (RVEDD=4.2cm) with low-normal   function.   4. Mildly enlarged left atrium.   5. Mildly enlarged right atrium.   6. Moderate tricuspid regurgitation.   7. Severe pulmonary hypertension (PASP = 62mmHg).   8. Trivial pericardial effusion.    PHYSICIAN INTERPRETATION:  Left Ventricle: The left ventricular internal cavity size is normal.   There is moderate concentric left ventricular hypertrophy. Global LV   systolic function was normal. Spectral Doppler shows pseudonormal pattern   of left ventricular myocardial filling (Grade II diastolic dysfunction).   Elevated mean left atrial pressure.  Right Ventricle: The right ventricular size is mildly enlarged. RV   systolic function is low normal.  Left Atrium: Mildly enlarged left atrium. LA volume Index is 36.9 ml/m²   ml/m2.  Right Atrium: Mildly enlarged right atrium. RA Area is 21.00 cm² cm2.  Pericardium: Trivial pericardial effusion is present.  Mitral Valve: The mitral valve is normal in structure. No evidence of   mitral valve stenosis. Trace mitral valve regurgitation is seen.  Tricuspid Valve: Structurally normal tricuspid valve, with normal leaflet   excursion. Moderate tricuspid regurgitation is visualized. Estimated   pulmonary artery systolic pressure is 61.7 mmHg assuming a right atrial   pressure of 20 mmHg, which is consistent with severe pulmonary   hypertension. The flow in the hepatic veins is reversed during   ventricular systole.  Aortic Valve: The aortic valve is trileaflet. No aortic stenosis. Trivial   aortic valve regurgitation is seen.  Pulmonic Valve: Structurally normal pulmonic valve, with normal leaflet   excursion. Trace pulmonic valve regurgitation.  Aorta: The aortic root and ascending aorta are structurally normal, with   no evidence of dilitation.  Venous: A normal flow pattern is recorded from the right upper pulmonary   vein. The inferior vena cava was dilated, with respiratory size variation   greater than 50%.    < end of copied text >    -CCTA: N/A  -STRESS TEST: N/A  -CATHETERIZATION: N/A  -OTHER:  EC Lead ECG:   Ventricular Rate 84 BPM    Atrial Rate 84 BPM    P-R Interval 98 ms    QRS Duration 86 ms    Q-T Interval 422 ms    QTC Calculation(Bazett) 498 ms    P Axis 87 degrees    R Axis 99 degrees    T Axis 68 degrees    Diagnosis Line Sinus rhythm with short NY  Rightward axis  Nonspecific ST abnormality  Prolonged QT  Abnormal ECG    Confirmed by Raf Pardo ([id number]) on 2023 9:15:40 AM ( @ 06:50)      TELEMETRY EVENTS: N/A

## 2023-06-24 LAB
CALCIUM SERPL-MCNC: 8.9 MG/DL — SIGNIFICANT CHANGE UP (ref 8.4–10.5)
FERRITIN SERPL-MCNC: HIGH NG/ML (ref 15–150)
PTH-INTACT FLD-MCNC: 92 PG/ML — HIGH (ref 15–65)

## 2023-06-24 PROCEDURE — 99233 SBSQ HOSP IP/OBS HIGH 50: CPT

## 2023-06-24 PROCEDURE — 99222 1ST HOSP IP/OBS MODERATE 55: CPT

## 2023-06-24 RX ORDER — HYDRALAZINE HCL 50 MG
25 TABLET ORAL ONCE
Refills: 0 | Status: COMPLETED | OUTPATIENT
Start: 2023-06-24 | End: 2023-06-24

## 2023-06-24 RX ADMIN — SEVELAMER CARBONATE 800 MILLIGRAM(S): 2400 POWDER, FOR SUSPENSION ORAL at 17:01

## 2023-06-24 RX ADMIN — SIMETHICONE 80 MILLIGRAM(S): 80 TABLET, CHEWABLE ORAL at 05:54

## 2023-06-24 RX ADMIN — ATORVASTATIN CALCIUM 80 MILLIGRAM(S): 80 TABLET, FILM COATED ORAL at 21:17

## 2023-06-24 RX ADMIN — METHOCARBAMOL 500 MILLIGRAM(S): 500 TABLET, FILM COATED ORAL at 17:01

## 2023-06-24 RX ADMIN — Medication 81 MILLIGRAM(S): at 12:26

## 2023-06-24 RX ADMIN — SIMETHICONE 80 MILLIGRAM(S): 80 TABLET, CHEWABLE ORAL at 21:18

## 2023-06-24 RX ADMIN — Medication 25 MILLIGRAM(S): at 12:26

## 2023-06-24 RX ADMIN — PANTOPRAZOLE SODIUM 40 MILLIGRAM(S): 20 TABLET, DELAYED RELEASE ORAL at 05:55

## 2023-06-24 RX ADMIN — SIMETHICONE 80 MILLIGRAM(S): 80 TABLET, CHEWABLE ORAL at 13:33

## 2023-06-24 RX ADMIN — GABAPENTIN 300 MILLIGRAM(S): 400 CAPSULE ORAL at 12:26

## 2023-06-24 RX ADMIN — PANTOPRAZOLE SODIUM 40 MILLIGRAM(S): 20 TABLET, DELAYED RELEASE ORAL at 17:01

## 2023-06-24 RX ADMIN — Medication 1 GRAM(S): at 05:55

## 2023-06-24 RX ADMIN — LORATADINE 10 MILLIGRAM(S): 10 TABLET ORAL at 12:26

## 2023-06-24 RX ADMIN — Medication 25 MILLIGRAM(S): at 17:01

## 2023-06-24 RX ADMIN — Medication 1 GRAM(S): at 12:27

## 2023-06-24 RX ADMIN — Medication 25 MILLIGRAM(S): at 05:54

## 2023-06-24 RX ADMIN — Medication 2 SPRAY(S): at 05:59

## 2023-06-24 RX ADMIN — CALCITRIOL 0.25 MICROGRAM(S): 0.5 CAPSULE ORAL at 12:26

## 2023-06-24 RX ADMIN — Medication 25 MILLIGRAM(S): at 17:00

## 2023-06-24 RX ADMIN — Medication 3 MILLIGRAM(S): at 03:48

## 2023-06-24 RX ADMIN — HYDROMORPHONE HYDROCHLORIDE 2 MILLIGRAM(S): 2 INJECTION INTRAMUSCULAR; INTRAVENOUS; SUBCUTANEOUS at 03:48

## 2023-06-24 RX ADMIN — Medication 1 GRAM(S): at 23:05

## 2023-06-24 RX ADMIN — SEVELAMER CARBONATE 800 MILLIGRAM(S): 2400 POWDER, FOR SUSPENSION ORAL at 07:46

## 2023-06-24 RX ADMIN — Medication 25 MILLIGRAM(S): at 07:46

## 2023-06-24 RX ADMIN — Medication 2 SPRAY(S): at 17:01

## 2023-06-24 RX ADMIN — METHOCARBAMOL 500 MILLIGRAM(S): 500 TABLET, FILM COATED ORAL at 05:54

## 2023-06-24 RX ADMIN — Medication 1 GRAM(S): at 17:00

## 2023-06-24 RX ADMIN — SEVELAMER CARBONATE 800 MILLIGRAM(S): 2400 POWDER, FOR SUSPENSION ORAL at 12:26

## 2023-06-24 RX ADMIN — Medication 60 MILLIGRAM(S): at 05:55

## 2023-06-24 NOTE — PROGRESS NOTE ADULT - ASSESSMENT
57 y/o F with PMHx of ESRD on HD T/Th/Sat (follows w nephro Dr Solis Borja), hyperlipidemia, HTN, PVD, CVA and hearing impaired presents to the ED for abdominal pain diffusely for the past several days a/w n/v/d. CT a/p showed no acute intra abdominal pathologies.  ESRD on HD/ PVD/ HTN  hd today   opti 160 3 hrs 2 k bath 2 L UF as tolerated  ph at goal   Hgb noted / sat elevated / resume HANNAH with HD    cardiology notes appreciated   bp controlled  follow platelet count   on calcitriol / PTH on the low side/ calcium 8.6  Abdominal pain - plan for EGD next week after optimizing volume status  will follow

## 2023-06-24 NOTE — PROGRESS NOTE ADULT - ASSESSMENT
57 y/o F with PMHx of ESRD on HD T/Th/Sat (follows w/ nephro Dr Solis Borja), hyperlipidemia, HTN, PVD, CVA and hearing impaired presents to the ED with c/o RUQ abdominal pain for past few weeks, associated with nausea, vomiting and poor po intake.    # Intellectual disability; hearing/speech impairment (since as baby)  Sign Language Interpreters are available 24/7 (after usual hours, they may need 30-60 minutes to respond)  Ext [phone]    # Abdominal pain of unclear etiology; + acute hernandez CHF; sev PHTN  CT A/P: pt has sm ascites and hepatomeg w/ known sev PHTN, G2 hernandez dysfxn and BNP 70K - could have passive stretch of liver capsule  pt on DAPT - could have gastritis -> GI eval re poss EGD next week   pt moving bowels, so unlikely constipation  pt does not think it is gas, but will try simethicone 80mg po q6 anyway  CT A/P: mod Rt/Sm Lt pl eff; atelectasis; coronary calcif; fatty liver; hepatomeg; ascites; periportal edema; atrophic kid; pelvic and inguinal LN; hiatal hernia;   Echo: (2/2023): EF 61%l G2DD; LAE; RAY; mod TR; sev pHTN; triv pericard eff  c/w sucralfate and PPI  c/w asa; HOLD plavix  cardio eval noted   Zofran PRN  dilaudid 2mg po q6 prn pain    # incr AP, prob from passive congestion  check GGT  RUQ U/S per GI: IMPRESSION: Hepatomegaly and hepatic steatosis, similar to prior imaging studies.    # neck pain; carotid bruits  carotid U/S noted   robaxin 500mg po q12  dilaudid 2mg po q6 prn  tylenol 650mg po q6 prn    # thrombocytopenia - unclear etiology  plt are usually normal for her (couple of rare and transient decreases in past)  hold heparin for now  poss clumping?  f/u CBC  H/O eval if < 50K    # ESRD; normo anemia of chr dz  on HD via left UE AVG - T/Th/Sat  HD per nephro  daily weight, fluid restriction, renal restrictions  c/w sevelamer 800mg qac, Calcitriol 0.25 q24    # pruritis  benadryl prn po    # Hypertensive Urgency  c/w Nifedipine xl 60 q24  c/w Hydralazine 25 q12    # DLD  c/w atorva 80 HS    # h/o PVD;  - s/p left femoral artery-posterior tibial artery bypass with autologous venous tissue and left heel ulcer debridement 11/21  Arterial duplex: patent LLE bypass; lt inguinal LN to 2.2cm  on DAPT - HOLD Plavix    # Chronic Normocytic anemia due to CKD  iron sat 82%   ferr [id number]    # DM - controlled  A1C 6 (11/22/22)  off meds  can D/C FS    # h/o Right pontine CVA (February 2021)  c/w ASA  HOLD Plavix  c/w statin    # Activity: Independent  # GI PPX: PPI  # DVT PPX: SCDs (low plt - d/c heparin, cannot use fondaparinux in HD)  # Full Code

## 2023-06-24 NOTE — PLAN
[FreeTextEntry1] : #CKD IV\par eGFR = 17\par -f/u CMP to assess for acid base disorders \par -f/u Phosphate and PTH\par -f/u spot urine Protein/Cr\par -f/u w Nephro in 2 months \par \par #UTI w hematuria\par recent UA + for RBC = 10, WBC = 399, +Bacteria \par -repeat UA +culture\par -patient will likely need antibiotics following UA results, will narrow Abx following cultures and sensitivity \par \par #Normocytic anemia\par iron studies as above\par likely 2/2 CKD IV however need further studies to determine\par -f/u CBC\par -if persistent anemia patient will need Hematology referral and may need Iron supplementation \par \par \par  Regular rate and rhythm, Heart sounds S1 S2 present, no murmurs, rubs or gallops

## 2023-06-24 NOTE — PROGRESS NOTE ADULT - SUBJECTIVE AND OBJECTIVE BOX
SUBJECTIVE:    Patient is a 58y old Female who presents with a chief complaint of Abdominal pain, nausea, vomiting (24 Jun 2023 08:28)    Currently admitted to medicine with the primary diagnosis of Chronic intractable pain    Today is hospital day 2d. This morning she is resting comfortably in bed and reports no new issues or overnight events.     PAST MEDICAL & SURGICAL HISTORY  PVD (peripheral vascular disease)    Benign essential HTN    Intellectual disability    Hearing impaired    HLD (hyperlipidemia)    Chronic kidney disease, unspecified CKD stage    H/O vascular surgery  left leg      SOCIAL HISTORY:  Negative for smoking/alcohol/drug use.     ALLERGIES:  NSAIDs (Nephrotoxicity)  penicillin (Rash)    MEDICATIONS:  STANDING MEDICATIONS  aspirin enteric coated 81 milliGRAM(s) Oral daily  atorvastatin 80 milliGRAM(s) Oral at bedtime  calcitriol   Capsule 0.25 MICROGram(s) Oral daily  fluticasone propionate 50 MICROgram(s)/spray Nasal Spray 2 Spray(s) Both Nostrils two times a day  gabapentin 300 milliGRAM(s) Oral daily  hydrALAZINE 25 milliGRAM(s) Oral once  hydrALAZINE 25 milliGRAM(s) Oral two times a day  loratadine 10 milliGRAM(s) Oral daily  methocarbamol 500 milliGRAM(s) Oral two times a day  NIFEdipine XL 60 milliGRAM(s) Oral daily  pantoprazole    Tablet 40 milliGRAM(s) Oral two times a day  sevelamer carbonate 800 milliGRAM(s) Oral three times a day with meals  simethicone 80 milliGRAM(s) Chew every 8 hours  sucralfate 1 Gram(s) Oral four times a day    PRN MEDICATIONS  acetaminophen     Tablet .. 650 milliGRAM(s) Oral every 6 hours PRN  diphenhydrAMINE 25 milliGRAM(s) Oral every 6 hours PRN  HYDROmorphone   Tablet 2 milliGRAM(s) Oral every 6 hours PRN  melatonin 3 milliGRAM(s) Oral at bedtime PRN  ondansetron Injectable 4 milliGRAM(s) IV Push every 8 hours PRN    VITALS:   T(F): 96.9  HR: 76  BP: 180/73  RR: 16  SpO2: 97%    LABS:                        9.5    4.99  )-----------( 81       ( 23 Jun 2023 08:41 )             28.7     06-23    137  |  93<L>  |  27<H>  ----------------------------<  111<H>  4.5   |  26  |  5.5<HH>    Ca    8.6      23 Jun 2023 08:41  Phos  5.4     06-23  Mg     2.2     06-23    TPro  6.2  /  Alb  3.9  /  TBili  0.7  /  DBili  x   /  AST  26  /  ALT  10  /  AlkPhos  274<H>  06-23      Urinalysis Basic - ( 23 Jun 2023 08:41 )    Color: x / Appearance: x / SG: x / pH: x  Gluc: 111 mg/dL / Ketone: x  / Bili: x / Urobili: x   Blood: x / Protein: x / Nitrite: x   Leuk Esterase: x / RBC: x / WBC x   Sq Epi: x / Non Sq Epi: x / Bacteria: x         PHYSICAL EXAM:  GEN: No acute distress  LUNGS: Clear to auscultation bilaterally   HEART: S1/S2 present. RRR.   ABD: Soft, non-tender, non-distended. Bowel sounds present  EXT: NC/NC/NE/2+PP/PALOMINO  NEURO: AAOX3

## 2023-06-24 NOTE — PROGRESS NOTE ADULT - SUBJECTIVE AND OBJECTIVE BOX
seen and examined  24 h events noted   no distress       PAST HISTORY  --------------------------------------------------------------------------------  No significant changes to PMH, PSH, FHx, SHx, unless otherwise noted    ALLERGIES & MEDICATIONS  --------------------------------------------------------------------------------  Allergies    NSAIDs (Nephrotoxicity)  penicillin (Rash)    Intolerances      Standing Inpatient Medications  aspirin enteric coated 81 milliGRAM(s) Oral daily  atorvastatin 80 milliGRAM(s) Oral at bedtime  calcitriol   Capsule 0.25 MICROGram(s) Oral daily  fluticasone propionate 50 MICROgram(s)/spray Nasal Spray 2 Spray(s) Both Nostrils two times a day  gabapentin 300 milliGRAM(s) Oral daily  hydrALAZINE 25 milliGRAM(s) Oral two times a day  loratadine 10 milliGRAM(s) Oral daily  methocarbamol 500 milliGRAM(s) Oral two times a day  NIFEdipine XL 60 milliGRAM(s) Oral daily  pantoprazole    Tablet 40 milliGRAM(s) Oral two times a day  sevelamer carbonate 800 milliGRAM(s) Oral three times a day with meals  simethicone 80 milliGRAM(s) Chew every 8 hours  sucralfate 1 Gram(s) Oral four times a day    PRN Inpatient Medications  acetaminophen     Tablet .. 650 milliGRAM(s) Oral every 6 hours PRN  diphenhydrAMINE 25 milliGRAM(s) Oral every 6 hours PRN  HYDROmorphone   Tablet 2 milliGRAM(s) Oral every 6 hours PRN  melatonin 3 milliGRAM(s) Oral at bedtime PRN  ondansetron Injectable 4 milliGRAM(s) IV Push every 8 hours PRN          VITALS/PHYSICAL EXAM  --------------------------------------------------------------------------------  T(C): 36.1 (06-24-23 @ 04:26), Max: 36.1 (06-24-23 @ 04:26)  HR: 69 (06-24-23 @ 04:26) (67 - 69)  BP: 131/61 (06-24-23 @ 04:26) (131/61 - 161/67)  RR: 18 (06-24-23 @ 04:26) (18 - 18)  SpO2: 97% (06-24-23 @ 04:26) (97% - 97%)  Wt(kg): --        Physical Exam:  	Gen: NAD,  	Abd: +distended, diffuse tenderness   	LE: edema  	Vascular access:av     LABS/STUDIES  --------------------------------------------------------------------------------              9.5    4.99  >-----------<  81       [06-23-23 @ 08:41]              28.7     137  |  93  |  27  ----------------------------<  111      [06-23-23 @ 08:41]  4.5   |  26  |  5.5        Ca     8.6     [06-23-23 @ 08:41]      Mg     2.2     [06-23-23 @ 08:41]      Phos  5.4     [06-23-23 @ 08:41]    TPro  6.2  /  Alb  3.9  /  TBili  0.7  /  DBili  x   /  AST  26  /  ALT  10  /  AlkPhos  274  [06-23-23 @ 08:41]      Creatinine Trend:  SCr 5.5 [06-23 @ 08:41]  SCr 7.8 [06-22 @ 04:05]    Urinalysis - [06-23-23 @ 08:41]      Color  / Appearance  / SG  / pH       Gluc 111 / Ketone   / Bili  / Urobili        Blood  / Protein  / Leuk Est  / Nitrite       RBC  / WBC  / Hyaline  / Gran  / Sq Epi  / Non Sq Epi  / Bacteria       Iron 191, TIBC 233, %sat 82      [06-23-23 @ 08:41]  Ferritin 33375      [06-23-23 @ 08:41]  PTH -- (Ca 8.9)      [06-23-23 @ 08:41]   92  Lipid: chol 158, , HDL 61, LDL --      [11-22-22 @ 06:31]

## 2023-06-25 LAB
ALBUMIN SERPL ELPH-MCNC: 4 G/DL — SIGNIFICANT CHANGE UP (ref 3.5–5.2)
ALP SERPL-CCNC: 325 U/L — HIGH (ref 30–115)
ALT FLD-CCNC: 9 U/L — SIGNIFICANT CHANGE UP (ref 0–41)
ANION GAP SERPL CALC-SCNC: 14 MMOL/L — SIGNIFICANT CHANGE UP (ref 7–14)
AST SERPL-CCNC: 19 U/L — SIGNIFICANT CHANGE UP (ref 0–41)
BILIRUB SERPL-MCNC: 0.5 MG/DL — SIGNIFICANT CHANGE UP (ref 0.2–1.2)
BUN SERPL-MCNC: 26 MG/DL — HIGH (ref 10–20)
CALCIUM SERPL-MCNC: 8.7 MG/DL — SIGNIFICANT CHANGE UP (ref 8.4–10.5)
CHLORIDE SERPL-SCNC: 97 MMOL/L — LOW (ref 98–110)
CO2 SERPL-SCNC: 28 MMOL/L — SIGNIFICANT CHANGE UP (ref 17–32)
CREAT SERPL-MCNC: 5.2 MG/DL — CRITICAL HIGH (ref 0.7–1.5)
EGFR: 9 ML/MIN/1.73M2 — LOW
GLUCOSE BLDC GLUCOMTR-MCNC: 131 MG/DL — HIGH (ref 70–99)
GLUCOSE BLDC GLUCOMTR-MCNC: 149 MG/DL — HIGH (ref 70–99)
GLUCOSE BLDC GLUCOMTR-MCNC: 166 MG/DL — HIGH (ref 70–99)
GLUCOSE SERPL-MCNC: 141 MG/DL — HIGH (ref 70–99)
HCT VFR BLD CALC: 29.9 % — LOW (ref 37–47)
HGB BLD-MCNC: 9.8 G/DL — LOW (ref 12–16)
MCHC RBC-ENTMCNC: 31.2 PG — HIGH (ref 27–31)
MCHC RBC-ENTMCNC: 32.8 G/DL — SIGNIFICANT CHANGE UP (ref 32–37)
MCV RBC AUTO: 95.2 FL — SIGNIFICANT CHANGE UP (ref 81–99)
NRBC # BLD: 0 /100 WBCS — SIGNIFICANT CHANGE UP (ref 0–0)
PHOSPHATE SERPL-MCNC: 2.5 MG/DL — SIGNIFICANT CHANGE UP (ref 2.1–4.9)
PLATELET # BLD AUTO: 68 K/UL — LOW (ref 130–400)
PMV BLD: 14.1 FL — HIGH (ref 7.4–10.4)
POTASSIUM SERPL-MCNC: 4.5 MMOL/L — SIGNIFICANT CHANGE UP (ref 3.5–5)
POTASSIUM SERPL-SCNC: 4.5 MMOL/L — SIGNIFICANT CHANGE UP (ref 3.5–5)
PROT SERPL-MCNC: 6.6 G/DL — SIGNIFICANT CHANGE UP (ref 6–8)
RBC # BLD: 3.14 M/UL — LOW (ref 4.2–5.4)
RBC # FLD: 19 % — HIGH (ref 11.5–14.5)
SODIUM SERPL-SCNC: 139 MMOL/L — SIGNIFICANT CHANGE UP (ref 135–146)
WBC # BLD: 5.57 K/UL — SIGNIFICANT CHANGE UP (ref 4.8–10.8)
WBC # FLD AUTO: 5.57 K/UL — SIGNIFICANT CHANGE UP (ref 4.8–10.8)

## 2023-06-25 RX ORDER — HYDRALAZINE HCL 50 MG
10 TABLET ORAL ONCE
Refills: 0 | Status: COMPLETED | OUTPATIENT
Start: 2023-06-25 | End: 2023-06-25

## 2023-06-25 RX ORDER — LABETALOL HCL 100 MG
10 TABLET ORAL ONCE
Refills: 0 | Status: COMPLETED | OUTPATIENT
Start: 2023-06-25 | End: 2023-06-25

## 2023-06-25 RX ADMIN — SEVELAMER CARBONATE 800 MILLIGRAM(S): 2400 POWDER, FOR SUSPENSION ORAL at 07:44

## 2023-06-25 RX ADMIN — Medication 10 MILLIGRAM(S): at 13:03

## 2023-06-25 RX ADMIN — GABAPENTIN 300 MILLIGRAM(S): 400 CAPSULE ORAL at 11:05

## 2023-06-25 RX ADMIN — Medication 25 MILLIGRAM(S): at 05:46

## 2023-06-25 RX ADMIN — HYDROMORPHONE HYDROCHLORIDE 2 MILLIGRAM(S): 2 INJECTION INTRAMUSCULAR; INTRAVENOUS; SUBCUTANEOUS at 11:12

## 2023-06-25 RX ADMIN — SIMETHICONE 80 MILLIGRAM(S): 80 TABLET, CHEWABLE ORAL at 13:04

## 2023-06-25 RX ADMIN — LORATADINE 10 MILLIGRAM(S): 10 TABLET ORAL at 11:05

## 2023-06-25 RX ADMIN — Medication 10 MILLIGRAM(S): at 14:25

## 2023-06-25 RX ADMIN — Medication 2 SPRAY(S): at 17:42

## 2023-06-25 RX ADMIN — Medication 60 MILLIGRAM(S): at 05:46

## 2023-06-25 RX ADMIN — CALCITRIOL 0.25 MICROGRAM(S): 0.5 CAPSULE ORAL at 11:05

## 2023-06-25 RX ADMIN — Medication 81 MILLIGRAM(S): at 11:04

## 2023-06-25 RX ADMIN — METHOCARBAMOL 500 MILLIGRAM(S): 500 TABLET, FILM COATED ORAL at 05:47

## 2023-06-25 RX ADMIN — Medication 1 GRAM(S): at 17:41

## 2023-06-25 RX ADMIN — METHOCARBAMOL 500 MILLIGRAM(S): 500 TABLET, FILM COATED ORAL at 17:41

## 2023-06-25 RX ADMIN — PANTOPRAZOLE SODIUM 40 MILLIGRAM(S): 20 TABLET, DELAYED RELEASE ORAL at 17:41

## 2023-06-25 RX ADMIN — Medication 2 SPRAY(S): at 06:04

## 2023-06-25 RX ADMIN — SEVELAMER CARBONATE 800 MILLIGRAM(S): 2400 POWDER, FOR SUSPENSION ORAL at 17:41

## 2023-06-25 RX ADMIN — SEVELAMER CARBONATE 800 MILLIGRAM(S): 2400 POWDER, FOR SUSPENSION ORAL at 11:07

## 2023-06-25 RX ADMIN — Medication 1 GRAM(S): at 11:07

## 2023-06-25 RX ADMIN — SIMETHICONE 80 MILLIGRAM(S): 80 TABLET, CHEWABLE ORAL at 05:46

## 2023-06-25 RX ADMIN — PANTOPRAZOLE SODIUM 40 MILLIGRAM(S): 20 TABLET, DELAYED RELEASE ORAL at 05:46

## 2023-06-25 RX ADMIN — Medication 1 GRAM(S): at 05:46

## 2023-06-25 RX ADMIN — ONDANSETRON 4 MILLIGRAM(S): 8 TABLET, FILM COATED ORAL at 14:02

## 2023-06-25 RX ADMIN — Medication 25 MILLIGRAM(S): at 17:40

## 2023-06-25 RX ADMIN — HYDROMORPHONE HYDROCHLORIDE 2 MILLIGRAM(S): 2 INJECTION INTRAMUSCULAR; INTRAVENOUS; SUBCUTANEOUS at 12:01

## 2023-06-25 RX ADMIN — Medication 25 MILLIGRAM(S): at 21:13

## 2023-06-25 RX ADMIN — Medication 10 MILLIGRAM(S): at 13:52

## 2023-06-25 RX ADMIN — ATORVASTATIN CALCIUM 80 MILLIGRAM(S): 80 TABLET, FILM COATED ORAL at 21:11

## 2023-06-25 RX ADMIN — SIMETHICONE 80 MILLIGRAM(S): 80 TABLET, CHEWABLE ORAL at 21:11

## 2023-06-25 NOTE — PROGRESS NOTE ADULT - SUBJECTIVE AND OBJECTIVE BOX
SEN LING 58y Female  MRN#: [id number]   CODE STATUS:     Admission Date: 06-22-23  Hospital Day: 3d    Pt is currently admitted with the primary diagnosis of hepatic capsule distension 2/2 pulmonary HTN    SUBJECTIVE  Hospital Course    6/23 day 1 BP improved from admission, evaluating cause of ascites, treating abdominal and msk pain  6/25 day 3 BP elevated to 200s, treating pain, vomiting    Admission H&P  HPI:  57 y/o F with PMHx of ESRD on HD T/Th/Sat (follows w nephro Dr Solis Borja), hyperlipidemia, HTN, PVD, CVA and hearing impaired presents to the ED with c/o RUQ abdominal pain for past few weeks, associated with nausea, vomiting and poor po intake. Patient is scheduled for dialysis later today but due to significant pain came to the emergency department.  Patient also reports bilateral lower extremity pain as well and pain on right hand. Denies any chest pain, shortness of breath, dizziness, fever,  In the ED BP was 212/102 on arrival, otherwise hemodynamically stable. Labs significant for K 5.2, BUN/Cr 51/7.8; Trops 0.04, BNP >23937; CXR showed bilateral pulmonary congestion and effusions; CT abdomen and pelvis showed - Small volume abdominopelvic ascites and anasarca. Persistent moderate right and small left pleural effusions with compressive atelectasis. Findings may reflect fluid overload state on the basis of known ESRD.  Nephro called by ED - patient scheduled for HD today.  (22 Jun 2023 08:40)        Overnight events    None reported    Subjective complaints    Abdominal pain RUQ improved but persists  Abdominal more distended than 2 days ago  Appetite is poor despite multiple episodes of diarrhea  Itchiness resolved  Rhinorrhea  Facial congestion  Pain of 1st digit R hand and B/L legs resolved  Crying from stress of situation  Not ambulating because feels dizzy when she does  Voiding freely       T(C): 36.3 (06-25-23 @ 12:50)  T(F): 97.3 (06-25-23 @ 12:50)  HR: 71 (06-25-23 @ 16:56)  BP: 169/74 (06-25-23 @ 16:56)  RR: 18 (06-25-23 @ 04:51)  SpO2: 92% (06-25-23 @ 04:51)      PHYSICAL EXAM:   GENERAL: NAD, lying in bed comfortably  HEAD:  Atraumatic, Normocephalic  CHEST/LUNG: Clear to auscultation anteriorly bilaterally; No rales, rhonchi, wheezing, or rubs. Unlabored respirations  HEART: Regular rate and rhythm; No appreciable murmurs, rubs, or gallops  ABDOMEN: tenderness to palpation RUQ  mildly distened (+)BS; Soft, nontender  EXTREMITIES:  #R charcot deformity, R feel scarring from prior debirdement, scarring R leg from surgery, L leg from pror injury, B/L sensation intact 2+ Radial Pulses, brisk capillary refill. No clubbing, cyanosis, or edema  NERVOUS SYSTEM:  A&Ox3, no noted facial droop. Moving all extremities w/o difficulty.   SKIN: No rashes or lesions to b/l forearm, face.                                            OBJECTIVE  PAST MEDICAL & SURGICAL HISTORY  PVD (peripheral vascular disease)    Benign essential HTN    Intellectual disability    Hearing impaired    HLD (hyperlipidemia)    Chronic kidney disease, unspecified CKD stage    H/O vascular surgery  left leg                                                ALLERGIES:  NSAIDs (Nephrotoxicity)  penicillin (Rash)                           HOME MEDICATIONS  Home Medications:                           MEDICATIONS:  STANDING MEDICATIONS  aspirin enteric coated 81 milliGRAM(s) Oral daily  atorvastatin 80 milliGRAM(s) Oral at bedtime  calcitriol   Capsule 0.25 MICROGram(s) Oral daily  fluticasone propionate 50 MICROgram(s)/spray Nasal Spray 2 Spray(s) Both Nostrils two times a day  gabapentin 300 milliGRAM(s) Oral daily  hydrALAZINE 25 milliGRAM(s) Oral two times a day  loratadine 10 milliGRAM(s) Oral daily  methocarbamol 500 milliGRAM(s) Oral two times a day  NIFEdipine XL 60 milliGRAM(s) Oral daily  pantoprazole    Tablet 40 milliGRAM(s) Oral two times a day  sevelamer carbonate 800 milliGRAM(s) Oral three times a day with meals  simethicone 80 milliGRAM(s) Chew every 8 hours  sucralfate 1 Gram(s) Oral four times a day    PRN MEDICATIONS  acetaminophen     Tablet .. 650 milliGRAM(s) Oral every 6 hours PRN  diphenhydrAMINE 25 milliGRAM(s) Oral every 6 hours PRN  HYDROmorphone   Tablet 2 milliGRAM(s) Oral every 6 hours PRN  melatonin 3 milliGRAM(s) Oral at bedtime PRN  ondansetron Injectable 4 milliGRAM(s) IV Push every 8 hours PRN                                            ------------------------------------------------------------  T(C): 36.3 (06-25-23 @ 12:50), Max: 36.7 (06-25-23 @ 04:51)  T(F): 97.3 (06-25-23 @ 12:50), Max: 98.1 (06-25-23 @ 04:51)  HR: 71 (06-25-23 @ 16:56) (71 - 83)  BP: 169/74 (06-25-23 @ 16:56) (169/70 - 213/95)  RR: 18 (06-25-23 @ 04:51) (18 - 18)  SpO2: 92% (06-25-23 @ 04:51) (92% - 92%)      06-24-23 @ 07:01  -  06-25-23 @ 07:00  --------------------------------------------------------  IN: 0 mL / OUT: 2000 mL / NET: -2000 mL                                               LABS:                        9.8    5.57  )-----------( 68       ( 25 Jun 2023 06:01 )             29.9     06-25    139  |  97<L>  |  26<H>  ----------------------------<  141<H>  4.5   |  28  |  5.2<HH>    Ca    8.7      25 Jun 2023 06:01  Phos  2.5     06-25    TPro  6.6  /  Alb  4.0  /  TBili  0.5  /  DBili  x   /  AST  19  /  ALT  9   /  AlkPhos  325<H>  06-25      Urinalysis Basic - ( 25 Jun 2023 06:01 )    Color: x / Appearance: x / SG: x / pH: x  Gluc: 141 mg/dL / Ketone: x  / Bili: x / Urobili: x   Blood: x / Protein: x / Nitrite: x   Leuk Esterase: x / RBC: x / WBC x   Sq Epi: x / Non Sq Epi: x / Bacteria: x

## 2023-06-25 NOTE — PROGRESS NOTE ADULT - ASSESSMENT
59 y/o F with PMHx of ESRD on HD T/Th/Sat (follows w/ nephro Dr Solis Borja), hyperlipidemia, HTN, PVD, CVA and hearing impaired presents to the ED with c/o RUQ abdominal pain for past few weeks, associated with nausea, vomiting and poor po intake.    # Intellectual disability; hearing/speech impairment (since as baby)  Sign Language Interpreters are available 24/7 (after usual hours, they may need 30-60 minutes to respond)  Ext [phone]    # Abdominal pain of unclear etiology; + acute hernandez CHF; sev PHTN  CT A/P: pt has sm ascites and hepatomeg w/ known sev PHTN, G2 hernandez dysfxn and BNP 70K - could have passive stretch of liver capsule  pt on DAPT - could have gastritis -> GI eval re poss EGD this week  pt moving bowels, so unlikely constipation  pt does not think it is gas, on simethicone 80mg po q6   CT A/P: mod Rt/Sm Lt pl eff; atelectasis; coronary calcif; fatty liver; hepatomeg; ascites; periportal edema; atrophic kid; pelvic and inguinal LN; hiatal hernia;   Echo: (2/2023): EF 61%l G2DD; LAE; RAY; mod TR; sev pHTN; triv pericard eff  c/w sucralfate and PPI  c/w asa; HOLD plavix  cardio eval noted   Zofran PRN  dilaudid 2mg po q6 prn pain    # incr AP, prob from passive congestion  RUQ U/S per GI: IMPRESSION: Hepatomegaly and hepatic steatosis, similar to prior imaging studies.    # neck pain; carotid bruits  carotid U/S noted   robaxin 500mg po q12  dilaudid 2mg po q6 prn  tylenol 650mg po q6 prn    # thrombocytopenia - unclear etiology-monitored, 68k today  plt are usually normal for her (couple of rare and transient decreases in past)  hold heparin for now  poss clumping?  f/u CBC  H/O eval if < 50K    # ESRD; normo anemia of chr dz  on HD via left UE AVG - T/Th/Sat  HD per nephro  daily weight, fluid restriction, renal restrictions  c/w sevelamer 800mg qac, Calcitriol 0.25 q24    # pruritis  benadryl prn po    # Hypertensive Urgency  c/w Nifedipine xl 60 q24  c/w Hydralazine 25 q12    # DLD  c/w atorva 80 HS    # h/o PVD;  - s/p left femoral artery-posterior tibial artery bypass with autologous venous tissue and left heel ulcer debridement 11/21  Arterial duplex: patent LLE bypass; lt inguinal LN to 2.2cm  on DAPT - HOLD Plavix    # Chronic Normocytic anemia due to CKD  iron sat 82%   ferr [id number]    # DM - controlled  A1C 6 (11/22/22)  off meds  can D/C FS    # h/o Right pontine CVA (February 2021)  c/w ASA  HOLD Plavix  c/w statin    # Activity: Independent  # GI PPX: PPI  # DVT PPX: SCDs (low plt - d/c heparin, cannot use fondaparinux in HD)  # Full Code

## 2023-06-25 NOTE — PROGRESS NOTE ADULT - ASSESSMENT
57 y/o F with PMHx of ESRD on HD T/Th/Sat (follows w/ nephro Dr Solis Borja), hyperlipidemia, HTN, PVD, CVA and hearing impaired presents to the ED with c/o RUQ abdominal pain for past few weeks, associated with nausea, vomiting and poor po intake.    # Intellectual disability; hearing/speech impairment (since as baby)  Sign Language Interpreters are available 24/7 (after usual hours, they may need 30-60 minutes to respond)  Ext [phone]    # Abdominal pain of unclear etiology; + acute hernandez CHF; sev PHTN  CT A/P: pt has sm ascites and hepatomeg w/ known sev PHTN, G2 hernandez dysfxn and BNP 70K - could have passive stretch of liver capsule  pt on DAPT - could have gastritis -> GI eval re poss EGD this week  pt moving bowels, so unlikely constipation  pt does not think it is gas, on simethicone 80mg po q6   CT A/P: mod Rt/Sm Lt pl eff; atelectasis; coronary calcif; fatty liver; hepatomeg; ascites; periportal edema; atrophic kid; pelvic and inguinal LN; hiatal hernia;   Echo: (2/2023): EF 61%l G2DD; LAE; RAY; mod TR; sev pHTN; triv pericard eff  c/w sucralfate and PPI  c/w asa; HOLD plavix  cardio eval noted   Zofran PRN  dilaudid 2mg po q6 prn pain    # incr AP, prob from passive congestion  RUQ U/S per GI: IMPRESSION: Hepatomegaly and hepatic steatosis, similar to prior imaging studies.    # neck pain; carotid bruits  carotid U/S noted   robaxin 500mg po q12  dilaudid 2mg po q6 prn  tylenol 650mg po q6 prn    # thrombocytopenia - unclear etiology-monitored, 68k today  plt are usually normal for her (couple of rare and transient decreases in past)  hold heparin for now  poss clumping?  f/u CBC  H/O eval if < 50K    # ESRD; normo anemia of chr dz  on HD via left UE AVG - T/Th/Sat  HD per nephro  daily weight, fluid restriction, renal restrictions  c/w sevelamer 800mg qac, Calcitriol 0.25 q24    # pruritis  benadryl prn po    # Hypertensive Urgency  c/w Nifedipine xl 60 q24  c/w Hydralazine 25 q12    # DLD  c/w atorva 80 HS    # h/o PVD;  - s/p left femoral artery-posterior tibial artery bypass with autologous venous tissue and left heel ulcer debridement 11/21  Arterial duplex: patent LLE bypass; lt inguinal LN to 2.2cm  on DAPT - HOLD Plavix    # Chronic Normocytic anemia due to CKD  iron sat 82%   ferr [id number]    # DM - controlled  A1C 6 (11/22/22)  off meds  can D/C FS    # h/o Right pontine CVA (February 2021)  c/w ASA  HOLD Plavix  c/w statin    # Activity: Independent  # GI PPX: PPI  # DVT PPX: SCDs (low plt - d/c heparin, cannot use fondaparinux in HD)  # Full Code

## 2023-06-25 NOTE — PROGRESS NOTE ADULT - ASSESSMENT
59 y/o F with PMHx of ESRD on HD T/Th/Sat (follows w nephro Dr Solis Borja), hyperlipidemia, HTN, PVD, CVA and hearing impaired presents to the ED for abdominal pain diffusely for the past several days a/w n/v/d. CT a/p showed no acute intra abdominal pathologies.  ESRD on HD/ PVD/ HTN  hd yesterday  opti 160 3 hrs 2 k bath 2 L UF as tolerated  ph at goal   Hgb noted / sat elevated / resume HANNAH with HD    cardiology notes appreciated   bp controlled  follow platelet count   on calcitriol / PTH on the low side/ calcium 8.6  Abdominal pain - plan for EGD this week after optimizing volume status  will follow

## 2023-06-25 NOTE — PROGRESS NOTE ADULT - SUBJECTIVE AND OBJECTIVE BOX
SEN LING  58y Female    CHIEF COMPLAINT:    Patient is a 58y old  Female who presents with a chief complaint of Abdominal pain, nausea, vomiting (25 Jun 2023 11:14)      INTERVAL HPI/OVERNIGHT EVENTS:    Patient seen and examined. No acute events overnight. abd pain somewhat better but still complaining of it    ROS: All other systems are negative.    Vital Signs:    T(F): 97.3 (06-25-23 @ 12:50), Max: 98.1 (06-25-23 @ 04:51)  HR: 71 (06-25-23 @ 16:56) (71 - 83)  BP: 169/74 (06-25-23 @ 16:56) (155/71 - 213/95)  RR: 18 (06-25-23 @ 04:51) (18 - 18)  SpO2: 92% (06-25-23 @ 04:51) (92% - 92%)  I&O's Summary    24 Jun 2023 07:01  -  25 Jun 2023 07:00  --------------------------------------------------------  IN: 0 mL / OUT: 2000 mL / NET: -2000 mL      Daily     Daily   CAPILLARY BLOOD GLUCOSE      POCT Blood Glucose.: 149 mg/dL (25 Jun 2023 16:42)  POCT Blood Glucose.: 131 mg/dL (25 Jun 2023 12:45)      PHYSICAL EXAM:    GENERAL:  NAD  SKIN: No rashes or lesions  HEENT: Atraumatic. Normocephalic. PERRL. Moist membranes.  NECK: Supple, No JVD. No lymphadenopathy.  PULMONARY: CTA B/L. No wheezing. No rales  CVS: Normal S1, S2. Rate and Rhythm are regular. No murmurs.  ABDOMEN/GI: Soft, TTP LUQ, Eigastric, LLQ  MSK:  No edema B/L LE.  NEUROLOGIC:  No motor or sensory deficit.  PSYCH: Alert, awake,     Consultant(s) Notes Reviewed:  [x ] YES  [ ] NO  Care Discussed with Consultants/Other Providers [ x] YES  [ ] NO    LABS:                        9.8    5.57  )-----------( 68       ( 25 Jun 2023 06:01 )             29.9     06-25    139  |  97<L>  |  26<H>  ----------------------------<  141<H>  4.5   |  28  |  5.2<HH>    Ca    8.7      25 Jun 2023 06:01  Phos  2.5     06-25    TPro  6.6  /  Alb  4.0  /  TBili  0.5  /  DBili  x   /  AST  19  /  ALT  9   /  AlkPhos  325<H>  06-25              RADIOLOGY & ADDITIONAL TESTS:      Imaging or report Personally Reviewed:  [x] YES  [ ] NO  EKG reviewed: [x] YES  [ ] NO    Medications:  Standing  aspirin enteric coated 81 milliGRAM(s) Oral daily  atorvastatin 80 milliGRAM(s) Oral at bedtime  calcitriol   Capsule 0.25 MICROGram(s) Oral daily  fluticasone propionate 50 MICROgram(s)/spray Nasal Spray 2 Spray(s) Both Nostrils two times a day  gabapentin 300 milliGRAM(s) Oral daily  hydrALAZINE 25 milliGRAM(s) Oral two times a day  loratadine 10 milliGRAM(s) Oral daily  methocarbamol 500 milliGRAM(s) Oral two times a day  NIFEdipine XL 60 milliGRAM(s) Oral daily  pantoprazole    Tablet 40 milliGRAM(s) Oral two times a day  sevelamer carbonate 800 milliGRAM(s) Oral three times a day with meals  simethicone 80 milliGRAM(s) Chew every 8 hours  sucralfate 1 Gram(s) Oral four times a day    PRN Meds  acetaminophen     Tablet .. 650 milliGRAM(s) Oral every 6 hours PRN  diphenhydrAMINE 25 milliGRAM(s) Oral every 6 hours PRN  HYDROmorphone   Tablet 2 milliGRAM(s) Oral every 6 hours PRN  melatonin 3 milliGRAM(s) Oral at bedtime PRN  ondansetron Injectable 4 milliGRAM(s) IV Push every 8 hours PRN

## 2023-06-25 NOTE — PROGRESS NOTE ADULT - SUBJECTIVE AND OBJECTIVE BOX
Nephrology progress note  , events over the last 24 h noted .  HD yesterday    Allergies:  NSAIDs (Nephrotoxicity)  penicillin (Rash)    Hospital Medications:   MEDICATIONS  (STANDING):  aspirin enteric coated 81 milliGRAM(s) Oral daily  atorvastatin 80 milliGRAM(s) Oral at bedtime  calcitriol   Capsule 0.25 MICROGram(s) Oral daily  fluticasone propionate 50 MICROgram(s)/spray Nasal Spray 2 Spray(s) Both Nostrils two times a day  gabapentin 300 milliGRAM(s) Oral daily  hydrALAZINE 25 milliGRAM(s) Oral two times a day  loratadine 10 milliGRAM(s) Oral daily  methocarbamol 500 milliGRAM(s) Oral two times a day  NIFEdipine XL 60 milliGRAM(s) Oral daily  pantoprazole    Tablet 40 milliGRAM(s) Oral two times a day  sevelamer carbonate 800 milliGRAM(s) Oral three times a day with meals  simethicone 80 milliGRAM(s) Chew every 8 hours  sucralfate 1 Gram(s) Oral four times a day        VITALS:  T(F): 98.1 (06-25-23 @ 04:51), Max: 98.1 (06-25-23 @ 04:51)  HR: 75 (06-25-23 @ 04:51)  BP: 177/76 (06-25-23 @ 04:51)  RR: 18 (06-25-23 @ 04:51)  SpO2: 92% (06-25-23 @ 04:51)  Wt(kg): --    06-24 @ 07:01  -  06-25 @ 07:00  --------------------------------------------------------  IN: 0 mL / OUT: 2000 mL / NET: -2000 mL          PHYSICAL EXAM:  Constitutional: NAD  HEENT: anicteric sclera, oropharynx clear, MMM  Neck: No JVD  Respiratory: CTAB, no wheezes, rales or rhonchi  Cardiovascular: S1, S2, RRR  Gastrointestinal: BS+, soft, NT/ND  Extremities: No cyanosis or clubbing. No peripheral edema  :  No mcgrath.   Skin: No rashes    LABS:  06-25    139  |  97<L>  |  26<H>  ----------------------------<  141<H>  4.5   |  28  |  5.2<HH>    Ca    8.7      25 Jun 2023 06:01  Phos  2.5     06-25    TPro  6.6  /  Alb  4.0  /  TBili  0.5  /  DBili      /  AST  19  /  ALT  9   /  AlkPhos  325<H>  06-25                          9.8    5.57  )-----------( 68       ( 25 Jun 2023 06:01 )             29.9       Urine Studies:  Urinalysis Basic - ( 25 Jun 2023 06:01 )    Color:  / Appearance:  / SG:  / pH:   Gluc: 141 mg/dL / Ketone:   / Bili:  / Urobili:    Blood:  / Protein:  / Nitrite:    Leuk Esterase:  / RBC:  / WBC    Sq Epi:  / Non Sq Epi:  / Bacteria:         RADIOLOGY & ADDITIONAL STUDIES:

## 2023-06-26 ENCOUNTER — TRANSCRIPTION ENCOUNTER (OUTPATIENT)
Age: 58
End: 2023-06-26

## 2023-06-26 LAB
ALBUMIN SERPL ELPH-MCNC: 3.9 G/DL — SIGNIFICANT CHANGE UP (ref 3.5–5.2)
ALP SERPL-CCNC: 323 U/L — HIGH (ref 30–115)
ALT FLD-CCNC: 9 U/L — SIGNIFICANT CHANGE UP (ref 0–41)
ANION GAP SERPL CALC-SCNC: 14 MMOL/L — SIGNIFICANT CHANGE UP (ref 7–14)
AST SERPL-CCNC: 16 U/L — SIGNIFICANT CHANGE UP (ref 0–41)
BILIRUB SERPL-MCNC: 0.5 MG/DL — SIGNIFICANT CHANGE UP (ref 0.2–1.2)
BUN SERPL-MCNC: 43 MG/DL — HIGH (ref 10–20)
CALCIUM SERPL-MCNC: 8.5 MG/DL — SIGNIFICANT CHANGE UP (ref 8.4–10.5)
CHLORIDE SERPL-SCNC: 99 MMOL/L — SIGNIFICANT CHANGE UP (ref 98–110)
CO2 SERPL-SCNC: 25 MMOL/L — SIGNIFICANT CHANGE UP (ref 17–32)
CREAT SERPL-MCNC: 6.6 MG/DL — CRITICAL HIGH (ref 0.7–1.5)
CRP SERPL-MCNC: 12.5 MG/L — HIGH
EGFR: 7 ML/MIN/1.73M2 — LOW
ERYTHROCYTE [SEDIMENTATION RATE] IN BLOOD: 10 MM/HR — SIGNIFICANT CHANGE UP (ref 0–20)
GLUCOSE BLDC GLUCOMTR-MCNC: 143 MG/DL — HIGH (ref 70–99)
GLUCOSE BLDC GLUCOMTR-MCNC: 144 MG/DL — HIGH (ref 70–99)
GLUCOSE BLDC GLUCOMTR-MCNC: 156 MG/DL — HIGH (ref 70–99)
GLUCOSE BLDC GLUCOMTR-MCNC: 167 MG/DL — HIGH (ref 70–99)
GLUCOSE SERPL-MCNC: 140 MG/DL — HIGH (ref 70–99)
HCT VFR BLD CALC: 29.6 % — LOW (ref 37–47)
HGB BLD-MCNC: 9.9 G/DL — LOW (ref 12–16)
MCHC RBC-ENTMCNC: 31.9 PG — HIGH (ref 27–31)
MCHC RBC-ENTMCNC: 33.4 G/DL — SIGNIFICANT CHANGE UP (ref 32–37)
MCV RBC AUTO: 95.5 FL — SIGNIFICANT CHANGE UP (ref 81–99)
NRBC # BLD: 0 /100 WBCS — SIGNIFICANT CHANGE UP (ref 0–0)
PHOSPHATE SERPL-MCNC: 2.3 MG/DL — SIGNIFICANT CHANGE UP (ref 2.1–4.9)
PLATELET # BLD AUTO: 69 K/UL — LOW (ref 130–400)
PMV BLD: SIGNIFICANT CHANGE UP (ref 7.4–10.4)
POTASSIUM SERPL-MCNC: 5.1 MMOL/L — HIGH (ref 3.5–5)
POTASSIUM SERPL-SCNC: 5.1 MMOL/L — HIGH (ref 3.5–5)
PROT SERPL-MCNC: 6.4 G/DL — SIGNIFICANT CHANGE UP (ref 6–8)
RBC # BLD: 3.1 M/UL — LOW (ref 4.2–5.4)
RBC # FLD: 19.1 % — HIGH (ref 11.5–14.5)
SARS-COV-2 RNA SPEC QL NAA+PROBE: SIGNIFICANT CHANGE UP
SODIUM SERPL-SCNC: 138 MMOL/L — SIGNIFICANT CHANGE UP (ref 135–146)
WBC # BLD: 6.19 K/UL — SIGNIFICANT CHANGE UP (ref 4.8–10.8)
WBC # FLD AUTO: 6.19 K/UL — SIGNIFICANT CHANGE UP (ref 4.8–10.8)

## 2023-06-26 PROCEDURE — 73562 X-RAY EXAM OF KNEE 3: CPT | Mod: 26,50

## 2023-06-26 PROCEDURE — 99232 SBSQ HOSP IP/OBS MODERATE 35: CPT

## 2023-06-26 PROCEDURE — 99233 SBSQ HOSP IP/OBS HIGH 50: CPT

## 2023-06-26 RX ORDER — HYDRALAZINE HCL 50 MG
2 TABLET ORAL
Qty: 180 | Refills: 0
Start: 2023-06-26 | End: 2023-07-25

## 2023-06-26 RX ORDER — PANTOPRAZOLE SODIUM 20 MG/1
1 TABLET, DELAYED RELEASE ORAL
Qty: 60 | Refills: 0
Start: 2023-06-26 | End: 2023-07-25

## 2023-06-26 RX ORDER — POLYETHYLENE GLYCOL 3350 17 G/17G
17 POWDER, FOR SOLUTION ORAL
Qty: 17 | Refills: 0
Start: 2023-06-26 | End: 2023-07-25

## 2023-06-26 RX ORDER — POLYETHYLENE GLYCOL 3350 17 G/17G
17 POWDER, FOR SOLUTION ORAL
Refills: 0 | Status: DISCONTINUED | OUTPATIENT
Start: 2023-06-26 | End: 2023-06-28

## 2023-06-26 RX ORDER — NIFEDIPINE 30 MG
1 TABLET, EXTENDED RELEASE 24 HR ORAL
Qty: 60 | Refills: 0
Start: 2023-06-26 | End: 2023-07-25

## 2023-06-26 RX ORDER — SENNA PLUS 8.6 MG/1
2 TABLET ORAL AT BEDTIME
Refills: 0 | Status: DISCONTINUED | OUTPATIENT
Start: 2023-06-26 | End: 2023-06-28

## 2023-06-26 RX ORDER — HYDRALAZINE HCL 50 MG
50 TABLET ORAL THREE TIMES A DAY
Refills: 0 | Status: DISCONTINUED | OUTPATIENT
Start: 2023-06-26 | End: 2023-06-28

## 2023-06-26 RX ORDER — NIFEDIPINE 30 MG
90 TABLET, EXTENDED RELEASE 24 HR ORAL DAILY
Refills: 0 | Status: DISCONTINUED | OUTPATIENT
Start: 2023-06-27 | End: 2023-06-28

## 2023-06-26 RX ORDER — LACTULOSE 10 G/15ML
20 SOLUTION ORAL
Refills: 0 | Status: COMPLETED | OUTPATIENT
Start: 2023-06-26 | End: 2023-06-26

## 2023-06-26 RX ADMIN — HYDROMORPHONE HYDROCHLORIDE 2 MILLIGRAM(S): 2 INJECTION INTRAMUSCULAR; INTRAVENOUS; SUBCUTANEOUS at 14:00

## 2023-06-26 RX ADMIN — HYDROMORPHONE HYDROCHLORIDE 2 MILLIGRAM(S): 2 INJECTION INTRAMUSCULAR; INTRAVENOUS; SUBCUTANEOUS at 12:07

## 2023-06-26 RX ADMIN — Medication 2 SPRAY(S): at 17:23

## 2023-06-26 RX ADMIN — Medication 1 GRAM(S): at 05:49

## 2023-06-26 RX ADMIN — Medication 60 MILLIGRAM(S): at 05:49

## 2023-06-26 RX ADMIN — SENNA PLUS 2 TABLET(S): 8.6 TABLET ORAL at 21:48

## 2023-06-26 RX ADMIN — GABAPENTIN 300 MILLIGRAM(S): 400 CAPSULE ORAL at 11:56

## 2023-06-26 RX ADMIN — LORATADINE 10 MILLIGRAM(S): 10 TABLET ORAL at 11:56

## 2023-06-26 RX ADMIN — Medication 25 MILLIGRAM(S): at 05:49

## 2023-06-26 RX ADMIN — Medication 1 GRAM(S): at 17:24

## 2023-06-26 RX ADMIN — Medication 1 GRAM(S): at 00:35

## 2023-06-26 RX ADMIN — PANTOPRAZOLE SODIUM 40 MILLIGRAM(S): 20 TABLET, DELAYED RELEASE ORAL at 17:24

## 2023-06-26 RX ADMIN — Medication 81 MILLIGRAM(S): at 11:57

## 2023-06-26 RX ADMIN — SEVELAMER CARBONATE 800 MILLIGRAM(S): 2400 POWDER, FOR SUSPENSION ORAL at 17:24

## 2023-06-26 RX ADMIN — LACTULOSE 20 GRAM(S): 10 SOLUTION ORAL at 12:47

## 2023-06-26 RX ADMIN — SIMETHICONE 80 MILLIGRAM(S): 80 TABLET, CHEWABLE ORAL at 13:30

## 2023-06-26 RX ADMIN — CALCITRIOL 0.25 MICROGRAM(S): 0.5 CAPSULE ORAL at 11:57

## 2023-06-26 RX ADMIN — Medication 50 MILLIGRAM(S): at 09:19

## 2023-06-26 RX ADMIN — PANTOPRAZOLE SODIUM 40 MILLIGRAM(S): 20 TABLET, DELAYED RELEASE ORAL at 05:49

## 2023-06-26 RX ADMIN — SIMETHICONE 80 MILLIGRAM(S): 80 TABLET, CHEWABLE ORAL at 06:00

## 2023-06-26 RX ADMIN — METHOCARBAMOL 500 MILLIGRAM(S): 500 TABLET, FILM COATED ORAL at 05:50

## 2023-06-26 RX ADMIN — SEVELAMER CARBONATE 800 MILLIGRAM(S): 2400 POWDER, FOR SUSPENSION ORAL at 11:56

## 2023-06-26 RX ADMIN — SEVELAMER CARBONATE 800 MILLIGRAM(S): 2400 POWDER, FOR SUSPENSION ORAL at 08:27

## 2023-06-26 RX ADMIN — ATORVASTATIN CALCIUM 80 MILLIGRAM(S): 80 TABLET, FILM COATED ORAL at 21:48

## 2023-06-26 RX ADMIN — LACTULOSE 20 GRAM(S): 10 SOLUTION ORAL at 09:19

## 2023-06-26 RX ADMIN — ONDANSETRON 4 MILLIGRAM(S): 8 TABLET, FILM COATED ORAL at 16:54

## 2023-06-26 RX ADMIN — SIMETHICONE 80 MILLIGRAM(S): 80 TABLET, CHEWABLE ORAL at 21:48

## 2023-06-26 RX ADMIN — Medication 50 MILLIGRAM(S): at 13:31

## 2023-06-26 RX ADMIN — Medication 2 SPRAY(S): at 05:54

## 2023-06-26 RX ADMIN — Medication 50 MILLIGRAM(S): at 21:48

## 2023-06-26 RX ADMIN — Medication 1 GRAM(S): at 11:56

## 2023-06-26 RX ADMIN — METHOCARBAMOL 500 MILLIGRAM(S): 500 TABLET, FILM COATED ORAL at 17:23

## 2023-06-26 NOTE — DISCHARGE NOTE PROVIDER - HOSPITAL COURSE
59 y/o F with PMHx of ESRD on HD T/Th/Sat (follows w nephro Dr Solis Borja), hyperlipidemia, HTN, PVD, CVA and hearing impaired presents to the ED with c/o RUQ abdominal pain for past few weeks, associated with nausea, vomiting and poor po intake. Patient is scheduled for dialysis later today but due to significant pain came to the emergency department.  Patient also reports bilateral lower extremity pain as well and pain on right hand. Denies any chest pain, shortness of breath, dizziness, fever,  In the ED BP was 212/102 on arrival, otherwise hemodynamically stable. Labs significant for K 5.2, BUN/Cr 51/7.8; Trops 0.04, BNP >41298; CXR showed bilateral pulmonary congestion and effusions; CT abdomen and pelvis showed - Small volume abdominopelvic ascites and anasarca. Persistent moderate right and small left pleural effusions with compressive atelectasis. Findings may reflect fluid overload state on the basis of known ESRD.      Patient abdominal pain improved slightly on pain medications : Hydromorphone and Acetaminophen     # Abdominal pain of unclear etiology; + acute hernandez CHF; sev PHTN  -CT A/P: pt has sm ascites and hepatomeg w/ known sev PHTN, G2 hernandez dysfxn and BNP 70K - could have passive stretch of liver capsule  -pt on DAPT - could have gastritis -> GI eval re poss EGD , they said that the abdominal pain is atypical for PUD , EGD to be done as outpatient   - last bowel motion was 4 days ago according to patient , was started on lactulose and Miralax   -pt is on simethicone 80mg po q6   -CT A/P: mod Rt/Sm Lt pl eff; atelectasis; coronary calcif; fatty liver; hepatomeg; ascites; periportal edema; atrophic kid; pelvic and inguinal LN; hiatal hernia;   -Echo: (2/2023): EF 61%l G2DD; LAE; RAY; mod TR; sev pHTN; triv pericard eff  -c/w sucralfate and PPI *2   -c/w asa; HOLD plavix   -Zofran PRN  -dilaudid 2mg po q6 prn pain      #knee pain with hyperferritinemia : 19402  high ferritin levels with artharlgia and change in skin color.   could be secondary to hemochromatosis   HFE gene sent   Monitor result       # Hypertensive Urgency  BP readings are trending down , but she had one episode of elevated BP today that reached to 180/80 , medications doses were adjusted as below ;  increase Nifedipine xl to 90 q24  increase Hydralazine to 50 q8     BP decreased to 130/70 after getting her anti-hypertensives     # acute hernandez CHF; severe PHTN  Patient has anasarca and ascites with liver congestion   For possible Right sided H cath to be done as outpatient.     # incr AP, prob from passive congestion  RUQ U/S per GI: IMPRESSION: Hepatomegaly and hepatic steatosis, similar to prior imaging studies.    # neck pain; carotid bruits  carotid U/S noted   robaxin 500mg po q12  dilaudid 2mg po q6 prn  tylenol 650mg po q6 prn    # thrombocytopenia - unclear etiology-monitored, 68k today  plt are usually normal for her (couple of rare and transient decreases in past)  hold heparin for now  possible clumping?  f/u CBC daily   H/O evauationl if PLTs < 50K    # ESRD; normo anemia of chr dz  -on HD via left UE AVG - T/Th/Sat  -HD per nephro  -daily weight, fluid restriction, renal restrictions  -c/w sevelamer 800mg qac, Calcitriol 0.25 q24  -Covid 19 swab to be done     # pruritis  benadryl prn po    # DLD  c/w atorva 80 HS    # h/o PVD;  - s/p left femoral artery-posterior tibial artery bypass with autologous venous tissue and left heel ulcer debridement 11/21  Arterial duplex: patent LLE bypass; lt inguinal LN to 2.2cm  on DAPT - HOLD Plavix     DM - controlled  A1C 6 (11/22/22)  off meds      # h/o Right pontine CVA (February 2021)  c/w ASA  HOLD Plavix  c/w statin    # Activity: Independent  # GI PPX: PPI  # DVT PPX : SCDs (low plt - d/c heparin, cannot use fondaparinux in HD)  # Full Code   59 y/o F with PMHx of ESRD on HD T/Th/Sat (follows w nephro Dr Solis Borja), hyperlipidemia, HTN, PVD, CVA and hearing impaired presents to the ED with c/o RUQ abdominal pain for past few weeks, associated with nausea, vomiting and poor po intake. Patient is scheduled for dialysis later today but due to significant pain came to the emergency department.  Patient also reports bilateral lower extremity pain as well and pain on right hand. Denies any chest pain, shortness of breath, dizziness, fever,  In the ED BP was 212/102 on arrival, otherwise hemodynamically stable. Labs significant for K 5.2, BUN/Cr 51/7.8; Trops 0.04, BNP >83187; CXR showed bilateral pulmonary congestion and effusions; CT abdomen and pelvis showed - Small volume abdominopelvic ascites and anasarca. Persistent moderate right and small left pleural effusions with compressive atelectasis. Findings may reflect fluid overload state on the basis of known ESRD.      Patient abdominal pain improved slightly on pain medications : Hydromorphone and Acetaminophen     # Abdominal pain of unclear etiology; + acute hernandez CHF; sev PHTN  -CT A/P: pt has sm ascites and hepatomeg w/ known sev PHTN, G2 hernandez dysfxn and BNP 70K - could have passive stretch of liver capsule  -pt on DAPT - could have gastritis -> GI eval re poss EGD , they said that the abdominal pain is atypical for PUD , EGD to be done as outpatient   - last bowel motion was 4 days ago according to patient , was started on lactulose and Miralax   -pt is on simethicone 80mg po q6   -CT A/P: mod Rt/Sm Lt pl eff; atelectasis; coronary calcif; fatty liver; hepatomeg; ascites; periportal edema; atrophic kid; pelvic and inguinal LN; hiatal hernia;   -Echo: (2/2023): EF 61%l G2DD; LAE; RAY; mod TR; sev pHTN; triv pericard eff  -c/w sucralfate and PPI *2   -c/w asa; HOLD plavix   -Zofran PRN  -dilaudid 2mg po q6 prn pain      #knee pain with hyperferritinemia : 84998  high ferritin levels with artharlgia and change in skin color.   could be secondary to hemochromatosis   HFE gene sent   Monitor result       # Hypertensive Urgency  BP readings are trending down , but she had one episode of elevated BP that reached to 180/80 , medications doses were adjusted as below ;  increase Nifedipine xl to 90 mg q24  increase Hydralazine to 50 mg q8     BP decreased to 130/70 after getting her anti-hypertensives     # acute hernandez CHF; severe PHTN  Patient has anasarca and ascites with liver congestion   For possible Right sided H cath to be done as outpatient.     # incr AP, prob from passive congestion  RUQ U/S per GI: IMPRESSION: Hepatomegaly and hepatic steatosis, similar to prior imaging studies.  GGT sent.     # neck pain; carotid bruits  carotid U/S noted   robaxin 500mg po q12  dilaudid 2mg po q6 prn  tylenol 650mg po q6 prn    # thrombocytopenia - unclear etiology-monitored, 68k today  plt are usually normal for her (couple of rare and transient decreases in past)  hold heparin for now  possible clumping?  f/u CBC daily   H/O evauationl if PLTs < 50K    # ESRD; normo anemia of chr dz  -on HD via left UE AVG - T/Th/Sat  -HD per nephro  -daily weight, fluid restriction, renal restrictions  -c/w sevelamer 800mg qac, Calcitriol 0.25 q24    # pruritis  benadryl prn po    # DLD  c/w atorva 80 HS    # h/o PVD;  - s/p left femoral artery-posterior tibial artery bypass with autologous venous tissue and left heel ulcer debridement 11/21  Arterial duplex: patent LLE bypass; lt inguinal LN to 2.2cm  on DAPT - HOLD Plavix     DM - controlled  A1C 6 (11/22/22)  off meds      # h/o Right pontine CVA (February 2021)  c/w ASA  HOLD Plavix  c/w statin    # Activity: Independent  # GI PPX: PPI  # DVT PPX : SCDs (low plt - d/c heparin, cannot use fondaparinux in HD)  # Full Code   59 y/o F with PMHx of ESRD on HD T/Th/Sat (follows w nephro Dr Solis Borja), hyperlipidemia, HTN, PVD, CVA and hearing impaired presents to the ED with c/o RUQ abdominal pain for past few weeks, associated with nausea, vomiting and poor po intake. Patient is scheduled for dialysis later today but due to significant pain came to the emergency department.  Patient also reports bilateral lower extremity pain as well and pain on right hand. Denies any chest pain, shortness of breath, dizziness, fever,  In the ED BP was 212/102 on arrival, otherwise hemodynamically stable. Labs significant for K 5.2, BUN/Cr 51/7.8; Trops 0.04, BNP >73658; CXR showed bilateral pulmonary congestion and effusions; CT abdomen and pelvis showed - Small volume abdominopelvic ascites and anasarca. Persistent moderate right and small left pleural effusions with compressive atelectasis. Findings may reflect fluid overload state on the basis of known ESRD.      Patient abdominal pain improved slightly on pain medications : Hydromorphone and Acetaminophen     # Abdominal pain of unclear etiology; + acute hernandez CHF; sev PHTN  -CT A/P: pt has sm ascites and hepatomeg w/ known sev PHTN, G2 hernandez dysfxn and BNP 70K - could have passive stretch of liver capsule  -pt on DAPT - could have gastritis -> GI eval re poss EGD , they said that the abdominal pain is atypical for PUD , EGD to be done as outpatient   - last bowel motion was 4 days ago according to patient , was started on lactulose and Miralax   -pt is on simethicone 80mg po q6   -CT A/P: mod Rt/Sm Lt pl eff; atelectasis; coronary calcif; fatty liver; hepatomeg; ascites; periportal edema; atrophic kid; pelvic and inguinal LN; hiatal hernia;   -Echo: (2/2023): EF 61%l G2DD; LAE; RAY; mod TR; sev pHTN; triv pericard eff  -c/w sucralfate and PPI *2   -c/w asa; HOLD plavix   -Zofran PRN  -dilaudid 2mg po q6 prn pain      #knee pain with hyperferritinemia : 90849  high ferritin levels with artharlgia and change in skin color.   could be secondary to hemochromatosis   HFE gene sent   Monitor result       # Hypertensive Urgency  BP readings are trending down , but she had one episode of elevated BP that reached to 180/80 , medications doses were adjusted as below ;  increase Nifedipine xl to 90 mg q24  increase Hydralazine to 50 mg q8     BP decreased to 130/70 after getting her anti-hypertensives     # acute hernandez CHF; severe PHTN  Patient has anasarca and ascites with liver congestion   For possible Right sided H cath to be done as outpatient.     # incr AP, prob from passive congestion  RUQ U/S per GI: IMPRESSION: Hepatomegaly and hepatic steatosis, similar to prior imaging studies.  GGT sent.     # neck pain; carotid bruits  carotid U/S noted   robaxin 500mg po q12  dilaudid 2mg po q6 prn  tylenol 650mg po q6 prn    # thrombocytopenia - unclear etiology-monitored, 68k today  plt are usually normal for her (couple of rare and transient decreases in past)  hold heparin for now  possible clumping?  f/u CBC daily   H/O evauationl if PLTs < 50K    # Chronic Normocytic anemia due to CKD  ferr 26,149 (was 135 Nov 2022) - not sure why ferr so acutely high - hold iron at HD  iron sat 82% (was 32% Nov 2022)  order HFE gene - though doubt hemachromatosis  d/c iron supplements      # ESRD; normo anemia of chr dz  -on HD via left UE AVG - T/Th/Sat  -HD per nephro  -daily weight, fluid restriction, renal restrictions  -c/w sevelamer 800mg qac, Calcitriol 0.25 q24    # pruritis  benadryl prn po    # DLD  c/w atorva 80 HS    # h/o PVD;  - s/p left femoral artery-posterior tibial artery bypass with autologous venous tissue and left heel ulcer debridement 11/21  Arterial duplex: patent LLE bypass; lt inguinal LN to 2.2cm  on DAPT - HOLD Plavix     DM - controlled  A1C 6 (11/22/22)  off meds      # h/o Right pontine CVA (February 2021)  c/w ASA  HOLD Plavix  c/w statin    # Activity: Independent  # GI PPX: PPI  # DVT PPX : SCDs (low plt - d/c heparin, cannot use fondaparinux in HD)  # Full Code   57 y/o F with PMHx of ESRD on HD T/Th/Sat (follows w nephro Dr Solis Borja), hyperlipidemia, HTN, PVD, CVA and hearing impaired presents to the ED with c/o RUQ abdominal pain for past few weeks, associated with nausea, vomiting and poor po intake. Patient is scheduled for dialysis later today but due to significant pain came to the emergency department.  Patient also reports bilateral lower extremity pain as well and pain on right hand. Denies any chest pain, shortness of breath, dizziness, fever,  In the ED BP was 212/102 on arrival, otherwise hemodynamically stable. Labs significant for K 5.2, BUN/Cr 51/7.8; Trops 0.04, BNP >85135; CXR showed bilateral pulmonary congestion and effusions; CT abdomen and pelvis showed - Small volume abdominopelvic ascites and anasarca. Persistent moderate right and small left pleural effusions with compressive atelectasis. Findings may reflect fluid overload state on the basis of known ESRD.      Patient abdominal pain improved slightly on pain medications : Hydromorphone and Acetaminophen     # Abdominal pain of unclear etiology; + acute hernandez CHF; sev PHTN  -CT A/P: pt has sm ascites and hepatomeg w/ known sev PHTN, G2 hernandez dysfxn and BNP 70K - could have passive stretch of liver capsule  -pt on DAPT - could have gastritis -> GI eval re poss EGD , they said that the abdominal pain is atypical for PUD , EGD to be done as outpatient   - last bowel motion was 4 days ago according to patient , was started on lactulose and Miralax   -pt is on simethicone 80mg po q6   -CT A/P: mod Rt/Sm Lt pl eff; atelectasis; coronary calcif; fatty liver; hepatomeg; ascites; periportal edema; atrophic kid; pelvic and inguinal LN; hiatal hernia;   -Echo: (2/2023): EF 61%l G2DD; LAE; RAY; mod TR; sev pHTN; triv pericard eff  -c/w sucralfate and PPI *2   -c/w asa; HOLD plavix   -Zofran PRN  -dilaudid 2mg po q6 prn pain      #knee pain with hyperferritinemia : 31619  high ferritin levels with artharlgia and change in skin color.   could be secondary to hemochromatosis   HFE gene sent   Monitor result       # Hypertensive Urgency  BP readings are trending down , medications doses were adjusted as below ;  Nifedipine xl to 90 mg q24  increase Hydralazine to 100 mg q12  Labetalol added 200 mg Q12         # acute hernandez CHF; severe PHTN  Patient has anasarca and ascites with liver congestion   For possible Right sided H cath to be done as outpatient.     # incr AP, prob from passive congestion  RUQ U/S per GI: IMPRESSION: Hepatomegaly and hepatic steatosis, similar to prior imaging studies.   , elevated   F/U with GI as outpatient     # neck pain; carotid bruits  carotid U/S noted   robaxin 500mg po q12  dilaudid 2mg po q6 prn  tylenol 650mg po q6 prn    # thrombocytopenia - unclear etiology-monitored, 73 k last reading   plt are usually normal for her (couple of rare and transient decreases in past)  hold heparin   possible clumping?  f/u CBC daily   H/O evauationl if PLTs < 50K    # Chronic Normocytic anemia due to CKD  ferr 26,149 (was 135 Nov 2022) - not sure why ferr so acutely high - hold iron at HD  iron sat 82% (was 32% Nov 2022)  order HFE gene - though doubt hemachromatosis  d/c iron supplements      # ESRD; normo anemia of chr dz  -on HD via left UE AVG - T/Th/Sat  -HD per nephro  -daily weight, fluid restriction, renal restrictions  -c/w sevelamer 800mg qac, Calcitriol 0.25 q24    # pruritis  benadryl prn po    # DLD  c/w atorva 80 HS    # h/o PVD;  - s/p left femoral artery-posterior tibial artery bypass with autologous venous tissue and left heel ulcer debridement 11/21  Arterial duplex: patent LLE bypass; lt inguinal LN to 2.2cm  on DAPT - HOLD Plavix     DM - controlled  A1C 6 (11/22/22)  off meds      # h/o Right pontine CVA (February 2021)  c/w ASA  HOLD Plavix  c/w statin    # Activity: Independent  # GI PPX: PPI  # DVT PPX : SCDs (low plt - d/c heparin, cannot use fondaparinux in HD)  # Full Code

## 2023-06-26 NOTE — PROGRESS NOTE ADULT - ASSESSMENT
57 y/o F with PMHx of ESRD on HD T/Th/Sat (follows w nephro Dr Solis Borja), hyperlipidemia, HTN, PVD, CVA and hearing impaired presents to the ED for abdominal pain diffusely for the past several days a/w n/v/d. CT a/p showed no acute intra abdominal pathologies.  ESRD on HD/ PVD/ HTN  hd in am   ph at goal , /dc renagel   Hgb noted / sat elevated / resume HANNAH with HD    cardiology notes appreciated   bp  better controlled this morning  follow platelet count / check HIT   on calcitriol / PTH on the low side/ calcium 8.5  Abdominal pain - plan for EGD this week after optimizing volume status  will follow

## 2023-06-26 NOTE — DISCHARGE NOTE PROVIDER - PROVIDER TOKENS
PROVIDER:[TOKEN:[[id number]]] PROVIDER:[TOKEN:[[id number]]],PROVIDER:[TOKEN:[[id number]],FOLLOWUP:[2 weeks]],PROVIDER:[TOKEN:[[id number]],FOLLOWUP:[2 weeks]],PROVIDER:[TOKEN:[[id number]],FOLLOWUP:[2 weeks]],PROVIDER:[TOKEN:[[id number]],FOLLOWUP:[2 weeks]],PROVIDER:[TOKEN:[[id number]],FOLLOWUP:[2 weeks]]

## 2023-06-26 NOTE — DISCHARGE NOTE PROVIDER - NSDCMRMEDTOKEN_GEN_ALL_CORE_FT
acetaminophen 325 mg oral tablet: 2 tab(s) orally every 6 hours, As Needed -Temp greater or equal to 38C (100.4F), Mild Pain (1 - 3) - for moderate pain  aspirin 81 mg oral delayed release tablet: 1 tab(s) orally once a day   atorvastatin 80 mg oral tablet: 1 tab(s) orally once a day  calcitriol 0.25 mcg oral capsule: 1 cap(s) orally once a day  gabapentin 300 mg oral capsule: 1 cap(s) orally 3 times a day  hydrALAZINE 25 mg oral tablet: 2 tab(s) orally 3 times a day  NIFEdipine (Eqv-Adalat CC) 90 mg oral tablet, extended release: 1 tab(s) orally 2 times a day  pantoprazole 40 mg oral delayed release tablet: 1 tab(s) orally 2 times a day  Plavix 75 mg oral tablet: 1 tab(s) orally once a day  polyethylene glycol 3350 oral powder for reconstitution: 17 gram(s) orally 2 times a day  Robaxin 500 mg oral tablet: 1 tab(s) orally 2 times a day  sevelamer carbonate 800 mg oral tablet: 1 tab(s) orally 3 times a day (with meals)  sucralfate 1 g oral tablet: 1 tab(s) orally 4 times a day, As Needed -for heartburn - for indigestion    acetaminophen 325 mg oral tablet: 2 tab(s) orally every 6 hours, As Needed -Temp greater or equal to 38C (100.4F), Mild Pain (1 - 3) - for moderate pain  aspirin 81 mg oral delayed release tablet: 1 tab(s) orally once a day   atorvastatin 80 mg oral tablet: 1 tab(s) orally once a day  calcitriol 0.25 mcg oral capsule: 1 cap(s) orally once a day  gabapentin 300 mg oral capsule: 1 cap(s) orally 3 times a day  hydrALAZINE 25 mg oral tablet: 2 tab(s) orally 3 times a day  NIFEdipine (Eqv-Adalat CC) 90 mg oral tablet, extended release: 1 tab(s) orally 2 times a day  pantoprazole 40 mg oral delayed release tablet: 1 tab(s) orally 2 times a day  polyethylene glycol 3350 oral powder for reconstitution: 17 gram(s) orally 2 times a day  Robaxin 500 mg oral tablet: 1 tab(s) orally 2 times a day  sevelamer carbonate 800 mg oral tablet: 1 tab(s) orally 3 times a day (with meals)   acetaminophen 325 mg oral tablet: 2 tab(s) orally every 6 hours, As Needed -Temp greater or equal to 38C (100.4F), Mild Pain (1 - 3) - for moderate pain  aspirin 81 mg oral delayed release tablet: 1 tab(s) orally once a day   atorvastatin 80 mg oral tablet: 1 tab(s) orally once a day  calcitriol 0.25 mcg oral capsule: 1 cap(s) orally once a day  gabapentin 300 mg oral capsule: 1 cap(s) orally 3 times a day  hydrALAZINE 25 mg oral tablet: 2 tab(s) orally 3 times a day  labetalol 100 mg oral tablet: 1 tab(s) orally every 8 hours  NIFEdipine (Eqv-Adalat CC) 90 mg oral tablet, extended release: 1 tab(s) orally 2 times a day  pantoprazole 40 mg oral delayed release tablet: 1 tab(s) orally 2 times a day  polyethylene glycol 3350 oral powder for reconstitution: 17 gram(s) orally 2 times a day  Robaxin 500 mg oral tablet: 1 tab(s) orally 2 times a day  sevelamer carbonate 800 mg oral tablet: 1 tab(s) orally 3 times a day (with meals)   acetaminophen 325 mg oral tablet: 2 tab(s) orally every 6 hours, As Needed -Temp greater or equal to 38C (100.4F), Mild Pain (1 - 3) - for moderate pain  aspirin 81 mg oral delayed release tablet: 1 tab(s) orally once a day   atorvastatin 80 mg oral tablet: 1 tab(s) orally once a day  calcitriol 0.25 mcg oral capsule: 1 cap(s) orally once a day  gabapentin 300 mg oral capsule: 1 cap(s) orally 3 times a day  hydrALAZINE 100 mg oral tablet: 1 tab(s) orally 2 times a day  labetalol 100 mg oral tablet: 1 tab(s) orally every 8 hours  labetalol 200 mg oral tablet: 1 tab(s) orally 2 times a day  NIFEdipine (Eqv-Adalat CC) 90 mg oral tablet, extended release: 1 tab(s) orally once a day  pantoprazole 40 mg oral delayed release tablet: 1 tab(s) orally 2 times a day  polyethylene glycol 3350 oral powder for reconstitution: 17 gram(s) orally 2 times a day  Robaxin 500 mg oral tablet: 1 tab(s) orally 2 times a day  sevelamer carbonate 800 mg oral tablet: 1 tab(s) orally 3 times a day (with meals)   acetaminophen 325 mg oral tablet: 2 tab(s) orally every 6 hours, As Needed -Temp greater or equal to 38C (100.4F), Mild Pain (1 - 3) - for moderate pain  aspirin 81 mg oral delayed release tablet: 1 tab(s) orally once a day   atorvastatin 80 mg oral tablet: 1 tab(s) orally once a day  calcitriol 0.25 mcg oral capsule: 1 cap(s) orally once a day  gabapentin 300 mg oral capsule: 1 cap(s) orally 3 times a day  hydrALAZINE 100 mg oral tablet: 1 tab(s) orally 2 times a day  labetalol 200 mg oral tablet: 1 tab(s) orally 2 times a day  NIFEdipine (Eqv-Adalat CC) 90 mg oral tablet, extended release: 1 tab(s) orally once a day  pantoprazole 40 mg oral delayed release tablet: 1 tab(s) orally 2 times a day  polyethylene glycol 3350 oral powder for reconstitution: 17 gram(s) orally 2 times a day  Robaxin 500 mg oral tablet: 1 tab(s) orally 2 times a day  sevelamer carbonate 800 mg oral tablet: 1 tab(s) orally 3 times a day (with meals)

## 2023-06-26 NOTE — DISCHARGE NOTE PROVIDER - NSDCFUSCHEDAPPT_GEN_ALL_CORE_FT
Shira Damon  [facility] PreAdmits  Scheduled Appointment: 07/18/2023    Shira Damon  [facility] [place] [facility]  [facility]  [address]  Scheduled Appointment: 07/18/2023    Obie Chappell  [facility] PreAdmits  Scheduled Appointment: 07/26/2023    [facility] [place] [facility]  GASTRO  [address]  Scheduled Appointment: 07/26/2023    Lexx Muñoz  [facility] [place] [facility]  VASCULAR [address]  Scheduled Appointment: 08/08/2023    Donovan Farfan  [facility] PreAdmits  Scheduled Appointment: 08/22/2023    [facility]  NEUROLOGY  [address]  Scheduled Appointment: 08/22/2023    Behuria, Supreeti  [facility] PreAdmits  Scheduled Appointment: 09/19/2023    [facility] [place] [facility]  CARDIOLOGY  [address]   Scheduled Appointment: 09/19/2023

## 2023-06-26 NOTE — PROGRESS NOTE ADULT - SUBJECTIVE AND OBJECTIVE BOX
seen and examined  24 h events noted   no distress       PAST HISTORY  --------------------------------------------------------------------------------  No significant changes to PMH, PSH, FHx, SHx, unless otherwise noted    ALLERGIES & MEDICATIONS  --------------------------------------------------------------------------------  Allergies    NSAIDs (Nephrotoxicity)  penicillin (Rash)    Intolerances      Standing Inpatient Medications  aspirin enteric coated 81 milliGRAM(s) Oral daily  atorvastatin 80 milliGRAM(s) Oral at bedtime  calcitriol   Capsule 0.25 MICROGram(s) Oral daily  fluticasone propionate 50 MICROgram(s)/spray Nasal Spray 2 Spray(s) Both Nostrils two times a day  gabapentin 300 milliGRAM(s) Oral daily  hydrALAZINE 50 milliGRAM(s) Oral three times a day  lactulose Syrup 20 Gram(s) Oral every 3 hours  loratadine 10 milliGRAM(s) Oral daily  methocarbamol 500 milliGRAM(s) Oral two times a day  NIFEdipine XL 90 milliGRAM(s) Oral daily  pantoprazole    Tablet 40 milliGRAM(s) Oral two times a day  polyethylene glycol 3350 17 Gram(s) Oral two times a day  senna 2 Tablet(s) Oral at bedtime  sevelamer carbonate 800 milliGRAM(s) Oral three times a day with meals  simethicone 80 milliGRAM(s) Chew every 8 hours  sucralfate 1 Gram(s) Oral four times a day    PRN Inpatient Medications  acetaminophen     Tablet .. 650 milliGRAM(s) Oral every 6 hours PRN  diphenhydrAMINE 25 milliGRAM(s) Oral every 6 hours PRN  HYDROmorphone   Tablet 2 milliGRAM(s) Oral every 6 hours PRN  melatonin 3 milliGRAM(s) Oral at bedtime PRN  ondansetron Injectable 4 milliGRAM(s) IV Push every 8 hours PRN        VITALS/PHYSICAL EXAM  --------------------------------------------------------------------------------  T(C): 36.2 (06-26-23 @ 05:02), Max: 36.3 (06-25-23 @ 12:50)  HR: 72 (06-26-23 @ 05:02) (71 - 83)  BP: 159/71 (06-26-23 @ 05:02) (159/71 - 213/95)  RR: 19 (06-26-23 @ 05:02) (19 - 20)  SpO2: --  Wt(kg): --        Physical Exam:  	Gen: NAD,   	Pulm: CTA B/L  	CV:  S1S2; no rub  	Abd: +distended  	Vascular access:    LABS/STUDIES  --------------------------------------------------------------------------------              9.9    6.19  >-----------<  69       [06-26-23 @ 06:30]              29.6     138  |  99  |  43  ----------------------------<  140      [06-26-23 @ 06:30]  5.1   |  25  |  6.6        Ca     8.5     [06-26-23 @ 06:30]      Phos  2.3     [06-26-23 @ 06:30]    TPro  6.4  /  Alb  3.9  /  TBili  0.5  /  DBili  x   /  AST  16  /  ALT  9   /  AlkPhos  323  [06-26-23 @ 06:30]    Creatinine Trend:  SCr 6.6 [06-26 @ 06:30]  SCr 5.2 [06-25 @ 06:01]  SCr 5.5 [06-23 @ 08:41]  SCr 7.8 [06-22 @ 04:05]    Urinalysis - [06-26-23 @ 06:30]      Color  / Appearance  / SG  / pH       Gluc 140 / Ketone   / Bili  / Urobili        Blood  / Protein  / Leuk Est  / Nitrite       RBC  / WBC  / Hyaline  / Gran  / Sq Epi  / Non Sq Epi  / Bacteria       Iron 191, TIBC 233, %sat 82      [06-23-23 @ 08:41]  Ferritin [id number]      [06-23-23 @ 08:41]  PTH -- (Ca 8.9)      [06-23-23 @ 08:41]   92  Lipid: chol 158, , HDL 61, LDL --      [11-22-22 @ 06:31]

## 2023-06-26 NOTE — PROGRESS NOTE ADULT - ASSESSMENT
57 y/o F with PMHx of ESRD on HD T/Th/Sat (follows w/ nephro Dr Solis Borja), hyperlipidemia, HTN, PVD, CVA and hearing impaired presents to the ED with c/o RUQ abdominal pain for past few weeks, associated with nausea, vomiting and poor po intake.    # Intellectual disability; hearing/speech impairment (since as baby)  Sign Language Interpreters are available 24/7 (after usual hours, they may need 30-60 minutes to respond)  [address] [phone]  Office Ext [phone]    # Abdominal pain of unclear etiology; + acute hernandez CHF; sev PHTN  CT A/P:   pt has sm ascites and hepatomeg w/ known sev PHTN, G2 hernandez dysfxn and BNP 70K - could have passive stretch of liver capsule  pt on DAPT - could have gastritis -> GI eval: EGD as outpt once cleared by cardio for pulm HTN (after outpt rt hrt cath)  doubt uremic gastritis (BUN 27 - prob malnourished)  doubt gastroparesis (no DM)  does NOT seem ischemic - BP not low; HCO3 26  simethicone 80mg po q8 is helping, so gas is likely contributing factor  + constipation at times: bowel reg prn  CT A/P: mod Rt/Sm Lt pl eff; atelectasis; coronary calcif; fatty liver; hepatomeg; ascites; periportal edema; atrophic kid; pelvic and inguinal LN; hiatal hernia;   RUQ U/S: hepatomeg; incr liver echo; NS thick edematous GB w/out stones; sm ascites  Echo: (2/2023): EF 61%l G2DD; LAE; RAY; mod TR; sev pHTN; triv pericard eff  c/w PPI  d/c carafate (has aluminium molecule in it)  c/w asa; HOLD plavix  CHF eval: noted: outpt f/u Dr Gomes for Rt Hrt cath;   Zofran PRN  dilaudid 2mg po q6 prn pain  f/u renal re cramping during HD  ESR 10; CRp 12.5    # incr AP, prob from passive congestion  ordered GGT  CLD w/u per GI - can f/u as outpt    # neck pain; carotid bruits  carotid U/S: mild b/l stenosis 20-39%  robaxin 500mg po q12  gabapentin 300mg hs  dilaudid 2mg po q6 prn  tylenol 650mg po q6 prn    # thrombocytopenia - unclear etiology  plt are usually normal for her (couple of rare and transient decreases in past)  hold heparin for now  poss clumping?  f/u CBC  H/O eval if < 50K    # ESRD; normo anemia of chr dz  on HD via left UE AVG - T/Th/Sat  HD per nephro  daily weight, fluid restriction, renal restrictions  c/w sevelamer 800mg qac  c/w Calcitriol 0.25 q24    # pruritis  benadryl prn po    # Hypertensive Urgency  incr Nifedipine xl 90 q24  incr Hydralazine 50 q8    # DLD  c/w atorva 80 HS    # h/o PVD;  s/p left femoral artery-posterior tibial artery bypass with autologous venous tissue and left heel ulcer debridement 11/21  Arterial duplex: patent LLE bypass; lt inguinal LN to 2.2cm  on DAPT - HOLD Plavix, c/w asa    # Chronic Normocytic anemia due to CKD  ferr 26,149 (was 135 Nov 2022) - not sure why ferr so acutely high - hold iron at HD  iron sat 82% (was 32% Nov 2022)  order HFE gene - though doubt hemachromatosis  d/c iron supplements    # DM - controlled  A1C 6 (11/22/22)  off meds  can D/C FS    # h/o Right pontine CVA (February 2021)  c/w ASA  HOLD Plavix  c/w statin    # Activity: Independent     # GI PPX: PPI    # DVT PPX: SCDs (low plt - d/c heparin, cannot use fondaparinux in HD)    # Full Code    # Dispo: d/c FS; tx BP; f/u renal/GI/CHF; f/u plt; SCDs; d/c carafate; check GGT;   eventually, pt will go home w/ cont outpt HD - f/u CM - anticipate d/c rich   57 y/o F with PMHx of ESRD on HD T/Th/Sat (follows w/ nephro Dr Solis Borja), hyperlipidemia, HTN, PVD, CVA and hearing impaired presents to the ED with c/o RUQ abdominal pain for past few weeks, associated with nausea, vomiting and poor po intake.    # Intellectual disability; hearing/speech impairment (since as baby)  Sign Language Interpreters are available 24/7 (after usual hours, they may need 30-60 minutes to respond)  [employer] [phone]  Office Ext [phone]    # Abdominal pain of unclear etiology; + acute hernandez CHF; sev PHTN  CT A/P:   pt has sm ascites and hepatomeg w/ known sev PHTN, G2 hernandez dysfxn and BNP 70K - could have passive stretch of liver capsule  pt on DAPT - could have gastritis -> GI eval: EGD as outpt once cleared by cardio for pulm HTN (after outpt rt hrt cath)  doubt uremic gastritis (BUN 27 - prob malnourished)  doubt gastroparesis (no DM)  does NOT seem ischemic - BP not low; HCO3 26  simethicone 80mg po q8 is helping, so gas is likely contributing factor  + constipation at times: bowel reg prn  CT A/P: mod Rt/Sm Lt pl eff; atelectasis; coronary calcif; fatty liver; hepatomeg; ascites; periportal edema; atrophic kid; pelvic and inguinal LN; hiatal hernia;   RUQ U/S: hepatomeg; incr liver echo; NS thick edematous GB w/out stones; sm ascites  Echo: (2/2023): EF 61%l G2DD; LAE; RAY; mod TR; sev pHTN; triv pericard eff  c/w PPI  d/c carafate (has aluminium molecule in it)  c/w asa; HOLD plavix  CHF eval: noted: outpt f/u Dr Gomes for Rt Hrt cath;   Zofran PRN  dilaudid 2mg po q6 prn pain  f/u renal re cramping during HD  ESR 10; CRp 12.5    # incr AP, prob from passive congestion  ordered GGT  CLD w/u per GI - can f/u as outpt    # neck pain; carotid bruits  carotid U/S: mild b/l stenosis 20-39%  robaxin 500mg po q12  gabapentin 300mg hs  dilaudid 2mg po q6 prn - will send pain rx to [facility]  tylenol 650mg po q6 prn    # thrombocytopenia - unclear etiology  plt are usually normal for her (couple of rare and transient decreases in past)  hold heparin for now  poss clumping?  f/u CBC  H/O eval if < 50K    # ESRD; normo anemia of chr dz  on HD via left UE AVG - T/Th/Sat  HD per nephro  daily weight, fluid restriction, renal restrictions  c/w sevelamer 800mg qac  c/w Calcitriol 0.25 q24    # pruritis  benadryl prn po    # Hypertensive Urgency  incr Nifedipine xl 90 q24  incr Hydralazine 50 q8    # DLD  c/w atorva 80 HS    # h/o PVD;  s/p left femoral artery-posterior tibial artery bypass with autologous venous tissue and left heel ulcer debridement 11/21  Arterial duplex: patent LLE bypass; lt inguinal LN to 2.2cm  on DAPT - HOLD Plavix, c/w asa    # Chronic Normocytic anemia due to CKD  ferr 26,149 (was 135 Nov 2022) - not sure why ferr so acutely high - hold iron at HD  iron sat 82% (was 32% Nov 2022)  order HFE gene - though doubt hemachromatosis  d/c iron supplements    # DM - controlled  A1C 6 (11/22/22)  off meds  can D/C FS    # h/o Right pontine CVA (February 2021)  c/w ASA  HOLD Plavix  c/w statin    # Activity: Independent     # GI PPX: PPI    # DVT PPX: SCDs (low plt - d/c heparin, cannot use fondaparinux in HD)    # Full Code    # Dispo: d/c FS; tx BP; f/u renal/GI/CHF; f/u plt; SCDs; d/c carafate; check GGT;   eventually, pt will go home w/ cont outpt HD - f/u CM - anticipate d/c rich

## 2023-06-26 NOTE — PROGRESS NOTE ADULT - SUBJECTIVE AND OBJECTIVE BOX
SEN LING  58y  Female  ***My note supersedes ALL resident notes that I sign.  My corrections for their notes are in my note.***    I can be reached directly on [employer]. My office number is [phone]. My personal cell number is [phone].    Span [employer] (for bro-in-law): [phone]  Sign Interpret (for pt): Jasmina; [phone]    INTERVAL EVENTS: Here for f/u of multiple areas of pain. Pt says pains are 8 out of 10, but also says they are improving. Pt able to eat/drink. Pt was OOBTC today. Pt feels that she could go home rich, if BP OK. Pt reported dizzy feeling w/ changes in BP meds - I told her she needs to get used to new reg. Pt reports that body aches are mostly from HD. I told her to d/w renal. Pt says cramps are not so bad on HD at hospital. Pt c/o constipation. Pt noticed that skin is getting darker. She did not here of any iron overload in family. Bro-in-law said pt had low iron in past and was getting supplements for this (thus, it seems hemachromatosis not likely - will test anyway).    T(F): 97.6 (06-26-23 @ 13:18), Max: 97.6 (06-26-23 @ 13:18)  HR: 74 (06-26-23 @ 14:30) (72 - 80)  BP: 139/62 (06-26-23 @ 14:30) (139/62 - 192/77)  RR: 18 (06-26-23 @ 13:18) (18 - 20)  SpO2: --    Gen: NAD; deaf  HEENT: PERRL, EOMI, mouth clr, nose clr  skin: dark  Neck: no nodes, no JVD, thyroid nl; + b/l carotid bruits;   lungs: decr BS bases  hrt: s1 s2 rrr no murmur  abd: soft, ND, + tender in RUQ and across lower abd R & L; no Splenomegaly; + mild hepatomegaly  ext: tr edema, no c/c; chr LE stasis changes  lt upper arm AVG: + bruit and thrill  neuro: aa ox3, cn intact, can move all 4 ext    LABS:                      9.9     (    95.5   6.19  )-----------( ---------      69       ( 26 Jun 2023 06:30 )             29.6    (    19.1     Hemoglobin: 9.9 g/dL (06-26 @ 06:30)  Hemoglobin: 9.8 g/dL (06-25 @ 06:01)  Hemoglobin: 9.5 g/dL (06-23 @ 08:41)  Hemoglobin: 10.6 g/dL (06-22 @ 04:05)    Platelet Count - Automated: 69 K/uL (06-26-23 @ 06:30) - stable  Platelet Count - Automated: 68 K/uL (06-25-23 @ 06:01)  Platelet Count - Automated: 81 K/uL (06-23-23 @ 08:41)  Platelet Count - Automated: 123 K/uL (06-22-23 @ 04:05)    138   (   99   (   140      06-26-23 @ 06:30  ----------------------               5.1   (   25   (   43                             -----                        6.6  Ca  8.5   Mg  --    P   2.3     LFT  6.4  (  0.5  (  16       06-26-23 @ 06:30  -------------------------  3.9  (  323  (  9    Alb 3.9  T ana m 0.5     AST 16  ALT 9  06-26-23 @ 06:30 - AP stable  Alb 4.0  T ana m 0.5     AST 19  ALT 9  06-25-23 @ 06:01  Alb 3.9  T ana m 0.7     AST 26  ALT 10  06-23-23 @ 08:41  Alb 4.2  T ana m 0.6     AST 15  ALT 11  06-22-23 @ 04:05    Urinalysis Basic - ( 26 Jun 2023 06:30 )    Color: x / Appearance: x / SG: x / pH: x  Gluc: 140 mg/dL / Ketone: x  / Bili: x / Urobili: x   Blood: x / Protein: x / Nitrite: x   Leuk Esterase: x / RBC: x / WBC x   Sq Epi: x / Non Sq Epi: x / Bacteria: x    CAPILLARY BLOOD GLUCOSE  POCT Blood Glucose.: 144 (06-26-23 @ 17:03)  POCT Blood Glucose.: 143 (06-26-23 @ 12:01)  POCT Blood Glucose.: 156 (06-26-23 @ 07:58)  POCT Blood Glucose.: 166 (06-25-23 @ 21:18)  POCT Blood Glucose.: 149 (06-25-23 @ 16:42)  POCT Blood Glucose.: 131 (06-25-23 @ 12:45)    RADIOLOGY & ADDITIONAL TESTS:  < from: Xray Knee 3 Views, Bilateral (06.26.23 @ 10:29) >  IMPRESSION:    RIGHT:  There is no evidence of acute fracture or dislocation. There is mild   degenerative change of the patellofemoral compartment. There is no knee   joint effusion. Osteopenia is noted.    There is diffuse nonspecific subcutaneous edema. Vascular calcifications are noted.    LEFT:  There is no evidence of acute fracture or dislocation. The joint spaces   of the knee appear maintained. There is a small knee joint effusion.   Osteopenia is noted.    Vascular calcifications and a vascular stent in the distal thigh are   noted. There is diffuse nonspecific subcutaneous edema. There are   surgical clips in the thigh and lower leg.    < end of copied text >    < from: US Abdomen Upper Quadrant Right (06.23.23 @ 19:22) >  FINDINGS:    LIVER: Increased echogenicity. Hepatomegaly.  BILE DUCTS: Normal caliber. Common bile duct measures 5 mm.  GALLBLADDER: Nonspecific thickened edematous gallbladder wall. No stones   or sludge.  PANCREAS: Visualized portions are within normal limits.  RIGHT KIDNEY: 8.3 cm., atrophic No hydronephrosis.  ASCITES: Small volume intra-abdominal ascites.  IVC: Visualized portions are within normal limits.    IMPRESSION:    Hepatomegaly and hepatic steatosis, similar to prior imaging studies.    < end of copied text >    < from: VA Duplex Carotid, Bilat (06.23.23 @ 18:58) >  IMPRESSION: Mild bilateral carotid artery stenosis estimated 20-39%.    < end of copied text >    MEDICATIONS:    acetaminophen     Tablet .. 650 milliGRAM(s) Oral every 6 hours PRN  aspirin enteric coated 81 milliGRAM(s) Oral daily  atorvastatin 80 milliGRAM(s) Oral at bedtime  calcitriol   Capsule 0.25 MICROGram(s) Oral daily  diphenhydrAMINE 25 milliGRAM(s) Oral every 6 hours PRN  fluticasone propionate 50 MICROgram(s)/spray Nasal Spray 2 Spray(s) Both Nostrils two times a day  gabapentin 300 milliGRAM(s) Oral daily  hydrALAZINE 50 milliGRAM(s) Oral three times a day  HYDROmorphone   Tablet 2 milliGRAM(s) Oral every 6 hours PRN  loratadine 10 milliGRAM(s) Oral daily  melatonin 3 milliGRAM(s) Oral at bedtime PRN  methocarbamol 500 milliGRAM(s) Oral two times a day  NIFEdipine XL 90 milliGRAM(s) Oral daily  ondansetron Injectable 4 milliGRAM(s) IV Push every 8 hours PRN  pantoprazole    Tablet 40 milliGRAM(s) Oral two times a day  polyethylene glycol 3350 17 Gram(s) Oral two times a day  senna 2 Tablet(s) Oral at bedtime  sevelamer carbonate 800 milliGRAM(s) Oral three times a day with meals  simethicone 80 milliGRAM(s) Chew every 8 hours  sucralfate 1 Gram(s) Oral four times a day

## 2023-06-26 NOTE — PROGRESS NOTE ADULT - ASSESSMENT
58 year old female  with PMHx of ESRD on HD T/Th/Sat (follows w nephsalvador Borja), hyperlipidemia, HTN, PVD, CVA and hearing impaired presents to the ED with c/o RUQ abdominal pain for past few weeks, associated with nausea, vomiting and poor po intake. GI consulted for abdominal pain and vomiting.     # Right Sided abdominal pain, nausea and vomiting. resolving. Tolerating PO diet now. CT unremarkable for acute pathology. LFT: S: 323 U/L / ALT: 9 U/L / AST: 16 U/L / GGT: x Isolated elevation of Alkphos. Denies NSAIDs. No recent endoscopy. RUQ-US showed steatosis.     PLAN:   Monitor LFTs   Pain is atypical for PUD. Will plan for EGD as outpatient  PPI BID   Supportive care for nausea per primary team.   Please send Hepatitis A IgM, Hepatitis B core IgM, core Ab total, surface Ag, surface Ab, HCV antibody, HCV RNA, Anti HEV  Please send OLVIN, AMA, anti-Smooth Muscle Ab type 1, Anti liver-kidney microsomal Ab, Anti-soluble liver Ag, immunoglobulin panel  Please avoid hepatotoxic medications    # Hepatic Steatosis: Please follow up with hepatology as outpatient for further management.     - Follow up with our [facility] located at [address] [place] [address]. Phone Number: [phone]

## 2023-06-26 NOTE — DISCHARGE NOTE PROVIDER - NSDCCPCAREPLAN_GEN_ALL_CORE_FT
PRINCIPAL DISCHARGE DIAGNOSIS  Diagnosis: Chronic intractable pain  Assessment and Plan of Treatment:       SECONDARY DISCHARGE DIAGNOSES  Diagnosis: ESRD on hemodialysis  Assessment and Plan of Treatment:     Diagnosis: PVD (peripheral vascular disease)  Assessment and Plan of Treatment:      PRINCIPAL DISCHARGE DIAGNOSIS  Diagnosis: Chronic intractable pain  Assessment and Plan of Treatment: you presented to our hospital complaining of abdominal pain that was all over and severe , the pain was controlled partially by pain medications and we will discharge you on oral pain medications   The pain can be due to distended liver from a heart problem caused by increased pressure in your pulmonary artery. You will have follow up with cardiology doctor - Doctor Gomes -  for possible heart cathertirization procedure to know the cause of the increased pressure in your pulmonary artery then after that , the GI doctor might consider doing upper endoscopy for you to make sure there is nothing abnormal in your stomach.      SECONDARY DISCHARGE DIAGNOSES  Diagnosis: ESRD on hemodialysis  Assessment and Plan of Treatment: you have end stage renal disease and you are on regular hemodialysis , you will be following up with the kidney doctor    Diagnosis: PVD (peripheral vascular disease)  Assessment and Plan of Treatment:     Diagnosis: Pulmonary hypertension  Assessment and Plan of Treatment: you were found to have increased pressure in your pulmonary artery which is causing fluid accumulation in your abdomen and body. You will follow with the cardiology doctor - doctor Gomes - for right sided heart catheterization to know the cause of your increased pressure in the pulmonary artery    Diagnosis: Hemochromatosis, unspecified  Assessment and Plan of Treatment: as part of your work up , you were found to have increased ferritin - which are the iron stores in the body - and you mentioned you are having knee pain and joint pain , these can be signs of a condition called hemochromatosis , we send genetic testing for this condition and you will follow with GI doctor for the results of this test. and the GI doctor might do upper endoscopy on you to make sure there is no problem in your stomach or duodenum     PRINCIPAL DISCHARGE DIAGNOSIS  Diagnosis: Abdominal pain  Assessment and Plan of Treatment: you presented to our hospital complaining of abdominal pain that was all over and severe , the pain was controlled partially by pain medications and we will discharge you on oral pain medications   The pain can be due to distended liver from a heart problem caused by increased pressure in your pulmonary artery. You will have follow up with cardiology doctor - Doctor Gomes -  for possible heart cathertirization procedure to know the cause of the increased pressure in your pulmonary artery then after that , the GI doctor might consider doing upper endoscopy for you to make sure there is nothing abnormal in your stomach.      SECONDARY DISCHARGE DIAGNOSES  Diagnosis: ESRD on hemodialysis  Assessment and Plan of Treatment: you have end stage renal disease and you are on regular hemodialysis , you will be following up with the kidney doctor    Diagnosis: PVD (peripheral vascular disease)  Assessment and Plan of Treatment:     Diagnosis: Pulmonary hypertension  Assessment and Plan of Treatment: you were found to have increased pressure in your pulmonary artery which is causing fluid accumulation in your abdomen and body. You will follow with the cardiology doctor - doctor Eliseo - for right sided heart catheterization to know the cause of your increased pressure in the pulmonary artery    Diagnosis: Hemochromatosis, unspecified  Assessment and Plan of Treatment: as part of your work up , you were found to have increased ferritin - which are the iron stores in the body - and you mentioned you are having knee pain and joint pain , these can be signs of a condition called hemochromatosis , we send genetic testing for this condition and you will follow with GI doctor for the results of this test. and the GI doctor might do upper endoscopy on you to make sure there is no problem in your stomach or duodenum    Diagnosis: Thrombocytopenia  Assessment and Plan of Treatment: you had low platelet count during your hospital admission , the count is improving , but you will need follow up with the hematology doctor - doctor Castanon -    Diagnosis: Pain in both knees  Assessment and Plan of Treatment: you complained of knee pain during your hospital stay , and you will follow up with the Rhematology doctor for this issue ( Dr Taylor )

## 2023-06-26 NOTE — PROGRESS NOTE ADULT - SUBJECTIVE AND OBJECTIVE BOX
Gastroenterology progress note:     Patient is a 58y old  Female who presents with a chief complaint of Abdominal pain, nausea, vomiting (26 Jun 2023 09:10)       Admitted on: 06-22-23    We are following the patient for abdominal pain     PAST MEDICAL & SURGICAL HISTORY:  PVD (peripheral vascular disease)      Benign essential HTN      Intellectual disability      Hearing impaired      HLD (hyperlipidemia)      Chronic kidney disease, unspecified CKD stage      H/O vascular surgery  left leg          MEDICATIONS  (STANDING):  aspirin enteric coated 81 milliGRAM(s) Oral daily  atorvastatin 80 milliGRAM(s) Oral at bedtime  calcitriol   Capsule 0.25 MICROGram(s) Oral daily  fluticasone propionate 50 MICROgram(s)/spray Nasal Spray 2 Spray(s) Both Nostrils two times a day  gabapentin 300 milliGRAM(s) Oral daily  hydrALAZINE 50 milliGRAM(s) Oral three times a day  lactulose Syrup 20 Gram(s) Oral every 3 hours  loratadine 10 milliGRAM(s) Oral daily  methocarbamol 500 milliGRAM(s) Oral two times a day  NIFEdipine XL 90 milliGRAM(s) Oral daily  pantoprazole    Tablet 40 milliGRAM(s) Oral two times a day  polyethylene glycol 3350 17 Gram(s) Oral two times a day  senna 2 Tablet(s) Oral at bedtime  sevelamer carbonate 800 milliGRAM(s) Oral three times a day with meals  simethicone 80 milliGRAM(s) Chew every 8 hours  sucralfate 1 Gram(s) Oral four times a day    MEDICATIONS  (PRN):  acetaminophen     Tablet .. 650 milliGRAM(s) Oral every 6 hours PRN Temp greater or equal to 38C (100.4F), Mild Pain (1 - 3)  diphenhydrAMINE 25 milliGRAM(s) Oral every 6 hours PRN Rash and/or Itching  HYDROmorphone   Tablet 2 milliGRAM(s) Oral every 6 hours PRN Severe Pain (7 - 10)  melatonin 3 milliGRAM(s) Oral at bedtime PRN Insomnia  ondansetron Injectable 4 milliGRAM(s) IV Push every 8 hours PRN Nausea and/or Vomiting      Allergies  NSAIDs (Nephrotoxicity)  penicillin (Rash)      Review of Systems:   Cardiovascular:  No Chest Pain, No Palpitations  Respiratory:  No Cough, No Dyspnea  Gastrointestinal:  As described in HPI  Skin:  No Skin Lesions, No Jaundice  Neuro:  No Syncope, No Dizziness    Physical Examination:  T(C): 36.4 (06-26-23 @ 13:18), Max: 36.4 (06-26-23 @ 13:18)  HR: 80 (06-26-23 @ 13:18) (71 - 80)  BP: 182/74 (06-26-23 @ 13:18) (159/71 - 192/77)  RR: 18 (06-26-23 @ 13:18) (18 - 20)  SpO2: --        GENERAL: no acute distress.  HEAD:  Atraumatic, Normocephalic  EYES: conjunctiva and sclera clear  NECK: Supple, no JVD or thyromegaly  CHEST/LUNG: Clear to auscultation bilaterally  HEART: Regular rate and rhythm; normal S1, S2, No murmurs.  ABDOMEN: Soft, nontender, nondistended  NEUROLOGY: No asterixis or tremor.   SKIN: Intact, no jaundice     Data:                        9.9    6.19  )-----------( 69       ( 26 Jun 2023 06:30 )             29.6     Hgb trend:  9.9  06-26-23 @ 06:30  9.8  06-25-23 @ 06:01        06-26    138  |  99  |  43<H>  ----------------------------<  140<H>  5.1<H>   |  25  |  6.6<HH>    Ca    8.5      26 Jun 2023 06:30  Phos  2.3     06-26    TPro  6.4  /  Alb  3.9  /  TBili  0.5  /  DBili  x   /  AST  16  /  ALT  9   /  AlkPhos  323<H>  06-26    Liver panel trend:  TBili 0.5   /   AST 16   /   ALT 9   /   AlkP 323   /   Tptn 6.4   /   Alb 3.9    /   DBili --      06-26  TBili 0.5   /   AST 19   /   ALT 9   /   AlkP 325   /   Tptn 6.6   /   Alb 4.0    /   DBili --      06-25  TBili 0.7   /   AST 26   /   ALT 10   /   AlkP 274   /   Tptn 6.2   /   Alb 3.9    /   DBili --      06-23  TBili 0.6   /   AST 15   /   ALT 11   /   AlkP 314   /   Tptn 7.0   /   Alb 4.2    /   DBili 0.4      06-22      Radiology:  < from: US Abdomen Upper Quadrant Right (06.23.23 @ 19:22) >  VC: Visualized portions are within normal limits.      IMPRESSION:    Hepatomegaly and hepatic steatosis, similar to prior imaging studies.    --- End of Report ---      < end of copied text >

## 2023-06-26 NOTE — DISCHARGE NOTE PROVIDER - CARE PROVIDER_API CALL
Shira Damon  Internal Medicine  [address] [place] [address]  Phone: [phone]  Fax: [phone]  Follow Up Time:    Shira Damon  Internal Medicine  [address] [place] [address]  Phone: [phone]  Fax: [phone]  Follow Up Time:     Adrian Castanon  Medical Oncology  [address] [place] [address]  Phone: [phone]  Fax: [phone]  Follow Up Time: 2 weeks    Helen Taylor  Rheumatology  [address] [facility], [address]  [place] [address]  Phone: [phone]  Fax: [phone]  Follow Up Time: 2 weeks    Jennifer Oliveira  [facility]  [address] [place] [address]  Phone: [phone]  Fax: [phone]  Follow Up Time: 2 weeks    Hill Huddleston  Nephrology  [address] [place] [address]  Phone: [phone]  Fax: [phone]  Follow Up Time: 2 weeks    Shorty Dennis  Gastroenterology  [address] [place] [address]  Phone: [phone]  Fax: [phone]  Follow Up Time: 2 weeks

## 2023-06-26 NOTE — PROGRESS NOTE ADULT - ASSESSMENT
· Assessment    59 y/o F with PMHx of ESRD on HD T/Th/Sat (follows w/ nephro Dr Solis Borja), hyperlipidemia, HTN, PVD, CVA and hearing impaired presents to the ED with c/o RUQ abdominal pain for past few weeks, associated with nausea, vomiting and poor po intake.    # Intellectual disability; hearing/speech impairment (since as baby)  Sign Language Interpreters are available 24/7 (after usual hours, they may need 30-60 minutes to respond)  Ext [phone]    # Abdominal pain of unclear etiology; + acute hernandez CHF; sev PHTN  -CT A/P: pt has sm ascites and hepatomeg w/ known sev PHTN, G2 hernandez dysfxn and BNP 70K - could have passive stretch of liver capsule  -pt on DAPT - could have gastritis -> GI eval re poss EGD , they said that the abdominal pain is atypical for PUD , EGD to be done as outpatient   - last bowel motion was 4 days ago according to patient , was started on lactulose and Miralax   -pt is on simethicone 80mg po q6   -CT A/P: mod Rt/Sm Lt pl eff; atelectasis; coronary calcif; fatty liver; hepatomeg; ascites; periportal edema; atrophic kid; pelvic and inguinal LN; hiatal hernia;   -Echo: (2/2023): EF 61%l G2DD; LAE; RAY; mod TR; sev pHTN; triv pericard eff  -c/w sucralfate and PPI *2   -c/w asa; HOLD plavix   -Zofran PRN  -dilaudid 2mg po q6 prn pain      #knee pain with hyperferritinemia : 81705  high ferritin levels with artharlgia and change in skin color.   could be secondary to hemochromatosis   HFE gene sent   Monitor result       # Hypertensive Urgency  BP readings are trending down , but she had one episode of elevated BP today that reached to 180/80 , medications doses were adjusted as below ;  increase Nifedipine xl to 90 q24  increase Hydralazine to 50 q8     BP decreased to 130/70 after getting her anti-hypertensives     # acute hernandez CHF; severe PHTN  Patient has anasarca and ascites with liver congestion   For possible Right sided H cath to be done as outpatient.         # incr AP, prob from passive congestion  RUQ U/S per GI: IMPRESSION: Hepatomegaly and hepatic steatosis, similar to prior imaging studies.    # neck pain; carotid bruits  carotid U/S noted   robaxin 500mg po q12  dilaudid 2mg po q6 prn  tylenol 650mg po q6 prn    # thrombocytopenia - unclear etiology-monitored, 68k today  plt are usually normal for her (couple of rare and transient decreases in past)  hold heparin for now  possible clumping?  f/u CBC daily   H/O evauationl if PLTs < 50K    # ESRD; normo anemia of chr dz  -on HD via left UE AVG - T/Th/Sat  -HD per nephro  -daily weight, fluid restriction, renal restrictions  -c/w sevelamer 800mg qac, Calcitriol 0.25 q24  -Covid 19 swab to be done     # pruritis  benadryl prn po      # DLD  c/w atorva 80 HS    # h/o PVD;  - s/p left femoral artery-posterior tibial artery bypass with autologous venous tissue and left heel ulcer debridement 11/21  Arterial duplex: patent LLE bypass; lt inguinal LN to 2.2cm  on DAPT - HOLD Plavix    # Chronic Normocytic anemia due to CKD  iron sat 82%   ferr 31524    # DM - controlled  A1C 6 (11/22/22)  off meds  can D/C FS    # h/o Right pontine CVA (February 2021)  c/w ASA  HOLD Plavix  c/w statin    # Activity: Independent  # GI PPX: PPI  # DVT PPX: SCDs (low plt - d/c heparin, cannot use fondaparinux in HD)  # Full Code    Monitor BP readings , abdominal pain , knee pain and bowel motions   Possible D/C Tomorrow.

## 2023-06-26 NOTE — PROGRESS NOTE ADULT - SUBJECTIVE AND OBJECTIVE BOX
SUBJECTIVE:  HPI:  57 y/o F with PMHx of ESRD on HD T/Th/Sat (follows w nephro Dr Solis Borja), hyperlipidemia, HTN, PVD, CVA and hearing impaired presents to the ED with c/o RUQ abdominal pain for past few weeks, associated with nausea, vomiting and poor po intake. Patient is scheduled for dialysis later today but due to significant pain came to the emergency department.  Patient also reports bilateral lower extremity pain as well and pain on right hand. Denies any chest pain, shortness of breath, dizziness, fever,  In the ED BP was 212/102 on arrival, otherwise hemodynamically stable. Labs significant for K 5.2, BUN/Cr 51/7.8; Trops 0.04, BNP >91843; CXR showed bilateral pulmonary congestion and effusions; CT abdomen and pelvis showed - Small volume abdominopelvic ascites and anasarca. Persistent moderate right and small left pleural effusions with compressive atelectasis. Findings may reflect fluid overload state on the basis of known ESRD.  Nephro called by ED - patient scheduled for HD today.  (22 Jun 2023 08:40)      Patient is a 58y old Female who presents with a chief complaint of Abdominal pain, nausea, vomiting (26 Jun 2023 14:14)    Currently admitted to medicine with the primary diagnosis of Chronic intractable pain       Today is hospital day 4d.     PAST MEDICAL & SURGICAL HISTORY  PVD (peripheral vascular disease)    Benign essential HTN    Intellectual disability    Hearing impaired    HLD (hyperlipidemia)    Chronic kidney disease, unspecified CKD stage    H/O vascular surgery  left leg        ALLERGIES:  NSAIDs (Nephrotoxicity)  penicillin (Rash)    MEDICATIONS:  ACTIVE MEDICATIONS  acetaminophen     Tablet .. 650 milliGRAM(s) Oral every 6 hours PRN  aspirin enteric coated 81 milliGRAM(s) Oral daily  atorvastatin 80 milliGRAM(s) Oral at bedtime  calcitriol   Capsule 0.25 MICROGram(s) Oral daily  diphenhydrAMINE 25 milliGRAM(s) Oral every 6 hours PRN  fluticasone propionate 50 MICROgram(s)/spray Nasal Spray 2 Spray(s) Both Nostrils two times a day  gabapentin 300 milliGRAM(s) Oral daily  hydrALAZINE 50 milliGRAM(s) Oral three times a day  HYDROmorphone   Tablet 2 milliGRAM(s) Oral every 6 hours PRN  lactulose Syrup 20 Gram(s) Oral every 3 hours  loratadine 10 milliGRAM(s) Oral daily  melatonin 3 milliGRAM(s) Oral at bedtime PRN  methocarbamol 500 milliGRAM(s) Oral two times a day  NIFEdipine XL 90 milliGRAM(s) Oral daily  ondansetron Injectable 4 milliGRAM(s) IV Push every 8 hours PRN  pantoprazole    Tablet 40 milliGRAM(s) Oral two times a day  polyethylene glycol 3350 17 Gram(s) Oral two times a day  senna 2 Tablet(s) Oral at bedtime  sevelamer carbonate 800 milliGRAM(s) Oral three times a day with meals  simethicone 80 milliGRAM(s) Chew every 8 hours  sucralfate 1 Gram(s) Oral four times a day      VITALS:   T(F): 97.6  HR: 74  BP: 139/62  RR: 18  SpO2: --    LABS:                        9.9    6.19  )-----------( 69       ( 26 Jun 2023 06:30 )             29.6     06-26    138  |  99  |  43<H>  ----------------------------<  140<H>  5.1<H>   |  25  |  6.6<HH>    Ca    8.5      26 Jun 2023 06:30  Phos  2.3     06-26    TPro  6.4  /  Alb  3.9  /  TBili  0.5  /  DBili  x   /  AST  16  /  ALT  9   /  AlkPhos  323<H>  06-26      Urinalysis Basic - ( 26 Jun 2023 06:30 )    Color: x / Appearance: x / SG: x / pH: x  Gluc: 140 mg/dL / Ketone: x  / Bili: x / Urobili: x   Blood: x / Protein: x / Nitrite: x   Leuk Esterase: x / RBC: x / WBC x   Sq Epi: x / Non Sq Epi: x / Bacteria: x        Sedimentation Rate, Erythrocyte: 10 mm/Hr (06-26-23 @ 10:44)              PHYSICAL EXAM:  GEN: No acute distress  LUNGS: Clear to auscultation bilaterally   HEART: S1/S2 present.    ABD: Soft, tenderness noted in milton-umbilical region and RLQ .   EXT: No pedal edema  NEURO: AAOX3

## 2023-06-26 NOTE — DISCHARGE NOTE PROVIDER - CARE PROVIDERS DIRECT ADDRESSES
,DirectAddress_Unknown ,DirectAddress_Unknown,DirectAddress_Unknown,DirectAddress_Unknown,DirectAddress_Unknown,DirectAddress_Unknown,DirectAddress_Unknown

## 2023-06-27 LAB
ALBUMIN SERPL ELPH-MCNC: 4.1 G/DL — SIGNIFICANT CHANGE UP (ref 3.5–5.2)
ALP SERPL-CCNC: 308 U/L — HIGH (ref 30–115)
ALT FLD-CCNC: 8 U/L — SIGNIFICANT CHANGE UP (ref 0–41)
ANION GAP SERPL CALC-SCNC: 15 MMOL/L — HIGH (ref 7–14)
AST SERPL-CCNC: 15 U/L — SIGNIFICANT CHANGE UP (ref 0–41)
BASOPHILS # BLD AUTO: 0.02 K/UL — SIGNIFICANT CHANGE UP (ref 0–0.2)
BASOPHILS NFR BLD AUTO: 0.3 % — SIGNIFICANT CHANGE UP (ref 0–1)
BILIRUB SERPL-MCNC: 0.6 MG/DL — SIGNIFICANT CHANGE UP (ref 0.2–1.2)
BUN SERPL-MCNC: 52 MG/DL — HIGH (ref 10–20)
CALCIUM SERPL-MCNC: 8.8 MG/DL — SIGNIFICANT CHANGE UP (ref 8.4–10.4)
CHLORIDE SERPL-SCNC: 95 MMOL/L — LOW (ref 98–110)
CO2 SERPL-SCNC: 25 MMOL/L — SIGNIFICANT CHANGE UP (ref 17–32)
CREAT SERPL-MCNC: 7.2 MG/DL — CRITICAL HIGH (ref 0.7–1.5)
EGFR: 6 ML/MIN/1.73M2 — LOW
EOSINOPHIL # BLD AUTO: 0.3 K/UL — SIGNIFICANT CHANGE UP (ref 0–0.7)
EOSINOPHIL NFR BLD AUTO: 5 % — SIGNIFICANT CHANGE UP (ref 0–8)
GGT SERPL-CCNC: 416 U/L — HIGH (ref 1–40)
GLUCOSE BLDC GLUCOMTR-MCNC: 152 MG/DL — HIGH (ref 70–99)
GLUCOSE BLDC GLUCOMTR-MCNC: 209 MG/DL — HIGH (ref 70–99)
GLUCOSE BLDC GLUCOMTR-MCNC: 256 MG/DL — HIGH (ref 70–99)
GLUCOSE SERPL-MCNC: 137 MG/DL — HIGH (ref 70–99)
HCT VFR BLD CALC: 29.3 % — LOW (ref 37–47)
HGB BLD-MCNC: 9.5 G/DL — LOW (ref 12–16)
IMM GRANULOCYTES NFR BLD AUTO: 0.3 % — SIGNIFICANT CHANGE UP (ref 0.1–0.3)
LYMPHOCYTES # BLD AUTO: 0.64 K/UL — LOW (ref 1.2–3.4)
LYMPHOCYTES # BLD AUTO: 10.6 % — LOW (ref 20.5–51.1)
MAGNESIUM SERPL-MCNC: 2.3 MG/DL — SIGNIFICANT CHANGE UP (ref 1.8–2.4)
MCHC RBC-ENTMCNC: 30.9 PG — SIGNIFICANT CHANGE UP (ref 27–31)
MCHC RBC-ENTMCNC: 32.4 G/DL — SIGNIFICANT CHANGE UP (ref 32–37)
MCV RBC AUTO: 95.4 FL — SIGNIFICANT CHANGE UP (ref 81–99)
MONOCYTES # BLD AUTO: 0.59 K/UL — SIGNIFICANT CHANGE UP (ref 0.1–0.6)
MONOCYTES NFR BLD AUTO: 9.7 % — HIGH (ref 1.7–9.3)
NEUTROPHILS # BLD AUTO: 4.49 K/UL — SIGNIFICANT CHANGE UP (ref 1.4–6.5)
NEUTROPHILS NFR BLD AUTO: 74.1 % — SIGNIFICANT CHANGE UP (ref 42.2–75.2)
NRBC # BLD: 0 /100 WBCS — SIGNIFICANT CHANGE UP (ref 0–0)
PHOSPHATE SERPL-MCNC: 2.2 MG/DL — SIGNIFICANT CHANGE UP (ref 2.1–4.9)
PLATELET # BLD AUTO: 73 K/UL — LOW (ref 130–400)
PMV BLD: SIGNIFICANT CHANGE UP (ref 7.4–10.4)
POTASSIUM SERPL-MCNC: 5.5 MMOL/L — HIGH (ref 3.5–5)
POTASSIUM SERPL-SCNC: 5.5 MMOL/L — HIGH (ref 3.5–5)
PROT SERPL-MCNC: 6.6 G/DL — SIGNIFICANT CHANGE UP (ref 6–8)
RBC # BLD: 3.07 M/UL — LOW (ref 4.2–5.4)
RBC # FLD: 19.1 % — HIGH (ref 11.5–14.5)
SODIUM SERPL-SCNC: 135 MMOL/L — SIGNIFICANT CHANGE UP (ref 135–146)
WBC # BLD: 6.06 K/UL — SIGNIFICANT CHANGE UP (ref 4.8–10.8)
WBC # FLD AUTO: 6.06 K/UL — SIGNIFICANT CHANGE UP (ref 4.8–10.8)

## 2023-06-27 PROCEDURE — 99232 SBSQ HOSP IP/OBS MODERATE 35: CPT

## 2023-06-27 RX ORDER — HYDRALAZINE HCL 50 MG
10 TABLET ORAL ONCE
Refills: 0 | Status: COMPLETED | OUTPATIENT
Start: 2023-06-27 | End: 2023-06-27

## 2023-06-27 RX ORDER — SODIUM ZIRCONIUM CYCLOSILICATE 10 G/10G
10 POWDER, FOR SUSPENSION ORAL ONCE
Refills: 0 | Status: DISCONTINUED | OUTPATIENT
Start: 2023-06-27 | End: 2023-06-27

## 2023-06-27 RX ORDER — SODIUM CHLORIDE 9 MG/ML
100 INJECTION INTRAMUSCULAR; INTRAVENOUS; SUBCUTANEOUS
Refills: 0 | Status: DISCONTINUED | OUTPATIENT
Start: 2023-06-27 | End: 2023-06-27

## 2023-06-27 RX ORDER — LABETALOL HCL 100 MG
1 TABLET ORAL
Qty: 90 | Refills: 0
Start: 2023-06-27 | End: 2023-07-26

## 2023-06-27 RX ORDER — LABETALOL HCL 100 MG
100 TABLET ORAL EVERY 8 HOURS
Refills: 0 | Status: DISCONTINUED | OUTPATIENT
Start: 2023-06-27 | End: 2023-06-28

## 2023-06-27 RX ADMIN — Medication 50 MILLIGRAM(S): at 05:54

## 2023-06-27 RX ADMIN — GABAPENTIN 300 MILLIGRAM(S): 400 CAPSULE ORAL at 12:40

## 2023-06-27 RX ADMIN — Medication 650 MILLIGRAM(S): at 05:01

## 2023-06-27 RX ADMIN — Medication 50 MILLIGRAM(S): at 21:05

## 2023-06-27 RX ADMIN — POLYETHYLENE GLYCOL 3350 17 GRAM(S): 17 POWDER, FOR SOLUTION ORAL at 17:02

## 2023-06-27 RX ADMIN — Medication 100 MILLIGRAM(S): at 21:06

## 2023-06-27 RX ADMIN — SEVELAMER CARBONATE 800 MILLIGRAM(S): 2400 POWDER, FOR SUSPENSION ORAL at 07:37

## 2023-06-27 RX ADMIN — METHOCARBAMOL 500 MILLIGRAM(S): 500 TABLET, FILM COATED ORAL at 05:54

## 2023-06-27 RX ADMIN — SIMETHICONE 80 MILLIGRAM(S): 80 TABLET, CHEWABLE ORAL at 05:55

## 2023-06-27 RX ADMIN — ATORVASTATIN CALCIUM 80 MILLIGRAM(S): 80 TABLET, FILM COATED ORAL at 21:06

## 2023-06-27 RX ADMIN — SIMETHICONE 80 MILLIGRAM(S): 80 TABLET, CHEWABLE ORAL at 13:05

## 2023-06-27 RX ADMIN — Medication 81 MILLIGRAM(S): at 12:39

## 2023-06-27 RX ADMIN — Medication 2 SPRAY(S): at 05:53

## 2023-06-27 RX ADMIN — Medication 100 MILLIGRAM(S): at 07:55

## 2023-06-27 RX ADMIN — Medication 2 SPRAY(S): at 17:07

## 2023-06-27 RX ADMIN — LORATADINE 10 MILLIGRAM(S): 10 TABLET ORAL at 17:02

## 2023-06-27 RX ADMIN — PANTOPRAZOLE SODIUM 40 MILLIGRAM(S): 20 TABLET, DELAYED RELEASE ORAL at 05:54

## 2023-06-27 RX ADMIN — Medication 90 MILLIGRAM(S): at 05:54

## 2023-06-27 RX ADMIN — Medication 50 MILLIGRAM(S): at 13:05

## 2023-06-27 RX ADMIN — POLYETHYLENE GLYCOL 3350 17 GRAM(S): 17 POWDER, FOR SOLUTION ORAL at 05:55

## 2023-06-27 RX ADMIN — CALCITRIOL 0.25 MICROGRAM(S): 0.5 CAPSULE ORAL at 12:40

## 2023-06-27 RX ADMIN — SEVELAMER CARBONATE 800 MILLIGRAM(S): 2400 POWDER, FOR SUSPENSION ORAL at 12:40

## 2023-06-27 RX ADMIN — SIMETHICONE 80 MILLIGRAM(S): 80 TABLET, CHEWABLE ORAL at 21:06

## 2023-06-27 RX ADMIN — PANTOPRAZOLE SODIUM 40 MILLIGRAM(S): 20 TABLET, DELAYED RELEASE ORAL at 17:01

## 2023-06-27 RX ADMIN — Medication 650 MILLIGRAM(S): at 04:06

## 2023-06-27 RX ADMIN — SENNA PLUS 2 TABLET(S): 8.6 TABLET ORAL at 21:05

## 2023-06-27 RX ADMIN — METHOCARBAMOL 500 MILLIGRAM(S): 500 TABLET, FILM COATED ORAL at 17:01

## 2023-06-27 RX ADMIN — SEVELAMER CARBONATE 800 MILLIGRAM(S): 2400 POWDER, FOR SUSPENSION ORAL at 17:04

## 2023-06-27 RX ADMIN — Medication 10 MILLIGRAM(S): at 07:58

## 2023-06-27 RX ADMIN — Medication 100 MILLIGRAM(S): at 13:05

## 2023-06-27 NOTE — PROGRESS NOTE ADULT - ASSESSMENT
· Assessment    57 y/o F with PMHx of ESRD on HD T/Th/Sat (follows w/ nephro Dr Solis Borja), hyperlipidemia, HTN, PVD, CVA and hearing impaired presents to the ED with c/o RUQ abdominal pain for past few weeks, associated with nausea, vomiting and poor po intake.    # Intellectual disability; hearing/speech impairment (since as baby)  Sign Language Interpreters are available 24/7 (after usual hours, they may need 30-60 minutes to respond)  Ext [phone]    # Abdominal pain of unclear etiology; + acute hernandez CHF; sev PHTN ; IMPROVING   -CT A/P: pt has sm ascites and hepatomeg w/ known sev PHTN, G2 hernandez dysfxn and BNP 70K - could have passive stretch of liver capsule  -pt on DAPT - could have gastritis -> GI eval re poss EGD , they said that the abdominal pain is atypical for PUD , EGD to be done as outpatient   -was started on lactulose and Miralax due to severe constipation , the patient had a bowel motion yesterday , her abdominal pain is improving   -CT A/P: mod Rt/Sm Lt pl eff; atelectasis; coronary calcif; fatty liver; hepatomeg; ascites; periportal edema; atrophic kid; pelvic and inguinal LN; hiatal hernia;   -Echo: (2/2023): EF 61%l G2DD; LAE; RAY; mod TR; sev pHTN; triv pericard eff  -c/w asa; HOLD plavix   -Zofran PRN  -dilaudid 2mg po q6 prn pain      #knee pain with hyperferritinemia : 84682  high ferritin levels with arthralgia and change in skin color.   could be due to hemochromatosis - though doubt  HFE gene sent   Monitor result       # Hypertensive Urgency  BP readings are still elevated , around 190-215/75-80   10 mg IV hydralazine was given   Labetalol 100 mg Q 8 ADDED   Yesterday ; increase Nifedipine xl to 90 q24  Yesterday ; increase Hydralazine to 50 q8         # acute hernandez CHF; severe PHTN  Patient has anasarca and ascites with liver congestion   For possible Right sided H cath to be done as outpatient.         # incr AP, prob from passive congestion  RUQ U/S per GI: IMPRESSION: Hepatomegaly and hepatic steatosis, similar to prior imaging studies.  ALP is elevated  ( 308 ) , GGT was ordered and it is  also elevated ( 416 )        # neck pain; carotid bruits  carotid U/S noted   robaxin 500mg po q12  dilaudid 2mg po q6 prn  tylenol 650mg po q6 prn    # thrombocytopenia - unclear etiology-monitored, 73 k today , increasing   plt are usually normal for her (couple of rare and transient decreases in past)  recieved heparin during her hospital stay but was stopped.   PLT increasing , low PLTs possibly due to heparin   possible clumping?  f/u CBC daily   Hematoncology evaluation if PLTs < 50K    # ESRD; normo anemia of chr dz  -on HD via left UE AVG - T/Th/Sat  -HD done today   -daily weight, fluid restriction, renal restrictions  -c/w sevelamer 800mg qac, Calcitriol 0.25 q24    # pruritis  benadryl prn po      # DLD  c/w atorvastatin 80 HS    # h/o PVD;  - s/p left femoral artery-posterior tibial artery bypass with autologous venous tissue and left heel ulcer debridement 11/21  Arterial duplex: patent LLE bypass; lt inguinal LN to 2.2cm  on DAPT - HOLD Plavix    # Chronic Normocytic anemia due to CKD  iron sat 82%  ferr 40587  ordered HFE gene - though doubt hemachromatosis  d/c iron supplements      # DM - controlled  A1C 6 (11/22/22)  off meds  can D/C FS    # h/o Right pontine CVA (February 2021)  c/w ASA  HOLD Plavix  c/w statin    # Activity: Independent  # GI PPX: PPI  # DVT PPX: SCDs (low plt - d/c heparin, cannot use fondaparinux in HD)  # Full Code    Monitor BP readings , abdominal pain , knee pain and bowel motions   Possible D/C Tomorrow if BP reading improved.

## 2023-06-27 NOTE — PROGRESS NOTE ADULT - SUBJECTIVE AND OBJECTIVE BOX
seen and examined   24 h events noted         PAST HISTORY  --------------------------------------------------------------------------------  No significant changes to PMH, PSH, FHx, SHx, unless otherwise noted    ALLERGIES & MEDICATIONS  --------------------------------------------------------------------------------  Allergies    NSAIDs (Nephrotoxicity)  penicillin (Rash)    Intolerances      Standing Inpatient Medications  aspirin enteric coated 81 milliGRAM(s) Oral daily  atorvastatin 80 milliGRAM(s) Oral at bedtime  calcitriol   Capsule 0.25 MICROGram(s) Oral daily  fluticasone propionate 50 MICROgram(s)/spray Nasal Spray 2 Spray(s) Both Nostrils two times a day  gabapentin 300 milliGRAM(s) Oral daily  hydrALAZINE 50 milliGRAM(s) Oral three times a day  labetalol 100 milliGRAM(s) Oral every 8 hours  loratadine 10 milliGRAM(s) Oral daily  methocarbamol 500 milliGRAM(s) Oral two times a day  NIFEdipine XL 90 milliGRAM(s) Oral daily  pantoprazole    Tablet 40 milliGRAM(s) Oral two times a day  polyethylene glycol 3350 17 Gram(s) Oral two times a day  senna 2 Tablet(s) Oral at bedtime  sevelamer carbonate 800 milliGRAM(s) Oral three times a day with meals  simethicone 80 milliGRAM(s) Chew every 8 hours    PRN Inpatient Medications  acetaminophen     Tablet .. 650 milliGRAM(s) Oral every 6 hours PRN  diphenhydrAMINE 25 milliGRAM(s) Oral every 6 hours PRN  HYDROmorphone   Tablet 2 milliGRAM(s) Oral every 6 hours PRN  melatonin 3 milliGRAM(s) Oral at bedtime PRN  ondansetron Injectable 4 milliGRAM(s) IV Push every 8 hours PRN        VITALS/PHYSICAL EXAM  --------------------------------------------------------------------------------  T(C): 36.1 (06-27-23 @ 05:05), Max: 36.4 (06-26-23 @ 13:18)  HR: 77 (06-27-23 @ 07:34) (74 - 80)  BP: 215/86 (06-27-23 @ 07:34) (139/62 - 215/86)  RR: 18 (06-27-23 @ 05:05) (18 - 18)  SpO2: --  Wt(kg): --        Physical Exam:  	Gen: NAD,  	Pulm: CTA B/L  	CV:  S1S2; no rub  	Abd: +distended  	LE:  edema  	Vascular access:av     LABS/STUDIES  --------------------------------------------------------------------------------              9.9    6.19  >-----------<  69       [06-26-23 @ 06:30]              29.6     138  |  99  |  43  ----------------------------<  140      [06-26-23 @ 06:30]  5.1   |  25  |  6.6        Ca     8.5     [06-26-23 @ 06:30]      Phos  2.3     [06-26-23 @ 06:30]    TPro  6.4  /  Alb  3.9  /  TBili  0.5  /  DBili  x   /  AST  16  /  ALT  9   /  AlkPhos  323  [06-26-23 @ 06:30]      Creatinine Trend:  SCr 6.6 [06-26 @ 06:30]  SCr 5.2 [06-25 @ 06:01]  SCr 5.5 [06-23 @ 08:41]  SCr 7.8 [06-22 @ 04:05]    Urinalysis - [06-26-23 @ 06:30]      Color  / Appearance  / SG  / pH       Gluc 140 / Ketone   / Bili  / Urobili        Blood  / Protein  / Leuk Est  / Nitrite       RBC  / WBC  / Hyaline  / Gran  / Sq Epi  / Non Sq Epi  / Bacteria       Iron 191, TIBC 233, %sat 82      [06-23-23 @ 08:41]  Ferritin 10725      [06-23-23 @ 08:41]  PTH -- (Ca 8.9)      [06-23-23 @ 08:41]   92  Lipid: chol 158, , HDL 61, LDL --      [11-22-22 @ 06:31]

## 2023-06-27 NOTE — PROGRESS NOTE ADULT - ASSESSMENT
59 y/o F with PMHx of ESRD on HD T/Th/Sat (follows w nephro Dr Solis Borja), hyperlipidemia, HTN, PVD, CVA and hearing impaired presents to the ED for abdominal pain diffusely for the past several days a/w n/v/d. CT a/p showed no acute intra abdominal pathologies.  ESRD on HD/ PVD/ HTN  hd today standard bath uf 2 liters as tolerated   ph at goal , /dc renagel   Hgb noted / sat elevated / resume HANNAH with HD / will check last dose of  mircera   if bp remains elevated after hd increase labetalol to 200 q 8   follow platelet count / check HIT   on calcitriol / PTH on the low side/ calcium 8.5  Abdominal pain -  followed by GI / EGD as outpatient   will follow

## 2023-06-27 NOTE — PROGRESS NOTE ADULT - ASSESSMENT
59 y/o F with PMHx of ESRD on HD T/Th/Sat (follows w/ nephro Dr Solis Borja), hyperlipidemia, HTN, PVD, CVA and hearing impaired presents to the ED with c/o RUQ abdominal pain for past few weeks, associated with nausea, vomiting and poor po intake.    # Intellectual disability; hearing/speech impairment (since as baby)  Sign Language Interpreters are available 24/7 (after usual hours, they may need 30-60 minutes to respond)  Shannan Freedman - spectra [phone]  Office Ext [phone]    # Hypertensive Urgency  Nifedipine xl 90 q24  Hydralazine 50 q8  add labetalol 100mg po q8    # Abdominal pain - improving a lot; + acute hernandez CHF; sev PHTN  CT A/P: noted  pt has sm ascites and hepatomeg w/ known sev PHTN, G2 hernandez dysfxn and BNP 70K - could have passive stretch of liver capsule  pt on DAPT - could have gastritis -> GI eval: EGD as outpt once cleared by cardio for pulm HTN (after outpt rt hrt cath)  doubt uremic gastritis (BUN 27 - prob malnourished)  doubt gastroparesis (no DM)  does NOT seem ischemic - BP not low; HCO3 26  simethicone 80mg po q8 is helping, so gas is likely contributing factor  + constipation at times: bowel reg prn  CT A/P: mod Rt/Sm Lt pl eff; atelectasis; coronary calcif; fatty liver; hepatomeg; ascites; periportal edema; atrophic kid; pelvic and inguinal LN; hiatal hernia;   RUQ U/S: hepatomeg; incr liver echo; NS thick edematous GB w/out stones; sm ascites  Echo: (2/2023): EF 61%l G2DD; LAE; RAY; mod TR; sev pHTN; triv pericard eff  c/w PPI and simethicone 80mg po q6  d/c carafate (has aluminium molecule in it)  c/w asa;   d/c plavix - see no need for DAPT at this point  CHF eval: noted: outpt f/u Dr Gomes for Rt Hrt cath;   Zofran PRN  f/u renal re cramping during HD  ESR 10; CRP 12.5    # incr AP, prob from passive congestion and cholestasis from DM, meds, fatty liver   -> thus incr AP likely liver in origin  CLD w/u per GI - can f/u as outpt    # neck pain; carotid bruits  carotid U/S: mild b/l stenosis 20-39%  robaxin 500mg po q12  gabapentin 300mg hs  dilaudid 2mg po q6 prn - will send pain rx to [facility] upon d/c  tylenol 650mg po q6 prn    # thrombocytopenia - unclear etiology  plt are usually normal for her (couple of rare and transient decreases in past)  hold heparin for now  poss clumping?  f/u CBC  H/O eval if < 50K    # ESRD; normo anemia of chr dz  on HD via left UE AVG - T/Th/Sat  HD per nephro  daily weight, fluid restriction, renal restrictions  c/w sevelamer 800mg qac  c/w Calcitriol 0.25 q24    # pruritis  benadryl prn po    # DLD  c/w atorva 80 HS    # h/o PVD;  s/p left femoral artery-posterior tibial artery bypass with autologous venous tissue and left heel ulcer debridement 11/21  Arterial duplex: patent LLE bypass; lt inguinal LN to 2.2cm  on DAPT - HOLD Plavix, c/w asa    # Chronic Normocytic anemia due to CKD  ferr 26,149 (was 135 Nov 2022) - not sure why ferr so acutely high - hold iron at HD  iron sat 82% (was 32% Nov 2022)  order HFE gene - though doubt hemachromatosis - f/u outpt  d/c iron supplements  outpt heme eval    # DM - controlled  A1C 6 (11/22/22)  off meds  can D/C FS    # h/o Right pontine CVA (February 2021)  c/w ASA  d/c Plavix - no need for DAPT now  c/w statin    # Activity: Independent     # GI PPX: PPI    # DVT PPX: SCDs (low plt - d/c heparin, cannot use fondaparinux in HD)    # Full Code    # updated bro-in-law at bedside    # Dispo: d/c FS; tx BP; f/u renal; f/u plt; SCDs;    eventually, pt will go home w/ cont outpt HD - f/u CM - anticipate d/c rich; COVID swab for HD

## 2023-06-27 NOTE — PROGRESS NOTE ADULT - SUBJECTIVE AND OBJECTIVE BOX
SEN LING  58y  Female  ***My note supersedes ALL resident notes that I sign.  My corrections for their notes are in my note.***    I can be reached directly on [email or web address]. My office number is [phone]. My personal cell number is [phone].    Sign inter: Shannan; [phone]    INTERVAL EVENTS: Here for f/u of HTN. BP still > 200 at times. Pt looks much better today. Pains are better controlled. Pt is tolerating diet. Pt is sitting in chair better. Dizziness is still present, but also not as bad. Pt feels ready to go home rich.    T(F): 97.8 (06-27-23 @ 13:46), Max: 97.8 (06-27-23 @ 13:46)  HR: 83 (06-27-23 @ 13:46) (74 - 86)  BP: 174/73 (06-27-23 @ 13:46) (162/68 - 215/86)  RR: 18 (06-27-23 @ 13:46) (18 - 18)  SpO2: --    Gen: NAD; deaf; more comfortable  HEENT: PERRL, EOMI, mouth clr, nose clr  skin: dark  Neck: no nodes, no JVD, thyroid nl; + b/l carotid bruits;   lungs: decr BS bases  hrt: s1 s2 rrr no murmur  abd: soft, ND, + tender in RUQ (mild); no Splenomegaly; + mild hepatomegaly  ext: tr edema, no c/c; chr LE stasis changes  lt upper arm AVG: + bruit and thrill  neuro: aa ox3, cn intact, can move all 4 ext    LABS:                      9.5     (    95.4   6.06  )-----------( ---------      73       ( 27 Jun 2023 06:42 )             29.3    (    19.1     Platelet Count - Automated: 73 K/uL (06-27-23 @ 06:42) - stable  Platelet Count - Automated: 69 K/uL (06-26-23 @ 06:30)  Platelet Count - Automated: 68 K/uL (06-25-23 @ 06:01)  Platelet Count - Automated: 81 K/uL (06-23-23 @ 08:41)    135   (   95   (   137      06-27-23 @ 06:42  ----------------------               5.5   (   25   (   52                             -----                        7.2  Ca  8.8   Mg  2.3    P   2.2     LFT  6.6  (  0.6  (  15       06-27-23 @ 06:42  -------------------------  4.1  (  308  (  8    Urinalysis Basic - ( 27 Jun 2023 06:42 )    Color: x / Appearance: x / SG: x / pH: x  Gluc: 137 mg/dL / Ketone: x  / Bili: x / Urobili: x   Blood: x / Protein: x / Nitrite: x   Leuk Esterase: x / RBC: x / WBC x   Sq Epi: x / Non Sq Epi: x / Bacteria: x    CAPILLARY BLOOD GLUCOSE  POCT Blood Glucose.: 256 (06-27-23 @ 16:42)  POCT Blood Glucose.: 152 (06-27-23 @ 07:56)  POCT Blood Glucose.: 167 (06-26-23 @ 21:08)  POCT Blood Glucose.: 144 (06-26-23 @ 17:03)  POCT Blood Glucose.: 143 (06-26-23 @ 12:01)  POCT Blood Glucose.: 156 (06-26-23 @ 07:58)  POCT Blood Glucose.: 166 (06-25-23 @ 21:18)  POCT Blood Glucose.: 149 (06-25-23 @ 16:42)    RADIOLOGY & ADDITIONAL TESTS:    MEDICATIONS:    acetaminophen     Tablet .. 650 milliGRAM(s) Oral every 6 hours PRN  aspirin enteric coated 81 milliGRAM(s) Oral daily  atorvastatin 80 milliGRAM(s) Oral at bedtime  calcitriol   Capsule 0.25 MICROGram(s) Oral daily  diphenhydrAMINE 25 milliGRAM(s) Oral every 6 hours PRN  fluticasone propionate 50 MICROgram(s)/spray Nasal Spray 2 Spray(s) Both Nostrils two times a day  gabapentin 300 milliGRAM(s) Oral daily  hydrALAZINE 50 milliGRAM(s) Oral three times a day  HYDROmorphone   Tablet 2 milliGRAM(s) Oral every 6 hours PRN  labetalol 100 milliGRAM(s) Oral every 8 hours  loratadine 10 milliGRAM(s) Oral daily  melatonin 3 milliGRAM(s) Oral at bedtime PRN  methocarbamol 500 milliGRAM(s) Oral two times a day  NIFEdipine XL 90 milliGRAM(s) Oral daily  ondansetron Injectable 4 milliGRAM(s) IV Push every 8 hours PRN  pantoprazole    Tablet 40 milliGRAM(s) Oral two times a day  polyethylene glycol 3350 17 Gram(s) Oral two times a day  senna 2 Tablet(s) Oral at bedtime  sevelamer carbonate 800 milliGRAM(s) Oral three times a day with meals  simethicone 80 milliGRAM(s) Chew every 8 hours

## 2023-06-27 NOTE — PROGRESS NOTE ADULT - SUBJECTIVE AND OBJECTIVE BOX
SUBJECTIVE:  HPI:  57 y/o F with PMHx of ESRD on HD T/Th/Sat (follows w nephro Dr Solis Borja), hyperlipidemia, HTN, PVD, CVA and hearing impaired presents to the ED with c/o RUQ abdominal pain for past few weeks, associated with nausea, vomiting and poor po intake. Patient is scheduled for dialysis later today but due to significant pain came to the emergency department.  Patient also reports bilateral lower extremity pain as well and pain on right hand. Denies any chest pain, shortness of breath, dizziness, fever,  In the ED BP was 212/102 on arrival, otherwise hemodynamically stable. Labs significant for K 5.2, BUN/Cr 51/7.8; Trops 0.04, BNP >63319; CXR showed bilateral pulmonary congestion and effusions; CT abdomen and pelvis showed - Small volume abdominopelvic ascites and anasarca. Persistent moderate right and small left pleural effusions with compressive atelectasis. Findings may reflect fluid overload state on the basis of known ESRD.  Nephro called by ED - patient scheduled for HD today.  (22 Jun 2023 08:40)      Patient is a 58y old Female who presents with a chief complaint of Abdominal pain, nausea, vomiting (27 Jun 2023 08:15)    Currently admitted to medicine with the primary diagnosis of Chronic intractable pain       Today is hospital day 5d.     PAST MEDICAL & SURGICAL HISTORY  PVD (peripheral vascular disease)    Benign essential HTN    Intellectual disability    Hearing impaired    HLD (hyperlipidemia)    Chronic kidney disease, unspecified CKD stage    H/O vascular surgery  left leg        ALLERGIES:  NSAIDs (Nephrotoxicity)  penicillin (Rash)    MEDICATIONS:  ACTIVE MEDICATIONS  acetaminophen     Tablet .. 650 milliGRAM(s) Oral every 6 hours PRN  aspirin enteric coated 81 milliGRAM(s) Oral daily  atorvastatin 80 milliGRAM(s) Oral at bedtime  calcitriol   Capsule 0.25 MICROGram(s) Oral daily  diphenhydrAMINE 25 milliGRAM(s) Oral every 6 hours PRN  fluticasone propionate 50 MICROgram(s)/spray Nasal Spray 2 Spray(s) Both Nostrils two times a day  gabapentin 300 milliGRAM(s) Oral daily  hydrALAZINE 50 milliGRAM(s) Oral three times a day  HYDROmorphone   Tablet 2 milliGRAM(s) Oral every 6 hours PRN  labetalol 100 milliGRAM(s) Oral every 8 hours  loratadine 10 milliGRAM(s) Oral daily  melatonin 3 milliGRAM(s) Oral at bedtime PRN  methocarbamol 500 milliGRAM(s) Oral two times a day  NIFEdipine XL 90 milliGRAM(s) Oral daily  ondansetron Injectable 4 milliGRAM(s) IV Push every 8 hours PRN  pantoprazole    Tablet 40 milliGRAM(s) Oral two times a day  polyethylene glycol 3350 17 Gram(s) Oral two times a day  senna 2 Tablet(s) Oral at bedtime  sevelamer carbonate 800 milliGRAM(s) Oral three times a day with meals  simethicone 80 milliGRAM(s) Chew every 8 hours      VITALS:   T(F): 97  HR: 74  BP: 189/83 ( medications adjusted as below )   RR: 18  SpO2: --    LABS:                        9.5    6.06  )-----------( 73       ( 27 Jun 2023 06:42 )             29.3     06-27    135  |  95<L>  |  52<H>  ----------------------------<  137<H>  5.5<H>   |  25  |  7.2<HH>    Ca    8.8      27 Jun 2023 06:42  Phos  2.2     06-27  Mg     2.3     06-27    TPro  6.6  /  Alb  4.1  /  TBili  0.6  /  DBili  x   /  AST  15  /  ALT  8   /  AlkPhos  308<H>  06-27      Urinalysis Basic - ( 27 Jun 2023 06:42 )    Color: x / Appearance: x / SG: x / pH: x  Gluc: 137 mg/dL / Ketone: x  / Bili: x / Urobili: x   Blood: x / Protein: x / Nitrite: x   Leuk Esterase: x / RBC: x / WBC x   Sq Epi: x / Non Sq Epi: x / Bacteria: x              Sedimentation Rate, Erythrocyte: 10 mm/Hr (06-26-23 @ 10:44)              PHYSICAL EXAM:  GEN: No acute distress  LUNGS: Clear to auscultation bilaterally   HEART: S1/S2 present.    ABD: Soft, mild tenderness on palpation mainly in the milton-umbilical region   EXT: No pedal edema  NEURO: AAOX3        Patient uses sign language , she indicated that her abdominal pain is improving , she had a bowel movement yesterday after starting her on the laxatives , but she is still complaining of bilateral arthritic knee pain.     Patient's BP is still elevated , readings are around 190-215/ 75-80.       Patient will undergo HD today.

## 2023-06-28 ENCOUNTER — TRANSCRIPTION ENCOUNTER (OUTPATIENT)
Age: 58
End: 2023-06-28

## 2023-06-28 VITALS — HEART RATE: 70 BPM | SYSTOLIC BLOOD PRESSURE: 158 MMHG | DIASTOLIC BLOOD PRESSURE: 57 MMHG

## 2023-06-28 LAB
ALBUMIN SERPL ELPH-MCNC: 3.8 G/DL — SIGNIFICANT CHANGE UP (ref 3.5–5.2)
ALP SERPL-CCNC: 352 U/L — HIGH (ref 30–115)
ALT FLD-CCNC: 9 U/L — SIGNIFICANT CHANGE UP (ref 0–41)
ANION GAP SERPL CALC-SCNC: 13 MMOL/L — SIGNIFICANT CHANGE UP (ref 7–14)
AST SERPL-CCNC: 18 U/L — SIGNIFICANT CHANGE UP (ref 0–41)
BASOPHILS # BLD AUTO: 0.04 K/UL — SIGNIFICANT CHANGE UP (ref 0–0.2)
BASOPHILS NFR BLD AUTO: 0.8 % — SIGNIFICANT CHANGE UP (ref 0–1)
BILIRUB SERPL-MCNC: 0.5 MG/DL — SIGNIFICANT CHANGE UP (ref 0.2–1.2)
BUN SERPL-MCNC: 41 MG/DL — HIGH (ref 10–20)
CALCIUM SERPL-MCNC: 8.5 MG/DL — SIGNIFICANT CHANGE UP (ref 8.4–10.5)
CHLORIDE SERPL-SCNC: 97 MMOL/L — LOW (ref 98–110)
CO2 SERPL-SCNC: 27 MMOL/L — SIGNIFICANT CHANGE UP (ref 17–32)
CREAT SERPL-MCNC: 5.2 MG/DL — CRITICAL HIGH (ref 0.7–1.5)
EGFR: 9 ML/MIN/1.73M2 — LOW
EOSINOPHIL # BLD AUTO: 0.27 K/UL — SIGNIFICANT CHANGE UP (ref 0–0.7)
EOSINOPHIL NFR BLD AUTO: 5.5 % — SIGNIFICANT CHANGE UP (ref 0–8)
GLUCOSE BLDC GLUCOMTR-MCNC: 166 MG/DL — HIGH (ref 70–99)
GLUCOSE BLDC GLUCOMTR-MCNC: 238 MG/DL — HIGH (ref 70–99)
GLUCOSE SERPL-MCNC: 182 MG/DL — HIGH (ref 70–99)
HCT VFR BLD CALC: 28.4 % — LOW (ref 37–47)
HGB BLD-MCNC: 9.4 G/DL — LOW (ref 12–16)
IMM GRANULOCYTES NFR BLD AUTO: 0.4 % — HIGH (ref 0.1–0.3)
LYMPHOCYTES # BLD AUTO: 0.67 K/UL — LOW (ref 1.2–3.4)
LYMPHOCYTES # BLD AUTO: 13.6 % — LOW (ref 20.5–51.1)
MAGNESIUM SERPL-MCNC: 2.1 MG/DL — SIGNIFICANT CHANGE UP (ref 1.8–2.4)
MCHC RBC-ENTMCNC: 32.1 PG — HIGH (ref 27–31)
MCHC RBC-ENTMCNC: 33.1 G/DL — SIGNIFICANT CHANGE UP (ref 32–37)
MCV RBC AUTO: 96.9 FL — SIGNIFICANT CHANGE UP (ref 81–99)
MONOCYTES # BLD AUTO: 0.58 K/UL — SIGNIFICANT CHANGE UP (ref 0.1–0.6)
MONOCYTES NFR BLD AUTO: 11.8 % — HIGH (ref 1.7–9.3)
NEUTROPHILS # BLD AUTO: 3.35 K/UL — SIGNIFICANT CHANGE UP (ref 1.4–6.5)
NEUTROPHILS NFR BLD AUTO: 67.9 % — SIGNIFICANT CHANGE UP (ref 42.2–75.2)
NRBC # BLD: 0 /100 WBCS — SIGNIFICANT CHANGE UP (ref 0–0)
PHOSPHATE SERPL-MCNC: 2.7 MG/DL — SIGNIFICANT CHANGE UP (ref 2.1–4.9)
PLATELET # BLD AUTO: 59 K/UL — LOW (ref 130–400)
PMV BLD: SIGNIFICANT CHANGE UP (ref 7.4–10.4)
POTASSIUM SERPL-MCNC: 5.5 MMOL/L — HIGH (ref 3.5–5)
POTASSIUM SERPL-SCNC: 5.5 MMOL/L — HIGH (ref 3.5–5)
PROT SERPL-MCNC: 6.1 G/DL — SIGNIFICANT CHANGE UP (ref 6–8)
RBC # BLD: 2.93 M/UL — LOW (ref 4.2–5.4)
RBC # FLD: 19.2 % — HIGH (ref 11.5–14.5)
SODIUM SERPL-SCNC: 137 MMOL/L — SIGNIFICANT CHANGE UP (ref 135–146)
WBC # BLD: 4.93 K/UL — SIGNIFICANT CHANGE UP (ref 4.8–10.8)
WBC # FLD AUTO: 4.93 K/UL — SIGNIFICANT CHANGE UP (ref 4.8–10.8)

## 2023-06-28 PROCEDURE — 99239 HOSP IP/OBS DSCHRG MGMT >30: CPT

## 2023-06-28 RX ORDER — HYDRALAZINE HCL 50 MG
1 TABLET ORAL
Qty: 60 | Refills: 0
Start: 2023-06-28 | End: 2023-07-27

## 2023-06-28 RX ORDER — LABETALOL HCL 100 MG
200 TABLET ORAL
Refills: 0 | Status: DISCONTINUED | OUTPATIENT
Start: 2023-06-28 | End: 2023-06-28

## 2023-06-28 RX ORDER — HYDRALAZINE HCL 50 MG
100 TABLET ORAL
Refills: 0 | Status: DISCONTINUED | OUTPATIENT
Start: 2023-06-28 | End: 2023-06-28

## 2023-06-28 RX ORDER — LABETALOL HCL 100 MG
1 TABLET ORAL
Qty: 60 | Refills: 0
Start: 2023-06-28 | End: 2023-07-27

## 2023-06-28 RX ORDER — POLYETHYLENE GLYCOL 3350 17 G/17G
17 POWDER, FOR SOLUTION ORAL
Qty: 0 | Refills: 0 | DISCHARGE
Start: 2023-06-28

## 2023-06-28 RX ORDER — NIFEDIPINE 30 MG
1 TABLET, EXTENDED RELEASE 24 HR ORAL
Qty: 30 | Refills: 0
Start: 2023-06-28 | End: 2023-07-27

## 2023-06-28 RX ORDER — SODIUM ZIRCONIUM CYCLOSILICATE 10 G/10G
5 POWDER, FOR SUSPENSION ORAL ONCE
Refills: 0 | Status: COMPLETED | OUTPATIENT
Start: 2023-06-28 | End: 2023-06-28

## 2023-06-28 RX ORDER — HYDROMORPHONE HYDROCHLORIDE 2 MG/ML
1 INJECTION INTRAMUSCULAR; INTRAVENOUS; SUBCUTANEOUS
Qty: 28 | Refills: 0
Start: 2023-06-28 | End: 2023-07-04

## 2023-06-28 RX ADMIN — Medication 90 MILLIGRAM(S): at 05:10

## 2023-06-28 RX ADMIN — Medication 50 MILLIGRAM(S): at 05:10

## 2023-06-28 RX ADMIN — SIMETHICONE 80 MILLIGRAM(S): 80 TABLET, CHEWABLE ORAL at 05:11

## 2023-06-28 RX ADMIN — SODIUM ZIRCONIUM CYCLOSILICATE 5 GRAM(S): 10 POWDER, FOR SUSPENSION ORAL at 11:19

## 2023-06-28 RX ADMIN — SIMETHICONE 80 MILLIGRAM(S): 80 TABLET, CHEWABLE ORAL at 13:20

## 2023-06-28 RX ADMIN — LORATADINE 10 MILLIGRAM(S): 10 TABLET ORAL at 11:18

## 2023-06-28 RX ADMIN — CALCITRIOL 0.25 MICROGRAM(S): 0.5 CAPSULE ORAL at 11:17

## 2023-06-28 RX ADMIN — SEVELAMER CARBONATE 800 MILLIGRAM(S): 2400 POWDER, FOR SUSPENSION ORAL at 07:54

## 2023-06-28 RX ADMIN — Medication 100 MILLIGRAM(S): at 05:10

## 2023-06-28 RX ADMIN — PANTOPRAZOLE SODIUM 40 MILLIGRAM(S): 20 TABLET, DELAYED RELEASE ORAL at 05:09

## 2023-06-28 RX ADMIN — GABAPENTIN 300 MILLIGRAM(S): 400 CAPSULE ORAL at 11:18

## 2023-06-28 RX ADMIN — POLYETHYLENE GLYCOL 3350 17 GRAM(S): 17 POWDER, FOR SOLUTION ORAL at 05:12

## 2023-06-28 RX ADMIN — SEVELAMER CARBONATE 800 MILLIGRAM(S): 2400 POWDER, FOR SUSPENSION ORAL at 13:21

## 2023-06-28 RX ADMIN — Medication 81 MILLIGRAM(S): at 11:17

## 2023-06-28 RX ADMIN — HYDROMORPHONE HYDROCHLORIDE 2 MILLIGRAM(S): 2 INJECTION INTRAMUSCULAR; INTRAVENOUS; SUBCUTANEOUS at 03:17

## 2023-06-28 RX ADMIN — HYDROMORPHONE HYDROCHLORIDE 2 MILLIGRAM(S): 2 INJECTION INTRAMUSCULAR; INTRAVENOUS; SUBCUTANEOUS at 02:24

## 2023-06-28 RX ADMIN — METHOCARBAMOL 500 MILLIGRAM(S): 500 TABLET, FILM COATED ORAL at 05:09

## 2023-06-28 RX ADMIN — Medication 2 SPRAY(S): at 05:11

## 2023-06-28 NOTE — PROGRESS NOTE ADULT - PROVIDER SPECIALTY LIST ADULT
Gastroenterology
Internal Medicine
Nephrology
Nephrology
Hospitalist
Internal Medicine
Internal Medicine
Nephrology
Nephrology
Internal Medicine
Internal Medicine
Hospitalist
Internal Medicine
Internal Medicine
Nephrology
Nephrology
Internal Medicine

## 2023-06-28 NOTE — PROGRESS NOTE ADULT - REASON FOR ADMISSION
Abdominal pain, nausea, vomiting

## 2023-06-28 NOTE — DISCHARGE NOTE NURSING/CASE MANAGEMENT/SOCIAL WORK - NSDCPEFALRISK_GEN_ALL_CORE
For information on Fall & Injury Prevention, visit: [email or web address] OR  [email or web address] OR  https://www.cdc.gov/steadi/patient.html

## 2023-06-28 NOTE — PROGRESS NOTE ADULT - SUBJECTIVE AND OBJECTIVE BOX
seen and examined  24 h events noted   no distress         PAST HISTORY  --------------------------------------------------------------------------------  No significant changes to PMH, PSH, FHx, SHx, unless otherwise noted    ALLERGIES & MEDICATIONS  --------------------------------------------------------------------------------  Allergies    NSAIDs (Nephrotoxicity)  penicillin (Rash)    Intolerances      Standing Inpatient Medications  aspirin enteric coated 81 milliGRAM(s) Oral daily  atorvastatin 80 milliGRAM(s) Oral at bedtime  calcitriol   Capsule 0.25 MICROGram(s) Oral daily  fluticasone propionate 50 MICROgram(s)/spray Nasal Spray 2 Spray(s) Both Nostrils two times a day  gabapentin 300 milliGRAM(s) Oral daily  hydrALAZINE 100 milliGRAM(s) Oral two times a day  labetalol 200 milliGRAM(s) Oral two times a day  loratadine 10 milliGRAM(s) Oral daily  methocarbamol 500 milliGRAM(s) Oral two times a day  NIFEdipine XL 90 milliGRAM(s) Oral daily  pantoprazole    Tablet 40 milliGRAM(s) Oral two times a day  polyethylene glycol 3350 17 Gram(s) Oral two times a day  senna 2 Tablet(s) Oral at bedtime  sevelamer carbonate 800 milliGRAM(s) Oral three times a day with meals  simethicone 80 milliGRAM(s) Chew every 8 hours    PRN Inpatient Medications  acetaminophen     Tablet .. 650 milliGRAM(s) Oral every 6 hours PRN  diphenhydrAMINE 25 milliGRAM(s) Oral every 6 hours PRN  HYDROmorphone   Tablet 2 milliGRAM(s) Oral every 6 hours PRN  melatonin 3 milliGRAM(s) Oral at bedtime PRN  ondansetron Injectable 4 milliGRAM(s) IV Push every 8 hours PRN          VITALS/PHYSICAL EXAM  --------------------------------------------------------------------------------  T(C): 36.5 (06-28-23 @ 05:13), Max: 36.6 (06-27-23 @ 13:46)  HR: 70 (06-28-23 @ 07:45) (67 - 85)  BP: 158/57 (06-28-23 @ 07:45) (149/101 - 176/74)  RR: 18 (06-28-23 @ 05:13) (18 - 18)  SpO2: 97% (06-28-23 @ 05:13) (97% - 97%)  Wt(kg): --        06-27-23 @ 07:01  -  06-28-23 @ 07:00  --------------------------------------------------------  IN: 0 mL / OUT: 2000 mL / NET: -2000 mL      Physical Exam:  	Gen: NAD,   	Pulm: CTA B/L  	CV: S1S2; no rub  	Abd: +distended  	LE:  edema  	Vascular access:av     LABS/STUDIES  --------------------------------------------------------------------------------              9.4    4.93  >-----------<  59       [06-28-23 @ 08:30]              28.4     137  |  97  |  41  ----------------------------<  182      [06-28-23 @ 08:30]  5.5   |  27  |  5.2        Ca     8.5     [06-28-23 @ 08:30]      Mg     2.1     [06-28-23 @ 08:30]      Phos  2.7     [06-28-23 @ 08:30]    TPro  6.1  /  Alb  3.8  /  TBili  0.5  /  DBili  x   /  AST  18  /  ALT  9   /  AlkPhos  352  [06-28-23 @ 08:30]    Creatinine Trend:  SCr 5.2 [06-28 @ 08:30]  SCr 7.2 [06-27 @ 06:42]  SCr 6.6 [06-26 @ 06:30]  SCr 5.2 [06-25 @ 06:01]  SCr 5.5 [06-23 @ 08:41]    Urinalysis - [06-28-23 @ 08:30]      Color  / Appearance  / SG  / pH       Gluc 182 / Ketone   / Bili  / Urobili        Blood  / Protein  / Leuk Est  / Nitrite       RBC  / WBC  / Hyaline  / Gran  / Sq Epi  / Non Sq Epi  / Bacteria       Iron 191, TIBC 233, %sat 82      [06-23-23 @ 08:41]  Ferritin 06903      [06-23-23 @ 08:41]  PTH -- (Ca 8.9)      [06-23-23 @ 08:41]   92  Lipid: chol 158, , HDL 61, LDL --      [11-22-22 @ 06:31]

## 2023-06-28 NOTE — DISCHARGE NOTE NURSING/CASE MANAGEMENT/SOCIAL WORK - NSDCVIVACCINE_GEN_ALL_CORE_FT
"Pt bib significant other with c/o a \"nodule\" along her right groin as well as lower back pain with dysuria for the past 2-3 days.   "
No Vaccines Administered.

## 2023-06-28 NOTE — DISCHARGE NOTE NURSING/CASE MANAGEMENT/SOCIAL WORK - PATIENT PORTAL LINK FT
You can access the FollowMyHealth Patient Portal offered by [facility] by registering at the following website: [email or web address]. By joining [employer]’s FollowMyHealth portal, you will also be able to view your health information using other applications (apps) compatible with our system.

## 2023-06-28 NOTE — PROGRESS NOTE ADULT - ASSESSMENT
59 y/o F with PMHx of ESRD on HD T/Th/Sat (follows w/ nephro Dr Solis Borja), hyperlipidemia, HTN, PVD, CVA and hearing impaired presents to the ED with c/o RUQ abdominal pain for past few weeks, associated with nausea, vomiting and poor po intake.    # Intellectual disability; hearing/speech impairment (since as baby)  Sign Language Interpreters are available 24/7 (after usual hours, they may need 30-60 minutes to respond)  [employer] - spectra [phone]  Office Ext [phone]    # Hypertensive Urgency  Nifedipine xl 90 q24  incr Hydralazine 100 q12  incr labetalol 200mg po q12    # Abdominal pain - improving a lot; + acute hernandez CHF; sev PHTN  CT A/P: noted  pt has sm ascites and hepatomeg w/ known sev PHTN, G2 hernandez dysfxn and BNP 70K - could have passive stretch of liver capsule  pt on DAPT - could have gastritis -> GI eval: EGD as outpt once cleared by cardio for pulm HTN (after outpt rt hrt cath)  doubt uremic gastritis (BUN 27 - prob malnourished)  doubt gastroparesis (no DM)  does NOT seem ischemic - BP not low; HCO3 26  simethicone 80mg po q8 is helping, so gas is likely contributing factor  + constipation at times: bowel reg prn  CT A/P: mod Rt/Sm Lt pl eff; atelectasis; coronary calcif; fatty liver; hepatomeg; ascites; periportal edema; atrophic kid; pelvic and inguinal LN; hiatal hernia;   RUQ U/S: hepatomeg; incr liver echo; NS thick edematous GB w/out stones; sm ascites  Echo: (2/2023): EF 61%l G2DD; LAE; RAY; mod TR; sev pHTN; triv pericard eff  c/w PPI and simethicone 80mg po q6  d/c carafate (has aluminium molecule in it)  c/w asa;   d/c plavix - see no need for DAPT at this point  CHF eval: noted: outpt f/u Dr Gomes for Rt Hrt cath;   Zofran PRN  f/u renal re cramping during HD  ESR 10; CRP 12.5    # incr AP, prob from passive congestion and cholestasis from DM, meds, fatty liver   -> thus incr AP likely liver in origin  CLD w/u per GI - can f/u as outpt    # neck pain; carotid bruits  carotid U/S: mild b/l stenosis 20-39%  robaxin 500mg po q12  gabapentin 300mg hs  dilaudid 2mg po q6 prn - sent pain rx to [facility] 1-wk supply   tylenol 650mg po q6 prn    # thrombocytopenia - unclear etiology - could be related to liver dz/portal HTN  plt are usually normal for her (couple of rare and transient decreases in past)  hold heparin for now  poss clumping?  f/u CBC  H/O eval as outpt    # ESRD; normo anemia of chr dz  on HD via left UE AVG - T/Th/Sat  HD per nephro  daily weight, fluid restriction, renal restrictions  c/w sevelamer 800mg qac  c/w Calcitriol 0.25 q24    # pruritis  benadryl prn po    # DLD  c/w atorva 80 HS    # h/o PVD;  s/p left femoral artery-posterior tibial artery bypass with autologous venous tissue and left heel ulcer debridement 11/21  Arterial duplex: patent LLE bypass; lt inguinal LN to 2.2cm  on DAPT - HOLD Plavix, c/w asa    # Chronic Normocytic anemia due to CKD  ferr 26,149 (was 135 Nov 2022) - not sure why ferr so acutely high - hold iron at HD  doubt HLH (no fever or splenomeg); doubt Still's dz (no fever or rash or splenomeg)  iron sat 82% (was 32% Nov 2022)  order HFE gene - though doubt hemachromatosis - f/u outpt  d/c iron supplements  outpt heme eval  rpt ferritin as outpt    # DM - controlled  A1C 6 (11/22/22)  off meds  can D/C FS  outpt f/u PMD    # h/o Right pontine CVA (February 2021)  c/w ASA  d/c Plavix - no need for DAPT now  c/w statin    # Activity: Independent     # GI PPX: PPI    # DVT PPX: SCDs (low plt - d/c heparin, cannot use fondaparinux in HD)    # Full Code    # updated bro-in-law at bedside    # Dispo: tx BP; f/u renal; f/u plt; SCDs;    today, pt will go home w/ cont outpt HD - f/u CM   outpt f/u as above - extensive

## 2023-06-28 NOTE — PROGRESS NOTE ADULT - SUBJECTIVE AND OBJECTIVE BOX
SEN LING  58y  Female  ***My note supersedes ALL resident notes that I sign.  My corrections for their notes are in my note.***    I can be reached directly on [employer] [id number]. My office number is [phone]. My personal cell number is [phone].    Sign Interpret: Jasmina packer [phone]    INTERVAL EVENTS: Here for f/u of pain. Pt doing well. Pt feels OK to go home. Pt, again, said she like HD at hospital better than across the street (same renal team, though). Pt has less cramping w/ HD here. Pt eating well. Pt OOBTC.    T(F): 97.7 (06-28-23 @ 05:13), Max: 97.7 (06-28-23 @ 05:13)  HR: 70 (06-28-23 @ 07:45) (67 - 74)  BP: 158/57 (06-28-23 @ 07:45) (149/101 - 158/57)  RR: 18 (06-28-23 @ 05:13) (18 - 18)  SpO2: 97% (06-28-23 @ 05:13) (97% - 97%)    Gen: NAD; deaf; more comfortable  HEENT: PERRL, EOMI, mouth clr, nose clr  skin: dark  Neck: no nodes, no JVD, thyroid nl; + b/l carotid bruits;   lungs: decr BS bases  hrt: s1 s2 rrr no murmur  abd: soft, ND, + tender in RUQ (mild); no Splenomegaly; + mild hepatomegaly  ext: tr edema, no c/c; chr LE stasis changes  lt upper arm AVG: + bruit and thrill  neuro: aa ox3, cn intact, can move all 4 ext    LABS:                      9.4     (    96.9   4.93  )-----------( ---------      59       ( 28 Jun 2023 08:30 )             28.4    (    19.2     Platelet Count - Automated: 59 K/uL (06-28-23 @ 08:30)  Platelet Count - Automated: 73 K/uL (06-27-23 @ 06:42)  Platelet Count - Automated: 69 K/uL (06-26-23 @ 06:30)  Platelet Count - Automated: 68 K/uL (06-25-23 @ 06:01)    137   (   97   (   182      06-28-23 @ 08:30  ----------------------               5.5   (   27   (   41                             -----                        5.2  Ca  8.5   Mg  2.1    P   2.7     LFT  6.1  (  0.5  (  18       06-28-23 @ 08:30  -------------------------  3.8  (  352  (  9    Urinalysis Basic - ( 28 Jun 2023 08:30 )    Color: x / Appearance: x / SG: x / pH: x  Gluc: 182 mg/dL / Ketone: x  / Bili: x / Urobili: x   Blood: x / Protein: x / Nitrite: x   Leuk Esterase: x / RBC: x / WBC x   Sq Epi: x / Non Sq Epi: x / Bacteria: x    CAPILLARY BLOOD GLUCOSE  POCT Blood Glucose.: 238 (06-28-23 @ 11:15)  POCT Blood Glucose.: 166 (06-28-23 @ 07:52)  POCT Blood Glucose.: 209 (06-27-23 @ 21:41)  POCT Blood Glucose.: 256 (06-27-23 @ 16:42)  POCT Blood Glucose.: 152 (06-27-23 @ 07:56)  POCT Blood Glucose.: 167 (06-26-23 @ 21:08)  POCT Blood Glucose.: 144 (06-26-23 @ 17:03)  POCT Blood Glucose.: 143 (06-26-23 @ 12:01)    RADIOLOGY & ADDITIONAL TESTS:      MEDICATIONS:    acetaminophen     Tablet .. 650 milliGRAM(s) Oral every 6 hours PRN  aspirin enteric coated 81 milliGRAM(s) Oral daily  atorvastatin 80 milliGRAM(s) Oral at bedtime  calcitriol   Capsule 0.25 MICROGram(s) Oral daily  diphenhydrAMINE 25 milliGRAM(s) Oral every 6 hours PRN  fluticasone propionate 50 MICROgram(s)/spray Nasal Spray 2 Spray(s) Both Nostrils two times a day  gabapentin 300 milliGRAM(s) Oral daily  hydrALAZINE 100 milliGRAM(s) Oral two times a day  HYDROmorphone   Tablet 2 milliGRAM(s) Oral every 6 hours PRN  labetalol 200 milliGRAM(s) Oral two times a day  loratadine 10 milliGRAM(s) Oral daily  melatonin 3 milliGRAM(s) Oral at bedtime PRN  methocarbamol 500 milliGRAM(s) Oral two times a day  NIFEdipine XL 90 milliGRAM(s) Oral daily  ondansetron Injectable 4 milliGRAM(s) IV Push every 8 hours PRN  pantoprazole    Tablet 40 milliGRAM(s) Oral two times a day  polyethylene glycol 3350 17 Gram(s) Oral two times a day  senna 2 Tablet(s) Oral at bedtime  sevelamer carbonate 800 milliGRAM(s) Oral three times a day with meals  simethicone 80 milliGRAM(s) Chew every 8 hours

## 2023-06-28 NOTE — PROGRESS NOTE ADULT - ASSESSMENT
57 y/o F with PMHx of ESRD on HD T/Th/Sat (follows w nephro Dr Solis Borja), hyperlipidemia, HTN, PVD, CVA and hearing impaired presents to the ED for abdominal pain diffusely for the past several days a/w n/v/d. CT a/p showed no acute intra abdominal pathologies.  ESRD on HD/ PVD/ HTN  hd in am   low k diet   lokelma 10 on non dialysis days   ph at goal , /dc renagel   Hgb noted / sat elevated and ferritin / no  venofer  / resume HANNAH with HD / will check last dose of  mircera   if bp remains elevated after hd increase labetalol to 200 q 8   follow platelet count / trending down / check HIT   on calcitriol / PTH on the low side/ calcium 8.5  Abdominal pain -  followed by GI / EGD as outpatient   will follow

## 2023-07-03 DIAGNOSIS — M25.561 PAIN IN RIGHT KNEE: ICD-10-CM

## 2023-07-03 DIAGNOSIS — I65.23 OCCLUSION AND STENOSIS OF BILATERAL CAROTID ARTERIES: ICD-10-CM

## 2023-07-03 DIAGNOSIS — R16.0 HEPATOMEGALY, NOT ELSEWHERE CLASSIFIED: ICD-10-CM

## 2023-07-03 DIAGNOSIS — H91.90 UNSPECIFIED HEARING LOSS, UNSPECIFIED EAR: ICD-10-CM

## 2023-07-03 DIAGNOSIS — E83.119 HEMOCHROMATOSIS, UNSPECIFIED: ICD-10-CM

## 2023-07-03 DIAGNOSIS — F79 UNSPECIFIED INTELLECTUAL DISABILITIES: ICD-10-CM

## 2023-07-03 DIAGNOSIS — E11.51 TYPE 2 DIABETES MELLITUS WITH DIABETIC PERIPHERAL ANGIOPATHY WITHOUT GANGRENE: ICD-10-CM

## 2023-07-03 DIAGNOSIS — Z88.6 ALLERGY STATUS TO ANALGESIC AGENT: ICD-10-CM

## 2023-07-03 DIAGNOSIS — Z95.820 PERIPHERAL VASCULAR ANGIOPLASTY STATUS WITH IMPLANTS AND GRAFTS: ICD-10-CM

## 2023-07-03 DIAGNOSIS — N18.6 END STAGE RENAL DISEASE: ICD-10-CM

## 2023-07-03 DIAGNOSIS — K76.0 FATTY (CHANGE OF) LIVER, NOT ELSEWHERE CLASSIFIED: ICD-10-CM

## 2023-07-03 DIAGNOSIS — D63.1 ANEMIA IN CHRONIC KIDNEY DISEASE: ICD-10-CM

## 2023-07-03 DIAGNOSIS — I36.1 NONRHEUMATIC TRICUSPID (VALVE) INSUFFICIENCY: ICD-10-CM

## 2023-07-03 DIAGNOSIS — E78.5 HYPERLIPIDEMIA, UNSPECIFIED: ICD-10-CM

## 2023-07-03 DIAGNOSIS — R10.11 RIGHT UPPER QUADRANT PAIN: ICD-10-CM

## 2023-07-03 DIAGNOSIS — L29.9 PRURITUS, UNSPECIFIED: ICD-10-CM

## 2023-07-03 DIAGNOSIS — M54.2 CERVICALGIA: ICD-10-CM

## 2023-07-03 DIAGNOSIS — D69.6 THROMBOCYTOPENIA, UNSPECIFIED: ICD-10-CM

## 2023-07-03 DIAGNOSIS — I50.33 ACUTE ON CHRONIC DIASTOLIC (CONGESTIVE) HEART FAILURE: ICD-10-CM

## 2023-07-03 DIAGNOSIS — J98.11 ATELECTASIS: ICD-10-CM

## 2023-07-03 DIAGNOSIS — I13.2 HYPERTENSIVE HEART AND CHRONIC KIDNEY DISEASE WITH HEART FAILURE AND WITH STAGE 5 CHRONIC KIDNEY DISEASE, OR END STAGE RENAL DISEASE: ICD-10-CM

## 2023-07-03 DIAGNOSIS — I16.0 HYPERTENSIVE URGENCY: ICD-10-CM

## 2023-07-03 DIAGNOSIS — Z79.02 LONG TERM (CURRENT) USE OF ANTITHROMBOTICS/ANTIPLATELETS: ICD-10-CM

## 2023-07-03 DIAGNOSIS — M25.562 PAIN IN LEFT KNEE: ICD-10-CM

## 2023-07-03 DIAGNOSIS — Z88.0 ALLERGY STATUS TO PENICILLIN: ICD-10-CM

## 2023-07-03 DIAGNOSIS — R18.8 OTHER ASCITES: ICD-10-CM

## 2023-07-03 DIAGNOSIS — Z79.82 LONG TERM (CURRENT) USE OF ASPIRIN: ICD-10-CM

## 2023-07-03 DIAGNOSIS — Z99.2 DEPENDENCE ON RENAL DIALYSIS: ICD-10-CM

## 2023-07-03 DIAGNOSIS — K59.00 CONSTIPATION, UNSPECIFIED: ICD-10-CM

## 2023-07-03 DIAGNOSIS — I27.20 PULMONARY HYPERTENSION, UNSPECIFIED: ICD-10-CM

## 2023-07-03 DIAGNOSIS — E11.22 TYPE 2 DIABETES MELLITUS WITH DIABETIC CHRONIC KIDNEY DISEASE: ICD-10-CM

## 2023-07-03 DIAGNOSIS — Z86.73 PERSONAL HISTORY OF TRANSIENT ISCHEMIC ATTACK (TIA), AND CEREBRAL INFARCTION WITHOUT RESIDUAL DEFICITS: ICD-10-CM

## 2023-07-03 DIAGNOSIS — Z98.890 OTHER SPECIFIED POSTPROCEDURAL STATES: ICD-10-CM

## 2023-07-09 ENCOUNTER — INPATIENT (INPATIENT)
Facility: HOSPITAL | Age: 58
LOS: 1 days | Discharge: HOME | DRG: 204 | End: 2023-07-11
Attending: STUDENT IN AN ORGANIZED HEALTH CARE EDUCATION/TRAINING PROGRAM | Admitting: HOSPITALIST
Payer: MEDICAID

## 2023-07-09 VITALS
OXYGEN SATURATION: 99 % | HEIGHT: 62 IN | DIASTOLIC BLOOD PRESSURE: 64 MMHG | SYSTOLIC BLOOD PRESSURE: 155 MMHG | HEART RATE: 67 BPM | TEMPERATURE: 98 F | WEIGHT: 156.53 LBS | RESPIRATION RATE: 18 BRPM

## 2023-07-09 DIAGNOSIS — R10.9 UNSPECIFIED ABDOMINAL PAIN: ICD-10-CM

## 2023-07-09 DIAGNOSIS — Z98.890 OTHER SPECIFIED POSTPROCEDURAL STATES: Chronic | ICD-10-CM

## 2023-07-09 LAB
ALBUMIN SERPL ELPH-MCNC: 4.2 G/DL — SIGNIFICANT CHANGE UP (ref 3.5–5.2)
ALP SERPL-CCNC: 398 U/L — HIGH (ref 30–115)
ALT FLD-CCNC: 11 U/L — SIGNIFICANT CHANGE UP (ref 0–41)
ANION GAP SERPL CALC-SCNC: 11 MMOL/L — SIGNIFICANT CHANGE UP (ref 7–14)
AST SERPL-CCNC: 18 U/L — SIGNIFICANT CHANGE UP (ref 0–41)
BASOPHILS # BLD AUTO: 0.08 K/UL — SIGNIFICANT CHANGE UP (ref 0–0.2)
BASOPHILS NFR BLD AUTO: 1.4 % — HIGH (ref 0–1)
BILIRUB SERPL-MCNC: 0.5 MG/DL — SIGNIFICANT CHANGE UP (ref 0.2–1.2)
BUN SERPL-MCNC: 24 MG/DL — HIGH (ref 10–20)
CALCIUM SERPL-MCNC: 8.9 MG/DL — SIGNIFICANT CHANGE UP (ref 8.4–10.5)
CHLORIDE SERPL-SCNC: 96 MMOL/L — LOW (ref 98–110)
CO2 SERPL-SCNC: 29 MMOL/L — SIGNIFICANT CHANGE UP (ref 17–32)
CREAT SERPL-MCNC: 4.2 MG/DL — CRITICAL HIGH (ref 0.7–1.5)
EGFR: 12 ML/MIN/1.73M2 — LOW
EOSINOPHIL # BLD AUTO: 0.12 K/UL — SIGNIFICANT CHANGE UP (ref 0–0.7)
EOSINOPHIL NFR BLD AUTO: 2.2 % — SIGNIFICANT CHANGE UP (ref 0–8)
GLUCOSE BLDC GLUCOMTR-MCNC: 111 MG/DL — HIGH (ref 70–99)
GLUCOSE BLDC GLUCOMTR-MCNC: 84 MG/DL — SIGNIFICANT CHANGE UP (ref 70–99)
GLUCOSE BLDC GLUCOMTR-MCNC: 84 MG/DL — SIGNIFICANT CHANGE UP (ref 70–99)
GLUCOSE SERPL-MCNC: 109 MG/DL — HIGH (ref 70–99)
HCT VFR BLD CALC: 27.9 % — LOW (ref 37–47)
HGB BLD-MCNC: 9.2 G/DL — LOW (ref 12–16)
IMM GRANULOCYTES NFR BLD AUTO: 0.2 % — SIGNIFICANT CHANGE UP (ref 0.1–0.3)
IRON SATN MFR SERPL: 24 % — SIGNIFICANT CHANGE UP (ref 15–50)
IRON SATN MFR SERPL: 58 UG/DL — SIGNIFICANT CHANGE UP (ref 35–150)
LIDOCAIN IGE QN: 21 U/L — SIGNIFICANT CHANGE UP (ref 7–60)
LYMPHOCYTES # BLD AUTO: 0.82 K/UL — LOW (ref 1.2–3.4)
LYMPHOCYTES # BLD AUTO: 14.8 % — LOW (ref 20.5–51.1)
MCHC RBC-ENTMCNC: 31.1 PG — HIGH (ref 27–31)
MCHC RBC-ENTMCNC: 33 G/DL — SIGNIFICANT CHANGE UP (ref 32–37)
MCV RBC AUTO: 94.3 FL — SIGNIFICANT CHANGE UP (ref 81–99)
MONOCYTES # BLD AUTO: 0.62 K/UL — HIGH (ref 0.1–0.6)
MONOCYTES NFR BLD AUTO: 11.2 % — HIGH (ref 1.7–9.3)
NEUTROPHILS # BLD AUTO: 3.89 K/UL — SIGNIFICANT CHANGE UP (ref 1.4–6.5)
NEUTROPHILS NFR BLD AUTO: 70.2 % — SIGNIFICANT CHANGE UP (ref 42.2–75.2)
NRBC # BLD: 0 /100 WBCS — SIGNIFICANT CHANGE UP (ref 0–0)
NT-PROBNP SERPL-SCNC: HIGH PG/ML (ref 0–300)
PLATELET # BLD AUTO: 89 K/UL — LOW (ref 130–400)
PMV BLD: 12.8 FL — HIGH (ref 7.4–10.4)
POTASSIUM SERPL-MCNC: 4.8 MMOL/L — SIGNIFICANT CHANGE UP (ref 3.5–5)
POTASSIUM SERPL-SCNC: 4.8 MMOL/L — SIGNIFICANT CHANGE UP (ref 3.5–5)
PROT SERPL-MCNC: 6.6 G/DL — SIGNIFICANT CHANGE UP (ref 6–8)
RBC # BLD: 2.96 M/UL — LOW (ref 4.2–5.4)
RBC # FLD: 17.2 % — HIGH (ref 11.5–14.5)
SODIUM SERPL-SCNC: 136 MMOL/L — SIGNIFICANT CHANGE UP (ref 135–146)
TIBC SERPL-MCNC: 243 UG/DL — SIGNIFICANT CHANGE UP (ref 220–430)
TROPONIN T SERPL-MCNC: 0.11 NG/ML — CRITICAL HIGH
TROPONIN T SERPL-MCNC: 0.13 NG/ML — CRITICAL HIGH
UIBC SERPL-MCNC: 185 UG/DL — SIGNIFICANT CHANGE UP (ref 110–370)
WBC # BLD: 5.54 K/UL — SIGNIFICANT CHANGE UP (ref 4.8–10.8)
WBC # FLD AUTO: 5.54 K/UL — SIGNIFICANT CHANGE UP (ref 4.8–10.8)

## 2023-07-09 PROCEDURE — 85730 THROMBOPLASTIN TIME PARTIAL: CPT

## 2023-07-09 PROCEDURE — 84478 ASSAY OF TRIGLYCERIDES: CPT

## 2023-07-09 PROCEDURE — 82962 GLUCOSE BLOOD TEST: CPT

## 2023-07-09 PROCEDURE — 85025 COMPLETE CBC W/AUTO DIFF WBC: CPT

## 2023-07-09 PROCEDURE — 93306 TTE W/DOPPLER COMPLETE: CPT | Mod: 26

## 2023-07-09 PROCEDURE — 84100 ASSAY OF PHOSPHORUS: CPT

## 2023-07-09 PROCEDURE — 85027 COMPLETE CBC AUTOMATED: CPT

## 2023-07-09 PROCEDURE — 70450 CT HEAD/BRAIN W/O DYE: CPT | Mod: 26,MA

## 2023-07-09 PROCEDURE — 36415 COLL VENOUS BLD VENIPUNCTURE: CPT

## 2023-07-09 PROCEDURE — 99223 1ST HOSP IP/OBS HIGH 75: CPT

## 2023-07-09 PROCEDURE — 84484 ASSAY OF TROPONIN QUANT: CPT

## 2023-07-09 PROCEDURE — 83540 ASSAY OF IRON: CPT

## 2023-07-09 PROCEDURE — 82728 ASSAY OF FERRITIN: CPT

## 2023-07-09 PROCEDURE — 99285 EMERGENCY DEPT VISIT HI MDM: CPT

## 2023-07-09 PROCEDURE — 93005 ELECTROCARDIOGRAM TRACING: CPT

## 2023-07-09 PROCEDURE — 73110 X-RAY EXAM OF WRIST: CPT | Mod: 26,LT

## 2023-07-09 PROCEDURE — 85379 FIBRIN DEGRADATION QUANT: CPT

## 2023-07-09 PROCEDURE — 72070 X-RAY EXAM THORAC SPINE 2VWS: CPT

## 2023-07-09 PROCEDURE — 83550 IRON BINDING TEST: CPT

## 2023-07-09 PROCEDURE — 80053 COMPREHEN METABOLIC PANEL: CPT

## 2023-07-09 PROCEDURE — 90935 HEMODIALYSIS ONE EVALUATION: CPT

## 2023-07-09 PROCEDURE — 86357 NK CELLS TOTAL COUNT: CPT

## 2023-07-09 PROCEDURE — 93010 ELECTROCARDIOGRAM REPORT: CPT | Mod: 77

## 2023-07-09 PROCEDURE — 97162 PT EVAL MOD COMPLEX 30 MIN: CPT | Mod: GP

## 2023-07-09 PROCEDURE — 93010 ELECTROCARDIOGRAM REPORT: CPT

## 2023-07-09 PROCEDURE — 72100 X-RAY EXAM L-S SPINE 2/3 VWS: CPT

## 2023-07-09 PROCEDURE — 93306 TTE W/DOPPLER COMPLETE: CPT

## 2023-07-09 PROCEDURE — 85384 FIBRINOGEN ACTIVITY: CPT

## 2023-07-09 PROCEDURE — 83735 ASSAY OF MAGNESIUM: CPT

## 2023-07-09 PROCEDURE — 71045 X-RAY EXAM CHEST 1 VIEW: CPT | Mod: 26

## 2023-07-09 PROCEDURE — 84466 ASSAY OF TRANSFERRIN: CPT

## 2023-07-09 PROCEDURE — 74177 CT ABD & PELVIS W/CONTRAST: CPT | Mod: 26,MA

## 2023-07-09 PROCEDURE — 85610 PROTHROMBIN TIME: CPT

## 2023-07-09 RX ORDER — SEVELAMER CARBONATE 2400 MG/1
800 POWDER, FOR SUSPENSION ORAL
Refills: 0 | Status: DISCONTINUED | OUTPATIENT
Start: 2023-07-09 | End: 2023-07-11

## 2023-07-09 RX ORDER — HEPARIN SODIUM 5000 [USP'U]/ML
5000 INJECTION INTRAVENOUS; SUBCUTANEOUS EVERY 8 HOURS
Refills: 0 | Status: DISCONTINUED | OUTPATIENT
Start: 2023-07-09 | End: 2023-07-11

## 2023-07-09 RX ORDER — LABETALOL HCL 100 MG
200 TABLET ORAL EVERY 8 HOURS
Refills: 0 | Status: DISCONTINUED | OUTPATIENT
Start: 2023-07-09 | End: 2023-07-11

## 2023-07-09 RX ORDER — ACETAMINOPHEN 500 MG
650 TABLET ORAL EVERY 6 HOURS
Refills: 0 | Status: DISCONTINUED | OUTPATIENT
Start: 2023-07-09 | End: 2023-07-10

## 2023-07-09 RX ORDER — ONDANSETRON 8 MG/1
4 TABLET, FILM COATED ORAL EVERY 8 HOURS
Refills: 0 | Status: DISCONTINUED | OUTPATIENT
Start: 2023-07-09 | End: 2023-07-11

## 2023-07-09 RX ORDER — ASPIRIN/CALCIUM CARB/MAGNESIUM 324 MG
81 TABLET ORAL DAILY
Refills: 0 | Status: DISCONTINUED | OUTPATIENT
Start: 2023-07-09 | End: 2023-07-11

## 2023-07-09 RX ORDER — ATORVASTATIN CALCIUM 80 MG/1
80 TABLET, FILM COATED ORAL AT BEDTIME
Refills: 0 | Status: DISCONTINUED | OUTPATIENT
Start: 2023-07-09 | End: 2023-07-11

## 2023-07-09 RX ORDER — MORPHINE SULFATE 50 MG/1
4 CAPSULE, EXTENDED RELEASE ORAL ONCE
Refills: 0 | Status: DISCONTINUED | OUTPATIENT
Start: 2023-07-09 | End: 2023-07-09

## 2023-07-09 RX ORDER — GABAPENTIN 400 MG/1
300 CAPSULE ORAL DAILY
Refills: 0 | Status: DISCONTINUED | OUTPATIENT
Start: 2023-07-09 | End: 2023-07-11

## 2023-07-09 RX ORDER — HYDRALAZINE HCL 50 MG
100 TABLET ORAL EVERY 12 HOURS
Refills: 0 | Status: DISCONTINUED | OUTPATIENT
Start: 2023-07-09 | End: 2023-07-09

## 2023-07-09 RX ORDER — CALCITRIOL 0.5 UG/1
0.25 CAPSULE ORAL DAILY
Refills: 0 | Status: DISCONTINUED | OUTPATIENT
Start: 2023-07-09 | End: 2023-07-11

## 2023-07-09 RX ORDER — NIFEDIPINE 30 MG
90 TABLET, EXTENDED RELEASE 24 HR ORAL DAILY
Refills: 0 | Status: DISCONTINUED | OUTPATIENT
Start: 2023-07-09 | End: 2023-07-11

## 2023-07-09 RX ORDER — METHOCARBAMOL 500 MG/1
500 TABLET, FILM COATED ORAL
Refills: 0 | Status: DISCONTINUED | OUTPATIENT
Start: 2023-07-09 | End: 2023-07-10

## 2023-07-09 RX ORDER — LABETALOL HCL 100 MG
200 TABLET ORAL
Refills: 0 | Status: DISCONTINUED | OUTPATIENT
Start: 2023-07-09 | End: 2023-07-09

## 2023-07-09 RX ORDER — PANTOPRAZOLE SODIUM 20 MG/1
40 TABLET, DELAYED RELEASE ORAL EVERY 12 HOURS
Refills: 0 | Status: DISCONTINUED | OUTPATIENT
Start: 2023-07-09 | End: 2023-07-11

## 2023-07-09 RX ORDER — HYDROMORPHONE HYDROCHLORIDE 2 MG/ML
2 INJECTION INTRAMUSCULAR; INTRAVENOUS; SUBCUTANEOUS
Refills: 0 | Status: DISCONTINUED | OUTPATIENT
Start: 2023-07-09 | End: 2023-07-10

## 2023-07-09 RX ORDER — MORPHINE SULFATE 50 MG/1
2 CAPSULE, EXTENDED RELEASE ORAL ONCE
Refills: 0 | Status: DISCONTINUED | OUTPATIENT
Start: 2023-07-09 | End: 2023-07-09

## 2023-07-09 RX ORDER — GABAPENTIN 400 MG/1
300 CAPSULE ORAL THREE TIMES A DAY
Refills: 0 | Status: DISCONTINUED | OUTPATIENT
Start: 2023-07-09 | End: 2023-07-09

## 2023-07-09 RX ADMIN — Medication 81 MILLIGRAM(S): at 12:25

## 2023-07-09 RX ADMIN — METHOCARBAMOL 500 MILLIGRAM(S): 500 TABLET, FILM COATED ORAL at 10:46

## 2023-07-09 RX ADMIN — Medication 200 MILLIGRAM(S): at 21:53

## 2023-07-09 RX ADMIN — HYDROMORPHONE HYDROCHLORIDE 2 MILLIGRAM(S): 2 INJECTION INTRAMUSCULAR; INTRAVENOUS; SUBCUTANEOUS at 11:30

## 2023-07-09 RX ADMIN — PANTOPRAZOLE SODIUM 40 MILLIGRAM(S): 20 TABLET, DELAYED RELEASE ORAL at 18:29

## 2023-07-09 RX ADMIN — GABAPENTIN 300 MILLIGRAM(S): 400 CAPSULE ORAL at 12:26

## 2023-07-09 RX ADMIN — ONDANSETRON 4 MILLIGRAM(S): 8 TABLET, FILM COATED ORAL at 16:39

## 2023-07-09 RX ADMIN — HYDROMORPHONE HYDROCHLORIDE 2 MILLIGRAM(S): 2 INJECTION INTRAMUSCULAR; INTRAVENOUS; SUBCUTANEOUS at 10:45

## 2023-07-09 RX ADMIN — SEVELAMER CARBONATE 800 MILLIGRAM(S): 2400 POWDER, FOR SUSPENSION ORAL at 18:30

## 2023-07-09 RX ADMIN — Medication 90 MILLIGRAM(S): at 10:46

## 2023-07-09 RX ADMIN — ATORVASTATIN CALCIUM 80 MILLIGRAM(S): 80 TABLET, FILM COATED ORAL at 21:38

## 2023-07-09 RX ADMIN — Medication 650 MILLIGRAM(S): at 18:29

## 2023-07-09 RX ADMIN — METHOCARBAMOL 500 MILLIGRAM(S): 500 TABLET, FILM COATED ORAL at 18:29

## 2023-07-09 RX ADMIN — CALCITRIOL 0.25 MICROGRAM(S): 0.5 CAPSULE ORAL at 12:26

## 2023-07-09 RX ADMIN — MORPHINE SULFATE 2 MILLIGRAM(S): 50 CAPSULE, EXTENDED RELEASE ORAL at 05:10

## 2023-07-09 RX ADMIN — HEPARIN SODIUM 5000 UNIT(S): 5000 INJECTION INTRAVENOUS; SUBCUTANEOUS at 21:42

## 2023-07-09 RX ADMIN — MORPHINE SULFATE 4 MILLIGRAM(S): 50 CAPSULE, EXTENDED RELEASE ORAL at 02:38

## 2023-07-09 RX ADMIN — Medication 200 MILLIGRAM(S): at 10:46

## 2023-07-09 RX ADMIN — HYDROMORPHONE HYDROCHLORIDE 2 MILLIGRAM(S): 2 INJECTION INTRAMUSCULAR; INTRAVENOUS; SUBCUTANEOUS at 23:00

## 2023-07-09 RX ADMIN — SEVELAMER CARBONATE 800 MILLIGRAM(S): 2400 POWDER, FOR SUSPENSION ORAL at 12:26

## 2023-07-09 NOTE — ED PROVIDER NOTE - CARE PLAN
1 Principal Discharge DX:	Abdominal pain  Secondary Diagnosis:	Generalized weakness  Secondary Diagnosis:	Elevated troponin

## 2023-07-09 NOTE — ED ADULT NURSE NOTE - NSFALLRISKINTERV_ED_ALL_ED

## 2023-07-09 NOTE — ED ADULT TRIAGE NOTE - CHIEF COMPLAINT QUOTE
pt felt lightheaded and fell while using her walker onto her left wrist and left leg. pt also c/o abd pain that started after dialysis.

## 2023-07-09 NOTE — H&P ADULT - HISTORY OF PRESENT ILLNESS
57 y/o F with PMHx of ESRD on HD T/Th/Sat (follows w nephro Dr Solis Borja), hyperlipidemia, HTN, PVD, CVA and hearing impaired presents to the ED with c/o fall, abd pain, Vomiting and diarrhoea. History obtained from brother at bedside, who has limited proficiency in sign language.  Pt had an unwitnessed fall at home yesterday after she came from dialysis. She felt dizzy while going to the bathroom and fell. +ve head trauma, -ve LOC, hit L wrist    Co diarrhoea that started 2d ago, 2 episodes today and the brother can ot think of anything that may have triggered it.  Also co vomiting 2d ago, but none since yesterday    Co chronic abdominal pain, located around the umbilicus radiating to the LLE  for the past 7 months since she started HD. unrelated to food is not positional and can not think of any ++/dec. factors.    Also co perineal irritation, no pruritis on inspection.  Does not urinate (ESRD), denies pelvis pain.    In the ED, VS stable  labs: Hb 9.2  alk phos 398   bnp 70k  trop 1.13  BUN/Cr at baseline  CT AP: no acute path             Unchanged bilateral pleural effusions, mild ascites, anasarca.  CTH: wnl  XR L wrist: wnl

## 2023-07-09 NOTE — CONSULT NOTE ADULT - ASSESSMENT
59 y/o F with PMHx of ESRD on HD T/Th/Sat last HD yesterday , hyperlipidemia, HTN, PVD, CVA and hearing impaired presents to the ED with c/o fall, abd pain, Vomiting and diarrhoea.    # ESRD on HD T TH Sat   # abdominal pain/ diarrhea   # thrombocytopenia   # chronic PVD     - for HD in Am / will benefit from extra ttt/ then Tuesday   - follow GI symptoms check stools for C diff   - follow CBC   - BP noted increase labetalol to q8h  - check Phosphorus    will follow

## 2023-07-09 NOTE — CONSULT NOTE ADULT - SUBJECTIVE AND OBJECTIVE BOX
PAIN MANAGEMENT CONSULT NOTE    Chief Complaint:    HPI:  59 y/o F with PMHx of ESRD on HD T/Th/Sat (follows w nephsalvador Borja), hyperlipidemia, HTN, PVD, CVA and hearing impaired presents to the ED with c/o fall, abd pain, Vomiting and diarrhoea. History obtained from brother at bedside, who has limited proficiency in sign language.  Pt had an unwitnessed fall at home yesterday after she came from dialysis. She felt dizzy while going to the bathroom and fell. +ve head trauma, -ve LOC, hit L wrist    Co diarrhoea that started 2d ago, 2 episodes today and the brother can ot think of anything that may have triggered it.  Also co vomiting 2d ago, but none since yesterday    Co chronic abdominal pain, located around the umbilicus radiating to the LLE  for the past 7 months since she started HD. unrelated to food is not positional and can not think of any ++/dec. factors.    Also co perineal irritation, no pruritis on inspection.  Does not urinate (ESRD), denies pelvis pain.      Patient seen and examined at bedside in ER hold. The patient complaints of aching/throbbing abdominal pain associated with nausea / vomiting and diarrhea. She reports chronic abdominal pain near the umbilicus that radiates to the  left lower extremity. The pain has been ongoing now for 7 months.   .  Of note, the patient had  recent fall after hemodialysis about two days ago.    PAST MEDICAL & SURGICAL HISTORY:  PVD (peripheral vascular disease)      Benign essential HTN      Intellectual disability      Hearing impaired      HLD (hyperlipidemia)      Chronic kidney disease, unspecified CKD stage      H/O vascular surgery  left leg          FAMILY HISTORY:      SOCIAL HISTORY:  [ ] Denies Smoking, Alcohol, or Drug Use    HOME MEDICATIONS:   Please refer to initial HNP    PAIN HOME MEDICATIONS:    Allergies    penicillin (Rash)  NSAIDs (Nephrotoxicity)    Intolerances        PAIN MEDICATIONS:  acetaminophen     Tablet .. 650 milliGRAM(s) Oral every 6 hours PRN  gabapentin 300 milliGRAM(s) Oral daily  HYDROmorphone   Tablet 2 milliGRAM(s) Oral four times a day PRN  methocarbamol 500 milliGRAM(s) Oral two times a day    Heme:  aspirin enteric coated 81 milliGRAM(s) Oral daily  heparin   Injectable 5000 Unit(s) SubCutaneous every 8 hours    Antibiotics:    Cardiovascular:  labetalol 200 milliGRAM(s) Oral two times a day  NIFEdipine XL 90 milliGRAM(s) Oral daily    GI:  pantoprazole    Tablet 40 milliGRAM(s) Oral every 12 hours    Endocrine:  atorvastatin 80 milliGRAM(s) Oral at bedtime    All Other Medications:  calcitriol   Capsule 0.25 MICROGram(s) Oral daily  sevelamer carbonate 800 milliGRAM(s) Oral three times a day with meals      Vital Signs Last 24 Hrs  T(C): 36.6 (09 Jul 2023 08:47), Max: 36.7 (09 Jul 2023 00:19)  T(F): 97.9 (09 Jul 2023 08:47), Max: 98.1 (09 Jul 2023 00:19)  HR: 68 (09 Jul 2023 08:47) (67 - 68)  BP: 168/73 (09 Jul 2023 08:47) (155/64 - 168/73)  BP(mean): --  RR: 18 (09 Jul 2023 08:47) (18 - 18)  SpO2: 96% (09 Jul 2023 08:47) (96% - 99%)    Parameters below as of 09 Jul 2023 08:47  Patient On (Oxygen Delivery Method): room air        LABS:                        9.2    5.54  )-----------( 89       ( 09 Jul 2023 02:35 )             27.9     07-09    136  |  96<L>  |  24<H>  ----------------------------<  109<H>  4.8   |  29  |  4.2<HH>    Ca    8.9      09 Jul 2023 02:35    TPro  6.6  /  Alb  4.2  /  TBili  0.5  /  DBili  x   /  AST  18  /  ALT  11  /  AlkPhos  398<H>  07-09      Urinalysis Basic - ( 09 Jul 2023 02:35 )    Color: x / Appearance: x / SG: x / pH: x  Gluc: 109 mg/dL / Ketone: x  / Bili: x / Urobili: x   Blood: x / Protein: x / Nitrite: x   Leuk Esterase: x / RBC: x / WBC x   Sq Epi: x / Non Sq Epi: x / Bacteria: x        RADs:  CT A& P  1. No evidence of acute abdominal pathology.    2. Unchanged bilateral pleural effusions, mild ascites, anasarca.    CT Head: negative    xray wrist L side  no fx / dislocations  vascular calcifications    Xray chest  unchanged bilateral pleural effusions/opacities      REVIEW OF SYSTEMS:  CONSTITUTIONAL: No fever, chills. Well appearing. hearing impaired  NECK: No pain or stiffness  RESPIRATORY: No cough, wheezing; No shortness of breath  CARDIOVASCULAR: No chest pain, palpitations.   GASTROINTESTINAL: Pt reports + bowel movements. + abdominal /epigastric pain. + nausea, vomiting.   GENITOURINARY: No dysuria, frequency, or incontinence  NEUROLOGICAL: No loss of strength, numbness, or tremors. No dizzinesss or lightheadedness with pain medications.   MUSCULOSKELETAL: No joint pain or swelling; + L leg muscle pain    Gen: NAD  HEENT: PERRL, EOMI  Neck: no nodes, no JVD  lungs: CTAB  hrt: s1 s2 rrr no murmur  abd: soft, +tender to palpation   ext: no edema, no c/c  neuro: A&Ox3        ASSESSMENT:   HPI:  59 y/o F with PMHx of ESRD on HD T/Th/Sat (follows w nephro Dr Solis Borja), hyperlipidemia, HTN, PVD, CVA and hearing impaired presents to the ED with c/o fall, abd pain, Vomiting and diarrhoea. History obtained from brother at bedside, who has limited proficiency in sign language.  Pt had an unwitnessed fall at home yesterday after she came from dialysis. She felt dizzy while going to the bathroom and fell. +ve head trauma, -ve LOC, hit L wrist    Co diarrhoea that started 2d ago, 2 episodes today and the brother can ot think of anything that may have triggered it.  Also co vomiting 2d ago, but none since yesterday    Co chronic abdominal pain, located around the umbilicus radiating to the LLE  for the past 7 months since she started HD. unrelated to food is not positional and can not think of any ++/dec. factors.    Also co perineal irritation, no pruritis on inspection.  Does not urinate (ESRD), denies pelvis pain.    In the ED, VS stable  labs: Hb 9.2  alk phos 398   bnp 70k  trop 1.13  BUN/Cr at baseline  CT AP: no acute path             Unchanged bilateral pleural effusions, mild ascites, anasarca.  CTH: wnl  XR L wrist: wnl     (09 Jul 2023 08:55)    Nonspecific abdominal pain associated w/ N/V/D  Recent fall  Left lower extremity pain    Recommendations  x-ray thoracic and lumbar spine 3 view  acetaminophen 1000mg q8h standing  increase gabapentin to 300mg q8h standing  can continue methocarbamol 500mg q12h standing  PO hydromorphone 2mg q4h PRN for pain    Bowel regimen: miralax / colace  Nausea ppx: zofran standing  Functional goals: oob-chair, ambulate PRN as per primary team  Physical therapy

## 2023-07-09 NOTE — ED PROVIDER NOTE - PHYSICAL EXAMINATION
CONST: chronically ill appearing  HEAD:  normocephalic, atraumatic  EYES:  conjunctivae without injection, drainage or discharge  ENMT:  nasal mucosa moist; mouth moist without ulcerations or lesions; throat moist without erythema, exudate, ulcerations or lesions  NECK:  supple, no midline tenderness  CARDIAC:  regular rate and rhythm, normal S1 and S2, no murmurs, rubs or gallops  RESP:  respiratory rate and effort appear normal for age; lungs are clear to auscultation bilaterally; no rales or wheezes  ABDOMEN:  soft, diffuse abdominal tenderness, moderate distention  MUSCULOSKELETAL/NEURO:  normal movement, normal tone  SKIN:  no rash

## 2023-07-09 NOTE — ED PROVIDER NOTE - OBJECTIVE STATEMENT
Pt is a 57 y/o female with PMH of ESRD on HD (T/Th/Sat), HTN, HLD, CVA, PVD and hearing loss presenting for lightheadedness and vomiting. Pt reports yesterday she felt lightheaded while she was walking with her walker and fell. + Head trauma, no LOC, no blood thinners. Reports pain to left wrist. Also endorses diffuse abdominal pain that started after she finished dialysis today. Associated with vomiting and diarrhea and generalized weakness. Pt reporting difficulty ambulating from the weakness.

## 2023-07-09 NOTE — ED PROVIDER NOTE - CLINICAL SUMMARY MEDICAL DECISION MAKING FREE TEXT BOX
Patient difficulty with ambulating had a fall at home.  Feels unsteady in her feet will consider admission for inpatient work-up

## 2023-07-09 NOTE — ED ADULT NURSE NOTE - SUICIDE SCREENING DEPRESSION
Subjective:     Chief Complaint   Patient presents with   • Follow-Up     Uzma Lara Anna is a 53 y.o. female here today to follow up on menopausal symptoms and mole    Patient is a pleasant 53-year-old who comes in stating that she is getting persistent hot flashes.  Has had menopausal symptoms for some time.  Was on Prempro but stopped this as she did not want to get a mammogram.  States she has mammogram order and is trying to schedule but would like to get back on her Prempro.  Was takingDOtti patch twice weekly to try to help control symptoms but states this does not control her symptoms.    History of depression after her 's death in December 2020 of Covid.  Lexapro helps symptoms.  Currently she is not taking Lexapro and walks daily and this helps her symptoms.    rePorts mole on her left inner arm that is darker than normal.  Also reports small pimple-like papule on her nose.  She does not wish to have it removed with a needle.  Wanted to have it evaluated.  Does not cause pain, denies redness swelling or discharge    Current medicines (including changes today)  Current Outpatient Medications   Medication Sig Dispense Refill   • estrogen, conjugated,-medroxyprogesterone (PREMPRO) 0.3-1.5 MG per tablet Take 1 Tablet by mouth every day. 30 Tablet 3   • escitalopram (LEXAPRO) 10 MG Tab Take 1/2 pill daily for 2 weeks then increase to 1 pill daily 30 Tablet 2     No current facility-administered medications for this visit.     She  has a past medical history of Major depressive disorder (9/23/2021). She also has no past medical history of ASTHMA, CAD (coronary artery disease), Cancer (HCC), Congestive heart failure (HCC), COPD, Diabetes, Hypertension, Infectious disease, Liver disease, Renal disorder, Seizure disorder (HCC), or Stroke (HCC).    ROS  No chest pain, no abdominal pain, no rash.  All other systems reviewed and are negative      Objective:     /70 (BP Location: Right arm, Patient  "Position: Sitting)   Pulse 74   Temp 36.5 °C (97.7 °F) (Temporal)   Ht 1.626 m (5' 4\")   Wt 60.8 kg (134 lb)   SpO2 100%  Body mass index is 23 kg/m².     Physical Exam:  Constitutional: Alert, no distress.  Skin: Small comedone on the tip of the nose without surrounding erythema, induration or warmth.  Left feel upper arm has 3 x 3 mm darkened nevus.  Scaly  Eye: Equal, round and reactive, conjunctiva clear, lids normal.  ENMT: Lips without lesions, good dentition, oropharynx clear.  Neck: Trachea midline, no masses, no thyromegaly. No cervical or supraclavicular lymphadenopathy.  Respiratory: Unlabored respiratory effort, lungs clear to auscultation, no wheezes, no ronchi.  Cardiovascular: Normal S1, S2, no murmur, no edema.  Abdomen: Soft, non-tender, no masses, no hepatosplenomegaly.  Psych: Alert and oriented x3, normal affect and mood.        Assessment and Plan:   The following treatment plan was discussed and signs and symptoms for which to return were discussed with patient.  Patient verbalized understanding.    1. Hot flashes  I am going to send patient to gynecology.  This is a chronic condition that is worsening.  Prempro helped her.  She needs to get her mammogram done though for further refills.  Did discuss risks and benefit of estrogen and progesterone.  Has no known family history of breast cancers or uterine cancers.  She has no smoking history and no history of blood clotting disorders  - estrogen, conjugated,-medroxyprogesterone (PREMPRO) 0.3-1.5 MG per tablet; Take 1 Tablet by mouth every day.  Dispense: 30 Tablet; Refill: 3  - REFERRAL TO GYNECOLOGY    2. Atypical mole  Send to dermatology for new mole that has changed in appearance  - REFERRAL TO DERMATOLOGY       Followup: No follow-ups on file.         Please note that this dictation was created using voice recognition software. I have made every reasonable attempt to correct obvious errors, but I expect that there are errors of grammar " and possibly content that I did not discover before finalizing the note.    Negative

## 2023-07-09 NOTE — H&P ADULT - ASSESSMENT
59 y/o F with PMHx of ESRD on HD T/Th/Sat (follows w nephro Dr Solis Borja), hyperlipidemia, HTN, PVD, CVA and hearing impaired presents to the ED with c/o fall, abd pain, Vomiting and diarrhoea.     fall yesterday sp HD, likely due to hypotension  Lightheadedness +ve  Head trauma +ve  LOC -ve  CTH & L wrist XR: no acute path  - orthostat  - TTE (no evidence of AS on prev TTE 2/23)  - PT consult  - holding hydralazine 50q12    Nonspecific N/V + diarrhoea  no recent abx use, no identifiable trigger  - dced home miralax  - if diarrhoea +ve after 24h ==> GI PCR    chronic abd pain radiating to LLE  iron overload due to HD (38829 in 11/22)  ferritin 26k   TSAT 82%    CT AP: enlarged liver + hepatic steatosis (normal in 2/23)  alk phos 398, GB wall edema on RUQ US last admission  pain could be due to liver capsule stretching  neuropathy known to occur in iron overload, chronic pain syndromes also known to occur  - pending EGD as OP to r/o gastritis as cause   - protonix q12    HTN  - cont home nifedipine and labetalol  - holding hydralazine 100 q12 in view of fall, cont if required    thrombocytopenia - unclear etiology - could be related to liver dz/portal HTN  - monitor H+H  - SCD for dvt ppx    ESRD; normo anemia of chr dz  on HD via left UE AVG - T/Th/Sat  - nephro consulted  - c/w sevelamer 800mg qac  - c/w Calcitriol 0.25 q24    DLD  - c/w atorva 80 HS    h/o PVD;  s/p left femoral artery-posterior tibial artery bypass with autologous venous tissue and left heel ulcer debridement 11/21  Arterial duplex: patent LLE bypass; lt inguinal LN to 2.2cm  - cont aspirin    h/o Right pontine CVA (February 2021)  c/w ASA  c/w statin    dvt ppx: scd  GI ppx: protonix  activity: AAT  diet: renal   57 y/o F with PMHx of ESRD on HD T/Th/Sat (follows w nephro Dr Solis Borja), hyperlipidemia, HTN, PVD, CVA and hearing impaired presents to the ED with c/o fall, abd pain, Vomiting and diarrhoea.     fall yesterday sp HD, likely due to hypotension  Lightheadedness +ve  Head trauma +ve  LOC -ve  CTH & L wrist XR: no acute path  - orthostat  - TTE (no evidence of AS on prev TTE 2/23)  - PT consult  - holding hydralazine 50q12    Nonspecific N/V + diarrhoea  no recent abx use, no identifiable trigger  - dced home miralax  - if diarrhoea +ve after 24h ==> GI PCR    chronic abd pain radiating to LLE  iron overload due to HD (18777 in 11/22)  ferritin 26k   TSAT 82%    CT AP: enlarged liver + hepatic steatosis (normal in 2/23)  alk phos 398, GB wall edema on RUQ US last admission  abd pain could be due to liver capsule stretching, but would be RUQ > umbilical  neuropathy known to occur in iron overload, chronic pain syndromes also known to occur  - fu heme onc consult  - fu iron studies  - fu pain management  - pending EGD as OP to r/o gastritis as cause for abd pain  - conr home protonix q12      HTN  - cont home nifedipine and labetalol  - holding hydralazine 100 q12 in view of fall, cont if required    thrombocytopenia - unclear etiology - could be related to liver dz/portal HTN  - monitor H+H    ESRD; normo anemia of chr dz  on HD via left UE AVG - T/Th/Sat  - nephro consulted  - c/w sevelamer 800mg qac  - c/w Calcitriol 0.25 q24    DLD  - c/w atorva 80 HS    h/o PVD;  s/p left femoral artery-posterior tibial artery bypass with autologous venous tissue and left heel ulcer debridement 11/21  Arterial duplex: patent LLE bypass; lt inguinal LN to 2.2cm  - cont aspirin    h/o Right pontine CVA (February 2021)  c/w ASA  c/w statin    dvt ppx: heparin  GI ppx: protonix  activity: AAT  diet: renal   57 y/o F with PMHx of ESRD on HD T/Th/Sat (follows w nephro Dr Solis Borja), hyperlipidemia, HTN, PVD, CVA and hearing impaired presents to the ED with c/o fall, abd pain, Vomiting and diarrhoea.     fall yesterday sp HD, likely due to hypotension  Lightheadedness +ve  Head trauma +ve  LOC -ve  CTH & L wrist XR: no acute path  - orthostat  - PT consult  - holding hydralazine 50q12    Nonspecific N/V + diarrhoea  no recent abx use, no identifiable trigger  - dced home miralax  - if diarrhoea +ve after 24h ==> GI PCR    chronic abd pain radiating to LLE  iron overload due to HD (31554 in 11/22)  ferritin 26k   TSAT 82%    CT AP: enlarged liver + hepatic steatosis (normal in 2/23)  alk phos 398, GB wall edema on RUQ US last admission  abd pain could be due to liver capsule stretching, but would be RUQ > umbilical  neuropathy known to occur in iron overload, chronic pain syndromes also known to occur  - fu heme onc consult  - fu iron studies  - fu pain management  - pending EGD as OP to r/o gastritis as cause for abd pain  - cont home protonix q12    HTN  - cont home nifedipine and labetalol  - holding hydralazine 100 q12 in view of fall, cont if required    HFpEF  TTE 2/23: GIIDD  +  pHTN  bnp 70k, same as last admission  - monitor for now    thrombocytopenia - unclear etiology - could be related to liver dz/portal HTN  - monitor H+H    ESRD; normo anemia of chr dz  on HD via left UE AVG - T/Th/Sat  - nephro consulted  - c/w sevelamer 800mg qac  - c/w Calcitriol 0.25 q24    DLD  - c/w atorva 80 HS    h/o PVD;  s/p left femoral artery-posterior tibial artery bypass with autologous venous tissue and left heel ulcer debridement 11/21  Arterial duplex: patent LLE bypass; lt inguinal LN to 2.2cm  - cont aspirin    h/o Right pontine CVA (February 2021)  c/w ASA  c/w statin    dvt ppx: heparin  GI ppx: protonix  activity: AAT  diet: renal

## 2023-07-09 NOTE — ED PROVIDER NOTE - ATTENDING CONTRIBUTION TO CARE
58-year-old female to ED with abdominal pain or leg pain and headache.  Patient with complicated medical history also is deaf and speaks with ASL.  Patient was lightheaded and dizzy had a fall today.  No fevers no sick contacts here with family member who is speaks English.     AVSS, exam as noted,
Patient has no objection to blood transfusions.

## 2023-07-09 NOTE — H&P ADULT - ATTENDING COMMENTS
Patient seen and examined independently. Agree with resident note with exceptions. Case discussed with housestaff, nursing and patient    # Near syncopal episode with fall after HD probably sec to  orthostatic hypotension  -  CT Head No Cont (07.09.23 @ 02:17) >No evidence of acute intracranial pathology.  - F/u orthostats  - PT eval    # Gastroenteritis  - zofran prn  - Start protonix  - if continues to have diarrhea GI PCR    # Chronic abdominal pain  # Hemochromatosis  -  CT Abdomen and Pelvis w/ IV Cont (07.09.23 @ 02:27) >No evidence of acute abdominal pathology. Unchanged bilateral pleural effusions, mild ascites, anasarca. HEPATOBILIARY: Periportal edema. Gallbladder wall edema. Patent portal vein. No biliary dilation. Unchanged hepatomegaly, 21 cm in length. Hepatic steatosis.  - Ferritin: 65760: Test Repeated ng/mL (06.23.23 @ 08:41)  Transferrin, Serum: 160 mg/dL (05.12.22 @ 08:21) Patient seen and examined independently. Agree with resident note with exceptions. Case discussed with housestaff, nursing and patient    # Near syncopal episode with fall after HD probably sec to  orthostatic hypotension  -  CT Head No Cont (07.09.23 @ 02:17) >No evidence of acute intracranial pathology.  - F/u orthostats  - PT eval    # Gastroenteritis  - hold miralax  - zofran prn  - Start protonix  - if continues to have diarrhea GI PCR    # Chronic abdominal pain  # Hemochromatosis  -  CT Abdomen and Pelvis w/ IV Cont (07.09.23 @ 02:27) >No evidence of acute abdominal pathology. Unchanged bilateral pleural effusions, mild ascites, anasarca. HEPATOBILIARY: Periportal edema. Gallbladder wall edema. Patent portal vein. No biliary dilation. Unchanged hepatomegaly, 21 cm in length. Hepatic steatosis.  - Ferritin: 20399: Test Repeated ng/mL (06.23.23 @ 08:41)  - Transferrin, Serum: 160 mg/dL (05.12.22 @ 08:21)  - Iron - Total Binding Capacity.: 233 ug/dL (06.23.23 @ 08:41)  - % Saturation, Iron: 82 % (06.23.23 @ 08:41)  - Iron Total: 191 ug/dL (06.23.23 @ 08:41)  - heme consult  - F/u repeat iron panel , ferritin  - planned for outpt  EGD    # ESRD on HD  # Anemia sec to CKD  - c/w sevelamer  - c/w Calcitriol   - nephrology following    # Chronic diastolic CHF with severe pulm hypertension  # Hypertension  -  TTE Echo Complete w/o Contrast w/ Doppler (07.09.23 @ 11:06) >LV Ejection Fraction by Soto's Method with a biplane EF of 57 %. Moderate tricuspid regurgitation.severe pulmonary hypertension.  - c/w labetalol, procardia  - hold hydralazine    # Thrombocytopenia  - monitor platelets    # H/o  Right pontine CVA in 2021  - c/w ASA, statin    # PAD   # H/o L heel gangrene, s/p L common femoral to anterior tibial reverse saphenous vein bypass 11/8/2021  -  VA Duplex Low Ext Arterial, Ltd, Left (06.22.23 @ 08:27) >Patent left lower extremity bypass with low flow in the distal segment. Suspected single vessel runoff via anterior tibial artery. Left inguinal lymphadenopathy measuring 2.2 x 0.9 cm.  -  VA Duplex Lower Extrem Arterial, Bilat (02.11.23 @ 08:59) >Left lower extremity arterial graft appears to be patent. No evidence of a stenosis or occlusion noted in the right lower extremity  - c/w ASA, statin    # DVT prophylaxis    # Full code    # Pending: orthostats, PT eval, iron panel, ferritin, hematology eval, monitor platelets  # Discussed plan of care with family  # Disposition: Home when stable    - Time spent: 79 minutes Patient seen and examined independently. Agree with resident note with exceptions. Case discussed with housestaff, nursing and patient    Pt presented after a fall, c/o nausea, vomiting , diarrhea and dizziness  C/o chr abd pain    T(C): 36.6 (07-09-23 @ 08:47), Max: 36.7 (07-09-23 @ 00:19)  HR: 68 (07-09-23 @ 08:47) (67 - 68)  BP: 168/73 (07-09-23 @ 08:47) (155/64 - 168/73)  RR: 18 (07-09-23 @ 08:47) (18 - 18)  SpO2: 96% (07-09-23 @ 08:47) (96% - 99%)    O/E: awake, alert  HEENT: atraumatic , EOMI, hearing impaired  Chest: decreased breath sounds at bases  CVS: S1S2+, systolic murmur+  P/A: soft, epigastric tenderness  CNS: awake, alert  Ext : no edema feet  All system reviewed positive findings as discussed above      # Near syncopal episode with fall after HD probably sec to  orthostatic hypotension  -  CT Head No Cont (07.09.23 @ 02:17) >No evidence of acute intracranial pathology.  - F/u orthostats  - PT eval    # Gastroenteritis  - hold miralax  - zofran prn  - Start protonix  - if continues to have diarrhea GI PCR    # Chronic abdominal pain  # Hemochromatosis  -  CT Abdomen and Pelvis w/ IV Cont (07.09.23 @ 02:27) >No evidence of acute abdominal pathology. Unchanged bilateral pleural effusions, mild ascites, anasarca. HEPATOBILIARY: Periportal edema. Gallbladder wall edema. Patent portal vein. No biliary dilation. Unchanged hepatomegaly, 21 cm in length. Hepatic steatosis.  - Ferritin: 82289: Test Repeated ng/mL (06.23.23 @ 08:41)  - Transferrin, Serum: 160 mg/dL (05.12.22 @ 08:21)  - Iron - Total Binding Capacity.: 233 ug/dL (06.23.23 @ 08:41)  - % Saturation, Iron: 82 % (06.23.23 @ 08:41)  - Iron Total: 191 ug/dL (06.23.23 @ 08:41)  - heme consult  - F/u repeat iron panel , ferritin  - planned for outpt  EGD    # ESRD on HD  # Anemia sec to CKD  - c/w sevelamer  - c/w Calcitriol   - nephrology following    # Elevated troponin probably sec to ESRD  # Chronic diastolic CHF with severe pulm hypertension  # Hypertension  -  TTE Echo Complete w/o Contrast w/ Doppler (07.09.23 @ 11:06) >LV Ejection Fraction by Soto's Method with a biplane EF of 57 %. Moderate tricuspid regurgitation.severe pulmonary hypertension.  - c/w labetalol, procardia  - hold hydralazine  - trend tropnin    # Thrombocytopenia  - monitor platelets    # H/o  Right pontine CVA in 2021  - c/w ASA, statin    # PAD   # H/o L heel gangrene, s/p L common femoral to anterior tibial reverse saphenous vein bypass 11/8/2021  -  VA Duplex Low Ext Arterial, Ltd, Left (06.22.23 @ 08:27) >Patent left lower extremity bypass with low flow in the distal segment. Suspected single vessel runoff via anterior tibial artery. Left inguinal lymphadenopathy measuring 2.2 x 0.9 cm.  -  VA Duplex Lower Extrem Arterial, Bilat (02.11.23 @ 08:59) >Left lower extremity arterial graft appears to be patent. No evidence of a stenosis or occlusion noted in the right lower extremity  - c/w ASA, statin    # DVT prophylaxis    # Full code    # Pending: orthostats, PT eval, iron panel, ferritin, hematology eval, monitor platelets, trend troponin  # Discussed plan of care with family  # Disposition: Home when stable    - Time spent: 79 minutes

## 2023-07-09 NOTE — H&P ADULT - NSHPPHYSICALEXAM_GEN_ALL_CORE
Gen: NAD  HEENT: PERRL, EOMI, mouth clr, nose clr  Neck: no nodes, no JVD, thyroid nl  lungs: clr  hrt: s1 s2 rrr no murmur  abd: soft, NT/ND, no HS megaly  ext: no edema, no c/c  neuro: aa ox3, cn intact, can move all 4 ext

## 2023-07-09 NOTE — CONSULT NOTE ADULT - SUBJECTIVE AND OBJECTIVE BOX
NEPHROLOGY CONSULTATION NOTE      57 y/o F with PMHx of ESRD on HD T/Th/Sat last HD yesterday , hyperlipidemia, HTN, PVD, CVA and hearing impaired presents to the ED with c/o fall, abd pain, Vomiting and diarrhoea. History obtained from brother at bedside, who has limited proficiency in sign language.  Pt had an unwitnessed fall at home yesterday after HD  She felt dizzy while going to the bathroom and fell. +ve head trauma, -ve LOC, hit L wrist  had diarrhea started 2 days ago     has chronic abdominal pain / nausea thought to be due to diabetic gastroparesis in the past         PAST MEDICAL & SURGICAL HISTORY:  PVD (peripheral vascular disease)  Benign essential HTN  Hearing impaired  HLD (hyperlipidemia)  Chronic kidney disease, unspecified CKD stage  H/O vascular surgery left leg        Allergies:  penicillin (Rash)  NSAIDs (Nephrotoxicity)    Home Medications Reviewed  Hospital Medications:   MEDICATIONS  (STANDING):  aspirin enteric coated 81 milliGRAM(s) Oral daily  atorvastatin 80 milliGRAM(s) Oral at bedtime  calcitriol   Capsule 0.25 MICROGram(s) Oral daily  gabapentin 300 milliGRAM(s) Oral daily  heparin   Injectable 5000 Unit(s) SubCutaneous every 8 hours  labetalol 200 milliGRAM(s) Oral two times a day  methocarbamol 500 milliGRAM(s) Oral two times a day  NIFEdipine XL 90 milliGRAM(s) Oral daily  pantoprazole    Tablet 40 milliGRAM(s) Oral every 12 hours  sevelamer carbonate 800 milliGRAM(s) Oral three times a day with meals      SOCIAL HISTORY:  Denies ETOH,Smoking,   FAMILY HISTORY:        REVIEW OF SYSTEMS:   All other review of systems is negative unless indicated above.    VITALS:  T(F): 97.9 (07-09-23 @ 08:47), Max: 98.1 (07-09-23 @ 00:19)  HR: 68 (07-09-23 @ 08:47)  BP: 168/73 (07-09-23 @ 08:47)  RR: 18 (07-09-23 @ 08:47)  SpO2: 96% (07-09-23 @ 08:47)    Height (cm): 157.5 (07-09 @ 00:19)  Weight (kg): 71 (07-09 @ 00:19)  BMI (kg/m2): 28.6 (07-09 @ 00:19)  BSA (m2): 1.72 (07-09 @ 00:19)    I&O's Detail        PHYSICAL EXAM:  Constitutional: NAD  Respiratory: CTAB,  Cardiovascular: S1, S2, RRR  Gastrointestinal: pain on palpation periumbilical area   Extremities: plus one edema  : No CVA tenderness. No mcgrath.   Skin: No rashes  Vascular Access:    LABS:  07-09    136  |  96<L>  |  24<H>  ----------------------------<  109<H>  4.8   |  29  |  4.2<HH>    Ca    8.9      09 Jul 2023 02:35    TPro  6.6  /  Alb  4.2  /  TBili  0.5  /  DBili      /  AST  18  /  ALT  11  /  AlkPhos  398<H>  07-09    Creatinine Trend: 4.2 <--, 5.2 <--, 7.2 <--, 6.6 <--, 5.2 <--, 5.5 <--, 7.8 <--                        9.2    5.54  )-----------( 89       ( 09 Jul 2023 02:35 )             27.9     Urine Studies:  Urinalysis Basic - ( 09 Jul 2023 02:35 )    Color:  / Appearance:  / SG:  / pH:   Gluc: 109 mg/dL / Ketone:   / Bili:  / Urobili:    Blood:  / Protein:  / Nitrite:    Leuk Esterase:  / RBC:  / WBC    Sq Epi:  / Non Sq Epi:  / Bacteria:           06-23 @ 08:41  8.9  92  --    Iron 191, TIBC 233, %sat 82      [06-23-23 @ 08:41]  Ferritin 35592      [06-23-23 @ 08:41]  PTH -- (Ca 8.9)      [06-23-23 @ 08:41]   92  Lipid: chol 158, , HDL 61, LDL --      [11-22-22 @ 06:31]    HBsAg Nonreact      [05-13-22 @ 18:00]  HCV 0.16, Nonreact      [05-13-22 @ 18:00]    OLVIN: titer 1:80, pattern Speckled      [11-04-21 @ 12:47]  dsDNA <12      [05-13-22 @ 18:00]  ANCA: cANCA Negative, pANCA Negative, atypical ANCA Negative      [05-13-22 @ 18:00]  PLA2R: MARIA GUADALUPE <1.8, IFA --      [05-13-22 @ 18:00]  Free Light Chains: kappa 10.04, lambda 8.96, ratio = 1.12      [05-12 @ 08:21]  Immunofixation Serum:   IgM Lambda Band Identified    Reference Range: None Detected      [05-13-22 @ 10:55]  SPEP Interpretation: Gamma-Migrating Paraprotein Identified      [05-12-22 @ 08:21]  Immunofixation Urine: Weak Bence Howard protein Lambda type      [05-11-22 @ 20:07]  UPEP Interpretation: Mild Selective (Glomerular) Proteinuria      [11-23-21 @ 12:40]      RADIOLOGY & ADDITIONAL STUDIES:      < from: CT Abdomen and Pelvis w/ IV Cont (07.09.23 @ 02:27) >  IMPRESSION:    1. No evidence of acute abdominal pathology.    2. Unchanged bilateral pleural effusions, mild ascites, anasarca.    < end of copied text >

## 2023-07-10 LAB
ALBUMIN SERPL ELPH-MCNC: 4.2 G/DL — SIGNIFICANT CHANGE UP (ref 3.5–5.2)
ALP SERPL-CCNC: 423 U/L — HIGH (ref 30–115)
ALT FLD-CCNC: 11 U/L — SIGNIFICANT CHANGE UP (ref 0–41)
ANION GAP SERPL CALC-SCNC: 13 MMOL/L — SIGNIFICANT CHANGE UP (ref 7–14)
APTT BLD: 33 SEC — SIGNIFICANT CHANGE UP (ref 27–39.2)
AST SERPL-CCNC: 23 U/L — SIGNIFICANT CHANGE UP (ref 0–41)
BILIRUB SERPL-MCNC: 0.5 MG/DL — SIGNIFICANT CHANGE UP (ref 0.2–1.2)
BUN SERPL-MCNC: 36 MG/DL — HIGH (ref 10–20)
CALCIUM SERPL-MCNC: 9 MG/DL — SIGNIFICANT CHANGE UP (ref 8.4–10.5)
CHLORIDE SERPL-SCNC: 99 MMOL/L — SIGNIFICANT CHANGE UP (ref 98–110)
CO2 SERPL-SCNC: 29 MMOL/L — SIGNIFICANT CHANGE UP (ref 17–32)
CREAT SERPL-MCNC: 5.6 MG/DL — CRITICAL HIGH (ref 0.7–1.5)
D DIMER BLD IA.RAPID-MCNC: 252 NG/ML DDU — HIGH
EGFR: 8 ML/MIN/1.73M2 — LOW
FERRITIN SERPL-MCNC: 518 NG/ML — HIGH (ref 13–330)
FIBRINOGEN PPP-MCNC: 387 MG/DL — SIGNIFICANT CHANGE UP (ref 204.4–570.6)
GLUCOSE SERPL-MCNC: 137 MG/DL — HIGH (ref 70–99)
HCT VFR BLD CALC: 28.6 % — LOW (ref 37–47)
HGB BLD-MCNC: 9.3 G/DL — LOW (ref 12–16)
INR BLD: 1.03 RATIO — SIGNIFICANT CHANGE UP (ref 0.65–1.3)
MCHC RBC-ENTMCNC: 31.4 PG — HIGH (ref 27–31)
MCHC RBC-ENTMCNC: 32.5 G/DL — SIGNIFICANT CHANGE UP (ref 32–37)
MCV RBC AUTO: 96.6 FL — SIGNIFICANT CHANGE UP (ref 81–99)
NRBC # BLD: 0 /100 WBCS — SIGNIFICANT CHANGE UP (ref 0–0)
PLATELET # BLD AUTO: 83 K/UL — LOW (ref 130–400)
PMV BLD: 13 FL — HIGH (ref 7.4–10.4)
POTASSIUM SERPL-MCNC: 5.7 MMOL/L — HIGH (ref 3.5–5)
POTASSIUM SERPL-SCNC: 5.7 MMOL/L — HIGH (ref 3.5–5)
PROT SERPL-MCNC: 6.8 G/DL — SIGNIFICANT CHANGE UP (ref 6–8)
PROTHROM AB SERPL-ACNC: 11.7 SEC — SIGNIFICANT CHANGE UP (ref 9.95–12.87)
RBC # BLD: 2.96 M/UL — LOW (ref 4.2–5.4)
RBC # FLD: 17.3 % — HIGH (ref 11.5–14.5)
SODIUM SERPL-SCNC: 141 MMOL/L — SIGNIFICANT CHANGE UP (ref 135–146)
TRANSFERRIN SERPL-MCNC: 173 MG/DL — LOW (ref 200–360)
TRIGL SERPL-MCNC: 72 MG/DL — SIGNIFICANT CHANGE UP
WBC # BLD: 5.53 K/UL — SIGNIFICANT CHANGE UP (ref 4.8–10.8)
WBC # FLD AUTO: 5.53 K/UL — SIGNIFICANT CHANGE UP (ref 4.8–10.8)

## 2023-07-10 PROCEDURE — 72070 X-RAY EXAM THORAC SPINE 2VWS: CPT | Mod: 26

## 2023-07-10 PROCEDURE — 72100 X-RAY EXAM L-S SPINE 2/3 VWS: CPT | Mod: 26

## 2023-07-10 PROCEDURE — 99222 1ST HOSP IP/OBS MODERATE 55: CPT

## 2023-07-10 RX ORDER — METHOCARBAMOL 500 MG/1
500 TABLET, FILM COATED ORAL
Refills: 0 | Status: DISCONTINUED | OUTPATIENT
Start: 2023-07-10 | End: 2023-07-11

## 2023-07-10 RX ORDER — SODIUM ZIRCONIUM CYCLOSILICATE 10 G/10G
5 POWDER, FOR SUSPENSION ORAL EVERY 8 HOURS
Refills: 0 | Status: COMPLETED | OUTPATIENT
Start: 2023-07-10 | End: 2023-07-11

## 2023-07-10 RX ORDER — ACETAMINOPHEN 500 MG
1000 TABLET ORAL EVERY 8 HOURS
Refills: 0 | Status: DISCONTINUED | OUTPATIENT
Start: 2023-07-10 | End: 2023-07-11

## 2023-07-10 RX ORDER — LABETALOL HCL 100 MG
5 TABLET ORAL EVERY 6 HOURS
Refills: 0 | Status: DISCONTINUED | OUTPATIENT
Start: 2023-07-10 | End: 2023-07-11

## 2023-07-10 RX ORDER — HYDROMORPHONE HYDROCHLORIDE 2 MG/ML
2 INJECTION INTRAMUSCULAR; INTRAVENOUS; SUBCUTANEOUS EVERY 4 HOURS
Refills: 0 | Status: DISCONTINUED | OUTPATIENT
Start: 2023-07-10 | End: 2023-07-11

## 2023-07-10 RX ORDER — METHOCARBAMOL 500 MG/1
1000 TABLET, FILM COATED ORAL
Refills: 0 | Status: DISCONTINUED | OUTPATIENT
Start: 2023-07-10 | End: 2023-07-10

## 2023-07-10 RX ADMIN — METHOCARBAMOL 500 MILLIGRAM(S): 500 TABLET, FILM COATED ORAL at 05:58

## 2023-07-10 RX ADMIN — HYDROMORPHONE HYDROCHLORIDE 2 MILLIGRAM(S): 2 INJECTION INTRAMUSCULAR; INTRAVENOUS; SUBCUTANEOUS at 09:55

## 2023-07-10 RX ADMIN — HYDROMORPHONE HYDROCHLORIDE 2 MILLIGRAM(S): 2 INJECTION INTRAMUSCULAR; INTRAVENOUS; SUBCUTANEOUS at 10:45

## 2023-07-10 RX ADMIN — ONDANSETRON 4 MILLIGRAM(S): 8 TABLET, FILM COATED ORAL at 08:43

## 2023-07-10 RX ADMIN — Medication 200 MILLIGRAM(S): at 14:51

## 2023-07-10 RX ADMIN — SODIUM ZIRCONIUM CYCLOSILICATE 5 GRAM(S): 10 POWDER, FOR SUSPENSION ORAL at 11:20

## 2023-07-10 RX ADMIN — PANTOPRAZOLE SODIUM 40 MILLIGRAM(S): 20 TABLET, DELAYED RELEASE ORAL at 18:30

## 2023-07-10 RX ADMIN — Medication 200 MILLIGRAM(S): at 05:58

## 2023-07-10 RX ADMIN — SEVELAMER CARBONATE 800 MILLIGRAM(S): 2400 POWDER, FOR SUSPENSION ORAL at 08:42

## 2023-07-10 RX ADMIN — HEPARIN SODIUM 5000 UNIT(S): 5000 INJECTION INTRAVENOUS; SUBCUTANEOUS at 05:58

## 2023-07-10 RX ADMIN — Medication 90 MILLIGRAM(S): at 05:58

## 2023-07-10 RX ADMIN — CALCITRIOL 0.25 MICROGRAM(S): 0.5 CAPSULE ORAL at 11:22

## 2023-07-10 RX ADMIN — Medication 81 MILLIGRAM(S): at 11:23

## 2023-07-10 RX ADMIN — HYDROMORPHONE HYDROCHLORIDE 2 MILLIGRAM(S): 2 INJECTION INTRAMUSCULAR; INTRAVENOUS; SUBCUTANEOUS at 14:50

## 2023-07-10 RX ADMIN — METHOCARBAMOL 500 MILLIGRAM(S): 500 TABLET, FILM COATED ORAL at 18:30

## 2023-07-10 RX ADMIN — SODIUM ZIRCONIUM CYCLOSILICATE 5 GRAM(S): 10 POWDER, FOR SUSPENSION ORAL at 21:28

## 2023-07-10 RX ADMIN — ONDANSETRON 4 MILLIGRAM(S): 8 TABLET, FILM COATED ORAL at 20:44

## 2023-07-10 RX ADMIN — PANTOPRAZOLE SODIUM 40 MILLIGRAM(S): 20 TABLET, DELAYED RELEASE ORAL at 05:57

## 2023-07-10 RX ADMIN — HYDROMORPHONE HYDROCHLORIDE 2 MILLIGRAM(S): 2 INJECTION INTRAMUSCULAR; INTRAVENOUS; SUBCUTANEOUS at 00:00

## 2023-07-10 RX ADMIN — SEVELAMER CARBONATE 800 MILLIGRAM(S): 2400 POWDER, FOR SUSPENSION ORAL at 12:23

## 2023-07-10 RX ADMIN — GABAPENTIN 300 MILLIGRAM(S): 400 CAPSULE ORAL at 11:23

## 2023-07-10 RX ADMIN — SODIUM ZIRCONIUM CYCLOSILICATE 5 GRAM(S): 10 POWDER, FOR SUSPENSION ORAL at 14:51

## 2023-07-10 RX ADMIN — HEPARIN SODIUM 5000 UNIT(S): 5000 INJECTION INTRAVENOUS; SUBCUTANEOUS at 14:51

## 2023-07-10 RX ADMIN — SEVELAMER CARBONATE 800 MILLIGRAM(S): 2400 POWDER, FOR SUSPENSION ORAL at 18:30

## 2023-07-10 NOTE — PATIENT PROFILE ADULT - FALL HARM RISK - HARM RISK INTERVENTIONS
Assistance with ambulation/Assistance OOB with selected safe patient handling equipment/Communicate Risk of Fall with Harm to all staff/Monitor gait and stability/Reinforce activity limits and safety measures with patient and family/Sit up slowly, dangle for a short time, stand at bedside before walking/Tailored Fall Risk Interventions/Visual Cue: Yellow wristband and red socks/Bed in lowest position, wheels locked, appropriate side rails in place/Call bell, personal items and telephone in reach/Instruct patient to call for assistance before getting out of bed or chair/Non-slip footwear when patient is out of bed/[place] to call system/Physically safe environment - no spills, clutter or unnecessary equipment/Purposeful Proactive Rounding/Room/bathroom lighting operational, light cord in reach

## 2023-07-10 NOTE — PROGRESS NOTE ADULT - SUBJECTIVE AND OBJECTIVE BOX
57 y/o F with PMHx of ESRD on HD T/Th/Sat (follows w nephro Dr Solis Borja), hyperlipidemia, HTN, PVD, CVA and hearing impaired presents to the ED with c/o fall, abd pain, Vomiting and diarrhoea. History obtained from brother at bedside, who has limited proficiency in sign language.  Pt had an unwitnessed fall at home yesterday after she came from dialysis. She felt dizzy while going to the bathroom and fell. +ve head trauma, -ve LOC, hit L wrist    Co diarrhoea that started 2d ago, 2 episodes today and the brother can ot think of anything that may have triggered it.  Also co vomiting 2d ago, but none since yesterday    Co chronic abdominal pain, located around the umbilicus radiating to the LLE  for the past 7 months since she started HD. unrelated to food is not positional and can not think of any ++/dec. factors.    Also co perineal irritation, no pruritis on inspection.  Does not urinate (ESRD), denies pelvis pain.        #fall post HD, likely due to hypotension  Lightheadedness +ve  Head trauma +ve  LOC -ve  CTH & L wrist XR: no acute path  holding hydralazine 50q12  c/w labetalol and nifedipine and monitor BP  BP are on high end since admission at around 160-170/80     #Nonspecific N/V + diarrhoea - improved   no recent abx use, no identifiable trigger  dc'ed home miralax      #chronic abd pain radiating to LLE  iron overload due to HD (18080 in 11/22)  ferritin 26k   Transferrin SAT 82%   , iron % sat = 24  CT AP: enlarged liver + hepatic steatosis (normal in 2/23)  alk phos 398, GB wall edema on RUQ US last admission  abd pain could be due to liver capsule stretching, but would be RUQ > umbilical  neuropathy known to occur in iron overload, chronic pain syndromes also known to occur  - fu heme onc consult , poss HLH vs increase ferritin due to inflammation   - fu iron studies  - pending EGD as OP to r/o gastritis as cause for abd pain  - cont home protonix q12  pain mx consult :   Recommendations  x-ray thoracic and lumbar spine 3 view  acetaminophen 1000mg q8h standing  increase gabapentin to 300mg q8h standing  can continue methocarbamol 500mg q12h standing  PO hydromorphone 2mg q4h PRN for pain  Bowel regimen: miralax / colace  Nausea ppx: zofran standing  Functional goals: oob-chair, ambulate PRN as per primary team  Physical therapy           #Left sole ulcer  podiatry consulted   not infected ulcer and no open wounds   c/w local wound care and follow up in clinic for routine care and debridement of callus    #HTN  - cont home nifedipine and labetalol  - holding hydralazine 100 q12 in view of fall, cont if required if BP are still high     #HFpEF  TTE 2/23: GIIDD  +  pHTN  bnp 70k, same as last admission  monitoring     #thrombocytopenia - unclear etiology - could be related to liver disease /portal HTN  - monitor H+H  - f/u heme-onc note     #ESRD; normo anemia of chr dz  on HD via left UE AVG - T/Th/Sat  - nephro consulted for poss HD today   - c/w sevelamer 800mg qac  - c/w Calcitriol 0.25 q24    #DLD  - c/w atorva 80 HS    # PVD;  s/p left femoral artery-posterior tibial artery bypass with autologous venous tissue and left heel ulcer debridement 11/21  Arterial duplex: patent LLE bypass; lt inguinal LN to 2.2cm  cont aspirin    #h/o Right pontine CVA (February 2021)  c/w ASA  c/w statin    #misc  dvt ppx: heparin  GI ppx: protonix  activity: AAT  diet: renal   59 y/o F with PMHx of ESRD on HD T/Th/Sat (follows w nephro Dr Solis Borja), hyperlipidemia, HTN, PVD, CVA and hearing impaired presents to the ED with c/o fall, abd pain, Vomiting and diarrhoea. History obtained from brother at bedside, who has limited proficiency in sign language.  Pt had an unwitnessed fall at home yesterday after she came from dialysis. She felt dizzy while going to the bathroom and fell. +ve head trauma, -ve LOC, hit L wrist    Co diarrhoea that started 2d ago, 2 episodes today and the brother can ot think of anything that may have triggered it.  Also co vomiting 2d ago, but none since yesterday    Co chronic abdominal pain, located around the umbilicus radiating to the LLE  for the past 7 months since she started HD. unrelated to food is not positional and can not think of any ++/dec. factors.    Also co perineal irritation, no pruritis on inspection.  Does not urinate (ESRD), denies pelvis pain.        #fall post HD, likely due to hypotension  Lightheadedness +ve  Head trauma +ve  LOC -ve  CTH & L wrist XR: no acute path  holding hydralazine 50q12  c/w labetalol and nifedipine and monitor BP  BP are on high end since admission at around 160-170/80     #Nonspecific N/V + diarrhoea - improved   no recent abx use, no identifiable trigger  dc'ed home miralax      #chronic abd pain radiating to LLE  iron overload due to HD (18403 in 11/22)  ferritin 26k   Transferrin SAT 82%   , iron % sat = 24  CT AP: enlarged liver + hepatic steatosis (normal in 2/23)  alk phos 398, GB wall edema on RUQ US last admission  abd pain could be due to liver capsule stretching, but would be RUQ > umbilical  neuropathy known to occur in iron overload, chronic pain syndromes also known to occur  - fu heme onc consult , poss HLH vs increase ferritin due to inflammation   - fu iron studies  - pending EGD as OP to r/o gastritis as cause for abd pain  - cont home protonix q12  pain mx consult :   Recommendations  x-ray thoracic and lumbar spine 3 view  acetaminophen 1000mg q8h standing  increase gabapentin to 300mg q8h standing  can continue methocarbamol 500mg q12h standing  PO hydromorphone 2mg q4h PRN for pain  Bowel regimen: miralax / colace  Nausea ppx: zofran standing  Functional goals: oob-chair, ambulate PRN as per primary team  Physical therapy           #Left sole ulcer  podiatry consulted   not infected ulcer and no open wounds   c/w local wound care and follow up in clinic for routine care and debridement of callus    #HTN  - cont home nifedipine and labetalol  - holding hydralazine 100 q12 in view of fall, cont if required if BP are still high     #HFpEF  TTE 2/23: GIIDD  +  pHTN  bnp 70k, same as last admission  monitoring     #thrombocytopenia - unclear etiology - could be related to liver disease /portal HTN  - monitor H+H  - f/u heme-onc note     #ESRD; normo anemia of chr dz  on HD via left UE AVG - T/Th/Sat  - nephro consulted for poss HD today   - c/w sevelamer 800mg qac  - c/w Calcitriol 0.25 q24    #DLD  - c/w atorva 80 HS    # PVD;  s/p left femoral artery-posterior tibial artery bypass with autologous venous tissue and left heel ulcer debridement 11/21  Arterial duplex: patent LLE bypass; lt inguinal LN to 2.2cm  cont aspirin    #h/o Right pontine CVA (February 2021)  c/w ASA  c/w statin    #misc  dvt ppx: heparin  GI ppx: protonix  activity: AAT  diet: renal   57 y/o F with PMHx of ESRD on HD T/Th/Sat (follows w nephro Dr Solis Borja), hyperlipidemia, HTN, PVD, CVA and hearing impaired presents to the ED with c/o fall, abd pain, Vomiting and diarrhoea. History obtained from brother at bedside, who has limited proficiency in sign language.  Pt had an unwitnessed fall at home yesterday after she came from dialysis. She felt dizzy while going to the bathroom and fell. +ve head trauma, -ve LOC, hit L wrist    Co diarrhoea that started 2d ago, 2 episodes today and the brother can ot think of anything that may have triggered it.  Also co vomiting 2d ago, but none since yesterday    Co chronic abdominal pain, located around the umbilicus radiating to the LLE  for the past 7 months since she started HD. unrelated to food is not positional and can not think of any ++/dec. factors.    Also co perineal irritation, no pruritis on inspection.  Does not urinate (ESRD), denies pelvis pain.        #fall post HD, likely due to hypotension  Lightheadedness +ve  Head trauma +ve  LOC -ve  CTH & L wrist XR: no acute path  holding hydralazine 50q12  c/w labetalol and nifedipine and monitor BP  BP are on high end since admission at around 160-170/80     #Nonspecific N/V + diarrhoea - improved   no recent abx use, no identifiable trigger  dc'ed home miralax      #chronic abd pain radiating to LLE  iron overload due to HD (07222 in 11/22)  ferritin 26k now dropped to 500's ,   Transferrin SAT 82%   , iron % sat = 24  CT AP: enlarged liver + hepatic steatosis (normal in 2/23)  alk phos 398, GB wall edema on RUQ US last admission  abd pain could be due to liver capsule stretching, but would be RUQ > umbilical  neuropathy known to occur in iron overload, chronic pain syndromes also known to occur  - fu heme onc consult , poss HLH vs increase ferritin due to inflammation ( since ferritin decreased it could be elevated as an acute phase reactant in the setting of ESRD )   - fu iron studies  - pending EGD as OP to r/o gastritis as cause for abd pain  - cont home protonix q12  pain mx consult :   Recommendations  x-ray thoracic and lumbar spine 3 view  acetaminophen 1000mg q8h standing  increase gabapentin to 300mg q8h standing  can continue methocarbamol 500mg q12h standing  PO hydromorphone 2mg q4h PRN for pain  Bowel regimen: miralax / colace  Nausea ppx: zofran standing  Functional goals: oob-chair, ambulate PRN as per primary team  Physical therapy           #Left sole ulcer  podiatry consulted   not infected ulcer and no open wounds   c/w local wound care and follow up in clinic for routine care and debridement of callus    #HTN  - cont home nifedipine and labetalol  - holding hydralazine 100 q12 in view of fall, cont if required if BP are still high     #HFpEF  TTE 2/23: GIIDD  +  pHTN  bnp 70k, same as last admission  monitoring     #thrombocytopenia - unclear etiology - could be related to liver disease /portal HTN  - monitor H+H  - f/u heme-onc note     #ESRD; normo anemia of chr dz  on HD via left UE AVG - T/Th/Sat  - nephro consulted for HD today for UF and hyperkalemia/ will reassess in AM   - UF 2-2.5 l as tolerated   - c/w sevelamer 800mg qac  - c/w Calcitriol 0.25 q24    #DLD  - c/w atorva 80 HS    # PVD;  s/p left femoral artery-posterior tibial artery bypass with autologous venous tissue and left heel ulcer debridement 11/21  Arterial duplex: patent LLE bypass; lt inguinal LN to 2.2cm  cont aspirin    #h/o Right pontine CVA (February 2021)  c/w ASA  c/w statin    #misc  dvt ppx: heparin  GI ppx: protonix  activity: AAT  diet: renal

## 2023-07-10 NOTE — CONSULT NOTE ADULT - ATTENDING COMMENTS
We were asked to see the patient because of elevated ferritin levels.  Patient was recently admitted for infection she also is on dialysis and has a history of hypertension PVD and CVA.    On last admission patient's ferritin was over 26,000 and iron saturation was high as well it was not repeated and the cause of this was unclear she did previously have a history of iron deficiency anemia in the setting of CKD she was given Venofer in the past.  She was also on oral iron which was stopped    Her repeat iron studies came back which showed a ferritin of 518 and normal iron saturation test the studies are consistent with iron levels that are typically seen in patient on dialysis who are on intravenous iron replacement therapy in order to prevent iron deficiency anemia    It is unclear to me why her previous ferritin and iron saturation were elevated, may be due to previous infection leading to severe inflammatory state or incorrect testing results?    In regards to her anemia this is likely due to CKD can continue with erythropoietin replacement therapy and iron replacement therapy as per discretion of her nephrology team      Mild thrombocytopenia likely secondary to consumption, she is on dialysis had previous infection and possibly medication effect.  -No change in medication at this time we will just monitor occasional platelet count  -Fibrinogen D-dimer PT PTT noted

## 2023-07-10 NOTE — CONSULT NOTE ADULT - ASSESSMENT
Pt is a 59 y/o F w/PMHx of ESRD on HD T/Th/Sat (follows w nephro Dr Solis Borja), HLD, HTN, PVD, CVA and hearing impaired presented to the ED with c/o fall, abd pain, vomiting, and diarrhea. Heme/Onc consulted for elevated ferritin/iron overload.        INCOMPLETE NOTE    #Elevated Ferritin  -     INCOMPLETE NOTE Pt is a 57 y/o F w/PMHx of ESRD on HD T/Th/Sat (follows w nephro Dr Solis Borja), HLD, HTN, PVD, CVA and hearing impaired presented to the ED with c/o fall, abd pain, vomiting, and diarrhea. Heme/Onc consulted for elevated ferritin/iron overload.    #Elevated Ferritin  - Concern for iron overload  - Previously Ferritin >21141, repeat on this admission now >500  - No clear cause on why ferritin was elevated previously (lab error, vs acute phase reactant elevation 2/2 sepsis, vs iron overloaded 2/2 multiple iron transfusions w/HD)    Plan:  - Stop iron supplementation for now  - Clarify if patient was getting iron transfusion w/HD  - Patient will likely require transfusions again later on given ESRD high risk for anemia 2/2 iron deficiency, but will require close monitoring to prevent iron overload. Pt is a 57 y/o F w/PMHx of ESRD on HD T/Th/Sat (follows w nephro Dr Solis Borja), HLD, HTN, PVD, CVA and hearing impaired presented to the ED with c/o fall, abd pain, vomiting, and diarrhea. Heme/Onc consulted for elevated ferritin/iron overload.    #Elevated Ferritin  - Concern for iron overload  - Previously Ferritin >83169, repeat on this admission now >500  - No clear cause on why ferritin was elevated previously (lab error, vs acute phase reactant elevation 2/2 sepsis, vs iron overloaded 2/2 multiple iron transfusions w/HD)    Plan:  - Stop PO iron supplementation for now  - Clarify if patient was getting iron infusions w/HD  - Patient will likely require iron infusions again later on given ESRD high risk for anemia 2/2 iron deficiency, but will require close monitoring to prevent iron overload.

## 2023-07-10 NOTE — CONSULT NOTE ADULT - SUBJECTIVE AND OBJECTIVE BOX
INCOMPLETE NOTE    Patient is a 58y old  Female who presents with a chief complaint of presyncope s/p HD. Heme/Onc consulted for elevated ferritin/iron overload.    HPI:  57 y/o F with PMHx of ESRD on HD T/Th/Sat (follows w nephsalvador Borja), hyperlipidemia, HTN, PVD, CVA and hearing impaired presents to the ED with c/o fall, abd pain, Vomiting and diarrhea. History obtained from brother at bedside, who has limited proficiency in sign language.  Pt had an unwitnessed fall at home on Sat after she came from dialysis. She felt dizzy while going to the bathroom and fell. +ve head trauma, -ve LOC, hit L wrist    C/o diarrhea that started 2d ago, 2 episodes today and the brother cannot think of anything that may have triggered it. Also c/o vomiting 2d ago, but none since yesterday    C/o chronic abdominal pain, located around the umbilicus radiating to the LLE  for the past 7 months since she started HD. Unrelated to food is non-positional and not associated with worsening or relieving  factors.    Also c/o perineal irritation, no pruritis on inspection.    Pt is anuric (ESRD), denies pelvis pain.    In the ED, VS stable  labs: Hb 9.2  alk phos 398   BNP 70k  trop 1.13  BUN/Cr at baseline  CT AP: no acute path             Unchanged bilateral pleural effusions, mild ascites, anasarca.  CTH: wnl  XR L wrist: wnl     (09 Jul 2023 08:55)       PAST MEDICAL & SURGICAL HISTORY:  PVD (peripheral vascular disease)      Benign essential HTN      Intellectual disability      Hearing impaired      HLD (hyperlipidemia)      Chronic kidney disease, unspecified CKD stage      H/O vascular surgery  left leg          SOCIAL HISTORY:    FAMILY HISTORY:      MEDICATIONS  (STANDING):  acetaminophen     Tablet .. 1000 milliGRAM(s) Oral every 8 hours  aspirin enteric coated 81 milliGRAM(s) Oral daily  atorvastatin 80 milliGRAM(s) Oral at bedtime  calcitriol   Capsule 0.25 MICROGram(s) Oral daily  gabapentin 300 milliGRAM(s) Oral daily  heparin   Injectable 5000 Unit(s) SubCutaneous every 8 hours  labetalol 200 milliGRAM(s) Oral every 8 hours  methocarbamol 500 milliGRAM(s) Oral two times a day  NIFEdipine XL 90 milliGRAM(s) Oral daily  pantoprazole    Tablet 40 milliGRAM(s) Oral every 12 hours  sevelamer carbonate 800 milliGRAM(s) Oral three times a day with meals  sodium zirconium cyclosilicate 5 Gram(s) Oral every 8 hours    MEDICATIONS  (PRN):  HYDROmorphone   Tablet 2 milliGRAM(s) Oral four times a day PRN for severe pain  ondansetron Injectable 4 milliGRAM(s) IV Push every 8 hours PRN Nausea and/or Vomiting      Allergies    penicillin (Rash)  NSAIDs (Nephrotoxicity)    Intolerances        Vital Signs Last 24 Hrs  T(C): 36.3 (10 Jul 2023 05:04), Max: 36.9 (09 Jul 2023 22:28)  T(F): 97.3 (10 Jul 2023 05:04), Max: 98.4 (09 Jul 2023 22:28)  HR: 72 (10 Jul 2023 05:04) (64 - 72)  BP: 172/72 (10 Jul 2023 05:04) (158/68 - 177/72)  BP(mean): --  RR: 18 (10 Jul 2023 05:04) (18 - 18)  SpO2: 97% (09 Jul 2023 22:28) (96% - 97%)    Parameters below as of 09 Jul 2023 22:28  Patient On (Oxygen Delivery Method): room air        PHYSICAL EXAM  General: Ill appearing, laying in bed, [place]  HEENT: AT, NC, neck supple  CV: RRR, S1/S2  Lungs: CTAB, no wheezing or rales.  Abdomen: Mild tenderness to palpation, mild distention, no guarding,  Ext: 2+ pitting edema  Neuro: No focal deficits      LABS:                          9.3    5.53  )-----------( 83       ( 10 Jul 2023 07:37 )             28.6         Mean Cell Volume : 96.6 fL  Mean Cell Hemoglobin : 31.4 pg  Mean Cell Hemoglobin Concentration : 32.5 g/dL  Auto Neutrophil # : x  Auto Lymphocyte # : x  Auto Monocyte # : x  Auto Eosinophil # : x  Auto Basophil # : x  Auto Neutrophil % : x  Auto Lymphocyte % : x  Auto Monocyte % : x  Auto Eosinophil % : x  Auto Basophil % : x      Serial CBC's  07-10 @ 07:37  Hct-28.6 / Hgb-9.3 / Plat-83 / RBC-2.96 / WBC-5.53  Serial CBC's  07-09 @ 02:35  Hct-27.9 / Hgb-9.2 / Plat-89 / RBC-2.96 / WBC-5.54      07-10    141  |  99  |  36<H>  ----------------------------<  137<H>  5.7<H>   |  29  |  5.6<HH>    Ca    9.0      10 Jul 2023 07:37    TPro  6.8  /  Alb  4.2  /  TBili  0.5  /  DBili  x   /  AST  23  /  ALT  11  /  AlkPhos  423<H>  07-10          Iron - Total Binding Capacity.: 243 ug/dL (07-09 @ 16:11)        RADIOLOGY & ADDITIONAL STUDIES:  < from: CT Abdomen and Pelvis w/ IV Cont (07.09.23 @ 02:27) >    IMPRESSION:    1. No evidence of acute abdominal pathology.    2. Unchanged bilateral pleural effusions, mild ascites, anasarca.    --- End of Report ---      INCOMPLETE NOTE Patient is a 58y old  Female who presents with a chief complaint of presyncope s/p HD. Heme/Onc consulted for elevated ferritin/iron overload.    HPI:  59 y/o F with PMHx of ESRD on HD T/Th/Sat (follows w nephsalvador Borja), hyperlipidemia, HTN, PVD, CVA and hearing impaired presents to the ED with c/o fall, abd pain, Vomiting and diarrhea. History obtained from brother at bedside, who has limited proficiency in sign language.  Pt had an unwitnessed fall at home on Sat after she came from dialysis. She felt dizzy while going to the bathroom and fell. +ve head trauma, -ve LOC, hit L wrist    C/o diarrhea that started 2d ago, 2 episodes today and the brother cannot think of anything that may have triggered it. Also c/o vomiting 2d ago, but none since yesterday    C/o chronic abdominal pain, located around the umbilicus radiating to the LLE  for the past 7 months since she started HD. Unrelated to food is non-positional and not associated with worsening or relieving  factors.    Also c/o perineal irritation, no pruritis on inspection.    Pt is anuric (ESRD), denies pelvis pain.    In the ED, VS stable  labs: Hb 9.2  alk phos 398   BNP 70k  trop 1.13  BUN/Cr at baseline  CT AP: no acute path             Unchanged bilateral pleural effusions, mild ascites, anasarca.  CTH: wnl  XR L wrist: wnl     (09 Jul 2023 08:55)       PAST MEDICAL & SURGICAL HISTORY:  PVD (peripheral vascular disease)      Benign essential HTN      Intellectual disability      Hearing impaired      HLD (hyperlipidemia)      Chronic kidney disease, unspecified CKD stage      H/O vascular surgery  left leg        MEDICATIONS  (STANDING):  acetaminophen     Tablet .. 1000 milliGRAM(s) Oral every 8 hours  aspirin enteric coated 81 milliGRAM(s) Oral daily  atorvastatin 80 milliGRAM(s) Oral at bedtime  calcitriol   Capsule 0.25 MICROGram(s) Oral daily  gabapentin 300 milliGRAM(s) Oral daily  heparin   Injectable 5000 Unit(s) SubCutaneous every 8 hours  labetalol 200 milliGRAM(s) Oral every 8 hours  methocarbamol 500 milliGRAM(s) Oral two times a day  NIFEdipine XL 90 milliGRAM(s) Oral daily  pantoprazole    Tablet 40 milliGRAM(s) Oral every 12 hours  sevelamer carbonate 800 milliGRAM(s) Oral three times a day with meals  sodium zirconium cyclosilicate 5 Gram(s) Oral every 8 hours    MEDICATIONS  (PRN):  HYDROmorphone   Tablet 2 milliGRAM(s) Oral four times a day PRN for severe pain  ondansetron Injectable 4 milliGRAM(s) IV Push every 8 hours PRN Nausea and/or Vomiting      Allergies    penicillin (Rash)  NSAIDs (Nephrotoxicity)    Intolerances        Vital Signs Last 24 Hrs  T(C): 36.3 (10 Jul 2023 05:04), Max: 36.9 (09 Jul 2023 22:28)  T(F): 97.3 (10 Jul 2023 05:04), Max: 98.4 (09 Jul 2023 22:28)  HR: 72 (10 Jul 2023 05:04) (64 - 72)  BP: 172/72 (10 Jul 2023 05:04) (158/68 - 177/72)  BP(mean): --  RR: 18 (10 Jul 2023 05:04) (18 - 18)  SpO2: 97% (09 Jul 2023 22:28) (96% - 97%)    Parameters below as of 09 Jul 2023 22:28  Patient On (Oxygen Delivery Method): room air        PHYSICAL EXAM  General: Ill appearing, laying in bed, [place]  HEENT: AT, NC, neck supple  CV: RRR, S1/S2  Lungs: CTAB, no wheezing or rales.  Abdomen: Mild tenderness to palpation, mild distention, no guarding,  Ext: 2+ pitting edema  Neuro: No focal deficits      LABS:                          9.3    5.53  )-----------( 83       ( 10 Jul 2023 07:37 )             28.6         Mean Cell Volume : 96.6 fL  Mean Cell Hemoglobin : 31.4 pg  Mean Cell Hemoglobin Concentration : 32.5 g/dL  Auto Neutrophil # : x  Auto Lymphocyte # : x  Auto Monocyte # : x  Auto Eosinophil # : x  Auto Basophil # : x  Auto Neutrophil % : x  Auto Lymphocyte % : x  Auto Monocyte % : x  Auto Eosinophil % : x  Auto Basophil % : x      Serial CBC's  07-10 @ 07:37  Hct-28.6 / Hgb-9.3 / Plat-83 / RBC-2.96 / WBC-5.53  Serial CBC's  07-09 @ 02:35  Hct-27.9 / Hgb-9.2 / Plat-89 / RBC-2.96 / WBC-5.54      07-10    141  |  99  |  36<H>  ----------------------------<  137<H>  5.7<H>   |  29  |  5.6<HH>    Ca    9.0      10 Jul 2023 07:37    TPro  6.8  /  Alb  4.2  /  TBili  0.5  /  DBili  x   /  AST  23  /  ALT  11  /  AlkPhos  423<H>  07-10          Iron - Total Binding Capacity.: 243 ug/dL (07-09 @ 16:11)        RADIOLOGY & ADDITIONAL STUDIES:  < from: CT Abdomen and Pelvis w/ IV Cont (07.09.23 @ 02:27) >    IMPRESSION:    1. No evidence of acute abdominal pathology.    2. Unchanged bilateral pleural effusions, mild ascites, anasarca.    --- End of Report ---

## 2023-07-10 NOTE — PROGRESS NOTE ADULT - ASSESSMENT
57 y/o F with PMHx of ESRD on HD T/Th/Sat last HD yesterday , hyperlipidemia, HTN, PVD, CVA and hearing impaired presents to the ED with c/o fall, abd pain, Vomiting and diarrhoea.    # ESRD on HD T TH Sat   # abdominal pain/ diarrhea   # thrombocytopenia   # chronic PVD     - for HD today for UF and hyperkalemia/ will reassess in AM   - UF 2-2.5 l as tolerated   - abdominal pain better   - follow CBC   - BP noted increased labetalol to q8h follow readings after HD   - check Phosphorus    will follow

## 2023-07-10 NOTE — PATIENT PROFILE ADULT - CAREGIVER NAME
Refill per VO of Dr. Ivette Khan:  Last appt: 3/11/21  Future Appointments   Date Time Provider Melania Lizzie   3/24/2021  3:00 PM LUISANA MUÑOZ   3/15/2022  1:00 PM MD MALORIE Dove AMB       Requested Prescriptions     Signed Prescriptions Disp Refills    apixaban (Eliquis) 5 mg tablet 180 Tab 3     Sig: TAKE 1 TABLET BY MOUTH TWICE A DAY     Authorizing Provider: Phoenix Cannon     Ordering User: Jennifer Miller brother in law

## 2023-07-10 NOTE — CONSULT NOTE ADULT - SUBJECTIVE AND OBJECTIVE BOX
Podiatry Consult Note    Subjective:  SEN LING  Seen Bedside 58y Female  .   Patient is a 58y old  Female who presents with a chief complaint of   HPI:  59 y/o F with PMHx of ESRD on HD T/Th/Sat (follows w nephsalvador Borja), hyperlipidemia, HTN, PVD, CVA and hearing impaired presents to the ED with c/o fall, abd pain, Vomiting and diarrhoea. History obtained from brother at bedside, who has limited proficiency in sign language.  Pt had an unwitnessed fall at home yesterday after she came from dialysis. She felt dizzy while going to the bathroom and fell. +ve head trauma, -ve LOC, hit L wrist    Co diarrhoea that started 2d ago, 2 episodes today and the brother can ot think of anything that may have triggered it.  Also co vomiting 2d ago, but none since yesterday    Co chronic abdominal pain, located around the umbilicus radiating to the LLE  for the past 7 months since she started HD. unrelated to food is not positional and can not think of any ++/dec. factors.    Also co perineal irritation, no pruritis on inspection.  Does not urinate (ESRD), denies pelvis pain.    In the ED, VS stable  labs: Hb 9.2  alk phos 398   bnp 70k  trop 1.13  BUN/Cr at baseline  CT AP: no acute path             Unchanged bilateral pleural effusions, mild ascites, anasarca.  CTH: wnl  XR L wrist: wnl     (09 Jul 2023 08:55)      Past Medical History and Surgical History  PAST MEDICAL & SURGICAL HISTORY:  PVD (peripheral vascular disease)      Benign essential HTN      Intellectual disability      Hearing impaired      HLD (hyperlipidemia)      Chronic kidney disease, unspecified CKD stage      H/O vascular surgery  left leg           Review of Systems:  [X] Ten point review of systems is otherwise negative except as noted     Objective:  Vital Signs Last 24 Hrs  T(C): 36.3 (10 Jul 2023 05:04), Max: 36.9 (09 Jul 2023 22:28)  T(F): 97.3 (10 Jul 2023 05:04), Max: 98.4 (09 Jul 2023 22:28)  HR: 72 (10 Jul 2023 05:04) (64 - 72)  BP: 172/72 (10 Jul 2023 05:04) (158/68 - 177/72)  BP(mean): --  RR: 18 (10 Jul 2023 05:04) (18 - 18)  SpO2: 97% (09 Jul 2023 22:28) (96% - 97%)    Parameters below as of 09 Jul 2023 22:28  Patient On (Oxygen Delivery Method): room air                            9.3    5.53  )-----------( 83       ( 10 Jul 2023 07:37 )             28.6                 07-10    141  |  99  |  36<H>  ----------------------------<  137<H>  5.7<H>   |  29  |  5.6<HH>    Ca    9.0      10 Jul 2023 07:37    TPro  6.8  /  Alb  4.2  /  TBili  0.5  /  DBili  x   /  AST  23  /  ALT  11  /  AlkPhos  423<H>  07-10        Physical Exam - Lower Extremity Focused:   Derm: left heel shows mild callus formation. No open wound appreciated to the feet bilateral  Vascular: DP and PT Pulses Diminished; Foot is Warm to Warm to the touch; global edema to the lower extremity bilateral   Neuro: Protective Sensation Diminished / Moderately Neuropathic   MSK: none    Assessment:  Left heel callus     Plan:  Chart reviewed and Patient evaluated. All Questions and Concerns Addressed and Answered  Local Wound Care; order placed for offloading dressing, no additional dressing needed at this time as there is only callus present  Weight Bearing Status; WBAT w/ Heel Touch   There is no open wound or any sign of infection present  Patient to follow up in clinic for routine care and debridement of callus  Treatment and discussion conducted with her son bedside to translate and help in communication  Discussed Plan w/ Dr. Llamas    Podiatry

## 2023-07-10 NOTE — PROGRESS NOTE ADULT - SUBJECTIVE AND OBJECTIVE BOX
Nephrology progress note    THIS IS AN INCOMPLETE NOTE . FULL NOTE TO FOLLOW SHORTLY    Patient is seen and examined, events over the last 24 h noted .    Allergies:  penicillin (Rash)  NSAIDs (Nephrotoxicity)    Hospital Medications:   MEDICATIONS  (STANDING):  acetaminophen     Tablet .. 1000 milliGRAM(s) Oral every 8 hours  aspirin enteric coated 81 milliGRAM(s) Oral daily  atorvastatin 80 milliGRAM(s) Oral at bedtime  calcitriol   Capsule 0.25 MICROGram(s) Oral daily  gabapentin 300 milliGRAM(s) Oral daily  heparin   Injectable 5000 Unit(s) SubCutaneous every 8 hours  labetalol 200 milliGRAM(s) Oral every 8 hours  methocarbamol 500 milliGRAM(s) Oral two times a day  NIFEdipine XL 90 milliGRAM(s) Oral daily  pantoprazole    Tablet 40 milliGRAM(s) Oral every 12 hours  sevelamer carbonate 800 milliGRAM(s) Oral three times a day with meals        VITALS:  T(F): 97.3 (07-10-23 @ 05:04), Max: 98.4 (07-09-23 @ 22:28)  HR: 72 (07-10-23 @ 05:04)  BP: 172/72 (07-10-23 @ 05:04)  RR: 18 (07-10-23 @ 05:04)  SpO2: 97% (07-09-23 @ 22:28)  Wt(kg): --        PHYSICAL EXAM:  Constitutional: NAD  HEENT: anicteric sclera, oropharynx clear, MMM  Neck: No JVD  Respiratory: CTAB, no wheezes, rales or rhonchi  Cardiovascular: S1, S2, RRR  Gastrointestinal: BS+, soft, NT/ND  Extremities: No cyanosis or clubbing. No peripheral edema  :  No mcgrath.   Skin: No rashes    LABS:  07-10    141  |  99  |  36<H>  ----------------------------<  137<H>  5.7<H>   |  29  |  5.6<HH>    Ca    9.0      10 Jul 2023 07:37    TPro  6.8  /  Alb  4.2  /  TBili  0.5  /  DBili      /  AST  23  /  ALT  11  /  AlkPhos  423<H>  07-10                          9.3    5.53  )-----------( 83       ( 10 Jul 2023 07:37 )             28.6       Urine Studies:  Urinalysis Basic - ( 10 Jul 2023 07:37 )    Color:  / Appearance:  / SG:  / pH:   Gluc: 137 mg/dL / Ketone:   / Bili:  / Urobili:    Blood:  / Protein:  / Nitrite:    Leuk Esterase:  / RBC:  / WBC    Sq Epi:  / Non Sq Epi:  / Bacteria:           Iron 58, TIBC 243, %sat 24      [07-09-23 @ 16:11]  Ferritin 12596      [06-23-23 @ 08:41]  PTH -- (Ca 8.9)      [06-23-23 @ 08:41]   92  Lipid: chol 158, , HDL 61, LDL --      [11-22-22 @ 06:31]    HBsAg Nonreact      [05-13-22 @ 18:00]  HCV 0.16, Nonreact      [05-13-22 @ 18:00]    OLVIN: titer 1:80, pattern Speckled      [11-04-21 @ 12:47]  dsDNA <12      [05-13-22 @ 18:00]  ANCA: cANCA Negative, pANCA Negative, atypical ANCA Negative      [05-13-22 @ 18:00]  PLA2R: MARIA GUADALUPE <1.8, IFA --      [05-13-22 @ 18:00]  Free Light Chains: kappa 10.04, lambda 8.96, ratio = 1.12      [05-12 @ 08:21]  Immunofixation Serum:   IgM Lambda Band Identified    Reference Range: None Detected      [05-13-22 @ 10:55]  SPEP Interpretation: Gamma-Migrating Paraprotein Identified      [05-12-22 @ 08:21]  Immunofixation Urine: Weak Bence Howard protein Lambda type      [05-11-22 @ 20:07]  UPEP Interpretation: Mild Selective (Glomerular) Proteinuria      [11-23-21 @ 12:40]      RADIOLOGY & ADDITIONAL STUDIES:   Nephrology progress note    Patient is seen and examined, events over the last 24 h noted .  lying in bed   said abdominal pain is better     Allergies:  penicillin (Rash)  NSAIDs (Nephrotoxicity)    Hospital Medications:   MEDICATIONS  (STANDING):  acetaminophen     Tablet .. 1000 milliGRAM(s) Oral every 8 hours  aspirin enteric coated 81 milliGRAM(s) Oral daily  atorvastatin 80 milliGRAM(s) Oral at bedtime  calcitriol   Capsule 0.25 MICROGram(s) Oral daily  gabapentin 300 milliGRAM(s) Oral daily  heparin   Injectable 5000 Unit(s) SubCutaneous every 8 hours  labetalol 200 milliGRAM(s) Oral every 8 hours  methocarbamol 500 milliGRAM(s) Oral two times a day  NIFEdipine XL 90 milliGRAM(s) Oral daily  pantoprazole    Tablet 40 milliGRAM(s) Oral every 12 hours  sevelamer carbonate 800 milliGRAM(s) Oral three times a day with meals        VITALS:  T(F): 97.3 (07-10-23 @ 05:04), Max: 98.4 (07-09-23 @ 22:28)  HR: 72 (07-10-23 @ 05:04)  BP: 172/72 (07-10-23 @ 05:04)  RR: 18 (07-10-23 @ 05:04)  SpO2: 97% (07-09-23 @ 22:28)          PHYSICAL EXAM:  Constitutional: NAD  Neck: No JVD  Respiratory: CTAB, no wheezes, rales or rhonchi  Cardiovascular: S1, S2, RRR  Gastrointestinal: BS+, soft, NT/ND  Extremities: No cyanosis or clubbing. No peripheral edema  Skin: No rashes    LABS:  07-10    141  |  99  |  36<H>  ----------------------------<  137<H>  5.7<H>   |  29  |  5.6<HH>    Ca    9.0      10 Jul 2023 07:37    TPro  6.8  /  Alb  4.2  /  TBili  0.5  /  DBili      /  AST  23  /  ALT  11  /  AlkPhos  423<H>  07-10                          9.3    5.53  )-----------( 83       ( 10 Jul 2023 07:37 )             28.6       Urine Studies:  Urinalysis Basic - ( 10 Jul 2023 07:37 )    Color:  / Appearance:  / SG:  / pH:   Gluc: 137 mg/dL / Ketone:   / Bili:  / Urobili:    Blood:  / Protein:  / Nitrite:    Leuk Esterase:  / RBC:  / WBC    Sq Epi:  / Non Sq Epi:  / Bacteria:           Iron 58, TIBC 243, %sat 24      [07-09-23 @ 16:11]  Ferritin 94852      [06-23-23 @ 08:41]  PTH -- (Ca 8.9)      [06-23-23 @ 08:41]   92  Lipid: chol 158, , HDL 61, LDL --      [11-22-22 @ 06:31]    HBsAg Nonreact      [05-13-22 @ 18:00]  HCV 0.16, Nonreact      [05-13-22 @ 18:00]    OLVIN: titer 1:80, pattern Speckled      [11-04-21 @ 12:47]  dsDNA <12      [05-13-22 @ 18:00]  ANCA: cANCA Negative, pANCA Negative, atypical ANCA Negative      [05-13-22 @ 18:00]  PLA2R: MARIA GUADALUPE <1.8, IFA --      [05-13-22 @ 18:00]  Free Light Chains: kappa 10.04, lambda 8.96, ratio = 1.12      [05-12 @ 08:21]  Immunofixation Serum:   IgM Lambda Band Identified    Reference Range: None Detected      [05-13-22 @ 10:55]  SPEP Interpretation: Gamma-Migrating Paraprotein Identified      [05-12-22 @ 08:21]  Immunofixation Urine: Weak Bence Howard protein Lambda type      [05-11-22 @ 20:07]  UPEP Interpretation: Mild Selective (Glomerular) Proteinuria      [11-23-21 @ 12:40]      RADIOLOGY & ADDITIONAL STUDIES:

## 2023-07-11 ENCOUNTER — TRANSCRIPTION ENCOUNTER (OUTPATIENT)
Age: 58
End: 2023-07-11

## 2023-07-11 VITALS
RESPIRATION RATE: 18 BRPM | TEMPERATURE: 97 F | SYSTOLIC BLOOD PRESSURE: 170 MMHG | HEART RATE: 70 BPM | DIASTOLIC BLOOD PRESSURE: 61 MMHG

## 2023-07-11 LAB
ALBUMIN SERPL ELPH-MCNC: 4.3 G/DL — SIGNIFICANT CHANGE UP (ref 3.5–5.2)
ALP SERPL-CCNC: 393 U/L — HIGH (ref 30–115)
ALT FLD-CCNC: 10 U/L — SIGNIFICANT CHANGE UP (ref 0–41)
ANION GAP SERPL CALC-SCNC: 12 MMOL/L — SIGNIFICANT CHANGE UP (ref 7–14)
AST SERPL-CCNC: 21 U/L — SIGNIFICANT CHANGE UP (ref 0–41)
BASOPHILS # BLD AUTO: 0.06 K/UL — SIGNIFICANT CHANGE UP (ref 0–0.2)
BASOPHILS NFR BLD AUTO: 1.2 % — HIGH (ref 0–1)
BILIRUB SERPL-MCNC: 0.5 MG/DL — SIGNIFICANT CHANGE UP (ref 0.2–1.2)
BUN SERPL-MCNC: 24 MG/DL — HIGH (ref 10–20)
CALCIUM SERPL-MCNC: 9 MG/DL — SIGNIFICANT CHANGE UP (ref 8.4–10.5)
CHLORIDE SERPL-SCNC: 97 MMOL/L — LOW (ref 98–110)
CO2 SERPL-SCNC: 29 MMOL/L — SIGNIFICANT CHANGE UP (ref 17–32)
CREAT SERPL-MCNC: 4.2 MG/DL — CRITICAL HIGH (ref 0.7–1.5)
EGFR: 12 ML/MIN/1.73M2 — LOW
EOSINOPHIL # BLD AUTO: 0.22 K/UL — SIGNIFICANT CHANGE UP (ref 0–0.7)
EOSINOPHIL NFR BLD AUTO: 4.4 % — SIGNIFICANT CHANGE UP (ref 0–8)
GLUCOSE BLDC GLUCOMTR-MCNC: 121 MG/DL — HIGH (ref 70–99)
GLUCOSE BLDC GLUCOMTR-MCNC: 125 MG/DL — HIGH (ref 70–99)
GLUCOSE SERPL-MCNC: 100 MG/DL — HIGH (ref 70–99)
HCT VFR BLD CALC: 28.5 % — LOW (ref 37–47)
HGB BLD-MCNC: 9.4 G/DL — LOW (ref 12–16)
IMM GRANULOCYTES NFR BLD AUTO: 0.4 % — HIGH (ref 0.1–0.3)
LYMPHOCYTES # BLD AUTO: 0.91 K/UL — LOW (ref 1.2–3.4)
LYMPHOCYTES # BLD AUTO: 18 % — LOW (ref 20.5–51.1)
MAGNESIUM SERPL-MCNC: 2.1 MG/DL — SIGNIFICANT CHANGE UP (ref 1.8–2.4)
MCHC RBC-ENTMCNC: 31.8 PG — HIGH (ref 27–31)
MCHC RBC-ENTMCNC: 33 G/DL — SIGNIFICANT CHANGE UP (ref 32–37)
MCV RBC AUTO: 96.3 FL — SIGNIFICANT CHANGE UP (ref 81–99)
MONOCYTES # BLD AUTO: 0.44 K/UL — SIGNIFICANT CHANGE UP (ref 0.1–0.6)
MONOCYTES NFR BLD AUTO: 8.7 % — SIGNIFICANT CHANGE UP (ref 1.7–9.3)
NEUTROPHILS # BLD AUTO: 3.4 K/UL — SIGNIFICANT CHANGE UP (ref 1.4–6.5)
NEUTROPHILS NFR BLD AUTO: 67.3 % — SIGNIFICANT CHANGE UP (ref 42.2–75.2)
NRBC # BLD: 0 /100 WBCS — SIGNIFICANT CHANGE UP (ref 0–0)
PHOSPHATE SERPL-MCNC: 3.1 MG/DL — SIGNIFICANT CHANGE UP (ref 2.1–4.9)
PLATELET # BLD AUTO: 71 K/UL — LOW (ref 130–400)
PMV BLD: 13.8 FL — HIGH (ref 7.4–10.4)
POTASSIUM SERPL-MCNC: 5 MMOL/L — SIGNIFICANT CHANGE UP (ref 3.5–5)
POTASSIUM SERPL-SCNC: 5 MMOL/L — SIGNIFICANT CHANGE UP (ref 3.5–5)
PROT SERPL-MCNC: 6.6 G/DL — SIGNIFICANT CHANGE UP (ref 6–8)
RBC # BLD: 2.96 M/UL — LOW (ref 4.2–5.4)
RBC # FLD: 17 % — HIGH (ref 11.5–14.5)
SODIUM SERPL-SCNC: 138 MMOL/L — SIGNIFICANT CHANGE UP (ref 135–146)
WBC # BLD: 5.05 K/UL — SIGNIFICANT CHANGE UP (ref 4.8–10.8)
WBC # FLD AUTO: 5.05 K/UL — SIGNIFICANT CHANGE UP (ref 4.8–10.8)

## 2023-07-11 RX ORDER — HYDRALAZINE HCL 50 MG
100 TABLET ORAL EVERY 12 HOURS
Refills: 0 | Status: DISCONTINUED | OUTPATIENT
Start: 2023-07-11 | End: 2023-07-11

## 2023-07-11 RX ORDER — HYDROMORPHONE HYDROCHLORIDE 2 MG/ML
1 INJECTION INTRAMUSCULAR; INTRAVENOUS; SUBCUTANEOUS
Qty: 24 | Refills: 0
Start: 2023-07-11 | End: 2023-07-14

## 2023-07-11 RX ORDER — METOCLOPRAMIDE HCL 10 MG
10 TABLET ORAL ONCE
Refills: 0 | Status: COMPLETED | OUTPATIENT
Start: 2023-07-11 | End: 2023-07-11

## 2023-07-11 RX ADMIN — Medication 1000 MILLIGRAM(S): at 05:52

## 2023-07-11 RX ADMIN — Medication 200 MILLIGRAM(S): at 13:59

## 2023-07-11 RX ADMIN — HEPARIN SODIUM 5000 UNIT(S): 5000 INJECTION INTRAVENOUS; SUBCUTANEOUS at 13:55

## 2023-07-11 RX ADMIN — Medication 5 MILLIGRAM(S): at 05:54

## 2023-07-11 RX ADMIN — Medication 1000 MILLIGRAM(S): at 13:54

## 2023-07-11 RX ADMIN — CALCITRIOL 0.25 MICROGRAM(S): 0.5 CAPSULE ORAL at 13:55

## 2023-07-11 RX ADMIN — Medication 10 MILLIGRAM(S): at 02:06

## 2023-07-11 RX ADMIN — Medication 81 MILLIGRAM(S): at 13:55

## 2023-07-11 RX ADMIN — GABAPENTIN 300 MILLIGRAM(S): 400 CAPSULE ORAL at 13:55

## 2023-07-11 RX ADMIN — METHOCARBAMOL 500 MILLIGRAM(S): 500 TABLET, FILM COATED ORAL at 05:52

## 2023-07-11 RX ADMIN — SEVELAMER CARBONATE 800 MILLIGRAM(S): 2400 POWDER, FOR SUSPENSION ORAL at 13:56

## 2023-07-11 RX ADMIN — PANTOPRAZOLE SODIUM 40 MILLIGRAM(S): 20 TABLET, DELAYED RELEASE ORAL at 05:52

## 2023-07-11 RX ADMIN — SODIUM ZIRCONIUM CYCLOSILICATE 5 GRAM(S): 10 POWDER, FOR SUSPENSION ORAL at 05:52

## 2023-07-11 RX ADMIN — Medication 100 MILLIGRAM(S): at 10:53

## 2023-07-11 RX ADMIN — SEVELAMER CARBONATE 800 MILLIGRAM(S): 2400 POWDER, FOR SUSPENSION ORAL at 08:38

## 2023-07-11 RX ADMIN — HEPARIN SODIUM 5000 UNIT(S): 5000 INJECTION INTRAVENOUS; SUBCUTANEOUS at 05:52

## 2023-07-11 NOTE — PHYSICAL THERAPY INITIAL EVALUATION ADULT - PERTINENT HX OF CURRENT PROBLEM, REHAB EVAL
57 y/o F with PMHx of ESRD on HD T/Th/Sat (follows w nephro Dr Solis Borja), hyperlipidemia, HTN, PVD, CVA and hearing impaired presents to the ED with c/o fall, abd pain, Vomiting and diarrhoea

## 2023-07-11 NOTE — DISCHARGE NOTE NURSING/CASE MANAGEMENT/SOCIAL WORK - NSDCPEFALRISK_GEN_ALL_CORE
For information on Fall & Injury Prevention, visit: [email or web address] OR  [email or web address] OR  https://www.cdc.gov/steadi/patient.html

## 2023-07-11 NOTE — PROGRESS NOTE ADULT - ASSESSMENT
59 y/o F with PMHx of ESRD on HD T/Th/Sat last HD yesterday , hyperlipidemia, HTN, PVD, CVA and hearing impaired presents to the ED with c/o fall, abd pain, Vomiting and diarrhoea.    # ESRD on HD T TH Sat   # abdominal pain/ diarrhea   # thrombocytopenia   # chronic PVD     - HD yesterday, K improved   - abdominal pain better   - follow CBC   - BP noted increased labetalol to q8h   phos at goal  will follow

## 2023-07-11 NOTE — PROGRESS NOTE ADULT - SUBJECTIVE AND OBJECTIVE BOX
Nephrology progress note    Patient was seen and examined, events over the last 24 h noted .    HD yesterday    Allergies:  penicillin (Rash)  NSAIDs (Nephrotoxicity)    Hospital Medications:   MEDICATIONS  (STANDING):  acetaminophen     Tablet .. 1000 milliGRAM(s) Oral every 8 hours  aspirin enteric coated 81 milliGRAM(s) Oral daily  atorvastatin 80 milliGRAM(s) Oral at bedtime  calcitriol   Capsule 0.25 MICROGram(s) Oral daily  gabapentin 300 milliGRAM(s) Oral daily  heparin   Injectable 5000 Unit(s) SubCutaneous every 8 hours  hydrALAZINE 100 milliGRAM(s) Oral every 12 hours  labetalol 200 milliGRAM(s) Oral every 8 hours  methocarbamol 500 milliGRAM(s) Oral two times a day  NIFEdipine XL 90 milliGRAM(s) Oral daily  pantoprazole    Tablet 40 milliGRAM(s) Oral every 12 hours  sevelamer carbonate 800 milliGRAM(s) Oral three times a day with meals        VITALS:  T(F): 96.7 (07-11-23 @ 13:31), Max: 98.4 (07-11-23 @ 05:17)  HR: 70 (07-11-23 @ 13:31)  BP: 170/61 (07-11-23 @ 13:31)  RR: 18 (07-11-23 @ 13:31)  SpO2: 95% (07-11-23 @ 08:15)  Wt(kg): --    07-10 @ 07:01  -  07-11 @ 07:00  --------------------------------------------------------  IN: 0 mL / OUT: 2500 mL / NET: -2500 mL          PHYSICAL EXAM:  Constitutional: NAD  HEENT: anicteric sclera, oropharynx clear, MMM  Neck: No JVD  Respiratory: CTAB, no wheezes, rales or rhonchi  Cardiovascular: S1, S2, RRR  Gastrointestinal: BS+, soft, NT/ND  Extremities: No cyanosis or clubbing. No peripheral edema  :  No mcgrath.   Skin: No rashes    LABS:  07-11    138  |  97<L>  |  24<H>  ----------------------------<  100<H>  5.0   |  29  |  4.2<HH>    Ca    9.0      11 Jul 2023 08:21  Phos  3.1     07-11  Mg     2.1     07-11    TPro  6.6  /  Alb  4.3  /  TBili  0.5  /  DBili      /  AST  21  /  ALT  10  /  AlkPhos  393<H>  07-11                          9.4    5.05  )-----------( 71       ( 11 Jul 2023 08:21 )             28.5       Urine Studies:  Urinalysis Basic - ( 11 Jul 2023 08:21 )    Color:  / Appearance:  / SG:  / pH:   Gluc: 100 mg/dL / Ketone:   / Bili:  / Urobili:    Blood:  / Protein:  / Nitrite:    Leuk Esterase:  / RBC:  / WBC    Sq Epi:  / Non Sq Epi:  / Bacteria:         RADIOLOGY & ADDITIONAL STUDIES:

## 2023-07-11 NOTE — DISCHARGE NOTE PROVIDER - NSDCMRMEDTOKEN_GEN_ALL_CORE_FT
acetaminophen 325 mg oral tablet: 2 tab(s) orally every 6 hours, As Needed -Temp greater or equal to 38C (100.4F), Mild Pain (1 - 3) - for moderate pain  aspirin 81 mg oral delayed release tablet: 1 tab(s) orally once a day   atorvastatin 80 mg oral tablet: 1 tab(s) orally once a day  calcitriol 0.25 mcg oral capsule: 1 cap(s) orally once a day  gabapentin 300 mg oral capsule: 1 cap(s) orally 3 times a day  hydrALAZINE 100 mg oral tablet: 1 tab(s) orally 2 times a day  labetalol 200 mg oral tablet: 1 tab(s) orally 2 times a day  NIFEdipine (Eqv-Adalat CC) 90 mg oral tablet, extended release: 1 tab(s) orally once a day  pantoprazole 40 mg oral delayed release tablet: 1 tab(s) orally 2 times a day  Robaxin 500 mg oral tablet: 1 tab(s) orally 2 times a day  sevelamer carbonate 800 mg oral tablet: 1 tab(s) orally 3 times a day (with meals)   acetaminophen 325 mg oral tablet: 2 tab(s) orally every 6 hours, As Needed -Temp greater or equal to 38C (100.4F), Mild Pain (1 - 3) - for moderate pain  aspirin 81 mg oral delayed release tablet: 1 tab(s) orally once a day   atorvastatin 80 mg oral tablet: 1 tab(s) orally once a day  calcitriol 0.25 mcg oral capsule: 1 cap(s) orally once a day  gabapentin 300 mg oral capsule: 1 cap(s) orally 3 times a day  hydrALAZINE 100 mg oral tablet: 1 tab(s) orally 2 times a day  HYDROmorphone 2 mg oral tablet: 1 tab(s) orally every 4 hours as needed for Severe Pain (7 - 10) MDD: No more than 4 tabs in 24hr period; take as prescribed  labetalol 200 mg oral tablet: 1 tab(s) orally 2 times a day  NIFEdipine (Eqv-Adalat CC) 90 mg oral tablet, extended release: 1 tab(s) orally once a day  pantoprazole 40 mg oral delayed release tablet: 1 tab(s) orally 2 times a day  Robaxin 500 mg oral tablet: 1 tab(s) orally 2 times a day  sevelamer carbonate 800 mg oral tablet: 1 tab(s) orally 3 times a day (with meals)   aspirin 81 mg oral delayed release tablet: 1 tab(s) orally once a day   atorvastatin 80 mg oral tablet: 1 tab(s) orally once a day  calcitriol 0.25 mcg oral capsule: 1 cap(s) orally once a day  gabapentin 300 mg oral capsule: 1 cap(s) orally 3 times a day  hydrALAZINE 100 mg oral tablet: 1 tab(s) orally 2 times a day  HYDROmorphone 2 mg oral tablet: 1 tab(s) orally every 4 hours as needed for Severe Pain (7 - 10) MDD: No more than 4 tabs in 24hr period; take as prescribed  labetalol 200 mg oral tablet: 1 tab(s) orally 2 times a day  NIFEdipine (Eqv-Adalat CC) 90 mg oral tablet, extended release: 1 tab(s) orally once a day  pantoprazole 40 mg oral delayed release tablet: 1 tab(s) orally 2 times a day  Robaxin 500 mg oral tablet: 1 tab(s) orally 2 times a day  sevelamer carbonate 800 mg oral tablet: 1 tab(s) orally 3 times a day (with meals)

## 2023-07-11 NOTE — DISCHARGE NOTE NURSING/CASE MANAGEMENT/SOCIAL WORK - PATIENT PORTAL LINK FT
You can access the FollowMyHealth Patient Portal offered by [facility] by registering at the following website: [email or web address]. By joining [facility]’s FollowMyHealth portal, you will also be able to view your health information using other applications (apps) compatible with our system.

## 2023-07-11 NOTE — DISCHARGE NOTE PROVIDER - HOSPITAL COURSE
· Subjective and Objective:   59 y/o F with PMHx of ESRD on HD T/Th/Sat (follows w nephro Dr Solis Borja), hyperlipidemia, HTN, PVD, CVA and hearing impaired presents to the ED with c/o fall, abd pain, Vomiting and diarrhoea. History obtained from brother at bedside, who has limited proficiency in sign language.  Pt had an unwitnessed fall at home yesterday after she came from dialysis. She felt dizzy while going to the bathroom and fell. +ve head trauma, -ve LOC, hit L wrist    Co diarrhoea that started 2d ago, 2 episodes today and the brother can ot think of anything that may have triggered it.  Also co vomiting for  2days     Co chronic abdominal pain, located around the umbilicus radiating to the LLE  for the past 7 months     She was admitted , hydralazine was stopped for poss hypotension causing the fall.     Patient had one episode of vomiting during hospitilization and underwent HD yesterday ( JULY 10 th )      Hospital remarks as following :     #fall post HD, likely due to hypotension  Lightheadedness +ve  Head trauma +ve  LOC -ve  CTH & L wrist XR: no acute path  holding hydralazine 50q12  c/w labetalol and nifedipine and monitor BP  BP are on high end since admission at around 160-170/80     #Nonspecific N/V + diarrhoea - improved   no recent abx use, no identifiable trigger  dc'ed home miralax      #chronic abd pain radiating to LLE  iron overload due to HD (79634 in 11/22)  ferritin 26k now dropped to 500's ,   Transferrin SAT 82%   , iron % sat = 24  CT AP: enlarged liver + hepatic steatosis (normal in 2/23)  alk phos 398, GB wall edema on RUQ US last admission  abd pain could be due to liver capsule stretching, but would be RUQ > umbilical  neuropathy known to occur in iron overload, chronic pain syndromes also known to occur  - fu heme onc consult , poss HLH vs increase ferritin due to inflammation ( since ferritin decreased it could be elevated as an acute phase reactant in the setting of ESRD )   - fu iron studies  - pending EGD as OP to r/o gastritis as cause for abd pain  - cont home protonix q12  pain mx consult :   Recommendations  x-ray thoracic and lumbar spine 3 view , nothing significant and no acute problems   acetaminophen 1000mg q8h standing  increase gabapentin to 300mg q8h standing  can continue methocarbamol 500mg q12h standing  PO hydromorphone 2mg q4h PRN for pain  Bowel regimen: miralax / colace  Nausea ppx: zofran standing  Functional goals: oob-chair, ambulate PRN as per primary team  Physical therapy           #Left sole ulcer  podiatry consulted   not infected ulcer and no open wounds   c/w local wound care and follow up in clinic for routine care and debridement of callus    #HTN  - cont home nifedipine and labetalol  - holding hydralazine 100 q12 in view of fall , but resumed since BP started rising     #HFpEF  TTE 2/23: GIIDD  +  pHTN  bnp 70k, same as last admission  monitoring     #thrombocytopenia - unclear etiology - could be related to liver disease /portal HTN  - monitor H+H  - f/u heme-onc note     #ESRD; normo anemia of chr dz  on HD via left UE AVG - T/Th/Sat  - nephro consulted for HD today for UF and hyperkalemia/ will reassess in AM   - UF 2-2.5 l as tolerated   - c/w sevelamer 800mg qac  - c/w Calcitriol 0.25 q24    #DLD  - c/w atorva 80 HS    # PVD;  s/p left femoral artery-posterior tibial artery bypass with autologous venous tissue and left heel ulcer debridement 11/21  Arterial duplex: patent LLE bypass; lt inguinal LN to 2.2cm  cont aspirin    #h/o Right pontine CVA (February 2021)  c/w ASA  c/w statin

## 2023-07-11 NOTE — DISCHARGE NOTE PROVIDER - NSDCCPCAREPLAN_GEN_ALL_CORE_FT
PRINCIPAL DISCHARGE DIAGNOSIS  Diagnosis: Fall  Assessment and Plan of Treatment: You presented due to falling two times at home , this can be due to sudden decrease in your blood pressure. We stopped one of your blood pressure medications (hydralazine)  and we monitored your blood pressure which went high again so we resumed the hydralazine.   Fall prevention includes ways to make your home and other areas safer. It also includes ways you can move more carefully to prevent a fall. Health conditions that cause changes in your blood pressure, vision, or muscle strength and coordination may increase your risk for falls. Medicines may also increase your risk for falls if they make you dizzy, weak, or sleepy. YOU SHOULD FOLLOW UP WITH PHYSICAL THERAPY RECOMMENDATIONS  If you have not already , you should follow up with the previously given appointments with :   1) hematology Doctor for low platelets count   2) Rheumatology Doctor for joint pain   3) GI doctor for upper endocsopy   4) Kidney doctor for End stage renal disease  5) cardiology doctor for right sided heart cath arrangment   follow instructions given to you on your last discharge  SEEK IMMEDIATE MEDICAL ATTENTION IF: You have fallen and are unconscious, you have fallen and cannot move part of your body, or you have fallen and have pain or a headache.  FALL PREVENTION TIPS:  Stand or sit up slowly. Use assistive devices as directed. You may need to have grab bars put in your bathroom near the toilet or in the shower.   Wear shoes that fit well and have soles that . Wear shoes both inside and outside. Do not wear shoes with high heels.   Wear a personal alarm that can call 911 in an emergency.   Manage your medical conditions. Keep all appointments with your healthcare providers. Visit your eye doctor as directed.        SECONDARY DISCHARGE DIAGNOSES  Diagnosis: Generalized weakness  Assessment and Plan of Treatment:     Diagnosis: Elevated troponin  Assessment and Plan of Treatment:     Diagnosis: Abdominal pain  Assessment and Plan of Treatment: you had abdominal pain nausea and vomiting , which can be due to fluid overload accumulating in your abdomen area , so we did hemodialysis session and had fluids removed , your abdominal distension improved, and your nausea and vomiting could be due to urea levels being high which improves after dialysis.   You should follow up with the nephrologist ( kidney doctor )   Hill Huddleston Phone: [phone]       PRINCIPAL DISCHARGE DIAGNOSIS  Diagnosis: Fall  Assessment and Plan of Treatment: You presented due to falling two times at home , this can be due to sudden decrease in your blood pressure. We stopped one of your blood pressure medications (hydralazine)  and we monitored your blood pressure which went high again so we resumed the hydralazine. please chekc your blood pressure at your next dialysis appointment on thursday  Fall prevention includes ways to make your home and other areas safer. It also includes ways you can move more carefully to prevent a fall. Health conditions that cause changes in your blood pressure, vision, or muscle strength and coordination may increase your risk for falls. Medicines may also increase your risk for falls if they make you dizzy, weak, or sleepy. YOU SHOULD FOLLOW UP WITH PHYSICAL THERAPY RECOMMENDATIONS  If you have not already , you should follow up with the previously given appointments with :   1) hematology Doctor for low platelets count   2) Rheumatology Doctor for joint pain   3) GI doctor for upper endocsopy   4) Kidney doctor for End stage renal disease  5) cardiology doctor for right sided heart cath arrangment   follow instructions given to you on your last discharge  SEEK IMMEDIATE MEDICAL ATTENTION IF: You have fallen and are unconscious, you have fallen and cannot move part of your body, or you have fallen and have pain or a headache.  FALL PREVENTION TIPS:  Stand or sit up slowly. Use assistive devices as directed. You may need to have grab bars put in your bathroom near the toilet or in the shower.   Wear shoes that fit well and have soles that . Wear shoes both inside and outside. Do not wear shoes with high heels.   Wear a personal alarm that can call 911 in an emergency.   Manage your medical conditions. Keep all appointments with your healthcare providers. Visit your eye doctor as directed.        SECONDARY DISCHARGE DIAGNOSES  Diagnosis: Generalized weakness  Assessment and Plan of Treatment:     Diagnosis: Elevated troponin  Assessment and Plan of Treatment:     Diagnosis: Abdominal pain  Assessment and Plan of Treatment: you had abdominal pain nausea and vomiting , which can be due to fluid overload accumulating in your abdomen area , so we did hemodialysis session and had fluids removed , your abdominal distension improved, and your nausea and vomiting could be due to urea levels being high which improves after dialysis.   You should follow up with the nephrologist ( kidney doctor )   [facility] Phone: [phone]

## 2023-07-11 NOTE — DISCHARGE NOTE PROVIDER - CARE PROVIDER_API CALL
Hill Huddleston  Nephrology  [address] [place] [id number]  Phone: [phone]  Fax: [phone]  Follow Up Time: 2 weeks

## 2023-07-11 NOTE — DISCHARGE NOTE PROVIDER - NSDCPNSUBOBJ_GEN_ALL_CORE
Pt was seen and examined at the bedside. resting comfortably. s/p Hemodialysis yesterday.  f/u Nephrology as outpatient.

## 2023-07-13 LAB
CD3-/CD16+CD56+(%): 4 % — SIGNIFICANT CHANGE UP (ref 4–25)
CD3-CD16+CD56+(ABS): 33 CELLS/UL — LOW (ref 70–760)
LYMPHOCYTES, ABSOLUTE: 763 CELLS/UL — LOW (ref 850–3900)

## 2023-07-17 DIAGNOSIS — Z86.73 PERSONAL HISTORY OF TRANSIENT ISCHEMIC ATTACK (TIA), AND CEREBRAL INFARCTION WITHOUT RESIDUAL DEFICITS: ICD-10-CM

## 2023-07-17 DIAGNOSIS — E87.5 HYPERKALEMIA: ICD-10-CM

## 2023-07-17 DIAGNOSIS — Z79.899 OTHER LONG TERM (CURRENT) DRUG THERAPY: ICD-10-CM

## 2023-07-17 DIAGNOSIS — E83.111 HEMOCHROMATOSIS DUE TO REPEATED RED BLOOD CELL TRANSFUSIONS: ICD-10-CM

## 2023-07-17 DIAGNOSIS — L84 CORNS AND CALLOSITIES: ICD-10-CM

## 2023-07-17 DIAGNOSIS — N18.6 END STAGE RENAL DISEASE: ICD-10-CM

## 2023-07-17 DIAGNOSIS — I95.1 ORTHOSTATIC HYPOTENSION: ICD-10-CM

## 2023-07-17 DIAGNOSIS — Z79.891 LONG TERM (CURRENT) USE OF OPIATE ANALGESIC: ICD-10-CM

## 2023-07-17 DIAGNOSIS — Z79.82 LONG TERM (CURRENT) USE OF ASPIRIN: ICD-10-CM

## 2023-07-17 DIAGNOSIS — K76.6 PORTAL HYPERTENSION: ICD-10-CM

## 2023-07-17 DIAGNOSIS — D69.59 OTHER SECONDARY THROMBOCYTOPENIA: ICD-10-CM

## 2023-07-17 DIAGNOSIS — I50.32 CHRONIC DIASTOLIC (CONGESTIVE) HEART FAILURE: ICD-10-CM

## 2023-07-17 DIAGNOSIS — D63.1 ANEMIA IN CHRONIC KIDNEY DISEASE: ICD-10-CM

## 2023-07-17 DIAGNOSIS — Z88.8 ALLERGY STATUS TO OTHER DRUGS, MEDICAMENTS AND BIOLOGICAL SUBSTANCES: ICD-10-CM

## 2023-07-17 DIAGNOSIS — Z99.2 DEPENDENCE ON RENAL DIALYSIS: ICD-10-CM

## 2023-07-17 DIAGNOSIS — Z88.0 ALLERGY STATUS TO PENICILLIN: ICD-10-CM

## 2023-07-17 DIAGNOSIS — I73.9 PERIPHERAL VASCULAR DISEASE, UNSPECIFIED: ICD-10-CM

## 2023-07-17 DIAGNOSIS — E78.5 HYPERLIPIDEMIA, UNSPECIFIED: ICD-10-CM

## 2023-07-17 DIAGNOSIS — I13.2 HYPERTENSIVE HEART AND CHRONIC KIDNEY DISEASE WITH HEART FAILURE AND WITH STAGE 5 CHRONIC KIDNEY DISEASE, OR END STAGE RENAL DISEASE: ICD-10-CM

## 2023-07-18 ENCOUNTER — OUTPATIENT (OUTPATIENT)
Dept: OUTPATIENT SERVICES | Facility: HOSPITAL | Age: 58
LOS: 1 days | End: 2023-07-18
Payer: COMMERCIAL

## 2023-07-18 ENCOUNTER — APPOINTMENT (OUTPATIENT)
Dept: INTERNAL MEDICINE | Facility: CLINIC | Age: 58
End: 2023-07-18
Payer: COMMERCIAL

## 2023-07-18 ENCOUNTER — APPOINTMENT (OUTPATIENT)
Dept: PODIATRY | Facility: CLINIC | Age: 58
End: 2023-07-18
Payer: COMMERCIAL

## 2023-07-18 VITALS
DIASTOLIC BLOOD PRESSURE: 56 MMHG | OXYGEN SATURATION: 96 % | BODY MASS INDEX: 30.23 KG/M2 | SYSTOLIC BLOOD PRESSURE: 158 MMHG | TEMPERATURE: 97 F | HEART RATE: 82 BPM | HEIGHT: 60 IN | WEIGHT: 154 LBS

## 2023-07-18 DIAGNOSIS — Z00.00 ENCOUNTER FOR GENERAL ADULT MEDICAL EXAMINATION WITHOUT ABNORMAL FINDINGS: ICD-10-CM

## 2023-07-18 DIAGNOSIS — Z98.890 OTHER SPECIFIED POSTPROCEDURAL STATES: Chronic | ICD-10-CM

## 2023-07-18 DIAGNOSIS — K21.9 GASTRO-ESOPHAGEAL REFLUX DISEASE W/OUT ESOPHAGITIS: ICD-10-CM

## 2023-07-18 PROCEDURE — 99213 OFFICE O/P EST LOW 20 MIN: CPT

## 2023-07-18 PROCEDURE — 99214 OFFICE O/P EST MOD 30 MIN: CPT

## 2023-07-18 PROCEDURE — 99214 OFFICE O/P EST MOD 30 MIN: CPT | Mod: GC

## 2023-07-18 RX ORDER — DICLOFENAC SODIUM 1% 10 MG/G
1 GEL TOPICAL
Qty: 1 | Refills: 3 | Status: DISCONTINUED | COMMUNITY
Start: 2022-12-16 | End: 2023-07-18

## 2023-07-18 RX ORDER — METOPROLOL SUCCINATE 100 MG/1
100 TABLET, EXTENDED RELEASE ORAL DAILY
Qty: 60 | Refills: 0 | Status: DISCONTINUED | COMMUNITY
End: 2023-07-18

## 2023-07-18 RX ORDER — HYDRALAZINE HYDROCHLORIDE 50 MG/1
50 TABLET ORAL 3 TIMES DAILY
Qty: 90 | Refills: 3 | Status: DISCONTINUED | COMMUNITY
Start: 2022-04-05 | End: 2023-07-18

## 2023-07-18 RX ORDER — NIFEDIPINE 60 MG/1
60 TABLET, EXTENDED RELEASE ORAL DAILY
Qty: 30 | Refills: 3 | Status: DISCONTINUED | COMMUNITY
Start: 2022-05-24 | End: 2023-07-18

## 2023-07-18 RX ORDER — SULFAMETHOXAZOLE AND TRIMETHOPRIM 400; 80 MG/1; MG/1
400-80 TABLET ORAL DAILY
Qty: 7 | Refills: 0 | Status: DISCONTINUED | COMMUNITY
Start: 2022-07-08 | End: 2023-07-18

## 2023-07-18 RX ORDER — CLOPIDOGREL 75 MG/1
75 TABLET, FILM COATED ORAL DAILY
Refills: 0 | Status: DISCONTINUED | COMMUNITY
End: 2023-07-18

## 2023-07-18 RX ORDER — DICLOFENAC SODIUM 1% 10 MG/G
1 GEL TOPICAL DAILY
Qty: 1 | Refills: 2 | Status: DISCONTINUED | COMMUNITY
Start: 2022-08-09 | End: 2023-07-18

## 2023-07-18 RX ORDER — FERROUS SULFATE TAB 325 MG (65 MG ELEMENTAL FE) 325 (65 FE) MG
325 (65 FE) TAB ORAL DAILY
Refills: 0 | Status: DISCONTINUED | COMMUNITY
Start: 2022-05-24 | End: 2023-07-18

## 2023-07-18 RX ORDER — DICLOFENAC SODIUM 1% 10 MG/G
1 GEL TOPICAL
Qty: 1 | Refills: 3 | Status: DISCONTINUED | COMMUNITY
Start: 2023-03-24 | End: 2023-07-18

## 2023-07-18 RX ORDER — CIPROFLOXACIN HYDROCHLORIDE 500 MG/1
500 TABLET, FILM COATED ORAL DAILY
Qty: 5 | Refills: 0 | Status: DISCONTINUED | COMMUNITY
Start: 2022-07-06 | End: 2023-07-18

## 2023-07-18 RX ORDER — ATORVASTATIN CALCIUM 80 MG/1
80 TABLET, FILM COATED ORAL
Refills: 0 | Status: DISCONTINUED | COMMUNITY
End: 2023-07-18

## 2023-07-18 RX ORDER — LOSARTAN POTASSIUM 50 MG/1
50 TABLET, FILM COATED ORAL DAILY
Qty: 30 | Refills: 0 | Status: DISCONTINUED | COMMUNITY
End: 2023-07-18

## 2023-07-18 NOTE — ASSESSMENT
[FreeTextEntry1] : 59 YO Hearing-Impaired woman w a PMH of ESRD on HD V s/p AVG placement in June 2022 due to diabetes, CVA (2/2021 on ASA), DM w peripheral neuropathy(on gabapentin), normocytic anemia, peripheral artery disease (s/p vascular repair 11/8/21), dyslipidemia here for followup. Pt says she still has abd pain. No SOB\par \par #HTN \par - BP - 158/56mmhg\par - asymptomatic\par - takes nifedipine 90mg daily and labetaol 200mg BID and hydralazine 100mg BID - continue \par - advised not to miss HD session\par \par #Gastro esophageal reflux disease\par - following up with GI\par - c/w protonix 40mg daily\par - need cardio clearance for scope (has appointments with Dr. Gomes)\par - needs scopies -screening \par \par # CKD 5 on Hemodialysis TTS sp AVG placement by Dr. Muñoz on 6/22/22\par - follow up with nephrology for hemodialysis on Tu Thurs Sat\par \par #Anemia \par - Hb- 9.3 (july 23)\par - Follow in Hem clinic with Dr. Xie \par \par # B/L LE pain \par #h/o PAD s/p bypass in 2021 \par - follow with  - vas sx \par - on exam - pt has normal perfusion b/l limbs. \par \par #Dm \par - takes prn insulin at home based on glucometer values\par - no recent a1c or lipids.\par \par #B/l LE neuropathy\par - takes gabapentin 300mg tid\par \par \par Routine blood work ordered - to be done now \par Plan colonoscopy/EGD \par RTC in 3 months and prn. \par \par \par

## 2023-07-18 NOTE — HISTORY OF PRESENT ILLNESS
[FreeTextEntry1] : Follow up after a hospital discharge.  [de-identified] : 57 YO Hearing-Impaired woman w a PMH of ESRD on HD V s/p AVG placement in June 2022 due to diabetes, CVA (2/2021 on ASA), DM w peripheral neuropathy(on gabapentin), normocytic anemia, peripheral artery disease (s/p vascular repair 11/8/21), dyslipidemia here for followup after a hospital admission from 7/9 to 7/11. She was admitted for a fall and dizziness 2/2 to hypotension. Her BP meds were adjusted and pt discharged. This encounter pt complaints of abd pain which is chronic and better compared to last visit. Pt has pain all over the body, generalized weakness and inc. day time sleepiness.

## 2023-07-18 NOTE — INTERPRETER SERVICES
[Time Spent: ____ minutes] : Total time spent using  services: [unfilled] minutes. The patient's primary language is not English thus required  services. [Interpreters_FullName] : Shannan [TWNoteComboBox1] : American Sign Language

## 2023-07-18 NOTE — REVIEW OF SYSTEMS
[Negative] : Heme/Lymph [Leg Claudication] : leg claudication [Lower Ext Edema] : lower extremity edema [Heartburn] : heartburn [FreeTextEntry2] : Weakness and pain all over the body

## 2023-07-18 NOTE — PHYSICAL EXAM
[No Acute Distress] : no acute distress [Well Nourished] : well nourished [Well Developed] : well developed [Well-Appearing] : well-appearing [Normal Sclera/Conjunctiva] : normal sclera/conjunctiva [PERRL] : pupils equal round and reactive to light [EOMI] : extraocular movements intact [Normal Outer Ear/Nose] : the outer ears and nose were normal in appearance [Normal Oropharynx] : the oropharynx was normal [No JVD] : no jugular venous distention [No Lymphadenopathy] : no lymphadenopathy [Supple] : supple [Thyroid Normal, No Nodules] : the thyroid was normal and there were no nodules present [No Respiratory Distress] : no respiratory distress  [No Accessory Muscle Use] : no accessory muscle use [Clear to Auscultation] : lungs were clear to auscultation bilaterally [Normal Rate] : normal rate  [Regular Rhythm] : with a regular rhythm [Normal S1, S2] : normal S1 and S2 [No Murmur] : no murmur heard [No Carotid Bruits] : no carotid bruits [No Abdominal Bruit] : a ~M bruit was not heard ~T in the abdomen [No Varicosities] : no varicosities [No Palpable Aorta] : no palpable aorta [No Extremity Clubbing/Cyanosis] : no extremity clubbing/cyanosis [Soft] : abdomen soft [Non Tender] : non-tender [Non-distended] : non-distended [No Masses] : no abdominal mass palpated [No HSM] : no HSM [Normal Bowel Sounds] : normal bowel sounds [Normal Posterior Cervical Nodes] : no posterior cervical lymphadenopathy [Normal Anterior Cervical Nodes] : no anterior cervical lymphadenopathy [No CVA Tenderness] : no CVA  tenderness [No Spinal Tenderness] : no spinal tenderness [No Joint Swelling] : no joint swelling [Grossly Normal Strength/Tone] : grossly normal strength/tone [No Rash] : no rash [Coordination Grossly Intact] : coordination grossly intact [No Focal Deficits] : no focal deficits [Normal Gait] : normal gait [Deep Tendon Reflexes (DTR)] : deep tendon reflexes were 2+ and symmetric [Normal Affect] : the affect was normal [Normal Insight/Judgement] : insight and judgment were intact [de-identified] : patient's peripheral pulses not obvious, perfusion is good in all extremitis. B/l LE mild non pitting edema

## 2023-07-19 NOTE — ASSESSMENT
[FreeTextEntry1] : Patient evaluated history reviewed discussed with patient right foot hardware removal\par Patient will obtain x-rays to right foot\par Discussed with patient that it would be very difficult to remove the hardware without causing further injury to patient\par Patient is aware\par Discussed topical medications for pain\par Patient would like to try capsaicin\par Patient will follow-up in 1 month [Verbal] : verbal [Patient] : patient [Good - alert, interested, motivated] : Good - alert, interested, motivated [Demonstrates independently] : demonstrates independently

## 2023-07-19 NOTE — PHYSICAL EXAM
[General Appearance - Alert] : alert [General Appearance - In No Acute Distress] : in no acute distress [General Appearance - Well Nourished] : well nourished [General Appearance - Well Developed] : well developed [Ankle Swelling (On Exam)] : not present [Ankle Swelling Bilaterally] : bilaterally  [Varicose Veins Of Lower Extremities] : bilaterally [] : on both lower extremities [Delayed in the Right Toes] : capillary refills normal in right toes [Delayed in the Left Toes] : capillary refills normal in the left toes [1+] : left foot dorsalis pedis 1+ [No Joint Swelling] : no joint swelling [de-identified] : No ROM, R ankle\par No pain w/passive & active ROM, L ankle\par Muscle strength 3/5 L foot, 4/5 R foot [Foot Ulcer] : no foot ulcer [Skin Induration] : skin induration [Diminished Throughout Right Foot] : diminished sensation with monofilament testing throughout right foot [FreeTextEntry1] : Callus plantar medial R heel\par Mildly hypertrophic tissue w/hyperpigmentation, contracture & induration to medial heel \par Skin thin & atrophic b/l feet [Diminished Throughout Left Foot] : diminished sensation with monofilament testing throughout left foot [Oriented To Time, Place, And Person] : oriented to person, place, and time [Impaired Insight] : insight and judgment were intact [Affect] : the affect was normal

## 2023-07-19 NOTE — HISTORY OF PRESENT ILLNESS
[Diabetic Shoes] : diabetic shoes [Walker] : a walker [FreeTextEntry1] : CC: "Checkup"\par \par HPI:\par -57F presents to clinic for 2 mo f/u L heel painful callus\par Patient complains of right foot pain patient states it is due to past surgical procedures\par Patient presents with family member and also \par \par Patient denies acute injury\par \par Patient has past x-rays on phone\par \par Discussed reviewing these pictures but stated to patient that this was not adequate and she would require new x-rays

## 2023-07-20 DIAGNOSIS — M79.671 PAIN IN RIGHT FOOT: ICD-10-CM

## 2023-07-20 DIAGNOSIS — T85.848A PAIN DUE TO OTHER INTERNAL PROSTHETIC DEVICES, IMPLANTS AND GRAFTS, INITIAL ENCOUNTER: ICD-10-CM

## 2023-07-20 DIAGNOSIS — E11.9 TYPE 2 DIABETES MELLITUS WITHOUT COMPLICATIONS: ICD-10-CM

## 2023-07-20 DIAGNOSIS — T84.84XA PAIN DUE TO INTERNAL ORTHOPEDIC PROSTHETIC DEVICES, IMPLANTS AND GRAFTS, INITIAL ENCOUNTER: ICD-10-CM

## 2023-07-20 DIAGNOSIS — K21.9 GASTRO-ESOPHAGEAL REFLUX DISEASE WITHOUT ESOPHAGITIS: ICD-10-CM

## 2023-07-20 DIAGNOSIS — E78.00 PURE HYPERCHOLESTEROLEMIA, UNSPECIFIED: ICD-10-CM

## 2023-07-26 ENCOUNTER — OUTPATIENT (OUTPATIENT)
Dept: OUTPATIENT SERVICES | Facility: HOSPITAL | Age: 58
LOS: 1 days | End: 2023-07-26
Payer: COMMERCIAL

## 2023-07-26 ENCOUNTER — APPOINTMENT (OUTPATIENT)
Dept: GASTROENTEROLOGY | Facility: CLINIC | Age: 58
End: 2023-07-26

## 2023-07-26 ENCOUNTER — RESULT REVIEW (OUTPATIENT)
Age: 58
End: 2023-07-26

## 2023-07-26 VITALS
TEMPERATURE: 97 F | OXYGEN SATURATION: 96 % | WEIGHT: 159 LBS | DIASTOLIC BLOOD PRESSURE: 54 MMHG | BODY MASS INDEX: 31.22 KG/M2 | SYSTOLIC BLOOD PRESSURE: 143 MMHG | HEIGHT: 60 IN | HEART RATE: 72 BPM

## 2023-07-26 DIAGNOSIS — Z98.890 OTHER SPECIFIED POSTPROCEDURAL STATES: Chronic | ICD-10-CM

## 2023-07-26 DIAGNOSIS — M79.671 PAIN IN RIGHT FOOT: ICD-10-CM

## 2023-07-26 PROCEDURE — 73630 X-RAY EXAM OF FOOT: CPT | Mod: RT

## 2023-07-26 PROCEDURE — 73630 X-RAY EXAM OF FOOT: CPT | Mod: 26,RT

## 2023-07-27 DIAGNOSIS — G47.00 INSOMNIA, UNSPECIFIED: ICD-10-CM

## 2023-07-27 DIAGNOSIS — M79.671 PAIN IN RIGHT FOOT: ICD-10-CM

## 2023-07-27 RX ORDER — DIPHENHYDRAMINE HCL 25 MG/1
25 TABLET ORAL
Qty: 30 | Refills: 6 | Status: ACTIVE | COMMUNITY
Start: 2023-07-27 | End: 1900-01-01

## 2023-07-27 NOTE — ASSESSMENT
Is This A New Presentation, Or A Follow-Up?: Rash [FreeTextEntry1] : 57 y/o female with h/o stroke, hearing and speech impaired, recently with left heel gangrene and left SFA occlusion, underwent left femoral to AT reverse saphenous vein bypass on 11/8/21. \par \par The left lower extremity bypass is patent on duplex today and the wounds are healing well. She was advised on Silvadene and dry dressing to left groin. I will see her back in 4 weeks for wound check.\par \par \par : Patricia Monahan

## 2023-08-08 ENCOUNTER — APPOINTMENT (OUTPATIENT)
Dept: VASCULAR SURGERY | Facility: CLINIC | Age: 58
End: 2023-08-08
Payer: SUBSIDIZED

## 2023-08-08 VITALS
BODY MASS INDEX: 31.22 KG/M2 | WEIGHT: 159 LBS | DIASTOLIC BLOOD PRESSURE: 62 MMHG | HEIGHT: 60 IN | SYSTOLIC BLOOD PRESSURE: 152 MMHG | HEART RATE: 73 BPM

## 2023-08-08 PROCEDURE — 93926 LOWER EXTREMITY STUDY: CPT | Mod: LT

## 2023-08-08 PROCEDURE — 99213 OFFICE O/P EST LOW 20 MIN: CPT

## 2023-08-08 PROCEDURE — 93990 DOPPLER FLOW TESTING: CPT

## 2023-08-08 NOTE — DATA REVIEWED
[FreeTextEntry1] : I performed an arterial duplex which was medically necessary to evaluate for patency of the LLE bypass and LUE AV graft.  The AVG is patent there is aneurysmal enlargement of the axillary vein with no flow restriction.  The bypass appears patent with inflow stanosis. The outflow was not visualized secondary to edema.

## 2023-08-08 NOTE — ASSESSMENT
[FreeTextEntry1] : 57 y/o female with h/o stroke, with left heel gangrene and left SFA occlusion, underwent left femoral to AT reverse saphenous vein bypass on 11/8/21.  She has h/o ESRD on HD, underwent left brachial artery to axillary vein AV graft placement on 6/22/22. She has right SFA occlusion.  I will schedule her for a left leg angiogram to evaluate the graft and prevent graft thrombosis.

## 2023-08-08 NOTE — HISTORY OF PRESENT ILLNESS
[FreeTextEntry1] : 57 y/o female with h/o stroke, with left heel gangrene and left SFA occlusion, underwent left femoral to AT reverse saphenous vein bypass on 11/8/21.  She has h/o ESRD on HD, underwent left brachial artery to axillary vein AV graft placement on 6/22/22. She has right SFA occlusion.

## 2023-08-11 ENCOUNTER — APPOINTMENT (OUTPATIENT)
Dept: RHEUMATOLOGY | Facility: CLINIC | Age: 58
End: 2023-08-11
Payer: COMMERCIAL

## 2023-08-11 ENCOUNTER — OUTPATIENT (OUTPATIENT)
Dept: OUTPATIENT SERVICES | Facility: HOSPITAL | Age: 58
LOS: 1 days | End: 2023-08-11
Payer: COMMERCIAL

## 2023-08-11 VITALS
OXYGEN SATURATION: 96 % | BODY MASS INDEX: 30.23 KG/M2 | HEIGHT: 60 IN | HEART RATE: 72 BPM | SYSTOLIC BLOOD PRESSURE: 164 MMHG | WEIGHT: 154 LBS | TEMPERATURE: 97.9 F | DIASTOLIC BLOOD PRESSURE: 66 MMHG

## 2023-08-11 DIAGNOSIS — Z98.890 OTHER SPECIFIED POSTPROCEDURAL STATES: Chronic | ICD-10-CM

## 2023-08-11 DIAGNOSIS — Z00.00 ENCOUNTER FOR GENERAL ADULT MEDICAL EXAMINATION WITHOUT ABNORMAL FINDINGS: ICD-10-CM

## 2023-08-11 DIAGNOSIS — R52 PAIN, UNSPECIFIED: ICD-10-CM

## 2023-08-11 DIAGNOSIS — Z86.73 PERSONAL HISTORY OF TRANSIENT ISCHEMIC ATTACK (TIA), AND CEREBRAL INFARCTION WITHOUT RESIDUAL DEFICITS: ICD-10-CM

## 2023-08-11 PROCEDURE — 99204 OFFICE O/P NEW MOD 45 MIN: CPT | Mod: GC

## 2023-08-11 PROCEDURE — 99203 OFFICE O/P NEW LOW 30 MIN: CPT

## 2023-08-11 RX ORDER — AMITRIPTYLINE HYDROCHLORIDE 10 MG/1
10 TABLET, FILM COATED ORAL
Qty: 30 | Refills: 2 | Status: ACTIVE | COMMUNITY
Start: 2023-08-11 | End: 1900-01-01

## 2023-08-11 NOTE — HISTORY OF PRESENT ILLNESS
[Malaise] : malaise [Fatigue] : fatigue [Arthralgias] : arthralgias [Myalgias] : myalgias [Muscle Cramping] : muscle cramping [FreeTextEntry1] : 57 y/o female with PMH ESRD on HD, CVA on aspirin, DM, PAD s/p bypass, and HLD here for diffuse pain. Patient states that she has pain throughout the daily in her neck, wrist, knees, ankles, and back. This has been going on for at least one year. She is on gabapentin but that has not helped. Additionally, patient has trouble sleeping.  [Malar Facial Rash] : no malar facial rash [Skin Lesions] : no lesions [Skin Nodules] : no skin nodules [Dry Mouth] : no dry mouth

## 2023-08-11 NOTE — REVIEW OF SYSTEMS
[Feeling Poorly] : feeling poorly [Feeling Tired] : feeling tired [Leg Claudication] : intermittent leg claudication [Arthralgias] : arthralgias [Joint Pain] : joint pain [Fever] : no fever [Chills] : no chills [Eye Pain] : no eye pain [Eyesight Problems] : no eyesight problems [Chest Pain] : no chest pain [Shortness Of Breath] : no shortness of breath [Wheezing] : no wheezing [Vomiting] : no vomiting [Constipation] : no constipation [Skin Lesions] : no skin lesions [Skin Wound] : no skin wound [Dizziness] : no dizziness [Confused] : no confusion [Suicidal] : not suicidal [Anxiety] : no anxiety

## 2023-08-11 NOTE — ASSESSMENT
[FreeTextEntry1] : 57 y/o female with PMH ESRD on HD, CVA on aspirin, DM, PAD s/p bypass, and HLD here for diffuse pain. Patient states that she has pain throughout the daily in her neck, wrist, knees, ankles, and back. This has been going on for at least one year. She is on gabapentin but that has not helped. Additionally, patient has trouble sleeping.   #General/Diffuse Pain - Fibromyalgia?  - CCP, RF, anti-RO/anti-LA, CK lab ordered - patient already has a pain management appointment scheduled - started amitriptyline 10 mg daily for sleep - no rheumatological concerns - no need for f/u unless abnormal labs

## 2023-08-11 NOTE — PHYSICAL EXAM
[General Appearance - Alert] : alert [General Appearance - In No Acute Distress] : in no acute distress [General Appearance - Well Nourished] : well nourished [Sclera] : the sclera and conjunctiva were normal [PERRL With Normal Accommodation] : pupils were equal in size, round, and reactive to light [Outer Ear] : the ears and nose were normal in appearance [Both Tympanic Membranes Were Examined] : both tympanic membranes were normal [Neck Appearance] : the appearance of the neck was normal [Neck Cervical Mass (___cm)] : no neck mass was observed [] : no respiratory distress [Exaggerated Use Of Accessory Muscles For Inspiration] : no accessory muscle use [Auscultation Breath Sounds / Voice Sounds] : lungs were clear to auscultation bilaterally [Apical Impulse] : the apical impulse was normal [Heart Rate And Rhythm] : heart rate was normal and rhythm regular [Heart Sounds] : normal S1 and S2 [Heart Sounds Gallop] : no gallops [Murmurs] : no murmurs [Bowel Sounds] : normal bowel sounds [Abdomen Tenderness] : non-tender [Nail Clubbing] : no clubbing  or cyanosis of the fingernails [Cranial Nerves] : cranial nerves 2-12 were intact [Sensation] : the sensory exam was normal to light touch and pinprick [Oriented To Time, Place, And Person] : oriented to person, place, and time

## 2023-08-11 NOTE — END OF VISIT
[] : Resident [FreeTextEntry3] : 57 y/o woman presents for evaluation of diffuse body pain. Pt has a h/o end-stage renal disease on dialysis, diabetes, CVA and peripheral artery disease. She reports a long-standing h/o diffuse severe body pain. + Pain at her AV fistula site which interferes with her sleep. + Pain in the R leg at the site of a prior fracture. Pt was recently hospitalized after a syncopal episode, was seen by pain management during her hospitalization and was recommended to take gabapentin 300 mg q 8 hours, methocarbamol and prn hydromorphone, but this regimen has not been significantly helping with her pain. Pt's exam today is notable for TTP throughout her back and her extremities, with no e/o inflammatory arthritis. Pt's symptoms are most suggestive of fibromyalgia. - f/u CK and labs for inflammatory arthritis, although low suspicion based on pt's symptoms and exam - Start amitriptyline 10 mg qhs, discussed adverse effects - Pt has appt with pain management  f/u prn abnormal lab findings [Time Spent: ___ minutes] : I have spent [unfilled] minutes of time on the encounter.

## 2023-08-15 ENCOUNTER — APPOINTMENT (OUTPATIENT)
Dept: NEPHROLOGY | Facility: CLINIC | Age: 58
End: 2023-08-15

## 2023-08-15 ENCOUNTER — OUTPATIENT (OUTPATIENT)
Dept: OUTPATIENT SERVICES | Facility: HOSPITAL | Age: 58
LOS: 1 days | End: 2023-08-15
Payer: COMMERCIAL

## 2023-08-15 VITALS
SYSTOLIC BLOOD PRESSURE: 157 MMHG | DIASTOLIC BLOOD PRESSURE: 63 MMHG | WEIGHT: 150 LBS | TEMPERATURE: 97.7 F | HEART RATE: 67 BPM | HEIGHT: 60 IN | BODY MASS INDEX: 29.45 KG/M2 | OXYGEN SATURATION: 96 %

## 2023-08-15 DIAGNOSIS — Z00.00 ENCOUNTER FOR GENERAL ADULT MEDICAL EXAMINATION WITHOUT ABNORMAL FINDINGS: ICD-10-CM

## 2023-08-15 DIAGNOSIS — N18.6 END STAGE RENAL DISEASE: ICD-10-CM

## 2023-08-15 DIAGNOSIS — Z99.2 END STAGE RENAL DISEASE: ICD-10-CM

## 2023-08-15 PROCEDURE — 99214 OFFICE O/P EST MOD 30 MIN: CPT

## 2023-08-15 RX ORDER — HYDROXYZINE HYDROCHLORIDE 50 MG/1
50 TABLET ORAL 3 TIMES DAILY
Qty: 90 | Refills: 0 | Status: COMPLETED | COMMUNITY
Start: 2023-05-25 | End: 2023-08-15

## 2023-08-15 RX ORDER — HYDRALAZINE HYDROCHLORIDE 100 MG/1
100 TABLET ORAL TWICE DAILY
Qty: 60 | Refills: 0 | Status: ACTIVE | COMMUNITY
Start: 2023-08-15

## 2023-08-15 RX ORDER — SUCRALFATE 1 G/1
1 TABLET ORAL 4 TIMES DAILY
Refills: 0 | Status: COMPLETED | COMMUNITY
Start: 2022-05-24 | End: 2023-08-15

## 2023-08-15 NOTE — ASSESSMENT
[FreeTextEntry1] : 58-year-old female w/ ESRD on HD TTS, Left AV fistula Brachial artery-Axillary vein, Left CFA-TRACY bypass, stroke, b/l renal nonobstructive calculi, normocytic anemia, DM, HTN, DL, HFpEF G2DD presenting post hospitalization for fall.  # ESRD on HD # HTN Kappa:Lambda 1.26 M-Luis Felipe 0.1: Gamma Migrating Paraprotein Inpatient, hydralazine was stopped, but BP got elevated. Hydralazine was then restarted. Labetalol switched from bid to Q8? then back to bid - avoid nephrotoxic drugs Phos 3.1 - PTH, 25-OH Vit D - c/w calcitriol 0.25 & sevelamer 800 tid - c/w Nifedipine 90 qd, Labetalol 200 bid, Hydralazine 100 bid - reports chest pain that increases during dialysis -> check BP during dialysis - reports compliance w/ renal diet - takes Dilaudid PO for chronic abdominal pain, denies constipation - Decrease Gabapentin  # Normocytic anemia - retic, iron, ferritin, TIBC, B12, folate - stopped iron for high ferritin 518  # Abdominal pain unclear cause, possible liver capsule stretch as per documentation, f/u GI for EGD  # Back pain XR lumbosacral -ve PCP f/u

## 2023-08-16 ENCOUNTER — OUTPATIENT (OUTPATIENT)
Dept: OUTPATIENT SERVICES | Facility: HOSPITAL | Age: 58
LOS: 1 days | End: 2023-08-16
Payer: COMMERCIAL

## 2023-08-16 VITALS
SYSTOLIC BLOOD PRESSURE: 148 MMHG | HEIGHT: 61 IN | WEIGHT: 152.12 LBS | DIASTOLIC BLOOD PRESSURE: 66 MMHG | RESPIRATION RATE: 18 BRPM | TEMPERATURE: 98 F | OXYGEN SATURATION: 99 % | HEART RATE: 68 BPM

## 2023-08-16 DIAGNOSIS — Z98.890 OTHER SPECIFIED POSTPROCEDURAL STATES: Chronic | ICD-10-CM

## 2023-08-16 DIAGNOSIS — Z01.818 ENCOUNTER FOR OTHER PREPROCEDURAL EXAMINATION: ICD-10-CM

## 2023-08-16 DIAGNOSIS — I73.9 PERIPHERAL VASCULAR DISEASE, UNSPECIFIED: ICD-10-CM

## 2023-08-16 DIAGNOSIS — Z87.81 PERSONAL HISTORY OF (HEALED) TRAUMATIC FRACTURE: Chronic | ICD-10-CM

## 2023-08-16 DIAGNOSIS — Z95.828 PRESENCE OF OTHER VASCULAR IMPLANTS AND GRAFTS: Chronic | ICD-10-CM

## 2023-08-16 DIAGNOSIS — Z89.422 ACQUIRED ABSENCE OF OTHER LEFT TOE(S): Chronic | ICD-10-CM

## 2023-08-16 LAB
A1C WITH ESTIMATED AVERAGE GLUCOSE RESULT: 5.6 % — SIGNIFICANT CHANGE UP (ref 4–5.6)
ALBUMIN SERPL ELPH-MCNC: 4.5 G/DL — SIGNIFICANT CHANGE UP (ref 3.5–5.2)
ALP SERPL-CCNC: 596 U/L — HIGH (ref 30–115)
ALT FLD-CCNC: 18 U/L — SIGNIFICANT CHANGE UP (ref 0–41)
ANION GAP SERPL CALC-SCNC: 17 MMOL/L — HIGH (ref 7–14)
AST SERPL-CCNC: 45 U/L — HIGH (ref 0–41)
BASOPHILS # BLD AUTO: 0.07 K/UL — SIGNIFICANT CHANGE UP (ref 0–0.2)
BASOPHILS NFR BLD AUTO: 1.4 % — HIGH (ref 0–1)
BILIRUB SERPL-MCNC: 0.9 MG/DL — SIGNIFICANT CHANGE UP (ref 0.2–1.2)
BUN SERPL-MCNC: 33 MG/DL — HIGH (ref 10–20)
CALCIUM SERPL-MCNC: 8.7 MG/DL — SIGNIFICANT CHANGE UP (ref 8.4–10.5)
CHLORIDE SERPL-SCNC: 94 MMOL/L — LOW (ref 98–110)
CO2 SERPL-SCNC: 26 MMOL/L — SIGNIFICANT CHANGE UP (ref 17–32)
CREAT SERPL-MCNC: 5.4 MG/DL — CRITICAL HIGH (ref 0.7–1.5)
EGFR: 9 ML/MIN/1.73M2 — LOW
EOSINOPHIL # BLD AUTO: 0.16 K/UL — SIGNIFICANT CHANGE UP (ref 0–0.7)
EOSINOPHIL NFR BLD AUTO: 3.1 % — SIGNIFICANT CHANGE UP (ref 0–8)
ESTIMATED AVERAGE GLUCOSE: 114 MG/DL — SIGNIFICANT CHANGE UP (ref 68–114)
GLUCOSE SERPL-MCNC: 139 MG/DL — HIGH (ref 70–99)
HCT VFR BLD CALC: 28.4 % — LOW (ref 37–47)
HGB BLD-MCNC: 9.3 G/DL — LOW (ref 12–16)
IMM GRANULOCYTES NFR BLD AUTO: 0.2 % — SIGNIFICANT CHANGE UP (ref 0.1–0.3)
LYMPHOCYTES # BLD AUTO: 0.68 K/UL — LOW (ref 1.2–3.4)
LYMPHOCYTES # BLD AUTO: 13.3 % — LOW (ref 20.5–51.1)
MCHC RBC-ENTMCNC: 30 PG — SIGNIFICANT CHANGE UP (ref 27–31)
MCHC RBC-ENTMCNC: 32.7 G/DL — SIGNIFICANT CHANGE UP (ref 32–37)
MCV RBC AUTO: 91.6 FL — SIGNIFICANT CHANGE UP (ref 81–99)
MONOCYTES # BLD AUTO: 0.62 K/UL — HIGH (ref 0.1–0.6)
MONOCYTES NFR BLD AUTO: 12.2 % — HIGH (ref 1.7–9.3)
NEUTROPHILS # BLD AUTO: 3.56 K/UL — SIGNIFICANT CHANGE UP (ref 1.4–6.5)
NEUTROPHILS NFR BLD AUTO: 69.8 % — SIGNIFICANT CHANGE UP (ref 42.2–75.2)
NRBC # BLD: 0 /100 WBCS — SIGNIFICANT CHANGE UP (ref 0–0)
PLATELET # BLD AUTO: 90 K/UL — LOW (ref 130–400)
PMV BLD: 13.7 FL — HIGH (ref 7.4–10.4)
POTASSIUM SERPL-MCNC: 4 MMOL/L — SIGNIFICANT CHANGE UP (ref 3.5–5)
POTASSIUM SERPL-SCNC: 4 MMOL/L — SIGNIFICANT CHANGE UP (ref 3.5–5)
PROT SERPL-MCNC: 7.1 G/DL — SIGNIFICANT CHANGE UP (ref 6–8)
RBC # BLD: 3.1 M/UL — LOW (ref 4.2–5.4)
RBC # FLD: 14.9 % — HIGH (ref 11.5–14.5)
SODIUM SERPL-SCNC: 137 MMOL/L — SIGNIFICANT CHANGE UP (ref 135–146)
WBC # BLD: 5.1 K/UL — SIGNIFICANT CHANGE UP (ref 4.8–10.8)
WBC # FLD AUTO: 5.1 K/UL — SIGNIFICANT CHANGE UP (ref 4.8–10.8)

## 2023-08-16 PROCEDURE — 80053 COMPREHEN METABOLIC PANEL: CPT

## 2023-08-16 PROCEDURE — 36415 COLL VENOUS BLD VENIPUNCTURE: CPT

## 2023-08-16 PROCEDURE — 99214 OFFICE O/P EST MOD 30 MIN: CPT | Mod: 25

## 2023-08-16 PROCEDURE — 85025 COMPLETE CBC W/AUTO DIFF WBC: CPT

## 2023-08-16 PROCEDURE — 83036 HEMOGLOBIN GLYCOSYLATED A1C: CPT

## 2023-08-16 NOTE — H&P PST ADULT - REASON FOR ADMISSION
57 Y/O HEARING IMPAIRED F WITH PMHX ESRD ON HEMO T-TH-SAT, HTN, PVD S/P FEM-POP BYPASS 2022, CVA ?2 YRS AGO, DM, SCHEDULED FOR PAST FOR LEFT LOWER EXTREMITY ANGIOGRAM, POSSIBLE ENDOVASCULAR REVASCULARIZATION UNDER LSB WITH DR RUEDA ON 9/6/23. PT WAS RECENTLY HOSPITALIZED 7/2023 S/P FALL X 2 WEEKS. AT THAT TIME PT C/O ABD PAIN AND DIARRHEA. CT ABD SHOWED enlarged liver + hepatic steatosis (normal in 2/23); alk phos 398, GB wall edema on RUQ US last. PT HAS GI F/U SCHEDULED THIS MONTH. ALSO FOUND TO HAVE IRON OVERLOAD ON ADMISSION D/CD WITH HEMONC F/U THIS MONTH. 59 Y/O HEARING IMPAIRED F WITH PMHX ESRD ON HEMO T-TH-SAT, HTN, PVD S/P FEM-POP BYPASS 2022, CVA ?2 YRS AGO, DM, SCHEDULED FOR PAST FOR LEFT LOWER EXTREMITY ANGIOGRAM, POSSIBLE ENDOVASCULAR REVASCULARIZATION UNDER LSB WITH DR RUEDA ON 9/6/23. PT WAS RECENTLY HOSPITALIZED 7/2023 S/P FALL 7/2023. AT THAT TIME PT C/O ABD PAIN AND DIARRHEA/N/V. CT ABD SHOWED enlarged liver + hepatic steatosis (normal in 2/23); alk phos 398, GB wall edema on RUQ US last. PT HAS GI F/U SCHEDULED 10/2023 R/O PORTAL HTN. PT WAS ALSO DISCHARGED WITH INSTRUCTION TO SEE HEME F/U LOW PLATELETS (APPT NEXT WEEK), AND CARDIOLOGY TO GET WORKUP AND SCHEDULE HEART CATHETERIZATION (APPT 9/19/23)

## 2023-08-16 NOTE — H&P PST ADULT - HISTORY OF PRESENT ILLNESS
PATIENT CURRENTLY DENIES CHEST PAIN  PALPITATIONS, OR UPPER RESPIRATORY INFECTION IN PAST 2 WEEKS- ANURIC  EXERCISE  TOLERANCE  1-2 FLat blocks OF STAIRS  WITHOUT SHORTNESS OF BREATH; PT REPORTS SHORTNESS OF BREATH AFTER SHORT DISTANCES AND NEEDS TO USE HER INHALER     Den[place] travel outside the [place] in the past 30 days  Patient denies any signs or symptoms of COVID 19 and denies contact with known positive individuals.     Patient reports receiving covid vaccine x  3 doses    Anesthesia Alert  YES--Difficult Airway CLASS IV  NO--History of neck surgery or radiation  NO--Limited ROM of neck  NO--History of Malignant hyperthermia  NO--No personal or family history of Pseudocholinesterase deficiency.  NO--Prior Anesthesia Complication  NO--Latex Allergy  YES--Loose teeth LOOSE UPPER FRONT TOOTH PT MADE AWARE THAT THERE IS RISK OF TOOTH BEING DISLODGED DURING PROCEDURE  NO--History of Rheumatoid Arthritis  NO--Bleeding risk  NO--DAJA  YES--Other_____ LEFT ARM PREC    PT DENIES ANY RASHES, ABRASION, OR OPEN WOUNDS OR CUTS    AS PER THE PT, THIS IS HIS/HER COMPLETE MEDICAL AND SURGICAL HX, INCLUDING MEDICATIONS PRESCRIBED AND OVER THE COUNTER    Patient verbalized understanding of instructions and was given the opportunity to ask questions and have them answered.    pt denies any suicidal ideation or thoughts, pt states not a threat to self or others   PATIENT CURRENTLY DENIES CHEST PAIN  PALPITATIONS, OR UPPER RESPIRATORY INFECTION IN PAST 2 WEEKS- ANURIC  EXERCISE  TOLERANCE  1-2 Flat blocks OF STAIRS  WITHOUT SHORTNESS OF BREATH; PT REPORTS SHORTNESS OF BREATH AFTER SHORT DISTANCES AND NEEDS TO USE HER INHALER . AMBULATES WITH WALKER     Denies travel outside the [place] in the past 30 days  Patient denies any signs or symptoms of COVID 19 and denies contact with known positive individuals.     Patient reports receiving covid vaccine x  3 doses    Anesthesia Alert  YES--Difficult Airway CLASS IV  NO--History of neck surgery or radiation  NO--Limited ROM of neck  NO--History of Malignant hyperthermia  NO--No personal or family history of Pseudocholinesterase deficiency.  NO--Prior Anesthesia Complication  NO--Latex Allergy  YES--Loose teeth LOOSE UPPER FRONT TOOTH PT MADE AWARE THAT THERE IS RISK OF TOOTH BEING DISLODGED DURING PROCEDURE  NO--History of Rheumatoid Arthritis  NO--Bleeding risk  NO--DAJA  YES--Other_____ LEFT ARM PREC      RCRI- Revised Cardiac Risk Index for Pre-Operative Risk  RESULT SUMMARY:  3 points  Class IV Risk  15.0 %  30-day risk of death, MI, or cardiac arrest  INPUTS:  Elevated-risk surgery —> 0 = No  History of ischemic heart disease —> 0 = No  History of congestive heart failure —> 0 = No  History of cerebrovascular disease —> 1 = Yes  Pre-operative treatment with insulin —> 1 = Yes  Pre-operative creatinine >2 mg/dL / 176.8 µmol/L —> 1 = Yes    DASI- Duke Activity Status Index (DASI)   RESULT SUMMARY:  4.5 points  The higher the score (maximum 58.2), the higher the functional status.  3.30 METs  INPUTS:  Take care of self —> 0 = No  Walk indoors —> 1.75 = Yes  Walk 1&ndash;2 blocks on level ground —> 2.75 = Yes  Climb a flight of stairs or walk up a hill —> 0 = No  Run a short distance —> 0 = No  Do light work around the house —> 0 = No  Do moderate work around the house —> 0 = No  Do heavy work around the house —> 0 = No  Do yardwork —> 0 = No  Have sexual relations —> 0 = No  Participate in moderate recreational activities —> 0 = No  Participate in strenuous sports —> 0 = No      PT DENIES ANY RASHES, ABRASION, OR OPEN WOUNDS OR CUTS    AS PER THE PT, THIS IS HIS/HER COMPLETE MEDICAL AND SURGICAL HX, INCLUDING MEDICATIONS PRESCRIBED AND OVER THE COUNTER    Patient verbalized understanding of instructions and was given the opportunity to ask questions and have them answered.    pt denies any suicidal ideation or thoughts, pt states not a threat to self or others   PATIENT CURRENTLY DENIES CHEST PAIN  PALPITATIONS, OR UPPER RESPIRATORY INFECTION IN PAST 2 WEEKS- ANURIC  EXERCISE  TOLERANCE  1-2 Flat blocks  WITHOUT SHORTNESS OF BREATH; PT REPORTS SHORTNESS OF BREATH AFTER SHORT DISTANCES AND NEEDS TO USE HER INHALER . AMBULATES WITH WALKER     Den[place] travel outside the [place] in the past 30 days  Patient denies any signs or symptoms of COVID 19 and denies contact with known positive individuals.     Patient reports receiving covid vaccine x  3 doses    Anesthesia Alert  YES--Difficult Airway CLASS IV  NO--History of neck surgery or radiation  NO--Limited ROM of neck  NO--History of Malignant hyperthermia  NO--No personal or family history of Pseudocholinesterase deficiency.  NO--Prior Anesthesia Complication  NO--Latex Allergy  YES--Loose teeth LOOSE UPPER FRONT TOOTH PT MADE AWARE THAT THERE IS RISK OF TOOTH BEING DISLODGED DURING PROCEDURE  NO--History of Rheumatoid Arthritis  NO--Bleeding risk  NO--DAJA  YES--Other_____ LEFT ARM PREC      RCRI- Revised Cardiac Risk Index for Pre-Operative Risk  RESULT SUMMARY:  3 points  Class IV Risk  15.0 %  30-day risk of death, MI, or cardiac arrest  INPUTS:  Elevated-risk surgery —> 0 = No  History of ischemic heart disease —> 0 = No  History of congestive heart failure —> 0 = No  History of cerebrovascular disease —> 1 = Yes  Pre-operative treatment with insulin —> 1 = Yes  Pre-operative creatinine >2 mg/dL / 176.8 µmol/L —> 1 = Yes    DASI- Duke Activity Status Index (DASI)   RESULT SUMMARY:  4.5 points  The higher the score (maximum 58.2), the higher the functional status.  3.30 METs  INPUTS:  Take care of self —> 0 = No  Walk indoors —> 1.75 = Yes  Walk 1&ndash;2 blocks on level ground —> 2.75 = Yes  Climb a flight of stairs or walk up a hill —> 0 = No  Run a short distance —> 0 = No  Do light work around the house —> 0 = No  Do moderate work around the house —> 0 = No  Do heavy work around the house —> 0 = No  Do yardwork —> 0 = No  Have sexual relations —> 0 = No  Participate in moderate recreational activities —> 0 = No  Participate in strenuous sports —> 0 = No      PT DENIES ANY RASHES, ABRASION, OR OPEN WOUNDS OR CUTS    AS PER THE PT, THIS IS HIS/HER COMPLETE MEDICAL AND SURGICAL HX, INCLUDING MEDICATIONS PRESCRIBED AND OVER THE COUNTER    Patient verbalized understanding of instructions and was given the opportunity to ask questions and have them answered.    pt denies any suicidal ideation or thoughts, pt states not a threat to self or others

## 2023-08-16 NOTE — H&P PST ADULT - NSICDXPASTSURGICALHX_GEN_ALL_CORE_FT
PAST SURGICAL HISTORY:  H/O fracture of leg     H/O vascular surgery left leg    History of partial amputation of toe of left foot     S/P arteriovenous (AV) fistula creation     S/P debridement     S/P femoral-popliteal bypass surgery

## 2023-08-16 NOTE — H&P PST ADULT - NSICDXPASTMEDICALHX_GEN_ALL_CORE_FT
PAST MEDICAL HISTORY:  Benign essential HTN     Chronic kidney disease, unspecified CKD stage     CVA (cerebral vascular accident)     DM (diabetes mellitus)     Hearing impaired     HLD (hyperlipidemia)     Intellectual disability     PVD (peripheral vascular disease)

## 2023-08-16 NOTE — H&P PST ADULT - PRIMARY CARE PROVIDER
DABAGHIAN ODIAMI BEHURIA- NEW PT 9/19/23     NEURO- NEW PT APPT 8/22/23 [facility]- APPT 8/18/23    BEHURIA- NEW PT 9/19/23     NEURO- NEW PT APPT 8/22/23   [facility]

## 2023-08-17 DIAGNOSIS — I73.9 PERIPHERAL VASCULAR DISEASE, UNSPECIFIED: ICD-10-CM

## 2023-08-17 DIAGNOSIS — Z01.818 ENCOUNTER FOR OTHER PREPROCEDURAL EXAMINATION: ICD-10-CM

## 2023-08-18 ENCOUNTER — APPOINTMENT (OUTPATIENT)
Dept: HEMATOLOGY ONCOLOGY | Facility: CLINIC | Age: 58
End: 2023-08-18
Payer: MEDICAID

## 2023-08-18 ENCOUNTER — OUTPATIENT (OUTPATIENT)
Dept: OUTPATIENT SERVICES | Facility: HOSPITAL | Age: 58
LOS: 1 days | End: 2023-08-18
Payer: MEDICAID

## 2023-08-18 ENCOUNTER — LABORATORY RESULT (OUTPATIENT)
Age: 58
End: 2023-08-18

## 2023-08-18 VITALS
SYSTOLIC BLOOD PRESSURE: 136 MMHG | WEIGHT: 152 LBS | HEART RATE: 62 BPM | DIASTOLIC BLOOD PRESSURE: 62 MMHG | BODY MASS INDEX: 29.84 KG/M2 | HEIGHT: 60 IN | RESPIRATION RATE: 14 BRPM | TEMPERATURE: 98.3 F

## 2023-08-18 DIAGNOSIS — Z89.422 ACQUIRED ABSENCE OF OTHER LEFT TOE(S): Chronic | ICD-10-CM

## 2023-08-18 DIAGNOSIS — Z98.890 OTHER SPECIFIED POSTPROCEDURAL STATES: Chronic | ICD-10-CM

## 2023-08-18 DIAGNOSIS — Z87.81 PERSONAL HISTORY OF (HEALED) TRAUMATIC FRACTURE: Chronic | ICD-10-CM

## 2023-08-18 DIAGNOSIS — D64.9 ANEMIA, UNSPECIFIED: ICD-10-CM

## 2023-08-18 DIAGNOSIS — Z95.828 PRESENCE OF OTHER VASCULAR IMPLANTS AND GRAFTS: Chronic | ICD-10-CM

## 2023-08-18 PROBLEM — E11.9 TYPE 2 DIABETES MELLITUS WITHOUT COMPLICATIONS: Chronic | Status: ACTIVE | Noted: 2023-08-16

## 2023-08-18 PROCEDURE — 36415 COLL VENOUS BLD VENIPUNCTURE: CPT

## 2023-08-18 PROCEDURE — 85027 COMPLETE CBC AUTOMATED: CPT

## 2023-08-18 PROCEDURE — 84165 PROTEIN E-PHORESIS SERUM: CPT

## 2023-08-18 PROCEDURE — 85046 RETICYTE/HGB CONCENTRATE: CPT

## 2023-08-18 PROCEDURE — 99213 OFFICE O/P EST LOW 20 MIN: CPT

## 2023-08-18 PROCEDURE — 83615 LACTATE (LD) (LDH) ENZYME: CPT

## 2023-08-18 PROCEDURE — 82607 VITAMIN B-12: CPT

## 2023-08-18 PROCEDURE — 82728 ASSAY OF FERRITIN: CPT

## 2023-08-19 DIAGNOSIS — D64.9 ANEMIA, UNSPECIFIED: ICD-10-CM

## 2023-08-19 LAB
FERRITIN SERPL-MCNC: 462 NG/ML
HCT VFR BLD CALC: 28.5 %
HGB BLD-MCNC: 9.7 G/DL
LDH SERPL-CCNC: 207
MCHC RBC-ENTMCNC: 30.8 PG
MCHC RBC-ENTMCNC: 34 G/DL
MCV RBC AUTO: 90.5 FL
PLATELET # BLD AUTO: 94 K/UL
PMV BLD: 12.7 FL
RBC # BLD: 3.15 M/UL
RBC # FLD: 14.7 %
RETICS # AUTO: 1.8 %
RETICS AGGREG/RBC NFR: 56.4 K/UL
VIT B12 SERPL-MCNC: 556 PG/ML
WBC # FLD AUTO: 5.25 K/UL

## 2023-08-21 NOTE — ASSESSMENT
[FreeTextEntry1] : 58 year old female with ESRD on dialysis . history of elevated ferritin , now within normal for dialysis patients Mild thrombocytopenia probably from dialysis , graft thrombosis /gangrene ? (consumption .) normal D dimers  History of igM paraproteinemia Rectal bleeding ? Plan : cbc , smear B12 , myeloma panel , LDH          patient reassured , can proceed with angiogram , rule out graft thrombosis           GI for colonoscopy ?

## 2023-08-21 NOTE — HISTORY OF PRESENT ILLNESS
[de-identified] : 58 year old female with PMH of ESRD on dialysis , seen in the hospital for markedly elevated ferritin of >25,000 , came down to 500 on repeat . She was also noted with mild thrombocytopenia since 2023 . main complait is weakness , generalized pain worse in LLE and associated with swelling . she is s/p arterial bypass and is scheduled for repeat angiogram on 9/6 . She reports blood per rectum and is also scheduled for colonoscopy . . Review of old records showed igM paraproteinemia ( 0.1 ) . she complains of abdominal pain , she was started on TCA by rheum for fibromyalgia ?

## 2023-08-21 NOTE — PHYSICAL EXAM
[Normal] : clear to auscultation bilaterally, no dullness, no wheezing [de-identified] : grade 3/6 ESM [de-identified] : bilateral edema L>R

## 2023-08-22 ENCOUNTER — OUTPATIENT (OUTPATIENT)
Dept: OUTPATIENT SERVICES | Facility: HOSPITAL | Age: 58
LOS: 1 days | End: 2023-08-22
Payer: COMMERCIAL

## 2023-08-22 ENCOUNTER — APPOINTMENT (OUTPATIENT)
Dept: CARDIOLOGY | Facility: CLINIC | Age: 58
End: 2023-08-22
Payer: COMMERCIAL

## 2023-08-22 ENCOUNTER — APPOINTMENT (OUTPATIENT)
Dept: NEUROLOGY | Facility: CLINIC | Age: 58
End: 2023-08-22
Payer: COMMERCIAL

## 2023-08-22 VITALS
DIASTOLIC BLOOD PRESSURE: 61 MMHG | SYSTOLIC BLOOD PRESSURE: 175 MMHG | HEART RATE: 80 BPM | BODY MASS INDEX: 29.84 KG/M2 | OXYGEN SATURATION: 93 % | TEMPERATURE: 98.6 F | WEIGHT: 152 LBS | HEIGHT: 60 IN

## 2023-08-22 DIAGNOSIS — Z98.890 OTHER SPECIFIED POSTPROCEDURAL STATES: Chronic | ICD-10-CM

## 2023-08-22 DIAGNOSIS — Z01.818 ENCOUNTER FOR OTHER PREPROCEDURAL EXAMINATION: ICD-10-CM

## 2023-08-22 DIAGNOSIS — Z00.00 ENCOUNTER FOR GENERAL ADULT MEDICAL EXAMINATION WITHOUT ABNORMAL FINDINGS: ICD-10-CM

## 2023-08-22 DIAGNOSIS — Z89.422 ACQUIRED ABSENCE OF OTHER LEFT TOE(S): Chronic | ICD-10-CM

## 2023-08-22 DIAGNOSIS — Z95.828 PRESENCE OF OTHER VASCULAR IMPLANTS AND GRAFTS: Chronic | ICD-10-CM

## 2023-08-22 DIAGNOSIS — I10 ESSENTIAL (PRIMARY) HYPERTENSION: ICD-10-CM

## 2023-08-22 DIAGNOSIS — Z86.73 PERSONAL HISTORY OF TRANSIENT ISCHEMIC ATTACK (TIA), AND CEREBRAL INFARCTION W/OUT RESIDUAL DEFICITS: ICD-10-CM

## 2023-08-22 DIAGNOSIS — Z87.81 PERSONAL HISTORY OF (HEALED) TRAUMATIC FRACTURE: Chronic | ICD-10-CM

## 2023-08-22 PROBLEM — I63.9 CEREBRAL INFARCTION, UNSPECIFIED: Chronic | Status: ACTIVE | Noted: 2023-08-16

## 2023-08-22 LAB
ALBUMIN MFR SERPL ELPH: 55.9 %
ALBUMIN SERPL-MCNC: 4 G/DL
ALBUMIN/GLOB SERPL: 1.3 RATIO
ALPHA1 GLOB MFR SERPL ELPH: 7 %
ALPHA1 GLOB SERPL ELPH-MCNC: 0.5 G/DL
ALPHA2 GLOB MFR SERPL ELPH: 12 %
ALPHA2 GLOB SERPL ELPH-MCNC: 0.9 G/DL
B-GLOBULIN MFR SERPL ELPH: 9.9 %
B-GLOBULIN SERPL ELPH-MCNC: 0.7 G/DL
DEPRECATED KAPPA LC FREE/LAMBDA SER: 0.81 RATIO
GAMMA GLOB FLD ELPH-MCNC: 1.1 G/DL
GAMMA GLOB MFR SERPL ELPH: 15.2 %
IGA SER QL IEP: 212 MG/DL
IGG SER QL IEP: 1095 MG/DL
IGM SER QL IEP: 192 MG/DL
INTERPRETATION SERPL IEP-IMP: NORMAL
KAPPA LC CSF-MCNC: 29.46 MG/DL
KAPPA LC SERPL-MCNC: 23.78 MG/DL
M PROTEIN MFR SERPL ELPH: 2.1 %
M PROTEIN SPEC IFE-MCNC: NORMAL
MONOCLON BAND OBS SERPL: 0.1 G/DL
PROT SERPL-MCNC: 7.1 G/DL
PROT SERPL-MCNC: 7.1 G/DL

## 2023-08-22 PROCEDURE — 99204 OFFICE O/P NEW MOD 45 MIN: CPT

## 2023-08-22 PROCEDURE — 99214 OFFICE O/P EST MOD 30 MIN: CPT

## 2023-08-22 PROCEDURE — 99203 OFFICE O/P NEW LOW 30 MIN: CPT

## 2023-08-22 NOTE — REVIEW OF SYSTEMS
[Negative] : Heme/Lymph [SOB] : shortness of breath [Dyspnea on exertion] : dyspnea during exertion [Chest Discomfort] : chest discomfort [Leg Claudication] : intermittent leg claudication

## 2023-08-22 NOTE — REVIEW OF SYSTEMS
[Chest Pain] : chest pain [Abdominal Pain] : abdominal pain [Dizziness] : dizziness [Fever] : no fever [Chills] : no chills [Shortness Of Breath] : no shortness of breath [Vomiting] : no vomiting [FreeTextEntry5] : increased during dialysis [FreeTextEntry9] : back pain [de-identified] : episodes of waking up very sleepy and drowzy

## 2023-08-22 NOTE — ASSESSMENT
[FreeTextEntry1] : 58 years old female with multiple co-morbidities is here for pre-procedural clearance to hold Plavix. H/O stroke with no residual weakness in 02/2021. Was on DAPT, recently Plavix was already stopped during last hospital admission. As the stroke was 2 years ago, no need to restart Plavix.  Plan: - Continue ASA 81 mg daily. - Continue high intensity Statin - Carotid doppler: can be done even after the procedure. - RTC in 1 year.

## 2023-08-22 NOTE — REASON FOR VISIT
[Initial Evaluation] : an initial evaluation [Other: _____] : [unfilled] [FreeTextEntry1] : Post hospitalization

## 2023-08-22 NOTE — END OF VISIT
[] : Resident [FreeTextEntry3] : I visited the patient with resident. Agree with history, physical exam and plan as above. Patient with history of CVA two years ago currently in neurology clinic referred by GI for procedural clearance. Currently on ASA and Lipitor. Not on Plavix anymore. Clear from neurology standpoint for GI procedure. Will obtain carotid Doppler. RTC in 6 months

## 2023-08-22 NOTE — HISTORY OF PRESENT ILLNESS
[FreeTextEntry1] : 58-year-old female w/ ESRD on HD TTS, Left AV fistula Brachial artery-Axillary vein, Left CFA-TRACY bypass, stroke, b/l renal nonobstructive calculi, normocytic anemia, DM, HTN, DL, HFpEF G2DD presenting post hospitalization for fall. Inpatient, hydralazine was stopped, but BP got elevated. Hydralazine was then restarted. Labetalol switched from bid to Q8?, now back to Q12 Reports -150 at home which improved from before  (Hearing impaired, sign language):Maria E Campbell

## 2023-08-22 NOTE — PHYSICAL EXAM
[General Appearance - Alert] : alert [General Appearance - In No Acute Distress] : in no acute distress [Sclera] : the sclera and conjunctiva were normal [] : no respiratory distress [Respiration, Rhythm And Depth] : normal respiratory rhythm and effort [Auscultation Breath Sounds / Voice Sounds] : lungs were clear to auscultation bilaterally [Heart Rate And Rhythm] : heart rate was normal and rhythm regular [Heart Sounds] : normal S1 and S2 [Murmurs] : no murmurs [Abdomen Soft] : soft [No Spinal Tenderness] : no spinal tenderness [FreeTextEntry1] : b/l cva tenderness (doesn't urinate) & pelvic tenderness

## 2023-08-22 NOTE — END OF VISIT
[FreeTextEntry3] : Patient was not seen in clinic as they are an ESRD patient currently under the care of my colleague Dr. Huddleston.  She will be seen during outpatient hemodialysis rounds by Dr. Huddleston at a later date.

## 2023-08-22 NOTE — HISTORY OF PRESENT ILLNESS
[FreeTextEntry1] : 58 years old female with PMH of Hearing-Impaired woman w a PMH of ESRD on HD V s/p AVG placement in June 2022 due to diabetes, PAD with left SFA occlusion, CVA (2/2021 on ASA), HFpEF, DM w peripheral neuropathy (on gabapentin), normocytic anemia, peripheral artery disease (s/p vascular repair 11/8/21), dyslipidemia presenting for pre-procedure clearance to hold Plavix prior to EGD/Colonoscopy.   She has been on Plavix for past 5 years for PAD, started by Vascular surgeon in [facility]. 2 years ago, she had a stroke, presenting with left sided weakness, admitted to [facility]. Was told she has a blockage in the vessel (showing the right side of back of neck). At that time ASA was added. Since then, she has been on both.  Recently she was admitted in [facility] from 06/22-06/27 for RUQ pain, at that time Plavix was stopped by Hospitalist.

## 2023-08-22 NOTE — REASON FOR VISIT
[Initial Evaluation] : an initial evaluation [FreeTextEntry1] : Pre-procedure clearance to hold Plavix

## 2023-08-24 DIAGNOSIS — Z86.73 PERSONAL HISTORY OF TRANSIENT ISCHEMIC ATTACK (TIA), AND CEREBRAL INFARCTION WITHOUT RESIDUAL DEFICITS: ICD-10-CM

## 2023-08-25 ENCOUNTER — APPOINTMENT (OUTPATIENT)
Dept: PODIATRY | Facility: CLINIC | Age: 58
End: 2023-08-25
Payer: COMMERCIAL

## 2023-08-25 ENCOUNTER — OUTPATIENT (OUTPATIENT)
Dept: OUTPATIENT SERVICES | Facility: HOSPITAL | Age: 58
LOS: 1 days | End: 2023-08-25
Payer: COMMERCIAL

## 2023-08-25 DIAGNOSIS — Z95.828 PRESENCE OF OTHER VASCULAR IMPLANTS AND GRAFTS: Chronic | ICD-10-CM

## 2023-08-25 DIAGNOSIS — Z98.890 OTHER SPECIFIED POSTPROCEDURAL STATES: Chronic | ICD-10-CM

## 2023-08-25 DIAGNOSIS — I10 ESSENTIAL (PRIMARY) HYPERTENSION: ICD-10-CM

## 2023-08-25 DIAGNOSIS — Z00.00 ENCOUNTER FOR GENERAL ADULT MEDICAL EXAMINATION WITHOUT ABNORMAL FINDINGS: ICD-10-CM

## 2023-08-25 DIAGNOSIS — Z87.81 PERSONAL HISTORY OF (HEALED) TRAUMATIC FRACTURE: Chronic | ICD-10-CM

## 2023-08-25 DIAGNOSIS — Z89.422 ACQUIRED ABSENCE OF OTHER LEFT TOE(S): Chronic | ICD-10-CM

## 2023-08-25 DIAGNOSIS — N18.6 END STAGE RENAL DISEASE: ICD-10-CM

## 2023-08-25 PROCEDURE — 99214 OFFICE O/P EST MOD 30 MIN: CPT

## 2023-08-25 PROCEDURE — 99214 OFFICE O/P EST MOD 30 MIN: CPT | Mod: 95

## 2023-08-28 DIAGNOSIS — Z01.810 ENCOUNTER FOR PREPROCEDURAL CARDIOVASCULAR EXAMINATION: ICD-10-CM

## 2023-08-28 DIAGNOSIS — R07.89 OTHER CHEST PAIN: ICD-10-CM

## 2023-08-28 DIAGNOSIS — I10 ESSENTIAL (PRIMARY) HYPERTENSION: ICD-10-CM

## 2023-08-30 NOTE — REASON FOR VISIT
[Follow-Up Visit] : a follow-up visit for [Other:___] : [unfilled] [Family Member] : family member [Interpreters_FullName] : Patricia

## 2023-08-30 NOTE — PHYSICAL EXAM
[General Appearance - Alert] : alert [General Appearance - In No Acute Distress] : in no acute distress [General Appearance - Well Nourished] : well nourished [General Appearance - Well Developed] : well developed [Ankle Swelling Bilaterally] : bilaterally  [1+] : left foot dorsalis pedis 1+ [No Joint Swelling] : no joint swelling [Skin Induration] : skin induration [Diminished Throughout Right Foot] : diminished sensation with monofilament testing throughout right foot [Diminished Throughout Left Foot] : diminished sensation with monofilament testing throughout left foot [Ankle Swelling (On Exam)] : not present [Varicose Veins Of Lower Extremities] : not present [] : not present [Delayed in the Right Toes] : capillary refills normal in right toes [Delayed in the Left Toes] : capillary refills normal in the left toes [de-identified] : No ROM, R ankle\par  No pain w/passive & active ROM, L ankle\par  Muscle strength 3/5 L foot, 4/5 R foot [Foot Ulcer] : no foot ulcer [FreeTextEntry1] : Callus plantar medial R heel Mildly hypertrophic tissue w/hyperpigmentation, contracture & induration to medial heel  Skin thin & atrophic b/l feet Discolored, thickened, elongated toenails

## 2023-08-30 NOTE — HISTORY OF PRESENT ILLNESS
[Diabetic Shoes] : diabetic shoes [Walker] : a walker [FreeTextEntry1] : CC: "Checkup"  HPI: -58F presents to clinic for  L heel painful callus, nails, bilateral foot pain Patient complains of right foot pain patient states it is due to past surgical procedures Patient presents with family member and also   Patient denies acute injury

## 2023-08-30 NOTE — ASSESSMENT
[Verbal] : verbal [Patient] : patient [Good - alert, interested, motivated] : Good - alert, interested, motivated [Demonstrates independently] : demonstrates independently [FreeTextEntry1] : -Ischemic pain, right lower extremity  Patient evaluated history reviewed discussed with patient  Xrays of right and left foot reviewed Discussed with patient that it would be very difficult to remove the hardware without causing further injury to patient- right foot Patient has appt with Dr. Muñoz on Sept 1, advised to follow up with Vascular team first  Pain in left foot likely caused by circulation issues All toenails debrided with sterile nail nippers to patient's tolerance Callus on left heel debrided with dermal curette Advised to follow up with primary provider for sleep medications  RTC 1 month
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
## 2023-08-31 DIAGNOSIS — E11.9 TYPE 2 DIABETES MELLITUS WITHOUT COMPLICATIONS: ICD-10-CM

## 2023-08-31 DIAGNOSIS — E11.42 TYPE 2 DIABETES MELLITUS WITH DIABETIC POLYNEUROPATHY: ICD-10-CM
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
## 2023-08-31 DIAGNOSIS — L97.523 NON-PRESSURE CHRONIC ULCER OF OTHER PART OF LEFT FOOT WITH NECROSIS OF MUSCLE: ICD-10-CM

## 2023-09-01 ENCOUNTER — RESULT REVIEW (OUTPATIENT)
Age: 58
End: 2023-09-01

## 2023-09-01 ENCOUNTER — OUTPATIENT (OUTPATIENT)
Dept: OUTPATIENT SERVICES | Facility: HOSPITAL | Age: 58
LOS: 1 days | End: 2023-09-01
Payer: COMMERCIAL

## 2023-09-01 DIAGNOSIS — Z98.890 OTHER SPECIFIED POSTPROCEDURAL STATES: Chronic | ICD-10-CM

## 2023-09-01 DIAGNOSIS — Z95.828 PRESENCE OF OTHER VASCULAR IMPLANTS AND GRAFTS: Chronic | ICD-10-CM

## 2023-09-01 DIAGNOSIS — Z89.422 ACQUIRED ABSENCE OF OTHER LEFT TOE(S): Chronic | ICD-10-CM

## 2023-09-01 DIAGNOSIS — R07.89 OTHER CHEST PAIN: ICD-10-CM

## 2023-09-01 DIAGNOSIS — Z87.81 PERSONAL HISTORY OF (HEALED) TRAUMATIC FRACTURE: Chronic | ICD-10-CM

## 2023-09-01 PROCEDURE — 78452 HT MUSCLE IMAGE SPECT MULT: CPT | Mod: 26

## 2023-09-01 PROCEDURE — A9500: CPT

## 2023-09-01 PROCEDURE — 93018 CV STRESS TEST I&R ONLY: CPT

## 2023-09-01 PROCEDURE — 78452 HT MUSCLE IMAGE SPECT MULT: CPT

## 2023-09-02 DIAGNOSIS — R07.89 OTHER CHEST PAIN: ICD-10-CM

## 2023-09-05 NOTE — ASSESSMENT
[FreeTextEntry1] : 57 YO Hearing-Impaired woman coming for preop risk stratification for LE angiogram and possible intervention   # Chest pain - possibly cardiac  # HFpEF  # Preop-Risk stratification  - Chest pain at rest and on exertion  - Multiple risk factors for cardiac disease despite no hx of Cardiac issues - TTE in feb 2023: Normal Ef with GIIDD; TTE July 2023: Normal EF, mod AR, severe pulmonary pressures  - RCRI 4 (high risk)  Plan:        > workup for ischemic cardiac disease prior procedure       > Plan For NST - lexiscan   # B/L LE pain #h/o PAD s/p bypass in 2021 - follow with Dr.Schor Camille santoyo sx  RTC in 3 months and prn.

## 2023-09-05 NOTE — PHYSICAL EXAM
[Well Developed] : well developed [Well Nourished] : well nourished [Normal S1, S2] : normal S1, S2 [No Rub] : no rub [Clear Lung Fields] : clear lung fields [Good Air Entry] : good air entry [Soft] : abdomen soft [Murmur] : murmur [de-identified] : diastolic in pulmonary area

## 2023-09-05 NOTE — ADDENDUM
[FreeTextEntry1] : Patient's stress test is negative for ischemia. Her LVEF is preserved on echocardiogram, however her estimated PASP is 61 mmHg, severe pulmonary HTN. Her RCRI is 4, putting her at high risk for this procedure. She is otherwise, optimized from a cardiac stand point and may proceed without further cardiovascular testing. Would consider cardiac anesthesia, in light of her severely elevated estimated PASP.  This consult serves as cardiovascular risk stratification for surgery. The ultimate decision to proceed with the surgery lies with the patient and the surgeon.

## 2023-09-05 NOTE — ASU PATIENT PROFILE, ADULT - FALL HARM RISK - UNIVERSAL INTERVENTIONS
Bed in lowest position, wheels locked, appropriate side rails in place/Call bell, personal items and telephone in reach/Instruct patient to call for assistance before getting out of bed or chair/Non-slip footwear when patient is out of bed/[place] to call system/Physically safe environment - no spills, clutter or unnecessary equipment/Purposeful Proactive Rounding/Room/bathroom lighting operational, light cord in reach

## 2023-09-05 NOTE — REASON FOR VISIT
[FreeTextEntry1] : 59 YO Hearing-Impaired woman w a PMH of ESRD on HD V s/p AVG placement in June 2022 due to diabetes, PAD with left SFA occlusion, CVA (2/2021 on ASA), HFpEF, DM w peripheral neuropathy (on gabapentin), normocytic anemia, peripheral artery disease (s/p vascular repair 11/8/21), dyslipidemia presenting for preop risk stratification.  and family member helped with interpretation.  Patient describes having chest pain and discomfort, noted to be mainly during dialysis sessions but can also happen with ambulation. Pain is short and lasts for 3 minutes, further details could not be obtained due to communication barriers. She mentions that many things cause her problems, is chronically fatigued and her LE are in pain most of the time.   ECG: NSR, no LVH  TTE feb 2023: GIIDD TTE July 2023: severe pulmonary pressure, normal EF, mod AR

## 2023-09-05 NOTE — END OF VISIT
[] : Resident [FreeTextEntry3] : Briefly, 58 year old woman who presents for preoperative risk stratification, complaining of chest pain, high risk for obstructive CAD. Will need a stress test prior to risk stratification.

## 2023-09-06 ENCOUNTER — OUTPATIENT (OUTPATIENT)
Dept: OUTPATIENT SERVICES | Facility: HOSPITAL | Age: 58
LOS: 1 days | Discharge: HOME | End: 2023-09-06
Payer: COMMERCIAL

## 2023-09-06 ENCOUNTER — TRANSCRIPTION ENCOUNTER (OUTPATIENT)
Age: 58
End: 2023-09-06

## 2023-09-06 VITALS
OXYGEN SATURATION: 97 % | DIASTOLIC BLOOD PRESSURE: 76 MMHG | HEART RATE: 73 BPM | WEIGHT: 167.99 LBS | RESPIRATION RATE: 17 BRPM | SYSTOLIC BLOOD PRESSURE: 194 MMHG | TEMPERATURE: 99 F | HEIGHT: 64 IN

## 2023-09-06 VITALS
DIASTOLIC BLOOD PRESSURE: 66 MMHG | HEART RATE: 66 BPM | RESPIRATION RATE: 18 BRPM | SYSTOLIC BLOOD PRESSURE: 147 MMHG | OXYGEN SATURATION: 96 % | TEMPERATURE: 98 F

## 2023-09-06 DIAGNOSIS — Z98.890 OTHER SPECIFIED POSTPROCEDURAL STATES: Chronic | ICD-10-CM

## 2023-09-06 DIAGNOSIS — Z95.828 PRESENCE OF OTHER VASCULAR IMPLANTS AND GRAFTS: Chronic | ICD-10-CM

## 2023-09-06 DIAGNOSIS — Z89.422 ACQUIRED ABSENCE OF OTHER LEFT TOE(S): Chronic | ICD-10-CM

## 2023-09-06 DIAGNOSIS — Z87.81 PERSONAL HISTORY OF (HEALED) TRAUMATIC FRACTURE: Chronic | ICD-10-CM

## 2023-09-06 DIAGNOSIS — I73.9 PERIPHERAL VASCULAR DISEASE, UNSPECIFIED: ICD-10-CM

## 2023-09-06 LAB
GLUCOSE BLDC GLUCOMTR-MCNC: 89 MG/DL — SIGNIFICANT CHANGE UP (ref 70–99)
GLUCOSE BLDC GLUCOMTR-MCNC: 91 MG/DL — SIGNIFICANT CHANGE UP (ref 70–99)

## 2023-09-06 PROCEDURE — C1887: CPT

## 2023-09-06 PROCEDURE — 37224: CPT

## 2023-09-06 PROCEDURE — C1769: CPT

## 2023-09-06 PROCEDURE — 36247 INS CATH ABD/L-EXT ART 3RD: CPT | Mod: XS

## 2023-09-06 PROCEDURE — C1766: CPT

## 2023-09-06 PROCEDURE — 73551 X-RAY EXAM OF FEMUR 1: CPT | Mod: LT

## 2023-09-06 PROCEDURE — C1894: CPT

## 2023-09-06 PROCEDURE — 82962 GLUCOSE BLOOD TEST: CPT

## 2023-09-06 PROCEDURE — C1760: CPT

## 2023-09-06 PROCEDURE — C2623: CPT

## 2023-09-06 PROCEDURE — 76937 US GUIDE VASCULAR ACCESS: CPT | Mod: 26

## 2023-09-06 PROCEDURE — 75710 ARTERY X-RAYS ARM/LEG: CPT | Mod: 26,XU

## 2023-09-06 RX ORDER — HYDROMORPHONE HYDROCHLORIDE 2 MG/ML
1 INJECTION INTRAMUSCULAR; INTRAVENOUS; SUBCUTANEOUS
Refills: 0 | Status: DISCONTINUED | OUTPATIENT
Start: 2023-09-06 | End: 2023-09-06

## 2023-09-06 RX ORDER — HYDROMORPHONE HYDROCHLORIDE 2 MG/ML
0.5 INJECTION INTRAMUSCULAR; INTRAVENOUS; SUBCUTANEOUS
Refills: 0 | Status: DISCONTINUED | OUTPATIENT
Start: 2023-09-06 | End: 2023-09-06

## 2023-09-06 RX ORDER — SODIUM CHLORIDE 9 MG/ML
1000 INJECTION INTRAMUSCULAR; INTRAVENOUS; SUBCUTANEOUS
Refills: 0 | Status: DISCONTINUED | OUTPATIENT
Start: 2023-09-06 | End: 2023-09-06

## 2023-09-06 RX ORDER — MEPERIDINE HYDROCHLORIDE 50 MG/ML
12.5 INJECTION INTRAMUSCULAR; INTRAVENOUS; SUBCUTANEOUS
Refills: 0 | Status: DISCONTINUED | OUTPATIENT
Start: 2023-09-06 | End: 2023-09-06

## 2023-09-06 RX ORDER — ASPIRIN/CALCIUM CARB/MAGNESIUM 324 MG
325 TABLET ORAL ONCE
Refills: 0 | Status: COMPLETED | OUTPATIENT
Start: 2023-09-06 | End: 2023-09-06

## 2023-09-06 RX ORDER — ONDANSETRON 8 MG/1
4 TABLET, FILM COATED ORAL ONCE
Refills: 0 | Status: DISCONTINUED | OUTPATIENT
Start: 2023-09-06 | End: 2023-09-06

## 2023-09-06 RX ADMIN — Medication 325 MILLIGRAM(S): at 11:38

## 2023-09-06 NOTE — BRIEF OPERATIVE NOTE - OPERATION/FINDINGS
1. Ultrasound guided access of R CFA  2. Aortobi-iliac angiogram  3. Selective catheterization of fem-AT bypass  4. DCB angioplasty of mid bypass stenosis with Lutonix 4x40  5. Vascade closure of R CFA arteriotomy

## 2023-09-06 NOTE — PRE-ANESTHESIA EVALUATION ADULT - NSANTHADDINFOFT_GEN_ALL_CORE
pt. is deaf with intelectual disability; will attempt iv sedation, but may require general anesthesia

## 2023-09-06 NOTE — ASU DISCHARGE PLAN (ADULT/PEDIATRIC) - CARE PROVIDER_API CALL
Lexx Muñoz  Vascular Surgery  [address] [place] [address], [address]  [place] [address]  Phone: [phone]  Fax: [phone]  Established Patient  Follow Up Time: 2 weeks

## 2023-09-06 NOTE — ASU DISCHARGE PLAN (ADULT/PEDIATRIC) - ASU DC SPECIAL INSTRUCTIONSFT
Ok to remove dressing in 24 hours. Ok to shower tomorrow. Do not scrub/soak wound simply let soapy water fall over it and pat to dry. Avoid tub baths.

## 2023-09-12 DIAGNOSIS — Z88.0 ALLERGY STATUS TO PENICILLIN: ICD-10-CM

## 2023-09-12 DIAGNOSIS — I70.302 UNSPECIFIED ATHEROSCLEROSIS OF UNSPECIFIED TYPE OF BYPASS GRAFT(S) OF THE EXTREMITIES, LEFT LEG: ICD-10-CM

## 2023-09-12 DIAGNOSIS — E11.22 TYPE 2 DIABETES MELLITUS WITH DIABETIC CHRONIC KIDNEY DISEASE: ICD-10-CM

## 2023-09-12 DIAGNOSIS — Z79.85 LONG-TERM (CURRENT) USE OF INJECTABLE NON-INSULIN ANTIDIABETIC DRUGS: ICD-10-CM

## 2023-09-12 DIAGNOSIS — I12.9 HYPERTENSIVE CHRONIC KIDNEY DISEASE WITH STAGE 1 THROUGH STAGE 4 CHRONIC KIDNEY DISEASE, OR UNSPECIFIED CHRONIC KIDNEY DISEASE: ICD-10-CM

## 2023-09-12 DIAGNOSIS — E78.5 HYPERLIPIDEMIA, UNSPECIFIED: ICD-10-CM

## 2023-09-26 ENCOUNTER — OUTPATIENT (OUTPATIENT)
Dept: OUTPATIENT SERVICES | Facility: HOSPITAL | Age: 58
LOS: 1 days | End: 2023-09-26
Payer: COMMERCIAL

## 2023-09-26 ENCOUNTER — APPOINTMENT (OUTPATIENT)
Dept: PODIATRY | Facility: CLINIC | Age: 58
End: 2023-09-26
Payer: COMMERCIAL

## 2023-09-26 ENCOUNTER — APPOINTMENT (OUTPATIENT)
Dept: VASCULAR SURGERY | Facility: CLINIC | Age: 58
End: 2023-09-26
Payer: COMMERCIAL

## 2023-09-26 VITALS — HEIGHT: 60 IN | BODY MASS INDEX: 29.45 KG/M2 | WEIGHT: 150 LBS

## 2023-09-26 DIAGNOSIS — Z98.890 OTHER SPECIFIED POSTPROCEDURAL STATES: Chronic | ICD-10-CM

## 2023-09-26 DIAGNOSIS — I70.619: ICD-10-CM

## 2023-09-26 DIAGNOSIS — I70.492 OTHER ATHEROSCLEROSIS OF AUTOLOGOUS VEIN BYPASS GRAFT(S) OF THE EXTREMITIES, LEFT LEG: ICD-10-CM

## 2023-09-26 DIAGNOSIS — Z87.81 PERSONAL HISTORY OF (HEALED) TRAUMATIC FRACTURE: Chronic | ICD-10-CM

## 2023-09-26 DIAGNOSIS — L29.9 PRURITUS, UNSPECIFIED: ICD-10-CM

## 2023-09-26 DIAGNOSIS — T82.898D OTHER SPECIFIED COMPLICATION OF VASCULAR PROSTHETIC DEVICES, IMPLANTS AND GRAFTS, SUBSEQUENT ENCOUNTER: ICD-10-CM

## 2023-09-26 DIAGNOSIS — M79.672 PAIN IN LEFT FOOT: ICD-10-CM

## 2023-09-26 DIAGNOSIS — E11.9 TYPE 2 DIABETES MELLITUS W/OUT COMPLICATIONS: ICD-10-CM

## 2023-09-26 DIAGNOSIS — Z89.422 ACQUIRED ABSENCE OF OTHER LEFT TOE(S): Chronic | ICD-10-CM

## 2023-09-26 DIAGNOSIS — Z95.828 PRESENCE OF OTHER VASCULAR IMPLANTS AND GRAFTS: Chronic | ICD-10-CM

## 2023-09-26 DIAGNOSIS — L97.523 NON-PRESSURE CHRONIC ULCER OF OTHER PART OF LEFT FOOT WITH NECROSIS OF MUSCLE: ICD-10-CM

## 2023-09-26 DIAGNOSIS — Z00.00 ENCOUNTER FOR GENERAL ADULT MEDICAL EXAMINATION WITHOUT ABNORMAL FINDINGS: ICD-10-CM

## 2023-09-26 PROCEDURE — 99213 OFFICE O/P EST LOW 20 MIN: CPT

## 2023-09-26 PROCEDURE — 99212 OFFICE O/P EST SF 10 MIN: CPT

## 2023-09-26 PROCEDURE — 93926 LOWER EXTREMITY STUDY: CPT

## 2023-09-26 NOTE — DISCHARGE NOTE NURSING/CASE MANAGEMENT/SOCIAL WORK - CAREGIVER PHONE NUMBER
I reviewed the MRI brain/MRA head neck which showed no evidence of acute infarct or large vessel occlusion, stenosis, aneurysm or dissection per vision radiology. Findings were communicated to patient. He was discharged home per Dr. Rosio Blackburn instructions. [phone]

## 2023-09-28 ENCOUNTER — APPOINTMENT (OUTPATIENT)
Dept: HEMATOLOGY ONCOLOGY | Facility: CLINIC | Age: 58
End: 2023-09-28
Payer: MEDICAID

## 2023-09-28 ENCOUNTER — OUTPATIENT (OUTPATIENT)
Dept: OUTPATIENT SERVICES | Facility: HOSPITAL | Age: 58
LOS: 1 days | End: 2023-09-28
Payer: MEDICAID

## 2023-09-28 VITALS
RESPIRATION RATE: 14 BRPM | BODY MASS INDEX: 29.84 KG/M2 | WEIGHT: 152 LBS | DIASTOLIC BLOOD PRESSURE: 62 MMHG | SYSTOLIC BLOOD PRESSURE: 160 MMHG | HEART RATE: 82 BPM | TEMPERATURE: 100 F | OXYGEN SATURATION: 99 % | HEIGHT: 60 IN

## 2023-09-28 DIAGNOSIS — Z89.422 ACQUIRED ABSENCE OF OTHER LEFT TOE(S): Chronic | ICD-10-CM

## 2023-09-28 DIAGNOSIS — Z98.890 OTHER SPECIFIED POSTPROCEDURAL STATES: Chronic | ICD-10-CM

## 2023-09-28 DIAGNOSIS — D64.9 ANEMIA, UNSPECIFIED: ICD-10-CM

## 2023-09-28 DIAGNOSIS — Z95.828 PRESENCE OF OTHER VASCULAR IMPLANTS AND GRAFTS: Chronic | ICD-10-CM

## 2023-09-28 DIAGNOSIS — Z87.81 PERSONAL HISTORY OF (HEALED) TRAUMATIC FRACTURE: Chronic | ICD-10-CM

## 2023-09-28 PROCEDURE — 99213 OFFICE O/P EST LOW 20 MIN: CPT

## 2023-09-29 DIAGNOSIS — M79.671 PAIN IN RIGHT FOOT: ICD-10-CM

## 2023-09-29 DIAGNOSIS — L29.9 PRURITUS, UNSPECIFIED: ICD-10-CM

## 2023-09-29 DIAGNOSIS — I70.492 OTHER ATHEROSCLEROSIS OF AUTOLOGOUS VEIN BYPASS GRAFT(S) OF THE EXTREMITIES, LEFT LEG: ICD-10-CM

## 2023-09-29 DIAGNOSIS — E11.9 TYPE 2 DIABETES MELLITUS WITHOUT COMPLICATIONS: ICD-10-CM

## 2023-09-29 DIAGNOSIS — T82.898D OTHER SPECIFIED COMPLICATION OF VASCULAR PROSTHETIC DEVICES, IMPLANTS AND GRAFTS, SUBSEQUENT ENCOUNTER: ICD-10-CM
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
## 2023-09-29 DIAGNOSIS — M79.672 PAIN IN LEFT FOOT: ICD-10-CM
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
## 2023-09-29 DIAGNOSIS — E11.42 TYPE 2 DIABETES MELLITUS WITH DIABETIC POLYNEUROPATHY: ICD-10-CM

## 2023-09-29 DIAGNOSIS — D64.9 ANEMIA, UNSPECIFIED: ICD-10-CM

## 2023-09-29 DIAGNOSIS — L97.523 NON-PRESSURE CHRONIC ULCER OF OTHER PART OF LEFT FOOT WITH NECROSIS OF MUSCLE: ICD-10-CM

## 2023-10-13 ENCOUNTER — OUTPATIENT (OUTPATIENT)
Dept: OUTPATIENT SERVICES | Facility: HOSPITAL | Age: 58
LOS: 1 days | End: 2023-10-13
Payer: COMMERCIAL

## 2023-10-13 DIAGNOSIS — Z89.422 ACQUIRED ABSENCE OF OTHER LEFT TOE(S): Chronic | ICD-10-CM

## 2023-10-13 DIAGNOSIS — E11.621 TYPE 2 DIABETES MELLITUS WITH FOOT ULCER: ICD-10-CM

## 2023-10-13 DIAGNOSIS — Z00.00 ENCOUNTER FOR GENERAL ADULT MEDICAL EXAMINATION WITHOUT ABNORMAL FINDINGS: ICD-10-CM

## 2023-10-13 DIAGNOSIS — Z98.890 OTHER SPECIFIED POSTPROCEDURAL STATES: Chronic | ICD-10-CM

## 2023-10-13 DIAGNOSIS — E11.9 TYPE 2 DIABETES MELLITUS WITHOUT COMPLICATIONS: ICD-10-CM

## 2023-10-13 DIAGNOSIS — Z95.828 PRESENCE OF OTHER VASCULAR IMPLANTS AND GRAFTS: Chronic | ICD-10-CM

## 2023-10-13 DIAGNOSIS — Z87.81 PERSONAL HISTORY OF (HEALED) TRAUMATIC FRACTURE: Chronic | ICD-10-CM

## 2023-10-13 LAB
ALBUMIN SERPL ELPH-MCNC: 4.2 G/DL
ALP BLD-CCNC: 652 U/L
ALT SERPL-CCNC: 13 U/L
ANION GAP SERPL CALC-SCNC: 16 MMOL/L
AST SERPL-CCNC: 22 U/L
BILIRUB SERPL-MCNC: 1.2 MG/DL
BUN SERPL-MCNC: 28 MG/DL
CALCIUM SERPL-MCNC: 8.8 MG/DL
CHLORIDE SERPL-SCNC: 95 MMOL/L
CHOLEST SERPL-MCNC: 180 MG/DL
CO2 SERPL-SCNC: 26 MMOL/L
CREAT SERPL-MCNC: 4.3 MG/DL
EGFR: 11 ML/MIN/1.73M2
ESTIMATED AVERAGE GLUCOSE: 111 MG/DL
GLUCOSE SERPL-MCNC: 90 MG/DL
HBA1C MFR BLD HPLC: 5.5 %
HDLC SERPL-MCNC: 88 MG/DL
LDLC SERPL CALC-MCNC: 77 MG/DL
NONHDLC SERPL-MCNC: 92 MG/DL
POTASSIUM SERPL-SCNC: 4.4 MMOL/L
PROT SERPL-MCNC: 6.8 G/DL
SODIUM SERPL-SCNC: 137 MMOL/L
TRIGL SERPL-MCNC: 74 MG/DL

## 2023-10-13 PROCEDURE — 85027 COMPLETE CBC AUTOMATED: CPT

## 2023-10-13 PROCEDURE — 80053 COMPREHEN METABOLIC PANEL: CPT

## 2023-10-13 PROCEDURE — 80061 LIPID PANEL: CPT

## 2023-10-13 PROCEDURE — 83036 HEMOGLOBIN GLYCOSYLATED A1C: CPT

## 2023-10-13 PROCEDURE — 36415 COLL VENOUS BLD VENIPUNCTURE: CPT

## 2023-10-14 DIAGNOSIS — Z00.00 ENCOUNTER FOR GENERAL ADULT MEDICAL EXAMINATION WITHOUT ABNORMAL FINDINGS: ICD-10-CM

## 2023-10-14 DIAGNOSIS — E11.9 TYPE 2 DIABETES MELLITUS WITHOUT COMPLICATIONS: ICD-10-CM

## 2023-10-14 DIAGNOSIS — E11.621 TYPE 2 DIABETES MELLITUS WITH FOOT ULCER: ICD-10-CM

## 2023-10-17 ENCOUNTER — INPATIENT (INPATIENT)
Facility: HOSPITAL | Age: 58
LOS: 2 days | Discharge: HOME | DRG: 425 | End: 2023-10-20
Attending: HOSPITALIST | Admitting: STUDENT IN AN ORGANIZED HEALTH CARE EDUCATION/TRAINING PROGRAM
Payer: MEDICAID

## 2023-10-17 VITALS
TEMPERATURE: 98 F | RESPIRATION RATE: 22 BRPM | HEIGHT: 64 IN | SYSTOLIC BLOOD PRESSURE: 130 MMHG | DIASTOLIC BLOOD PRESSURE: 59 MMHG | HEART RATE: 73 BPM | OXYGEN SATURATION: 98 %

## 2023-10-17 DIAGNOSIS — Z98.890 OTHER SPECIFIED POSTPROCEDURAL STATES: Chronic | ICD-10-CM

## 2023-10-17 DIAGNOSIS — Z87.81 PERSONAL HISTORY OF (HEALED) TRAUMATIC FRACTURE: Chronic | ICD-10-CM

## 2023-10-17 DIAGNOSIS — Z89.422 ACQUIRED ABSENCE OF OTHER LEFT TOE(S): Chronic | ICD-10-CM

## 2023-10-17 DIAGNOSIS — Z95.828 PRESENCE OF OTHER VASCULAR IMPLANTS AND GRAFTS: Chronic | ICD-10-CM

## 2023-10-17 PROCEDURE — 99285 EMERGENCY DEPT VISIT HI MDM: CPT

## 2023-10-18 ENCOUNTER — APPOINTMENT (OUTPATIENT)
Dept: GASTROENTEROLOGY | Facility: CLINIC | Age: 58
End: 2023-10-18

## 2023-10-18 ENCOUNTER — APPOINTMENT (OUTPATIENT)
Dept: INTERNAL MEDICINE | Facility: CLINIC | Age: 58
End: 2023-10-18

## 2023-10-18 DIAGNOSIS — H57.02 ANISOCORIA: ICD-10-CM

## 2023-10-18 LAB
ALBUMIN SERPL ELPH-MCNC: 4.5 G/DL — SIGNIFICANT CHANGE UP (ref 3.5–5.2)
ALBUMIN SERPL ELPH-MCNC: 4.5 G/DL — SIGNIFICANT CHANGE UP (ref 3.5–5.2)
ALP SERPL-CCNC: 622 U/L — HIGH (ref 30–115)
ALP SERPL-CCNC: 622 U/L — HIGH (ref 30–115)
ALT FLD-CCNC: 10 U/L — SIGNIFICANT CHANGE UP (ref 0–41)
ALT FLD-CCNC: 10 U/L — SIGNIFICANT CHANGE UP (ref 0–41)
ANION GAP SERPL CALC-SCNC: 14 MMOL/L — SIGNIFICANT CHANGE UP (ref 7–14)
ANION GAP SERPL CALC-SCNC: 14 MMOL/L — SIGNIFICANT CHANGE UP (ref 7–14)
AST SERPL-CCNC: 18 U/L — SIGNIFICANT CHANGE UP (ref 0–41)
AST SERPL-CCNC: 18 U/L — SIGNIFICANT CHANGE UP (ref 0–41)
BASOPHILS # BLD AUTO: 0.05 K/UL — SIGNIFICANT CHANGE UP (ref 0–0.2)
BASOPHILS # BLD AUTO: 0.05 K/UL — SIGNIFICANT CHANGE UP (ref 0–0.2)
BASOPHILS NFR BLD AUTO: 0.9 % — SIGNIFICANT CHANGE UP (ref 0–1)
BASOPHILS NFR BLD AUTO: 0.9 % — SIGNIFICANT CHANGE UP (ref 0–1)
BILIRUB SERPL-MCNC: 1.1 MG/DL — SIGNIFICANT CHANGE UP (ref 0.2–1.2)
BILIRUB SERPL-MCNC: 1.1 MG/DL — SIGNIFICANT CHANGE UP (ref 0.2–1.2)
BUN SERPL-MCNC: 25 MG/DL — HIGH (ref 10–20)
BUN SERPL-MCNC: 25 MG/DL — HIGH (ref 10–20)
CALCIUM SERPL-MCNC: 8.9 MG/DL — SIGNIFICANT CHANGE UP (ref 8.4–10.5)
CALCIUM SERPL-MCNC: 8.9 MG/DL — SIGNIFICANT CHANGE UP (ref 8.4–10.5)
CHLORIDE SERPL-SCNC: 94 MMOL/L — LOW (ref 98–110)
CHLORIDE SERPL-SCNC: 94 MMOL/L — LOW (ref 98–110)
CO2 SERPL-SCNC: 28 MMOL/L — SIGNIFICANT CHANGE UP (ref 17–32)
CO2 SERPL-SCNC: 28 MMOL/L — SIGNIFICANT CHANGE UP (ref 17–32)
CREAT SERPL-MCNC: 4.6 MG/DL — CRITICAL HIGH (ref 0.7–1.5)
CREAT SERPL-MCNC: 4.6 MG/DL — CRITICAL HIGH (ref 0.7–1.5)
EGFR: 10 ML/MIN/1.73M2 — LOW
EGFR: 10 ML/MIN/1.73M2 — LOW
EOSINOPHIL # BLD AUTO: 0.17 K/UL — SIGNIFICANT CHANGE UP (ref 0–0.7)
EOSINOPHIL # BLD AUTO: 0.17 K/UL — SIGNIFICANT CHANGE UP (ref 0–0.7)
EOSINOPHIL NFR BLD AUTO: 3.2 % — SIGNIFICANT CHANGE UP (ref 0–8)
EOSINOPHIL NFR BLD AUTO: 3.2 % — SIGNIFICANT CHANGE UP (ref 0–8)
GLUCOSE SERPL-MCNC: 113 MG/DL — HIGH (ref 70–99)
GLUCOSE SERPL-MCNC: 113 MG/DL — HIGH (ref 70–99)
HCT VFR BLD CALC: 30.5 % — LOW (ref 37–47)
HCT VFR BLD CALC: 30.5 % — LOW (ref 37–47)
HGB BLD-MCNC: 10.2 G/DL — LOW (ref 12–16)
HGB BLD-MCNC: 10.2 G/DL — LOW (ref 12–16)
IMM GRANULOCYTES NFR BLD AUTO: 0.4 % — HIGH (ref 0.1–0.3)
IMM GRANULOCYTES NFR BLD AUTO: 0.4 % — HIGH (ref 0.1–0.3)
LYMPHOCYTES # BLD AUTO: 0.53 K/UL — LOW (ref 1.2–3.4)
LYMPHOCYTES # BLD AUTO: 0.53 K/UL — LOW (ref 1.2–3.4)
LYMPHOCYTES # BLD AUTO: 9.8 % — LOW (ref 20.5–51.1)
LYMPHOCYTES # BLD AUTO: 9.8 % — LOW (ref 20.5–51.1)
MCHC RBC-ENTMCNC: 29.4 PG — SIGNIFICANT CHANGE UP (ref 27–31)
MCHC RBC-ENTMCNC: 29.4 PG — SIGNIFICANT CHANGE UP (ref 27–31)
MCHC RBC-ENTMCNC: 33.4 G/DL — SIGNIFICANT CHANGE UP (ref 32–37)
MCHC RBC-ENTMCNC: 33.4 G/DL — SIGNIFICANT CHANGE UP (ref 32–37)
MCV RBC AUTO: 87.9 FL — SIGNIFICANT CHANGE UP (ref 81–99)
MCV RBC AUTO: 87.9 FL — SIGNIFICANT CHANGE UP (ref 81–99)
MONOCYTES # BLD AUTO: 0.65 K/UL — HIGH (ref 0.1–0.6)
MONOCYTES # BLD AUTO: 0.65 K/UL — HIGH (ref 0.1–0.6)
MONOCYTES NFR BLD AUTO: 12.1 % — HIGH (ref 1.7–9.3)
MONOCYTES NFR BLD AUTO: 12.1 % — HIGH (ref 1.7–9.3)
NEUTROPHILS # BLD AUTO: 3.97 K/UL — SIGNIFICANT CHANGE UP (ref 1.4–6.5)
NEUTROPHILS # BLD AUTO: 3.97 K/UL — SIGNIFICANT CHANGE UP (ref 1.4–6.5)
NEUTROPHILS NFR BLD AUTO: 73.6 % — SIGNIFICANT CHANGE UP (ref 42.2–75.2)
NEUTROPHILS NFR BLD AUTO: 73.6 % — SIGNIFICANT CHANGE UP (ref 42.2–75.2)
NRBC # BLD: 0 /100 WBCS — SIGNIFICANT CHANGE UP (ref 0–0)
NRBC # BLD: 0 /100 WBCS — SIGNIFICANT CHANGE UP (ref 0–0)
PLATELET # BLD AUTO: 83 K/UL — LOW (ref 130–400)
PLATELET # BLD AUTO: 83 K/UL — LOW (ref 130–400)
PMV BLD: 13.6 FL — HIGH (ref 7.4–10.4)
PMV BLD: 13.6 FL — HIGH (ref 7.4–10.4)
POTASSIUM SERPL-MCNC: 4.4 MMOL/L — SIGNIFICANT CHANGE UP (ref 3.5–5)
POTASSIUM SERPL-MCNC: 4.4 MMOL/L — SIGNIFICANT CHANGE UP (ref 3.5–5)
POTASSIUM SERPL-SCNC: 4.4 MMOL/L — SIGNIFICANT CHANGE UP (ref 3.5–5)
POTASSIUM SERPL-SCNC: 4.4 MMOL/L — SIGNIFICANT CHANGE UP (ref 3.5–5)
PROT SERPL-MCNC: 7.1 G/DL — SIGNIFICANT CHANGE UP (ref 6–8)
PROT SERPL-MCNC: 7.1 G/DL — SIGNIFICANT CHANGE UP (ref 6–8)
RBC # BLD: 3.47 M/UL — LOW (ref 4.2–5.4)
RBC # BLD: 3.47 M/UL — LOW (ref 4.2–5.4)
RBC # FLD: 18.2 % — HIGH (ref 11.5–14.5)
RBC # FLD: 18.2 % — HIGH (ref 11.5–14.5)
SODIUM SERPL-SCNC: 136 MMOL/L — SIGNIFICANT CHANGE UP (ref 135–146)
SODIUM SERPL-SCNC: 136 MMOL/L — SIGNIFICANT CHANGE UP (ref 135–146)
WBC # BLD: 5.39 K/UL — SIGNIFICANT CHANGE UP (ref 4.8–10.8)
WBC # BLD: 5.39 K/UL — SIGNIFICANT CHANGE UP (ref 4.8–10.8)
WBC # FLD AUTO: 5.39 K/UL — SIGNIFICANT CHANGE UP (ref 4.8–10.8)
WBC # FLD AUTO: 5.39 K/UL — SIGNIFICANT CHANGE UP (ref 4.8–10.8)

## 2023-10-18 PROCEDURE — 71045 X-RAY EXAM CHEST 1 VIEW: CPT | Mod: 26

## 2023-10-18 PROCEDURE — 93010 ELECTROCARDIOGRAM REPORT: CPT

## 2023-10-18 PROCEDURE — 85025 COMPLETE CBC W/AUTO DIFF WBC: CPT

## 2023-10-18 PROCEDURE — 93005 ELECTROCARDIOGRAM TRACING: CPT

## 2023-10-18 PROCEDURE — 74177 CT ABD & PELVIS W/CONTRAST: CPT | Mod: 26,MA

## 2023-10-18 PROCEDURE — 83970 ASSAY OF PARATHORMONE: CPT

## 2023-10-18 PROCEDURE — 97162 PT EVAL MOD COMPLEX 30 MIN: CPT | Mod: GP

## 2023-10-18 PROCEDURE — 70450 CT HEAD/BRAIN W/O DYE: CPT | Mod: 26,MA

## 2023-10-18 PROCEDURE — 80053 COMPREHEN METABOLIC PANEL: CPT

## 2023-10-18 PROCEDURE — 71260 CT THORAX DX C+: CPT | Mod: 26,MA

## 2023-10-18 PROCEDURE — 82310 ASSAY OF CALCIUM: CPT

## 2023-10-18 PROCEDURE — 82962 GLUCOSE BLOOD TEST: CPT

## 2023-10-18 PROCEDURE — 83735 ASSAY OF MAGNESIUM: CPT

## 2023-10-18 PROCEDURE — 82306 VITAMIN D 25 HYDROXY: CPT

## 2023-10-18 PROCEDURE — 72070 X-RAY EXAM THORAC SPINE 2VWS: CPT | Mod: 26

## 2023-10-18 PROCEDURE — 82652 VIT D 1 25-DIHYDROXY: CPT

## 2023-10-18 PROCEDURE — 72110 X-RAY EXAM L-2 SPINE 4/>VWS: CPT

## 2023-10-18 PROCEDURE — 72110 X-RAY EXAM L-2 SPINE 4/>VWS: CPT | Mod: 26

## 2023-10-18 PROCEDURE — 36415 COLL VENOUS BLD VENIPUNCTURE: CPT

## 2023-10-18 PROCEDURE — 73030 X-RAY EXAM OF SHOULDER: CPT | Mod: 26,LT

## 2023-10-18 PROCEDURE — 72125 CT NECK SPINE W/O DYE: CPT | Mod: 26,MA

## 2023-10-18 PROCEDURE — 73060 X-RAY EXAM OF HUMERUS: CPT | Mod: 26,LT

## 2023-10-18 PROCEDURE — 99223 1ST HOSP IP/OBS HIGH 75: CPT

## 2023-10-18 PROCEDURE — 93970 EXTREMITY STUDY: CPT

## 2023-10-18 PROCEDURE — 90935 HEMODIALYSIS ONE EVALUATION: CPT

## 2023-10-18 PROCEDURE — 72070 X-RAY EXAM THORAC SPINE 2VWS: CPT

## 2023-10-18 PROCEDURE — 93970 EXTREMITY STUDY: CPT | Mod: 26

## 2023-10-18 PROCEDURE — 84100 ASSAY OF PHOSPHORUS: CPT

## 2023-10-18 RX ORDER — LABETALOL HCL 100 MG
200 TABLET ORAL
Refills: 0 | Status: DISCONTINUED | OUTPATIENT
Start: 2023-10-18 | End: 2023-10-20

## 2023-10-18 RX ORDER — METHOCARBAMOL 500 MG/1
500 TABLET, FILM COATED ORAL
Refills: 0 | Status: DISCONTINUED | OUTPATIENT
Start: 2023-10-18 | End: 2023-10-20

## 2023-10-18 RX ORDER — PANTOPRAZOLE SODIUM 20 MG/1
40 TABLET, DELAYED RELEASE ORAL
Refills: 0 | Status: DISCONTINUED | OUTPATIENT
Start: 2023-10-18 | End: 2023-10-20

## 2023-10-18 RX ORDER — HEPARIN SODIUM 5000 [USP'U]/ML
5000 INJECTION INTRAVENOUS; SUBCUTANEOUS EVERY 8 HOURS
Refills: 0 | Status: DISCONTINUED | OUTPATIENT
Start: 2023-10-18 | End: 2023-10-20

## 2023-10-18 RX ORDER — ACETAMINOPHEN 500 MG
650 TABLET ORAL ONCE
Refills: 0 | Status: COMPLETED | OUTPATIENT
Start: 2023-10-18 | End: 2023-10-18

## 2023-10-18 RX ORDER — HYDRALAZINE HCL 50 MG
100 TABLET ORAL
Refills: 0 | Status: DISCONTINUED | OUTPATIENT
Start: 2023-10-18 | End: 2023-10-20

## 2023-10-18 RX ORDER — LIDOCAINE 4 G/100G
1 CREAM TOPICAL EVERY 24 HOURS
Refills: 0 | Status: DISCONTINUED | OUTPATIENT
Start: 2023-10-18 | End: 2023-10-20

## 2023-10-18 RX ORDER — ONDANSETRON 8 MG/1
4 TABLET, FILM COATED ORAL ONCE
Refills: 0 | Status: COMPLETED | OUTPATIENT
Start: 2023-10-18 | End: 2023-10-18

## 2023-10-18 RX ORDER — LIDOCAINE 4 G/100G
1 CREAM TOPICAL ONCE
Refills: 0 | Status: COMPLETED | OUTPATIENT
Start: 2023-10-18 | End: 2023-10-18

## 2023-10-18 RX ORDER — NIFEDIPINE 30 MG
90 TABLET, EXTENDED RELEASE 24 HR ORAL DAILY
Refills: 0 | Status: DISCONTINUED | OUTPATIENT
Start: 2023-10-18 | End: 2023-10-20

## 2023-10-18 RX ORDER — CALCITRIOL 0.5 UG/1
0.25 CAPSULE ORAL DAILY
Refills: 0 | Status: DISCONTINUED | OUTPATIENT
Start: 2023-10-18 | End: 2023-10-20

## 2023-10-18 RX ORDER — OXYCODONE HYDROCHLORIDE 5 MG/1
2.5 TABLET ORAL ONCE
Refills: 0 | Status: DISCONTINUED | OUTPATIENT
Start: 2023-10-18 | End: 2023-10-18

## 2023-10-18 RX ORDER — MORPHINE SULFATE 50 MG/1
4 CAPSULE, EXTENDED RELEASE ORAL ONCE
Refills: 0 | Status: DISCONTINUED | OUTPATIENT
Start: 2023-10-18 | End: 2023-10-18

## 2023-10-18 RX ORDER — HYDROMORPHONE HYDROCHLORIDE 2 MG/ML
2 INJECTION INTRAMUSCULAR; INTRAVENOUS; SUBCUTANEOUS ONCE
Refills: 0 | Status: DISCONTINUED | OUTPATIENT
Start: 2023-10-18 | End: 2023-10-18

## 2023-10-18 RX ORDER — GABAPENTIN 400 MG/1
300 CAPSULE ORAL THREE TIMES A DAY
Refills: 0 | Status: DISCONTINUED | OUTPATIENT
Start: 2023-10-18 | End: 2023-10-20

## 2023-10-18 RX ORDER — ASPIRIN/CALCIUM CARB/MAGNESIUM 324 MG
81 TABLET ORAL DAILY
Refills: 0 | Status: DISCONTINUED | OUTPATIENT
Start: 2023-10-18 | End: 2023-10-20

## 2023-10-18 RX ORDER — ACETAMINOPHEN 500 MG
650 TABLET ORAL EVERY 6 HOURS
Refills: 0 | Status: DISCONTINUED | OUTPATIENT
Start: 2023-10-18 | End: 2023-10-20

## 2023-10-18 RX ORDER — SEVELAMER CARBONATE 2400 MG/1
800 POWDER, FOR SUSPENSION ORAL
Refills: 0 | Status: DISCONTINUED | OUTPATIENT
Start: 2023-10-18 | End: 2023-10-20

## 2023-10-18 RX ORDER — HYDROMORPHONE HYDROCHLORIDE 2 MG/ML
2 INJECTION INTRAMUSCULAR; INTRAVENOUS; SUBCUTANEOUS EVERY 4 HOURS
Refills: 0 | Status: DISCONTINUED | OUTPATIENT
Start: 2023-10-18 | End: 2023-10-20

## 2023-10-18 RX ORDER — ATORVASTATIN CALCIUM 80 MG/1
80 TABLET, FILM COATED ORAL AT BEDTIME
Refills: 0 | Status: DISCONTINUED | OUTPATIENT
Start: 2023-10-18 | End: 2023-10-20

## 2023-10-18 RX ADMIN — SEVELAMER CARBONATE 800 MILLIGRAM(S): 2400 POWDER, FOR SUSPENSION ORAL at 11:24

## 2023-10-18 RX ADMIN — LIDOCAINE 1 PATCH: 4 CREAM TOPICAL at 13:40

## 2023-10-18 RX ADMIN — Medication 90 MILLIGRAM(S): at 11:23

## 2023-10-18 RX ADMIN — OXYCODONE HYDROCHLORIDE 2.5 MILLIGRAM(S): 5 TABLET ORAL at 06:04

## 2023-10-18 RX ADMIN — Medication 650 MILLIGRAM(S): at 02:16

## 2023-10-18 RX ADMIN — Medication 81 MILLIGRAM(S): at 11:22

## 2023-10-18 RX ADMIN — LIDOCAINE 1 PATCH: 4 CREAM TOPICAL at 01:50

## 2023-10-18 RX ADMIN — ATORVASTATIN CALCIUM 80 MILLIGRAM(S): 80 TABLET, FILM COATED ORAL at 22:29

## 2023-10-18 RX ADMIN — HEPARIN SODIUM 5000 UNIT(S): 5000 INJECTION INTRAVENOUS; SUBCUTANEOUS at 13:40

## 2023-10-18 RX ADMIN — GABAPENTIN 300 MILLIGRAM(S): 400 CAPSULE ORAL at 13:40

## 2023-10-18 RX ADMIN — PANTOPRAZOLE SODIUM 40 MILLIGRAM(S): 20 TABLET, DELAYED RELEASE ORAL at 11:23

## 2023-10-18 RX ADMIN — MORPHINE SULFATE 4 MILLIGRAM(S): 50 CAPSULE, EXTENDED RELEASE ORAL at 02:40

## 2023-10-18 RX ADMIN — ONDANSETRON 4 MILLIGRAM(S): 8 TABLET, FILM COATED ORAL at 23:19

## 2023-10-18 RX ADMIN — Medication 650 MILLIGRAM(S): at 17:33

## 2023-10-18 RX ADMIN — HYDROMORPHONE HYDROCHLORIDE 2 MILLIGRAM(S): 2 INJECTION INTRAMUSCULAR; INTRAVENOUS; SUBCUTANEOUS at 15:38

## 2023-10-18 RX ADMIN — METHOCARBAMOL 500 MILLIGRAM(S): 500 TABLET, FILM COATED ORAL at 17:34

## 2023-10-18 RX ADMIN — HYDROMORPHONE HYDROCHLORIDE 2 MILLIGRAM(S): 2 INJECTION INTRAMUSCULAR; INTRAVENOUS; SUBCUTANEOUS at 21:03

## 2023-10-18 RX ADMIN — GABAPENTIN 300 MILLIGRAM(S): 400 CAPSULE ORAL at 22:29

## 2023-10-18 RX ADMIN — HEPARIN SODIUM 5000 UNIT(S): 5000 INJECTION INTRAVENOUS; SUBCUTANEOUS at 22:29

## 2023-10-18 RX ADMIN — Medication 200 MILLIGRAM(S): at 17:33

## 2023-10-18 RX ADMIN — SEVELAMER CARBONATE 800 MILLIGRAM(S): 2400 POWDER, FOR SUSPENSION ORAL at 17:33

## 2023-10-18 RX ADMIN — Medication 100 MILLIGRAM(S): at 17:33

## 2023-10-18 RX ADMIN — CALCITRIOL 0.25 MICROGRAM(S): 0.5 CAPSULE ORAL at 11:23

## 2023-10-18 NOTE — CONSULT NOTE ADULT - SUBJECTIVE AND OBJECTIVE BOX
INCOMPLETE NEPHROLOGY CONSULTATION NOTE    59 y/o F with PMHx of ESRD on HD T/Th/Sat last HD yesterday , hyperlipidemia, HTN, PVD, CVA and hearing impaired presents to the ED with c/o fall four days back and c/o left sided arm and leg pain. Had regular HD session on Tuesday. Nephrology was consulted for ESRD      PAST MEDICAL & SURGICAL HISTORY:  PVD (peripheral vascular disease)  Benign essential HTN  Intellectual disability  Hearing impaired  HLD (hyperlipidemia)  Chronic kidney disease, unspecified CKD stage  CVA (cerebral vascular accident)  DM (diabetes mellitus)  H/O vascular surgery  left leg  H/O fracture of leg  S/P arteriovenous (AV) fistula creation  S/P debridement  History of partial amputation of toe of left foot  S/P femoral-popliteal bypass surgery    Allergies:  NSAIDs (Nephrotoxicity)  penicillin (Rash)    Home Medications Reviewed  Hospital Medications:   MEDICATIONS  (STANDING):    SOCIAL HISTORY:  Denies ETOH,Smoking,   FAMILY HISTORY:      REVIEW OF SYSTEMS:    RESPIRATORY: No cough, wheezing, hemoptysis; No shortness of breath  CARDIOVASCULAR: No chest pain or palpitations.  GASTROINTESTINAL: No abdominal or epigastric pain. No nausea, vomiting, or hematemesis; No diarrhea or constipation. No melena or hematochezia.  GENITOURINARY: No dysuria, frequency, foamy urine, urinary urgency, incontinence or hematuria  NEUROLOGICAL: No numbness or weakness  SKIN: No itching, burning, rashes, or lesions   MSK- Left leg and arm pain from fall    VITALS:  Vital Signs Last 24 Hrs  T(C): 37 (18 Oct 2023 07:35), Max: 37 (18 Oct 2023 07:35)  T(F): 98.6 (18 Oct 2023 07:35), Max: 98.6 (18 Oct 2023 07:35)  HR: 72 (18 Oct 2023 07:35) (72 - 73)  BP: 192/81 (18 Oct 2023 07:35) (130/59 - 192/81)  BP(mean): --  RR: 17 (18 Oct 2023 07:35) (17 - 22)  SpO2: 98% (18 Oct 2023 07:35) (98% - 98%)    Parameters below as of 18 Oct 2023 07:35  Patient On (Oxygen Delivery Method): room air        Height (cm): 162.6 (10-17 @ 23:56)    PHYSICAL EXAM:    Respiratory: CTAB, no wheezes, rales or rhonchi  Cardiovascular: S1, S2, RRR  Gastrointestinal: BS+, soft, NT/ND  Extremities: No cyanosis or clubbing. No peripheral edema  Neurological: A/O x 3, no focal deficits  Psychiatric: Normal mood, normal affect  : No CVA tenderness. No mcgrath.   Skin: No rashes  Vascular Access:    LABS:  10-18    136  |  94<L>  |  25<H>  ----------------------------<  113<H>  4.4   |  28  |  4.6<HH>    Ca    8.9      18 Oct 2023 00:42    TPro  7.1  /  Alb  4.5  /  TBili  1.1  /  DBili      /  AST  18  /  ALT  10  /  AlkPhos  622<H>  10-18    Creatinine Trend: 4.6 <--                        10.2   5.39  )-----------( 83       ( 18 Oct 2023 00:42 )             30.5     Urine Studies:  Urinalysis Basic - ( 18 Oct 2023 00:42 )    Color:  / Appearance:  / SG:  / pH:   Gluc: 113 mg/dL / Ketone:   / Bili:  / Urobili:    Blood:  / Protein:  / Nitrite:    Leuk Esterase:  / RBC:  / WBC    Sq Epi:  / Non Sq Epi:  / Bacteria:                 RADIOLOGY & ADDITIONAL STUDIES:      CXR     IMPRESSION:    No evidence of an acute traumatic solid organ or osseous injury.    Anasarca, moderate to large ascites, bilateral small-to-moderate pleural effusions. Correlate for fluid overload.            NEPHROLOGY CONSULTATION NOTE    57 y/o F with PMHx of ESRD on HD T/Th/Sat last HD yesterday , hyperlipidemia, HTN, PVD, CVA and hearing impaired presents to the ED with c/o fall four days back and c/o left sided arm and leg pain. Had regular HD session on Tuesday and had a fall later. Nephrology was consulted for ESRD      PAST MEDICAL & SURGICAL HISTORY:  PVD (peripheral vascular disease)  Benign essential HTN  Intellectual disability  Hearing impaired  HLD (hyperlipidemia)  Chronic kidney disease, unspecified CKD stage  CVA (cerebral vascular accident)  DM (diabetes mellitus)  H/O vascular surgery  left leg  H/O fracture of leg  S/P arteriovenous (AV) fistula creation  S/P debridement  History of partial amputation of toe of left foot  S/P femoral-popliteal bypass surgery    Allergies:  NSAIDs (Nephrotoxicity)  penicillin (Rash)    Home Medications Reviewed  Hospital Medications:   MEDICATIONS  (STANDING):    SOCIAL HISTORY:  Denies ETOH,Smoking,   FAMILY HISTORY:      REVIEW OF SYSTEMS:    Patient experience  used   RESPIRATORY: No cough, wheezing, hemoptysis; No shortness of breath  CARDIOVASCULAR: No chest pain or palpitations. Pedal edema   GASTROINTESTINAL: No abdominal or epigastric pain. No nausea, vomiting, or hematemesis; No diarrhea or constipation. No melena or hematochezia.  GENITOURINARY: No dysuria, frequency, foamy urine, urinary urgency, incontinence or hematuria  NEUROLOGICAL: No numbness or weakness  SKIN: No itching, burning, rashes, or lesions   MSK- Left leg and arm pain from fall    VITALS:  Vital Signs Last 24 Hrs  T(C): 37 (18 Oct 2023 07:35), Max: 37 (18 Oct 2023 07:35)  T(F): 98.6 (18 Oct 2023 07:35), Max: 98.6 (18 Oct 2023 07:35)  HR: 72 (18 Oct 2023 07:35) (72 - 73)  BP: 192/81 (18 Oct 2023 07:35) (130/59 - 192/81)  BP(mean): --  RR: 17 (18 Oct 2023 07:35) (17 - 22)  SpO2: 98% (18 Oct 2023 07:35) (98% - 98%)    Parameters below as of 18 Oct 2023 07:35  Patient On (Oxygen Delivery Method): room air        Height (cm): 162.6 (10-17 @ 23:56)    PHYSICAL EXAM:    Respiratory: CTAB, no wheezes, rales or rhonchi  Cardiovascular: S1, S2, RRR  Gastrointestinal: BS+, soft, NT/ND  Extremities: Grade 1 -2 pedal edema   Neurological: A/O x 3, no focal deficits  Psychiatric: Normal mood, normal affect  : No CVA tenderness. No mcgrath.   Skin: No rashes  Vascular Access: AVF left UE     LABS:  10-18    136  |  94<L>  |  25<H>  ----------------------------<  113<H>  4.4   |  28  |  4.6<HH>    Ca    8.9      18 Oct 2023 00:42    TPro  7.1  /  Alb  4.5  /  TBili  1.1  /  DBili      /  AST  18  /  ALT  10  /  AlkPhos  622<H>  10-18    Creatinine Trend: 4.6 <--                        10.2   5.39  )-----------( 83       ( 18 Oct 2023 00:42 )             30.5     Urine Studies:  Urinalysis Basic - ( 18 Oct 2023 00:42 )    Color:  / Appearance:  / SG:  / pH:   Gluc: 113 mg/dL / Ketone:   / Bili:  / Urobili:    Blood:  / Protein:  / Nitrite:    Leuk Esterase:  / RBC:  / WBC    Sq Epi:  / Non Sq Epi:  / Bacteria:         RADIOLOGY & ADDITIONAL STUDIES:      CXR     IMPRESSION:    No evidence of an acute traumatic solid organ or osseous injury.    Anasarca, moderate to large ascites, bilateral small-to-moderate pleural effusions. Correlate for fluid overload.

## 2023-10-18 NOTE — ED ADULT NURSE NOTE - BEFAST EYES
Thank you for allowing us to participate in your care. Based on your examination today the following recommendations are being made.           * Discontinue Metformin.  * Physical Therapy evaluation and treatment.  * Tylenol or Advil as needed for pain.        For questions regarding your health or the recommendations that are being made today please contact our office at [phone] or [email or web address].     
No

## 2023-10-18 NOTE — ED PROVIDER NOTE - ATTENDING APP SHARED VISIT CONTRIBUTION OF CARE
pt c/o head, back, neck, ab pain after a fall 5 days ago. she fell backwards onto her bottom and then hit her head. is on Hemodialysis t/t/s. went today full session.  also c/o b/l LE swelling, chronic. no sob. no fever. no vomiting. no vision changes.    pt communicates via sign language w  translating.

## 2023-10-18 NOTE — CONSULT NOTE ADULT - ASSESSMENT
57 y/o F with PMHx of ESRD on HD T/Th/Sat last HD yesterday , hyperlipidemia, HTN, PVD, CVA and hearing impaired presents to the ED with c/o fall four days back and c/o left sided arm and leg pain. Had regular HD session on Tuesday. Nephrology was consulted for ESRD      INCOMPLETE NEPHROLOGY CONSULTATION NOTE    ESRD on HD/ PVD/ Fall  - TTS  HD via AVF  - Last session outpatient on 10/17/23  - HD tomorrow   - Potassium under control- 4.4  - Bicarbonate levels - 28. GI loss ?  - Blood pressure elevated. Resume home medications hydralazine 100 mg BID and labetalol 200 mg BID and nifedipine XR 90 mg OD.   - Monitor blood pressure and titrate medications. Goal BP below 130/80  - Anemia of ESRD. Hbg 10.2. On HANNAH  - MBD. Get phosphorous, Vitamin D  and PTH levels  - C/w sevelamer 800 mg TID and calcitriol 0.25 mcg once daily       59 y/o F with PMHx of ESRD on HD T/Th/Sat last HD yesterday , hyperlipidemia, HTN, PVD, CVA and hearing impaired presents to the ED with c/o fall four days back and c/o left sided arm and leg pain. Had regular HD session on Tuesday. Nephrology was consulted for ESRD    ESRD on HD/ PVD/ Fall  - TTS  HD via AVF  - Last session outpatient on 10/17/23  - HD tomorrow 3 L UF, 3 hrs and 2 K   - Potassium under control- 4.4  - Bicarbonate levels - 28. GI loss from vomiting   - Blood pressure elevated. Resume home medications hydralazine 100 mg BID and labetalol 200 mg BID and nifedipine XR 90 mg OD.   - Monitor blood pressure and titrate medications. Goal BP below 130/80  - Anemia of ESRD. Hbg 10.2. On HANNAH with HD   - MBD. Get phosphorous, Vitamin D  and PTH levels. Off sevelamer         57 y/o F with PMHx of ESRD on HD T/Th/Sat last HD yesterday , hyperlipidemia, HTN, PVD, CVA and hearing impaired presents to the ED with c/o fall four days back and c/o left sided arm and leg pain. Had regular HD session on Tuesday. Nephrology was consulted for ESRD    ESRD on HD/ PVD/ Fall  - TTS  HD via AVG  - Last session outpatient on 10/17/23  - HD tomorrow 3 L UF, 3 hrs and 2 K   - Potassium under control- 4.4  - Bicarbonate levels - 28. GI loss from vomiting   - Blood pressure elevated. Resume home medications hydralazine 100 mg BID and labetalol 200 mg BID and nifedipine XR 90 mg OD.   - Monitor blood pressure and titrate medications. Goal BP below 130/80  - Anemia of ESRD. Hbg 10.2. On HANNAH with HD   - MBD. Get phosphorous, Vitamin D  and PTH levels. Off sevelamer

## 2023-10-18 NOTE — H&P ADULT - NSHPPHYSICALEXAM_GEN_ALL_CORE
GENERAL: NAD, lying in bed comfortably  HEAD:  Atraumatic, Normocephalic  EYES: conjunctiva and sclera clear  CHEST/LUNG: mild crackles. Unlabored respirations  HEART: Regular rate and rhythm; No murmurs, rubs, or gallops  ABDOMEN: Soft, nondistended, mild tenderness to palpation   EXTREMITIES:  No clubbing, cyanosis, or edema  NERVOUS SYSTEM:  no sensory loss or decreanced strength from baseline per pt in LE

## 2023-10-18 NOTE — H&P ADULT - HISTORY OF PRESENT ILLNESS
57 y/o F with PMHx of ESRD on HD T/Th/Sat (follows w nephsalvador Borja), hyperlipidemia, HTN, PVD, CVA and mute and hearing impaired uses sign language to communicate presents with back pain. History obtained and facilitated with sign language from Brother in law who she lives with at bedside. Patient had a mechanical fall about 4 days ago, slipped on something and fell backwards and landed on her side and back, denies LOC or hitting her head. Unclear as to timeline of when pain started but seemingly worse yesterday prompting family to bring her to the ED. Pt also complaining of somewhat diffuse pain including in her left neck, left arm, some abdominal and leg pain but brother in law states that the leg and abd pain are chronic, no acute worsening. Pt does not produce urine since starting HD a year ago. Denies alarm symptoms, no fecal incontinence, saddle anesthesia, worsening weakness or sensory loss. Denies chest pain or sob. Had her HD yesterday.     In the ED, pt given tylenol, oxy, morphine, lidocaine patch  Vitals: Vital Signs Last 24 Hrs  T(C): 37 (18 Oct 2023 07:35), Max: 37 (18 Oct 2023 07:35)  T(F): 98.6 (18 Oct 2023 07:35), Max: 98.6 (18 Oct 2023 07:35)  HR: 72 (18 Oct 2023 07:35) (72 - 73)  BP: 192/81 (18 Oct 2023 07:35) (130/59 - 192/81)  BP(mean): --  RR: 17 (18 Oct 2023 07:35) (17 - 22)  SpO2: 98% (18 Oct 2023 07:35) (98% - 98%)    Parameters below as of 18 Oct 2023 07:35  Patient On (Oxygen Delivery Method): room air    Labs: Hgb 10.2 and plt 83 (both at baseline), otherwise typical labs in a HD patient  Imaging:  < from: CT Abdomen and Pelvis w/ IV Cont (10.18.23 @ 01:24) >  IMPRESSION:    No evidence of an acute traumatic solid organ or osseous injury.    Anasarca, moderate to large ascites, bilateral small-to-moderate pleural   effusions. Correlate for fluid overload.    < end of copied text >  < from: CT Cervical Spine No Cont (10.18.23 @ 01:18) >  IMPRESSION:    No evidence of acute fracture, compression deformity or facetsubluxation.    Partially imaged bilateral pleural effusions.    < end of copied text >  < from: CT Head No Cont (10.18.23 @ 01:00) >  Impression:    No acute intracranial abnormality.    < end of copied text >    Pt admitted for pain control

## 2023-10-18 NOTE — ED PROVIDER NOTE - PHYSICAL EXAMINATION
CONSTITUTIONAL: Well-developed; well-nourished; in no acute distress, nontoxic appearing  SKIN: skin exam is warm and dry  HEAD: Normocephalic; atraumatic  EYES: PERRL, EOM intact; conjunctiva and sclera clear.  ENT: MMM  NECK: ROM intact.  CARD: S1, S2 normal, no murmur  RESP: No wheezes, rales or rhonchi. Good air movement bilaterally  ABD: soft; non-tender, not tense, no overlying skin changes.   EXT: Normal ROM. +L sided thoracic and lumbar tenderness, no midline tenderness. no skin changes. FROM b/l ue and le  NEURO: awake, alert, following commands, oriented, grossly unremarkable. No Focal deficits. GCS 15.   PSYCH: Cooperative, appropriate.

## 2023-10-18 NOTE — ED PROVIDER NOTE - OBJECTIVE STATEMENT
58 year old female, past medical history esrd on hd t/th/sat, cva, dm, hearing impairment, bib family with back pain. patient ambulates with walker, reports mechanical slip and fall backwards x4 days ago. ?hitting head, no loc, no ac use. patient now with L shoulder pain and L sided back pain. denies f/c, chest pain, vomiting/diarrhea, syncope. patient last dialysis today, follows with dr tabitha ramírez.

## 2023-10-18 NOTE — H&P ADULT - ASSESSMENT
57 y/o F with PMHx of ESRD on HD T/Th/Sat (follows w nephro Dr Solis Borja), hyperlipidemia, HTN, PVD, CVA and mute and hearing impaired uses sign language to communicate presents with back pain.    #Intractable Back pain  -Seemingly from a mechanical fall 4 days ago, though worsened yesterday  -No alarm symptoms, no incontinence, no saddle anesthesia, no decreased weakness from baseline (walks with walker at baseline)  -CT abd/chest/cervical spine/head with no acute path or osseous injuries, anasarca  -f/u xray of L shoulder and humerus   -Pain control: c/w with home gabapentin 300 TID, c/w home robaxin 500 bid, lidocaine patch, and dilaudid 2mg q4 prn  -PT    #HTN  - cont home nifedipine 90   -c/w home labetalol 200 bid  -c/w homehydralazine 100 bid     #Hx of HFpEF  -TTE 2/23: GIIDD  +  pHTN  -on RA, not in acute exacerbation     #ESRD; normo anemia of chr dz  -on HD via left UE AVG - T/Th/Sat  - nephro consulted  - c/w sevelamer 800mg qac  - c/w Calcitriol 0.25 q24  -CT's showing anasarca, possibly take off more fluid with next HD    #h/o R pontine CVA (Feb 2021)  #DLD  -c/w aspirin 81  - c/w atorva 80 HS    #h/o PVD;  -Recent left leg angiogram & angioplasty with vascular last month   - cont aspirin/statin     dvt ppx: heparin  GI ppx: protonix (home med)  diet: renal  dispo: from home

## 2023-10-18 NOTE — H&P ADULT - ATTENDING COMMENTS
: Shannan Sanderson at the bedside   HPI:  57 y/o F with PMHx of ESRD on HD T/Th/Sat (follows w nephro Dr Solis Borja), hyperlipidemia, HTN, PVD, CVA and mute and hearing impaired uses sign language to communicate presents with back pain. History obtained and facilitated with sign language from Brother in law who she lives with at bedside. Patient had a mechanical fall about 4 days ago, slipped on something and fell backwards and landed on her side and back, denies LOC or hitting her head. Unclear as to timeline of when pain started but seemingly worse yesterday prompting family to bring her to the ED. Pt also complaining of somewhat diffuse pain including in her left neck, left arm, some abdominal and leg pain but brother in law states that the leg and abd pain are chronic, no acute worsening. Pt does not produce urine since starting HD a year ago. Denies alarm symptoms, no fecal incontinence, saddle anesthesia, worsening weakness or sensory loss. Denies chest pain or sob. Had her HD yesterday.     pt fell after HD, she felt dizzy and nausea, no chest pain, no head trauma, brother in law at the bedside , he states pt slipped and hit her back.now her back is hurting   ROS is significant for leg swelling L>R, also hx of GIB.     # sp fall, back pain, check T and LS spine xrays   pain management  cont dilaudid iv prn for now    # pt with dizziness post HD  check orthostatic  hx of cva and cad, and Pad  transfer to tele , may benefit from loop recorder or MCOT   cardiac enzymes,   pt eval     # leg swelling, hx of HFpEF , with pulm HTN cont meds  check doppler lower ext     # nausea, vomiting, hx of blood in stool, hemodynamically stable,   had an appointment with GI today in clinic that got canceled, GI consult,   monitor H/H  zofran prn , monitor QTc,     < from: 12 Lead ECG (10.18.23 @ 00:02) >  QTC Calculation(Bazett) 489 ms  < end of copied text >    # Anasarca, moderate to large ascites, bilateral small-to-moderate pleural , pericardial effusion in echo from last admission, leg swelling   etiology unknown , possible fluid overload,     Alb: 4.5 g/dL / Pro: 7.1 g/dL / ALK PHOS: 622 U/L / ALT: 10 U/L / AST: 18 U/L / GGT: x     /  ana m x    / 1.1 mg/dL ( 18 Oct 2023 00:42 )    # ESRD cont HD per nephro     # anemia, of chronic disease   Iron Total: 58 ug/dL (07.09.23 @ 16:11)  Ferritin: 518 ng/mL (07.09.23 @ 16:11)    Progress Note Handoff    Pending :  pain control, x-ray of TLS, PT eval   Family discussion: dw pt and her brother in law at the bedside   Disposition: home   code status full code    pt is high risk of morbidity due to : cva, multiple falls, pain requiring iv pain meds, ESRD on HD , HF and GIB

## 2023-10-18 NOTE — ED PROVIDER NOTE - NS ED ATTENDING STATEMENT MOD
This was a shared visit with the BROWN. I reviewed and verified the documentation and independently performed the documented:

## 2023-10-18 NOTE — H&P ADULT - NSHPLABSRESULTS_GEN_ALL_CORE
10.2   5.39  )-----------( 83       ( 18 Oct 2023 00:42 )             30.5       10-18    136  |  94<L>  |  25<H>  ----------------------------<  113<H>  4.4   |  28  |  4.6<HH>    Ca    8.9      18 Oct 2023 00:42    TPro  7.1  /  Alb  4.5  /  TBili  1.1  /  DBili  x   /  AST  18  /  ALT  10  /  AlkPhos  622<H>  10-18

## 2023-10-18 NOTE — ED PROVIDER NOTE - CARE PLAN
Assessment and plan of treatment:	fall, body pain  labs, imaging, supportive care   Principal Discharge DX:	Anisocoria  Assessment and plan of treatment:	fall, body pain  labs, imaging, supportive care  Secondary Diagnosis:	Chronic back pain  Secondary Diagnosis:	Intractable back pain   1

## 2023-10-18 NOTE — ED ADULT NURSE NOTE - OBJECTIVE STATEMENT
patient family at bed side for sign language translation. Patient complaints of back pain, abdominal pain, weakness and neck pain. Patient reports falling 5 days ago.  Patient denies hitting her head.

## 2023-10-18 NOTE — ED PROVIDER NOTE - CLINICAL SUMMARY MEDICAL DECISION MAKING FREE TEXT BOX
sp fall, gen weakness, edema, whole body pain.  imaging, labs, supportive care  admit for fluid overload.

## 2023-10-18 NOTE — ED ADULT NURSE NOTE - NSFALLHARMRISKINTERV_ED_ALL_ED

## 2023-10-19 LAB
24R-OH-CALCIDIOL SERPL-MCNC: 28 NG/ML — LOW (ref 30–80)
24R-OH-CALCIDIOL SERPL-MCNC: 28 NG/ML — LOW (ref 30–80)
ALBUMIN SERPL ELPH-MCNC: 4.2 G/DL — SIGNIFICANT CHANGE UP (ref 3.5–5.2)
ALBUMIN SERPL ELPH-MCNC: 4.2 G/DL — SIGNIFICANT CHANGE UP (ref 3.5–5.2)
ALP SERPL-CCNC: 585 U/L — HIGH (ref 30–115)
ALP SERPL-CCNC: 585 U/L — HIGH (ref 30–115)
ALT FLD-CCNC: 9 U/L — SIGNIFICANT CHANGE UP (ref 0–41)
ALT FLD-CCNC: 9 U/L — SIGNIFICANT CHANGE UP (ref 0–41)
ANION GAP SERPL CALC-SCNC: 16 MMOL/L — HIGH (ref 7–14)
ANION GAP SERPL CALC-SCNC: 16 MMOL/L — HIGH (ref 7–14)
AST SERPL-CCNC: 15 U/L — SIGNIFICANT CHANGE UP (ref 0–41)
AST SERPL-CCNC: 15 U/L — SIGNIFICANT CHANGE UP (ref 0–41)
BASOPHILS # BLD AUTO: 0.05 K/UL — SIGNIFICANT CHANGE UP (ref 0–0.2)
BASOPHILS # BLD AUTO: 0.05 K/UL — SIGNIFICANT CHANGE UP (ref 0–0.2)
BASOPHILS NFR BLD AUTO: 0.9 % — SIGNIFICANT CHANGE UP (ref 0–1)
BASOPHILS NFR BLD AUTO: 0.9 % — SIGNIFICANT CHANGE UP (ref 0–1)
BILIRUB SERPL-MCNC: 0.9 MG/DL — SIGNIFICANT CHANGE UP (ref 0.2–1.2)
BILIRUB SERPL-MCNC: 0.9 MG/DL — SIGNIFICANT CHANGE UP (ref 0.2–1.2)
BUN SERPL-MCNC: 35 MG/DL — HIGH (ref 10–20)
BUN SERPL-MCNC: 35 MG/DL — HIGH (ref 10–20)
CALCIUM SERPL-MCNC: 8.5 MG/DL — SIGNIFICANT CHANGE UP (ref 8.4–10.5)
CALCIUM SERPL-MCNC: 8.5 MG/DL — SIGNIFICANT CHANGE UP (ref 8.4–10.5)
CALCIUM SERPL-MCNC: 8.6 MG/DL — SIGNIFICANT CHANGE UP (ref 8.4–10.5)
CALCIUM SERPL-MCNC: 8.6 MG/DL — SIGNIFICANT CHANGE UP (ref 8.4–10.5)
CHLORIDE SERPL-SCNC: 95 MMOL/L — LOW (ref 98–110)
CHLORIDE SERPL-SCNC: 95 MMOL/L — LOW (ref 98–110)
CO2 SERPL-SCNC: 27 MMOL/L — SIGNIFICANT CHANGE UP (ref 17–32)
CO2 SERPL-SCNC: 27 MMOL/L — SIGNIFICANT CHANGE UP (ref 17–32)
CREAT SERPL-MCNC: 5.6 MG/DL — CRITICAL HIGH (ref 0.7–1.5)
CREAT SERPL-MCNC: 5.6 MG/DL — CRITICAL HIGH (ref 0.7–1.5)
EGFR: 8 ML/MIN/1.73M2 — LOW
EGFR: 8 ML/MIN/1.73M2 — LOW
EOSINOPHIL # BLD AUTO: 0.21 K/UL — SIGNIFICANT CHANGE UP (ref 0–0.7)
EOSINOPHIL # BLD AUTO: 0.21 K/UL — SIGNIFICANT CHANGE UP (ref 0–0.7)
EOSINOPHIL NFR BLD AUTO: 3.9 % — SIGNIFICANT CHANGE UP (ref 0–8)
EOSINOPHIL NFR BLD AUTO: 3.9 % — SIGNIFICANT CHANGE UP (ref 0–8)
GLUCOSE BLDC GLUCOMTR-MCNC: 103 MG/DL — HIGH (ref 70–99)
GLUCOSE BLDC GLUCOMTR-MCNC: 103 MG/DL — HIGH (ref 70–99)
GLUCOSE BLDC GLUCOMTR-MCNC: 105 MG/DL — HIGH (ref 70–99)
GLUCOSE BLDC GLUCOMTR-MCNC: 105 MG/DL — HIGH (ref 70–99)
GLUCOSE BLDC GLUCOMTR-MCNC: 133 MG/DL — HIGH (ref 70–99)
GLUCOSE BLDC GLUCOMTR-MCNC: 133 MG/DL — HIGH (ref 70–99)
GLUCOSE BLDC GLUCOMTR-MCNC: 137 MG/DL — HIGH (ref 70–99)
GLUCOSE BLDC GLUCOMTR-MCNC: 137 MG/DL — HIGH (ref 70–99)
GLUCOSE SERPL-MCNC: 95 MG/DL — SIGNIFICANT CHANGE UP (ref 70–99)
GLUCOSE SERPL-MCNC: 95 MG/DL — SIGNIFICANT CHANGE UP (ref 70–99)
HCT VFR BLD CALC: 30.4 % — LOW (ref 37–47)
HCT VFR BLD CALC: 30.4 % — LOW (ref 37–47)
HGB BLD-MCNC: 10.1 G/DL — LOW (ref 12–16)
HGB BLD-MCNC: 10.1 G/DL — LOW (ref 12–16)
IMM GRANULOCYTES NFR BLD AUTO: 0.2 % — SIGNIFICANT CHANGE UP (ref 0.1–0.3)
IMM GRANULOCYTES NFR BLD AUTO: 0.2 % — SIGNIFICANT CHANGE UP (ref 0.1–0.3)
LYMPHOCYTES # BLD AUTO: 0.72 K/UL — LOW (ref 1.2–3.4)
LYMPHOCYTES # BLD AUTO: 0.72 K/UL — LOW (ref 1.2–3.4)
LYMPHOCYTES # BLD AUTO: 13.3 % — LOW (ref 20.5–51.1)
LYMPHOCYTES # BLD AUTO: 13.3 % — LOW (ref 20.5–51.1)
MAGNESIUM SERPL-MCNC: 2.3 MG/DL — SIGNIFICANT CHANGE UP (ref 1.8–2.4)
MAGNESIUM SERPL-MCNC: 2.3 MG/DL — SIGNIFICANT CHANGE UP (ref 1.8–2.4)
MCHC RBC-ENTMCNC: 30 PG — SIGNIFICANT CHANGE UP (ref 27–31)
MCHC RBC-ENTMCNC: 30 PG — SIGNIFICANT CHANGE UP (ref 27–31)
MCHC RBC-ENTMCNC: 33.2 G/DL — SIGNIFICANT CHANGE UP (ref 32–37)
MCHC RBC-ENTMCNC: 33.2 G/DL — SIGNIFICANT CHANGE UP (ref 32–37)
MCV RBC AUTO: 90.2 FL — SIGNIFICANT CHANGE UP (ref 81–99)
MCV RBC AUTO: 90.2 FL — SIGNIFICANT CHANGE UP (ref 81–99)
MONOCYTES # BLD AUTO: 0.65 K/UL — HIGH (ref 0.1–0.6)
MONOCYTES # BLD AUTO: 0.65 K/UL — HIGH (ref 0.1–0.6)
MONOCYTES NFR BLD AUTO: 12 % — HIGH (ref 1.7–9.3)
MONOCYTES NFR BLD AUTO: 12 % — HIGH (ref 1.7–9.3)
NEUTROPHILS # BLD AUTO: 3.76 K/UL — SIGNIFICANT CHANGE UP (ref 1.4–6.5)
NEUTROPHILS # BLD AUTO: 3.76 K/UL — SIGNIFICANT CHANGE UP (ref 1.4–6.5)
NEUTROPHILS NFR BLD AUTO: 69.7 % — SIGNIFICANT CHANGE UP (ref 42.2–75.2)
NEUTROPHILS NFR BLD AUTO: 69.7 % — SIGNIFICANT CHANGE UP (ref 42.2–75.2)
NRBC # BLD: 0 /100 WBCS — SIGNIFICANT CHANGE UP (ref 0–0)
NRBC # BLD: 0 /100 WBCS — SIGNIFICANT CHANGE UP (ref 0–0)
PHOSPHATE SERPL-MCNC: 5.3 MG/DL — HIGH (ref 2.1–4.9)
PHOSPHATE SERPL-MCNC: 5.3 MG/DL — HIGH (ref 2.1–4.9)
PLATELET # BLD AUTO: 77 K/UL — LOW (ref 130–400)
PLATELET # BLD AUTO: 77 K/UL — LOW (ref 130–400)
PMV BLD: 12.4 FL — HIGH (ref 7.4–10.4)
PMV BLD: 12.4 FL — HIGH (ref 7.4–10.4)
POTASSIUM SERPL-MCNC: 5 MMOL/L — SIGNIFICANT CHANGE UP (ref 3.5–5)
POTASSIUM SERPL-MCNC: 5 MMOL/L — SIGNIFICANT CHANGE UP (ref 3.5–5)
POTASSIUM SERPL-SCNC: 5 MMOL/L — SIGNIFICANT CHANGE UP (ref 3.5–5)
POTASSIUM SERPL-SCNC: 5 MMOL/L — SIGNIFICANT CHANGE UP (ref 3.5–5)
PROT SERPL-MCNC: 6.8 G/DL — SIGNIFICANT CHANGE UP (ref 6–8)
PROT SERPL-MCNC: 6.8 G/DL — SIGNIFICANT CHANGE UP (ref 6–8)
RBC # BLD: 3.37 M/UL — LOW (ref 4.2–5.4)
RBC # BLD: 3.37 M/UL — LOW (ref 4.2–5.4)
RBC # FLD: 18 % — HIGH (ref 11.5–14.5)
RBC # FLD: 18 % — HIGH (ref 11.5–14.5)
SODIUM SERPL-SCNC: 138 MMOL/L — SIGNIFICANT CHANGE UP (ref 135–146)
SODIUM SERPL-SCNC: 138 MMOL/L — SIGNIFICANT CHANGE UP (ref 135–146)
WBC # BLD: 5.4 K/UL — SIGNIFICANT CHANGE UP (ref 4.8–10.8)
WBC # BLD: 5.4 K/UL — SIGNIFICANT CHANGE UP (ref 4.8–10.8)
WBC # FLD AUTO: 5.4 K/UL — SIGNIFICANT CHANGE UP (ref 4.8–10.8)
WBC # FLD AUTO: 5.4 K/UL — SIGNIFICANT CHANGE UP (ref 4.8–10.8)

## 2023-10-19 PROCEDURE — 99233 SBSQ HOSP IP/OBS HIGH 50: CPT

## 2023-10-19 PROCEDURE — 93010 ELECTROCARDIOGRAM REPORT: CPT

## 2023-10-19 RX ORDER — ONDANSETRON 8 MG/1
4 TABLET, FILM COATED ORAL ONCE
Refills: 0 | Status: COMPLETED | OUTPATIENT
Start: 2023-10-19 | End: 2023-10-19

## 2023-10-19 RX ORDER — HYDROMORPHONE HYDROCHLORIDE 2 MG/ML
2 INJECTION INTRAMUSCULAR; INTRAVENOUS; SUBCUTANEOUS ONCE
Refills: 0 | Status: DISCONTINUED | OUTPATIENT
Start: 2023-10-19 | End: 2023-10-19

## 2023-10-19 RX ADMIN — HYDROMORPHONE HYDROCHLORIDE 2 MILLIGRAM(S): 2 INJECTION INTRAMUSCULAR; INTRAVENOUS; SUBCUTANEOUS at 13:35

## 2023-10-19 RX ADMIN — Medication 200 MILLIGRAM(S): at 17:55

## 2023-10-19 RX ADMIN — GABAPENTIN 300 MILLIGRAM(S): 400 CAPSULE ORAL at 23:08

## 2023-10-19 RX ADMIN — Medication 100 MILLIGRAM(S): at 17:55

## 2023-10-19 RX ADMIN — HYDROMORPHONE HYDROCHLORIDE 2 MILLIGRAM(S): 2 INJECTION INTRAMUSCULAR; INTRAVENOUS; SUBCUTANEOUS at 11:36

## 2023-10-19 RX ADMIN — HYDROMORPHONE HYDROCHLORIDE 2 MILLIGRAM(S): 2 INJECTION INTRAMUSCULAR; INTRAVENOUS; SUBCUTANEOUS at 18:32

## 2023-10-19 RX ADMIN — GABAPENTIN 300 MILLIGRAM(S): 400 CAPSULE ORAL at 17:00

## 2023-10-19 RX ADMIN — PANTOPRAZOLE SODIUM 40 MILLIGRAM(S): 20 TABLET, DELAYED RELEASE ORAL at 06:52

## 2023-10-19 RX ADMIN — Medication 81 MILLIGRAM(S): at 11:33

## 2023-10-19 RX ADMIN — Medication 100 MILLIGRAM(S): at 05:22

## 2023-10-19 RX ADMIN — Medication 90 MILLIGRAM(S): at 05:22

## 2023-10-19 RX ADMIN — LIDOCAINE 1 PATCH: 4 CREAM TOPICAL at 16:58

## 2023-10-19 RX ADMIN — ONDANSETRON 4 MILLIGRAM(S): 8 TABLET, FILM COATED ORAL at 06:52

## 2023-10-19 RX ADMIN — HYDROMORPHONE HYDROCHLORIDE 2 MILLIGRAM(S): 2 INJECTION INTRAMUSCULAR; INTRAVENOUS; SUBCUTANEOUS at 19:09

## 2023-10-19 RX ADMIN — CALCITRIOL 0.25 MICROGRAM(S): 0.5 CAPSULE ORAL at 11:33

## 2023-10-19 RX ADMIN — SEVELAMER CARBONATE 800 MILLIGRAM(S): 2400 POWDER, FOR SUSPENSION ORAL at 16:56

## 2023-10-19 RX ADMIN — Medication 650 MILLIGRAM(S): at 17:56

## 2023-10-19 RX ADMIN — Medication 650 MILLIGRAM(S): at 19:09

## 2023-10-19 RX ADMIN — HYDROMORPHONE HYDROCHLORIDE 2 MILLIGRAM(S): 2 INJECTION INTRAMUSCULAR; INTRAVENOUS; SUBCUTANEOUS at 16:56

## 2023-10-19 RX ADMIN — SEVELAMER CARBONATE 800 MILLIGRAM(S): 2400 POWDER, FOR SUSPENSION ORAL at 11:33

## 2023-10-19 RX ADMIN — SEVELAMER CARBONATE 800 MILLIGRAM(S): 2400 POWDER, FOR SUSPENSION ORAL at 09:01

## 2023-10-19 RX ADMIN — ATORVASTATIN CALCIUM 80 MILLIGRAM(S): 80 TABLET, FILM COATED ORAL at 23:07

## 2023-10-19 RX ADMIN — HEPARIN SODIUM 5000 UNIT(S): 5000 INJECTION INTRAVENOUS; SUBCUTANEOUS at 16:59

## 2023-10-19 RX ADMIN — Medication 200 MILLIGRAM(S): at 05:26

## 2023-10-19 RX ADMIN — HEPARIN SODIUM 5000 UNIT(S): 5000 INJECTION INTRAVENOUS; SUBCUTANEOUS at 23:08

## 2023-10-19 RX ADMIN — ONDANSETRON 4 MILLIGRAM(S): 8 TABLET, FILM COATED ORAL at 23:08

## 2023-10-19 RX ADMIN — HEPARIN SODIUM 5000 UNIT(S): 5000 INJECTION INTRAVENOUS; SUBCUTANEOUS at 05:24

## 2023-10-19 RX ADMIN — METHOCARBAMOL 500 MILLIGRAM(S): 500 TABLET, FILM COATED ORAL at 05:22

## 2023-10-19 RX ADMIN — GABAPENTIN 300 MILLIGRAM(S): 400 CAPSULE ORAL at 05:22

## 2023-10-19 RX ADMIN — METHOCARBAMOL 500 MILLIGRAM(S): 500 TABLET, FILM COATED ORAL at 17:55

## 2023-10-19 NOTE — CONSULT NOTE ADULT - SUBJECTIVE AND OBJECTIVE BOX
PAIN MANAGEMENT CONSULT NOTE    Chief Complaint: pain everywhere    HPI:  59 y/o F with PMHx of ESRD on HD T/Th/Sat (follows w nephro Dr Solis Borja), hyperlipidemia, HTN, PVD, CVA and mute and hearing impaired uses sign language to communicate presents with back pain. History obtained and facilitated with sign language from Brother in law who she lives with at bedside. Patient had a mechanical fall about 4 days ago, slipped on something and fell backwards and landed on her side and back, denies LOC or hitting her head. Unclear as to timeline of when pain started but seemingly worse yesterday prompting family to bring her to the ED. Pt also complaining of somewhat diffuse pain including in her left neck, left arm, some abdominal and leg pain but brother in law states that the leg and abd pain are chronic, no acute worsening. Pt does not produce urine since starting HD a year ago. Denies alarm symptoms, no fecal incontinence, saddle anesthesia, worsening weakness or sensory loss. Denies chest pain or sob. Had her HD yesterday.     Pain hx:  Patient family helped assist in the history taking. Patient gets relief with the PO dilaudid currently. She has pain everywhere, but mostly her stomach. Pain is achy in nature and can be severe at times. Patient usually is sent home with 2mg of dilaudid by our hospitalists. Our orthopedic group does not take [place] medicaid unfortunately.     PAST MEDICAL & SURGICAL HISTORY:  PVD (peripheral vascular disease)      Benign essential HTN      Intellectual disability      Hearing impaired      HLD (hyperlipidemia)      Chronic kidney disease, unspecified CKD stage      CVA (cerebral vascular accident)      DM (diabetes mellitus)      H/O vascular surgery  left leg      H/O fracture of leg      S/P arteriovenous (AV) fistula creation      S/P debridement      History of partial amputation of toe of left foot      S/P femoral-popliteal bypass surgery          FAMILY HISTORY:      SOCIAL HISTORY:  [x ] Denies Smoking, Alcohol, or Drug Use    HOME MEDICATIONS:   Please refer to initial HNP    PAIN HOME MEDICATIONS:    Allergies    NSAIDs (Nephrotoxicity)  penicillin (Rash)    Intolerances        PAIN MEDICATIONS:  acetaminophen     Tablet .. 650 milliGRAM(s) Oral every 6 hours PRN  gabapentin 300 milliGRAM(s) Oral three times a day  HYDROmorphone   Tablet 2 milliGRAM(s) Oral every 4 hours PRN  methocarbamol 500 milliGRAM(s) Oral two times a day    Heme:  aspirin enteric coated 81 milliGRAM(s) Oral daily  heparin   Injectable 5000 Unit(s) SubCutaneous every 8 hours    Antibiotics:    Cardiovascular:  hydrALAZINE 100 milliGRAM(s) Oral two times a day  labetalol 200 milliGRAM(s) Oral two times a day  NIFEdipine XL 90 milliGRAM(s) Oral daily    GI:  pantoprazole    Tablet 40 milliGRAM(s) Oral before breakfast    Endocrine:  atorvastatin 80 milliGRAM(s) Oral at bedtime    All Other Medications:  calcitriol   Capsule 0.25 MICROGram(s) Oral daily  lidocaine   4% Patch 1 Patch Transdermal every 24 hours  sevelamer carbonate 800 milliGRAM(s) Oral three times a day with meals      Vital Signs Last 24 Hrs  T(C): 36.7 (19 Oct 2023 19:49), Max: 36.8 (19 Oct 2023 00:03)  T(F): 98.1 (19 Oct 2023 19:49), Max: 98.3 (19 Oct 2023 00:03)  HR: 66 (19 Oct 2023 19:49) (58 - 70)  BP: 137/52 (19 Oct 2023 19:49) (118/50 - 183/77)  BP(mean): --  RR: 18 (19 Oct 2023 19:49) (18 - 18)  SpO2: 99% (19 Oct 2023 00:03) (99% - 99%)    Parameters below as of 19 Oct 2023 15:15  Patient On (Oxygen Delivery Method): room air        LABS:                        10.1   5.40  )-----------( 77       ( 19 Oct 2023 08:58 )             30.4     10-19    138  |  95<L>  |  35<H>  ----------------------------<  95  5.0   |  27  |  5.6<HH>    Ca    8.6      19 Oct 2023 08:58  Phos  5.3     10-19  Mg     2.3     10-19    TPro  6.8  /  Alb  4.2  /  TBili  0.9  /  DBili  x   /  AST  15  /  ALT  9   /  AlkPhos  585<H>  10-19      Urinalysis Basic - ( 19 Oct 2023 08:58 )    Color: x / Appearance: x / SG: x / pH: x  Gluc: 95 mg/dL / Ketone: x  / Bili: x / Urobili: x   Blood: x / Protein: x / Nitrite: x   Leuk Esterase: x / RBC: x / WBC x   Sq Epi: x / Non Sq Epi: x / Bacteria: x        RADIOLOGY:    ACC: [id number] EXAM:  XR T SPINE 2 VIEWS   ORDERED BY: DEEPA CORRIGAN     ACC: [id number] EXAM:  XR LS SPINE MIN 4 VIEWS   ORDERED BY: DEEPA CORRIGAN     PROCEDURE DATE:  10/18/2023          INTERPRETATION:  INDICATION: Pain. Fall.    COMPARISON: CT chest abdomen pelvis performed the same day. Thoracic and   lumbar spine radiographs dated 7/10/2023.    TECHNIQUE: 4 images of the lumbar spine and one coned-down image of the   lumbosacral junction. 4 images of the thoracic spine.    FINDINGS:    THORACIC:    The visualized vertebral heights and intervertebral disc spaces are   maintained. No acute fracture is seen. There is no spondylolisthesis    LUMBAR:    The vertebral body heights are maintained. No acute fracture is seen.    There is trace grade 1 anterolisthesis of L4 on L5.    There is mild to moderate multilevel discogenic degenerative change, most   pronounced at L2-L3 and L4-L5. There is multilevel facet arthrosis, most   pronounced on the left at L4-L5    Calcified plaque is noted in the abdominal aorta.    IMPRESSION:  No acute osseous abnormality seen.    --- End of Report ---            FE SÁNCHEZ MD; Attending Radiologist  This document has been electronically signed. Oct 19 2023  8:21AM      REVIEW OF SYSTEMS:  see hpi    PHYSICAL EXAM  GENERAL: Laying in bed, NAD  Neuro:  EOMI  Cranial nerves grossly intact  CHEST/LUNG: no audible wheeze, no accessory muscle usage  HEART: Regular rate and rhythm; No edema  ABDOMEN: Soft, Nontenderm distended  EXTREMITIES: No clubbing, cyanosis  SKIN: No rashes or lesions      ASSESSMENT:   abdominal pain    PLAN:   - Pain:    start tylenol [id number], d/c prn dose  c/w lidocaine patch  c/w gabapentin 300mg TID  c/w methocarbomal 500mg BID  c/w dilaudid 2mg PO q4hr prn. can send the patient home on this medication. Patient needs to follow in the [facility] clinic for these medication refills as our outpatient grouo does not take  [place] medicaid.

## 2023-10-19 NOTE — PROGRESS NOTE ADULT - ATTENDING COMMENTS
57 y/o F with PMHx of ESRD on HD T/Th/Sat (follows w nephro Dr Solis Borja), hyperlipidemia, HTN, PVD, CVA and mute and hearing impaired uses sign language to communicate presents with back pain. History obtained and facilitated with sign language from Brother in law who she lives with at bedside. Patient had a mechanical fall about 4 days ago, slipped on something and fell backwards and landed on her side and back, denies LOC or hitting her head. Unclear as to timeline of when pain started but seemingly worse yesterday prompting family to bring her to the ED. Pt also complaining of somewhat diffuse pain including in her left neck, left arm, some abdominal and leg pain but brother in law states that the leg and abd pain are chronic, no acute worsening. Pt does not produce urine since starting HD a year ago. Denies alarm symptoms, no fecal incontinence, saddle anesthesia, worsening weakness or sensory loss.        # sp fall, back pain  trauma w/u negative   pain management    # hx of HFpEF , with pulm HTN   cont meds    # nausea, vomiting, hx of blood in stool  had an appointment with GI in clinic that got canceled, GI consult  monitor H/H  zofran prn, monitor QTc,     # Anasarca, moderate to large ascites, bilateral small-to-moderate pleural   fluid removal via HD     # ESRD on HD  ont HD per nephro     # anemia, of chronic disease   monitor     Progress Note Handoff  Pending :  pain mgmt, GI consult   Family discussion: dw pt and her brother in law at the bedside   Disposition: home

## 2023-10-19 NOTE — PROGRESS NOTE ADULT - SUBJECTIVE AND OBJECTIVE BOX
24H events:    Patient is a 58y old Female who presents with a chief complaint of   Primary diagnosis of Anisocoria       Today is hospital day 1d. This morning patient was seen and examined at bedside, resting comfortably in bed with brother in law present. Patient is hearing impaired and brother in law assisted interview and exam. Patient had several complaints: Nausea, chest and abdominal pain for 2 weeks, Swelling, tightness and pain of thighs bilaterally, acute worsening of chest and abdominal pain 2 days ago, gum pain, cough, shortness of breath, insomnia, left neck pain, left upper back pain, left shoulder pain, decreased breast size and muscle wasting of arms, abdominal bloating that has been refractory to dialysis.   No acute overnight events.     PAST MEDICAL & SURGICAL HISTORY  PVD (peripheral vascular disease)    Benign essential HTN    Intellectual disability    Hearing impaired    HLD (hyperlipidemia)    Chronic kidney disease, unspecified CKD stage    CVA (cerebral vascular accident)    DM (diabetes mellitus)    H/O vascular surgery  left leg    H/O fracture of leg    S/P arteriovenous (AV) fistula creation    S/P debridement    History of partial amputation of toe of left foot    S/P femoral-popliteal bypass surgery      SOCIAL HISTORY:  Social History:      ALLERGIES:  NSAIDs (Nephrotoxicity)  penicillin (Rash)    MEDICATIONS:  STANDING MEDICATIONS  aspirin enteric coated 81 milliGRAM(s) Oral daily  atorvastatin 80 milliGRAM(s) Oral at bedtime  calcitriol   Capsule 0.25 MICROGram(s) Oral daily  gabapentin 300 milliGRAM(s) Oral three times a day  heparin   Injectable 5000 Unit(s) SubCutaneous every 8 hours  hydrALAZINE 100 milliGRAM(s) Oral two times a day  labetalol 200 milliGRAM(s) Oral two times a day  lidocaine   4% Patch 1 Patch Transdermal every 24 hours  methocarbamol 500 milliGRAM(s) Oral two times a day  NIFEdipine XL 90 milliGRAM(s) Oral daily  pantoprazole    Tablet 40 milliGRAM(s) Oral before breakfast  sevelamer carbonate 800 milliGRAM(s) Oral three times a day with meals    PRN MEDICATIONS  acetaminophen     Tablet .. 650 milliGRAM(s) Oral every 6 hours PRN  HYDROmorphone   Tablet 2 milliGRAM(s) Oral every 4 hours PRN    VITALS:   T(F): 97.5  HR: 61  BP: 135/62  RR: 18  SpO2: 99%    PHYSICAL EXAM:  GENERAL: NAD, hearing impaired, mute, uses sign language  HEAD:  Atraumatic, Normocephalic  EYES: EOMI  NECK: Supple  NERVOUS SYSTEM:  Alert & Oriented X3, non focal   CHEST/LUNG: Clear to auscultation bilaterally; lidocaine patch on left upper back  HEART: Regular rate and rhythm; No murmurs, rubs, or gallops  ABDOMEN: Pitting, mildly distended   EXTREMITIES: Tight, edematous lower extremity edema up to the thighs.     LABS:                        10.1   5.40  )-----------( 77       ( 19 Oct 2023 08:58 )             30.4     10-19    138  |  95<L>  |  35<H>  ----------------------------<  95  5.0   |  27  |  5.6<HH>    Ca    8.6      19 Oct 2023 08:58  Phos  5.3     10-19  Mg     2.3     10-19    TPro  6.8  /  Alb  4.2  /  TBili  0.9  /  DBili  x   /  AST  15  /  ALT  9   /  AlkPhos  585<H>  10-19      Urinalysis Basic - ( 19 Oct 2023 08:58 )    Color: x / Appearance: x / SG: x / pH: x  Gluc: 95 mg/dL / Ketone: x  / Bili: x / Urobili: x   Blood: x / Protein: x / Nitrite: x   Leuk Esterase: x / RBC: x / WBC x   Sq Epi: x / Non Sq Epi: x / Bacteria: x                RADIOLOGY:

## 2023-10-20 ENCOUNTER — TRANSCRIPTION ENCOUNTER (OUTPATIENT)
Age: 58
End: 2023-10-20

## 2023-10-20 VITALS
TEMPERATURE: 97 F | SYSTOLIC BLOOD PRESSURE: 129 MMHG | OXYGEN SATURATION: 94 % | RESPIRATION RATE: 18 BRPM | DIASTOLIC BLOOD PRESSURE: 58 MMHG | HEART RATE: 67 BPM

## 2023-10-20 LAB
ALBUMIN SERPL ELPH-MCNC: 4.2 G/DL — SIGNIFICANT CHANGE UP (ref 3.5–5.2)
ALBUMIN SERPL ELPH-MCNC: 4.2 G/DL — SIGNIFICANT CHANGE UP (ref 3.5–5.2)
ALP SERPL-CCNC: 593 U/L — HIGH (ref 30–115)
ALP SERPL-CCNC: 593 U/L — HIGH (ref 30–115)
ALT FLD-CCNC: 9 U/L — SIGNIFICANT CHANGE UP (ref 0–41)
ALT FLD-CCNC: 9 U/L — SIGNIFICANT CHANGE UP (ref 0–41)
ANION GAP SERPL CALC-SCNC: 14 MMOL/L — SIGNIFICANT CHANGE UP (ref 7–14)
ANION GAP SERPL CALC-SCNC: 14 MMOL/L — SIGNIFICANT CHANGE UP (ref 7–14)
AST SERPL-CCNC: 16 U/L — SIGNIFICANT CHANGE UP (ref 0–41)
AST SERPL-CCNC: 16 U/L — SIGNIFICANT CHANGE UP (ref 0–41)
BASOPHILS # BLD AUTO: 0.06 K/UL — SIGNIFICANT CHANGE UP (ref 0–0.2)
BASOPHILS # BLD AUTO: 0.06 K/UL — SIGNIFICANT CHANGE UP (ref 0–0.2)
BASOPHILS NFR BLD AUTO: 1.2 % — HIGH (ref 0–1)
BASOPHILS NFR BLD AUTO: 1.2 % — HIGH (ref 0–1)
BILIRUB SERPL-MCNC: 0.9 MG/DL — SIGNIFICANT CHANGE UP (ref 0.2–1.2)
BILIRUB SERPL-MCNC: 0.9 MG/DL — SIGNIFICANT CHANGE UP (ref 0.2–1.2)
BUN SERPL-MCNC: 24 MG/DL — HIGH (ref 10–20)
BUN SERPL-MCNC: 24 MG/DL — HIGH (ref 10–20)
CALCIUM SERPL-MCNC: 9.1 MG/DL — SIGNIFICANT CHANGE UP (ref 8.4–10.4)
CALCIUM SERPL-MCNC: 9.1 MG/DL — SIGNIFICANT CHANGE UP (ref 8.4–10.4)
CHLORIDE SERPL-SCNC: 97 MMOL/L — LOW (ref 98–110)
CHLORIDE SERPL-SCNC: 97 MMOL/L — LOW (ref 98–110)
CO2 SERPL-SCNC: 28 MMOL/L — SIGNIFICANT CHANGE UP (ref 17–32)
CO2 SERPL-SCNC: 28 MMOL/L — SIGNIFICANT CHANGE UP (ref 17–32)
CREAT SERPL-MCNC: 4.6 MG/DL — CRITICAL HIGH (ref 0.7–1.5)
CREAT SERPL-MCNC: 4.6 MG/DL — CRITICAL HIGH (ref 0.7–1.5)
EGFR: 10 ML/MIN/1.73M2 — LOW
EGFR: 10 ML/MIN/1.73M2 — LOW
EOSINOPHIL # BLD AUTO: 0.2 K/UL — SIGNIFICANT CHANGE UP (ref 0–0.7)
EOSINOPHIL # BLD AUTO: 0.2 K/UL — SIGNIFICANT CHANGE UP (ref 0–0.7)
EOSINOPHIL NFR BLD AUTO: 3.8 % — SIGNIFICANT CHANGE UP (ref 0–8)
EOSINOPHIL NFR BLD AUTO: 3.8 % — SIGNIFICANT CHANGE UP (ref 0–8)
GLUCOSE BLDC GLUCOMTR-MCNC: 104 MG/DL — HIGH (ref 70–99)
GLUCOSE BLDC GLUCOMTR-MCNC: 104 MG/DL — HIGH (ref 70–99)
GLUCOSE BLDC GLUCOMTR-MCNC: 114 MG/DL — HIGH (ref 70–99)
GLUCOSE BLDC GLUCOMTR-MCNC: 114 MG/DL — HIGH (ref 70–99)
GLUCOSE SERPL-MCNC: 95 MG/DL — SIGNIFICANT CHANGE UP (ref 70–99)
GLUCOSE SERPL-MCNC: 95 MG/DL — SIGNIFICANT CHANGE UP (ref 70–99)
HCT VFR BLD CALC: 30.2 % — LOW (ref 37–47)
HCT VFR BLD CALC: 30.2 % — LOW (ref 37–47)
HGB BLD-MCNC: 9.8 G/DL — LOW (ref 12–16)
HGB BLD-MCNC: 9.8 G/DL — LOW (ref 12–16)
IMM GRANULOCYTES NFR BLD AUTO: 0.2 % — SIGNIFICANT CHANGE UP (ref 0.1–0.3)
IMM GRANULOCYTES NFR BLD AUTO: 0.2 % — SIGNIFICANT CHANGE UP (ref 0.1–0.3)
LYMPHOCYTES # BLD AUTO: 0.71 K/UL — LOW (ref 1.2–3.4)
LYMPHOCYTES # BLD AUTO: 0.71 K/UL — LOW (ref 1.2–3.4)
LYMPHOCYTES # BLD AUTO: 13.6 % — LOW (ref 20.5–51.1)
LYMPHOCYTES # BLD AUTO: 13.6 % — LOW (ref 20.5–51.1)
MAGNESIUM SERPL-MCNC: 2.1 MG/DL — SIGNIFICANT CHANGE UP (ref 1.8–2.4)
MAGNESIUM SERPL-MCNC: 2.1 MG/DL — SIGNIFICANT CHANGE UP (ref 1.8–2.4)
MCHC RBC-ENTMCNC: 30 PG — SIGNIFICANT CHANGE UP (ref 27–31)
MCHC RBC-ENTMCNC: 30 PG — SIGNIFICANT CHANGE UP (ref 27–31)
MCHC RBC-ENTMCNC: 32.5 G/DL — SIGNIFICANT CHANGE UP (ref 32–37)
MCHC RBC-ENTMCNC: 32.5 G/DL — SIGNIFICANT CHANGE UP (ref 32–37)
MCV RBC AUTO: 92.4 FL — SIGNIFICANT CHANGE UP (ref 81–99)
MCV RBC AUTO: 92.4 FL — SIGNIFICANT CHANGE UP (ref 81–99)
MONOCYTES # BLD AUTO: 0.7 K/UL — HIGH (ref 0.1–0.6)
MONOCYTES # BLD AUTO: 0.7 K/UL — HIGH (ref 0.1–0.6)
MONOCYTES NFR BLD AUTO: 13.4 % — HIGH (ref 1.7–9.3)
MONOCYTES NFR BLD AUTO: 13.4 % — HIGH (ref 1.7–9.3)
NEUTROPHILS # BLD AUTO: 3.53 K/UL — SIGNIFICANT CHANGE UP (ref 1.4–6.5)
NEUTROPHILS # BLD AUTO: 3.53 K/UL — SIGNIFICANT CHANGE UP (ref 1.4–6.5)
NEUTROPHILS NFR BLD AUTO: 67.8 % — SIGNIFICANT CHANGE UP (ref 42.2–75.2)
NEUTROPHILS NFR BLD AUTO: 67.8 % — SIGNIFICANT CHANGE UP (ref 42.2–75.2)
NRBC # BLD: 0 /100 WBCS — SIGNIFICANT CHANGE UP (ref 0–0)
NRBC # BLD: 0 /100 WBCS — SIGNIFICANT CHANGE UP (ref 0–0)
PLATELET # BLD AUTO: 76 K/UL — LOW (ref 130–400)
PLATELET # BLD AUTO: 76 K/UL — LOW (ref 130–400)
PMV BLD: 12.3 FL — HIGH (ref 7.4–10.4)
PMV BLD: 12.3 FL — HIGH (ref 7.4–10.4)
POTASSIUM SERPL-MCNC: 4.8 MMOL/L — SIGNIFICANT CHANGE UP (ref 3.5–5)
POTASSIUM SERPL-MCNC: 4.8 MMOL/L — SIGNIFICANT CHANGE UP (ref 3.5–5)
POTASSIUM SERPL-SCNC: 4.8 MMOL/L — SIGNIFICANT CHANGE UP (ref 3.5–5)
POTASSIUM SERPL-SCNC: 4.8 MMOL/L — SIGNIFICANT CHANGE UP (ref 3.5–5)
PROT SERPL-MCNC: 7.1 G/DL — SIGNIFICANT CHANGE UP (ref 6–8)
PROT SERPL-MCNC: 7.1 G/DL — SIGNIFICANT CHANGE UP (ref 6–8)
PTH-INTACT FLD-MCNC: 159 PG/ML — HIGH (ref 15–65)
PTH-INTACT FLD-MCNC: 159 PG/ML — HIGH (ref 15–65)
RBC # BLD: 3.27 M/UL — LOW (ref 4.2–5.4)
RBC # BLD: 3.27 M/UL — LOW (ref 4.2–5.4)
RBC # FLD: 18 % — HIGH (ref 11.5–14.5)
RBC # FLD: 18 % — HIGH (ref 11.5–14.5)
SODIUM SERPL-SCNC: 139 MMOL/L — SIGNIFICANT CHANGE UP (ref 135–146)
SODIUM SERPL-SCNC: 139 MMOL/L — SIGNIFICANT CHANGE UP (ref 135–146)
VIT D25+D1,25 OH+D1,25 PNL SERPL-MCNC: 16.1 PG/ML — LOW (ref 19.9–79.3)
VIT D25+D1,25 OH+D1,25 PNL SERPL-MCNC: 16.1 PG/ML — LOW (ref 19.9–79.3)
WBC # BLD: 5.21 K/UL — SIGNIFICANT CHANGE UP (ref 4.8–10.8)
WBC # BLD: 5.21 K/UL — SIGNIFICANT CHANGE UP (ref 4.8–10.8)
WBC # FLD AUTO: 5.21 K/UL — SIGNIFICANT CHANGE UP (ref 4.8–10.8)
WBC # FLD AUTO: 5.21 K/UL — SIGNIFICANT CHANGE UP (ref 4.8–10.8)

## 2023-10-20 PROCEDURE — 99239 HOSP IP/OBS DSCHRG MGMT >30: CPT

## 2023-10-20 PROCEDURE — 93010 ELECTROCARDIOGRAM REPORT: CPT

## 2023-10-20 RX ORDER — DIPHENHYDRAMINE HCL 50 MG
25 CAPSULE ORAL EVERY 6 HOURS
Refills: 0 | Status: DISCONTINUED | OUTPATIENT
Start: 2023-10-20 | End: 2023-10-20

## 2023-10-20 RX ORDER — ACETAMINOPHEN 500 MG
975 TABLET ORAL EVERY 8 HOURS
Refills: 0 | Status: DISCONTINUED | OUTPATIENT
Start: 2023-10-20 | End: 2023-10-20

## 2023-10-20 RX ORDER — HYDROMORPHONE HYDROCHLORIDE 2 MG/ML
1 INJECTION INTRAMUSCULAR; INTRAVENOUS; SUBCUTANEOUS
Qty: 30 | Refills: 0
Start: 2023-10-20 | End: 2023-10-24

## 2023-10-20 RX ORDER — ACETAMINOPHEN 500 MG
2 TABLET ORAL
Qty: 112 | Refills: 0
Start: 2023-10-20 | End: 2023-11-02

## 2023-10-20 RX ORDER — DIPHENHYDRAMINE HCL 50 MG
2 CAPSULE ORAL
Qty: 112 | Refills: 0
Start: 2023-10-20 | End: 2023-11-02

## 2023-10-20 RX ADMIN — HEPARIN SODIUM 5000 UNIT(S): 5000 INJECTION INTRAVENOUS; SUBCUTANEOUS at 13:23

## 2023-10-20 RX ADMIN — HEPARIN SODIUM 5000 UNIT(S): 5000 INJECTION INTRAVENOUS; SUBCUTANEOUS at 06:21

## 2023-10-20 RX ADMIN — Medication 81 MILLIGRAM(S): at 12:35

## 2023-10-20 RX ADMIN — GABAPENTIN 300 MILLIGRAM(S): 400 CAPSULE ORAL at 06:22

## 2023-10-20 RX ADMIN — Medication 975 MILLIGRAM(S): at 13:22

## 2023-10-20 RX ADMIN — Medication 975 MILLIGRAM(S): at 06:23

## 2023-10-20 RX ADMIN — Medication 100 MILLIGRAM(S): at 17:56

## 2023-10-20 RX ADMIN — Medication 200 MILLIGRAM(S): at 06:22

## 2023-10-20 RX ADMIN — Medication 100 MILLIGRAM(S): at 06:23

## 2023-10-20 RX ADMIN — Medication 975 MILLIGRAM(S): at 14:25

## 2023-10-20 RX ADMIN — PANTOPRAZOLE SODIUM 40 MILLIGRAM(S): 20 TABLET, DELAYED RELEASE ORAL at 06:23

## 2023-10-20 RX ADMIN — Medication 975 MILLIGRAM(S): at 07:00

## 2023-10-20 RX ADMIN — Medication 90 MILLIGRAM(S): at 06:22

## 2023-10-20 RX ADMIN — SEVELAMER CARBONATE 800 MILLIGRAM(S): 2400 POWDER, FOR SUSPENSION ORAL at 17:57

## 2023-10-20 RX ADMIN — METHOCARBAMOL 500 MILLIGRAM(S): 500 TABLET, FILM COATED ORAL at 06:22

## 2023-10-20 RX ADMIN — Medication 25 MILLIGRAM(S): at 14:14

## 2023-10-20 RX ADMIN — CALCITRIOL 0.25 MICROGRAM(S): 0.5 CAPSULE ORAL at 12:35

## 2023-10-20 RX ADMIN — GABAPENTIN 300 MILLIGRAM(S): 400 CAPSULE ORAL at 13:23

## 2023-10-20 RX ADMIN — SEVELAMER CARBONATE 800 MILLIGRAM(S): 2400 POWDER, FOR SUSPENSION ORAL at 08:15

## 2023-10-20 RX ADMIN — SEVELAMER CARBONATE 800 MILLIGRAM(S): 2400 POWDER, FOR SUSPENSION ORAL at 12:35

## 2023-10-20 RX ADMIN — Medication 200 MILLIGRAM(S): at 17:56

## 2023-10-20 RX ADMIN — LIDOCAINE 1 PATCH: 4 CREAM TOPICAL at 13:22

## 2023-10-20 RX ADMIN — METHOCARBAMOL 500 MILLIGRAM(S): 500 TABLET, FILM COATED ORAL at 17:56

## 2023-10-20 NOTE — PROGRESS NOTE ADULT - ASSESSMENT
Patient is 57 y/o F with PMH of ESRD on HD, HLD, HTN, PVD, CVA and  with hearing impairment presenting for severe back pain.    #Back pain 2/2 to mechanical fall after hemodialysis  - Complains of pain in chest, back, shoulder legs, abdomen and gums along with generalized pain.   - Patient does not attribute it to fall and reports having these symptoms chronically.  - No alarm symptoms per initial history   - No acute findings on CT abd/chest/neck/head and -Xray L shoulder, tspine, lspine  - Anasarca and ascites noted on CT AP w/ contrast.   - Venous duplex negative.  - Pain control: c/w with home gabapentin 300 TID, c/w home robaxin 500 bid, lidocaine patch, and dilaudid 2mg q4 prn  -PT    #Nausea  - hx of nausea, emesis and hx of blood in stool.  - hemodynamically stable,   had an appointment with GI today in clinic that got canceled, GI consult,   monitor H/H  zofran prn , monitor QTc,       #Dizziness after hemodialysis  #ESRD on HD   - Anasarca and ascites noted on CT AP w/ contrast.   - Denies LOC, or syncopal/seizure symptoms  -on HD via left UE AVG - T/Th/Sat  - nephro consulted  - c/w sevelamer 800mg qac  - c/w Calcitriol 0.25 q24  - Hemodialysis today  - check orthostatic vital signs  - Cardiac loop monitoring due to cardiac history. follows     #HTN  - cont home nifedipine 90   - c/w home labetalol 200 bid  - c/w homehydralazine 100 bid     #Hx of HFpEF  -TTE 2/23: GIIDD  +  pHTN  -on RA, not in acute exacerbation     #h/o R pontine CVA (Feb 2021)  #DLD  -c/w aspirin 81  - c/w atorva 80 HS    #h/o PVD;  -Recent left leg angiogram & angioplasty with vascular last month   - cont aspirin/statin     dvt ppx: heparin  GI ppx: protonix (home med)  diet: renal  dispo: from home       Progress Note Handoff    Disposition: home   code status full code    
57 y/o F with PMHx of ESRD on HD T/Th/Sat last HD yesterday , hyperlipidemia, HTN, PVD, CVA and hearing impaired presents to the ED with c/o fall four days back and c/o left sided arm and leg pain. Had regular HD session on Tuesday. Nephrology was consulted for ESRD    ESRD on HD/ PVD/ Fall  - TTS  HD via AVG  - HD in AM 3h UF 3l   - Blood pressure elevated. Resume home medications hydralazine 100 mg BID and labetalol 200 mg BID can increase to q8h/ nifedipine XR 90 mg OD.   - Monitor blood pressure and titrate medications. Goal BP below 130/80  - Anemia of ESRD. Hbg 10.2. On HANNAH with HD / can resume OP on DC   - MBD. Phos ar target / PTH at target     will follow         
57 y/o F with PMHx of ESRD on HD T/Th/Sat last HD yesterday , hyperlipidemia, HTN, PVD, CVA and hearing impaired presents to the ED with c/o fall four days back and c/o left sided arm and leg pain. Had regular HD session on Tuesday. Nephrology was consulted for ESRD    ESRD on HD/ PVD/ Fall  - TTS  HD via AVG  - Last session outpatient on 10/17/23  - HD today  3 L UF, 3 hrs and 2 K   - Blood pressure elevated. Resume home medications hydralazine 100 mg BID and labetalol 200 mg BID can increase to q8h/ nifedipine XR 90 mg OD.   - Monitor blood pressure and titrate medications. Goal BP below 130/80  - Anemia of ESRD. Hbg 10.2. On HANNAH with HD   - MBD. Get phosphorous, Vitamin D  and PTH levels. Off sevelamer    will follow

## 2023-10-20 NOTE — CONSULT NOTE ADULT - SUBJECTIVE AND OBJECTIVE BOX
Gastroenterology Initial Consult Note      Location: [facility] 3B (Back) 023 B [facility]  Patient Name: SEN ANN  Age: 58y  Gender: Female      Chief Complaint  Patient is a 58y old Female who presents with a chief complaint of   Primary diagnosis of Anisocoria      Reason for Consult      History of Present Illness  58 year old female patient who is mute with hearing impairment, ESRD on HD, DL, PVD with left SFA occlusion, HTN, and DM who was brought to the ED on 10/17 for evaluation of back pain after a recent fall, found to be hemodynamically stable, admitted for further management.     We are consulted for dyspepsia evaluation.  Per patient, she has been complaining of achy epigastric discomfort for 1 year.  This has been persistent throughout the day and does not worsen with meals.  It has been associated with nausea, burping, bloating, and distention that doesn't improve markedly with HD.  She notes constipation (1x blood on wiping).  Brother in law notes subjective weight loss (mainly thinning of extremities despite abdominal distention).  * No prior EGD  * Baseline Hb 08/2023 9.3  * Hemodynamically stable  * Labs: Hb 10.2 on 10/17 -> 10.1 on 10/19  * Iron with Total Binding Capacity in AM (07.09.23 @ 16:11)    % Saturation, Iron: 24 %   Iron - Total Binding Capacity.: 243 ug/dL   Iron Total: 58 ug/dL   Unsaturated Iron Binding Capacity: 185 ug/dL  * Ferritin (07.09.23 @ 16:11)    Ferritin: 518 ng/mL  * CT Abdomen and Pelvis w/ IV Cont (10.18.23 @ 01:24) No evidence of an acute traumatic solid organ or osseous injury. Anasarca, moderate to large ascites, bilateral small-to-moderate pleural  effusions. Correlate for fluid overload.    We are consulted for dyspepsia and 1x blood on wiping.      Prior EGD:  as below      Prior Colonoscopy:  as below      Past Medical and Surgical History:  PVD (peripheral vascular disease)  Benign essential HTN  Intellectual disability  Hearing impaired  HLD (hyperlipidemia)  Chronic kidney disease, unspecified CKD stage  CVA (cerebral vascular accident)  DM (diabetes mellitus)  H/O vascular surgery  left leg  H/O fracture of leg  S/P arteriovenous (AV) fistula creation  S/P debridement  History of partial amputation of toe of left foot  S/P femoral-popliteal bypass surgery      Home Medications:  Home Medications:  Albuterol (Eqv-ProAir HFA) 90 mcg/inh inhalation aerosol: 2 puff(s) inhaled as needed for  bronchospasm (06 Sep 2023 06:32)  NovoLOG Mix 70/30 FlexPen subcutaneous suspension: subcutaneous 3 times a day with meals- sliding scale (06 Sep 2023 06:32)      Social History:  Tobacco: denies   Alcohol: denies   Drugs: denies      Allergies:  NSAIDs (Nephrotoxicity)  penicillin (Rash)      Family History:  FAMILY HISTORY:  no GI malignancies      Vital Signs in the last 24 hours   Vitals Summary T(C): 37.1 (10-20-23 @ 05:27), Max: 37.1 (10-20-23 @ 05:27)  HR: 76 (10-20-23 @ 05:27) (58 - 76)  BP: 178/78 (10-20-23 @ 05:27) (118/50 - 178/78)  RR: 18 (10-20-23 @ 05:27) (18 - 18)  SpO2: --  Vent Data   Intake/ Output   10-19-23 @ 07:01  -  10-20-23 @ 07:00  --------------------------------------------------------  IN: 0 mL / OUT: 3000 mL / NET: -3000 mL        Physical Exam  * General Appearance: Alert, cooperative, interactive, oriented to time, place, and person, in no acute distress  * Lungs: Good bilateral air entry, audible crackles  * Heart: Regular Rate and Rhythm, normal S1 and S2, no audible murmur, rub, or gallop  * Abdomen: Symmetric, mildly distended, soft, non-tender, bowel sounds active all four quadrants, no masses, no organomegaly  * Rectal: Brown stool, Normal tone, no palpable masses or tenderness      Investigations   Laboratory Workup      - CBC:                        10.1   5.40  )-----------( 77       ( 19 Oct 2023 08:58 )             30.4       - Hgb Trend:  10.1  10-19-23 @ 08:58  10.2  10-18-23 @ 00:42          - Chemistry:  10-19    138  |  95<L>  |  35<H>  ----------------------------<  95  5.0   |  27  |  5.6<HH>    Ca    8.6      19 Oct 2023 08:58  Phos  5.3     10-19  Mg     2.3     10-19    TPro  6.8  /  Alb  4.2  /  TBili  0.9  /  DBili  x   /  AST  15  /  ALT  9   /  AlkPhos  585<H>  10-19    Liver panel trend:  TBili 0.9   /   AST 15   /   ALT 9   /   AlkP 585   /   Tptn 6.8   /   Alb 4.2    /   DBili --      10-19  TBili 1.1   /   AST 18   /   ALT 10   /   AlkP 622   /   Tptn 7.1   /   Alb 4.5    /   DBili --      10-18        Microbiological Workup  Urinalysis Basic - ( 19 Oct 2023 08:58 )    Color: x / Appearance: x / SG: x / pH: x  Gluc: 95 mg/dL / Ketone: x  / Bili: x / Urobili: x   Blood: x / Protein: x / Nitrite: x   Leuk Esterase: x / RBC: x / WBC x   Sq Epi: x / Non Sq Epi: x / Bacteria: x      Current Medications  Standing Medications  acetaminophen     Tablet .. 975 milliGRAM(s) Oral every 8 hours  aspirin enteric coated 81 milliGRAM(s) Oral daily  atorvastatin 80 milliGRAM(s) Oral at bedtime  calcitriol   Capsule 0.25 MICROGram(s) Oral daily  gabapentin 300 milliGRAM(s) Oral three times a day  heparin   Injectable 5000 Unit(s) SubCutaneous every 8 hours  hydrALAZINE 100 milliGRAM(s) Oral two times a day  labetalol 200 milliGRAM(s) Oral two times a day  lidocaine   4% Patch 1 Patch Transdermal every 24 hours  methocarbamol 500 milliGRAM(s) Oral two times a day  NIFEdipine XL 90 milliGRAM(s) Oral daily  pantoprazole    Tablet 40 milliGRAM(s) Oral before breakfast  sevelamer carbonate 800 milliGRAM(s) Oral three times a day with meals    PRN Medications  HYDROmorphone   Tablet 2 milliGRAM(s) Oral every 4 hours PRN Severe Pain (7 - 10)    Singles Doses Administered  (ADM OVERRIDE) 1 each &lt;see task&gt; GiveOnce  (ADM OVERRIDE) 2 each &lt;see task&gt; GiveOnce  (ADM OVERRIDE) 2 each &lt;see task&gt; GiveOnce  (ADM OVERRIDE) 1 each &lt;see task&gt; GiveOnce  (ADM OVERRIDE) 1 each &lt;see task&gt; GiveOnce  (ADM OVERRIDE) 1 each &lt;see task&gt; GiveOnce  (ADM OVERRIDE) 1 each &lt;see task&gt; GiveOnce  (ADM OVERRIDE) 2 each &lt;see task&gt; GiveOnce  (ADM OVERRIDE) 1 each &lt;see task&gt; GiveOnce  (ADM OVERRIDE) 1 each &lt;see task&gt; GiveOnce  (ADM OVERRIDE) 1 each &lt;see task&gt; GiveOnce  acetaminophen     Tablet .. 650 milliGRAM(s) Oral once  HYDROmorphone  Injectable 2 milliGRAM(s) IV Push once  HYDROmorphone  Injectable 2 milliGRAM(s) IV Push once  lidocaine   4% Patch 1 Patch Transdermal Once  morphine  - Injectable 4 milliGRAM(s) IV Push Once  ondansetron    Tablet 4 milliGRAM(s) Oral once  ondansetron    Tablet 4 milliGRAM(s) Oral once  ondansetron    Tablet 4 milliGRAM(s) Oral once  oxyCODONE    IR 2.5 milliGRAM(s) Oral once     Gastroenterology Initial Consult Note      Location: [address] (Back) 023 B [facility]  Patient Name: SEN ANN  Age: 58y  Gender: Female      Chief Complaint  Patient is a 58y old Female who presents with a chief complaint of   Primary diagnosis of Anisocoria      Reason for Consult  Dyspepsia      History of Present Illness  58 year old female patient who is mute with hearing impairment, ESRD on HD, DL, PVD with left SFA occlusion, HTN, and DM who was brought to the ED on 10/17 for evaluation of back pain after a recent fall, found to be hemodynamically stable, admitted for further management.     We are consulted for dyspepsia evaluation.  Per patient, she has been complaining of achy epigastric discomfort for 1 year.  This has been persistent throughout the day and does not worsen with meals.  It has been associated with nausea, burping, bloating, and distention that doesn't improve markedly with HD.  She notes constipation (1x blood on wiping).  Brother in law notes subjective weight loss (mainly thinning of extremities despite abdominal distention).  * No prior EGD  * Baseline Hb 08/2023 9.3  * Hemodynamically stable  * Labs: Hb 10.2 on 10/17 -> 10.1 on 10/19  * Iron with Total Binding Capacity in AM (07.09.23 @ 16:11)    % Saturation, Iron: 24 %   Iron - Total Binding Capacity.: 243 ug/dL   Iron Total: 58 ug/dL   Unsaturated Iron Binding Capacity: 185 ug/dL  * Ferritin (07.09.23 @ 16:11)    Ferritin: 518 ng/mL  * CT Abdomen and Pelvis w/ IV Cont (10.18.23 @ 01:24) No evidence of an acute traumatic solid organ or osseous injury. Anasarca, moderate to large ascites, bilateral small-to-moderate pleural  effusions. Correlate for fluid overload.    We are consulted for dyspepsia and 1x blood on wiping.      Prior EGD:  as below      Prior Colonoscopy:  as below      Past Medical and Surgical History:  PVD (peripheral vascular disease)  Benign essential HTN  Intellectual disability  Hearing impaired  HLD (hyperlipidemia)  Chronic kidney disease, unspecified CKD stage  CVA (cerebral vascular accident)  DM (diabetes mellitus)  H/O vascular surgery  left leg  H/O fracture of leg  S/P arteriovenous (AV) fistula creation  S/P debridement  History of partial amputation of toe of left foot  S/P femoral-popliteal bypass surgery      Home Medications:  Home Medications:  Albuterol (Eqv-ProAir HFA) 90 mcg/inh inhalation aerosol: 2 puff(s) inhaled as needed for  bronchospasm (06 Sep 2023 06:32)  NovoLOG Mix 70/30 FlexPen subcutaneous suspension: subcutaneous 3 times a day with meals- sliding scale (06 Sep 2023 06:32)      Social History:  Tobacco: denies   Alcohol: denies   Drugs: denies      Allergies:  NSAIDs (Nephrotoxicity)  penicillin (Rash)      Family History:  FAMILY HISTORY:  no GI malignancies      Vital Signs in the last 24 hours   Vitals Summary T(C): 37.1 (10-20-23 @ 05:27), Max: 37.1 (10-20-23 @ 05:27)  HR: 76 (10-20-23 @ 05:27) (58 - 76)  BP: 178/78 (10-20-23 @ 05:27) (118/50 - 178/78)  RR: 18 (10-20-23 @ 05:27) (18 - 18)  SpO2: --  Vent Data   Intake/ Output   10-19-23 @ 07:01  -  10-20-23 @ 07:00  --------------------------------------------------------  IN: 0 mL / OUT: 3000 mL / NET: -3000 mL        Physical Exam  * General Appearance: Alert, cooperative, interactive, oriented to time, place, and person, in no acute distress  * Lungs: Good bilateral air entry, audible crackles  * Heart: Regular Rate and Rhythm, normal S1 and S2, no audible murmur, rub, or gallop  * Abdomen: Symmetric, mildly distended, soft, non-tender, bowel sounds active all four quadrants, no masses, no organomegaly  * Rectal: Brown stool, Normal tone, no palpable masses or tenderness      Investigations   Laboratory Workup      - CBC:                        10.1   5.40  )-----------( 77       ( 19 Oct 2023 08:58 )             30.4       - Hgb Trend:  10.1  10-19-23 @ 08:58  10.2  10-18-23 @ 00:42          - Chemistry:  10-19    138  |  95<L>  |  35<H>  ----------------------------<  95  5.0   |  27  |  5.6<HH>    Ca    8.6      19 Oct 2023 08:58  Phos  5.3     10-19  Mg     2.3     10-19    TPro  6.8  /  Alb  4.2  /  TBili  0.9  /  DBili  x   /  AST  15  /  ALT  9   /  AlkPhos  585<H>  10-19    Liver panel trend:  TBili 0.9   /   AST 15   /   ALT 9   /   AlkP 585   /   Tptn 6.8   /   Alb 4.2    /   DBili --      10-19  TBili 1.1   /   AST 18   /   ALT 10   /   AlkP 622   /   Tptn 7.1   /   Alb 4.5    /   DBili --      10-18        Microbiological Workup  Urinalysis Basic - ( 19 Oct 2023 08:58 )    Color: x / Appearance: x / SG: x / pH: x  Gluc: 95 mg/dL / Ketone: x  / Bili: x / Urobili: x   Blood: x / Protein: x / Nitrite: x   Leuk Esterase: x / RBC: x / WBC x   Sq Epi: x / Non Sq Epi: x / Bacteria: x      Current Medications  Standing Medications  acetaminophen     Tablet .. 975 milliGRAM(s) Oral every 8 hours  aspirin enteric coated 81 milliGRAM(s) Oral daily  atorvastatin 80 milliGRAM(s) Oral at bedtime  calcitriol   Capsule 0.25 MICROGram(s) Oral daily  gabapentin 300 milliGRAM(s) Oral three times a day  heparin   Injectable 5000 Unit(s) SubCutaneous every 8 hours  hydrALAZINE 100 milliGRAM(s) Oral two times a day  labetalol 200 milliGRAM(s) Oral two times a day  lidocaine   4% Patch 1 Patch Transdermal every 24 hours  methocarbamol 500 milliGRAM(s) Oral two times a day  NIFEdipine XL 90 milliGRAM(s) Oral daily  pantoprazole    Tablet 40 milliGRAM(s) Oral before breakfast  sevelamer carbonate 800 milliGRAM(s) Oral three times a day with meals    PRN Medications  HYDROmorphone   Tablet 2 milliGRAM(s) Oral every 4 hours PRN Severe Pain (7 - 10)    Singles Doses Administered  (ADM OVERRIDE) 1 each &lt;see task&gt; GiveOnce  (ADM OVERRIDE) 2 each &lt;see task&gt; GiveOnce  (ADM OVERRIDE) 2 each &lt;see task&gt; GiveOnce  (ADM OVERRIDE) 1 each &lt;see task&gt; GiveOnce  (ADM OVERRIDE) 1 each &lt;see task&gt; GiveOnce  (ADM OVERRIDE) 1 each &lt;see task&gt; GiveOnce  (ADM OVERRIDE) 1 each &lt;see task&gt; GiveOnce  (ADM OVERRIDE) 2 each &lt;see task&gt; GiveOnce  (ADM OVERRIDE) 1 each &lt;see task&gt; GiveOnce  (ADM OVERRIDE) 1 each &lt;see task&gt; GiveOnce  (ADM OVERRIDE) 1 each &lt;see task&gt; GiveOnce  acetaminophen     Tablet .. 650 milliGRAM(s) Oral once  HYDROmorphone  Injectable 2 milliGRAM(s) IV Push once  HYDROmorphone  Injectable 2 milliGRAM(s) IV Push once  lidocaine   4% Patch 1 Patch Transdermal Once  morphine  - Injectable 4 milliGRAM(s) IV Push Once  ondansetron    Tablet 4 milliGRAM(s) Oral once  ondansetron    Tablet 4 milliGRAM(s) Oral once  ondansetron    Tablet 4 milliGRAM(s) Oral once  oxyCODONE    IR 2.5 milliGRAM(s) Oral once

## 2023-10-20 NOTE — DISCHARGE NOTE PROVIDER - NSDCCPCAREPLAN_GEN_ALL_CORE_FT
PRINCIPAL DISCHARGE DIAGNOSIS  Diagnosis: Anasarca  Assessment and Plan of Treatment: You came to hospital with generalised swelling which got better wiith dialysis. Please followup with nephrologist regularly and keep up with your dialysis sessions      SECONDARY DISCHARGE DIAGNOSES  Diagnosis: Chronic back pain  Assessment and Plan of Treatment: Please take pain meds and muscle relaxants as prescribed

## 2023-10-20 NOTE — CONSULT NOTE ADULT - ASSESSMENT
Assessment and Plan  Case of a 58 year old female patient who is mute with hearing impairment, ESRD on HD, DL, PVD with left SFA occlusion, HTN, and DM who was brought to the ED on 10/17 for evaluation of back pain after a recent fall, found to be hemodynamically stable, admitted for further management. We are consulted for dyspepsia evaluation.      Dyspepsia with Nausea and Vomiting  Anasarca with Abdominal Bloating and Distention Likely Related to Moderate-Large Ascites in Setting of ESRD on HD   * No prior EGD  * Baseline Hb 08/2023 9.3  * Hemodynamically stable  * Labs: Hb 10.2 on 10/17 -> 10.1 on 10/19  * Iron with Total Binding Capacity in AM (07.09.23 @ 16:11)    % Saturation, Iron: 24 %   Iron - Total Binding Capacity.: 243 ug/dL   Iron Total: 58 ug/dL   Unsaturated Iron Binding Capacity: 185 ug/dL  * Ferritin (07.09.23 @ 16:11)    Ferritin: 518 ng/mL  * CT Abdomen and Pelvis w/ IV Cont (10.18.23 @ 01:24) No evidence of an acute traumatic solid organ or osseous injury. Anasarca, moderate to large ascites, bilateral small-to-moderate pleural  effusions. Correlate for fluid overload.    RECOMMENDATIONS  - Will discuss EGD with Dr Dennis  - Monitor for nausea and vomiting. Antiemetics PRN  - Monitor pain. Pain management team on board. Currently on opioids and Methocarbamol 500mg BID  - Bowel regimen to avoid constipation while on opioids: last BM was on 10/17. Start senna 2 tabs at bedtime, miralax QD  - Hemodialysis per nephrology team for ascites  - Continue PO protonix 40mg QD   - Start carafate QID and maalox PRN  - Avoid NSAIDs  - Advance diet as tolerated      History of Hematochezia x1   Chronic Constipation  Chronic Anemia with No Acute Drop in Hb  * Hemodynamically stable  * Baseline Hb 9.3 from 08/2023  * Labs Hb 10.2 on 10/17 -> 10.1 on 1/19  * Iron with Total Binding Capacity in AM (07.09.23 @ 16:11)    % Saturation, Iron: 24 %   Iron - Total Binding Capacity.: 243 ug/dL   Iron Total: 58 ug/dL   Unsaturated Iron Binding Capacity: 185 ug/dL  * One episode of bright red blood on wiping  * SHIRA yellow brown stool, small external hemorrhoids noted  * Last colonoscopy around 5 years ago per patient and brother in law  * Family History:     RECOMMENDATIONS  - Will discuss scheduling colonoscopy with Dr Dennis  - Trend CBC closely BID and transfuse as needed (target Hb >8). Maintain active Type and screen  - Monitor BM for recurrence of hematochezia. Bowel regimen as above  - Not on therapeutic AC  - Diet as above      Hepatic Steatosis  Moderate-Large Ascites in Setting of ESRD on HD   Elevated ALP  * No BMI in chart  * Denies history of alcohol drinking  * Prior US 06/23/2023 hepatic steatosis, small ascites, non specific GB thickening  * CT Abdomen and Pelvis w/ IV Cont (10.18.23 @ 01:24) No evidence of an acute traumatic solid organ or osseous injury. Anasarca, moderate to large ascites, bilateral small-to-moderate pleural  effusions. Correlate for fluid overload.  * LFTs 0.9/585/15/9 on 10/19  * No INR     RECOMMENDATIONS  - Trend hepatic function panel daily  - Check antimitochondrial antibody  - Lifestyle modifications: mainly weight loss through diet modification and exercise  - Avoid hepatotoxic agents and avoid exceeding daily tolerated doses of tylenol if considered    - Follow up with our [facility] located at [address] [place] [address]. Phone Number: [phone]    - Follow up with our [facility] located at [address] [place] [address], [facility]. Telephone No: [phone]    Thank you for your consult.  - Please note that plan was discussed with Dr. PIERRE and communicated with medical team.   - Please reach GI on [phone] during weekdays till 5pm.  - Please call the GI service line after 5pm on Weekdays and anytime on Weekends: [phone].      Kiara Becker MD  PGY - 4 Gastroenterology Fellow   [facility]     Assessment and Plan  Case of a 58 year old female patient who is mute with hearing impairment, ESRD on HD, DL, PVD with left SFA occlusion, HTN, and DM who was brought to the ED on 10/17 for evaluation of back pain after a recent fall, found to be hemodynamically stable, admitted for further management. We are consulted for dyspepsia evaluation.      Dyspepsia with Nausea and Vomiting  Anasarca with Abdominal Bloating and Distention Likely Related to Moderate-Large Ascites in Setting of ESRD on HD   * No prior EGD  * Baseline Hb 08/2023 9.3  * Hemodynamically stable  * Labs: Hb 10.2 on 10/17 -> 10.1 on 10/19  * Iron with Total Binding Capacity in AM (07.09.23 @ 16:11)    % Saturation, Iron: 24 %   Iron - Total Binding Capacity.: 243 ug/dL   Iron Total: 58 ug/dL   Unsaturated Iron Binding Capacity: 185 ug/dL  * Ferritin (07.09.23 @ 16:11)    Ferritin: 518 ng/mL  * CT Abdomen and Pelvis w/ IV Cont (10.18.23 @ 01:24) No evidence of an acute traumatic solid organ or osseous injury. Anasarca, moderate to large ascites, bilateral small-to-moderate pleural  effusions. Correlate for fluid overload.    RECOMMENDATIONS  - Monitor for nausea and vomiting. Antiemetics PRN  - Monitor pain. Pain management team on board. Currently on opioids and Methocarbamol 500mg BID  - Bowel regimen to avoid constipation while on opioids: last BM was on 10/17. Start senna 2 tabs at bedtime, miralax QD  - Hemodialysis per nephrology team for ascites  - Continue PO protonix 40mg QD   - Start carafate QID and maalox PRN  - Avoid NSAIDs  - Advance diet as tolerated  - In case of no improvement, will recommend increasing PPI to BID and outpatient follow up with GI for possible EGD      History of Hematochezia x1   Chronic Constipation  Chronic Anemia with No Acute Drop in Hb  * Hemodynamically stable  * Baseline Hb 9.3 from 08/2023  * Labs Hb 10.2 on 10/17 -> 10.1 on 1/19  * Iron with Total Binding Capacity in AM (07.09.23 @ 16:11)    % Saturation, Iron: 24 %   Iron - Total Binding Capacity.: 243 ug/dL   Iron Total: 58 ug/dL   Unsaturated Iron Binding Capacity: 185 ug/dL  * One episode of bright red blood on wiping  * SHIRA yellow brown stool, small external hemorrhoids noted  * Last colonoscopy around 5 years ago per patient and brother in law  * Family History:     RECOMMENDATIONS  - Outpatient follow up with GI for colonoscopy  - Trend CBC closely BID and transfuse as needed (target Hb >8). Maintain active Type and screen  - Monitor BM for recurrence of hematochezia. Bowel regimen as above  - Not on therapeutic AC  - Diet as above      Hepatic Steatosis  Moderate-Large Ascites in Setting of ESRD on HD   Elevated ALP  * No BMI in chart  * Denies history of alcohol drinking  * Prior US 06/23/2023 hepatic steatosis, small ascites, non specific GB thickening  * CT Abdomen and Pelvis w/ IV Cont (10.18.23 @ 01:24) No evidence of an acute traumatic solid organ or osseous injury. Anasarca, moderate to large ascites, bilateral small-to-moderate pleural  effusions. Correlate for fluid overload.  * LFTs 0.9/585/15/9 on 10/19  * No INR     RECOMMENDATIONS  - Trend hepatic function panel daily  - Check antimitochondrial antibody  - Lifestyle modifications: mainly weight loss through diet modification and exercise  - Avoid hepatotoxic agents and avoid exceeding daily tolerated doses of tylenol if considered    - Follow up with our [facility] located at [address] [place] [address]. Phone Number: [phone]    - Follow up with our [facility] located at [address] [place] [address], [facility]. Telephone No: [phone]    Thank you for your consult.  - Please note that plan was discussed with Dr. PIERRE and communicated with medical team.   - Please reach GI on [phone] during weekdays till 5pm.  - Please call the GI service line after 5pm on Weekdays and anytime on Weekends: [phone].      Kiara Becker MD  PGY - 4 Gastroenterology Fellow   [facility]

## 2023-10-20 NOTE — DISCHARGE NOTE NURSING/CASE MANAGEMENT/SOCIAL WORK - PATIENT PORTAL LINK FT
You can access the FollowMyHealth Patient Portal offered by [facility] by registering at the following website: [email or web address]. By joining [facility]’s FollowMyHealth portal, you will also be able to view your health information using other applications (apps) compatible with our system.

## 2023-10-20 NOTE — PHYSICAL THERAPY INITIAL EVALUATION ADULT - PERTINENT HX OF CURRENT PROBLEM, REHAB EVAL
57 y/o F with PMHx of ESRD on HD T/Th/Sat (follows w nephro Dr Solis Borja), hyperlipidemia, HTN, PVD, CVA and mute and hearing impaired uses sign language to communicate presents with back pain. History obtained and facilitated with sign language from Brother in law who she lives with at bedside. Patient had a mechanical fall about 4 days ago, slipped on something and fell backwards and landed on her side and back

## 2023-10-20 NOTE — DISCHARGE NOTE PROVIDER - HOSPITAL COURSE
10/18   : Shannan Sanderson at the bedside   HPI:  57 y/o F with PMHx of ESRD on HD T/Th/Sat (follows w nephsalvador Borja), hyperlipidemia, HTN, PVD, CVA and mute and hearing impaired uses sign language to communicate presents with back pain. History obtained and facilitated with sign language from Brother in law who she lives with at bedside. Patient had a mechanical fall about 4 days ago, slipped on something and fell backwards and landed on her side and back, denies LOC or hitting her head. Unclear as to timeline of when pain started but seemingly worse yesterday prompting family to bring her to the ED. Pt also complaining of somewhat diffuse pain including in her left neck, left arm, some abdominal and leg pain but brother in law states that the leg and abd pain are chronic, no acute worsening. Pt does not produce urine since starting HD a year ago. Denies alarm symptoms, no fecal incontinence, saddle anesthesia, worsening weakness or sensory loss. Denies chest pain or sob. Had her HD yesterday.     pt fell after HD, she felt dizzy and nausea, no chest pain, no head trauma, brother in law at the bedside , he states pt slipped and hit her back.now her back is hurting   ROS is significant for leg swelling L>R, also hx of GIB.     # sp fall, back pain, check T and LS spine xrays   pain management  cont dilaudid iv prn for now    # pt with dizziness post HD  check orthostatic  hx of cva and cad, and Pad  transfer to tele , may benefit from loop recorder or MCOT   cardiac enzymes,   pt eval     # leg swelling, hx of HFpEF , with pulm HTN cont meds  check doppler lower ext     # nausea, vomiting, hx of blood in stool, hemodynamically stable,   had an appointment with GI today in clinic that got canceled, GI consult,   monitor H/H  zofran prn , monitor QTc,     < from: 12 Lead ECG (10.18.23 @ 00:02) >  QTC Calculation(Bazett) 489 ms  < end of copied text >    # Anasarca, moderate to large ascites, bilateral small-to-moderate pleural , pericardial effusion in echo from last admission, leg swelling   etiology unknown , possible fluid overload,     Alb: 4.5 g/dL / Pro: 7.1 g/dL / ALK PHOS: 622 U/L / ALT: 10 U/L / AST: 18 U/L / GGT: x     /  ana m x    / 1.1 mg/dL ( 18 Oct 2023 00:42 )    # ESRD cont HD per nephro     # anemia, of chronic disease   Iron Total: 58 ug/dL (07.09.23 @ 16:11)  Ferritin: 518 ng/mL (07.09.23 @ 16:11)    Progress Note Handoff    Pending :  pain control, x-ray of TLS, PT eval   Family discussion: dw pt and her brother in law at the bedside   Disposition: home   code status full code    pt is high risk of morbidity due to : cva, multiple falls, pain requiring iv pain meds, ESRD on HD , HF and GIB .      - nephrology: HD tomorrow, resume home medications (hydralazine, labetalol, nifedipine)    10/19  - No acute findings on CT abd/chest/neck/head and -Xray L shoulder, tspine, lspine  - Anasarca and ascites noted on CT AP w/ contrast.   - Venous duplex negative.  - Pain control: c/w with home gabapentin 300 TID, c/w home robaxin 500 bid, lidocaine patch, and dilaudid 2mg q4 prn  - orthostatic vital signs negative       59 y/o F with PMHx of ESRD on HD T/Th/Sat (follows w nephro Dr Solis Borja), hyperlipidemia, HTN, PVD, CVA and mute and hearing impaired uses sign language to communicate presents with back pain. History obtained and facilitated with sign language from Brother in law who she lives with at bedside. Patient had a mechanical fall about 4 days ago, slipped on something and fell backwards and landed on her side and back, denies LOC or hitting her head. Unclear as to timeline of when pain started but seemingly worse yesterday prompting family to bring her to the ED. Pt also complaining of somewhat diffuse pain including in her left neck, left arm, some abdominal and leg pain but brother in law states that the leg and abd pain are chronic, no acute worsening. Pt does not produce urine since starting HD a year ago. Denies alarm symptoms, no fecal incontinence, saddle anesthesia, worsening weakness or sensory loss. Denies chest pain or sob. Had her HD yesterday.     pt fell after HD, she felt dizzy and nausea, no chest pain, no head trauma, brother in law at the bedside , he states pt slipped and hit her back.now her back is hurting   ROS is significant for leg swelling L>R, also hx of GIB.     # sp fall, back pain, check T and LS spine xrays   pain controlled     # nausea, vomiting, hx of blood in stool, hemodynamically stable,   had an appointment with GI in clinic that got canceled  Consulted GI who recommended outpatient visit    # Anasarca, moderate to large ascites, bilateral small-to-moderate pleural , pericardial effusion in echo from last admission, leg swelling   got better with HD     # ESRD   Dialysed per nephro     # anemia, of chronic disease   Iron Total: 58 ug/dL (07.09.23 @ 16:11)  Ferritin: 518 ng/mL (07.09.23 @ 16:11)

## 2023-10-20 NOTE — DISCHARGE NOTE PROVIDER - CARE PROVIDER_API CALL
Shira Damon  Internal Medicine  [address] [place] [id number]  Phone: [phone]  Fax: [phone]  Follow Up Time: 1 week

## 2023-10-20 NOTE — DISCHARGE NOTE PROVIDER - NSDCFUSCHEDAPPT_GEN_ALL_CORE_FT
Alpesh Llamas  [facility] PreAdmits  Scheduled Appointment: 11/27/2023    [facility] Physician [facility]  PODIATRY  [address]  Scheduled Appointment: 11/27/2023    Behuria, Supreeti  [facility] PreAdmits  Scheduled Appointment: 12/05/2023    [facility]  CARDIOLOGY  [address]   Scheduled Appointment: 12/05/2023    Lexx Muñoz  [facility] Physician [facility]  VASCULAR [address]  Scheduled Appointment: 12/26/2023

## 2023-10-20 NOTE — PROGRESS NOTE ADULT - SUBJECTIVE AND OBJECTIVE BOX
Nephrology progress note    THIS IS AN INCOMPLETE NOTE . FULL NOTE TO FOLLOW SHORTLY    Patient is seen and examined, events over the last 24 h noted .    Allergies:  NSAIDs (Nephrotoxicity)  penicillin (Rash)    Hospital Medications:   MEDICATIONS  (STANDING):  acetaminophen     Tablet .. 975 milliGRAM(s) Oral every 8 hours  aspirin enteric coated 81 milliGRAM(s) Oral daily  atorvastatin 80 milliGRAM(s) Oral at bedtime  calcitriol   Capsule 0.25 MICROGram(s) Oral daily  gabapentin 300 milliGRAM(s) Oral three times a day  heparin   Injectable 5000 Unit(s) SubCutaneous every 8 hours  hydrALAZINE 100 milliGRAM(s) Oral two times a day  labetalol 200 milliGRAM(s) Oral two times a day  lidocaine   4% Patch 1 Patch Transdermal every 24 hours  methocarbamol 500 milliGRAM(s) Oral two times a day  NIFEdipine XL 90 milliGRAM(s) Oral daily  pantoprazole    Tablet 40 milliGRAM(s) Oral before breakfast  sevelamer carbonate 800 milliGRAM(s) Oral three times a day with meals        VITALS:  T(F): 98.7 (10-20-23 @ 05:27), Max: 98.7 (10-20-23 @ 05:27)  HR: 76 (10-20-23 @ 05:27)  BP: 178/78 (10-20-23 @ 05:27)  RR: 18 (10-20-23 @ 05:27)  SpO2: --  Wt(kg): --    10-19 @ 07:01  -  10-20 @ 07:00  --------------------------------------------------------  IN: 0 mL / OUT: 3000 mL / NET: -3000 mL          PHYSICAL EXAM:  Constitutional: NAD  HEENT: anicteric sclera, oropharynx clear, MMM  Neck: No JVD  Respiratory: CTAB, no wheezes, rales or rhonchi  Cardiovascular: S1, S2, RRR  Gastrointestinal: BS+, soft, NT/ND  Extremities: No cyanosis or clubbing. No peripheral edema  :  No mcgrath.   Skin: No rashes    LABS:  10-19    138  |  95<L>  |  35<H>  ----------------------------<  95  5.0   |  27  |  5.6<HH>    Ca    8.6      19 Oct 2023 08:58  Phos  5.3     10-19  Mg     2.3     10-19    TPro  6.8  /  Alb  4.2  /  TBili  0.9  /  DBili      /  AST  15  /  ALT  9   /  AlkPhos  585<H>  10-19                          9.8    5.21  )-----------( 76       ( 20 Oct 2023 07:53 )             30.2       Urine Studies:  Urinalysis Basic - ( 19 Oct 2023 08:58 )    Color:  / Appearance:  / SG:  / pH:   Gluc: 95 mg/dL / Ketone:   / Bili:  / Urobili:    Blood:  / Protein:  / Nitrite:    Leuk Esterase:  / RBC:  / WBC    Sq Epi:  / Non Sq Epi:  / Bacteria:           Iron 58, TIBC 243, %sat 24      [07-09-23 @ 16:11]  Ferritin 518      [07-09-23 @ 16:11]  PTH -- (Ca 8.5)      [10-19-23 @ 08:58]   159  PTH -- (Ca 8.9)      [06-23-23 @ 08:41]   92  Vitamin D (25OH) 28      [10-19-23 @ 08:58]  Lipid: chol --, TG 72, HDL --, LDL --      [07-10-23 @ 12:13]    HBsAg Nonreact      [05-13-22 @ 18:00]  HCV 0.16, Nonreact      [05-13-22 @ 18:00]    OLVIN: titer 1:80, pattern Speckled      [11-04-21 @ 12:47]  dsDNA <12      [05-13-22 @ 18:00]  ANCA: cANCA Negative, pANCA Negative, atypical ANCA Negative      [05-13-22 @ 18:00]  PLA2R: MARIA GUADALUPE <1.8, IFA --      [05-13-22 @ 18:00]  Free Light Chains: kappa 10.04, lambda 8.96, ratio = 1.12      [05-12 @ 08:21]  Immunofixation Serum:   IgM Lambda Band Identified    Reference Range: None Detected      [05-13-22 @ 10:55]  SPEP Interpretation: Gamma-Migrating Paraprotein Identified      [05-12-22 @ 08:21]  Immunofixation Urine: Weak Bence Howard protein Lambda type      [05-11-22 @ 20:07]  UPEP Interpretation: Mild Selective (Glomerular) Proteinuria      [11-23-21 @ 12:40]      RADIOLOGY & ADDITIONAL STUDIES:   Nephrology progress note    Patient is seen and examined, events over the last 24 h noted .  Lying in bed comfortable     Allergies:  NSAIDs (Nephrotoxicity)  penicillin (Rash)    Hospital Medications:   MEDICATIONS  (STANDING):  acetaminophen     Tablet .. 975 milliGRAM(s) Oral every 8 hours  aspirin enteric coated 81 milliGRAM(s) Oral daily  atorvastatin 80 milliGRAM(s) Oral at bedtime  calcitriol   Capsule 0.25 MICROGram(s) Oral daily  gabapentin 300 milliGRAM(s) Oral three times a day  heparin   Injectable 5000 Unit(s) SubCutaneous every 8 hours  hydrALAZINE 100 milliGRAM(s) Oral two times a day  labetalol 200 milliGRAM(s) Oral two times a day  lidocaine   4% Patch 1 Patch Transdermal every 24 hours  methocarbamol 500 milliGRAM(s) Oral two times a day  NIFEdipine XL 90 milliGRAM(s) Oral daily  pantoprazole    Tablet 40 milliGRAM(s) Oral before breakfast  sevelamer carbonate 800 milliGRAM(s) Oral three times a day with meals        VITALS:  T(F): 98.7 (10-20-23 @ 05:27), Max: 98.7 (10-20-23 @ 05:27)  HR: 76 (10-20-23 @ 05:27)  BP: 178/78 (10-20-23 @ 05:27)  RR: 18 (10-20-23 @ 05:27)      10-19 @ 07:01  -  10-20 @ 07:00  --------------------------------------------------------  IN: 0 mL / OUT: 3000 mL / NET: -3000 mL          PHYSICAL EXAM:  Constitutional: NAD  Respiratory: CTAB,   Cardiovascular: S1, S2, RRR  Gastrointestinal: BS+, soft, NT/ND  Extremities: No cyanosis or clubbing. plus one edema   :  No mcgrath.   Skin: No rashes    LABS:  10-19    138  |  95<L>  |  35<H>  ----------------------------<  95  5.0   |  27  |  5.6<HH>    Ca    8.6      19 Oct 2023 08:58  Phos  5.3     10-19  Mg     2.3     10-19    TPro  6.8  /  Alb  4.2  /  TBili  0.9  /  DBili      /  AST  15  /  ALT  9   /  AlkPhos  585<H>  10-19                          9.8    5.21  )-----------( 76       ( 20 Oct 2023 07:53 )             30.2       Urine Studies:  Urinalysis Basic - ( 19 Oct 2023 08:58 )    Color:  / Appearance:  / SG:  / pH:   Gluc: 95 mg/dL / Ketone:   / Bili:  / Urobili:    Blood:  / Protein:  / Nitrite:    Leuk Esterase:  / RBC:  / WBC    Sq Epi:  / Non Sq Epi:  / Bacteria:           Iron 58, TIBC 243, %sat 24      [07-09-23 @ 16:11]  Ferritin 518      [07-09-23 @ 16:11]  PTH -- (Ca 8.5)      [10-19-23 @ 08:58]   159  PTH -- (Ca 8.9)      [06-23-23 @ 08:41]   92  Vitamin D (25OH) 28      [10-19-23 @ 08:58]  Lipid: chol --, TG 72, HDL --, LDL --      [07-10-23 @ 12:13]    HBsAg Nonreact      [05-13-22 @ 18:00]  HCV 0.16, Nonreact      [05-13-22 @ 18:00]    OLVIN: titer 1:80, pattern Speckled      [11-04-21 @ 12:47]  dsDNA <12      [05-13-22 @ 18:00]  ANCA: cANCA Negative, pANCA Negative, atypical ANCA Negative      [05-13-22 @ 18:00]  PLA2R: MARIA GUADALUPE <1.8, IFA --      [05-13-22 @ 18:00]  Free Light Chains: kappa 10.04, lambda 8.96, ratio = 1.12      [05-12 @ 08:21]  Immunofixation Serum:   IgM Lambda Band Identified    Reference Range: None Detected      [05-13-22 @ 10:55]  SPEP Interpretation: Gamma-Migrating Paraprotein Identified      [05-12-22 @ 08:21]  Immunofixation Urine: Weak Bence Howard protein Lambda type      [05-11-22 @ 20:07]  UPEP Interpretation: Mild Selective (Glomerular) Proteinuria      [11-23-21 @ 12:40]      RADIOLOGY & ADDITIONAL STUDIES:

## 2023-10-20 NOTE — DISCHARGE NOTE PROVIDER - NSDCMRMEDTOKEN_GEN_ALL_CORE_FT
Albuterol (Eqv-ProAir HFA) 90 mcg/inh inhalation aerosol: 2 puff(s) inhaled as needed for  bronchospasm  aspirin 81 mg oral delayed release tablet: 1 tab(s) orally once a day   atorvastatin 80 mg oral tablet: 1 tab(s) orally once a day  calcitriol 0.25 mcg oral capsule: 1 cap(s) orally once a day  gabapentin 300 mg oral capsule: 1 cap(s) orally 3 times a day  hydrALAZINE 100 mg oral tablet: 1 tab(s) orally 2 times a day  labetalol 200 mg oral tablet: 1 tab(s) orally 2 times a day  NIFEdipine (Eqv-Adalat CC) 90 mg oral tablet, extended release: 1 tab(s) orally once a day  NovoLOG Mix 70/30 FlexPen subcutaneous suspension: subcutaneous 3 times a day with meals- sliding scale  pantoprazole 40 mg oral delayed release tablet: 1 tab(s) orally 2 times a day  Robaxin 500 mg oral tablet: 1 tab(s) orally 2 times a day  sevelamer carbonate 800 mg oral tablet: 1 tab(s) orally 3 times a day (with meals)   Albuterol (Eqv-ProAir HFA) 90 mcg/inh inhalation aerosol: 2 puff(s) inhaled as needed for  bronchospasm  aspirin 81 mg oral delayed release tablet: 1 tab(s) orally once a day   atorvastatin 80 mg oral tablet: 1 tab(s) orally once a day  calcitriol 0.25 mcg oral capsule: 1 cap(s) orally once a day  diphenhydrAMINE 25 mg oral capsule: 2 cap(s) orally every 6 hours as needed for Rash and/or Itching  gabapentin 300 mg oral capsule: 1 cap(s) orally 3 times a day  hydrALAZINE 100 mg oral tablet: 1 tab(s) orally 2 times a day  labetalol 200 mg oral tablet: 1 tab(s) orally 2 times a day  NIFEdipine (Eqv-Adalat CC) 90 mg oral tablet, extended release: 1 tab(s) orally once a day  NovoLOG Mix 70/30 FlexPen subcutaneous suspension: subcutaneous 3 times a day with meals- sliding scale  pantoprazole 40 mg oral delayed release tablet: 1 tab(s) orally 2 times a day  Robaxin 500 mg oral tablet: 1 tab(s) orally 2 times a day  sevelamer carbonate 800 mg oral tablet: 1 tab(s) orally 3 times a day (with meals)  Tylenol 325 mg oral tablet: 2 tab(s) orally every 6 hours as needed for  moderate pain

## 2023-10-30 ENCOUNTER — INPATIENT (INPATIENT)
Facility: HOSPITAL | Age: 58
LOS: 2 days | Discharge: HOME | DRG: 425 | End: 2023-11-02
Attending: INTERNAL MEDICINE | Admitting: STUDENT IN AN ORGANIZED HEALTH CARE EDUCATION/TRAINING PROGRAM
Payer: MEDICAID

## 2023-10-30 VITALS
WEIGHT: 149.91 LBS | HEART RATE: 82 BPM | SYSTOLIC BLOOD PRESSURE: 193 MMHG | OXYGEN SATURATION: 97 % | HEIGHT: 64 IN | TEMPERATURE: 97 F | RESPIRATION RATE: 17 BRPM | DIASTOLIC BLOOD PRESSURE: 86 MMHG

## 2023-10-30 DIAGNOSIS — Z89.422 ACQUIRED ABSENCE OF OTHER LEFT TOE(S): Chronic | ICD-10-CM

## 2023-10-30 DIAGNOSIS — Z87.81 PERSONAL HISTORY OF (HEALED) TRAUMATIC FRACTURE: Chronic | ICD-10-CM

## 2023-10-30 DIAGNOSIS — Z98.890 OTHER SPECIFIED POSTPROCEDURAL STATES: Chronic | ICD-10-CM

## 2023-10-30 DIAGNOSIS — Z95.828 PRESENCE OF OTHER VASCULAR IMPLANTS AND GRAFTS: Chronic | ICD-10-CM

## 2023-10-30 LAB
ALBUMIN SERPL ELPH-MCNC: 4.2 G/DL — SIGNIFICANT CHANGE UP (ref 3.5–5.2)
ALBUMIN SERPL ELPH-MCNC: 4.2 G/DL — SIGNIFICANT CHANGE UP (ref 3.5–5.2)
ALP SERPL-CCNC: 594 U/L — HIGH (ref 30–115)
ALP SERPL-CCNC: 594 U/L — HIGH (ref 30–115)
ALT FLD-CCNC: 11 U/L — SIGNIFICANT CHANGE UP (ref 0–41)
ALT FLD-CCNC: 11 U/L — SIGNIFICANT CHANGE UP (ref 0–41)
ANION GAP SERPL CALC-SCNC: 19 MMOL/L — HIGH (ref 7–14)
ANION GAP SERPL CALC-SCNC: 19 MMOL/L — HIGH (ref 7–14)
AST SERPL-CCNC: 24 U/L — SIGNIFICANT CHANGE UP (ref 0–41)
AST SERPL-CCNC: 24 U/L — SIGNIFICANT CHANGE UP (ref 0–41)
BASE EXCESS BLDV CALC-SCNC: 6.8 MMOL/L — HIGH (ref -2–3)
BASE EXCESS BLDV CALC-SCNC: 6.8 MMOL/L — HIGH (ref -2–3)
BASOPHILS # BLD AUTO: 0.08 K/UL — SIGNIFICANT CHANGE UP (ref 0–0.2)
BASOPHILS # BLD AUTO: 0.08 K/UL — SIGNIFICANT CHANGE UP (ref 0–0.2)
BASOPHILS NFR BLD AUTO: 1.4 % — HIGH (ref 0–1)
BASOPHILS NFR BLD AUTO: 1.4 % — HIGH (ref 0–1)
BILIRUB SERPL-MCNC: 1 MG/DL — SIGNIFICANT CHANGE UP (ref 0.2–1.2)
BILIRUB SERPL-MCNC: 1 MG/DL — SIGNIFICANT CHANGE UP (ref 0.2–1.2)
BUN SERPL-MCNC: 41 MG/DL — HIGH (ref 10–20)
BUN SERPL-MCNC: 41 MG/DL — HIGH (ref 10–20)
CA-I SERPL-SCNC: 1.1 MMOL/L — LOW (ref 1.15–1.33)
CA-I SERPL-SCNC: 1.1 MMOL/L — LOW (ref 1.15–1.33)
CALCIUM SERPL-MCNC: 9.1 MG/DL — SIGNIFICANT CHANGE UP (ref 8.4–10.4)
CALCIUM SERPL-MCNC: 9.1 MG/DL — SIGNIFICANT CHANGE UP (ref 8.4–10.4)
CHLORIDE SERPL-SCNC: 94 MMOL/L — LOW (ref 98–110)
CHLORIDE SERPL-SCNC: 94 MMOL/L — LOW (ref 98–110)
CO2 SERPL-SCNC: 25 MMOL/L — SIGNIFICANT CHANGE UP (ref 17–32)
CO2 SERPL-SCNC: 25 MMOL/L — SIGNIFICANT CHANGE UP (ref 17–32)
CREAT SERPL-MCNC: 5 MG/DL — CRITICAL HIGH (ref 0.7–1.5)
CREAT SERPL-MCNC: 5 MG/DL — CRITICAL HIGH (ref 0.7–1.5)
EGFR: 9 ML/MIN/1.73M2 — LOW
EGFR: 9 ML/MIN/1.73M2 — LOW
EOSINOPHIL # BLD AUTO: 0.11 K/UL — SIGNIFICANT CHANGE UP (ref 0–0.7)
EOSINOPHIL # BLD AUTO: 0.11 K/UL — SIGNIFICANT CHANGE UP (ref 0–0.7)
EOSINOPHIL NFR BLD AUTO: 2 % — SIGNIFICANT CHANGE UP (ref 0–8)
EOSINOPHIL NFR BLD AUTO: 2 % — SIGNIFICANT CHANGE UP (ref 0–8)
GAS PNL BLDV: 134 MMOL/L — LOW (ref 136–145)
GAS PNL BLDV: 134 MMOL/L — LOW (ref 136–145)
GAS PNL BLDV: SIGNIFICANT CHANGE UP
GLUCOSE BLDC GLUCOMTR-MCNC: 87 MG/DL — SIGNIFICANT CHANGE UP (ref 70–99)
GLUCOSE BLDC GLUCOMTR-MCNC: 87 MG/DL — SIGNIFICANT CHANGE UP (ref 70–99)
GLUCOSE SERPL-MCNC: 70 MG/DL — SIGNIFICANT CHANGE UP (ref 70–99)
GLUCOSE SERPL-MCNC: 70 MG/DL — SIGNIFICANT CHANGE UP (ref 70–99)
HCO3 BLDV-SCNC: 31 MMOL/L — HIGH (ref 22–29)
HCO3 BLDV-SCNC: 31 MMOL/L — HIGH (ref 22–29)
HCT VFR BLD CALC: 35.9 % — LOW (ref 37–47)
HCT VFR BLD CALC: 35.9 % — LOW (ref 37–47)
HCT VFR BLDA CALC: 37 % — LOW (ref 39–51)
HCT VFR BLDA CALC: 37 % — LOW (ref 39–51)
HGB BLD CALC-MCNC: 12.2 G/DL — LOW (ref 12.6–17.4)
HGB BLD CALC-MCNC: 12.2 G/DL — LOW (ref 12.6–17.4)
HGB BLD-MCNC: 11.7 G/DL — LOW (ref 12–16)
HGB BLD-MCNC: 11.7 G/DL — LOW (ref 12–16)
IMM GRANULOCYTES NFR BLD AUTO: 0.4 % — HIGH (ref 0.1–0.3)
IMM GRANULOCYTES NFR BLD AUTO: 0.4 % — HIGH (ref 0.1–0.3)
LACTATE BLDV-MCNC: 1.1 MMOL/L — SIGNIFICANT CHANGE UP (ref 0.5–2)
LACTATE BLDV-MCNC: 1.1 MMOL/L — SIGNIFICANT CHANGE UP (ref 0.5–2)
LIDOCAIN IGE QN: 8 U/L — SIGNIFICANT CHANGE UP (ref 7–60)
LIDOCAIN IGE QN: 8 U/L — SIGNIFICANT CHANGE UP (ref 7–60)
LYMPHOCYTES # BLD AUTO: 0.93 K/UL — LOW (ref 1.2–3.4)
LYMPHOCYTES # BLD AUTO: 0.93 K/UL — LOW (ref 1.2–3.4)
LYMPHOCYTES # BLD AUTO: 16.7 % — LOW (ref 20.5–51.1)
LYMPHOCYTES # BLD AUTO: 16.7 % — LOW (ref 20.5–51.1)
MCHC RBC-ENTMCNC: 29.4 PG — SIGNIFICANT CHANGE UP (ref 27–31)
MCHC RBC-ENTMCNC: 29.4 PG — SIGNIFICANT CHANGE UP (ref 27–31)
MCHC RBC-ENTMCNC: 32.6 G/DL — SIGNIFICANT CHANGE UP (ref 32–37)
MCHC RBC-ENTMCNC: 32.6 G/DL — SIGNIFICANT CHANGE UP (ref 32–37)
MCV RBC AUTO: 90.2 FL — SIGNIFICANT CHANGE UP (ref 81–99)
MCV RBC AUTO: 90.2 FL — SIGNIFICANT CHANGE UP (ref 81–99)
MONOCYTES # BLD AUTO: 0.6 K/UL — SIGNIFICANT CHANGE UP (ref 0.1–0.6)
MONOCYTES # BLD AUTO: 0.6 K/UL — SIGNIFICANT CHANGE UP (ref 0.1–0.6)
MONOCYTES NFR BLD AUTO: 10.8 % — HIGH (ref 1.7–9.3)
MONOCYTES NFR BLD AUTO: 10.8 % — HIGH (ref 1.7–9.3)
NEUTROPHILS # BLD AUTO: 3.83 K/UL — SIGNIFICANT CHANGE UP (ref 1.4–6.5)
NEUTROPHILS # BLD AUTO: 3.83 K/UL — SIGNIFICANT CHANGE UP (ref 1.4–6.5)
NEUTROPHILS NFR BLD AUTO: 68.7 % — SIGNIFICANT CHANGE UP (ref 42.2–75.2)
NEUTROPHILS NFR BLD AUTO: 68.7 % — SIGNIFICANT CHANGE UP (ref 42.2–75.2)
NRBC # BLD: 0 /100 WBCS — SIGNIFICANT CHANGE UP (ref 0–0)
NRBC # BLD: 0 /100 WBCS — SIGNIFICANT CHANGE UP (ref 0–0)
NT-PROBNP SERPL-SCNC: HIGH PG/ML (ref 0–300)
NT-PROBNP SERPL-SCNC: HIGH PG/ML (ref 0–300)
PCO2 BLDV: 43 MMHG — HIGH (ref 39–42)
PCO2 BLDV: 43 MMHG — HIGH (ref 39–42)
PH BLDV: 7.47 — HIGH (ref 7.32–7.43)
PH BLDV: 7.47 — HIGH (ref 7.32–7.43)
PLATELET # BLD AUTO: 95 K/UL — LOW (ref 130–400)
PLATELET # BLD AUTO: 95 K/UL — LOW (ref 130–400)
PMV BLD: 12.7 FL — HIGH (ref 7.4–10.4)
PMV BLD: 12.7 FL — HIGH (ref 7.4–10.4)
PO2 BLDV: 64 MMHG — SIGNIFICANT CHANGE UP
PO2 BLDV: 64 MMHG — SIGNIFICANT CHANGE UP
POTASSIUM BLDV-SCNC: 5.3 MMOL/L — HIGH (ref 3.5–5.1)
POTASSIUM BLDV-SCNC: 5.3 MMOL/L — HIGH (ref 3.5–5.1)
POTASSIUM SERPL-MCNC: 5.3 MMOL/L — HIGH (ref 3.5–5)
POTASSIUM SERPL-MCNC: 5.3 MMOL/L — HIGH (ref 3.5–5)
POTASSIUM SERPL-SCNC: 5.3 MMOL/L — HIGH (ref 3.5–5)
POTASSIUM SERPL-SCNC: 5.3 MMOL/L — HIGH (ref 3.5–5)
PROT SERPL-MCNC: 7 G/DL — SIGNIFICANT CHANGE UP (ref 6–8)
PROT SERPL-MCNC: 7 G/DL — SIGNIFICANT CHANGE UP (ref 6–8)
RBC # BLD: 3.98 M/UL — LOW (ref 4.2–5.4)
RBC # BLD: 3.98 M/UL — LOW (ref 4.2–5.4)
RBC # FLD: 17.6 % — HIGH (ref 11.5–14.5)
RBC # FLD: 17.6 % — HIGH (ref 11.5–14.5)
SAO2 % BLDV: 90.3 % — SIGNIFICANT CHANGE UP
SAO2 % BLDV: 90.3 % — SIGNIFICANT CHANGE UP
SODIUM SERPL-SCNC: 138 MMOL/L — SIGNIFICANT CHANGE UP (ref 135–146)
SODIUM SERPL-SCNC: 138 MMOL/L — SIGNIFICANT CHANGE UP (ref 135–146)
TROPONIN T SERPL-MCNC: 0.05 NG/ML — CRITICAL HIGH
TROPONIN T SERPL-MCNC: 0.05 NG/ML — CRITICAL HIGH
WBC # BLD: 5.57 K/UL — SIGNIFICANT CHANGE UP (ref 4.8–10.8)
WBC # BLD: 5.57 K/UL — SIGNIFICANT CHANGE UP (ref 4.8–10.8)
WBC # FLD AUTO: 5.57 K/UL — SIGNIFICANT CHANGE UP (ref 4.8–10.8)
WBC # FLD AUTO: 5.57 K/UL — SIGNIFICANT CHANGE UP (ref 4.8–10.8)

## 2023-10-30 PROCEDURE — 74177 CT ABD & PELVIS W/CONTRAST: CPT | Mod: 26,MA

## 2023-10-30 PROCEDURE — 99285 EMERGENCY DEPT VISIT HI MDM: CPT

## 2023-10-30 PROCEDURE — 93010 ELECTROCARDIOGRAM REPORT: CPT | Mod: 76

## 2023-10-30 PROCEDURE — 71045 X-RAY EXAM CHEST 1 VIEW: CPT | Mod: 26

## 2023-10-30 RX ORDER — MORPHINE SULFATE 50 MG/1
4 CAPSULE, EXTENDED RELEASE ORAL ONCE
Refills: 0 | Status: DISCONTINUED | OUTPATIENT
Start: 2023-10-30 | End: 2023-10-30

## 2023-10-30 RX ORDER — ONDANSETRON 8 MG/1
4 TABLET, FILM COATED ORAL ONCE
Refills: 0 | Status: COMPLETED | OUTPATIENT
Start: 2023-10-30 | End: 2023-10-30

## 2023-10-30 RX ORDER — DEXTROSE 50 % IN WATER 50 %
50 SYRINGE (ML) INTRAVENOUS ONCE
Refills: 0 | Status: COMPLETED | OUTPATIENT
Start: 2023-10-30 | End: 2023-10-30

## 2023-10-30 RX ADMIN — Medication 50 MILLILITER(S): at 21:56

## 2023-10-30 RX ADMIN — MORPHINE SULFATE 4 MILLIGRAM(S): 50 CAPSULE, EXTENDED RELEASE ORAL at 23:02

## 2023-10-30 RX ADMIN — ONDANSETRON 4 MILLIGRAM(S): 8 TABLET, FILM COATED ORAL at 23:02

## 2023-10-30 NOTE — ED ADULT TRIAGE NOTE - TEMPERATURE IN FAHRENHEIT (DEGREES F)
SUBJECTIVE:   Nyasia Chu is a 2 year old female presenting with a chief complaint of   Chief Complaint   Patient presents with     URI     Parent reports that pt has had an intermittent fever since Wednesday 11/22/17. Today, pt developed a cough.     Urgent Care   .    Onset of symptoms was 3 week(s) ago.  Course of illness is worsening.    Severity moderate  Current and Associated symptoms: fever, runny nose, cough - non-productive, wheezing, vomiting and taking in fluids?  Yes  Treatment measures tried include Tylenol/Ibuprofen, OTC Cough med and Fluids  Predisposing factors include ill contact:   History of PE tubes? Yes  Recent antibiotics? Yes  For pink eye -recently treated      Review of Systems   Constitutional: Positive for fever. Negative for chills, diaphoresis and malaise/fatigue.   HENT: Positive for congestion and sore throat. Negative for ear discharge, ear pain and sinus pain.    Respiratory: Positive for cough and wheezing. Negative for sputum production.    Gastrointestinal: Negative for diarrhea and vomiting.   Skin: Negative for rash.         No past medical history on file.  Current Outpatient Prescriptions   Medication Sig Dispense Refill     ibuprofen (ADVIL/MOTRIN) 100 MG/5ML suspension Take 10 mg/kg by mouth every 6 hours as needed for fever or moderate pain       GuaiFENesin (COUGH SYRUP PO) Take by mouth as needed       albuterol (2.5 MG/3ML) 0.083% neb solution Take 1 vial (2.5 mg) by nebulization every 6 hours as needed for shortness of breath / dyspnea or wheezing 1 vial 0     albuterol (2.5 MG/3ML) 0.083% neb solution Take 1 vial (2.5 mg) by nebulization every 6 hours as needed for shortness of breath / dyspnea or wheezing 25 vial 1     Ferrous Sulfate (IRON SUPPLEMENT PO)        Social History   Substance Use Topics     Smoking status: Never Smoker     Smokeless tobacco: Never Used      Comment: non smoking home     Alcohol use No       OBJECTIVE  Pulse 87  Temp 98.1  F  (36.7  C) (Axillary)  Resp 20  Wt 42 lb 12.8 oz (19.4 kg)  SpO2 96%    Physical Exam   Constitutional: She appears distressed.   HENT:   Head: Normocephalic and atraumatic.   Right Ear: Tympanic membrane, external ear and ear canal normal.   Left Ear: Tympanic membrane, external ear and ear canal normal.   Mouth/Throat: Oropharynx is clear and moist. Mucous membranes are not dry. No oropharyngeal exudate, posterior oropharyngeal erythema or tonsillar abscesses.   Eyes: EOM are normal. Right eye exhibits no discharge. Left eye exhibits no discharge.   Neck: Normal range of motion. Neck supple.   Cardiovascular: Normal rate, regular rhythm and normal heart sounds.    Pulmonary/Chest: Effort normal. No respiratory distress. She has wheezes (diffuse wheezing with expiration.  Using accessory muscles.). She has no rales.   Musculoskeletal: Normal range of motion. She exhibits no edema.   Lymphadenopathy:     She has cervical adenopathy.   Neurological: She is alert. No cranial nerve deficit. Gait normal.   Skin: Skin is warm and dry. No rash noted. She is not diaphoretic.   Psychiatric: Mood and affect normal.       Labs:  No results found for this or any previous visit (from the past 24 hour(s)).    X-Ray was not done.    ASSESSMENT:      ICD-10-CM    1. Acute bronchospasm J98.01 albuterol (2.5 MG/3ML) 0.083% neb solution     ALBUTEROL UNIT DOSE, 1 MG     albuterol (2.5 MG/3ML) 0.083% neb solution        Medical Decision Making:    Differential Diagnosis:  URI Adult/Peds:  Acute right otitis media, Acute left otitis media, Asthma, Asthma exacerbation, Bronchiolitis, Bronchitis-viral, Bronchospasm, Croup, Epiglotitis, Laryngitis, Viral pharyngitis, Viral syndrome and Viral upper respiratory illness    Serious Comorbid Conditions:  Peds:  Recurrent OM    PLAN:    Albuterol neb given in UC.  Exam after treatment yielded clear breath sounds with no increased resp effort.    URI Peds:  Tylenol, Ibuprofen, Fluids, Rest,  Nebulizations and Rx Asthma exacerbation   Albuterol NEB    Followup:    If not improving or if condition worsens, follow up with your Primary Care Provider    Patient Instructions     Bronchospasm (Child)    When your child breathes, the air goes down his or her main windpipe (trachea) and through the bronchi into the lungs. The bronchi are the 2 tubes that lead from the trachea to the left and right lungs. If the bronchi get irritated and inflamed, they can narrow. This is because the muscles around the air passages go into spasm. This makes it hard to breathe. This condition is called bronchospasm.  Bronchospasm can be caused by many things. These include allergies, asthma, a respiratory infection, exercise, or reaction to a medicine.  Bronchospasm makes it hard to breathe out. It causes wheezing when exhaling. In severe cases, it is difficult to breath in or out. Wheezing is a whistling sound caused by breathing through narrowed airways. Bronchospasm can also cause frequent coughing without the wheezing sound. A child with bronchospasm may cough, wheeze, or be short of breath. The inflamed area creates mucus. The mucus can partially block the airways. The chest muscles can tighten. The child can also have a fever.  A child with bronchospasm may be given medicine to take at home. A child with severe bronchospasm may need to stay in the hospital for 1 or more nights. There, he or she is given intravenous (IV) fluids, breathing treatments, and oxygen.  Children with asthma often get bronchospasm. But not all children with bronchospasm have asthma. If a child has repeated bouts of bronchospasm, then he or she may need to be tested for asthma.  Home care  Follow these guidelines when caring for your child at home:    Your child's healthcare provider may prescribe medicines. Follow all instructions for giving these to your child. Don t give your child any medicines that have not been approved by the provider. Your  child may be prescribed bronchodilator medicine. This is to help with breathing. It may come as an inhaler with a spacer, or a liquid that is made into an aerosol by a machine, then breathed in. Make sure your child uses the medicine exactly at the times advised.    Don t give a child under age 6 cough or cold medicine unless the healthcare provider tells you to do so.    Know the warning signs of a bronchospasm attack. These can include cough, wheezing, shortness of breath, chest tightness, irritability, restless sleep, fever, and cough. Your child may have no interest in feeding. Learn what medicines to give if you see these signs.    Wash your hands well with soap and warm water before and after caring for your child. This is to help prevent spreading infection.    Give your child plenty of time to rest. Have your child sleep in a slightly upright position. This is to help make breathing easier. If possible, raise the head of the mattress a few inches. Or prop your child s body up with pillows. Don t put pillows or other soft objects in the crib of babies under 12 months of age.    To prevent dehydration and help loosen lung mucus in toddlers and older children, make sure your child drinks plenty of liquids. Children may prefer cold drinks, frozen desserts, or popsicles. They may also like warm chicken soup or drinks with lemon and honey. Don t give honey to a child younger than 1 year old.     To prevent dehydration and help loosen lung mucus in babies, make sure your child drinks plenty of liquids. Use a medicine dropper, if needed, to give small amounts of breast milk, formula, or clear liquids to your baby. Give 1 to 2 teaspoons every 10 to 15 minutes. A baby may only be able to feed for short amounts of time. If you are breastfeeding, pump and store milk for later use. Give your child oral rehydration solution between feedings. These are available from the drug store.    Don t smoke around your child.  Tobacco smoke can make your child s symptoms worse.  Follow-up care   Follow up with your child s healthcare provider, or as advised.  Special note to parents  Don t give cough and cold medicines to any child under age 6. These have been shown to not help young children, and may cause serious side effects.  When to seek medical advice  Call your child's healthcare provider or seek immediate medical care right away if any of these occur:    No improvement within 24 hours of treatment    Symptoms that don t get better, or get worse    Cough with lots of thick colored mucus    Trouble breathing that doesn t get better, or gets worse    Fast breathing    Loss of appetite or poor feeding    Signs of dehydration, such as dry mouth, crying with no tears, or urinating less than normal    More medicine than prescribed is needed to help relieve wheezing    The medicine doesn t relieve wheezing  Unless advised otherwise by your child s healthcare provider, call the provider right away if:    Your child is of any age and has repeated fevers above 104 F (40 C).    Your child is younger than 2 years of age and a fever of 100.4 F (38 C) continues for more than 1 day.    Your child is 2 years old or older and a fever of 100.4 F (38 C) continues for more than 3 days.  Date Last Reviewed: 4/9/2016 2000-2017 [employer]. [address] [place] [address], [place] [address]. All rights reserved. This information is not intended as a substitute for professional medical care. Always follow your healthcare professional's instructions.             97.4

## 2023-10-30 NOTE — ED ADULT NURSE NOTE - NSFALLHARMRISKINTERV_ED_ALL_ED

## 2023-10-30 NOTE — ED ADULT TRIAGE NOTE - CHIEF COMPLAINT QUOTE
pt with abdominal pain & swelling x5 days   pt on hemodialysis goes t-th-sat  pt legally deaf, family at bedside

## 2023-10-30 NOTE — ED PROVIDER NOTE - OBJECTIVE STATEMENT
Pt is a 57 y/o female with PMH of ESRD on HD (T/Th/Sat), HTN, HLD, DM, PVD, CVA and deafness presenting for abdominal pain x 2 weeks. Pain worsened today, associated with abdominal distention, nausea and vomiting. Also reports shortness of breath with periorbital edema. Also reports generalized weakness and more trouble walking due to weakness.

## 2023-10-30 NOTE — ED PROVIDER NOTE - PHYSICAL EXAMINATION
CONST: NAD  HEAD:  normocephalic, atraumatic  EYES:  periorbital edema  ENMT:  nasal mucosa moist; mouth moist without ulcerations or lesions; throat moist without erythema, exudate, ulcerations or lesions  NECK:  supple  CARDIAC:  regular rate and rhythm, normal S1 and S2, no murmurs, rubs or gallops  RESP:  respiratory rate and effort appear normal for age; lungs are clear to auscultation bilaterally; no rales or wheezes  ABDOMEN:  soft, diffuse abdominal tenderness, + distention  MUSCULOSKELETAL/NEURO:  awake and alert, normal movement, normal tone  SKIN:  normal skin color for age and race, well-perfused; warm and dry

## 2023-10-30 NOTE — ED ADULT NURSE NOTE - OBJECTIVE STATEMENT
Pt brought to Ed complaining of abd pain, FS 60, edema in B/L LE. Pt legally deaf. Denies fever, v/d.

## 2023-10-30 NOTE — ED PROVIDER NOTE - CLINICAL SUMMARY MEDICAL DECISION MAKING FREE TEXT BOX
57 y/o female with PMH of ESRD on HD (T/Th/Sat), HTN, HLD, DM, PVD, CVA and deafness presenting for abdominal pain x 2 weeks- Patient was admitted for anasarca, history provided by patient's brother-in-law who was able to communicate with her who states since discharge her anasarca has worsened including periorbital edema.  No objective fevers.  Endorses 2 weeks of lower abdominal pain, decreased oral intake, vomiting.  Does not make urine.  Last dialysis session was Saturday.  Patient uses sign language however cannot use the iPad as she has not had formal training, discussed with A[employer] translation services who are familiar with the patient however no translators available at this time.  Limited history as results.  Initially patient was uncomfortable, noted to have anasarca diffusely.  3+ pitting edema to bilateral extremities.  Abdominal distention.  Noted to have tenderness to the lower quadrants.  Noted to have crackles on auscultation of bases of lungs.  Regular rhythm.  Moving all 4 extremities.  Labs ordered and reviewed appear to be stable.  EKG no STEMI.  CT with worsening anasarca, patient will undergo dialysis tomorrow.  Fingerstick initially 68, given 1 amp of dextrose with improvement to 86. We will repeat fingerstick in 1 hour.  Stable for discharge at this time

## 2023-10-31 DIAGNOSIS — H91.3 DEAF NONSPEAKING, NOT ELSEWHERE CLASSIFIED: ICD-10-CM

## 2023-10-31 DIAGNOSIS — D63.1 ANEMIA IN CHRONIC KIDNEY DISEASE: ICD-10-CM

## 2023-10-31 DIAGNOSIS — K59.09 OTHER CONSTIPATION: ICD-10-CM

## 2023-10-31 DIAGNOSIS — Z79.82 LONG TERM (CURRENT) USE OF ASPIRIN: ICD-10-CM

## 2023-10-31 DIAGNOSIS — Z91.81 HISTORY OF FALLING: ICD-10-CM

## 2023-10-31 DIAGNOSIS — E87.70 FLUID OVERLOAD, UNSPECIFIED: ICD-10-CM

## 2023-10-31 DIAGNOSIS — N18.6 END STAGE RENAL DISEASE: ICD-10-CM

## 2023-10-31 DIAGNOSIS — I50.32 CHRONIC DIASTOLIC (CONGESTIVE) HEART FAILURE: ICD-10-CM

## 2023-10-31 DIAGNOSIS — Z79.02 LONG TERM (CURRENT) USE OF ANTITHROMBOTICS/ANTIPLATELETS: ICD-10-CM

## 2023-10-31 DIAGNOSIS — Z86.73 PERSONAL HISTORY OF TRANSIENT ISCHEMIC ATTACK (TIA), AND CEREBRAL INFARCTION WITHOUT RESIDUAL DEFICITS: ICD-10-CM

## 2023-10-31 DIAGNOSIS — E11.22 TYPE 2 DIABETES MELLITUS WITH DIABETIC CHRONIC KIDNEY DISEASE: ICD-10-CM

## 2023-10-31 DIAGNOSIS — R42 DIZZINESS AND GIDDINESS: ICD-10-CM

## 2023-10-31 DIAGNOSIS — Z79.899 OTHER LONG TERM (CURRENT) DRUG THERAPY: ICD-10-CM

## 2023-10-31 DIAGNOSIS — M54.9 DORSALGIA, UNSPECIFIED: ICD-10-CM

## 2023-10-31 DIAGNOSIS — G89.29 OTHER CHRONIC PAIN: ICD-10-CM

## 2023-10-31 DIAGNOSIS — Z20.822 CONTACT WITH AND (SUSPECTED) EXPOSURE TO COVID-19: ICD-10-CM

## 2023-10-31 DIAGNOSIS — Z88.6 ALLERGY STATUS TO ANALGESIC AGENT: ICD-10-CM

## 2023-10-31 DIAGNOSIS — K76.0 FATTY (CHANGE OF) LIVER, NOT ELSEWHERE CLASSIFIED: ICD-10-CM

## 2023-10-31 DIAGNOSIS — F79 UNSPECIFIED INTELLECTUAL DISABILITIES: ICD-10-CM

## 2023-10-31 DIAGNOSIS — Z79.4 LONG TERM (CURRENT) USE OF INSULIN: ICD-10-CM

## 2023-10-31 DIAGNOSIS — Z88.0 ALLERGY STATUS TO PENICILLIN: ICD-10-CM

## 2023-10-31 DIAGNOSIS — R11.2 NAUSEA WITH VOMITING, UNSPECIFIED: ICD-10-CM

## 2023-10-31 DIAGNOSIS — N04.9 NEPHROTIC SYNDROME WITH UNSPECIFIED MORPHOLOGIC CHANGES: ICD-10-CM

## 2023-10-31 DIAGNOSIS — Z99.2 DEPENDENCE ON RENAL DIALYSIS: ICD-10-CM

## 2023-10-31 DIAGNOSIS — I13.2 HYPERTENSIVE HEART AND CHRONIC KIDNEY DISEASE WITH HEART FAILURE AND WITH STAGE 5 CHRONIC KIDNEY DISEASE, OR END STAGE RENAL DISEASE: ICD-10-CM

## 2023-10-31 DIAGNOSIS — E78.5 HYPERLIPIDEMIA, UNSPECIFIED: ICD-10-CM

## 2023-10-31 DIAGNOSIS — R18.8 OTHER ASCITES: ICD-10-CM

## 2023-10-31 DIAGNOSIS — Z98.62 PERIPHERAL VASCULAR ANGIOPLASTY STATUS: ICD-10-CM

## 2023-10-31 DIAGNOSIS — Z89.422 ACQUIRED ABSENCE OF OTHER LEFT TOE(S): ICD-10-CM

## 2023-10-31 DIAGNOSIS — R10.13 EPIGASTRIC PAIN: ICD-10-CM

## 2023-10-31 DIAGNOSIS — E11.51 TYPE 2 DIABETES MELLITUS WITH DIABETIC PERIPHERAL ANGIOPATHY WITHOUT GANGRENE: ICD-10-CM

## 2023-10-31 LAB
A1C WITH ESTIMATED AVERAGE GLUCOSE RESULT: 5 % — SIGNIFICANT CHANGE UP (ref 4–5.6)
A1C WITH ESTIMATED AVERAGE GLUCOSE RESULT: 5 % — SIGNIFICANT CHANGE UP (ref 4–5.6)
ALBUMIN SERPL ELPH-MCNC: 3.9 G/DL — SIGNIFICANT CHANGE UP (ref 3.5–5.2)
ALBUMIN SERPL ELPH-MCNC: 3.9 G/DL — SIGNIFICANT CHANGE UP (ref 3.5–5.2)
ALP SERPL-CCNC: 582 U/L — HIGH (ref 30–115)
ALP SERPL-CCNC: 582 U/L — HIGH (ref 30–115)
ALT FLD-CCNC: 11 U/L — SIGNIFICANT CHANGE UP (ref 0–41)
ALT FLD-CCNC: 11 U/L — SIGNIFICANT CHANGE UP (ref 0–41)
ANION GAP SERPL CALC-SCNC: 18 MMOL/L — HIGH (ref 7–14)
ANION GAP SERPL CALC-SCNC: 18 MMOL/L — HIGH (ref 7–14)
APTT BLD: 36.6 SEC — SIGNIFICANT CHANGE UP (ref 27–39.2)
APTT BLD: 36.6 SEC — SIGNIFICANT CHANGE UP (ref 27–39.2)
AST SERPL-CCNC: 23 U/L — SIGNIFICANT CHANGE UP (ref 0–41)
AST SERPL-CCNC: 23 U/L — SIGNIFICANT CHANGE UP (ref 0–41)
BASOPHILS # BLD AUTO: 0.08 K/UL — SIGNIFICANT CHANGE UP (ref 0–0.2)
BASOPHILS # BLD AUTO: 0.08 K/UL — SIGNIFICANT CHANGE UP (ref 0–0.2)
BASOPHILS NFR BLD AUTO: 1.6 % — HIGH (ref 0–1)
BASOPHILS NFR BLD AUTO: 1.6 % — HIGH (ref 0–1)
BILIRUB SERPL-MCNC: 0.9 MG/DL — SIGNIFICANT CHANGE UP (ref 0.2–1.2)
BILIRUB SERPL-MCNC: 0.9 MG/DL — SIGNIFICANT CHANGE UP (ref 0.2–1.2)
BUN SERPL-MCNC: 44 MG/DL — HIGH (ref 10–20)
BUN SERPL-MCNC: 44 MG/DL — HIGH (ref 10–20)
CALCIUM SERPL-MCNC: 8.7 MG/DL — SIGNIFICANT CHANGE UP (ref 8.4–10.5)
CALCIUM SERPL-MCNC: 8.7 MG/DL — SIGNIFICANT CHANGE UP (ref 8.4–10.5)
CHLORIDE SERPL-SCNC: 97 MMOL/L — LOW (ref 98–110)
CHLORIDE SERPL-SCNC: 97 MMOL/L — LOW (ref 98–110)
CHOLEST SERPL-MCNC: 195 MG/DL — SIGNIFICANT CHANGE UP
CHOLEST SERPL-MCNC: 195 MG/DL — SIGNIFICANT CHANGE UP
CK SERPL-CCNC: 64 U/L — SIGNIFICANT CHANGE UP (ref 0–225)
CK SERPL-CCNC: 64 U/L — SIGNIFICANT CHANGE UP (ref 0–225)
CO2 SERPL-SCNC: 24 MMOL/L — SIGNIFICANT CHANGE UP (ref 17–32)
CO2 SERPL-SCNC: 24 MMOL/L — SIGNIFICANT CHANGE UP (ref 17–32)
CREAT SERPL-MCNC: 5.4 MG/DL — CRITICAL HIGH (ref 0.7–1.5)
CREAT SERPL-MCNC: 5.4 MG/DL — CRITICAL HIGH (ref 0.7–1.5)
EGFR: 9 ML/MIN/1.73M2 — LOW
EGFR: 9 ML/MIN/1.73M2 — LOW
EOSINOPHIL # BLD AUTO: 0.09 K/UL — SIGNIFICANT CHANGE UP (ref 0–0.7)
EOSINOPHIL # BLD AUTO: 0.09 K/UL — SIGNIFICANT CHANGE UP (ref 0–0.7)
EOSINOPHIL NFR BLD AUTO: 1.8 % — SIGNIFICANT CHANGE UP (ref 0–8)
EOSINOPHIL NFR BLD AUTO: 1.8 % — SIGNIFICANT CHANGE UP (ref 0–8)
ESTIMATED AVERAGE GLUCOSE: 97 MG/DL — SIGNIFICANT CHANGE UP (ref 68–114)
ESTIMATED AVERAGE GLUCOSE: 97 MG/DL — SIGNIFICANT CHANGE UP (ref 68–114)
GLUCOSE BLDC GLUCOMTR-MCNC: 114 MG/DL — HIGH (ref 70–99)
GLUCOSE BLDC GLUCOMTR-MCNC: 114 MG/DL — HIGH (ref 70–99)
GLUCOSE BLDC GLUCOMTR-MCNC: 71 MG/DL — SIGNIFICANT CHANGE UP (ref 70–99)
GLUCOSE BLDC GLUCOMTR-MCNC: 71 MG/DL — SIGNIFICANT CHANGE UP (ref 70–99)
GLUCOSE BLDC GLUCOMTR-MCNC: 97 MG/DL — SIGNIFICANT CHANGE UP (ref 70–99)
GLUCOSE BLDC GLUCOMTR-MCNC: 97 MG/DL — SIGNIFICANT CHANGE UP (ref 70–99)
GLUCOSE SERPL-MCNC: 78 MG/DL — SIGNIFICANT CHANGE UP (ref 70–99)
GLUCOSE SERPL-MCNC: 78 MG/DL — SIGNIFICANT CHANGE UP (ref 70–99)
HCT VFR BLD CALC: 35.2 % — LOW (ref 37–47)
HCT VFR BLD CALC: 35.2 % — LOW (ref 37–47)
HDLC SERPL-MCNC: 100 MG/DL — SIGNIFICANT CHANGE UP
HDLC SERPL-MCNC: 100 MG/DL — SIGNIFICANT CHANGE UP
HGB BLD-MCNC: 11.6 G/DL — LOW (ref 12–16)
HGB BLD-MCNC: 11.6 G/DL — LOW (ref 12–16)
IMM GRANULOCYTES NFR BLD AUTO: 0.2 % — SIGNIFICANT CHANGE UP (ref 0.1–0.3)
IMM GRANULOCYTES NFR BLD AUTO: 0.2 % — SIGNIFICANT CHANGE UP (ref 0.1–0.3)
INR BLD: 1.11 RATIO — SIGNIFICANT CHANGE UP (ref 0.65–1.3)
INR BLD: 1.11 RATIO — SIGNIFICANT CHANGE UP (ref 0.65–1.3)
LIPID PNL WITH DIRECT LDL SERPL: 74 MG/DL — SIGNIFICANT CHANGE UP
LIPID PNL WITH DIRECT LDL SERPL: 74 MG/DL — SIGNIFICANT CHANGE UP
LYMPHOCYTES # BLD AUTO: 0.95 K/UL — LOW (ref 1.2–3.4)
LYMPHOCYTES # BLD AUTO: 0.95 K/UL — LOW (ref 1.2–3.4)
LYMPHOCYTES # BLD AUTO: 18.5 % — LOW (ref 20.5–51.1)
LYMPHOCYTES # BLD AUTO: 18.5 % — LOW (ref 20.5–51.1)
MAGNESIUM SERPL-MCNC: 2.2 MG/DL — SIGNIFICANT CHANGE UP (ref 1.8–2.4)
MAGNESIUM SERPL-MCNC: 2.2 MG/DL — SIGNIFICANT CHANGE UP (ref 1.8–2.4)
MCHC RBC-ENTMCNC: 29.7 PG — SIGNIFICANT CHANGE UP (ref 27–31)
MCHC RBC-ENTMCNC: 29.7 PG — SIGNIFICANT CHANGE UP (ref 27–31)
MCHC RBC-ENTMCNC: 33 G/DL — SIGNIFICANT CHANGE UP (ref 32–37)
MCHC RBC-ENTMCNC: 33 G/DL — SIGNIFICANT CHANGE UP (ref 32–37)
MCV RBC AUTO: 90.3 FL — SIGNIFICANT CHANGE UP (ref 81–99)
MCV RBC AUTO: 90.3 FL — SIGNIFICANT CHANGE UP (ref 81–99)
MONOCYTES # BLD AUTO: 0.57 K/UL — SIGNIFICANT CHANGE UP (ref 0.1–0.6)
MONOCYTES # BLD AUTO: 0.57 K/UL — SIGNIFICANT CHANGE UP (ref 0.1–0.6)
MONOCYTES NFR BLD AUTO: 11.1 % — HIGH (ref 1.7–9.3)
MONOCYTES NFR BLD AUTO: 11.1 % — HIGH (ref 1.7–9.3)
NEUTROPHILS # BLD AUTO: 3.43 K/UL — SIGNIFICANT CHANGE UP (ref 1.4–6.5)
NEUTROPHILS # BLD AUTO: 3.43 K/UL — SIGNIFICANT CHANGE UP (ref 1.4–6.5)
NEUTROPHILS NFR BLD AUTO: 66.8 % — SIGNIFICANT CHANGE UP (ref 42.2–75.2)
NEUTROPHILS NFR BLD AUTO: 66.8 % — SIGNIFICANT CHANGE UP (ref 42.2–75.2)
NON HDL CHOLESTEROL: 95 MG/DL — SIGNIFICANT CHANGE UP
NON HDL CHOLESTEROL: 95 MG/DL — SIGNIFICANT CHANGE UP
NRBC # BLD: 0 /100 WBCS — SIGNIFICANT CHANGE UP (ref 0–0)
NRBC # BLD: 0 /100 WBCS — SIGNIFICANT CHANGE UP (ref 0–0)
PHOSPHATE SERPL-MCNC: 4.9 MG/DL — SIGNIFICANT CHANGE UP (ref 2.1–4.9)
PHOSPHATE SERPL-MCNC: 4.9 MG/DL — SIGNIFICANT CHANGE UP (ref 2.1–4.9)
PLATELET # BLD AUTO: 112 K/UL — LOW (ref 130–400)
PLATELET # BLD AUTO: 112 K/UL — LOW (ref 130–400)
PMV BLD: 12.1 FL — HIGH (ref 7.4–10.4)
PMV BLD: 12.1 FL — HIGH (ref 7.4–10.4)
POTASSIUM SERPL-MCNC: 5.3 MMOL/L — HIGH (ref 3.5–5)
POTASSIUM SERPL-MCNC: 5.3 MMOL/L — HIGH (ref 3.5–5)
POTASSIUM SERPL-SCNC: 5.3 MMOL/L — HIGH (ref 3.5–5)
POTASSIUM SERPL-SCNC: 5.3 MMOL/L — HIGH (ref 3.5–5)
PROT SERPL-MCNC: 6.8 G/DL — SIGNIFICANT CHANGE UP (ref 6–8)
PROT SERPL-MCNC: 6.8 G/DL — SIGNIFICANT CHANGE UP (ref 6–8)
PROTHROM AB SERPL-ACNC: 12.7 SEC — SIGNIFICANT CHANGE UP (ref 9.95–12.87)
PROTHROM AB SERPL-ACNC: 12.7 SEC — SIGNIFICANT CHANGE UP (ref 9.95–12.87)
RBC # BLD: 3.9 M/UL — LOW (ref 4.2–5.4)
RBC # BLD: 3.9 M/UL — LOW (ref 4.2–5.4)
RBC # FLD: 17.5 % — HIGH (ref 11.5–14.5)
RBC # FLD: 17.5 % — HIGH (ref 11.5–14.5)
SODIUM SERPL-SCNC: 139 MMOL/L — SIGNIFICANT CHANGE UP (ref 135–146)
SODIUM SERPL-SCNC: 139 MMOL/L — SIGNIFICANT CHANGE UP (ref 135–146)
TRIGL SERPL-MCNC: 104 MG/DL — SIGNIFICANT CHANGE UP
TRIGL SERPL-MCNC: 104 MG/DL — SIGNIFICANT CHANGE UP
TROPONIN T SERPL-MCNC: 0.06 NG/ML — CRITICAL HIGH
TROPONIN T SERPL-MCNC: 0.06 NG/ML — CRITICAL HIGH
WBC # BLD: 5.13 K/UL — SIGNIFICANT CHANGE UP (ref 4.8–10.8)
WBC # BLD: 5.13 K/UL — SIGNIFICANT CHANGE UP (ref 4.8–10.8)
WBC # FLD AUTO: 5.13 K/UL — SIGNIFICANT CHANGE UP (ref 4.8–10.8)
WBC # FLD AUTO: 5.13 K/UL — SIGNIFICANT CHANGE UP (ref 4.8–10.8)

## 2023-10-31 PROCEDURE — 85730 THROMBOPLASTIN TIME PARTIAL: CPT

## 2023-10-31 PROCEDURE — 71045 X-RAY EXAM CHEST 1 VIEW: CPT

## 2023-10-31 PROCEDURE — 86900 BLOOD TYPING SEROLOGIC ABO: CPT

## 2023-10-31 PROCEDURE — 82306 VITAMIN D 25 HYDROXY: CPT

## 2023-10-31 PROCEDURE — 80061 LIPID PANEL: CPT

## 2023-10-31 PROCEDURE — 83937 ASSAY OF OSTEOCALCIN: CPT

## 2023-10-31 PROCEDURE — 82523 COLLAGEN CROSSLINKS: CPT

## 2023-10-31 PROCEDURE — 82310 ASSAY OF CALCIUM: CPT

## 2023-10-31 PROCEDURE — 85610 PROTHROMBIN TIME: CPT

## 2023-10-31 PROCEDURE — 83036 HEMOGLOBIN GLYCOSYLATED A1C: CPT

## 2023-10-31 PROCEDURE — 83735 ASSAY OF MAGNESIUM: CPT

## 2023-10-31 PROCEDURE — 83970 ASSAY OF PARATHORMONE: CPT

## 2023-10-31 PROCEDURE — 82962 GLUCOSE BLOOD TEST: CPT

## 2023-10-31 PROCEDURE — 36415 COLL VENOUS BLD VENIPUNCTURE: CPT

## 2023-10-31 PROCEDURE — 99232 SBSQ HOSP IP/OBS MODERATE 35: CPT

## 2023-10-31 PROCEDURE — 84484 ASSAY OF TROPONIN QUANT: CPT

## 2023-10-31 PROCEDURE — 84100 ASSAY OF PHOSPHORUS: CPT

## 2023-10-31 PROCEDURE — 80048 BASIC METABOLIC PNL TOTAL CA: CPT

## 2023-10-31 PROCEDURE — 86901 BLOOD TYPING SEROLOGIC RH(D): CPT

## 2023-10-31 PROCEDURE — 90935 HEMODIALYSIS ONE EVALUATION: CPT

## 2023-10-31 PROCEDURE — 84080 ASSAY ALKALINE PHOSPHATASES: CPT

## 2023-10-31 PROCEDURE — 86850 RBC ANTIBODY SCREEN: CPT

## 2023-10-31 PROCEDURE — 82550 ASSAY OF CK (CPK): CPT

## 2023-10-31 PROCEDURE — 80053 COMPREHEN METABOLIC PANEL: CPT

## 2023-10-31 PROCEDURE — 85025 COMPLETE CBC W/AUTO DIFF WBC: CPT

## 2023-10-31 RX ORDER — DEXTROSE 50 % IN WATER 50 %
15 SYRINGE (ML) INTRAVENOUS ONCE
Refills: 0 | Status: DISCONTINUED | OUTPATIENT
Start: 2023-10-31 | End: 2023-11-02

## 2023-10-31 RX ORDER — DIPHENHYDRAMINE HCL 50 MG
50 CAPSULE ORAL EVERY 6 HOURS
Refills: 0 | Status: DISCONTINUED | OUTPATIENT
Start: 2023-10-31 | End: 2023-11-02

## 2023-10-31 RX ORDER — ALBUTEROL 90 UG/1
2 AEROSOL, METERED ORAL EVERY 6 HOURS
Refills: 0 | Status: DISCONTINUED | OUTPATIENT
Start: 2023-10-31 | End: 2023-11-02

## 2023-10-31 RX ORDER — HYDRALAZINE HCL 50 MG
75 TABLET ORAL THREE TIMES A DAY
Refills: 0 | Status: DISCONTINUED | OUTPATIENT
Start: 2023-10-31 | End: 2023-11-02

## 2023-10-31 RX ORDER — NIFEDIPINE 30 MG
30 TABLET, EXTENDED RELEASE 24 HR ORAL ONCE
Refills: 0 | Status: DISCONTINUED | OUTPATIENT
Start: 2023-10-31 | End: 2023-10-31

## 2023-10-31 RX ORDER — LABETALOL HCL 100 MG
200 TABLET ORAL ONCE
Refills: 0 | Status: COMPLETED | OUTPATIENT
Start: 2023-10-31 | End: 2023-10-31

## 2023-10-31 RX ORDER — ASPIRIN/CALCIUM CARB/MAGNESIUM 324 MG
81 TABLET ORAL DAILY
Refills: 0 | Status: DISCONTINUED | OUTPATIENT
Start: 2023-10-31 | End: 2023-11-02

## 2023-10-31 RX ORDER — HYDROMORPHONE HYDROCHLORIDE 2 MG/ML
0.5 INJECTION INTRAMUSCULAR; INTRAVENOUS; SUBCUTANEOUS ONCE
Refills: 0 | Status: DISCONTINUED | OUTPATIENT
Start: 2023-10-31 | End: 2023-10-31

## 2023-10-31 RX ORDER — ISOSORBIDE DINITRATE 5 MG/1
5 TABLET ORAL ONCE
Refills: 0 | Status: COMPLETED | OUTPATIENT
Start: 2023-10-31 | End: 2023-10-31

## 2023-10-31 RX ORDER — ACETAMINOPHEN 500 MG
650 TABLET ORAL EVERY 6 HOURS
Refills: 0 | Status: COMPLETED | OUTPATIENT
Start: 2023-10-31 | End: 2024-09-28

## 2023-10-31 RX ORDER — INSULIN LISPRO 100/ML
VIAL (ML) SUBCUTANEOUS
Refills: 0 | Status: DISCONTINUED | OUTPATIENT
Start: 2023-10-31 | End: 2023-11-02

## 2023-10-31 RX ORDER — HYDROMORPHONE HYDROCHLORIDE 2 MG/ML
0.2 INJECTION INTRAMUSCULAR; INTRAVENOUS; SUBCUTANEOUS ONCE
Refills: 0 | Status: DISCONTINUED | OUTPATIENT
Start: 2023-10-31 | End: 2023-10-31

## 2023-10-31 RX ORDER — METOCLOPRAMIDE HCL 10 MG
10 TABLET ORAL THREE TIMES A DAY
Refills: 0 | Status: DISCONTINUED | OUTPATIENT
Start: 2023-10-31 | End: 2023-11-02

## 2023-10-31 RX ORDER — HEPARIN SODIUM 5000 [USP'U]/ML
5000 INJECTION INTRAVENOUS; SUBCUTANEOUS EVERY 12 HOURS
Refills: 0 | Status: DISCONTINUED | OUTPATIENT
Start: 2023-10-31 | End: 2023-11-02

## 2023-10-31 RX ORDER — PANTOPRAZOLE SODIUM 20 MG/1
40 TABLET, DELAYED RELEASE ORAL
Refills: 0 | Status: DISCONTINUED | OUTPATIENT
Start: 2023-10-31 | End: 2023-11-02

## 2023-10-31 RX ORDER — CALCITRIOL 0.5 UG/1
0.25 CAPSULE ORAL DAILY
Refills: 0 | Status: DISCONTINUED | OUTPATIENT
Start: 2023-10-31 | End: 2023-11-02

## 2023-10-31 RX ORDER — DIPHENHYDRAMINE HCL 50 MG
25 CAPSULE ORAL ONCE
Refills: 0 | Status: COMPLETED | OUTPATIENT
Start: 2023-10-31 | End: 2023-10-31

## 2023-10-31 RX ORDER — ACETAMINOPHEN 500 MG
650 TABLET ORAL EVERY 6 HOURS
Refills: 0 | Status: DISCONTINUED | OUTPATIENT
Start: 2023-10-31 | End: 2023-10-31

## 2023-10-31 RX ORDER — SODIUM CHLORIDE 9 MG/ML
1000 INJECTION, SOLUTION INTRAVENOUS
Refills: 0 | Status: DISCONTINUED | OUTPATIENT
Start: 2023-10-31 | End: 2023-11-02

## 2023-10-31 RX ORDER — ACETAMINOPHEN 500 MG
975 TABLET ORAL ONCE
Refills: 0 | Status: COMPLETED | OUTPATIENT
Start: 2023-10-31 | End: 2023-10-31

## 2023-10-31 RX ORDER — SEVELAMER CARBONATE 2400 MG/1
800 POWDER, FOR SUSPENSION ORAL
Refills: 0 | Status: DISCONTINUED | OUTPATIENT
Start: 2023-10-31 | End: 2023-11-02

## 2023-10-31 RX ORDER — LANOLIN ALCOHOL/MO/W.PET/CERES
3 CREAM (GRAM) TOPICAL AT BEDTIME
Refills: 0 | Status: DISCONTINUED | OUTPATIENT
Start: 2023-10-31 | End: 2023-11-02

## 2023-10-31 RX ORDER — HYDRALAZINE HCL 50 MG
10 TABLET ORAL ONCE
Refills: 0 | Status: COMPLETED | OUTPATIENT
Start: 2023-10-31 | End: 2023-10-31

## 2023-10-31 RX ORDER — ACETAMINOPHEN 500 MG
650 TABLET ORAL EVERY 6 HOURS
Refills: 0 | Status: DISCONTINUED | OUTPATIENT
Start: 2023-10-31 | End: 2023-11-02

## 2023-10-31 RX ORDER — LABETALOL HCL 100 MG
200 TABLET ORAL THREE TIMES A DAY
Refills: 0 | Status: DISCONTINUED | OUTPATIENT
Start: 2023-10-31 | End: 2023-11-02

## 2023-10-31 RX ORDER — NIFEDIPINE 30 MG
90 TABLET, EXTENDED RELEASE 24 HR ORAL ONCE
Refills: 0 | Status: COMPLETED | OUTPATIENT
Start: 2023-10-31 | End: 2023-10-31

## 2023-10-31 RX ORDER — DEXTROSE 50 % IN WATER 50 %
25 SYRINGE (ML) INTRAVENOUS ONCE
Refills: 0 | Status: DISCONTINUED | OUTPATIENT
Start: 2023-10-31 | End: 2023-11-02

## 2023-10-31 RX ORDER — ACETAMINOPHEN 500 MG
1000 TABLET ORAL EVERY 6 HOURS
Refills: 0 | Status: DISCONTINUED | OUTPATIENT
Start: 2023-10-31 | End: 2023-10-31

## 2023-10-31 RX ORDER — GABAPENTIN 400 MG/1
300 CAPSULE ORAL DAILY
Refills: 0 | Status: DISCONTINUED | OUTPATIENT
Start: 2023-10-31 | End: 2023-11-02

## 2023-10-31 RX ORDER — GLUCAGON INJECTION, SOLUTION 0.5 MG/.1ML
1 INJECTION, SOLUTION SUBCUTANEOUS ONCE
Refills: 0 | Status: DISCONTINUED | OUTPATIENT
Start: 2023-10-31 | End: 2023-11-02

## 2023-10-31 RX ORDER — FUROSEMIDE 40 MG
80 TABLET ORAL ONCE
Refills: 0 | Status: DISCONTINUED | OUTPATIENT
Start: 2023-10-31 | End: 2023-10-31

## 2023-10-31 RX ORDER — GABAPENTIN 400 MG/1
300 CAPSULE ORAL THREE TIMES A DAY
Refills: 0 | Status: DISCONTINUED | OUTPATIENT
Start: 2023-10-31 | End: 2023-10-31

## 2023-10-31 RX ORDER — ONDANSETRON 8 MG/1
4 TABLET, FILM COATED ORAL EVERY 8 HOURS
Refills: 0 | Status: DISCONTINUED | OUTPATIENT
Start: 2023-10-31 | End: 2023-10-31

## 2023-10-31 RX ORDER — ACETAMINOPHEN 500 MG
1000 TABLET ORAL ONCE
Refills: 0 | Status: COMPLETED | OUTPATIENT
Start: 2023-10-31 | End: 2023-10-31

## 2023-10-31 RX ORDER — NIFEDIPINE 30 MG
90 TABLET, EXTENDED RELEASE 24 HR ORAL DAILY
Refills: 0 | Status: DISCONTINUED | OUTPATIENT
Start: 2023-10-31 | End: 2023-11-02

## 2023-10-31 RX ORDER — ATORVASTATIN CALCIUM 80 MG/1
80 TABLET, FILM COATED ORAL AT BEDTIME
Refills: 0 | Status: DISCONTINUED | OUTPATIENT
Start: 2023-10-31 | End: 2023-11-02

## 2023-10-31 RX ADMIN — HYDROMORPHONE HYDROCHLORIDE 0.5 MILLIGRAM(S): 2 INJECTION INTRAMUSCULAR; INTRAVENOUS; SUBCUTANEOUS at 09:25

## 2023-10-31 RX ADMIN — Medication 200 MILLIGRAM(S): at 21:49

## 2023-10-31 RX ADMIN — HYDROMORPHONE HYDROCHLORIDE 0.2 MILLIGRAM(S): 2 INJECTION INTRAMUSCULAR; INTRAVENOUS; SUBCUTANEOUS at 01:43

## 2023-10-31 RX ADMIN — HYDROMORPHONE HYDROCHLORIDE 0.2 MILLIGRAM(S): 2 INJECTION INTRAMUSCULAR; INTRAVENOUS; SUBCUTANEOUS at 18:55

## 2023-10-31 RX ADMIN — Medication 975 MILLIGRAM(S): at 21:59

## 2023-10-31 RX ADMIN — ONDANSETRON 4 MILLIGRAM(S): 8 TABLET, FILM COATED ORAL at 01:48

## 2023-10-31 RX ADMIN — Medication 81 MILLIGRAM(S): at 14:45

## 2023-10-31 RX ADMIN — SEVELAMER CARBONATE 800 MILLIGRAM(S): 2400 POWDER, FOR SUSPENSION ORAL at 17:32

## 2023-10-31 RX ADMIN — CALCITRIOL 0.25 MICROGRAM(S): 0.5 CAPSULE ORAL at 14:46

## 2023-10-31 RX ADMIN — Medication 200 MILLIGRAM(S): at 17:32

## 2023-10-31 RX ADMIN — HYDROMORPHONE HYDROCHLORIDE 0.2 MILLIGRAM(S): 2 INJECTION INTRAMUSCULAR; INTRAVENOUS; SUBCUTANEOUS at 18:13

## 2023-10-31 RX ADMIN — HYDROMORPHONE HYDROCHLORIDE 0.5 MILLIGRAM(S): 2 INJECTION INTRAMUSCULAR; INTRAVENOUS; SUBCUTANEOUS at 15:03

## 2023-10-31 RX ADMIN — Medication 50 MILLIGRAM(S): at 09:40

## 2023-10-31 RX ADMIN — Medication 1000 MILLIGRAM(S): at 03:49

## 2023-10-31 RX ADMIN — Medication 25 MILLIGRAM(S): at 03:52

## 2023-10-31 RX ADMIN — Medication 90 MILLIGRAM(S): at 09:42

## 2023-10-31 RX ADMIN — HYDROMORPHONE HYDROCHLORIDE 0.2 MILLIGRAM(S): 2 INJECTION INTRAMUSCULAR; INTRAVENOUS; SUBCUTANEOUS at 03:51

## 2023-10-31 RX ADMIN — Medication 200 MILLIGRAM(S): at 09:24

## 2023-10-31 RX ADMIN — Medication 10 MILLIGRAM(S): at 09:25

## 2023-10-31 RX ADMIN — HEPARIN SODIUM 5000 UNIT(S): 5000 INJECTION INTRAVENOUS; SUBCUTANEOUS at 17:31

## 2023-10-31 RX ADMIN — HYDROMORPHONE HYDROCHLORIDE 0.2 MILLIGRAM(S): 2 INJECTION INTRAMUSCULAR; INTRAVENOUS; SUBCUTANEOUS at 02:13

## 2023-10-31 RX ADMIN — Medication 400 MILLIGRAM(S): at 03:19

## 2023-10-31 RX ADMIN — Medication 75 MILLIGRAM(S): at 17:32

## 2023-10-31 RX ADMIN — GABAPENTIN 300 MILLIGRAM(S): 400 CAPSULE ORAL at 14:46

## 2023-10-31 RX ADMIN — Medication 75 MILLIGRAM(S): at 21:48

## 2023-10-31 RX ADMIN — ATORVASTATIN CALCIUM 80 MILLIGRAM(S): 80 TABLET, FILM COATED ORAL at 21:48

## 2023-10-31 RX ADMIN — Medication 10 MILLIGRAM(S): at 03:51

## 2023-10-31 RX ADMIN — Medication 10 MILLIGRAM(S): at 02:17

## 2023-10-31 NOTE — CONSULT NOTE ADULT - ASSESSMENT
59 y/o female with PMH of Hearing loss,  ESRD on HD (T/Th/Sat), HTN, HLD, DM, PVD s/p L fem bypass, and CVA presenting for diffuse pain bony pain, abdominal pain, lethargy and headache. Pain worsened today, associated with abdominal distention, nausea and vomiting. Also reports shortness of breath with periorbital edema. She reports generalized weakness and more trouble walking due to weakness. No chest pain , fever, chills, palpitations, cough, diarrhea, joint swelling or recent sick contacts. In the ED patient was hypertensive 193/86 , HR 82 and afebrile. Labs were significant for Hb 11, potassium 5.3, Alk phos 593, Trop 0.05 and BNP 70K. CT abdomen and pelvis showing diffuse moderate to severe anasarca with ascites and bilateral pleural effusion as well as non specific inguinal lymph nodes. Patient admitted for hypertensive urgency and volume overload    INCOMPLETE NEPHROLOGY CONSULTATION NOTE    ESRD on HD     - TTS HD via AVG  - Last session outpatient on 7/17/23  - Not fluid overload  - Hyperkalemic to 5.3  - Bicarbonate levels 24- under  control   - Was in hypertensive urgency at arrival. Hydralazine incraesed from 100 BID to 75 mg TID. Labetalol increased from 200 BID to 200 mg TID and on  Nifedipine 90 mg   - Monitor BP after HD   - Anemia of ESRD. Hbg 11.6. On HANNAH  - MBD. Get phosphorous, Vitamin D  and PTH levels  - C/w sevelamer 800 mg TID   - Will follow    59 y/o female with PMH of Hearing loss,  ESRD on HD (T/Th/Sat), HTN, HLD, DM, PVD s/p L fem bypass, and CVA presenting for diffuse pain bony pain, abdominal pain, lethargy and headache. Pain worsened today, associated with abdominal distention, nausea and vomiting. Also reports shortness of breath with periorbital edema. She reports generalized weakness and more trouble walking due to weakness. No chest pain , fever, chills, palpitations, cough, diarrhea, joint swelling or recent sick contacts. In the ED patient was hypertensive 193/86 , HR 82 and afebrile. Labs were significant for Hb 11, potassium 5.3, Alk phos 593, Trop 0.05 and BNP 70K. CT abdomen and pelvis showing diffuse moderate to severe anasarca with ascites and bilateral pleural effusion as well as non specific inguinal lymph nodes. Patient admitted for hypertensive urgency and volume overload      ESRD on HD/ Bilateral pleural effusions  - TTS HD via AVG  - Last session outpatient on 10/28/23  - Fluid overload. Will get HD today  - Hyperkalemic to 5.3. If BMP after HD shows hyperkalemia above 5.5 start Lokelma 10 gm once daily   - Bicarbonate levels 24- under  control   - Was in hypertensive urgency at arrival. Hydralazine increased from 100 BID to 75 mg TID. Labetalol increased from 200 BID to 200 mg TID and on  Nifedipine 90 mg   - Monitor BP after HD   - Anemia of ESRD. Hbg 11.6. On HANNAH  - MBD. Get phosphorous, Vitamin D  and PTH levels  - C/w sevelamer 800 mg TID   - Will benefit from pulmonary evaluation as pleural effusion size increased in the CT chest when compared to previous imaging   - Will follow

## 2023-10-31 NOTE — H&P ADULT - NSHPPHYSICALEXAM_GEN_ALL_CORE
LOS:     VITALS:   T(C): 36.9 (10-31-23 @ 00:20), Max: 36.9 (10-31-23 @ 00:20)  HR: 81 (10-31-23 @ 00:20) (81 - 82)  BP: 214/101 (10-31-23 @ 00:20) (193/86 - 214/101)  RR: 18 (10-31-23 @ 00:20) (17 - 18)  SpO2: 98% (10-31-23 @ 00:20) (97% - 98%)    GENERAL: NAD, lying in bed comfortably  HEAD:  Atraumatic, Normocephalic  EYES: EOMI, PERRLA, conjunctiva and sclera clear  ENT: Moist mucous membranes  NECK: Supple, No JVD  CHEST/LUNG: Clear to auscultation bilaterally; No rales, rhonchi, wheezing, or rubs. Unlabored respirations  HEART: Regular rate and rhythm; No murmurs, rubs, or gallops  ABDOMEN: BSx4; Soft, nontender, nondistended  EXTREMITIES:  2+ Peripheral Pulses, brisk capillary refill. No clubbing, cyanosis, or edema  NERVOUS SYSTEM:  A&Ox3, no focal deficits   SKIN: No rashes or lesions

## 2023-10-31 NOTE — PATIENT PROFILE ADULT - FALL HARM RISK - HARM RISK INTERVENTIONS

## 2023-10-31 NOTE — H&P ADULT - ATTENDING COMMENTS
57 y/o female with PMH of Hearing loss,  ESRD on HD (T/Th/Sat), HTN, HLD, DM, PVD s/p L fem bypass, and CVA presenting for diffuse pain bony pain, abdominal pain, lethargy and headache. Sxs likely related to high BP    #Hypertensive urgency  #Fluid overload  #ESRD on HD TTS  #Hyperkalemia  BP meds increased  Pleural effusions slightly increased in size compared to 10/18/2023  Pt stable on RA  - Cont labetalol 200 TID  - Cont hydralazine 75mg TID  - Cont nifedipine 90mg qD  - Titrate BP meds as needed    #Nausea  Possibly gastritis, vs DM Gastroparesis  CTAP negative for acute abdominal pathology  - Reglan PRN  - Outpatient f/u for NM Gastric emptying study    # h/o Right pontine CVA (February 2021)  # PVD s/p left fem bypass  #DM  #HLD  - c/w statin and ASA  - Insulin SSI  - check A1c and Lipids    #Asthma - no in exacerbation  - Albuterol as needed    DVT PPX, heparin    #Progress Note Handoff  Pending (specify): Control BP, tx nausea, HD  Family discussion: dw family regarding tx for HTNive urgency and fluid overload  Disposition: Home

## 2023-10-31 NOTE — CONSULT NOTE ADULT - SUBJECTIVE AND OBJECTIVE BOX
INCOMPLETE NEPHROLOGY CONSULTATION NOTE  59 y/o female with PMH of Hearing loss,  ESRD on HD (T/Th/Sat), HTN, HLD, DM, PVD s/p L fem bypass, and CVA presenting for diffuse pain bony pain, abdominal pain, lethargy and headache. Pain worsened today, associated with abdominal distention, nausea and vomiting. Also reports shortness of breath with periorbital edema. She reports generalized weakness and more trouble walking due to weakness. No chest pain , fever, chills, palpitations, cough, diarrhea, joint swelling or recent sick contacts. In the ED patient was hypertensive 193/86 , HR 82 and afebrile. Labs were significant for Hb 11, potassium 5.3, Alk phos 593, Trop 0.05 and BNP 70K. CT abdomen and pelvis showing diffuse moderate to severe anasarca with ascites and bilateral pleural effusion as well as non specific inguinal lymph nodes. Patient admitted for hypertensive urgency and volume overload    PAST MEDICAL & SURGICAL HISTORY:  PVD (peripheral vascular disease)  Benign essential HTN  Intellectual disability  Hearing impaired  HLD (hyperlipidemia)  Chronic kidney disease, unspecified CKD stage  CVA (cerebral vascular accident)  DM (diabetes mellitus)  H/O vascular surgery  left leg  H/O fracture of leg  S/P arteriovenous (AV) fistula creation  S/P debridement  History of partial amputation of toe of left foot  S/P femoral-popliteal bypass surgery    Allergies:  penicillin (Rash)  NSAIDs (Nephrotoxicity)    Home Medications Reviewed  Hospital Medications:   MEDICATIONS  (STANDING):  aspirin enteric coated 81 milliGRAM(s) Oral daily  atorvastatin 80 milliGRAM(s) Oral at bedtime  calcitriol   Capsule 0.25 MICROGram(s) Oral daily  dextrose 5%. 1000 milliLiter(s) (50 mL/Hr) IV Continuous <Continuous>  dextrose 50% Injectable 25 Gram(s) IV Push once  gabapentin 300 milliGRAM(s) Oral daily  glucagon  Injectable 1 milliGRAM(s) IntraMuscular once  heparin   Injectable 5000 Unit(s) SubCutaneous every 12 hours  hydrALAZINE 75 milliGRAM(s) Oral three times a day  insulin lispro (ADMELOG) corrective regimen sliding scale   SubCutaneous three times a day before meals  labetalol 200 milliGRAM(s) Oral three times a day  NIFEdipine XL 90 milliGRAM(s) Oral daily  pantoprazole    Tablet 40 milliGRAM(s) Oral before breakfast  sevelamer carbonate 800 milliGRAM(s) Oral three times a day with meals    SOCIAL HISTORY:  Denies ETOH,Smoking,   FAMILY HISTORY:      REVIEW OF SYSTEMS:  CONSTITUTIONAL: No weakness, fevers or chills  EYES/ENT: No visual changes;  No vertigo or throat pain   NECK: No pain or stiffness  RESPIRATORY: No cough, wheezing, hemoptysis; No shortness of breath  CARDIOVASCULAR: No chest pain or palpitations.  GASTROINTESTINAL: No abdominal or epigastric pain. No nausea, vomiting, or hematemesis; No diarrhea or constipation. No melena or hematochezia.  GENITOURINARY: No dysuria, frequency, foamy urine, urinary urgency, incontinence or hematuria  NEUROLOGICAL: No numbness or weakness  SKIN: No itching, burning, rashes, or lesions   VASCULAR: No bilateral lower extremity edema.   All other review of systems is negative unless indicated above.    VITALS:  Vital Signs Last 24 Hrs  T(C): 36.8 (31 Oct 2023 07:35), Max: 36.9 (31 Oct 2023 00:20)  T(F): 98.2 (31 Oct 2023 07:35), Max: 98.4 (31 Oct 2023 00:20)  HR: 74 (31 Oct 2023 07:35) (72 - 82)  BP: 194/91 (31 Oct 2023 07:35) (193/86 - 220/93)  BP(mean): --  RR: 18 (31 Oct 2023 07:35) (17 - 18)  SpO2: 98% (31 Oct 2023 07:35) (97% - 98%)    Parameters below as of 31 Oct 2023 07:35  Patient On (Oxygen Delivery Method): room air        Height (cm): 162.6 (10-30 @ 20:14)  Weight (kg): 68 (10-30 @ 20:14)  BMI (kg/m2): 25.7 (10-30 @ 20:14)  BSA (m2): 1.73 (10-30 @ 20:14)  PHYSICAL EXAM:  Constitutional: NAD  HEENT: anicteric sclera, oropharynx clear, MMM  Neck: No JVD  Respiratory: CTAB, no wheezes, rales or rhonchi  Cardiovascular: S1, S2, RRR  Gastrointestinal: BS+, soft, NT/ND  Extremities: No cyanosis or clubbing. No peripheral edema  Neurological: A/O x 3, no focal deficits  Psychiatric: Normal mood, normal affect  : No CVA tenderness. No mcgrath.   Skin: No rashes  Vascular Access:    LABS:  10-31    139  |  97<L>  |  44<H>  ----------------------------<  78  5.3<H>   |  24  |  5.4<HH>    Ca    8.7      31 Oct 2023 06:57  Phos  4.9     10-31  Mg     2.2     10-31    TPro  6.8  /  Alb  3.9  /  TBili  0.9  /  DBili      /  AST  23  /  ALT  11  /  AlkPhos  582<H>  10-31    Creatinine Trend: 5.4 <--, 5.0 <--, 4.6 <--, 5.6 <--, 4.6 <--                        11.6   5.13  )-----------( 112      ( 31 Oct 2023 06:57 )             35.2     Urine Studies:  Urinalysis Basic - ( 31 Oct 2023 06:57 )    Color:  / Appearance:  / SG:  / pH:   Gluc: 78 mg/dL / Ketone:   / Bili:  / Urobili:    Blood:  / Protein:  / Nitrite:    Leuk Esterase:  / RBC:  / WBC    Sq Epi:  / Non Sq Epi:  / Bacteria:           10-19 @ 08:58  8.5  159  --        RADIOLOGY & ADDITIONAL STUDIES:    CT Abdomen     FINDINGS:    LOWER CHEST: Large right and moderate left pleural effusions with overlying atelectasis, slightly increased in size compared to 10/18/2023. Cardiomegaly, stable.    HEPATOBILIARY: Unremarkable.    SPLEEN: Unremarkable.    PANCREAS: Unremarkable.    ADRENAL GLANDS: Unremarkable.    KIDNEYS: Atrophic bilaterally, stable. No hydronephrosis..    ABDOMINOPELVIC NODES: Limited evaluation secondary to large volume abdominal pelvic ascites. Unchanged prominence of the bilateral inguinal and iliac chain lymph nodes.    PELVIC ORGANS: Unremarkable.    PERITONEUM/MESENTERY/BOWEL: Large volume abdominal pelvic ascites. No evidence of bowel obstruction or free air.    BONES/SOFT TISSUES: Anasarca. No acute osseous abnormality.      IMPRESSION:    Large right and moderate left pleural effusions, slightly increased in size compared to 10/18/2023.    Additional findings as above, are similar compared to prior exam 10/18/2023, including large volume abdominal pelvic ascites, anasarca and nonspecific bilateral inguinal/iliac chain lymph nodes.                NEPHROLOGY CONSULTATION NOTE  57 y/o female with PMH of Hearing loss,  ESRD on HD (T/Th/Sat), HTN, HLD, DM, PVD s/p L fem bypass, and CVA presenting for diffuse pain bony pain, abdominal pain, lethargy and headache. Pain worsened today, associated with abdominal distention, nausea and vomiting. Also reports shortness of breath with periorbital edema. She reports generalized weakness and more trouble walking due to weakness. No chest pain , fever, chills, palpitations, cough, diarrhea, joint swelling or recent sick contacts. In the ED patient was hypertensive 193/86 , HR 82 and afebrile. Labs were significant for Hb 11, potassium 5.3, Alk phos 593, Trop 0.05 and BNP 70K. CT abdomen and pelvis showing diffuse moderate to severe anasarca with ascites and bilateral pleural effusion as well as non specific inguinal lymph nodes. Patient admitted for hypertensive urgency and volume overload    PAST MEDICAL & SURGICAL HISTORY:  PVD (peripheral vascular disease)  Benign essential HTN  Intellectual disability  Hearing impaired  HLD (hyperlipidemia)  Chronic kidney disease, unspecified CKD stage  CVA (cerebral vascular accident)  DM (diabetes mellitus)  H/O vascular surgery  left leg  H/O fracture of leg  S/P arteriovenous (AV) fistula creation  S/P debridement  History of partial amputation of toe of left foot  S/P femoral-popliteal bypass surgery    Allergies:  penicillin (Rash)  NSAIDs (Nephrotoxicity)    Home Medications Reviewed  Hospital Medications:   MEDICATIONS  (STANDING):  aspirin enteric coated 81 milliGRAM(s) Oral daily  atorvastatin 80 milliGRAM(s) Oral at bedtime  calcitriol   Capsule 0.25 MICROGram(s) Oral daily  dextrose 5%. 1000 milliLiter(s) (50 mL/Hr) IV Continuous <Continuous>  dextrose 50% Injectable 25 Gram(s) IV Push once  gabapentin 300 milliGRAM(s) Oral daily  glucagon  Injectable 1 milliGRAM(s) IntraMuscular once  heparin   Injectable 5000 Unit(s) SubCutaneous every 12 hours  hydrALAZINE 75 milliGRAM(s) Oral three times a day  insulin lispro (ADMELOG) corrective regimen sliding scale   SubCutaneous three times a day before meals  labetalol 200 milliGRAM(s) Oral three times a day  NIFEdipine XL 90 milliGRAM(s) Oral daily  pantoprazole    Tablet 40 milliGRAM(s) Oral before breakfast  sevelamer carbonate 800 milliGRAM(s) Oral three times a day with meals    SOCIAL HISTORY:  Denies ETOH,Smoking,   FAMILY HISTORY:      REVIEW OF SYSTEMS:    RESPIRATORY: Shortness of breath  CARDIOVASCULAR: No chest pain or palpitations.  GASTROINTESTINAL: Abdominal distension   GENITOURINARY: No dysuria, frequency, foamy urine, urinary urgency, incontinence or hematuria  NEUROLOGICAL: No numbness or weakness  SKIN: No itching, burning, rashes, or lesions   VASCULAR: No bilateral lower extremity edema.       VITALS:  Vital Signs Last 24 Hrs  T(C): 36.8 (31 Oct 2023 07:35), Max: 36.9 (31 Oct 2023 00:20)  T(F): 98.2 (31 Oct 2023 07:35), Max: 98.4 (31 Oct 2023 00:20)  HR: 74 (31 Oct 2023 07:35) (72 - 82)  BP: 194/91 (31 Oct 2023 07:35) (193/86 - 220/93)  BP(mean): --  RR: 18 (31 Oct 2023 07:35) (17 - 18)  SpO2: 98% (31 Oct 2023 07:35) (97% - 98%)    Parameters below as of 31 Oct 2023 07:35  Patient On (Oxygen Delivery Method): room air        Height (cm): 162.6 (10-30 @ 20:14)  Weight (kg): 68 (10-30 @ 20:14)  BMI (kg/m2): 25.7 (10-30 @ 20:14)  BSA (m2): 1.73 (10-30 @ 20:14)    PHYSICAL EXAM:    Respiratory: CTAB, no wheezes, rales or rhonchi  Cardiovascular: S1, S2, RRR  Gastrointestinal: BS+, soft, NT/ND  Extremities: Grade 1 pedal edema   : No CVA tenderness. No mcgrath.   Skin: No rashes      LABS:  10-31    139  |  97<L>  |  44<H>  ----------------------------<  78  5.3<H>   |  24  |  5.4<HH>    Ca    8.7      31 Oct 2023 06:57  Phos  4.9     10-31  Mg     2.2     10-31    TPro  6.8  /  Alb  3.9  /  TBili  0.9  /  DBili      /  AST  23  /  ALT  11  /  AlkPhos  582<H>  10-31    Creatinine Trend: 5.4 <--, 5.0 <--, 4.6 <--, 5.6 <--, 4.6 <--                        11.6   5.13  )-----------( 112      ( 31 Oct 2023 06:57 )             35.2     Urine Studies:  Urinalysis Basic - ( 31 Oct 2023 06:57 )    Color:  / Appearance:  / SG:  / pH:   Gluc: 78 mg/dL / Ketone:   / Bili:  / Urobili:    Blood:  / Protein:  / Nitrite:    Leuk Esterase:  / RBC:  / WBC    Sq Epi:  / Non Sq Epi:  / Bacteria:           10-19 @ 08:58  8.5  159  --        RADIOLOGY & ADDITIONAL STUDIES:    CT Abdomen     FINDINGS:    LOWER CHEST: Large right and moderate left pleural effusions with overlying atelectasis, slightly increased in size compared to 10/18/2023. Cardiomegaly, stable.    HEPATOBILIARY: Unremarkable.    SPLEEN: Unremarkable.    PANCREAS: Unremarkable.    ADRENAL GLANDS: Unremarkable.    KIDNEYS: Atrophic bilaterally, stable. No hydronephrosis..    ABDOMINOPELVIC NODES: Limited evaluation secondary to large volume abdominal pelvic ascites. Unchanged prominence of the bilateral inguinal and iliac chain lymph nodes.    PELVIC ORGANS: Unremarkable.    PERITONEUM/MESENTERY/BOWEL: Large volume abdominal pelvic ascites. No evidence of bowel obstruction or free air.    BONES/SOFT TISSUES: Anasarca. No acute osseous abnormality.      IMPRESSION:    Large right and moderate left pleural effusions, slightly increased in size compared to 10/18/2023.    Additional findings as above, are similar compared to prior exam 10/18/2023, including large volume abdominal pelvic ascites, anasarca and nonspecific bilateral inguinal/iliac chain lymph nodes.

## 2023-10-31 NOTE — CONSULT NOTE ADULT - ATTENDING COMMENTS
59 y/o female with PMH of Hearing loss,  ESRD on HD (T/Th/Sat), HTN, HLD, DM, PVD s/p L fem bypass, and CVA presenting for diffuse pain bony pain, abdominal pain, lethargy and headache. Pain worsened today, associated with abdominal distention, nausea and vomiting. Pt with SOB, large b/l pleural effusions. In the ED patient was hypertensive 193/86 , HR 82 and afebrile. Patient admitted for hypertensive urgency and volume overload    ESRD HD today  3 hrs 2 K bath UF 3 L  fluid restrict 1 L daily  repeat CXR post HD  may need thoracentesis

## 2023-10-31 NOTE — H&P ADULT - HISTORY OF PRESENT ILLNESS
59 y/o female with PMH of Hearing loss,  ESRD on HD (T/Th/Sat), HTN, HLD, DM, PVD s/p L fem bypass, and CVA presenting for diffuse pain bony pain, abdominal pain, lethargy and headache. Pain worsened today, associated with abdominal distention, nausea and vomiting. Also reports shortness of breath with periorbital edema. She reports generalized weakness and more trouble walking due to weakness. No chest pain , fever, chills, palpitations, cough, diarrhea, joint swelling or recent sick contacts.    In the ED patient was hypertensive 193/86 , HR 82 and afebrile  Labs were significant for Hb 11, potassium 5.3, Alk phos 593, Trop 0.05 and BNP 70K  CT abdomen and pelvis showing diffuse moderate to severe anasarca with ascites and bilateral pleural effusion as well as non specific inguinal lymph nodes.    Patient admitted for hypertensive urgency and volume overload

## 2023-10-31 NOTE — H&P ADULT - ASSESSMENT
59 y/o female with PMH of Hearing loss,  ESRD on HD (T/Th/Sat), HTN, HLD, DM, PVD s/p stent, and CVA presenting for diffuse pain bony pain, abdominal pain, lethargy and headache. Pain worsened today, associated with abdominal distention, nausea and vomiting.    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  - Nephro Cs for dialysis in AM  - No indication for urgent dialysis  - No need for sodium bicarb  - c/w calcitriol and sevelamer  - Diffuse Bony Pain, , 25 Vit D: 29 and 1.25 Vit D: 16.1  - ALk phos chronically high > 500 -> check bone ALK phos, osteocalcin and propeptide type 1 collagen and GGT  - Decrease Gabapentin to 300 mg daily    #Elevated Trop  # possible CAD  - Baseline trop 0.02 - 0.04  - Highly likely due to ESRD  - No ECG changes ; repeat in AM  - Trend trop  - c/w asa and statin    #Hypertensive Urgency  - /101 - Hydralazine 10 mg IV push and 1 dose of Isosorbide dinitrate  - Increase Hydralazine from 100 BID to 75 mg TID  - Increase Labetalol from 200 BID to 200 mg TID  - Continue Nifedipine 90 mg   - Control pain    #Nausea  - maybe related to HTN urgency  - Zofran as needed for now    # h/o Right pontine CVA (February 2021)  # PVD s/p left fem bypass  #DM  #HLD  - c/w statin and ASA  - Insulin SSI  - check A1c and Lipids    #Asthma - no in exacerbation  - Albuterol as needed    DVT ppx: heparin  GI ppx: pantoprazole  Diet: RR CC DASH  Full code  Plan discussed bedside with patient and

## 2023-11-01 LAB
24R-OH-CALCIDIOL SERPL-MCNC: 23 NG/ML — LOW (ref 30–80)
24R-OH-CALCIDIOL SERPL-MCNC: 23 NG/ML — LOW (ref 30–80)
ANION GAP SERPL CALC-SCNC: 13 MMOL/L — SIGNIFICANT CHANGE UP (ref 7–14)
ANION GAP SERPL CALC-SCNC: 13 MMOL/L — SIGNIFICANT CHANGE UP (ref 7–14)
BASOPHILS # BLD AUTO: 0.08 K/UL — SIGNIFICANT CHANGE UP (ref 0–0.2)
BASOPHILS # BLD AUTO: 0.08 K/UL — SIGNIFICANT CHANGE UP (ref 0–0.2)
BASOPHILS NFR BLD AUTO: 1.7 % — HIGH (ref 0–1)
BASOPHILS NFR BLD AUTO: 1.7 % — HIGH (ref 0–1)
BUN SERPL-MCNC: 33 MG/DL — HIGH (ref 10–20)
BUN SERPL-MCNC: 33 MG/DL — HIGH (ref 10–20)
CALCIUM SERPL-MCNC: 8.7 MG/DL — SIGNIFICANT CHANGE UP (ref 8.4–10.4)
CALCIUM SERPL-MCNC: 8.7 MG/DL — SIGNIFICANT CHANGE UP (ref 8.4–10.4)
CHLORIDE SERPL-SCNC: 100 MMOL/L — SIGNIFICANT CHANGE UP (ref 98–110)
CHLORIDE SERPL-SCNC: 100 MMOL/L — SIGNIFICANT CHANGE UP (ref 98–110)
CO2 SERPL-SCNC: 27 MMOL/L — SIGNIFICANT CHANGE UP (ref 17–32)
CO2 SERPL-SCNC: 27 MMOL/L — SIGNIFICANT CHANGE UP (ref 17–32)
CREAT SERPL-MCNC: 4.7 MG/DL — CRITICAL HIGH (ref 0.7–1.5)
CREAT SERPL-MCNC: 4.7 MG/DL — CRITICAL HIGH (ref 0.7–1.5)
EGFR: 10 ML/MIN/1.73M2 — LOW
EGFR: 10 ML/MIN/1.73M2 — LOW
EOSINOPHIL # BLD AUTO: 0.13 K/UL — SIGNIFICANT CHANGE UP (ref 0–0.7)
EOSINOPHIL # BLD AUTO: 0.13 K/UL — SIGNIFICANT CHANGE UP (ref 0–0.7)
EOSINOPHIL NFR BLD AUTO: 2.8 % — SIGNIFICANT CHANGE UP (ref 0–8)
EOSINOPHIL NFR BLD AUTO: 2.8 % — SIGNIFICANT CHANGE UP (ref 0–8)
GLUCOSE BLDC GLUCOMTR-MCNC: 101 MG/DL — HIGH (ref 70–99)
GLUCOSE BLDC GLUCOMTR-MCNC: 101 MG/DL — HIGH (ref 70–99)
GLUCOSE BLDC GLUCOMTR-MCNC: 104 MG/DL — HIGH (ref 70–99)
GLUCOSE BLDC GLUCOMTR-MCNC: 104 MG/DL — HIGH (ref 70–99)
GLUCOSE BLDC GLUCOMTR-MCNC: 108 MG/DL — HIGH (ref 70–99)
GLUCOSE SERPL-MCNC: 105 MG/DL — HIGH (ref 70–99)
GLUCOSE SERPL-MCNC: 105 MG/DL — HIGH (ref 70–99)
HCT VFR BLD CALC: 32.4 % — LOW (ref 37–47)
HCT VFR BLD CALC: 32.4 % — LOW (ref 37–47)
HGB BLD-MCNC: 10.3 G/DL — LOW (ref 12–16)
HGB BLD-MCNC: 10.3 G/DL — LOW (ref 12–16)
IMM GRANULOCYTES NFR BLD AUTO: 0.2 % — SIGNIFICANT CHANGE UP (ref 0.1–0.3)
IMM GRANULOCYTES NFR BLD AUTO: 0.2 % — SIGNIFICANT CHANGE UP (ref 0.1–0.3)
LYMPHOCYTES # BLD AUTO: 0.74 K/UL — LOW (ref 1.2–3.4)
LYMPHOCYTES # BLD AUTO: 0.74 K/UL — LOW (ref 1.2–3.4)
LYMPHOCYTES # BLD AUTO: 16.1 % — LOW (ref 20.5–51.1)
LYMPHOCYTES # BLD AUTO: 16.1 % — LOW (ref 20.5–51.1)
MAGNESIUM SERPL-MCNC: 2.2 MG/DL — SIGNIFICANT CHANGE UP (ref 1.8–2.4)
MAGNESIUM SERPL-MCNC: 2.2 MG/DL — SIGNIFICANT CHANGE UP (ref 1.8–2.4)
MCHC RBC-ENTMCNC: 29.5 PG — SIGNIFICANT CHANGE UP (ref 27–31)
MCHC RBC-ENTMCNC: 29.5 PG — SIGNIFICANT CHANGE UP (ref 27–31)
MCHC RBC-ENTMCNC: 31.8 G/DL — LOW (ref 32–37)
MCHC RBC-ENTMCNC: 31.8 G/DL — LOW (ref 32–37)
MCV RBC AUTO: 92.8 FL — SIGNIFICANT CHANGE UP (ref 81–99)
MCV RBC AUTO: 92.8 FL — SIGNIFICANT CHANGE UP (ref 81–99)
MONOCYTES # BLD AUTO: 0.69 K/UL — HIGH (ref 0.1–0.6)
MONOCYTES # BLD AUTO: 0.69 K/UL — HIGH (ref 0.1–0.6)
MONOCYTES NFR BLD AUTO: 15 % — HIGH (ref 1.7–9.3)
MONOCYTES NFR BLD AUTO: 15 % — HIGH (ref 1.7–9.3)
NEUTROPHILS # BLD AUTO: 2.96 K/UL — SIGNIFICANT CHANGE UP (ref 1.4–6.5)
NEUTROPHILS # BLD AUTO: 2.96 K/UL — SIGNIFICANT CHANGE UP (ref 1.4–6.5)
NEUTROPHILS NFR BLD AUTO: 64.2 % — SIGNIFICANT CHANGE UP (ref 42.2–75.2)
NEUTROPHILS NFR BLD AUTO: 64.2 % — SIGNIFICANT CHANGE UP (ref 42.2–75.2)
NRBC # BLD: 0 /100 WBCS — SIGNIFICANT CHANGE UP (ref 0–0)
NRBC # BLD: 0 /100 WBCS — SIGNIFICANT CHANGE UP (ref 0–0)
PHOSPHATE SERPL-MCNC: 3.9 MG/DL — SIGNIFICANT CHANGE UP (ref 2.1–4.9)
PHOSPHATE SERPL-MCNC: 3.9 MG/DL — SIGNIFICANT CHANGE UP (ref 2.1–4.9)
PLATELET # BLD AUTO: 94 K/UL — LOW (ref 130–400)
PLATELET # BLD AUTO: 94 K/UL — LOW (ref 130–400)
PMV BLD: 13 FL — HIGH (ref 7.4–10.4)
PMV BLD: 13 FL — HIGH (ref 7.4–10.4)
POTASSIUM SERPL-MCNC: 4.6 MMOL/L — SIGNIFICANT CHANGE UP (ref 3.5–5)
POTASSIUM SERPL-MCNC: 4.6 MMOL/L — SIGNIFICANT CHANGE UP (ref 3.5–5)
POTASSIUM SERPL-SCNC: 4.6 MMOL/L — SIGNIFICANT CHANGE UP (ref 3.5–5)
POTASSIUM SERPL-SCNC: 4.6 MMOL/L — SIGNIFICANT CHANGE UP (ref 3.5–5)
RBC # BLD: 3.49 M/UL — LOW (ref 4.2–5.4)
RBC # BLD: 3.49 M/UL — LOW (ref 4.2–5.4)
RBC # FLD: 17.8 % — HIGH (ref 11.5–14.5)
RBC # FLD: 17.8 % — HIGH (ref 11.5–14.5)
SODIUM SERPL-SCNC: 140 MMOL/L — SIGNIFICANT CHANGE UP (ref 135–146)
SODIUM SERPL-SCNC: 140 MMOL/L — SIGNIFICANT CHANGE UP (ref 135–146)
TROPONIN T SERPL-MCNC: 0.07 NG/ML — CRITICAL HIGH
WBC # BLD: 4.61 K/UL — LOW (ref 4.8–10.8)
WBC # BLD: 4.61 K/UL — LOW (ref 4.8–10.8)
WBC # FLD AUTO: 4.61 K/UL — LOW (ref 4.8–10.8)
WBC # FLD AUTO: 4.61 K/UL — LOW (ref 4.8–10.8)

## 2023-11-01 PROCEDURE — 71045 X-RAY EXAM CHEST 1 VIEW: CPT | Mod: 26

## 2023-11-01 PROCEDURE — 99233 SBSQ HOSP IP/OBS HIGH 50: CPT

## 2023-11-01 RX ORDER — HYDROMORPHONE HYDROCHLORIDE 2 MG/ML
0.2 INJECTION INTRAMUSCULAR; INTRAVENOUS; SUBCUTANEOUS ONCE
Refills: 0 | Status: DISCONTINUED | OUTPATIENT
Start: 2023-11-01 | End: 2023-11-01

## 2023-11-01 RX ORDER — HYDROMORPHONE HYDROCHLORIDE 2 MG/ML
0.5 INJECTION INTRAMUSCULAR; INTRAVENOUS; SUBCUTANEOUS ONCE
Refills: 0 | Status: DISCONTINUED | OUTPATIENT
Start: 2023-11-01 | End: 2023-11-01

## 2023-11-01 RX ADMIN — SEVELAMER CARBONATE 800 MILLIGRAM(S): 2400 POWDER, FOR SUSPENSION ORAL at 18:03

## 2023-11-01 RX ADMIN — SEVELAMER CARBONATE 800 MILLIGRAM(S): 2400 POWDER, FOR SUSPENSION ORAL at 09:02

## 2023-11-01 RX ADMIN — Medication 200 MILLIGRAM(S): at 16:21

## 2023-11-01 RX ADMIN — Medication 650 MILLIGRAM(S): at 21:38

## 2023-11-01 RX ADMIN — Medication 650 MILLIGRAM(S): at 20:52

## 2023-11-01 RX ADMIN — Medication 200 MILLIGRAM(S): at 06:12

## 2023-11-01 RX ADMIN — Medication 81 MILLIGRAM(S): at 14:25

## 2023-11-01 RX ADMIN — Medication 975 MILLIGRAM(S): at 00:35

## 2023-11-01 RX ADMIN — Medication 75 MILLIGRAM(S): at 06:11

## 2023-11-01 RX ADMIN — HYDROMORPHONE HYDROCHLORIDE 0.5 MILLIGRAM(S): 2 INJECTION INTRAMUSCULAR; INTRAVENOUS; SUBCUTANEOUS at 16:09

## 2023-11-01 RX ADMIN — Medication 10 MILLIGRAM(S): at 21:33

## 2023-11-01 RX ADMIN — Medication 50 MILLIGRAM(S): at 16:09

## 2023-11-01 RX ADMIN — HYDROMORPHONE HYDROCHLORIDE 0.2 MILLIGRAM(S): 2 INJECTION INTRAMUSCULAR; INTRAVENOUS; SUBCUTANEOUS at 22:15

## 2023-11-01 RX ADMIN — HEPARIN SODIUM 5000 UNIT(S): 5000 INJECTION INTRAVENOUS; SUBCUTANEOUS at 18:03

## 2023-11-01 RX ADMIN — Medication 75 MILLIGRAM(S): at 21:39

## 2023-11-01 RX ADMIN — HYDROMORPHONE HYDROCHLORIDE 0.2 MILLIGRAM(S): 2 INJECTION INTRAMUSCULAR; INTRAVENOUS; SUBCUTANEOUS at 01:13

## 2023-11-01 RX ADMIN — CALCITRIOL 0.25 MICROGRAM(S): 0.5 CAPSULE ORAL at 16:08

## 2023-11-01 RX ADMIN — HYDROMORPHONE HYDROCHLORIDE 0.2 MILLIGRAM(S): 2 INJECTION INTRAMUSCULAR; INTRAVENOUS; SUBCUTANEOUS at 21:57

## 2023-11-01 RX ADMIN — Medication 75 MILLIGRAM(S): at 14:08

## 2023-11-01 RX ADMIN — PANTOPRAZOLE SODIUM 40 MILLIGRAM(S): 20 TABLET, DELAYED RELEASE ORAL at 09:01

## 2023-11-01 RX ADMIN — Medication 90 MILLIGRAM(S): at 06:12

## 2023-11-01 RX ADMIN — HYDROMORPHONE HYDROCHLORIDE 0.5 MILLIGRAM(S): 2 INJECTION INTRAMUSCULAR; INTRAVENOUS; SUBCUTANEOUS at 16:39

## 2023-11-01 RX ADMIN — SEVELAMER CARBONATE 800 MILLIGRAM(S): 2400 POWDER, FOR SUSPENSION ORAL at 14:22

## 2023-11-01 RX ADMIN — Medication 200 MILLIGRAM(S): at 21:39

## 2023-11-01 RX ADMIN — ATORVASTATIN CALCIUM 80 MILLIGRAM(S): 80 TABLET, FILM COATED ORAL at 21:40

## 2023-11-01 RX ADMIN — Medication 650 MILLIGRAM(S): at 06:10

## 2023-11-01 RX ADMIN — GABAPENTIN 300 MILLIGRAM(S): 400 CAPSULE ORAL at 14:07

## 2023-11-01 RX ADMIN — HEPARIN SODIUM 5000 UNIT(S): 5000 INJECTION INTRAVENOUS; SUBCUTANEOUS at 06:12

## 2023-11-01 NOTE — PROGRESS NOTE ADULT - SUBJECTIVE AND OBJECTIVE BOX
Nephrology Progress Note    SEN ANN  MRN-[id number]  58y  Female    S:  Patient is seen and examined, events over the last 24h noted.    O:  Allergies:  penicillin (Rash)  NSAIDs (Nephrotoxicity)    Hospital Medications:   MEDICATIONS  (STANDING):  aspirin enteric coated 81 milliGRAM(s) Oral daily  atorvastatin 80 milliGRAM(s) Oral at bedtime  calcitriol   Capsule 0.25 MICROGram(s) Oral daily  dextrose 5%. 1000 milliLiter(s) (50 mL/Hr) IV Continuous <Continuous>  dextrose 50% Injectable 25 Gram(s) IV Push once  gabapentin 300 milliGRAM(s) Oral daily  glucagon  Injectable 1 milliGRAM(s) IntraMuscular once  heparin   Injectable 5000 Unit(s) SubCutaneous every 12 hours  hydrALAZINE 75 milliGRAM(s) Oral three times a day  insulin lispro (ADMELOG) corrective regimen sliding scale   SubCutaneous three times a day before meals  labetalol 200 milliGRAM(s) Oral three times a day  NIFEdipine XL 90 milliGRAM(s) Oral daily  pantoprazole    Tablet 40 milliGRAM(s) Oral before breakfast  sevelamer carbonate 800 milliGRAM(s) Oral three times a day with meals    MEDICATIONS  (PRN):  acetaminophen     Tablet .. 650 milliGRAM(s) Oral every 6 hours PRN Mild Pain (1 - 3)  albuterol    90 MICROgram(s) HFA Inhaler 2 Puff(s) Inhalation every 6 hours PRN for bronchospasm  aluminum hydroxide/magnesium hydroxide/simethicone Suspension 30 milliLiter(s) Oral every 4 hours PRN Dyspepsia  dextrose Oral Gel 15 Gram(s) Oral once PRN Blood Glucose LESS THAN 70 milliGRAM(s)/deciliter  diphenhydrAMINE 50 milliGRAM(s) Oral every 6 hours PRN Rash and/or Itching  melatonin 3 milliGRAM(s) Oral at bedtime PRN Insomnia  metoclopramide Injectable 10 milliGRAM(s) IV Push three times a day PRN nausea and vomiting    Home Medications:  Albuterol (Eqv-ProAir HFA) 90 mcg/inh inhalation aerosol: 2 puff(s) inhaled as needed for  bronchospasm (06 Sep 2023 06:32)  NovoLOG Mix 70/30 FlexPen subcutaneous suspension: subcutaneous 3 times a day with meals- sliding scale (06 Sep 2023 06:32)      VITALS:  Daily     Daily Weight in k (2023 05:12)  T(F): 98.6 (23 @ 05:12), Max: 98.6 (23 @ 05:12)  HR: 64 (23 @ 05:12)  BP: 150/65 (23 @ 05:12)  RR: 18 (23 @ 05:12)  SpO2: 96% (10-31-23 @ 23:51)  Wt(kg): --  I&O's Detail    31 Oct 2023 07:  -  2023 07:00  --------------------------------------------------------  IN:  Total IN: 0 mL    OUT:    Other (mL): 3000 mL  Total OUT: 3000 mL    Total NET: -3000 mL        I&O's Summary    31 Oct 2023 07:  -  2023 07:00  --------------------------------------------------------  IN: 0 mL / OUT: 3000 mL / NET: -3000 mL        Weight (kg): 70 ( @ 00:30)    PHYSICAL EXAM:  Gen: NAD  Chest: b/l breath sounds  Abd: soft  Extremities: no edema  Vascular access:       LABS:    Blood Gas Profile w/Lytes - Venous: Performed in Lab (10-30-23 @ 21:58)  Blood Gas Venous - Sodium: 134 mmol/L (10-30-23 @ 21:58)  Blood Gas Venous - Potassium: 5.3 mmol/L (10-30-23 @ 21:58)        140  |  100  |  33<H>  ----------------------------<  105<H>  4.6   |  27  |  4.7<HH>    Ca    8.7      2023 07:03  Phos  3.9       Mg     2.2         TPro  6.8  /  Alb  3.9  /  TBili  0.9  /  DBili      /  AST  23  /  ALT  11  /  AlkPhos  582<H>  10-31    eGFR: 10 mL/min/1.73m2 (23 @ 07:03)  eGFR: 9 mL/min/1.73m2 (10-31-23 @ 06:57)    Phosphorus: 3.9 mg/dL (23 @ 07:03)  Phosphorus: 4.9 mg/dL (10-31-23 @ 06:57)    Vitamin D, 1, 25-Dihydroxy: 16.1 pg/mL (10-19-23 @ 08:58)  Vitamin D, 25-Hydroxy: 28 ng/mL (10-19-23 @ 08:58)                          10.3   4.61  )-----------( 94       ( 2023 07:03 )             32.4     Mean Cell Volume: 92.8 fL (23 @ 07:03)    Ferritin: 518 ng/mL (23 @ 16:11)  Iron Total: 58 ug/dL (23 @ 16:11)      % Saturation, Iron: 24 % (23 @ 16:11)  % Saturation, Iron: 82 % (23 @ 08:41)    Urine Studies:  Protein, Urine: >600 mg/dL (23 @ 03:25)  White Blood Cell - Urine: 2 /HPF (23 @ 03:25)  Red Blood Cell - Urine: 8 /HPF (23 @ 03:25)  Protein, Urine: >600 mg/dL (22 @ 16:58)  White Blood Cell - Urine: 12 /HPF (22 @ 16:58)  Red Blood Cell - Urine: 3 /HPF (22 @ 16:58)  Protein, Urine: 300 mg/dL (22 @ 17:26)  White Blood Cell - Urine: 1 /HPF (22 @ 17:26)  Red Blood Cell - Urine: 1 /HPF (22 @ 17:26)  Protein, Urine: 300 mg/dL (22 @ 09:02)  White Blood Cell - Urine: 1 /HPF (22 @ 09:02)  Red Blood Cell - Urine: 0 /HPF (22 @ 09:02)  Protein, Urine: >600 mg/dL (22 @ 13:14)  White Blood Cell - Urine: 2 /HPF (22 @ 13:14)  Red Blood Cell - Urine: 3 /HPF (22 @ 13:14)  Protein, Urine: 100 mg/dL (12-10-21 @ 11:26)  White Blood Cell - Urine: 161 /HPF (12-10-21 @ 11:26)  Red Blood Cell - Urine: 1 /HPF (12-10-21 @ 11:26)  Protein, Urine: 300 mg/dL (11-10-21 @ 14:51)  White Blood Cell - Urine: >720 /HPF (11-10-21 @ 14:51)  Red Blood Cell - Urine: 2 /HPF (11-10-21 @ 14:51)  Protein, Urine: 300 mg/dL (21 @ 21:45)  White Blood Cell - Urine: 122 /HPF (21 @ 21:45)  Red Blood Cell - Urine: 1 /HPF (21 @ 21:45)        Culture Results:   <10,000 CFU/mL Normal Urogenital Na ( @ 03:25)  Culture Results:   Normal Urogenital na present ( @ 16:58)    Creatinine trend:  Creatinine: 4.7 mg/dL (23 @ 07:03)  Creatinine: 5.4 mg/dL (10-31-23 @ 06:57)  Creatinine: 5.0 mg/dL (10-30-23 @ 22:04)  Creatinine: 4.6 mg/dL (10-20-23 @ 07:53)  Creatinine: 5.6 mg/dL (10-19-23 @ 08:58)  Creatinine: 4.6 mg/dL (10-18-23 @ 00:42)  Creatinine: 5.4 mg/dL (23 @ 17:17)  Creatinine: 4.2 mg/dL (23 @ 08:21)  Creatinine: 5.6 mg/dL (07-10-23 @ 07:37)  Creatinine: 4.2 mg/dL (23 @ 02:35)  Creatinine: 5.2 mg/dL (23 @ 08:30)  Creatinine: 7.2 mg/dL (23 @ 06:42)  Creatinine: 6.6 mg/dL (23 @ 06:30)  Creatinine: 5.2 mg/dL (23 @ 06:01)  Creatinine: 5.5 mg/dL (23 @ 08:41)  Creatinine: 7.8 mg/dL (23 @ 04:05)  Creatinine, Serum: 5.8 mg/dL (02-15-23 @ 07:29)  Creatinine, Serum: 6.8 mg/dL (23 @ 07:39)  Creatinine, Serum: 5.8 mg/dL (23 @ 08:12)  Creatinine, Serum: 4.0 mg/dL (23 @ 08:26)    Phospholipase A2 Receptor MARIA GUADALUPE: <1.8 RU/mL (22 @ 18:00)  Cytoplasmic (c-ANCA) Antibody: Negative (22 @ 18:00)  Perinuclear (p-ANCA) Antibody: Negative (22 @ 18:00)      US Kidney and Bladder:   ACC: [id number] EXAM:  US KIDNEYS AND BLADDER                          PROCEDURE DATE:  2022          INTERPRETATION:  CLINICAL INFORMATION: Epigastric pain.    COMPARISON: CT scan dated May 9, 2022.    TECHNIQUE: Sonography of the kidneys and bladder.    FINDINGS:    Right kidney: 8.4 cm. Somewhat thinned parenchyma.    Left kidney:  9.7 cm. No hydronephrosis or calculi.    Urinary bladder: Ureteral jets are not confidently visualized. Prevoid   volume of approximately 315 cc.  Postvoid volume is approximately 70 cc.        IMPRESSION:    Somewhat thinned renal parenchyma. No hydronephrosis.        --- End of Report ---            TONIO JOYA MD; Attending Interventional Radiologist  This document has been electronically signed. May 11 2022  5:57PM (22 @ 16:40)     Nephrology Progress Note    SEN ANN  MRN-[id number]  58y  Female    S:  Patient is seen and examined, events over the last 24h noted.    O:  Allergies:  penicillin (Rash)  NSAIDs (Nephrotoxicity)    Hospital Medications:   MEDICATIONS  (STANDING):  aspirin enteric coated 81 milliGRAM(s) Oral daily  atorvastatin 80 milliGRAM(s) Oral at bedtime  calcitriol   Capsule 0.25 MICROGram(s) Oral daily  dextrose 5%. 1000 milliLiter(s) (50 mL/Hr) IV Continuous <Continuous>  dextrose 50% Injectable 25 Gram(s) IV Push once  gabapentin 300 milliGRAM(s) Oral daily  glucagon  Injectable 1 milliGRAM(s) IntraMuscular once  heparin   Injectable 5000 Unit(s) SubCutaneous every 12 hours  hydrALAZINE 75 milliGRAM(s) Oral three times a day  insulin lispro (ADMELOG) corrective regimen sliding scale   SubCutaneous three times a day before meals  labetalol 200 milliGRAM(s) Oral three times a day  NIFEdipine XL 90 milliGRAM(s) Oral daily  pantoprazole    Tablet 40 milliGRAM(s) Oral before breakfast  sevelamer carbonate 800 milliGRAM(s) Oral three times a day with meals    MEDICATIONS  (PRN):  acetaminophen     Tablet .. 650 milliGRAM(s) Oral every 6 hours PRN Mild Pain (1 - 3)  albuterol    90 MICROgram(s) HFA Inhaler 2 Puff(s) Inhalation every 6 hours PRN for bronchospasm  aluminum hydroxide/magnesium hydroxide/simethicone Suspension 30 milliLiter(s) Oral every 4 hours PRN Dyspepsia  dextrose Oral Gel 15 Gram(s) Oral once PRN Blood Glucose LESS THAN 70 milliGRAM(s)/deciliter  diphenhydrAMINE 50 milliGRAM(s) Oral every 6 hours PRN Rash and/or Itching  melatonin 3 milliGRAM(s) Oral at bedtime PRN Insomnia  metoclopramide Injectable 10 milliGRAM(s) IV Push three times a day PRN nausea and vomiting    Home Medications:  Albuterol (Eqv-ProAir HFA) 90 mcg/inh inhalation aerosol: 2 puff(s) inhaled as needed for  bronchospasm (06 Sep 2023 06:32)  NovoLOG Mix 70/30 FlexPen subcutaneous suspension: subcutaneous 3 times a day with meals- sliding scale (06 Sep 2023 06:32)      VITALS:  Daily     Daily Weight in k (2023 05:12)  T(F): 98.6 (23 @ 05:12), Max: 98.6 (23 @ 05:12)  HR: 64 (23 @ 05:12)  BP: 150/65 (23 @ 05:12)  RR: 18 (23 @ 05:12)  SpO2: 96% (10-31-23 @ 23:51)  Wt(kg): --  I&O's Detail    31 Oct 2023 07:  -  2023 07:00  --------------------------------------------------------  IN:  Total IN: 0 mL    OUT:    Other (mL): 3000 mL  Total OUT: 3000 mL    Total NET: -3000 mL        I&O's Summary    31 Oct 2023 07:  -  2023 07:00  --------------------------------------------------------  IN: 0 mL / OUT: 3000 mL / NET: -3000 mL        Weight (kg): 70 ( @ 00:30)    PHYSICAL EXAM:  Gen: NAD  Chest: b/l breath sounds  Abd: soft  Extremities: LE edema  Vascular access: LUE AVF +Thrill      LABS:        140  |  100  |  33<H>  ----------------------------<  105<H>  4.6   |  27  |  4.7<HH>    Ca    8.7      2023 07:03  Phos  3.9       Mg     2.2         TPro  6.8  /  Alb  3.9  /  TBili  0.9  /  DBili      /  AST  23  /  ALT  11  /  AlkPhos  582<H>  10-31    Phosphorus: 3.9 mg/dL (23 @ 07:03)  Phosphorus: 4.9 mg/dL (10-31-23 @ 06:57)                          10.3   4.61  )-----------( 94       ( 2023 07:03 )             32.4     Mean Cell Volume: 92.8 fL (23 @ 07:03)

## 2023-11-01 NOTE — PROGRESS NOTE ADULT - SUBJECTIVE AND OBJECTIVE BOX
24H events:    Patient is a 58y old Female who presents with a chief complaint of Fatigue and Diffuse Pain (01 Nov 2023 10:16)  Primary diagnosis of Fluid overload  Today is hospital day 1d. This morning patient was seen and examined at bedside, resting comfortably in bed.    No acute or major events overnight. Hemodynamically stable, tolerating oral diet, voiding appropriately with appropriate bowel movements.     Patient is hard of hearing, requires , still notes c/o abd pain but notes it to be markedly improved compared to yesterday w/improved abd distension as well.   Patient was found to be volume overloaded, received HD yesterday and per her brother at bedside she had more fluid removed inpatient vs. outpatient. Follows w/Dr. Huddleston.      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 1000 milliLiter(s) IV Continuous <Continuous>  dextrose 50% Injectable 25 Gram(s) IV Push once  gabapentin 300 milliGRAM(s) Oral daily  glucagon  Injectable 1 milliGRAM(s) IntraMuscular once  heparin   Injectable 5000 Unit(s) SubCutaneous every 12 hours  hydrALAZINE 75 milliGRAM(s) Oral three times a day  HYDROmorphone  Injectable 0.5 milliGRAM(s) IV Push once  insulin lispro (ADMELOG) corrective regimen sliding scale   SubCutaneous three times a day before meals  labetalol 200 milliGRAM(s) Oral three times a day  NIFEdipine XL 90 milliGRAM(s) Oral daily  pantoprazole    Tablet 40 milliGRAM(s) Oral before breakfast  sevelamer carbonate 800 milliGRAM(s) Oral three times a day with meals    PRN MEDICATIONS  acetaminophen     Tablet .. 650 milliGRAM(s) Oral every 6 hours PRN  albuterol    90 MICROgram(s) HFA Inhaler 2 Puff(s) Inhalation every 6 hours PRN  aluminum hydroxide/magnesium hydroxide/simethicone Suspension 30 milliLiter(s) Oral every 4 hours PRN  dextrose Oral Gel 15 Gram(s) Oral once PRN  diphenhydrAMINE 50 milliGRAM(s) Oral every 6 hours PRN  melatonin 3 milliGRAM(s) Oral at bedtime PRN  metoclopramide Injectable 10 milliGRAM(s) IV Push three times a day PRN    VITALS:   T(F): 97.6  HR: 70  BP: 134/62  RR: 18  SpO2: 96%    PHYSICAL EXAM:  GENERAL: NAD, lying in bed mild-discomfort secondary to abd pain  HEAD:  Atraumatic, Normocephalic  EYES: EOMI, conjunctiva and sclera clear  ENT: Moist mucous membranes, patient is hard of hearing, requires sign-   NECK: Supple, No JVD  CHEST/LUNG: Clear to auscultation bilaterally; No wheezing, or rubs  HEART: Regular rate and rhythm; No murmurs, rubs, or gallops  ABDOMEN: BSx4; slightly distended, markedly improved from yesterday  EXTREMITIES:  2+ Peripheral Pulses, brisk capillary refill. No clubbing, cyanosis, or edema  NERVOUS SYSTEM:  A&Ox3, no focal deficits   SKIN: No rashes or lesions      (  ) Indwelling Henriquez Catheter:   Date insterted:    Reason (  ) Critical illness     (  ) urinary retention    (  ) Accurate Ins/Outs Monitoring     (  ) CMO patient    (  ) Central Line:   Date inserted:  Location: (  ) Right IJ     (  ) Left IJ     (  ) Right Fem     (  ) Left Fem    (  ) SPC        (  ) pigtail       (  ) PEG tube       (  ) colostomy       (  ) jejunostomy  (  ) U-Dall    LABS:                        10.3   4.61  )-----------( 94       ( 01 Nov 2023 07:03 )             32.4     11-01    140  |  100  |  33<H>  ----------------------------<  105<H>  4.6   |  27  |  4.7<HH>    Ca    8.7      01 Nov 2023 07:03  Phos  3.9     11-01  Mg     2.2     11-01    TPro  6.8  /  Alb  3.9  /  TBili  0.9  /  DBili  x   /  AST  23  /  ALT  11  /  AlkPhos  582<H>  10-31    PT/INR - ( 31 Oct 2023 06:57 )   PT: 12.70 sec;   INR: 1.11 ratio         PTT - ( 31 Oct 2023 06:57 )  PTT:36.6 sec  Urinalysis Basic - ( 01 Nov 2023 07:03 )    Color: x / Appearance: x / SG: x / pH: x  Gluc: 105 mg/dL / Ketone: x  / Bili: x / Urobili: x   Blood: x / Protein: x / Nitrite: x   Leuk Esterase: x / RBC: x / WBC x   Sq Epi: x / Non Sq Epi: x / Bacteria: x        Troponin T, Serum: 0.07 ng/mL *HH* (11-01-23 @ 12:27)      CARDIAC MARKERS ( 01 Nov 2023 12:27 )  x     / 0.07 ng/mL / x     / x     / x      CARDIAC MARKERS ( 31 Oct 2023 06:57 )  x     / 0.06 ng/mL / 64 U/L / x     / x      CARDIAC MARKERS ( 30 Oct 2023 22:04 )  x     / 0.05 ng/mL / x     / x     / x          RADIOLOGY:    RADIOLOGY

## 2023-11-01 NOTE — PROGRESS NOTE ADULT - SUBJECTIVE AND OBJECTIVE BOX
SEN ANN  58y Female    CHIEF COMPLAINT:    Patient is a 58y old  Female who presents with a chief complaint of Fatigue and Diffuse Pain (31 Oct 2023 09:51)      INTERVAL HPI/OVERNIGHT EVENTS:    Patient seen and examined.    ROS: All other systems are negative.    Vital Signs:    T(F): 98.6 (23 @ 05:12), Max: 98.6 (23 @ 05:12)  HR: 64 (23 @ 05:12) (64 - 78)  BP: 150/65 (23 @ 05:12) (150/65 - 176/71)  RR: 18 (23 @ 05:12) (18 - 18)  SpO2: 96% (10-31-23 @ 23:51) (96% - 97%)  I&O's Summary    31 Oct 2023 07:01  -  2023 07:00  --------------------------------------------------------  IN: 0 mL / OUT: 3000 mL / NET: -3000 mL      Daily     Daily Weight in k (2023 05:12)  CAPILLARY BLOOD GLUCOSE      POCT Blood Glucose.: 114 mg/dL (31 Oct 2023 21:16)  POCT Blood Glucose.: 97 mg/dL (31 Oct 2023 16:50)  POCT Blood Glucose.: 71 mg/dL (31 Oct 2023 14:34)  POCT Blood Glucose.: 76 mg/dL (31 Oct 2023 08:24)      PHYSICAL EXAM:    GENERAL:  NAD  SKIN: No rashes or lesions  HENT: Atraumatic. Normocephalic. PERRL. Moist membranes.  NECK: Supple, No JVD. No lymphadenopathy.  PULMONARY: CTA B/L. No wheezing. No rales  CVS: Normal S1, S2. Rate and Rhythm are regular. No murmurs.  ABDOMEN/GI: Soft, Nontender, Nondistended; BS present  EXTREMITIES: Peripheral pulses intact. No edema B/L LE.  NEUROLOGIC:  No motor or sensory deficit.  PSYCH: Alert & oriented x 3    Consultant(s) Notes Reviewed:  [x ] YES  [ ] NO  Care Discussed with Consultants/Other Providers [ x] YES  [ ] NO    EKG reviewed  Telemetry reviewed    LABS:                        11.6   5.13  )-----------( 112      ( 31 Oct 2023 06:57 )             35.2     10-31    139  |  97<L>  |  44<H>  ----------------------------<  78  5.3<H>   |  24  |  5.4<HH>    Ca    8.7      31 Oct 2023 06:57  Phos  4.9     10-31  Mg     2.2     10-31    TPro  6.8  /  Alb  3.9  /  TBili  0.9  /  DBili  x   /  AST  23  /  ALT  11  /  AlkPhos  582<H>  10-31    PT/INR - ( 31 Oct 2023 06:57 )   PT: 12.70 sec;   INR: 1.11 ratio         PTT - ( 31 Oct 2023 06:57 )  PTT:36.6 sec    Trop 0.06, CKMB --, CK 64, 10-31-23 @ 06:57  Trop 0.05, CKMB --, CK --, 10-30-23 @ 22:04        RADIOLOGY & ADDITIONAL TESTS:    < from: CT Abdomen and Pelvis w/ IV Cont (10.30.23 @ 22:15) >    IMPRESSION:    Large right and moderate left pleural effusions, slightly increased in   size compared to 10/18/2023.    < end of copied text >  < from: CT Abdomen and Pelvis w/ IV Cont (10.30.23 @ 22:15) >    ABDOMINOPELVIC NODES: Limited evaluation secondary to large volume   abdominal pelvic ascites. Unchanged prominence of the bilateral inguinal   and iliac chain lymph nodes.    < end of copied text >  < from: Xray Chest 1 View- PORTABLE-Urgent (10.30.23 @ 22:06) >  Impression:    Increasing right pleural effusion with associated CHF.    < end of copied text >    Imaging or report Personally Reviewed:  [x ] YES  [ ] NO    Medications:  Standing  aspirin enteric coated 81 milliGRAM(s) Oral daily  atorvastatin 80 milliGRAM(s) Oral at bedtime  calcitriol   Capsule 0.25 MICROGram(s) Oral daily  dextrose 5%. 1000 milliLiter(s) IV Continuous <Continuous>  dextrose 50% Injectable 25 Gram(s) IV Push once  gabapentin 300 milliGRAM(s) Oral daily  glucagon  Injectable 1 milliGRAM(s) IntraMuscular once  heparin   Injectable 5000 Unit(s) SubCutaneous every 12 hours  hydrALAZINE 75 milliGRAM(s) Oral three times a day  insulin lispro (ADMELOG) corrective regimen sliding scale   SubCutaneous three times a day before meals  labetalol 200 milliGRAM(s) Oral three times a day  NIFEdipine XL 90 milliGRAM(s) Oral daily  pantoprazole    Tablet 40 milliGRAM(s) Oral before breakfast  sevelamer carbonate 800 milliGRAM(s) Oral three times a day with meals    PRN Meds  acetaminophen     Tablet .. 650 milliGRAM(s) Oral every 6 hours PRN  albuterol    90 MICROgram(s) HFA Inhaler 2 Puff(s) Inhalation every 6 hours PRN  aluminum hydroxide/magnesium hydroxide/simethicone Suspension 30 milliLiter(s) Oral every 4 hours PRN  dextrose Oral Gel 15 Gram(s) Oral once PRN  diphenhydrAMINE 50 milliGRAM(s) Oral every 6 hours PRN  melatonin 3 milliGRAM(s) Oral at bedtime PRN  metoclopramide Injectable 10 milliGRAM(s) IV Push three times a day PRN      Case discussed with resident    Care discussed with pt/family           SEN ANN  58y Female    CHIEF COMPLAINT:    Patient is a 58y old  Female who presents with a chief complaint of Fatigue and Diffuse Pain (31 Oct 2023 09:51)      INTERVAL HPI/OVERNIGHT EVENTS:    Patient seen and examined. Spoke to the pt with the help of . Pt states that one day she did not take any of her meds as she was nauseous. Pt states that her face and legs were swollen and abdomen was distended. Feels much better after the HD.      ROS: All other systems are negative.    Vital Signs:    T(F): 98.6 (23 @ 05:12), Max: 98.6 (23 @ 05:12)  HR: 64 (23 @ 05:12) (64 - 78)  BP: 150/65 (23 @ 05:12) (150/65 - 176/71)  RR: 18 (23 @ 05:12) (18 - 18)  SpO2: 96% (10-31-23 @ 23:51) (96% - 97%)  I&O's Summary    31 Oct 2023 07:01  -  2023 07:00  --------------------------------------------------------  IN: 0 mL / OUT: 3000 mL / NET: -3000 mL      Daily     Daily Weight in k (2023 05:12)  CAPILLARY BLOOD GLUCOSE      POCT Blood Glucose.: 114 mg/dL (31 Oct 2023 21:16)  POCT Blood Glucose.: 97 mg/dL (31 Oct 2023 16:50)  POCT Blood Glucose.: 71 mg/dL (31 Oct 2023 14:34)  POCT Blood Glucose.: 76 mg/dL (31 Oct 2023 08:24)      PHYSICAL EXAM:    GENERAL:  NAD  SKIN: No rashes or lesions  HENT: Atraumatic. Normocephalic. PERRL. Moist membranes.  NECK: Supple, No JVD. No lymphadenopathy.  PULMONARY: Decreased BS in the lower chest post B/L. No wheezing. No rales  CVS: Normal S1, S2. Rate and Rhythm are regular. No murmurs.  ABDOMEN/GI: Soft, Nontender, distended; BS present  EXTREMITIES: Peripheral pulses intact. +ve edema B/L LE.  NEUROLOGIC:  No motor or sensory deficit.  PSYCH: Alert & oriented x 3    Consultant(s) Notes Reviewed:  [x ] YES  [ ] NO  Care Discussed with Consultants/Other Providers [ x] YES  [ ] NO    EKG reviewed  Telemetry reviewed    LABS:                        11.6   5.13  )-----------( 112      ( 31 Oct 2023 06:57 )             35.2     10-31    139  |  97<L>  |  44<H>  ----------------------------<  78  5.3<H>   |  24  |  5.4<HH>    Ca    8.7      31 Oct 2023 06:57  Phos  4.9     10-31  Mg     2.2     10-31    TPro  6.8  /  Alb  3.9  /  TBili  0.9  /  DBili  x   /  AST  23  /  ALT  11  /  AlkPhos  582<H>  10-31    PT/INR - ( 31 Oct 2023 06:57 )   PT: 12.70 sec;   INR: 1.11 ratio         PTT - ( 31 Oct 2023 06:57 )  PTT:36.6 sec    Trop 0.06, CKMB --, CK 64, 10-31-23 @ 06:57  Trop 0.05, CKMB --, CK --, 10-30-23 @ 22:04        RADIOLOGY & ADDITIONAL TESTS:    < from: CT Abdomen and Pelvis w/ IV Cont (10.30.23 @ 22:15) >    IMPRESSION:    Large right and moderate left pleural effusions, slightly increased in   size compared to 10/18/2023.    < end of copied text >  < from: CT Abdomen and Pelvis w/ IV Cont (10.30.23 @ 22:15) >    ABDOMINOPELVIC NODES: Limited evaluation secondary to large volume   abdominal pelvic ascites. Unchanged prominence of the bilateral inguinal   and iliac chain lymph nodes.    < end of copied text >  < from: Xray Chest 1 View- PORTABLE-Urgent (10.30.23 @ 22:06) >  Impression:    Increasing right pleural effusion with associated CHF.    < end of copied text >    Imaging or report Personally Reviewed:  [x ] YES  [ ] NO    Medications:  Standing  aspirin enteric coated 81 milliGRAM(s) Oral daily  atorvastatin 80 milliGRAM(s) Oral at bedtime  calcitriol   Capsule 0.25 MICROGram(s) Oral daily  dextrose 5%. 1000 milliLiter(s) IV Continuous <Continuous>  dextrose 50% Injectable 25 Gram(s) IV Push once  gabapentin 300 milliGRAM(s) Oral daily  glucagon  Injectable 1 milliGRAM(s) IntraMuscular once  heparin   Injectable 5000 Unit(s) SubCutaneous every 12 hours  hydrALAZINE 75 milliGRAM(s) Oral three times a day  insulin lispro (ADMELOG) corrective regimen sliding scale   SubCutaneous three times a day before meals  labetalol 200 milliGRAM(s) Oral three times a day  NIFEdipine XL 90 milliGRAM(s) Oral daily  pantoprazole    Tablet 40 milliGRAM(s) Oral before breakfast  sevelamer carbonate 800 milliGRAM(s) Oral three times a day with meals    PRN Meds  acetaminophen     Tablet .. 650 milliGRAM(s) Oral every 6 hours PRN  albuterol    90 MICROgram(s) HFA Inhaler 2 Puff(s) Inhalation every 6 hours PRN  aluminum hydroxide/magnesium hydroxide/simethicone Suspension 30 milliLiter(s) Oral every 4 hours PRN  dextrose Oral Gel 15 Gram(s) Oral once PRN  diphenhydrAMINE 50 milliGRAM(s) Oral every 6 hours PRN  melatonin 3 milliGRAM(s) Oral at bedtime PRN  metoclopramide Injectable 10 milliGRAM(s) IV Push three times a day PRN      Case discussed with resident    Care discussed with pt/family

## 2023-11-01 NOTE — PROGRESS NOTE ADULT - ASSESSMENT
59 y/o female with PMH of Hearing loss,  ESRD on HD (T/Th/Sat), HTN, HLD, DM, PVD s/p stent, and CVA presenting for diffuse abdominal pain, lethargy and headache. Pain worsened today, associated with abdominal distention, nausea and vomiting.    Fluid overload  ESRD on HD  DM-2 / HTN / DL  PVD  H/O CVA 57 y/o female with PMH of Hearing loss,  ESRD on HD (T/Th/Sat), HTN, HLD, DM, PVD s/p stent, and CVA presenting for diffuse abdominal pain, lethargy and headache. Pain worsened today, associated with abdominal distention, nausea and vomiting.    Fluid overload  Hypertensive Urgency  ESRD on HD  Hyperkalemia  DM-2 / HTN / DL  PVD  H/O CVA              PLAN:    ·	Used  to talk to the pt.   ·	CT abd/pelvis/chest reviewed. Large R and mod L pleural effusions  ·	S/P HD yesterday. Feels much better.   ·	Meds adjusted. BP is better controlled now.   ·	On Hydralazine 75 mg po three times a day, labetalol 200 mg po three times a day and Nifedipine XL 90 mg po daily  ·	Low salt diet and water restriction to 1.2 L/D  ·	Nephrology f/u. More fluid needs to be removed during HD  ·	Check BMP. If hyperkalemic is not resolved give her Lokelma 10 mg po q 12h x 2 doses  ·	Monitor FS. On Lispro corrective regimen.   ·	Cont her other meds    Progress Note Handoff    Pending (specify):  Consults_________, Tests________, Test Results_______, Other__Fluid overload_______  Family discussion:  Disposition: Home___/SNF___/Other________/Unknown at this time________    Rosalio Murillo MD  Spectra: [phone]

## 2023-11-01 NOTE — PROGRESS NOTE ADULT - ASSESSMENT
57 y/o female with PMH of Hearing loss,  ESRD on HD (T/Th/Sat), HTN, HLD, DM, PVD s/p L fem bypass, and CVA presenting for diffuse pain bony pain, abdominal pain, lethargy and headache. Pain worsened today, associated with abdominal distention, nausea and vomiting. Pt with SOB, large b/l pleural effusions. In the ED patient was hypertensive 193/86 , HR 82 and afebrile. Patient admitted for hypertensive urgency and volume overload    ESRD on HD / hypervolemia / pleural effusions  next HD tomorrow, 3 hrs 2 K bath UF 3   maximize UF   fluid restrict 1 L daily  repeat CXR noted   phos level noted; at goal.  cont sevelamer  hgb at goal / resume HANNAH, check iron stores  Monitor BP as increased

## 2023-11-01 NOTE — PROGRESS NOTE ADULT - ASSESSMENT
57 y/o female with PMH of Hearing loss,  ESRD on HD (T/Th/Sat), HTN, HLD, DM, PVD s/p stent, and CVA presenting for diffuse pain bony pain, abdominal pain, lethargy and headache. Pain worsened today, associated with abdominal distention, nausea and vomiting.    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  - Nephro Cs for dialysis in AM  - No indication for urgent dialysis  - No need for sodium bicarb  - c/w calcitriol and sevelamer  - Diffuse Bony Pain, , 25 Vit D: 29 and 1.25 Vit D: 16.1  - ALk phos chronically high > 500 -> check bone ALK phos, osteocalcin and propeptide type 1 collagen and GGT  - Decrease Gabapentin to 300 mg daily    #Elevated Trop  # possible CAD  - Baseline trop 0.02 - 0.04  - Highly likely due to ESRD  - No ECG changes ; repeat in AM  - Trend trop  - c/w asa and statin    #Hypertensive Urgency  - /101 - Hydralazine 10 mg IV push and 1 dose of Isosorbide dinitrate  - Increase Hydralazine from 100 BID to 75 mg TID  - Increase Labetalol from 200 BID to 200 mg TID  - Continue Nifedipine 90 mg   - Control pain    #Nausea  - maybe related to HTN urgency  - Zofran as needed for now    # h/o Right pontine CVA (February 2021)  # PVD s/p left fem bypass  #DM  #HLD  - c/w statin and ASA  - Insulin SSI  - check A1c and Lipids    #Asthma - no in exacerbation  - Albuterol as needed    DVT ppx: heparin  GI ppx: pantoprazole  Diet: RR CC DASH  Full code  Plan discussed bedside with patient and      59 y/o female with PMH of Hearing loss,  ESRD on HD (T/Th/Sat), HTN, HLD, DM, PVD s/p stent, and CVA presenting for diffuse pain bony pain, abdominal pain, lethargy and headache. Pain worsened today, associated with abdominal distention, nausea and vomiting.    #ESRD on HD  (T/Th/Sat)  #b/l pleural effusions  #hypertensive urgency  #hyperkalemia - 4.6 today   - per nephro from 10/31: ESRD on HD/ Bilateral pleural effusions  - TTS HD via AVG  - Last session outpatient on 10/28/23  - Fluid overload. Will get HD today  - K+ was 4.6 today, if elevated then 10mg lokelma q twice  - Bicarbonate levels 24- under  control   - Was in hypertensive urgency at arrival. Hydralazine increased from 100 BID to 75 mg TID. Labetalol increased from 200 BID to 200 mg TID and on  Nifedipine 90 mg   - Monitor BP after HD   - Anemia of ESRD. Hbg 11.6. On HANNAH  - MBD. Get phosphorous, Vitamin D  and PTH levels  - C/w sevelamer 800 mg TID   - Will benefit from pulmonary evaluation as pleural effusion size increased in the CT chest when compared to previous imaging   - Will follow   - f/u nephro from 11/1    #Nausea  - maybe related to HTN urgency and volume overload  - Zofran as needed for now    # h/o Right pontine CVA (February 2021)  # PVD s/p left fem bypass  #DM  #HLD  - c/w statin and ASA  - Insulin SSI  - check A1c and Lipids    #Asthma - not in exacerbation  - Albuterol as needed    DVT ppx: heparin  GI ppx: pantoprazole  Diet: RR CC DASH  Full code  Plan discussed bedside with patient and brother  f/u neprho 11/1

## 2023-11-02 ENCOUNTER — TRANSCRIPTION ENCOUNTER (OUTPATIENT)
Age: 58
End: 2023-11-02

## 2023-11-02 VITALS
SYSTOLIC BLOOD PRESSURE: 184 MMHG | HEART RATE: 79 BPM | TEMPERATURE: 97 F | RESPIRATION RATE: 18 BRPM | DIASTOLIC BLOOD PRESSURE: 75 MMHG

## 2023-11-02 LAB
ALBUMIN SERPL ELPH-MCNC: 3.9 G/DL — SIGNIFICANT CHANGE UP (ref 3.5–5.2)
ALBUMIN SERPL ELPH-MCNC: 3.9 G/DL — SIGNIFICANT CHANGE UP (ref 3.5–5.2)
ALP SERPL-CCNC: 559 U/L — HIGH (ref 30–115)
ALP SERPL-CCNC: 559 U/L — HIGH (ref 30–115)
ALT FLD-CCNC: 9 U/L — SIGNIFICANT CHANGE UP (ref 0–41)
ALT FLD-CCNC: 9 U/L — SIGNIFICANT CHANGE UP (ref 0–41)
ANION GAP SERPL CALC-SCNC: 15 MMOL/L — HIGH (ref 7–14)
ANION GAP SERPL CALC-SCNC: 15 MMOL/L — HIGH (ref 7–14)
APTT BLD: 32.7 SEC — SIGNIFICANT CHANGE UP (ref 27–39.2)
APTT BLD: 32.7 SEC — SIGNIFICANT CHANGE UP (ref 27–39.2)
AST SERPL-CCNC: 19 U/L — SIGNIFICANT CHANGE UP (ref 0–41)
AST SERPL-CCNC: 19 U/L — SIGNIFICANT CHANGE UP (ref 0–41)
BASOPHILS # BLD AUTO: 0.06 K/UL — SIGNIFICANT CHANGE UP (ref 0–0.2)
BASOPHILS # BLD AUTO: 0.06 K/UL — SIGNIFICANT CHANGE UP (ref 0–0.2)
BASOPHILS NFR BLD AUTO: 1.3 % — HIGH (ref 0–1)
BASOPHILS NFR BLD AUTO: 1.3 % — HIGH (ref 0–1)
BILIRUB SERPL-MCNC: 0.8 MG/DL — SIGNIFICANT CHANGE UP (ref 0.2–1.2)
BILIRUB SERPL-MCNC: 0.8 MG/DL — SIGNIFICANT CHANGE UP (ref 0.2–1.2)
BUN SERPL-MCNC: 40 MG/DL — HIGH (ref 10–20)
BUN SERPL-MCNC: 40 MG/DL — HIGH (ref 10–20)
CALCIUM SERPL-MCNC: 8.5 MG/DL — SIGNIFICANT CHANGE UP (ref 8.4–10.5)
CALCIUM SERPL-MCNC: 8.5 MG/DL — SIGNIFICANT CHANGE UP (ref 8.4–10.5)
CALCIUM SERPL-MCNC: 8.6 MG/DL — SIGNIFICANT CHANGE UP (ref 8.4–10.5)
CALCIUM SERPL-MCNC: 8.6 MG/DL — SIGNIFICANT CHANGE UP (ref 8.4–10.5)
CHLORIDE SERPL-SCNC: 97 MMOL/L — LOW (ref 98–110)
CHLORIDE SERPL-SCNC: 97 MMOL/L — LOW (ref 98–110)
CO2 SERPL-SCNC: 28 MMOL/L — SIGNIFICANT CHANGE UP (ref 17–32)
CO2 SERPL-SCNC: 28 MMOL/L — SIGNIFICANT CHANGE UP (ref 17–32)
CREAT SERPL-MCNC: 5.3 MG/DL — CRITICAL HIGH (ref 0.7–1.5)
CREAT SERPL-MCNC: 5.3 MG/DL — CRITICAL HIGH (ref 0.7–1.5)
EGFR: 9 ML/MIN/1.73M2 — LOW
EGFR: 9 ML/MIN/1.73M2 — LOW
EOSINOPHIL # BLD AUTO: 0.17 K/UL — SIGNIFICANT CHANGE UP (ref 0–0.7)
EOSINOPHIL # BLD AUTO: 0.17 K/UL — SIGNIFICANT CHANGE UP (ref 0–0.7)
EOSINOPHIL NFR BLD AUTO: 3.8 % — SIGNIFICANT CHANGE UP (ref 0–8)
EOSINOPHIL NFR BLD AUTO: 3.8 % — SIGNIFICANT CHANGE UP (ref 0–8)
GLUCOSE BLDC GLUCOMTR-MCNC: 118 MG/DL — HIGH (ref 70–99)
GLUCOSE BLDC GLUCOMTR-MCNC: 118 MG/DL — HIGH (ref 70–99)
GLUCOSE BLDC GLUCOMTR-MCNC: 89 MG/DL — SIGNIFICANT CHANGE UP (ref 70–99)
GLUCOSE BLDC GLUCOMTR-MCNC: 89 MG/DL — SIGNIFICANT CHANGE UP (ref 70–99)
GLUCOSE SERPL-MCNC: 74 MG/DL — SIGNIFICANT CHANGE UP (ref 70–99)
GLUCOSE SERPL-MCNC: 74 MG/DL — SIGNIFICANT CHANGE UP (ref 70–99)
HCT VFR BLD CALC: 31.2 % — LOW (ref 37–47)
HCT VFR BLD CALC: 31.2 % — LOW (ref 37–47)
HGB BLD-MCNC: 10 G/DL — LOW (ref 12–16)
HGB BLD-MCNC: 10 G/DL — LOW (ref 12–16)
IMM GRANULOCYTES NFR BLD AUTO: 0.2 % — SIGNIFICANT CHANGE UP (ref 0.1–0.3)
IMM GRANULOCYTES NFR BLD AUTO: 0.2 % — SIGNIFICANT CHANGE UP (ref 0.1–0.3)
INR BLD: 1.12 RATIO — SIGNIFICANT CHANGE UP (ref 0.65–1.3)
INR BLD: 1.12 RATIO — SIGNIFICANT CHANGE UP (ref 0.65–1.3)
LYMPHOCYTES # BLD AUTO: 0.83 K/UL — LOW (ref 1.2–3.4)
LYMPHOCYTES # BLD AUTO: 0.83 K/UL — LOW (ref 1.2–3.4)
LYMPHOCYTES # BLD AUTO: 18.3 % — LOW (ref 20.5–51.1)
LYMPHOCYTES # BLD AUTO: 18.3 % — LOW (ref 20.5–51.1)
MAGNESIUM SERPL-MCNC: 2.2 MG/DL — SIGNIFICANT CHANGE UP (ref 1.8–2.4)
MAGNESIUM SERPL-MCNC: 2.2 MG/DL — SIGNIFICANT CHANGE UP (ref 1.8–2.4)
MCHC RBC-ENTMCNC: 29.4 PG — SIGNIFICANT CHANGE UP (ref 27–31)
MCHC RBC-ENTMCNC: 29.4 PG — SIGNIFICANT CHANGE UP (ref 27–31)
MCHC RBC-ENTMCNC: 32.1 G/DL — SIGNIFICANT CHANGE UP (ref 32–37)
MCHC RBC-ENTMCNC: 32.1 G/DL — SIGNIFICANT CHANGE UP (ref 32–37)
MCV RBC AUTO: 91.8 FL — SIGNIFICANT CHANGE UP (ref 81–99)
MCV RBC AUTO: 91.8 FL — SIGNIFICANT CHANGE UP (ref 81–99)
MONOCYTES # BLD AUTO: 0.63 K/UL — HIGH (ref 0.1–0.6)
MONOCYTES # BLD AUTO: 0.63 K/UL — HIGH (ref 0.1–0.6)
MONOCYTES NFR BLD AUTO: 13.9 % — HIGH (ref 1.7–9.3)
MONOCYTES NFR BLD AUTO: 13.9 % — HIGH (ref 1.7–9.3)
NEUTROPHILS # BLD AUTO: 2.83 K/UL — SIGNIFICANT CHANGE UP (ref 1.4–6.5)
NEUTROPHILS # BLD AUTO: 2.83 K/UL — SIGNIFICANT CHANGE UP (ref 1.4–6.5)
NEUTROPHILS NFR BLD AUTO: 62.5 % — SIGNIFICANT CHANGE UP (ref 42.2–75.2)
NEUTROPHILS NFR BLD AUTO: 62.5 % — SIGNIFICANT CHANGE UP (ref 42.2–75.2)
NRBC # BLD: 0 /100 WBCS — SIGNIFICANT CHANGE UP (ref 0–0)
NRBC # BLD: 0 /100 WBCS — SIGNIFICANT CHANGE UP (ref 0–0)
PHOSPHATE SERPL-MCNC: 4.2 MG/DL — SIGNIFICANT CHANGE UP (ref 2.1–4.9)
PHOSPHATE SERPL-MCNC: 4.2 MG/DL — SIGNIFICANT CHANGE UP (ref 2.1–4.9)
PLATELET # BLD AUTO: 92 K/UL — LOW (ref 130–400)
PLATELET # BLD AUTO: 92 K/UL — LOW (ref 130–400)
PMV BLD: 12.7 FL — HIGH (ref 7.4–10.4)
PMV BLD: 12.7 FL — HIGH (ref 7.4–10.4)
POTASSIUM SERPL-MCNC: 5.1 MMOL/L — HIGH (ref 3.5–5)
POTASSIUM SERPL-MCNC: 5.1 MMOL/L — HIGH (ref 3.5–5)
POTASSIUM SERPL-SCNC: 5.1 MMOL/L — HIGH (ref 3.5–5)
POTASSIUM SERPL-SCNC: 5.1 MMOL/L — HIGH (ref 3.5–5)
PROT SERPL-MCNC: 6.7 G/DL — SIGNIFICANT CHANGE UP (ref 6–8)
PROT SERPL-MCNC: 6.7 G/DL — SIGNIFICANT CHANGE UP (ref 6–8)
PROTHROM AB SERPL-ACNC: 12.8 SEC — SIGNIFICANT CHANGE UP (ref 9.95–12.87)
PROTHROM AB SERPL-ACNC: 12.8 SEC — SIGNIFICANT CHANGE UP (ref 9.95–12.87)
PTH-INTACT FLD-MCNC: 124 PG/ML — HIGH (ref 15–65)
PTH-INTACT FLD-MCNC: 124 PG/ML — HIGH (ref 15–65)
RBC # BLD: 3.4 M/UL — LOW (ref 4.2–5.4)
RBC # BLD: 3.4 M/UL — LOW (ref 4.2–5.4)
RBC # FLD: 17.6 % — HIGH (ref 11.5–14.5)
RBC # FLD: 17.6 % — HIGH (ref 11.5–14.5)
SODIUM SERPL-SCNC: 140 MMOL/L — SIGNIFICANT CHANGE UP (ref 135–146)
SODIUM SERPL-SCNC: 140 MMOL/L — SIGNIFICANT CHANGE UP (ref 135–146)
WBC # BLD: 4.53 K/UL — LOW (ref 4.8–10.8)
WBC # BLD: 4.53 K/UL — LOW (ref 4.8–10.8)
WBC # FLD AUTO: 4.53 K/UL — LOW (ref 4.8–10.8)
WBC # FLD AUTO: 4.53 K/UL — LOW (ref 4.8–10.8)

## 2023-11-02 PROCEDURE — 99239 HOSP IP/OBS DSCHRG MGMT >30: CPT

## 2023-11-02 RX ORDER — HYDRALAZINE HCL 50 MG
3 TABLET ORAL
Qty: 270 | Refills: 0
Start: 2023-11-02 | End: 2023-12-01

## 2023-11-02 RX ORDER — LABETALOL HCL 100 MG
1 TABLET ORAL
Qty: 90 | Refills: 0
Start: 2023-11-02 | End: 2023-12-01

## 2023-11-02 RX ADMIN — SEVELAMER CARBONATE 800 MILLIGRAM(S): 2400 POWDER, FOR SUSPENSION ORAL at 12:44

## 2023-11-02 RX ADMIN — Medication 200 MILLIGRAM(S): at 14:06

## 2023-11-02 RX ADMIN — PANTOPRAZOLE SODIUM 40 MILLIGRAM(S): 20 TABLET, DELAYED RELEASE ORAL at 05:49

## 2023-11-02 RX ADMIN — Medication 200 MILLIGRAM(S): at 05:49

## 2023-11-02 RX ADMIN — Medication 75 MILLIGRAM(S): at 05:49

## 2023-11-02 RX ADMIN — HEPARIN SODIUM 5000 UNIT(S): 5000 INJECTION INTRAVENOUS; SUBCUTANEOUS at 05:49

## 2023-11-02 RX ADMIN — CALCITRIOL 0.25 MICROGRAM(S): 0.5 CAPSULE ORAL at 12:44

## 2023-11-02 RX ADMIN — GABAPENTIN 300 MILLIGRAM(S): 400 CAPSULE ORAL at 12:44

## 2023-11-02 RX ADMIN — Medication 81 MILLIGRAM(S): at 12:44

## 2023-11-02 RX ADMIN — SEVELAMER CARBONATE 800 MILLIGRAM(S): 2400 POWDER, FOR SUSPENSION ORAL at 08:10

## 2023-11-02 RX ADMIN — Medication 75 MILLIGRAM(S): at 14:06

## 2023-11-02 RX ADMIN — Medication 90 MILLIGRAM(S): at 05:49

## 2023-11-02 NOTE — DISCHARGE NOTE PROVIDER - NSDCMRMEDTOKEN_GEN_ALL_CORE_FT
Albuterol (Eqv-ProAir HFA) 90 mcg/inh inhalation aerosol: 2 puff(s) inhaled as needed for  bronchospasm  aspirin 81 mg oral delayed release tablet: 1 tab(s) orally once a day   atorvastatin 80 mg oral tablet: 1 tab(s) orally once a day  calcitriol 0.25 mcg oral capsule: 1 cap(s) orally once a day  Dilaudid 2 mg oral tablet: 1 tab(s) orally every 4 hours MDD: 6 tabs  diphenhydrAMINE 25 mg oral capsule: 2 cap(s) orally every 6 hours as needed for Rash and/or Itching  gabapentin 300 mg oral capsule: 1 cap(s) orally 3 times a day  hydrALAZINE 100 mg oral tablet: 1 tab(s) orally 2 times a day  labetalol 200 mg oral tablet: 1 tab(s) orally 2 times a day  NIFEdipine (Eqv-Adalat CC) 90 mg oral tablet, extended release: 1 tab(s) orally once a day  NovoLOG Mix 70/30 FlexPen subcutaneous suspension: subcutaneous 3 times a day with meals- sliding scale  pantoprazole 40 mg oral delayed release tablet: 1 tab(s) orally 2 times a day  sevelamer carbonate 800 mg oral tablet: 1 tab(s) orally 3 times a day (with meals)  Tylenol 325 mg oral tablet: 2 tab(s) orally every 6 hours as needed for  moderate pain   Albuterol (Eqv-ProAir HFA) 90 mcg/inh inhalation aerosol: 2 puff(s) inhaled every 6 hours as needed for  bronchospasm  aspirin 81 mg oral delayed release tablet: 1 tab(s) orally once a day   atorvastatin 80 mg oral tablet: 1 tab(s) orally once a day  calcitriol 0.25 mcg oral capsule: 1 cap(s) orally once a day  Dilaudid 2 mg oral tablet: 1 tab(s) orally every 4 hours MDD: 6 tabs  diphenhydrAMINE 25 mg oral capsule: 2 cap(s) orally every 6 hours as needed for Rash and/or Itching  gabapentin 300 mg oral capsule: 1 cap(s) orally 3 times a day  hydrALAZINE 25 mg oral tablet: 3 tab(s) orally 3 times a day  labetalol 200 mg oral tablet: 1 tab(s) orally 3 times a day  NIFEdipine (Eqv-Adalat CC) 90 mg oral tablet, extended release: 1 tab(s) orally once a day  NovoLOG Mix 70/30 FlexPen subcutaneous suspension: subcutaneous 3 times a day with meals- sliding scale  pantoprazole 40 mg oral delayed release tablet: 1 tab(s) orally 2 times a day  sevelamer carbonate 800 mg oral tablet: 1 tab(s) orally 3 times a day (with meals)  Tylenol 325 mg oral tablet: 2 tab(s) orally every 6 hours as needed for  moderate pain

## 2023-11-02 NOTE — PROGRESS NOTE ADULT - REASON FOR ADMISSION
Fatigue and Diffuse Pain

## 2023-11-02 NOTE — DISCHARGE NOTE PROVIDER - HOSPITAL COURSE
10/31 - admitted     59 y/o female with PMH of Hearing loss,  ESRD on HD (T/Th/Sat), HTN, HLD, DM, PVD s/p L fem bypass, and CVA presenting for diffuse pain bony pain, abdominal pain, lethargy and headache. Pain worsened today, associated with abdominal distention, nausea and vomiting. Also reports shortness of breath with periorbital edema. She reports generalized weakness and more trouble walking due to weakness. No chest pain , fever, chills, palpitations, cough, diarrhea, joint swelling or recent sick contacts.    In the ED patient was hypertensive 193/86 , HR 82 and afebrile  Labs were significant for Hb 11, potassium 5.3, Alk phos 593, Trop 0.05 and BNP 70K  CT abdomen and pelvis showing diffuse moderate to severe anasarca with ascites and bilateral pleural effusion as well as non specific inguinal lymph nodes.    Patient admitted for hypertensive urgency and volume overload    Received HD on 10/31 w/following changes  ESRD on HD/ Bilateral pleural effusions  - TTS HD via AVG  - Last session outpatient on 10/28/23  - Fluid overload. Will get HD today  - Hyperkalemic to 5.3. If BMP after HD shows hyperkalemia above 5.5 start Lokelma 10 gm once daily   - Bicarbonate levels 24- under  control   - Was in hypertensive urgency at arrival. Hydralazine increased from 100 BID to 75 mg TID. Labetalol increased from 200 BID to 200 mg TID and on  Nifedipine 90 mg   - Monitor BP after HD   - Anemia of ESRD. Hbg 11.6. On HANNAH  - MBD. Get phosphorous, Vitamin D  and PTH levels  - C/w sevelamer 800 mg TID   - Will benefit from pulmonary evaluation as pleural effusion size increased in the CT chest when compared to previous imaging   - Will follow       11/1  - patient doing clinically much better, on RA, abd distension markedly improved  - patient requires sign-, patient's brother at bedside to help with some communcation   - f/u nephrology for HD 11/2 11/2  - patient received HD  - vitals improved  - clear for DC per updated nephrology reccs  - f/u w/ Dr. Huddleston   10/31 - admitted     59 y/o female with PMH of Hearing loss,  ESRD on HD (T/Th/Sat), HTN, HLD, DM, PVD s/p L fem bypass, and CVA presenting for diffuse pain bony pain, abdominal pain, lethargy and headache. Pain worsened today, associated with abdominal distention, nausea and vomiting. Also reports shortness of breath with periorbital edema. She reports generalized weakness and more trouble walking due to weakness. No chest pain , fever, chills, palpitations, cough, diarrhea, joint swelling or recent sick contacts.    In the ED patient was hypertensive 193/86 , HR 82 and afebrile  Labs were significant for Hb 11, potassium 5.3, Alk phos 593, Trop 0.05 and BNP 70K  CT abdomen and pelvis showing diffuse moderate to severe anasarca with ascites and bilateral pleural effusion as well as non specific inguinal lymph nodes.    Patient admitted for hypertensive urgency and volume overload    Received HD on 10/31 w/following changes  ESRD on HD/ Bilateral pleural effusions  - TTS HD via AVG  - Last session outpatient on 10/28/23  - Fluid overload. Will get HD today  - Hyperkalemic to 5.3. If BMP after HD shows hyperkalemia above 5.5 start Lokelma 10 gm once daily   - Bicarbonate levels 24- under  control   - Was in hypertensive urgency at arrival. Hydralazine increased from 100 BID to 75 mg TID. Labetalol increased from 200 BID to 200 mg TID and on  Nifedipine 90 mg   - Monitor BP after HD   - Anemia of ESRD. Hbg 11.6. On HANNAH  - MBD. Get phosphorous, Vitamin D  and PTH levels  - C/w sevelamer 800 mg TID   - Will benefit from pulmonary evaluation as pleural effusion size increased in the CT chest when compared to previous imaging   - Will follow       11/1  - patient doing clinically much better, on RA, abd distension markedly improved  - patient requires sign-, patient's brother at bedside to help with some communcation   - f/u nephrology for HD 11/2 11/2  - patient received HD  - vitals improved  - clear for DC per updated nephrology reccs  - f/u w/ Dr. Kleiner

## 2023-11-02 NOTE — DISCHARGE NOTE PROVIDER - NSDCFUSCHEDAPPT_GEN_ALL_CORE_FT
Shira Damon  [facility] PreAdmits  Scheduled Appointment: 11/08/2023    Shira Damon  [facility] Physician [facility]  INTMED  [address]  Scheduled Appointment: 11/08/2023    Alpesh Llamas  [facility] PreAdmits  Scheduled Appointment: 11/27/2023    [facility]  PODIATRY  [address]  Scheduled Appointment: 11/27/2023    Behuria, Supreeti  [facility] PreAdmits  Scheduled Appointment: 12/05/2023    [facility] Physician [facility]  CARDIOLOGY  [address]   Scheduled Appointment: 12/05/2023    Lexx Muñoz  [facility] Physician [facility]  VASCULAR [address]  Scheduled Appointment: 12/26/2023

## 2023-11-02 NOTE — DISCHARGE NOTE PROVIDER - CARE PROVIDER_API CALL
Hill Huddleston  Nephrology  [address] [place] [id number]  Phone: [phone]  Fax: [phone]  Follow Up Time: 1-3 days   Kleiner, Morton Jay  Nephrology  [address] [place] [id number]  Phone: [phone]  Fax: [phone]  Follow Up Time: 1-3 days

## 2023-11-02 NOTE — DISCHARGE NOTE PROVIDER - NSDCCPCAREPLAN_GEN_ALL_CORE_FT
PRINCIPAL DISCHARGE DIAGNOSIS  Diagnosis: Fluid overload  Assessment and Plan of Treatment: You presented with hypervolemia/fluid overload. You had a slight increase of pleural fluid and ascites compared to a CT on 10/18. You were taken for a dialysis session on 10/31 and had improvement of your symptoms and improvement of abdominal distention. Nephrology ([facility] specialists) adjusted your dialysis and blood pressure medications. Please continue medications as indicated with the changes. Please follow up outpatient with Dr. Huddleston nephrology, please follow up with primary care, please return to the hospital if your symptoms worsen.      SECONDARY DISCHARGE DIAGNOSES  Diagnosis: ESRD on dialysis  Assessment and Plan of Treatment:

## 2023-11-02 NOTE — DISCHARGE NOTE NURSING/CASE MANAGEMENT/SOCIAL WORK - NSDCPEFALRISK_GEN_ALL_CORE
For information on Fall & Injury Prevention, visit: [email or web address] OR  [email or web address] OR  https://www.cdc.gov/steadi/patient.html

## 2023-11-02 NOTE — DISCHARGE NOTE PROVIDER - PROVIDER TOKENS
PROVIDER:[TOKEN:[[id number]],FOLLOWUP:[1-3 days]] PROVIDER:[TOKEN:[[id number]],FOLLOWUP:[1-3 days]]

## 2023-11-02 NOTE — DISCHARGE NOTE NURSING/CASE MANAGEMENT/SOCIAL WORK - PATIENT PORTAL LINK FT
You can access the FollowMyHealth Patient Portal offered by [facility] by registering at the following website: [email or web address]. By joining [facility]’s FollowMyHealth portal, you will also be able to view your health information using other applications (apps) compatible with our system.

## 2023-11-02 NOTE — PROGRESS NOTE ADULT - SUBJECTIVE AND OBJECTIVE BOX
seen and examined   24 h events noted       PAST HISTORY  --------------------------------------------------------------------------------  No significant changes to PMH, PSH, FHx, SHx, unless otherwise noted    ALLERGIES & MEDICATIONS  --------------------------------------------------------------------------------  Allergies    penicillin (Rash)  NSAIDs (Nephrotoxicity)    Intolerances      Standing Inpatient Medications  aspirin enteric coated 81 milliGRAM(s) Oral daily  atorvastatin 80 milliGRAM(s) Oral at bedtime  calcitriol   Capsule 0.25 MICROGram(s) Oral daily  dextrose 5%. 1000 milliLiter(s) IV Continuous <Continuous>  dextrose 50% Injectable 25 Gram(s) IV Push once  gabapentin 300 milliGRAM(s) Oral daily  glucagon  Injectable 1 milliGRAM(s) IntraMuscular once  heparin   Injectable 5000 Unit(s) SubCutaneous every 12 hours  hydrALAZINE 75 milliGRAM(s) Oral three times a day  insulin lispro (ADMELOG) corrective regimen sliding scale   SubCutaneous three times a day before meals  labetalol 200 milliGRAM(s) Oral three times a day  NIFEdipine XL 90 milliGRAM(s) Oral daily  pantoprazole    Tablet 40 milliGRAM(s) Oral before breakfast  sevelamer carbonate 800 milliGRAM(s) Oral three times a day with meals    PRN Inpatient Medications  acetaminophen     Tablet .. 650 milliGRAM(s) Oral every 6 hours PRN  albuterol    90 MICROgram(s) HFA Inhaler 2 Puff(s) Inhalation every 6 hours PRN  aluminum hydroxide/magnesium hydroxide/simethicone Suspension 30 milliLiter(s) Oral every 4 hours PRN  dextrose Oral Gel 15 Gram(s) Oral once PRN  diphenhydrAMINE 50 milliGRAM(s) Oral every 6 hours PRN  melatonin 3 milliGRAM(s) Oral at bedtime PRN  metoclopramide Injectable 10 milliGRAM(s) IV Push three times a day PRN        VITALS/PHYSICAL EXAM  --------------------------------------------------------------------------------  T(C): 36.1 (11-02-23 @ 04:57), Max: 36.4 (11-01-23 @ 14:08)  HR: 63 (11-02-23 @ 04:57) (63 - 89)  BP: 134/61 (11-02-23 @ 04:57) (112/58 - 134/62)  RR: 18 (11-02-23 @ 04:57) (18 - 18)  SpO2: --  Wt(kg): --    Weight (kg): 70 (11-01-23 @ 00:30)      11-01-23 @ 07:01  -  11-02-23 @ 07:00  --------------------------------------------------------  IN: 840 mL / OUT: 0 mL / NET: 840 mL      Physical Exam:  	Gen: NAD  	Pulm: decrease BS  B/L  	CV:  S1S2; no rub  	Abd: distended  	Vascular access:av     LABS/STUDIES  --------------------------------------------------------------------------------              10.3   4.61  >-----------<  94       [11-01-23 @ 07:03]              32.4     140  |  100  |  33  ----------------------------<  105      [11-01-23 @ 07:03]  4.6   |  27  |  4.7        Ca     8.7     [11-01-23 @ 07:03]      Mg     2.2     [11-01-23 @ 07:03]      Phos  3.9     [11-01-23 @ 07:03]      Troponin 0.07      [11-01-23 @ 22:22]    Creatinine Trend:  SCr 4.7 [11-01 @ 07:03]  SCr 5.4 [10-31 @ 06:57]  SCr 5.0 [10-30 @ 22:04]  SCr 4.6 [10-20 @ 07:53]  SCr 5.6 [10-19 @ 08:58]    Urinalysis - [11-01-23 @ 07:03]      Color  / Appearance  / SG  / pH       Gluc 105 / Ketone   / Bili  / Urobili        Blood  / Protein  / Leuk Est  / Nitrite       RBC  / WBC  / Hyaline  / Gran  / Sq Epi  / Non Sq Epi  / Bacteria       Iron 58, TIBC 243, %sat 24      [07-09-23 @ 16:11]  Ferritin 518      [07-09-23 @ 16:11]  PTH -- (Ca 8.5)      [11-01-23 @ 07:03]   124  PTH -- (Ca 8.5)      [10-19-23 @ 08:58]   159  PTH -- (Ca 8.9)      [06-23-23 @ 08:41]   92  Vitamin D (25OH) 23      [11-01-23 @ 07:03]  Lipid: chol 195, , , LDL --      [10-31-23 @ 06:57]

## 2023-11-02 NOTE — PROGRESS NOTE ADULT - ASSESSMENT
57 y/o female with PMH of Hearing loss,  ESRD on HD (T/Th/Sat), HTN, HLD, DM, PVD s/p stent, and CVA presenting for diffuse abdominal pain, lethargy and headache. Pain worsened today, associated with abdominal distention, nausea and vomiting.    Fluid overload  Hypertensive Urgency  ESRD on HD  Hyperkalemia  DM-2 / HTN / DL  PVD  H/O CVA              PLAN:    ·	Used  to talk to the pt.   ·	CT abd/pelvis/chest reviewed. Large R and mod L pleural effusions  ·	S/P HD yesterday. Feels much better.   ·	Meds adjusted. BP is better controlled now.   ·	BP is now better controlled on Hydralazine 75 mg po three times a day, labetalol 200 mg po three times a day and Nifedipine XL 90 mg po daily. Will cont on d/c.   ·	Low salt diet and water restriction to 1.2 L/D  ·	Pt likely will need an extra session of HD every other week  ·	Monitor FS. On Lispro corrective regimen.   ·	Cont her other meds  ·	D/C home today    * Med rec reviewed. Plan of care d/w the pt with the help of   57 y/o female with PMH of Hearing loss,  ESRD on HD (T/Th/Sat), HTN, HLD, DM, PVD s/p stent, and CVA presenting for diffuse abdominal pain, lethargy and headache. Pain worsened today, associated with abdominal distention, nausea and vomiting.    Fluid overload  Hypertensive Urgency  ESRD on HD  Hyperkalemia  DM-2 / HTN / DL  PVD  H/O CVA              PLAN:    ·	S/P another HD today. Feels good. No more nausea or abdominal bloating. Face swelling and leg swelling had gone down  ·	Used  to talk to the pt.  ·	CT abd/pelvis/chest reviewed. Large R and mod L pleural effusions  ·	Meds adjusted. BP is better controlled now.   ·	BP is now better controlled on Hydralazine 75 mg po three times a day, labetalol 200 mg po three times a day and Nifedipine XL 90 mg po daily. Will cont on d/c.   ·	Low salt diet and water restriction to 1.2 L/D  ·	Pt likely will need an extra session of HD every other week  ·	Cont her other home meds  ·	D/C home today    * Med rec reviewed. Plan of care d/w the pt with the help of . Also d/w the brother in law on bedside.

## 2023-11-02 NOTE — PROGRESS NOTE ADULT - ASSESSMENT
57 y/o female with PMH of Hearing loss,  ESRD on HD (T/Th/Sat), HTN, HLD, DM, PVD s/p L fem bypass, and CVA presenting for diffuse pain bony pain, abdominal pain, lethargy and headache. Pain worsened today, associated with abdominal distention, nausea and vomiting. Pt with SOB, large b/l pleural effusions. In the ED patient was hypertensive 193/86 , HR 82 and afebrile. Patient admitted for hypertensive urgency and volume overload  ESRD on HD / hypervolemia / pleural effusions  next HD today , 3 hrs 2 K bath UF 3   maximize UF   fluid restrict 1 L daily  phos level noted; at goal.  cont sevelamer  hgb at goal / no HANNAH   follow platelet count   BP controlled   will follow

## 2023-11-02 NOTE — PROGRESS NOTE ADULT - SUBJECTIVE AND OBJECTIVE BOX
SEN ANN  58y Female    CHIEF COMPLAINT:    Patient is a 58y old  Female who presents with a chief complaint of Fatigue and Diffuse Pain (2023 10:36)      INTERVAL HPI/OVERNIGHT EVENTS:    Patient seen and examined.    ROS: All other systems are negative.    Vital Signs:    T(F): 97 (23 @ 04:57), Max: 97.6 (23 @ 14:08)  HR: 76 (23 @ 11:50) (63 - 89)  BP: 167/61 (23 @ 11:50) (112/58 - 167/61)  RR: 18 (23 @ 11:50) (18 - 18)  SpO2: --  I&O's Summary    2023 07:01  -  2023 07:00  --------------------------------------------------------  IN: 1080 mL / OUT: 0 mL / NET: 1080 mL    2023 07:01  -  2023 12:45  --------------------------------------------------------  IN: 0 mL / OUT: 3000 mL / NET: -3000 mL      Daily     Daily Weight in k (2023 08:06)  CAPILLARY BLOOD GLUCOSE      POCT Blood Glucose.: 89 mg/dL (2023 12:39)  POCT Blood Glucose.: 118 mg/dL (2023 07:56)  POCT Blood Glucose.: 108 mg/dL (2023 21:45)  POCT Blood Glucose.: 108 mg/dL (2023 16:37)      PHYSICAL EXAM:    GENERAL:  NAD  SKIN: No rashes or lesions  HENT: Atraumatic. Normocephalic. PERRL. Moist membranes.  NECK: Supple, No JVD. No lymphadenopathy.  PULMONARY: CTA B/L. No wheezing. No rales  CVS: Normal S1, S2. Rate and Rhythm are regular. No murmurs.  ABDOMEN/GI: Soft, Nontender, Nondistended; BS present  EXTREMITIES: Peripheral pulses intact. No edema B/L LE.  NEUROLOGIC:  No motor or sensory deficit.  PSYCH: Alert & oriented x 3    Consultant(s) Notes Reviewed:  [x ] YES  [ ] NO  Care Discussed with Consultants/Other Providers [ x] YES  [ ] NO    EKG reviewed  Telemetry reviewed    LABS:                        10.0   4.53  )-----------( 92       ( 2023 06:15 )             31.2     11-02    140  |  97<L>  |  40<H>  ----------------------------<  74  5.1<H>   |  28  |  5.3<HH>    Ca    8.6      2023 06:15  Phos  4.2       Mg     2.2         TPro  6.7  /  Alb  3.9  /  TBili  0.8  /  DBili  x   /  AST  19  /  ALT  9   /  AlkPhos  559<H>  11-02    PT/INR - ( 2023 06:15 )   PT: 12.80 sec;   INR: 1.12 ratio         PTT - ( 2023 06:15 )  PTT:32.7 sec    Trop 0.07, CKMB --, CK --, 23 @ 22:22  Trop 0.07, CKMB --, CK --, 23 @ 12:27  Trop 0.06, CKMB --, CK 64, 10-31-23 @ 06:57  Trop 0.05, CKMB --, CK --, 10-30-23 @ 22:04        RADIOLOGY & ADDITIONAL TESTS:      Imaging or report Personally Reviewed:  [ ] YES  [ ] NO    Medications:  Standing  aspirin enteric coated 81 milliGRAM(s) Oral daily  atorvastatin 80 milliGRAM(s) Oral at bedtime  calcitriol   Capsule 0.25 MICROGram(s) Oral daily  dextrose 5%. 1000 milliLiter(s) IV Continuous <Continuous>  dextrose 50% Injectable 25 Gram(s) IV Push once  gabapentin 300 milliGRAM(s) Oral daily  glucagon  Injectable 1 milliGRAM(s) IntraMuscular once  heparin   Injectable 5000 Unit(s) SubCutaneous every 12 hours  hydrALAZINE 75 milliGRAM(s) Oral three times a day  insulin lispro (ADMELOG) corrective regimen sliding scale   SubCutaneous three times a day before meals  labetalol 200 milliGRAM(s) Oral three times a day  NIFEdipine XL 90 milliGRAM(s) Oral daily  pantoprazole    Tablet 40 milliGRAM(s) Oral before breakfast  sevelamer carbonate 800 milliGRAM(s) Oral three times a day with meals    PRN Meds  acetaminophen     Tablet .. 650 milliGRAM(s) Oral every 6 hours PRN  albuterol    90 MICROgram(s) HFA Inhaler 2 Puff(s) Inhalation every 6 hours PRN  aluminum hydroxide/magnesium hydroxide/simethicone Suspension 30 milliLiter(s) Oral every 4 hours PRN  dextrose Oral Gel 15 Gram(s) Oral once PRN  diphenhydrAMINE 50 milliGRAM(s) Oral every 6 hours PRN  melatonin 3 milliGRAM(s) Oral at bedtime PRN  metoclopramide Injectable 10 milliGRAM(s) IV Push three times a day PRN      Case discussed with resident    Care discussed with pt/family           SEN ANN  58y Female    CHIEF COMPLAINT:    Patient is a 58y old  Female who presents with a chief complaint of Fatigue and Diffuse Pain (2023 10:36)      INTERVAL HPI/OVERNIGHT EVENTS:    Patient seen and examined. Spoke to the pt with the help of . S/P HD today. Feels much better now. No sob. No abdominal pain. No N/V    ROS: All other systems are negative.    Vital Signs:    T(F): 97 (23 @ 04:57), Max: 97.6 (23 @ 14:08)  HR: 76 (23 @ 11:50) (63 - 89)  BP: 167/61 (23 @ 11:50) (112/58 - 167/61)  RR: 18 (23 @ 11:50) (18 - 18)  SpO2: --  I&O's Summary    2023 07:  -  2023 07:00  --------------------------------------------------------  IN: 1080 mL / OUT: 0 mL / NET: 1080 mL    2023 07:01  -  2023 12:45  --------------------------------------------------------  IN: 0 mL / OUT: 3000 mL / NET: -3000 mL      Daily     Daily Weight in k (2023 08:06)  CAPILLARY BLOOD GLUCOSE      POCT Blood Glucose.: 89 mg/dL (2023 12:39)  POCT Blood Glucose.: 118 mg/dL (2023 07:56)  POCT Blood Glucose.: 108 mg/dL (2023 21:45)  POCT Blood Glucose.: 108 mg/dL (2023 16:37)      PHYSICAL EXAM:    GENERAL:  NAD  SKIN: No rashes or lesions  HENT: Atraumatic. Normocephalic. PERRL. Moist membranes.  NECK: Supple, No JVD. No lymphadenopathy.  PULMONARY: Decreased BS in the bases B/L. No wheezing. No rales  CVS: Normal S1, S2. Rate and Rhythm are regular. No murmurs.  ABDOMEN/GI: Soft, Nontender, distended; BS present  EXTREMITIES: Peripheral pulses intact. No edema B/L LE.  NEUROLOGIC:  No motor or sensory deficit.  PSYCH: Alert & oriented x 3    Consultant(s) Notes Reviewed:  [x ] YES  [ ] NO  Care Discussed with Consultants/Other Providers [ x] YES  [ ] NO    EKG reviewed  Telemetry reviewed    LABS:                        10.0   4.53  )-----------( 92       ( 2023 06:15 )             31.2     11    140  |  97<L>  |  40<H>  ----------------------------<  74  5.1<H>   |  28  |  5.3<HH>    Ca    8.6      2023 06:15  Phos  4.2       Mg     2.2         TPro  6.7  /  Alb  3.9  /  TBili  0.8  /  DBili  x   /  AST  19  /  ALT  9   /  AlkPhos  559<H>  11    PT/INR - ( 2023 06:15 )   PT: 12.80 sec;   INR: 1.12 ratio         PTT - ( 2023 06:15 )  PTT:32.7 sec    Trop 0.07, CKMB --, CK --, 23 @ 22:22  Trop 0.07, CKMB --, CK --, 23 @ 12:27  Trop 0.06, CKMB --, CK 64, 10-31-23 @ 06:57  Trop 0.05, CKMB --, CK --, 10-30-23 @ 22:04        RADIOLOGY & ADDITIONAL TESTS:      Imaging or report Personally Reviewed:  [ ] YES  [ ] NO    Medications:  Standing  aspirin enteric coated 81 milliGRAM(s) Oral daily  atorvastatin 80 milliGRAM(s) Oral at bedtime  calcitriol   Capsule 0.25 MICROGram(s) Oral daily  dextrose 5%. 1000 milliLiter(s) IV Continuous <Continuous>  dextrose 50% Injectable 25 Gram(s) IV Push once  gabapentin 300 milliGRAM(s) Oral daily  glucagon  Injectable 1 milliGRAM(s) IntraMuscular once  heparin   Injectable 5000 Unit(s) SubCutaneous every 12 hours  hydrALAZINE 75 milliGRAM(s) Oral three times a day  insulin lispro (ADMELOG) corrective regimen sliding scale   SubCutaneous three times a day before meals  labetalol 200 milliGRAM(s) Oral three times a day  NIFEdipine XL 90 milliGRAM(s) Oral daily  pantoprazole    Tablet 40 milliGRAM(s) Oral before breakfast  sevelamer carbonate 800 milliGRAM(s) Oral three times a day with meals    PRN Meds  acetaminophen     Tablet .. 650 milliGRAM(s) Oral every 6 hours PRN  albuterol    90 MICROgram(s) HFA Inhaler 2 Puff(s) Inhalation every 6 hours PRN  aluminum hydroxide/magnesium hydroxide/simethicone Suspension 30 milliLiter(s) Oral every 4 hours PRN  dextrose Oral Gel 15 Gram(s) Oral once PRN  diphenhydrAMINE 50 milliGRAM(s) Oral every 6 hours PRN  melatonin 3 milliGRAM(s) Oral at bedtime PRN  metoclopramide Injectable 10 milliGRAM(s) IV Push three times a day PRN      Case discussed with resident    Care discussed with pt/family

## 2023-11-02 NOTE — DISCHARGE NOTE PROVIDER - CARE PROVIDERS DIRECT ADDRESSES
[email or web address]

## 2023-11-03 LAB
OSTEOCALCIN SERPL-MCNC: 45.4 NG/ML — SIGNIFICANT CHANGE UP
OSTEOCALCIN SERPL-MCNC: 45.4 NG/ML — SIGNIFICANT CHANGE UP

## 2023-11-06 LAB
PINP SER-MCNC: 102.9 NG/ML — SIGNIFICANT CHANGE UP
PINP SER-MCNC: 102.9 NG/ML — SIGNIFICANT CHANGE UP

## 2023-11-07 LAB
ALP BONE SERPL-MCNC: 58.6 UG/L — SIGNIFICANT CHANGE UP
ALP BONE SERPL-MCNC: 58.6 UG/L — SIGNIFICANT CHANGE UP

## 2023-11-08 ENCOUNTER — APPOINTMENT (OUTPATIENT)
Dept: INTERNAL MEDICINE | Facility: CLINIC | Age: 58
End: 2023-11-08
Payer: COMMERCIAL

## 2023-11-08 ENCOUNTER — OUTPATIENT (OUTPATIENT)
Dept: OUTPATIENT SERVICES | Facility: HOSPITAL | Age: 58
LOS: 1 days | End: 2023-11-08
Payer: COMMERCIAL

## 2023-11-08 VITALS
OXYGEN SATURATION: 97 % | HEIGHT: 60 IN | WEIGHT: 155 LBS | BODY MASS INDEX: 30.43 KG/M2 | HEART RATE: 65 BPM | TEMPERATURE: 97.7 F

## 2023-11-08 VITALS — DIASTOLIC BLOOD PRESSURE: 59 MMHG | SYSTOLIC BLOOD PRESSURE: 136 MMHG

## 2023-11-08 DIAGNOSIS — Z86.73 PERSONAL HISTORY OF TRANSIENT ISCHEMIC ATTACK (TIA), AND CEREBRAL INFARCTION WITHOUT RESIDUAL DEFICITS: ICD-10-CM

## 2023-11-08 DIAGNOSIS — E78.6 LIPOPROTEIN DEFICIENCY: ICD-10-CM

## 2023-11-08 DIAGNOSIS — E87.70 FLUID OVERLOAD, UNSPECIFIED: ICD-10-CM

## 2023-11-08 DIAGNOSIS — Z87.81 PERSONAL HISTORY OF (HEALED) TRAUMATIC FRACTURE: Chronic | ICD-10-CM

## 2023-11-08 DIAGNOSIS — R59.9 ENLARGED LYMPH NODES, UNSPECIFIED: ICD-10-CM

## 2023-11-08 DIAGNOSIS — I12.0 HYPERTENSIVE CHRONIC KIDNEY DISEASE WITH STAGE 5 CHRONIC KIDNEY DISEASE OR END STAGE RENAL DISEASE: ICD-10-CM

## 2023-11-08 DIAGNOSIS — Z99.2 DEPENDENCE ON RENAL DIALYSIS: ICD-10-CM

## 2023-11-08 DIAGNOSIS — Z98.890 OTHER SPECIFIED POSTPROCEDURAL STATES: Chronic | ICD-10-CM

## 2023-11-08 DIAGNOSIS — Z95.828 PRESENCE OF OTHER VASCULAR IMPLANTS AND GRAFTS: Chronic | ICD-10-CM

## 2023-11-08 DIAGNOSIS — E11.22 TYPE 2 DIABETES MELLITUS WITH DIABETIC CHRONIC KIDNEY DISEASE: ICD-10-CM

## 2023-11-08 DIAGNOSIS — R18.8 OTHER ASCITES: ICD-10-CM

## 2023-11-08 DIAGNOSIS — E87.5 HYPERKALEMIA: ICD-10-CM

## 2023-11-08 DIAGNOSIS — J90 PLEURAL EFFUSION, NOT ELSEWHERE CLASSIFIED: ICD-10-CM

## 2023-11-08 DIAGNOSIS — Z89.422 ACQUIRED ABSENCE OF OTHER LEFT TOE(S): Chronic | ICD-10-CM

## 2023-11-08 DIAGNOSIS — Z88.6 ALLERGY STATUS TO ANALGESIC AGENT: ICD-10-CM

## 2023-11-08 DIAGNOSIS — Z00.00 ENCOUNTER FOR GENERAL ADULT MEDICAL EXAMINATION WITHOUT ABNORMAL FINDINGS: ICD-10-CM

## 2023-11-08 DIAGNOSIS — I16.0 HYPERTENSIVE URGENCY: ICD-10-CM

## 2023-11-08 DIAGNOSIS — N18.6 END STAGE RENAL DISEASE: ICD-10-CM

## 2023-11-08 DIAGNOSIS — H91.8X9 OTHER SPECIFIED HEARING LOSS, UNSPECIFIED EAR: ICD-10-CM

## 2023-11-08 DIAGNOSIS — Z79.899 OTHER LONG TERM (CURRENT) DRUG THERAPY: ICD-10-CM

## 2023-11-08 DIAGNOSIS — R11.0 NAUSEA: ICD-10-CM

## 2023-11-08 DIAGNOSIS — D64.9 ANEMIA, UNSPECIFIED: ICD-10-CM

## 2023-11-08 DIAGNOSIS — J45.909 UNSPECIFIED ASTHMA, UNCOMPLICATED: ICD-10-CM

## 2023-11-08 DIAGNOSIS — E21.3 HYPERPARATHYROIDISM, UNSPECIFIED: ICD-10-CM

## 2023-11-08 DIAGNOSIS — Z89.422 ACQUIRED ABSENCE OF OTHER LEFT TOE(S): ICD-10-CM

## 2023-11-08 DIAGNOSIS — E11.51 TYPE 2 DIABETES MELLITUS WITH DIABETIC PERIPHERAL ANGIOPATHY WITHOUT GANGRENE: ICD-10-CM

## 2023-11-08 DIAGNOSIS — Z79.4 LONG TERM (CURRENT) USE OF INSULIN: ICD-10-CM

## 2023-11-08 DIAGNOSIS — F79 UNSPECIFIED INTELLECTUAL DISABILITIES: ICD-10-CM

## 2023-11-08 DIAGNOSIS — D63.1 ANEMIA IN CHRONIC KIDNEY DISEASE: ICD-10-CM

## 2023-11-08 DIAGNOSIS — Z88.0 ALLERGY STATUS TO PENICILLIN: ICD-10-CM

## 2023-11-08 DIAGNOSIS — Z79.02 LONG TERM (CURRENT) USE OF ANTITHROMBOTICS/ANTIPLATELETS: ICD-10-CM

## 2023-11-08 DIAGNOSIS — Z79.82 LONG TERM (CURRENT) USE OF ASPIRIN: ICD-10-CM

## 2023-11-08 DIAGNOSIS — Z98.62 PERIPHERAL VASCULAR ANGIOPLASTY STATUS: ICD-10-CM

## 2023-11-08 DIAGNOSIS — R34 ANURIA AND OLIGURIA: ICD-10-CM

## 2023-11-08 DIAGNOSIS — T82.858S STENOSIS OF OTHER VASCULAR PROSTHETIC, SEQUELA: ICD-10-CM

## 2023-11-08 PROCEDURE — 99214 OFFICE O/P EST MOD 30 MIN: CPT

## 2023-11-11 ENCOUNTER — OUTPATIENT (OUTPATIENT)
Dept: OUTPATIENT SERVICES | Facility: HOSPITAL | Age: 58
LOS: 1 days | End: 2023-11-11
Payer: COMMERCIAL

## 2023-11-11 ENCOUNTER — RESULT REVIEW (OUTPATIENT)
Age: 58
End: 2023-11-11

## 2023-11-11 DIAGNOSIS — Z12.31 ENCOUNTER FOR SCREENING MAMMOGRAM FOR MALIGNANT NEOPLASM OF BREAST: ICD-10-CM

## 2023-11-11 DIAGNOSIS — Z87.81 PERSONAL HISTORY OF (HEALED) TRAUMATIC FRACTURE: Chronic | ICD-10-CM

## 2023-11-11 DIAGNOSIS — Z98.890 OTHER SPECIFIED POSTPROCEDURAL STATES: Chronic | ICD-10-CM

## 2023-11-11 DIAGNOSIS — Z95.828 PRESENCE OF OTHER VASCULAR IMPLANTS AND GRAFTS: Chronic | ICD-10-CM

## 2023-11-11 DIAGNOSIS — Z89.422 ACQUIRED ABSENCE OF OTHER LEFT TOE(S): Chronic | ICD-10-CM

## 2023-11-11 PROCEDURE — 77063 BREAST TOMOSYNTHESIS BI: CPT

## 2023-11-11 PROCEDURE — 77063 BREAST TOMOSYNTHESIS BI: CPT | Mod: 26

## 2023-11-11 PROCEDURE — 77067 SCR MAMMO BI INCL CAD: CPT

## 2023-11-11 PROCEDURE — 77067 SCR MAMMO BI INCL CAD: CPT | Mod: 26

## 2023-11-12 DIAGNOSIS — Z12.31 ENCOUNTER FOR SCREENING MAMMOGRAM FOR MALIGNANT NEOPLASM OF BREAST: ICD-10-CM

## 2023-11-13 DIAGNOSIS — D64.9 ANEMIA, UNSPECIFIED: ICD-10-CM

## 2023-11-13 DIAGNOSIS — Z00.00 ENCOUNTER FOR GENERAL ADULT MEDICAL EXAMINATION WITHOUT ABNORMAL FINDINGS: ICD-10-CM

## 2023-11-13 DIAGNOSIS — N18.9 CHRONIC KIDNEY DISEASE, UNSPECIFIED: ICD-10-CM

## 2023-11-13 DIAGNOSIS — T82.858S STENOSIS OF OTHER VASCULAR PROSTHETIC DEVICES, IMPLANTS AND GRAFTS, SEQUELA: ICD-10-CM

## 2023-11-22 DIAGNOSIS — Z00.00 ENCOUNTER FOR GENERAL ADULT MEDICAL EXAMINATION W/OUT ABNORMAL FINDINGS: ICD-10-CM

## 2023-11-22 RX ORDER — LABETALOL HYDROCHLORIDE 200 MG/1
200 TABLET, FILM COATED ORAL TWICE DAILY
Qty: 60 | Refills: 2 | Status: ACTIVE | COMMUNITY
Start: 2023-07-18 | End: 1900-01-01

## 2023-11-22 RX ORDER — NIFEDIPINE 90 MG/1
90 TABLET, EXTENDED RELEASE ORAL DAILY
Qty: 30 | Refills: 2 | Status: ACTIVE | COMMUNITY
Start: 2023-07-18 | End: 1900-01-01

## 2023-11-22 RX ORDER — HYDRALAZINE HYDROCHLORIDE 100 MG/1
100 TABLET ORAL TWICE DAILY
Qty: 60 | Refills: 2 | Status: ACTIVE | COMMUNITY
Start: 2023-07-18 | End: 1900-01-01

## 2023-11-22 RX ORDER — PANTOPRAZOLE 40 MG/1
40 TABLET, DELAYED RELEASE ORAL
Qty: 30 | Refills: 4 | Status: ACTIVE | COMMUNITY
Start: 2023-03-08 | End: 1900-01-01

## 2023-11-27 ENCOUNTER — APPOINTMENT (OUTPATIENT)
Dept: PODIATRY | Facility: CLINIC | Age: 58
End: 2023-11-27
Payer: COMMERCIAL

## 2023-11-27 ENCOUNTER — OUTPATIENT (OUTPATIENT)
Dept: OUTPATIENT SERVICES | Facility: HOSPITAL | Age: 58
LOS: 1 days | End: 2023-11-27
Payer: COMMERCIAL

## 2023-11-27 DIAGNOSIS — Z98.890 OTHER SPECIFIED POSTPROCEDURAL STATES: Chronic | ICD-10-CM

## 2023-11-27 DIAGNOSIS — M79.672 PAIN IN LEFT FOOT: ICD-10-CM

## 2023-11-27 DIAGNOSIS — Z00.00 ENCOUNTER FOR GENERAL ADULT MEDICAL EXAMINATION WITHOUT ABNORMAL FINDINGS: ICD-10-CM

## 2023-11-27 DIAGNOSIS — E11.42 TYPE 2 DIABETES MELLITUS WITH DIABETIC POLYNEUROPATHY: ICD-10-CM

## 2023-11-27 DIAGNOSIS — Z87.81 PERSONAL HISTORY OF (HEALED) TRAUMATIC FRACTURE: Chronic | ICD-10-CM

## 2023-11-27 DIAGNOSIS — Z95.828 PRESENCE OF OTHER VASCULAR IMPLANTS AND GRAFTS: Chronic | ICD-10-CM

## 2023-11-27 DIAGNOSIS — M79.671 PAIN IN RIGHT FOOT: ICD-10-CM

## 2023-11-27 PROCEDURE — 99214 OFFICE O/P EST MOD 30 MIN: CPT

## 2023-11-27 PROCEDURE — 99214 OFFICE O/P EST MOD 30 MIN: CPT | Mod: 95

## 2023-11-27 RX ORDER — CAPSAICIN 0.75 MG/G
0.1 CREAM TOPICAL 3 TIMES DAILY
Qty: 1 | Refills: 5 | Status: ACTIVE | COMMUNITY
Start: 2023-11-27 | End: 1900-01-01

## 2023-11-27 RX ORDER — CAPSAICIN 0.1 G/100G
0.1 CREAM TOPICAL 4 TIMES DAILY
Qty: 1 | Refills: 1 | Status: ACTIVE | COMMUNITY
Start: 2023-07-18 | End: 1900-01-01

## 2023-12-01 ENCOUNTER — RESULT REVIEW (OUTPATIENT)
Age: 58
End: 2023-12-01

## 2023-12-01 ENCOUNTER — OUTPATIENT (OUTPATIENT)
Dept: OUTPATIENT SERVICES | Facility: HOSPITAL | Age: 58
LOS: 1 days | End: 2023-12-01
Payer: COMMERCIAL

## 2023-12-01 DIAGNOSIS — Z98.890 OTHER SPECIFIED POSTPROCEDURAL STATES: Chronic | ICD-10-CM

## 2023-12-01 DIAGNOSIS — R92.8 OTHER ABNORMAL AND INCONCLUSIVE FINDINGS ON DIAGNOSTIC IMAGING OF BREAST: ICD-10-CM

## 2023-12-01 DIAGNOSIS — Z89.422 ACQUIRED ABSENCE OF OTHER LEFT TOE(S): Chronic | ICD-10-CM

## 2023-12-01 DIAGNOSIS — Z95.828 PRESENCE OF OTHER VASCULAR IMPLANTS AND GRAFTS: Chronic | ICD-10-CM

## 2023-12-01 DIAGNOSIS — Z87.81 PERSONAL HISTORY OF (HEALED) TRAUMATIC FRACTURE: Chronic | ICD-10-CM

## 2023-12-01 PROCEDURE — 76641 ULTRASOUND BREAST COMPLETE: CPT | Mod: 26,50

## 2023-12-01 PROCEDURE — G0279: CPT | Mod: 26

## 2023-12-01 PROCEDURE — 76641 ULTRASOUND BREAST COMPLETE: CPT | Mod: 50

## 2023-12-01 PROCEDURE — G0279: CPT

## 2023-12-01 PROCEDURE — 77065 DX MAMMO INCL CAD UNI: CPT | Mod: 26,RT

## 2023-12-01 PROCEDURE — 77065 DX MAMMO INCL CAD UNI: CPT | Mod: RT

## 2023-12-02 DIAGNOSIS — R92.8 OTHER ABNORMAL AND INCONCLUSIVE FINDINGS ON DIAGNOSTIC IMAGING OF BREAST: ICD-10-CM

## 2023-12-04 DIAGNOSIS — M79.672 PAIN IN LEFT FOOT: ICD-10-CM

## 2023-12-04 DIAGNOSIS — E11.42 TYPE 2 DIABETES MELLITUS WITH DIABETIC POLYNEUROPATHY: ICD-10-CM

## 2023-12-04 DIAGNOSIS — M79.671 PAIN IN RIGHT FOOT: ICD-10-CM
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
## 2023-12-05 ENCOUNTER — APPOINTMENT (OUTPATIENT)
Dept: CARDIOLOGY | Facility: CLINIC | Age: 58
End: 2023-12-05
Payer: COMMERCIAL

## 2023-12-05 ENCOUNTER — OUTPATIENT (OUTPATIENT)
Dept: OUTPATIENT SERVICES | Facility: HOSPITAL | Age: 58
LOS: 1 days | End: 2023-12-05
Payer: COMMERCIAL

## 2023-12-05 VITALS
WEIGHT: 155 LBS | TEMPERATURE: 97.4 F | OXYGEN SATURATION: 92 % | SYSTOLIC BLOOD PRESSURE: 156 MMHG | HEIGHT: 60 IN | DIASTOLIC BLOOD PRESSURE: 62 MMHG | BODY MASS INDEX: 30.43 KG/M2 | HEART RATE: 76 BPM

## 2023-12-05 DIAGNOSIS — Z98.890 OTHER SPECIFIED POSTPROCEDURAL STATES: Chronic | ICD-10-CM

## 2023-12-05 DIAGNOSIS — E78.00 PURE HYPERCHOLESTEROLEMIA, UNSPECIFIED: ICD-10-CM

## 2023-12-05 DIAGNOSIS — Z89.422 ACQUIRED ABSENCE OF OTHER LEFT TOE(S): Chronic | ICD-10-CM

## 2023-12-05 DIAGNOSIS — N18.9 CHRONIC KIDNEY DISEASE, UNSPECIFIED: ICD-10-CM

## 2023-12-05 DIAGNOSIS — Z01.810 ENCOUNTER FOR PREPROCEDURAL CARDIOVASCULAR EXAMINATION: ICD-10-CM

## 2023-12-05 DIAGNOSIS — Z00.00 ENCOUNTER FOR GENERAL ADULT MEDICAL EXAMINATION WITHOUT ABNORMAL FINDINGS: ICD-10-CM

## 2023-12-05 DIAGNOSIS — R07.89 OTHER CHEST PAIN: ICD-10-CM

## 2023-12-05 DIAGNOSIS — I50.30 UNSPECIFIED DIASTOLIC (CONGESTIVE) HEART FAILURE: ICD-10-CM

## 2023-12-05 DIAGNOSIS — I70.262 ATHEROSCLEROSIS OF NATIVE ARTERIES OF EXTREMITIES WITH GANGRENE, LEFT LEG: ICD-10-CM

## 2023-12-05 PROCEDURE — 99214 OFFICE O/P EST MOD 30 MIN: CPT

## 2023-12-09 ENCOUNTER — INPATIENT (INPATIENT)
Facility: HOSPITAL | Age: 58
LOS: 5 days | Discharge: HOME | DRG: 952 | End: 2023-12-15
Attending: STUDENT IN AN ORGANIZED HEALTH CARE EDUCATION/TRAINING PROGRAM | Admitting: STUDENT IN AN ORGANIZED HEALTH CARE EDUCATION/TRAINING PROGRAM
Payer: MEDICAID

## 2023-12-09 VITALS
WEIGHT: 134.48 LBS | RESPIRATION RATE: 18 BRPM | OXYGEN SATURATION: 96 % | HEIGHT: 64 IN | DIASTOLIC BLOOD PRESSURE: 81 MMHG | HEART RATE: 74 BPM | SYSTOLIC BLOOD PRESSURE: 196 MMHG | TEMPERATURE: 98 F

## 2023-12-09 DIAGNOSIS — Z95.828 PRESENCE OF OTHER VASCULAR IMPLANTS AND GRAFTS: Chronic | ICD-10-CM

## 2023-12-09 DIAGNOSIS — Z89.422 ACQUIRED ABSENCE OF OTHER LEFT TOE(S): Chronic | ICD-10-CM

## 2023-12-09 DIAGNOSIS — Z98.890 OTHER SPECIFIED POSTPROCEDURAL STATES: Chronic | ICD-10-CM

## 2023-12-09 DIAGNOSIS — Z87.81 PERSONAL HISTORY OF (HEALED) TRAUMATIC FRACTURE: Chronic | ICD-10-CM

## 2023-12-09 PROCEDURE — 99285 EMERGENCY DEPT VISIT HI MDM: CPT

## 2023-12-09 NOTE — ED ADULT TRIAGE NOTE - CHIEF COMPLAINT QUOTE
Pt presents to the ED c/o of L Lower leg swelling and pain. Pt goes to HD on Tue-Thur-Sat and has a L AV fistula. Pt missed todays HD as she had a fever earlier today. Pt been taking Tylenol for the fever

## 2023-12-09 NOTE — ED ADULT TRIAGE NOTE - ACCOMPANIED BY
SUBJECTIVE:   Emre Child is a 47 year old male who presents to clinic today for the following health issues:      Chief Complaint   Patient presents with     Suture Removal     left hand x 8 sutures      Hypertension     Med refill      Patient has 8 sutures in his left index finger that have been in for 2 weeks and need removal.    Hypertension Follow-up      Outpatient blood pressures are not being checked.    Low Salt Diet: no added salt    Patient is here today for medication refill and has been asymptomatic.  He is compliant with medication being taken daily and has no side effects.    Problem list and histories reviewed & adjusted, as indicated.  Additional history: as documented    Patient Active Problem List   Diagnosis     Elevated BP     Essential hypertension     History reviewed. No pertinent surgical history.    Social History     Tobacco Use     Smoking status: Never Smoker     Smokeless tobacco: Never Used   Substance Use Topics     Alcohol use: No     Family History   Problem Relation Age of Onset     Colon Polyps Mother      Cerebrovascular Disease Father 66        Stents     Colon Polyps Father      Thyroid Disease Sister         tyroid removed         Current Outpatient Medications   Medication Sig Dispense Refill     IBUPROFEN 200 MG OR TABS 1 TABLET EVERY 4 TO 6 HOURS AS NEEDED       lisinopril (PRINIVIL/ZESTRIL) 20 MG tablet Take 1 tablet (20 mg) by mouth daily 90 tablet 3     Allergies   Allergen Reactions     Penicillin G        Reviewed and updated as needed this visit by clinical staff  Tobacco  Allergies  Meds  Problems  Med Hx  Surg Hx  Fam Hx  Soc Hx        Reviewed and updated as needed this visit by Provider  Tobacco  Allergies  Meds  Problems  Med Hx  Surg Hx  Fam Hx         ROS:  CONSTITUTIONAL: NEGATIVE for fever, chills, change in weight  INTEGUMENTARY/SKIN: POSITIVE for healing laceration on right distal index finger knuckle  RESP: NEGATIVE for significant cough or  "SOB  CV: NEGATIVE for chest pain, palpitations or peripheral edema  PSYCHIATRIC: NEGATIVE for changes in mood or affect  ROS otherwise negative    OBJECTIVE:     /82 (Cuff Size: Adult Large)   Pulse 64   Temp 98  F (36.7  C) (Tympanic)   Resp 16   Ht 1.88 m (6' 2\")   Wt 122.4 kg (269 lb 12.8 oz)   SpO2 99%   BMI 34.64 kg/m    Body mass index is 34.64 kg/m .  GENERAL: healthy, alert and no distress  RESP: lungs clear to auscultation - no rales, rhonchi or wheezes  CV: regular rate and rhythm, normal S1 S2, no S3 or S4, no murmur, click or rub, no peripheral edema and peripheral pulses strong  SKIN: no suspicious lesions or rashes and 8 sutures that are intact on anterior right index finger.  These were removed and the cut is still healing and partially open on medial end.     Diagnostic Test Results:  Results for orders placed or performed in visit on 02/22/19   Basic metabolic panel  (Ca, Cl, CO2, Creat, Gluc, K, Na, BUN)   Result Value Ref Range    Sodium 139 133 - 144 mmol/L    Potassium 4.0 3.4 - 5.3 mmol/L    Chloride 104 94 - 109 mmol/L    Carbon Dioxide 29 20 - 32 mmol/L    Anion Gap 6 3 - 14 mmol/L    Glucose 94 70 - 99 mg/dL    Urea Nitrogen 14 7 - 30 mg/dL    Creatinine 0.94 0.66 - 1.25 mg/dL    GFR Estimate >90 >60 mL/min/[1.73_m2]    GFR Estimate If Black >90 >60 mL/min/[1.73_m2]    Calcium 9.1 8.5 - 10.1 mg/dL       ASSESSMENT/PLAN:     1. Essential hypertension  BP is slightly elevated but under 140/90.  Discussed reducing salt in diet and refilled medications for 1 year.  BMP normal.  - Basic metabolic panel  (Ca, Cl, CO2, Creat, Gluc, K, Na, BUN)  - lisinopril (PRINIVIL/ZESTRIL) 20 MG tablet; Take 1 tablet (20 mg) by mouth daily  Dispense: 90 tablet; Refill: 3    2. Laceration of right index finger without foreign body without damage to nail, subsequent encounter  Removed 8 sutures.  Used benzoyn tincture and applied steri-strips over the laceration and wrapped with ace bandage to protect " them with movement until full healing occurs.  Follow-up as needed.    See Patient Instructions    Marleny Garcia NP  [facility]   Self

## 2023-12-10 DIAGNOSIS — R11.2 NAUSEA WITH VOMITING, UNSPECIFIED: ICD-10-CM

## 2023-12-10 LAB
ALBUMIN SERPL ELPH-MCNC: 4.5 G/DL — SIGNIFICANT CHANGE UP (ref 3.5–5.2)
ALBUMIN SERPL ELPH-MCNC: 4.5 G/DL — SIGNIFICANT CHANGE UP (ref 3.5–5.2)
ALP SERPL-CCNC: 469 U/L — HIGH (ref 30–115)
ALP SERPL-CCNC: 469 U/L — HIGH (ref 30–115)
ALT FLD-CCNC: 7 U/L — SIGNIFICANT CHANGE UP (ref 0–41)
ALT FLD-CCNC: 7 U/L — SIGNIFICANT CHANGE UP (ref 0–41)
ANION GAP SERPL CALC-SCNC: 15 MMOL/L — HIGH (ref 7–14)
ANION GAP SERPL CALC-SCNC: 15 MMOL/L — HIGH (ref 7–14)
AST SERPL-CCNC: 15 U/L — SIGNIFICANT CHANGE UP (ref 0–41)
AST SERPL-CCNC: 15 U/L — SIGNIFICANT CHANGE UP (ref 0–41)
BASOPHILS # BLD AUTO: 0.07 K/UL — SIGNIFICANT CHANGE UP (ref 0–0.2)
BASOPHILS # BLD AUTO: 0.07 K/UL — SIGNIFICANT CHANGE UP (ref 0–0.2)
BASOPHILS NFR BLD AUTO: 1.6 % — HIGH (ref 0–1)
BASOPHILS NFR BLD AUTO: 1.6 % — HIGH (ref 0–1)
BILIRUB SERPL-MCNC: 0.8 MG/DL — SIGNIFICANT CHANGE UP (ref 0.2–1.2)
BILIRUB SERPL-MCNC: 0.8 MG/DL — SIGNIFICANT CHANGE UP (ref 0.2–1.2)
BLD GP AB SCN SERPL QL: SIGNIFICANT CHANGE UP
BLD GP AB SCN SERPL QL: SIGNIFICANT CHANGE UP
BUN SERPL-MCNC: 33 MG/DL — HIGH (ref 10–20)
BUN SERPL-MCNC: 33 MG/DL — HIGH (ref 10–20)
CALCIUM SERPL-MCNC: 8.7 MG/DL — SIGNIFICANT CHANGE UP (ref 8.4–10.4)
CALCIUM SERPL-MCNC: 8.7 MG/DL — SIGNIFICANT CHANGE UP (ref 8.4–10.4)
CHLORIDE SERPL-SCNC: 97 MMOL/L — LOW (ref 98–110)
CHLORIDE SERPL-SCNC: 97 MMOL/L — LOW (ref 98–110)
CO2 SERPL-SCNC: 28 MMOL/L — SIGNIFICANT CHANGE UP (ref 17–32)
CO2 SERPL-SCNC: 28 MMOL/L — SIGNIFICANT CHANGE UP (ref 17–32)
CREAT SERPL-MCNC: 5.6 MG/DL — CRITICAL HIGH (ref 0.7–1.5)
CREAT SERPL-MCNC: 5.6 MG/DL — CRITICAL HIGH (ref 0.7–1.5)
EGFR: 8 ML/MIN/1.73M2 — LOW
EGFR: 8 ML/MIN/1.73M2 — LOW
EOSINOPHIL # BLD AUTO: 0.06 K/UL — SIGNIFICANT CHANGE UP (ref 0–0.7)
EOSINOPHIL # BLD AUTO: 0.06 K/UL — SIGNIFICANT CHANGE UP (ref 0–0.7)
EOSINOPHIL NFR BLD AUTO: 1.4 % — SIGNIFICANT CHANGE UP (ref 0–8)
EOSINOPHIL NFR BLD AUTO: 1.4 % — SIGNIFICANT CHANGE UP (ref 0–8)
GLUCOSE BLDC GLUCOMTR-MCNC: 76 MG/DL — SIGNIFICANT CHANGE UP (ref 70–99)
GLUCOSE BLDC GLUCOMTR-MCNC: 76 MG/DL — SIGNIFICANT CHANGE UP (ref 70–99)
GLUCOSE BLDC GLUCOMTR-MCNC: 77 MG/DL — SIGNIFICANT CHANGE UP (ref 70–99)
GLUCOSE BLDC GLUCOMTR-MCNC: 77 MG/DL — SIGNIFICANT CHANGE UP (ref 70–99)
GLUCOSE BLDC GLUCOMTR-MCNC: 79 MG/DL — SIGNIFICANT CHANGE UP (ref 70–99)
GLUCOSE BLDC GLUCOMTR-MCNC: 79 MG/DL — SIGNIFICANT CHANGE UP (ref 70–99)
GLUCOSE SERPL-MCNC: 99 MG/DL — SIGNIFICANT CHANGE UP (ref 70–99)
GLUCOSE SERPL-MCNC: 99 MG/DL — SIGNIFICANT CHANGE UP (ref 70–99)
HCT VFR BLD CALC: 38.4 % — SIGNIFICANT CHANGE UP (ref 37–47)
HCT VFR BLD CALC: 38.4 % — SIGNIFICANT CHANGE UP (ref 37–47)
HGB BLD-MCNC: 12.7 G/DL — SIGNIFICANT CHANGE UP (ref 12–16)
HGB BLD-MCNC: 12.7 G/DL — SIGNIFICANT CHANGE UP (ref 12–16)
IMM GRANULOCYTES NFR BLD AUTO: 0.2 % — SIGNIFICANT CHANGE UP (ref 0.1–0.3)
IMM GRANULOCYTES NFR BLD AUTO: 0.2 % — SIGNIFICANT CHANGE UP (ref 0.1–0.3)
LACTATE SERPL-SCNC: 1.1 MMOL/L — SIGNIFICANT CHANGE UP (ref 0.7–2)
LACTATE SERPL-SCNC: 1.1 MMOL/L — SIGNIFICANT CHANGE UP (ref 0.7–2)
LIDOCAIN IGE QN: 11 U/L — SIGNIFICANT CHANGE UP (ref 7–60)
LIDOCAIN IGE QN: 11 U/L — SIGNIFICANT CHANGE UP (ref 7–60)
LYMPHOCYTES # BLD AUTO: 0.84 K/UL — LOW (ref 1.2–3.4)
LYMPHOCYTES # BLD AUTO: 0.84 K/UL — LOW (ref 1.2–3.4)
LYMPHOCYTES # BLD AUTO: 19 % — LOW (ref 20.5–51.1)
LYMPHOCYTES # BLD AUTO: 19 % — LOW (ref 20.5–51.1)
MCHC RBC-ENTMCNC: 30 PG — SIGNIFICANT CHANGE UP (ref 27–31)
MCHC RBC-ENTMCNC: 30 PG — SIGNIFICANT CHANGE UP (ref 27–31)
MCHC RBC-ENTMCNC: 33.1 G/DL — SIGNIFICANT CHANGE UP (ref 32–37)
MCHC RBC-ENTMCNC: 33.1 G/DL — SIGNIFICANT CHANGE UP (ref 32–37)
MCV RBC AUTO: 90.6 FL — SIGNIFICANT CHANGE UP (ref 81–99)
MCV RBC AUTO: 90.6 FL — SIGNIFICANT CHANGE UP (ref 81–99)
MONOCYTES # BLD AUTO: 0.47 K/UL — SIGNIFICANT CHANGE UP (ref 0.1–0.6)
MONOCYTES # BLD AUTO: 0.47 K/UL — SIGNIFICANT CHANGE UP (ref 0.1–0.6)
MONOCYTES NFR BLD AUTO: 10.6 % — HIGH (ref 1.7–9.3)
MONOCYTES NFR BLD AUTO: 10.6 % — HIGH (ref 1.7–9.3)
NEUTROPHILS # BLD AUTO: 2.98 K/UL — SIGNIFICANT CHANGE UP (ref 1.4–6.5)
NEUTROPHILS # BLD AUTO: 2.98 K/UL — SIGNIFICANT CHANGE UP (ref 1.4–6.5)
NEUTROPHILS NFR BLD AUTO: 67.2 % — SIGNIFICANT CHANGE UP (ref 42.2–75.2)
NEUTROPHILS NFR BLD AUTO: 67.2 % — SIGNIFICANT CHANGE UP (ref 42.2–75.2)
NRBC # BLD: 0 /100 WBCS — SIGNIFICANT CHANGE UP (ref 0–0)
NRBC # BLD: 0 /100 WBCS — SIGNIFICANT CHANGE UP (ref 0–0)
PLATELET # BLD AUTO: 85 K/UL — LOW (ref 130–400)
PLATELET # BLD AUTO: 85 K/UL — LOW (ref 130–400)
PMV BLD: 11.5 FL — HIGH (ref 7.4–10.4)
PMV BLD: 11.5 FL — HIGH (ref 7.4–10.4)
POTASSIUM SERPL-MCNC: 4.1 MMOL/L — SIGNIFICANT CHANGE UP (ref 3.5–5)
POTASSIUM SERPL-MCNC: 4.1 MMOL/L — SIGNIFICANT CHANGE UP (ref 3.5–5)
POTASSIUM SERPL-SCNC: 4.1 MMOL/L — SIGNIFICANT CHANGE UP (ref 3.5–5)
POTASSIUM SERPL-SCNC: 4.1 MMOL/L — SIGNIFICANT CHANGE UP (ref 3.5–5)
PROT SERPL-MCNC: 7.2 G/DL — SIGNIFICANT CHANGE UP (ref 6–8)
PROT SERPL-MCNC: 7.2 G/DL — SIGNIFICANT CHANGE UP (ref 6–8)
RBC # BLD: 4.24 M/UL — SIGNIFICANT CHANGE UP (ref 4.2–5.4)
RBC # BLD: 4.24 M/UL — SIGNIFICANT CHANGE UP (ref 4.2–5.4)
RBC # FLD: 16.4 % — HIGH (ref 11.5–14.5)
RBC # FLD: 16.4 % — HIGH (ref 11.5–14.5)
SODIUM SERPL-SCNC: 140 MMOL/L — SIGNIFICANT CHANGE UP (ref 135–146)
SODIUM SERPL-SCNC: 140 MMOL/L — SIGNIFICANT CHANGE UP (ref 135–146)
TROPONIN T SERPL-MCNC: 0.06 NG/ML — CRITICAL HIGH
TROPONIN T SERPL-MCNC: 0.06 NG/ML — CRITICAL HIGH
TROPONIN T SERPL-MCNC: 0.07 NG/ML — CRITICAL HIGH
TROPONIN T SERPL-MCNC: 0.07 NG/ML — CRITICAL HIGH
WBC # BLD: 4.43 K/UL — LOW (ref 4.8–10.8)
WBC # BLD: 4.43 K/UL — LOW (ref 4.8–10.8)
WBC # FLD AUTO: 4.43 K/UL — LOW (ref 4.8–10.8)
WBC # FLD AUTO: 4.43 K/UL — LOW (ref 4.8–10.8)

## 2023-12-10 PROCEDURE — 82042 OTHER SOURCE ALBUMIN QUAN EA: CPT

## 2023-12-10 PROCEDURE — 76856 US EXAM PELVIC COMPLETE: CPT

## 2023-12-10 PROCEDURE — 93990 DOPPLER FLOW TESTING: CPT | Mod: LT

## 2023-12-10 PROCEDURE — 84484 ASSAY OF TROPONIN QUANT: CPT

## 2023-12-10 PROCEDURE — 99497 ADVNCD CARE PLAN 30 MIN: CPT | Mod: 25

## 2023-12-10 PROCEDURE — 86301 IMMUNOASSAY TUMOR CA 19-9: CPT

## 2023-12-10 PROCEDURE — 87040 BLOOD CULTURE FOR BACTERIA: CPT

## 2023-12-10 PROCEDURE — 93970 EXTREMITY STUDY: CPT | Mod: 26

## 2023-12-10 PROCEDURE — 88112 CYTOPATH CELL ENHANCE TECH: CPT

## 2023-12-10 PROCEDURE — 99223 1ST HOSP IP/OBS HIGH 75: CPT

## 2023-12-10 PROCEDURE — 86304 IMMUNOASSAY TUMOR CA 125: CPT

## 2023-12-10 PROCEDURE — 83605 ASSAY OF LACTIC ACID: CPT

## 2023-12-10 PROCEDURE — 87070 CULTURE OTHR SPECIMN AEROBIC: CPT

## 2023-12-10 PROCEDURE — 88305 TISSUE EXAM BY PATHOLOGIST: CPT

## 2023-12-10 PROCEDURE — 87205 SMEAR GRAM STAIN: CPT

## 2023-12-10 PROCEDURE — 93005 ELECTROCARDIOGRAM TRACING: CPT

## 2023-12-10 PROCEDURE — 80053 COMPREHEN METABOLIC PANEL: CPT

## 2023-12-10 PROCEDURE — 87150 DNA/RNA AMPLIFIED PROBE: CPT

## 2023-12-10 PROCEDURE — 84295 ASSAY OF SERUM SODIUM: CPT

## 2023-12-10 PROCEDURE — 90935 HEMODIALYSIS ONE EVALUATION: CPT

## 2023-12-10 PROCEDURE — 82803 BLOOD GASES ANY COMBINATION: CPT

## 2023-12-10 PROCEDURE — 93970 EXTREMITY STUDY: CPT

## 2023-12-10 PROCEDURE — 87075 CULTR BACTERIA EXCEPT BLOOD: CPT

## 2023-12-10 PROCEDURE — 84132 ASSAY OF SERUM POTASSIUM: CPT

## 2023-12-10 PROCEDURE — 89051 BODY FLUID CELL COUNT: CPT

## 2023-12-10 PROCEDURE — 82330 ASSAY OF CALCIUM: CPT

## 2023-12-10 PROCEDURE — 87077 CULTURE AEROBIC IDENTIFY: CPT

## 2023-12-10 PROCEDURE — 86300 IMMUNOASSAY TUMOR CA 15-3: CPT

## 2023-12-10 PROCEDURE — 85027 COMPLETE CBC AUTOMATED: CPT

## 2023-12-10 PROCEDURE — 82140 ASSAY OF AMMONIA: CPT

## 2023-12-10 PROCEDURE — 85014 HEMATOCRIT: CPT

## 2023-12-10 PROCEDURE — 85018 HEMOGLOBIN: CPT

## 2023-12-10 PROCEDURE — 75635 CT ANGIO ABDOMINAL ARTERIES: CPT | Mod: 26,MA

## 2023-12-10 PROCEDURE — 71045 X-RAY EXAM CHEST 1 VIEW: CPT | Mod: 26

## 2023-12-10 PROCEDURE — 82962 GLUCOSE BLOOD TEST: CPT

## 2023-12-10 PROCEDURE — 82378 CARCINOEMBRYONIC ANTIGEN: CPT

## 2023-12-10 PROCEDURE — 84157 ASSAY OF PROTEIN OTHER: CPT

## 2023-12-10 PROCEDURE — 85025 COMPLETE CBC W/AUTO DIFF WBC: CPT

## 2023-12-10 PROCEDURE — 83735 ASSAY OF MAGNESIUM: CPT

## 2023-12-10 PROCEDURE — 93010 ELECTROCARDIOGRAM REPORT: CPT

## 2023-12-10 PROCEDURE — 36415 COLL VENOUS BLD VENIPUNCTURE: CPT

## 2023-12-10 PROCEDURE — 82945 GLUCOSE OTHER FLUID: CPT

## 2023-12-10 PROCEDURE — 83615 LACTATE (LD) (LDH) ENZYME: CPT

## 2023-12-10 PROCEDURE — 80048 BASIC METABOLIC PNL TOTAL CA: CPT

## 2023-12-10 RX ORDER — GLUCAGON INJECTION, SOLUTION 0.5 MG/.1ML
1 INJECTION, SOLUTION SUBCUTANEOUS ONCE
Refills: 0 | Status: DISCONTINUED | OUTPATIENT
Start: 2023-12-10 | End: 2023-12-15

## 2023-12-10 RX ORDER — ONDANSETRON 8 MG/1
4 TABLET, FILM COATED ORAL ONCE
Refills: 0 | Status: COMPLETED | OUTPATIENT
Start: 2023-12-10 | End: 2023-12-10

## 2023-12-10 RX ORDER — DEXTROSE 50 % IN WATER 50 %
25 SYRINGE (ML) INTRAVENOUS ONCE
Refills: 0 | Status: DISCONTINUED | OUTPATIENT
Start: 2023-12-10 | End: 2023-12-15

## 2023-12-10 RX ORDER — INSULIN LISPRO 100/ML
VIAL (ML) SUBCUTANEOUS
Refills: 0 | Status: DISCONTINUED | OUTPATIENT
Start: 2023-12-10 | End: 2023-12-11

## 2023-12-10 RX ORDER — PANTOPRAZOLE SODIUM 20 MG/1
40 TABLET, DELAYED RELEASE ORAL
Refills: 0 | Status: DISCONTINUED | OUTPATIENT
Start: 2023-12-10 | End: 2023-12-15

## 2023-12-10 RX ORDER — ALBUTEROL 90 UG/1
2 AEROSOL, METERED ORAL
Qty: 0 | Refills: 0 | DISCHARGE

## 2023-12-10 RX ORDER — LABETALOL HCL 100 MG
200 TABLET ORAL THREE TIMES A DAY
Refills: 0 | Status: DISCONTINUED | OUTPATIENT
Start: 2023-12-10 | End: 2023-12-15

## 2023-12-10 RX ORDER — ATORVASTATIN CALCIUM 80 MG/1
80 TABLET, FILM COATED ORAL AT BEDTIME
Refills: 0 | Status: DISCONTINUED | OUTPATIENT
Start: 2023-12-10 | End: 2023-12-15

## 2023-12-10 RX ORDER — SEVELAMER CARBONATE 2400 MG/1
800 POWDER, FOR SUSPENSION ORAL
Refills: 0 | Status: DISCONTINUED | OUTPATIENT
Start: 2023-12-10 | End: 2023-12-15

## 2023-12-10 RX ORDER — DEXTROSE 50 % IN WATER 50 %
12.5 SYRINGE (ML) INTRAVENOUS ONCE
Refills: 0 | Status: DISCONTINUED | OUTPATIENT
Start: 2023-12-10 | End: 2023-12-15

## 2023-12-10 RX ORDER — DEXTROSE 50 % IN WATER 50 %
15 SYRINGE (ML) INTRAVENOUS ONCE
Refills: 0 | Status: DISCONTINUED | OUTPATIENT
Start: 2023-12-10 | End: 2023-12-15

## 2023-12-10 RX ORDER — SODIUM CHLORIDE 9 MG/ML
1000 INJECTION, SOLUTION INTRAVENOUS
Refills: 0 | Status: DISCONTINUED | OUTPATIENT
Start: 2023-12-10 | End: 2023-12-15

## 2023-12-10 RX ORDER — HEPARIN SODIUM 5000 [USP'U]/ML
5000 INJECTION INTRAVENOUS; SUBCUTANEOUS EVERY 8 HOURS
Refills: 0 | Status: DISCONTINUED | OUTPATIENT
Start: 2023-12-10 | End: 2023-12-11

## 2023-12-10 RX ORDER — CALCITRIOL 0.5 UG/1
0.25 CAPSULE ORAL DAILY
Refills: 0 | Status: DISCONTINUED | OUTPATIENT
Start: 2023-12-10 | End: 2023-12-15

## 2023-12-10 RX ORDER — HYDRALAZINE HCL 50 MG
75 TABLET ORAL THREE TIMES A DAY
Refills: 0 | Status: DISCONTINUED | OUTPATIENT
Start: 2023-12-10 | End: 2023-12-15

## 2023-12-10 RX ORDER — DIPHENHYDRAMINE HCL 50 MG
50 CAPSULE ORAL EVERY 6 HOURS
Refills: 0 | Status: DISCONTINUED | OUTPATIENT
Start: 2023-12-10 | End: 2023-12-13

## 2023-12-10 RX ORDER — NIFEDIPINE 30 MG
90 TABLET, EXTENDED RELEASE 24 HR ORAL DAILY
Refills: 0 | Status: DISCONTINUED | OUTPATIENT
Start: 2023-12-10 | End: 2023-12-15

## 2023-12-10 RX ORDER — INSULIN ASPART 100 [IU]/ML
0 INJECTION, SUSPENSION SUBCUTANEOUS
Refills: 0 | DISCHARGE

## 2023-12-10 RX ORDER — MORPHINE SULFATE 50 MG/1
4 CAPSULE, EXTENDED RELEASE ORAL ONCE
Refills: 0 | Status: DISCONTINUED | OUTPATIENT
Start: 2023-12-10 | End: 2023-12-10

## 2023-12-10 RX ORDER — ASPIRIN/CALCIUM CARB/MAGNESIUM 324 MG
81 TABLET ORAL DAILY
Refills: 0 | Status: DISCONTINUED | OUTPATIENT
Start: 2023-12-10 | End: 2023-12-15

## 2023-12-10 RX ORDER — HYDROMORPHONE HYDROCHLORIDE 2 MG/ML
2 INJECTION INTRAMUSCULAR; INTRAVENOUS; SUBCUTANEOUS EVERY 4 HOURS
Refills: 0 | Status: DISCONTINUED | OUTPATIENT
Start: 2023-12-10 | End: 2023-12-13

## 2023-12-10 RX ORDER — GABAPENTIN 400 MG/1
300 CAPSULE ORAL THREE TIMES A DAY
Refills: 0 | Status: DISCONTINUED | OUTPATIENT
Start: 2023-12-10 | End: 2023-12-13

## 2023-12-10 RX ADMIN — MORPHINE SULFATE 4 MILLIGRAM(S): 50 CAPSULE, EXTENDED RELEASE ORAL at 03:29

## 2023-12-10 RX ADMIN — Medication 200 MILLIGRAM(S): at 14:57

## 2023-12-10 RX ADMIN — Medication 81 MILLIGRAM(S): at 12:07

## 2023-12-10 RX ADMIN — ONDANSETRON 4 MILLIGRAM(S): 8 TABLET, FILM COATED ORAL at 08:28

## 2023-12-10 RX ADMIN — SEVELAMER CARBONATE 800 MILLIGRAM(S): 2400 POWDER, FOR SUSPENSION ORAL at 12:07

## 2023-12-10 RX ADMIN — MORPHINE SULFATE 4 MILLIGRAM(S): 50 CAPSULE, EXTENDED RELEASE ORAL at 01:14

## 2023-12-10 RX ADMIN — SEVELAMER CARBONATE 800 MILLIGRAM(S): 2400 POWDER, FOR SUSPENSION ORAL at 17:25

## 2023-12-10 RX ADMIN — ONDANSETRON 4 MILLIGRAM(S): 8 TABLET, FILM COATED ORAL at 03:29

## 2023-12-10 RX ADMIN — MORPHINE SULFATE 4 MILLIGRAM(S): 50 CAPSULE, EXTENDED RELEASE ORAL at 07:44

## 2023-12-10 RX ADMIN — HYDROMORPHONE HYDROCHLORIDE 2 MILLIGRAM(S): 2 INJECTION INTRAMUSCULAR; INTRAVENOUS; SUBCUTANEOUS at 12:33

## 2023-12-10 RX ADMIN — HYDROMORPHONE HYDROCHLORIDE 2 MILLIGRAM(S): 2 INJECTION INTRAMUSCULAR; INTRAVENOUS; SUBCUTANEOUS at 14:58

## 2023-12-10 RX ADMIN — HYDROMORPHONE HYDROCHLORIDE 2 MILLIGRAM(S): 2 INJECTION INTRAMUSCULAR; INTRAVENOUS; SUBCUTANEOUS at 17:25

## 2023-12-10 RX ADMIN — HYDROMORPHONE HYDROCHLORIDE 2 MILLIGRAM(S): 2 INJECTION INTRAMUSCULAR; INTRAVENOUS; SUBCUTANEOUS at 15:58

## 2023-12-10 RX ADMIN — Medication 200 MILLIGRAM(S): at 22:07

## 2023-12-10 RX ADMIN — Medication 75 MILLIGRAM(S): at 14:58

## 2023-12-10 RX ADMIN — GABAPENTIN 300 MILLIGRAM(S): 400 CAPSULE ORAL at 14:58

## 2023-12-10 RX ADMIN — HYDROMORPHONE HYDROCHLORIDE 2 MILLIGRAM(S): 2 INJECTION INTRAMUSCULAR; INTRAVENOUS; SUBCUTANEOUS at 22:07

## 2023-12-10 RX ADMIN — HYDROMORPHONE HYDROCHLORIDE 2 MILLIGRAM(S): 2 INJECTION INTRAMUSCULAR; INTRAVENOUS; SUBCUTANEOUS at 23:07

## 2023-12-10 RX ADMIN — HEPARIN SODIUM 5000 UNIT(S): 5000 INJECTION INTRAVENOUS; SUBCUTANEOUS at 22:07

## 2023-12-10 RX ADMIN — ONDANSETRON 4 MILLIGRAM(S): 8 TABLET, FILM COATED ORAL at 01:00

## 2023-12-10 RX ADMIN — CALCITRIOL 0.25 MICROGRAM(S): 0.5 CAPSULE ORAL at 12:06

## 2023-12-10 RX ADMIN — ONDANSETRON 4 MILLIGRAM(S): 8 TABLET, FILM COATED ORAL at 19:45

## 2023-12-10 RX ADMIN — ONDANSETRON 4 MILLIGRAM(S): 8 TABLET, FILM COATED ORAL at 05:43

## 2023-12-10 RX ADMIN — HYDROMORPHONE HYDROCHLORIDE 2 MILLIGRAM(S): 2 INJECTION INTRAMUSCULAR; INTRAVENOUS; SUBCUTANEOUS at 10:14

## 2023-12-10 NOTE — PATIENT PROFILE ADULT - LANGUAGE ASSISTANCE NEEDED
pt request for family member to serve as interpretor/No-Patient/Caregiver offered and refused free interpretation services.

## 2023-12-10 NOTE — H&P ADULT - ATTENDING COMMENTS
58F from home with PMH: ESRD, on HD TTA, HTN, HLD, DM2, Severe PAD s/p LLE Femoral Bypass, CVA who presents with abdominal pain associated by watery diarrhea and N/V with poor appetite for 3-5 days. PT missed HD Saturday due to not feeling well enough to go to HD. Today she presents with above complaints and ALSO LLE pain with known h/x Severe PAD and Femoral Bipass on LLE.   Pt is non verbal and communicated using sign language for which a family member Herb Salazar (HCP) assistend with interpretation in the ER, Herb lives with the patient and provided the history in verification. He also added pt always complains of poor appetite and nausea and was told to discuss with dialysis doctors but no real answer. I discussed with HD MD today and seems pt has a long history of diabetic gastroparesis and struggles with severe PAD.     Vital Signs Last 24 Hrs  T(C): 36.8 (10 Dec 2023 08:23), Max: 36.8 (10 Dec 2023 08:23)  T(F): 98.2 (10 Dec 2023 08:23), Max: 98.2 (10 Dec 2023 08:23)  HR: 74 (10 Dec 2023 08:23) (74 - 86)  BP: 198/87 (10 Dec 2023 08:23) (115/70 - 198/87)  RR: 18 (10 Dec 2023 02:17) (18 - 18)  SpO2: 98% (10 Dec 2023 08:23) (96% - 98%)    Parameters below as of 10 Dec 2023 02:17  Patient On (Oxygen Delivery Method): room air    GEN: Mild distress  CV: NL S1/2  RESP: Decreased BS B/L  GI: +BS Soft NT ND  Ext: ++ edema b/l and tenderness +RLU dry ulcer in sole of foot   ++ LUE AVF ++ palpable thrill                           12.7   4.43  )-----------( 85       ( 10 Dec 2023 01:28 )             38.4   12-10    140  |  97<L>  |  33<H>  ----------------------------<  99  4.1   |  28  |  5.6<HH>    Ca    8.7      10 Dec 2023 01:28    TPro  7.2  /  Alb  4.5  /  TBili  0.8  /  DBili  x   /  AST  15  /  ALT  7   /  AlkPhos  469<H>  12-10      IMPRESSION:  Stable diffuse vascular congestion and right-sided opacity/effusion.    CTA w/ runoffs   IMPRESSION:    No acute intra-abdominal pathology. Moderate to large volume ascites.    RIGHT LOWER EXTREMITY:  Right superficial femoral femoral artery proximal severe stenosis, and   occlusion of 4-5 cm segment of mid vessel with reconstitution distally.  Patent anterior tibial artery with apparent occlusion of posterior tibial   and peroneal artery.    LEFT LOWER EXTREMITY:  Patent left femoral to anterior tibial artery graft.  Occluded superficial femoral artery with stent.  Occluded popliteal artery.  Patent left anterior tibial artery and mid to-distal peroneal artery.   Occluded left posterior tibial artery.    TTE: July 2023  Severe PHTN / EF 57%   Trivial Pericardial Effusion  Moderate Tricuspic Regurgitation     IMPRESSION:   Severe PAD   Rule out DVTs B/L Legs   Suspecting Diabetic Gastroparesis Exarc   Rule out SBP (abd pain and mod-severe ascities)  Missed HD (AVF LUE)    PLAN:   Vascular consultation attending dispo   f/u Duplex r/o DVTs   Nephrology for HD likely tomorrow no urgency for HD now  GI Consultation: Gastroparesis vs SBP? Consult Procedure Team for Parasynthesis   Nephro to try to remove more fluid during HD   Low Na diet   Low K Diet   Resume home meds and add Reglan q6     GOC Discussed in detail at bedside with HCP Herb     Poor Prognosis     GI / DVT Proph: Protonix and Heparin 58F from home with PMH: ESRD, on HD TTA, HTN, HLD, DM2, Severe PAD s/p LLE Femoral Bypass, CVA who presents with abdominal pain associated by watery diarrhea and N/V with poor appetite for 3-5 days. PT missed HD Saturday due to not feeling well enough to go to HD. Today she presents with above complaints and ALSO LLE pain with known h/x Severe PAD and Femoral Bipass on LLE.   Pt is non verbal and communicated using sign language for which a family member Herb Salzaar (HCP) assistend with interpretation in the ER, Hebr lives with the patient and provided the history in verification. He also added pt always complains of poor appetite and nausea and was told to discuss with dialysis doctors but no real answer. I discussed with HD MD today and seems pt has a long history of diabetic gastroparesis and struggles with severe PAD.     Vital Signs Last 24 Hrs  T(C): 36.8 (10 Dec 2023 08:23), Max: 36.8 (10 Dec 2023 08:23)  T(F): 98.2 (10 Dec 2023 08:23), Max: 98.2 (10 Dec 2023 08:23)  HR: 74 (10 Dec 2023 08:23) (74 - 86)  BP: 198/87 (10 Dec 2023 08:23) (115/70 - 198/87)  RR: 18 (10 Dec 2023 02:17) (18 - 18)  SpO2: 98% (10 Dec 2023 08:23) (96% - 98%)    Parameters below as of 10 Dec 2023 02:17  Patient On (Oxygen Delivery Method): room air    GEN: Mild distress  CV: NL S1/2  RESP: Decreased BS B/L  GI: +BS Soft NT ND  Ext: ++ edema b/l and tenderness +RLU dry ulcer in sole of foot   ++ LUE AVF ++ palpable thrill                           12.7   4.43  )-----------( 85       ( 10 Dec 2023 01:28 )             38.4   12-10    140  |  97<L>  |  33<H>  ----------------------------<  99  4.1   |  28  |  5.6<HH>    Ca    8.7      10 Dec 2023 01:28    TPro  7.2  /  Alb  4.5  /  TBili  0.8  /  DBili  x   /  AST  15  /  ALT  7   /  AlkPhos  469<H>  12-10      IMPRESSION:  Stable diffuse vascular congestion and right-sided opacity/effusion.    CTA w/ runoffs   IMPRESSION:    No acute intra-abdominal pathology. Moderate to large volume ascites.    RIGHT LOWER EXTREMITY:  Right superficial femoral femoral artery proximal severe stenosis, and   occlusion of 4-5 cm segment of mid vessel with reconstitution distally.  Patent anterior tibial artery with apparent occlusion of posterior tibial   and peroneal artery.    LEFT LOWER EXTREMITY:  Patent left femoral to anterior tibial artery graft.  Occluded superficial femoral artery with stent.  Occluded popliteal artery.  Patent left anterior tibial artery and mid to-distal peroneal artery.   Occluded left posterior tibial artery.    TTE: July 2023  Severe PHTN / EF 57%   Trivial Pericardial Effusion  Moderate Tricuspic Regurgitation     IMPRESSION:   Severe PAD   Rule out DVTs B/L Legs   Suspecting Diabetic Gastroparesis Exarc   Rule out SBP (abd pain and mod-severe ascities)  Missed HD (AVF LUE)    PLAN:   Vascular consultation attending dispo   f/u Duplex r/o DVTs   Nephrology for HD likely tomorrow no urgency for HD now  GI Consultation: Gastroparesis vs SBP? Consult Procedure Team for Parasynthesis   Nephro to try to remove more fluid during HD   Low Na diet   Low K Diet   Resume home meds and add Reglan q6     GOC Discussed in detail at bedside with HCP Herb     Poor Prognosis     GI / DVT Proph: Protonix and Heparin 58F from home with PMH: ESRD, on HD TTA, HTN, HLD, DM2, Severe PAD s/p LLE Femoral Bypass, CVA who presents with abdominal pain associated by watery diarrhea and N/V with poor appetite for 3-5 days. PT missed HD Saturday due to not feeling well enough to go to HD. Today she presents with above complaints and ALSO LLE pain with known h/x Severe PAD and Femoral Bipass on LLE.   Pt is non verbal and communicated using sign language for which a family member Herb Salazar (HCP) assistend with interpretation in the ER, Herb lives with the patient and provided the history in verification. He also added pt always complains of poor appetite and nausea and was told to discuss with dialysis doctors but no real answer. I discussed with HD MD today and seems pt has a long history of diabetic gastroparesis and struggles with severe PAD.     Vital Signs Last 24 Hrs  T(C): 36.8 (10 Dec 2023 08:23), Max: 36.8 (10 Dec 2023 08:23)  T(F): 98.2 (10 Dec 2023 08:23), Max: 98.2 (10 Dec 2023 08:23)  HR: 74 (10 Dec 2023 08:23) (74 - 86)  BP: 198/87 (10 Dec 2023 08:23) (115/70 - 198/87)  RR: 18 (10 Dec 2023 02:17) (18 - 18)  SpO2: 98% (10 Dec 2023 08:23) (96% - 98%)    Parameters below as of 10 Dec 2023 02:17  Patient On (Oxygen Delivery Method): room air    GEN: Mild distress  CV: NL S1/2  RESP: Decreased BS B/L  GI: +BS Soft NT ND  Ext: ++ edema b/l and tenderness +RLU dry ulcer in sole of foot   ++ LUE AVF ++ palpable thrill                           12.7   4.43  )-----------( 85       ( 10 Dec 2023 01:28 )             38.4   12-10    140  |  97<L>  |  33<H>  ----------------------------<  99  4.1   |  28  |  5.6<HH>    Ca    8.7      10 Dec 2023 01:28    TPro  7.2  /  Alb  4.5  /  TBili  0.8  /  DBili  x   /  AST  15  /  ALT  7   /  AlkPhos  469<H>  12-10      IMPRESSION:  Stable diffuse vascular congestion and right-sided opacity/effusion.    CTA w/ runoffs   IMPRESSION:    No acute intra-abdominal pathology. Moderate to large volume ascites.    RIGHT LOWER EXTREMITY:  Right superficial femoral femoral artery proximal severe stenosis, and   occlusion of 4-5 cm segment of mid vessel with reconstitution distally.  Patent anterior tibial artery with apparent occlusion of posterior tibial   and peroneal artery.    LEFT LOWER EXTREMITY:  Patent left femoral to anterior tibial artery graft.  Occluded superficial femoral artery with stent.  Occluded popliteal artery.  Patent left anterior tibial artery and mid to-distal peroneal artery.   Occluded left posterior tibial artery.    TTE: July 2023  Severe PHTN / EF 57%   Trivial Pericardial Effusion  Moderate Tricuspic Regurgitation     IMPRESSION:   Accelerated HTN   Severe PAD   Rule out DVTs B/L Legs   Suspecting Diabetic Gastroparesis Exarc   Rule out SBP (abd pain and mod-severe ascities)  Missed HD (AVF LUE)  Volume Overload State however not hypoxic: Pleural Effusions and Mod-Severe Ascities     PLAN:   BP Control resume home meds   Vascular consultation attending dispo   f/u Duplex r/o DVTs   Nephrology for HD likely tomorrow no urgency for HD now  GI Consultation: Gastroparesis vs SBP? Consult Procedure Team for Parasynthesis   Nephro to try to remove more fluid during HD   Low Na diet   Low K Diet   Resume home meds and add Reglan q6     GOC Discussed in detail at bedside with HCP Herb     Poor Prognosis     GI / DVT Proph: Protonix and Heparin

## 2023-12-10 NOTE — ED PROVIDER NOTE - CLINICAL SUMMARY MEDICAL DECISION MAKING FREE TEXT BOX
57 yo female, PMHx of ESRD on HD (T/Th/Sat), HTN, HLD, DM, PAD s/p left fem bypass, CVA, presenting with diffuse abdominal pain and LLE pain x1 days. She states she was not feeling well and was unable to go to her HD session today. Endorses subjective fever but did not take her temperature. Denies chest pain, shortness of breath, nausea, vomiting, back pain, trauma.    +abdomen diffuse tenderness  +unable to palpable LE pulses    CTA: Occluded superficial femoral artery with stent.  Occluded popliteal artery. Occluded left posterior tibial artery.    Vascular consulted. Recommending discharge with outpatient follow up with Dr. Muñoz on Monday.    Patient still with pain and nausea, unable to tolerate PO. Labs were ordered and reviewed.  Imaging was ordered and reviewed by me.  Appropriate medications for patient's presenting complaints were ordered and effects were reassessed.  Patient's records (prior hospital) were reviewed.  Additional history was obtained from brother in law at bedside.  Escalation to admission/observation was considered.  Patient requires inpatient hospitalization. 59 yo female, PMHx of ESRD on HD (T/Th/Sat), HTN, HLD, DM, PAD s/p left fem bypass, CVA, presenting with diffuse abdominal pain and LLE pain x1 days. She states she was not feeling well and was unable to go to her HD session today. Endorses subjective fever but did not take her temperature. Denies chest pain, shortness of breath, nausea, vomiting, back pain, trauma.    +abdomen diffuse tenderness  +unable to palpable LE pulses    CTA: Occluded superficial femoral artery with stent.  Occluded popliteal artery. Occluded left posterior tibial artery.    Vascular consulted. Recommending discharge with outpatient follow up with Dr. Muñoz on Monday.    Patient still with pain and nausea, unable to tolerate PO. Labs were ordered and reviewed.  Imaging was ordered and reviewed by me.  Appropriate medications for patient's presenting complaints were ordered and effects were reassessed.  Patient's records (prior hospital) were reviewed.  Additional history was obtained from brother in law at bedside.  Escalation to admission/observation was considered.  Patient requires inpatient hospitalization.

## 2023-12-10 NOTE — ED PROVIDER NOTE - ATTENDING APP SHARED VISIT CONTRIBUTION OF CARE
59 yo female, PMHx of ESRD on HD (T/Th/Sat), HTN, HLD, DM, PAD s/p left fem bypass, CVA, presenting with diffuse abdominal pain and LLE pain x1 days. She states she was not feeling well and was unable to go to her HD session today. Endorses subjective fever but did not take her temperature. Denies chest pain, shortness of breath, nausea, vomiting, back pain, trauma.    +abdomen diffuse tenderness  +unable to palpable LE pulses    CTA: Occluded superficial femoral artery with stent.  Occluded popliteal artery. Occluded left posterior tibial artery.    Vascular consulted.

## 2023-12-10 NOTE — PATIENT PROFILE ADULT - FALL HARM RISK - HARM RISK INTERVENTIONS
Assistance with ambulation/Assistance OOB with selected safe patient handling equipment/Communicate Risk of Fall with Harm to all staff/Discuss with provider need for PT consult/Monitor gait and stability/Provide patient with walking aids - walker, cane, crutches/Reinforce activity limits and safety measures with patient and family/Tailored Fall Risk Interventions/Visual Cue: Yellow wristband and red socks/Bed in lowest position, wheels locked, appropriate side rails in place/Call bell, personal items and telephone in reach/Instruct patient to call for assistance before getting out of bed or chair/Non-slip footwear when patient is out of bed/[place] to call system/Physically safe environment - no spills, clutter or unnecessary equipment/Purposeful Proactive Rounding/Room/bathroom lighting operational, light cord in reach Assistance with ambulation/Assistance OOB with selected safe patient handling equipment/Communicate Risk of Fall with Harm to all staff/Discuss with provider need for PT consult/Monitor gait and stability/Provide patient with walking aids - walker, cane, crutches/Reinforce activity limits and safety measures with patient and family/Tailored Fall Risk Interventions/Visual Cue: Yellow wristband and red socks/Bed in lowest position, wheels locked, appropriate side rails in place/Call bell, personal items and telephone in reach/Instruct patient to call for assistance before getting out of bed or chair/Non-slip footwear when patient is out of bed/[place] to call system/Physically safe environment - no spills, clutter or unnecessary equipment/Purposeful Proactive Rounding/Room/bathroom lighting operational, light cord in reach

## 2023-12-10 NOTE — ED PROVIDER NOTE - OBJECTIVE STATEMENT
58 yold female to [facility] [place] uses [facility] esrd on Hd(tu, th, sat), Htn, HLd, Dm, PAD s/p left fem bypass, cva; pt c/o diffuse abdominal pain and left lower ext pain x 1 day; pt wasn't feeling well and missed HD today - subjective fever; pt denies chest pain, n/v, back pain; 58 yold female to [facility] [place] uses [facility] esrd on Hd(tu, th, sat), Htn, HLd, Dm, PAD s/p left fem bypass, cva; pt c/o diffuse abdominal pain and left lower ext pain x 1 day; pt wasn't feeling well and missed HD today - subjective fever; pt denies chest pain, n/v, back pain;

## 2023-12-10 NOTE — H&P ADULT - HISTORY OF PRESENT ILLNESS
57 y/o female with PMH of Hearing loss,  ESRD on HD (T/Th/Sat), HTN, HLD, DM, PVD s/p L fem bypass, and CVA presents to the ED for left lower extremity pain.    Sign language is aided by son at bedside.    Patient reports that over the past few days she has been having worsening pain over the left lower extremity, unrelated to ambulation, present on rest, no tingling/ numbness, weakness, signs of infection over the same extremity, no recent trauma. Patient reports as well diarrhea over the past few days, which has resolved.  Patient has not gone to the HD center for dialysis on Saturday because of these symptoms. No chest pain, shortness of breath, cough, or constitutional symptoms.    In the ED, the patient was hypertensive with a systolic in the 190s, afebrile.  Labs showed only as abnormal a troponins level= 0.07.    CT angio of the abdomen showed moderate to large volume ascites.    CT angio of the lower extremities showed:    RIGHT LOWER EXTREMITY:  Right superficial femoral femoral artery proximal severe stenosis, and   occlusion of 4-5 cm segment of mid vessel with reconstitution distally.  Patent anterior tibial artery with apparent occlusion of posterior tibial   and peroneal artery.    LEFT LOWER EXTREMITY:  Patent left femoral to anterior tibial artery graft.  Occluded superficial femoral artery with stent.  Occluded popliteal   artery.  Patent left anterior tibial artery and mid to-distal peroneal artery.   Occluded left posterior tibial artery.    Vascular were consulted for the above findings, recommended a Duplex to R/O DVT and an outpatient follow-up.  Patient received symptomatic treatment in the ED, and will be admitted for management. 59 y/o female with PMH of Hearing loss,  ESRD on HD (T/Th/Sat), HTN, HLD, DM, PVD s/p L fem bypass, and CVA presents to the ED for left lower extremity pain.    Sign language is aided by son at bedside.    Patient reports that over the past few days she has been having worsening pain over the left lower extremity, unrelated to ambulation, present on rest, no tingling/ numbness, weakness, signs of infection over the same extremity, no recent trauma. Patient reports as well diarrhea over the past few days, which has resolved.  Patient has not gone to the HD center for dialysis on Saturday because of these symptoms. No chest pain, shortness of breath, cough, or constitutional symptoms.    In the ED, the patient was hypertensive with a systolic in the 190s, afebrile.  Labs showed only as abnormal a troponins level= 0.07.    CT angio of the abdomen showed moderate to large volume ascites.    CT angio of the lower extremities showed:    RIGHT LOWER EXTREMITY:  Right superficial femoral femoral artery proximal severe stenosis, and   occlusion of 4-5 cm segment of mid vessel with reconstitution distally.  Patent anterior tibial artery with apparent occlusion of posterior tibial   and peroneal artery.    LEFT LOWER EXTREMITY:  Patent left femoral to anterior tibial artery graft.  Occluded superficial femoral artery with stent.  Occluded popliteal   artery.  Patent left anterior tibial artery and mid to-distal peroneal artery.   Occluded left posterior tibial artery.    Vascular were consulted for the above findings, recommended a Duplex to R/O DVT and an outpatient follow-up.  Patient received symptomatic treatment in the ED, and will be admitted for management.

## 2023-12-10 NOTE — CONSULT NOTE ADULT - ASSESSMENT
59 y/o female with PMH of Hearing loss,  ESRD on HD (T/Th/Sat), HTN, HLD, DM, PVD s/p L fem bypass, and CVA presents to the ED for left lower extremity pain.    # ESRD on HD T TH Sat   # severe PVD and occluded stents   # ascites abdominal   # HTN uncontrolled   # thrombocytopenia     - no need for urgent RRT tonight / will schedule for tomorrow AM   - follow with vascular recs  -consider diagnostic paracentesis / doubt SBP   - resume home meds for BP   - check IP     will follow  57 y/o female with PMH of Hearing loss,  ESRD on HD (T/Th/Sat), HTN, HLD, DM, PVD s/p L fem bypass, and CVA presents to the ED for left lower extremity pain.    # ESRD on HD T TH Sat   # severe PVD and occluded stents   # ascites abdominal   # HTN uncontrolled   # thrombocytopenia     - no need for urgent RRT tonight / will schedule for tomorrow AM   - follow with vascular recs  -consider diagnostic paracentesis / doubt SBP   - resume home meds for BP   - check IP     will follow

## 2023-12-10 NOTE — ED PROVIDER NOTE - CARE PLAN
Principal Discharge DX:	Nausea & vomiting  Secondary Diagnosis:	ESRD on dialysis  Secondary Diagnosis:	Severe peripheral arterial disease  Secondary Diagnosis:	Uremia   1

## 2023-12-10 NOTE — CONSULT NOTE ADULT - SUBJECTIVE AND OBJECTIVE BOX
HPI: ***    Patient denies fevers/chills, denies lightheadedness/dizziness, denies SOB/chest pain, denies nausea/vomiting, denies constipation/diarrhea.      ROS: 10-system review is otherwise negative except HPI above.      PAST MEDICAL & SURGICAL HISTORY:  PVD (peripheral vascular disease)      Benign essential HTN      Intellectual disability      Hearing impaired      HLD (hyperlipidemia)      Chronic kidney disease, unspecified CKD stage      CVA (cerebral vascular accident)      DM (diabetes mellitus)      H/O vascular surgery  left leg      H/O fracture of leg      S/P arteriovenous (AV) fistula creation      S/P debridement      History of partial amputation of toe of left foot      S/P femoral-popliteal bypass surgery        FAMILY HISTORY:    [] Family history not pertinent as reviewed with the patient and family    SOCIAL HISTORY:  ***    ALLERGIES: penicillin (Rash)  NSAIDs (Nephrotoxicity)      HOME MEDICATIONS:  ***    CURRENT MEDICATIONS  MEDICATIONS (STANDING):   MEDICATIONS (PRN):  --------------------------------------------------------------------------------------------    Vitals:   T(C): 36.7 (12-10-23 @ 02:17), Max: 36.7 (12-10-23 @ 02:17)  HR: 86 (12-10-23 @ 02:17) (74 - 86)  BP: 115/70 (12-10-23 @ 02:17) (115/70 - 196/81)  RR: 18 (12-10-23 @ 02:17) (18 - 18)  SpO2: 96% (12-10-23 @ 02:17) (96% - 96%)  CAPILLARY BLOOD GLUCOSE        CAPILLARY BLOOD GLUCOSE          Height (cm): 162.6 (12-09 @ 23:25)  Weight (kg): 61 (12-09 @ 23:25)  BMI (kg/m2): 23.1 (12-09 @ 23:25)  BSA (m2): 1.65 (12-09 @ 23:25)    PHYSICAL EXAM: ***  General: NAD, Lying in bed comfortably  Neuro: AAOx3  HEENT: PERRL, EOMI  Cardio: RRR, nml S1/S2  Resp: Good effort, CTA b/l  Thorax: No chest wall tenderness  Breast: No lesions/masses, no drainage  GI/Abd: Soft, NT/ND, no rebound/guarding. No masses palpated. Bowel sounds present in all 4 quadrants. No CVAT tenderness.   Vascular: Extremities are normal in size, color and temperature   b/l DP/PT palpable, weak signals at b/l DP/PT.  ***  Skin: Intact, no breakdown  Lymphatic/Nodes: No palpable lymphadenopathy  Musculoskeletal: All 4 extremities moving spontaneously, no limitations.   Wound: ***  --------------------------------------------------------------------------------------------    LABS  CBC (12-10 @ 01:28)                              12.7                           4.43<L>  )----------------(  85<L>      67.2  % Neutrophils, 19.0<L>% Lymphocytes, ANC: 2.98                                38.4      BMP (12-10 @ 01:28)             140     |  97<L>   |  33<H> 		Ca++ --      Ca 8.7                ---------------------------------( 99    		Mg --                 4.1     |  28      |  5.6<HH>			Ph --        LFTs (12-10 @ 01:28)      TPro 7.2 / Alb 4.5 / TBili 0.8 / DBili -- / AST 15 / ALT 7 / AlkPhos 469<H>        ABG (12-10 @ 01:28)      /  /  /  /  / %     Lactate:  1.1      --------------------------------------------------------------------------------------------    MICROBIOLOGY  Urinalysis (12-10 @ 01:28):     Color:  / Appearance:  / SG:  / pH:  / Gluc: 99 / Ketones:  / Bili:  / Urobili:  / Protein : / Nitrites:  / Leuk.Est:  / RBC:  / WBC:  / Sq Epi:  / Non Sq Epi:  / Bacteria          --------------------------------------------------------------------------------------------    IMAGING       HPI: 58F w/ PMHx of ESRD on HD (Tues, Thurs, Sat), HTN, HLD, DM, PAD s/p left fem-AT bypass (11/2021 - Dr Muñoz) with LLE angiogram and angioplasty for occluded graft (9/2023 - Dr Muñoz). She presents to ED c/o progressively worsening LLEx pain and swelling. Patient last received dialysis on Thurs 12/7. Patient denies fevers/chills, denies lightheadedness/dizziness, denies SOB/chest pain, denies nausea/vomiting, denies constipation/diarrhea.      ROS: 10-system review is otherwise negative except HPI above.      PAST MEDICAL & SURGICAL HISTORY:  ·	PVD (peripheral vascular disease)  ·	Benign essential HTN  ·	Intellectual disability  ·	Hearing impaired  ·	HLD (hyperlipidemia)  ·	Chronic kidney disease, unspecified CKD stage  ·	CVA (cerebral vascular accident)  ·	DM (diabetes mellitus)  ·	H/O fracture of leg  ·	S/P arteriovenous (AV) fistula creation  ·	S/P debridement  ·	History of partial amputation of toe of left foot  ·	S/P femoral-popliteal bypass surgery        FAMILY HISTORY:    [] Family history not pertinent as reviewed with the patient and family    SOCIAL HISTORY:  ***    ALLERGIES: penicillin (Rash)  NSAIDs (Nephrotoxicity)      HOME MEDICATIONS:  ***    CURRENT MEDICATIONS  MEDICATIONS (STANDING):   MEDICATIONS (PRN):  --------------------------------------------------------------------------------------------  Vitals:   T(C): 36.7 (12-10-23 @ 02:17), Max: 36.7 (12-10-23 @ 02:17)  HR: 86 (12-10-23 @ 02:17) (74 - 86)  BP: 115/70 (12-10-23 @ 02:17) (115/70 - 196/81)  RR: 18 (12-10-23 @ 02:17) (18 - 18)  SpO2: 96% (12-10-23 @ 02:17) (96% - 96%)    Height (cm): 162.6 (12-09 @ 23:25)  Weight (kg): 61 (12-09 @ 23:25)  BMI (kg/m2): 23.1 (12-09 @ 23:25)  BSA (m2): 1.65 (12-09 @ 23:25)    PHYSICAL EXAM:  GENERAL: NAD, Lying in bed comfortably  NEURO: AAOx3  HEENT: PERRL, EOMI; hearing impaired   CARDIO: regular rate   RESP: equal chest rise bilaterally   GI/ABD: Soft, NT/ND, no rebound/guarding.   VASCULAR: +Bilateral Yue edema (LEFT>Right)   b/l DP/PT palpable, strong signals at b/l DP/PT.   SKIN: Intact, no breakdown  MSK: All 4 extremities moving spontaneously, no limitations.   --------------------------------------------------------------------------------------------  LABS  CBC (12-10 @ 01:28)                              12.7                           4.43<L>  )----------------(  85<L>      67.2  % Neutrophils, 19.0<L>% Lymphocytes, ANC: 2.98                                38.4      BMP (12-10 @ 01:28)             140     |  97<L>   |  33<H> 		Ca++ --      Ca 8.7                ---------------------------------( 99    		Mg --                 4.1     |  28      |  5.6<HH>			Ph --        LFTs (12-10 @ 01:28)      TPro 7.2 / Alb 4.5 / TBili 0.8 / DBili -- / AST 15 / ALT 7 / AlkPhos 469<H>        ABG (12-10 @ 01:28)      /  /  /  /  / %     Lactate:  1.1      --------------------------------------------------------------------------------------------  MICROBIOLOGY  Urinalysis (12-10 @ 01:28):     Color:  / Appearance:  / SG:  / pH:  / Gluc: 99 / Ketones:  / Bili:  / Urobili:  / Protein : / Nitrites:  / Leuk.Est:  / RBC:  / WBC:  / Sq Epi:  / Non Sq Epi:  / Bacteria      --------------------------------------------------------------------------------------------  IMAGING  < from: CT Angio Abd Aorta w/run-off w/ IV Cont (12.10.23 @ 02:11) >  RIGHT LOWER EXTREMITY:    Common Iliac: Atherosclerosis with mildstenosis.  Internal Iliac: Atherosclerosis with moderate stenoses.  External Iliac: Atherosclerosis with mild stenoses.  Common Femoral: Atherosclerosis with mild stenoses.  Deep Femoral: Atherosclerosis with mild stenosis  Superficial Femoral: Proximal severe stenosis, and occlusion of 4-5 cm   segment of mid vessel with reconstitution distally.  Popliteal: Atherosclerosis with severe stenoses.  Three Vessel Runoff: Patent anterior tibial artery with apparent   occlusion of posterior tibial and peroneal artery.    LEFT LOWER EXTREMITY:    Common Iliac: Atherosclerosis with mild stenosis.  Internal Iliac: Atherosclerosis with severe stenoses.  External Iliac: Atherosclerosis with mild stenosis.  Post left femoral to anterior tibial bypass graft which is patent.  Common Femoral: Atherosclerosis with mild stenosis.  Deep Femoral: Atherosclerosis with mild-to-moderate stenoses  Superficial Femoral: Occluded superficial femoral artery with stent.  Popliteal: Occluded popliteal artery.  Three Vessel Runoff: Patent left anterior tibial artery and mid to-distal   peroneal artery. Occluded left posterior tibial artery.    IMPRESSION:  No acute intra-abdominal pathology. Moderate to large volume ascites.    RIGHT LOWER EXTREMITY:  Right superficial femoral femoral artery proximal severe stenosis, and   occlusion of 4-5 cm segment of mid vessel with reconstitution distally.  Patent anterior tibial artery with apparent occlusion of posterior tibial   and peroneal artery.    LEFT LOWER EXTREMITY:  Patent left femoral to anterior tibial artery graft.  Occluded superficial femoral artery with stent.  Occluded popliteal   artery.  Patent left anterior tibial artery and mid to-distal peroneal artery.   Occluded left posterior tibial artery.    --- End of Report ---

## 2023-12-10 NOTE — PATIENT PROFILE ADULT - FUNCTIONAL ASSESSMENT - BASIC MOBILITY 6.
3-calculated by average/Not able to assess (calculate score using [facility] averaging method) 3-calculated by average/Not able to assess (calculate score using [facility] averaging method)

## 2023-12-10 NOTE — ED ADULT NURSE REASSESSMENT NOTE - NS ED NURSE REASSESS COMMENT FT1
Patient A&O x4. Patient legally deaf. Brother in law at bedside interpreting for patient. IVL in place. Denies pain/ discomfort. Safety & comfort measures in place.

## 2023-12-10 NOTE — CONSULT NOTE ADULT - ASSESSMENT
58F, established patient of Dr Muñoz, w/ PMHx of ESRD on HD (Tues, Thurs, Sat), PVD w/ left SFA occlusion s/p left fem-to-AT reverse saphenous bypass graft (11/2021), s/p angioplasty of mid-bypass stenosis w/ stent placement (9/2023) who presents to ED complaining of worsening LLE pain and swelling.     Vascular surgery consulted for occlusion of left SFA stent. Patient examined at bedside. Physical exam demonstrated palpable DP and PT pulses bilaterally, as well as strong doppler signals throughout. Her Yue pain and swelling is likely related to fluid overload (h/o ESRD on HD) vs acute DVT.      PLAN:   - Obtain bilateral LE venous duplex to r/o DVT   - If duplex negative, no further vascular intervention indicated at this time  - Will schedule outpatient f/u appointment this week with Dr Muñoz for further evaluation and management of occluded SFA stent     Patient seen/examined or Plan Discussed with Fellow, Dr. Wanda Fermin   Plan to be discussed with Attending, Dr. Muñoz  ---------------------------------  Vascular Surgery  [phone]

## 2023-12-10 NOTE — H&P ADULT - NSHPPHYSICALEXAM_GEN_ALL_CORE
General: In mild distress because of the pain  CV: RRR  Chest: CTAB  Abdomen: Distended, soft  Left lower extremity: tenderness upon palpation, no evidence of inflammation/ infection, 2+ pitting edema  Right lower extremity: 2+ pitting edema

## 2023-12-10 NOTE — H&P ADULT - CONVERSATION DETAILS
Goals of Care Conversation Discussed at bedside in the ER with patient and HCP.   FULL CODE VS DNR/DNI, CPR and intubation were discussed with Pt and HCP.    Pt wishes to be FULL CODE and understands risks involved given ESRD and poor outcomes of code blue.   HCP and PT Agree to remain FULL CODE. All questions answered.

## 2023-12-10 NOTE — ED PROVIDER NOTE - PHYSICAL EXAMINATION
Constitutional: Well developed, well nourished. NAD  Head: Normocephalic, atraumatic.  Eyes: PERRL, EOMI.  ENT: No nasal discharge. Mucous membranes dry.  Neck: Supple. Painless ROM.  Cardiovascular:  . Regular rate and rhythm.    Pulmonary:  . Lungs clear to auscultation bilaterally.   Abdominal: Soft. Nondistended. No rebound, guarding, rigidity.  Extremities. Pelvis stable. + mild suprapubic tenderness without rebound, guarding or flank pain  Skin: No rashes, cyanosis.  Neuro: AAOx3. No focal neurological deficits.  Psych: Normal mood. Normal affect. Constitutional: chronically ill appearing female in nad  Head: Normocephalic, atraumatic.  Eyes: PERRL, EOMI.  ENT: No nasal discharge. Mucous membranes dry.  Neck: Supple. Painless ROM.  Cardiovascular:  . Regular rate and rhythm.    Pulmonary:  . Lungs clear to auscultation bilaterally.   Abdominal: Soft. + diffuse tenderness noted to entire abdomen without rebound.  Extremities. Pelvis stable. + left leg swelling > rigth with diffuse tenderness; + bilat edema;   Skin: No rashes, cyanosis.  Neuro: AAOx3. No focal neurological deficits.  Psych: Normal mood. Normal affect.

## 2023-12-10 NOTE — CONSULT NOTE ADULT - SUBJECTIVE AND OBJECTIVE BOX
NEPHROLOGY CONSULTATION NOTE    THIS CONSULT IS INCOMPLETE / FULL CONSULT TO FOLLOW    Patient is a 58y Female whom presented to the hospital with     PAST MEDICAL & SURGICAL HISTORY:  PVD (peripheral vascular disease)      Benign essential HTN      Intellectual disability      Hearing impaired      HLD (hyperlipidemia)      Chronic kidney disease, unspecified CKD stage      CVA (cerebral vascular accident)      DM (diabetes mellitus)      H/O vascular surgery  left leg      H/O fracture of leg      S/P arteriovenous (AV) fistula creation      S/P debridement      History of partial amputation of toe of left foot      S/P femoral-popliteal bypass surgery        Allergies:  penicillin (Rash)  NSAIDs (Nephrotoxicity)    Home Medications Reviewed  Hospital Medications:   MEDICATIONS  (STANDING):  aspirin enteric coated 81 milliGRAM(s) Oral daily  atorvastatin 80 milliGRAM(s) Oral at bedtime  calcitriol   Capsule 0.25 MICROGram(s) Oral daily  dextrose 5%. 1000 milliLiter(s) (50 mL/Hr) IV Continuous <Continuous>  dextrose 5%. 1000 milliLiter(s) (100 mL/Hr) IV Continuous <Continuous>  dextrose 50% Injectable 25 Gram(s) IV Push once  dextrose 50% Injectable 12.5 Gram(s) IV Push once  dextrose 50% Injectable 25 Gram(s) IV Push once  gabapentin 300 milliGRAM(s) Oral three times a day  glucagon  Injectable 1 milliGRAM(s) IntraMuscular once  heparin   Injectable 5000 Unit(s) SubCutaneous every 8 hours  hydrALAZINE 75 milliGRAM(s) Oral three times a day  HYDROmorphone   Tablet 2 milliGRAM(s) Oral every 4 hours  insulin lispro (ADMELOG) corrective regimen sliding scale   SubCutaneous three times a day before meals  labetalol 200 milliGRAM(s) Oral three times a day  NIFEdipine XL 90 milliGRAM(s) Oral daily  pantoprazole    Tablet 40 milliGRAM(s) Oral before breakfast  sevelamer carbonate 800 milliGRAM(s) Oral three times a day with meals      SOCIAL HISTORY:  Denies ETOH,Smoking,   FAMILY HISTORY:        REVIEW OF SYSTEMS:  CONSTITUTIONAL: No weakness, fevers or chills  EYES/ENT: No visual changes;  No vertigo or throat pain   NECK: No pain or stiffness  RESPIRATORY: No cough, wheezing, hemoptysis; No shortness of breath  CARDIOVASCULAR: No chest pain or palpitations.  GASTROINTESTINAL: No abdominal or epigastric pain. No nausea, vomiting, or hematemesis; No diarrhea or constipation. No melena or hematochezia.  GENITOURINARY: No dysuria, frequency, foamy urine, urinary urgency, incontinence or hematuria  NEUROLOGICAL: No numbness or weakness  SKIN: No itching, burning, rashes, or lesions   VASCULAR: No bilateral lower extremity edema.   All other review of systems is negative unless indicated above.    VITALS:  T(F): 98.2 (12-10-23 @ 08:23), Max: 98.2 (12-10-23 @ 08:23)  HR: 74 (12-10-23 @ 08:23)  BP: 198/87 (12-10-23 @ 08:23)  RR: 18 (12-10-23 @ 02:17)  SpO2: 98% (12-10-23 @ 08:23)    Height (cm): 162.6 (12-09 @ 23:25)  Weight (kg): 61 (12-09 @ 23:25)  BMI (kg/m2): 23.1 (12-09 @ 23:25)  BSA (m2): 1.65 (12-09 @ 23:25)    I&O's Detail        PHYSICAL EXAM:  Constitutional: NAD  HEENT: anicteric sclera, oropharynx clear, MMM  Neck: No JVD  Respiratory: CTAB, no wheezes, rales or rhonchi  Cardiovascular: S1, S2, RRR  Gastrointestinal: BS+, soft, NT/ND  Extremities: No cyanosis or clubbing. No peripheral edema  Neurological: A/O x 3, no focal deficits  Psychiatric: Normal mood, normal affect  : No CVA tenderness. No mcgrath.   Skin: No rashes  Vascular Access:    LABS:  12-10    140  |  97<L>  |  33<H>  ----------------------------<  99  4.1   |  28  |  5.6<HH>    Ca    8.7      10 Dec 2023 01:28    TPro  7.2  /  Alb  4.5  /  TBili  0.8  /  DBili      /  AST  15  /  ALT  7   /  AlkPhos  469<H>  12-10    Creatinine Trend: 5.6 <--                        12.7   4.43  )-----------( 85       ( 10 Dec 2023 01:28 )             38.4     Urine Studies:  Urinalysis Basic - ( 10 Dec 2023 01:28 )    Color:  / Appearance:  / SG:  / pH:   Gluc: 99 mg/dL / Ketone:   / Bili:  / Urobili:    Blood:  / Protein:  / Nitrite:    Leuk Esterase:  / RBC:  / WBC    Sq Epi:  / Non Sq Epi:  / Bacteria:             Iron 58, TIBC 243, %sat 24      [07-09-23 @ 16:11]  Ferritin 518      [07-09-23 @ 16:11]  PTH -- (Ca 8.5)      [11-01-23 @ 07:03]   124  PTH -- (Ca 8.5)      [10-19-23 @ 08:58]   159  PTH -- (Ca 8.9)      [06-23-23 @ 08:41]   92  Vitamin D (25OH) 23      [11-01-23 @ 07:03]  Lipid: chol 195, , , LDL --      [10-31-23 @ 06:57]    HBsAg Nonreact      [05-13-22 @ 18:00]  HCV 0.16, Nonreact      [05-13-22 @ 18:00]    OLVIN: titer 1:80, pattern Speckled      [11-04-21 @ 12:47]  dsDNA <12      [05-13-22 @ 18:00]  ANCA: cANCA Negative, pANCA Negative, atypical ANCA Negative      [05-13-22 @ 18:00]  PLA2R: MARIA GUADALUPE <1.8, IFA --      [05-13-22 @ 18:00]  Free Light Chains: kappa 10.04, lambda 8.96, ratio = 1.12      [05-12 @ 08:21]  Immunofixation Serum:   IgM Lambda Band Identified    Reference Range: None Detected      [05-13-22 @ 10:55]  SPEP Interpretation: Gamma-Migrating Paraprotein Identified      [05-12-22 @ 08:21]  Immunofixation Urine: Weak Bence Howard protein Lambda type      [05-11-22 @ 20:07]  UPEP Interpretation: Mild Selective (Glomerular) Proteinuria      [11-23-21 @ 12:40]      RADIOLOGY & ADDITIONAL STUDIES:                 NEPHROLOGY CONSULTATION NOTE    THIS CONSULT IS INCOMPLETE / FULL CONSULT TO FOLLOW    Patient is a 58y Female whom presented to the hospital with     PAST MEDICAL & SURGICAL HISTORY:  PVD (peripheral vascular disease)      Benign essential HTN      Intellectual disability      Hearing impaired      HLD (hyperlipidemia)      Chronic kidney disease, unspecified CKD stage      CVA (cerebral vascular accident)      DM (diabetes mellitus)      H/O vascular surgery  left leg      H/O fracture of leg      S/P arteriovenous (AV) fistula creation      S/P debridement      History of partial amputation of toe of left foot      S/P femoral-popliteal bypass surgery        Allergies:  penicillin (Rash)  NSAIDs (Nephrotoxicity)    Home Medications Reviewed  Hospital Medications:   MEDICATIONS  (STANDING):  aspirin enteric coated 81 milliGRAM(s) Oral daily  atorvastatin 80 milliGRAM(s) Oral at bedtime  calcitriol   Capsule 0.25 MICROGram(s) Oral daily  dextrose 5%. 1000 milliLiter(s) (50 mL/Hr) IV Continuous <Continuous>  dextrose 5%. 1000 milliLiter(s) (100 mL/Hr) IV Continuous <Continuous>  dextrose 50% Injectable 25 Gram(s) IV Push once  dextrose 50% Injectable 12.5 Gram(s) IV Push once  dextrose 50% Injectable 25 Gram(s) IV Push once  gabapentin 300 milliGRAM(s) Oral three times a day  glucagon  Injectable 1 milliGRAM(s) IntraMuscular once  heparin   Injectable 5000 Unit(s) SubCutaneous every 8 hours  hydrALAZINE 75 milliGRAM(s) Oral three times a day  HYDROmorphone   Tablet 2 milliGRAM(s) Oral every 4 hours  insulin lispro (ADMELOG) corrective regimen sliding scale   SubCutaneous three times a day before meals  labetalol 200 milliGRAM(s) Oral three times a day  NIFEdipine XL 90 milliGRAM(s) Oral daily  pantoprazole    Tablet 40 milliGRAM(s) Oral before breakfast  sevelamer carbonate 800 milliGRAM(s) Oral three times a day with meals      SOCIAL HISTORY:  Denies ETOH,Smoking,   FAMILY HISTORY:        REVIEW OF SYSTEMS:  CONSTITUTIONAL: No weakness, fevers or chills  EYES/ENT: No visual changes;  No vertigo or throat pain   NECK: No pain or stiffness  RESPIRATORY: No cough, wheezing, hemoptysis; No shortness of breath  CARDIOVASCULAR: No chest pain or palpitations.  GASTROINTESTINAL: No abdominal or epigastric pain. No nausea, vomiting, or hematemesis; No diarrhea or constipation. No melena or hematochezia.  GENITOURINARY: No dysuria, frequency, foamy urine, urinary urgency, incontinence or hematuria  NEUROLOGICAL: No numbness or weakness  SKIN: No itching, burning, rashes, or lesions   VASCULAR: No bilateral lower extremity edema.   All other review of systems is negative unless indicated above.    VITALS:  T(F): 98.2 (12-10-23 @ 08:23), Max: 98.2 (12-10-23 @ 08:23)  HR: 74 (12-10-23 @ 08:23)  BP: 198/87 (12-10-23 @ 08:23)  RR: 18 (12-10-23 @ 02:17)  SpO2: 98% (12-10-23 @ 08:23)    Height (cm): 162.6 (12-09 @ 23:25)  Weight (kg): 61 (12-09 @ 23:25)  BMI (kg/m2): 23.1 (12-09 @ 23:25)  BSA (m2): 1.65 (12-09 @ 23:25)    I&O's Detail        PHYSICAL EXAM:  Constitutional: NAD  HEENT: anicteric sclera, oropharynx clear, MMM  Neck: No JVD  Respiratory: CTAB, no wheezes, rales or rhonchi  Cardiovascular: S1, S2, RRR  Gastrointestinal: BS+, soft, NT/ND  Extremities: No cyanosis or clubbing. No peripheral edema  Neurological: A/O x 3, no focal deficits  Psychiatric: Normal mood, normal affect  : No CVA tenderness. No cmgrath.   Skin: No rashes  Vascular Access:    LABS:  12-10    140  |  97<L>  |  33<H>  ----------------------------<  99  4.1   |  28  |  5.6<HH>    Ca    8.7      10 Dec 2023 01:28    TPro  7.2  /  Alb  4.5  /  TBili  0.8  /  DBili      /  AST  15  /  ALT  7   /  AlkPhos  469<H>  12-10    Creatinine Trend: 5.6 <--                        12.7   4.43  )-----------( 85       ( 10 Dec 2023 01:28 )             38.4     Urine Studies:  Urinalysis Basic - ( 10 Dec 2023 01:28 )    Color:  / Appearance:  / SG:  / pH:   Gluc: 99 mg/dL / Ketone:   / Bili:  / Urobili:    Blood:  / Protein:  / Nitrite:    Leuk Esterase:  / RBC:  / WBC    Sq Epi:  / Non Sq Epi:  / Bacteria:             Iron 58, TIBC 243, %sat 24      [07-09-23 @ 16:11]  Ferritin 518      [07-09-23 @ 16:11]  PTH -- (Ca 8.5)      [11-01-23 @ 07:03]   124  PTH -- (Ca 8.5)      [10-19-23 @ 08:58]   159  PTH -- (Ca 8.9)      [06-23-23 @ 08:41]   92  Vitamin D (25OH) 23      [11-01-23 @ 07:03]  Lipid: chol 195, , , LDL --      [10-31-23 @ 06:57]    HBsAg Nonreact      [05-13-22 @ 18:00]  HCV 0.16, Nonreact      [05-13-22 @ 18:00]    OLVIN: titer 1:80, pattern Speckled      [11-04-21 @ 12:47]  dsDNA <12      [05-13-22 @ 18:00]  ANCA: cANCA Negative, pANCA Negative, atypical ANCA Negative      [05-13-22 @ 18:00]  PLA2R: MARIA GUADALUPE <1.8, IFA --      [05-13-22 @ 18:00]  Free Light Chains: kappa 10.04, lambda 8.96, ratio = 1.12      [05-12 @ 08:21]  Immunofixation Serum:   IgM Lambda Band Identified    Reference Range: None Detected      [05-13-22 @ 10:55]  SPEP Interpretation: Gamma-Migrating Paraprotein Identified      [05-12-22 @ 08:21]  Immunofixation Urine: Weak Bence Howard protein Lambda type      [05-11-22 @ 20:07]  UPEP Interpretation: Mild Selective (Glomerular) Proteinuria      [11-23-21 @ 12:40]      RADIOLOGY & ADDITIONAL STUDIES:                 NEPHROLOGY CONSULTATION NOTE    59 y/o female with PMH of Hearing loss,  ESRD on HD (T/Th/Sat), HTN, HLD, DM, PVD s/p L fem bypass, and CVA presents to the ED for left lower extremity pain.  Family member at bedside helping with interpretation     Patient reports that over the past few days she has been having worsening pain over the left lower extremity, unrelated to ambulation, present on rest, no tingling/ numbness, weakness, signs of infection over the same extremity, no recent trauma. Patient reports as well diarrhea over the past few days, which has resolved.  Patient has not gone to the HD center for dialysis on Saturday because of these symptoms. No chest pain, shortness of breath, cough, or constitutional symptoms.    In the ED, the patient was hypertensive with a systolic in the 190s, afebrile.  Labs showed only as abnormal a troponins level= 0.07.    CT angio of the abdomen showed moderate to large volume ascites. no current chest pain no SOB / has abdominal discomfort     PAST MEDICAL & SURGICAL HISTORY:  PVD (peripheral vascular disease)  Benign essential HTN  Intellectual disability  Hearing impaired  HLD (hyperlipidemia)  Chronic kidney disease, unspecified CKD stage  CVA (cerebral vascular accident)  DM (diabetes mellitus)  H/O vascular surgery left leg  H/O fracture of leg  S/P arteriovenous (AV) fistula creation  S/P debridement  History of partial amputation of toe of left foot  S/P femoral-popliteal bypass surgery        Allergies:  penicillin (Rash)  NSAIDs (Nephrotoxicity)    Home Medications Reviewed  Hospital Medications:   MEDICATIONS  (STANDING):  aspirin enteric coated 81 milliGRAM(s) Oral daily  atorvastatin 80 milliGRAM(s) Oral at bedtime  calcitriol   Capsule 0.25 MICROGram(s) Oral daily  gabapentin 300 milliGRAM(s) Oral three times a day  glucagon  Injectable 1 milliGRAM(s) IntraMuscular once  heparin   Injectable 5000 Unit(s) SubCutaneous every 8 hours  hydrALAZINE 75 milliGRAM(s) Oral three times a day  HYDROmorphone   Tablet 2 milliGRAM(s) Oral every 4 hours  insulin lispro (ADMELOG) corrective regimen sliding scale   SubCutaneous three times a day before meals  labetalol 200 milliGRAM(s) Oral three times a day  NIFEdipine XL 90 milliGRAM(s) Oral daily  pantoprazole    Tablet 40 milliGRAM(s) Oral before breakfast  sevelamer carbonate 800 milliGRAM(s) Oral three times a day with meals      SOCIAL HISTORY:  Denies ETOH,Smoking,   FAMILY HISTORY:        REVIEW OF SYSTEMS:  All other review of systems is negative unless indicated above.    VITALS:  T(F): 98.2 (12-10-23 @ 08:23), Max: 98.2 (12-10-23 @ 08:23)  HR: 74 (12-10-23 @ 08:23)  BP: 198/87 (12-10-23 @ 08:23)  RR: 18 (12-10-23 @ 02:17)  SpO2: 98% (12-10-23 @ 08:23)    Height (cm): 162.6 (12-09 @ 23:25)  Weight (kg): 61 (12-09 @ 23:25)  BMI (kg/m2): 23.1 (12-09 @ 23:25)  BSA (m2): 1.65 (12-09 @ 23:25)    I&O's Detail        PHYSICAL EXAM:  Constitutional: NAD  Respiratory: CTAB,  Cardiovascular: S1, S2, RRR  Gastrointestinal: BS+, soft, NT/ND  Extremities: No cyanosis or clubbing. No peripheral edema  : No CVA tenderness. No mcgrath.   Skin: No rashes  Vascular Access:    LABS:  12-10    140  |  97<L>  |  33<H>  ----------------------------<  99  4.1   |  28  |  5.6<HH>    Ca    8.7      10 Dec 2023 01:28    TPro  7.2  /  Alb  4.5  /  TBili  0.8  /  DBili      /  AST  15  /  ALT  7   /  AlkPhos  469<H>  12-10    Creatinine Trend: 5.6 <--                        12.7   4.43  )-----------( 85       ( 10 Dec 2023 01:28 )             38.4     Urine Studies:  Urinalysis Basic - ( 10 Dec 2023 01:28 )    Color:  / Appearance:  / SG:  / pH:   Gluc: 99 mg/dL / Ketone:   / Bili:  / Urobili:    Blood:  / Protein:  / Nitrite:    Leuk Esterase:  / RBC:  / WBC    Sq Epi:  / Non Sq Epi:  / Bacteria:             Iron 58, TIBC 243, %sat 24      [07-09-23 @ 16:11]  Ferritin 518      [07-09-23 @ 16:11]  PTH -- (Ca 8.5)      [11-01-23 @ 07:03]   124  PTH -- (Ca 8.5)      [10-19-23 @ 08:58]   159  PTH -- (Ca 8.9)      [06-23-23 @ 08:41]   92  Vitamin D (25OH) 23      [11-01-23 @ 07:03]  Lipid: chol 195, , , LDL --      [10-31-23 @ 06:57]    HBsAg Nonreact      [05-13-22 @ 18:00]  HCV 0.16, Nonreact      [05-13-22 @ 18:00]    OLVIN: titer 1:80, pattern Speckled      [11-04-21 @ 12:47]  dsDNA <12      [05-13-22 @ 18:00]  ANCA: cANCA Negative, pANCA Negative, atypical ANCA Negative      [05-13-22 @ 18:00]  PLA2R: MARIA GUADALUPE <1.8, IFA --      [05-13-22 @ 18:00]  Free Light Chains: kappa 10.04, lambda 8.96, ratio = 1.12      [05-12 @ 08:21]  Immunofixation Serum:   IgM Lambda Band Identified    Reference Range: None Detected      [05-13-22 @ 10:55]  SPEP Interpretation: Gamma-Migrating Paraprotein Identified      [05-12-22 @ 08:21]  Immunofixation Urine: Weak Bence Howard protein Lambda type      [05-11-22 @ 20:07]  UPEP Interpretation: Mild Selective (Glomerular) Proteinuria      [11-23-21 @ 12:40]      RADIOLOGY & ADDITIONAL STUDIES:  < from: CT Angio Abd Aorta w/run-off w/ IV Cont (12.10.23 @ 02:11) >    No acute intra-abdominal pathology. Moderate to large volume ascites.    RIGHT LOWER EXTREMITY:  Right superficial femoral femoral artery proximal severe stenosis, and   occlusion of 4-5 cm segment of mid vessel with reconstitution distally.  Patent anterior tibial artery with apparent occlusion of posterior tibial   and peroneal artery.    LEFT LOWER EXTREMITY:  Patent left femoral to anterior tibial artery graft.  Occluded superficial femoral artery with stent.  Occluded popliteal   artery.  Patent left anterior tibial artery and mid to-distal peroneal artery.   Occluded left posterior tibial artery.    < end of copied text >                 NEPHROLOGY CONSULTATION NOTE    57 y/o female with PMH of Hearing loss,  ESRD on HD (T/Th/Sat), HTN, HLD, DM, PVD s/p L fem bypass, and CVA presents to the ED for left lower extremity pain.  Family member at bedside helping with interpretation     Patient reports that over the past few days she has been having worsening pain over the left lower extremity, unrelated to ambulation, present on rest, no tingling/ numbness, weakness, signs of infection over the same extremity, no recent trauma. Patient reports as well diarrhea over the past few days, which has resolved.  Patient has not gone to the HD center for dialysis on Saturday because of these symptoms. No chest pain, shortness of breath, cough, or constitutional symptoms.    In the ED, the patient was hypertensive with a systolic in the 190s, afebrile.  Labs showed only as abnormal a troponins level= 0.07.    CT angio of the abdomen showed moderate to large volume ascites. no current chest pain no SOB / has abdominal discomfort     PAST MEDICAL & SURGICAL HISTORY:  PVD (peripheral vascular disease)  Benign essential HTN  Intellectual disability  Hearing impaired  HLD (hyperlipidemia)  Chronic kidney disease, unspecified CKD stage  CVA (cerebral vascular accident)  DM (diabetes mellitus)  H/O vascular surgery left leg  H/O fracture of leg  S/P arteriovenous (AV) fistula creation  S/P debridement  History of partial amputation of toe of left foot  S/P femoral-popliteal bypass surgery        Allergies:  penicillin (Rash)  NSAIDs (Nephrotoxicity)    Home Medications Reviewed  Hospital Medications:   MEDICATIONS  (STANDING):  aspirin enteric coated 81 milliGRAM(s) Oral daily  atorvastatin 80 milliGRAM(s) Oral at bedtime  calcitriol   Capsule 0.25 MICROGram(s) Oral daily  gabapentin 300 milliGRAM(s) Oral three times a day  glucagon  Injectable 1 milliGRAM(s) IntraMuscular once  heparin   Injectable 5000 Unit(s) SubCutaneous every 8 hours  hydrALAZINE 75 milliGRAM(s) Oral three times a day  HYDROmorphone   Tablet 2 milliGRAM(s) Oral every 4 hours  insulin lispro (ADMELOG) corrective regimen sliding scale   SubCutaneous three times a day before meals  labetalol 200 milliGRAM(s) Oral three times a day  NIFEdipine XL 90 milliGRAM(s) Oral daily  pantoprazole    Tablet 40 milliGRAM(s) Oral before breakfast  sevelamer carbonate 800 milliGRAM(s) Oral three times a day with meals      SOCIAL HISTORY:  Denies ETOH,Smoking,   FAMILY HISTORY:        REVIEW OF SYSTEMS:  All other review of systems is negative unless indicated above.    VITALS:  T(F): 98.2 (12-10-23 @ 08:23), Max: 98.2 (12-10-23 @ 08:23)  HR: 74 (12-10-23 @ 08:23)  BP: 198/87 (12-10-23 @ 08:23)  RR: 18 (12-10-23 @ 02:17)  SpO2: 98% (12-10-23 @ 08:23)    Height (cm): 162.6 (12-09 @ 23:25)  Weight (kg): 61 (12-09 @ 23:25)  BMI (kg/m2): 23.1 (12-09 @ 23:25)  BSA (m2): 1.65 (12-09 @ 23:25)    I&O's Detail        PHYSICAL EXAM:  Constitutional: NAD  Respiratory: CTAB,  Cardiovascular: S1, S2, RRR  Gastrointestinal: BS+, soft, NT/ND  Extremities: No cyanosis or clubbing. No peripheral edema  : No CVA tenderness. No mcgrath.   Skin: No rashes  Vascular Access:    LABS:  12-10    140  |  97<L>  |  33<H>  ----------------------------<  99  4.1   |  28  |  5.6<HH>    Ca    8.7      10 Dec 2023 01:28    TPro  7.2  /  Alb  4.5  /  TBili  0.8  /  DBili      /  AST  15  /  ALT  7   /  AlkPhos  469<H>  12-10    Creatinine Trend: 5.6 <--                        12.7   4.43  )-----------( 85       ( 10 Dec 2023 01:28 )             38.4     Urine Studies:  Urinalysis Basic - ( 10 Dec 2023 01:28 )    Color:  / Appearance:  / SG:  / pH:   Gluc: 99 mg/dL / Ketone:   / Bili:  / Urobili:    Blood:  / Protein:  / Nitrite:    Leuk Esterase:  / RBC:  / WBC    Sq Epi:  / Non Sq Epi:  / Bacteria:             Iron 58, TIBC 243, %sat 24      [07-09-23 @ 16:11]  Ferritin 518      [07-09-23 @ 16:11]  PTH -- (Ca 8.5)      [11-01-23 @ 07:03]   124  PTH -- (Ca 8.5)      [10-19-23 @ 08:58]   159  PTH -- (Ca 8.9)      [06-23-23 @ 08:41]   92  Vitamin D (25OH) 23      [11-01-23 @ 07:03]  Lipid: chol 195, , , LDL --      [10-31-23 @ 06:57]    HBsAg Nonreact      [05-13-22 @ 18:00]  HCV 0.16, Nonreact      [05-13-22 @ 18:00]    OLVIN: titer 1:80, pattern Speckled      [11-04-21 @ 12:47]  dsDNA <12      [05-13-22 @ 18:00]  ANCA: cANCA Negative, pANCA Negative, atypical ANCA Negative      [05-13-22 @ 18:00]  PLA2R: MARIA GUADALUPE <1.8, IFA --      [05-13-22 @ 18:00]  Free Light Chains: kappa 10.04, lambda 8.96, ratio = 1.12      [05-12 @ 08:21]  Immunofixation Serum:   IgM Lambda Band Identified    Reference Range: None Detected      [05-13-22 @ 10:55]  SPEP Interpretation: Gamma-Migrating Paraprotein Identified      [05-12-22 @ 08:21]  Immunofixation Urine: Weak Bence Howard protein Lambda type      [05-11-22 @ 20:07]  UPEP Interpretation: Mild Selective (Glomerular) Proteinuria      [11-23-21 @ 12:40]      RADIOLOGY & ADDITIONAL STUDIES:  < from: CT Angio Abd Aorta w/run-off w/ IV Cont (12.10.23 @ 02:11) >    No acute intra-abdominal pathology. Moderate to large volume ascites.    RIGHT LOWER EXTREMITY:  Right superficial femoral femoral artery proximal severe stenosis, and   occlusion of 4-5 cm segment of mid vessel with reconstitution distally.  Patent anterior tibial artery with apparent occlusion of posterior tibial   and peroneal artery.    LEFT LOWER EXTREMITY:  Patent left femoral to anterior tibial artery graft.  Occluded superficial femoral artery with stent.  Occluded popliteal   artery.  Patent left anterior tibial artery and mid to-distal peroneal artery.   Occluded left posterior tibial artery.    < end of copied text >

## 2023-12-10 NOTE — H&P ADULT - ASSESSMENT
59 y/o female with PMH of Hearing loss,  ESRD on HD (T/Th/Sat), HTN, HLD, DM, PVD s/p L fem bypass, and CVA presents to the ED for left lower extremity pain.    # Left lower extremity pain  # H/o PVD s/p left femoral bypass    Started a few days ago, worsening, unrelated to ambulation, no trauma, or evidence of infection/ inflammation  CT angio of lower extremities showed occluded superficial femoral artery with stent, occluded popliteal  and posterior tibial arteries  No evidence of acute occlusion or compartment syndrome  Vascular consulted and recs appreciated  Duplex ordered and awaiting results  Although vascular recommended OP follow-up for now, patient however is still in pain, will be kept in the hospital and seen by vascular attending for further recs, might there be an inpatient intervention?  Symptomatic treatment for now ++  On aspirin and atorvastatin 80 mg qday    # Troponinemia     F/u repeat troponins  Patient does not have chest pain and EKG does not show any ischemic ST segment changes, not meeting criteria of ACS    # ESRD    Patient missed HD session on Saturday  CT of the abdomen shows moderate volume ascites    # HTN    C/w home antihypertensives  BP decreased after resuming the medications  Better control after HD session    # HLD    C/w atorvastatin 80, although evidence is not the strongest in ESRD    # H/o CVA    C/w aspirin and atorvastatin   57 y/o female with PMH of Hearing loss,  ESRD on HD (T/Th/Sat), HTN, HLD, DM, PVD s/p L fem bypass, and CVA presents to the ED for left lower extremity pain.    # Left lower extremity pain  # H/o PVD s/p left femoral bypass    Started a few days ago, worsening, unrelated to ambulation, no trauma, or evidence of infection/ inflammation  CT angio of lower extremities showed occluded superficial femoral artery with stent, occluded popliteal  and posterior tibial arteries  No evidence of acute occlusion or compartment syndrome  Vascular consulted and recs appreciated  Duplex ordered and awaiting results  Although vascular recommended OP follow-up for now, patient however is still in pain, will be kept in the hospital and seen by vascular attending for further recs, might there be an inpatient intervention?  Symptomatic treatment for now ++  On aspirin and atorvastatin 80 mg qday    # Troponinemia     F/u repeat troponins  Patient does not have chest pain and EKG does not show any ischemic ST segment changes, not meeting criteria of ACS    # ESRD    Patient missed HD session on Saturday  CT of the abdomen shows moderate volume ascites  Consulted nephrology for dialysis, f/u ascites after HD session with US to check if needs to be drained  C/w sevelamer with meals    # HTN    C/w home antihypertensives  BP decreased after resuming the medications  Better control after HD session    # HLD    C/w atorvastatin 80, although evidence is not the strongest in ESRD    # H/o CVA    C/w aspirin and atorvastatin    DVT ppx: heparin 5000 q8h  GI ppx: not indicated  Diet: Renal  Activity: as tolerated  Dispo: medicine

## 2023-12-10 NOTE — H&P ADULT - NSHPLABSRESULTS_GEN_ALL_CORE
LABS:                          12.7   4.43  )-----------( 85       ( 10 Dec 2023 01:28 )             38.4     12-10    140  |  97<L>  |  33<H>  ----------------------------<  99  4.1   |  28  |  5.6<HH>    Ca    8.7      10 Dec 2023 01:28    TPro  7.2  /  Alb  4.5  /  TBili  0.8  /  DBili  x   /  AST  15  /  ALT  7   /  AlkPhos  469<H>  12-10    LIVER FUNCTIONS - ( 10 Dec 2023 01:28 )  Alb: 4.5 g/dL / Pro: 7.2 g/dL / ALK PHOS: 469 U/L / ALT: 7 U/L / AST: 15 U/L / GGT: x             Urinalysis Basic - ( 10 Dec 2023 01:28 )    Color: x / Appearance: x / SG: x / pH: x  Gluc: 99 mg/dL / Ketone: x  / Bili: x / Urobili: x   Blood: x / Protein: x / Nitrite: x   Leuk Esterase: x / RBC: x / WBC x   Sq Epi: x / Non Sq Epi: x / Bacteria: x

## 2023-12-11 LAB
ALBUMIN SERPL ELPH-MCNC: 4 G/DL — SIGNIFICANT CHANGE UP (ref 3.5–5.2)
ALBUMIN SERPL ELPH-MCNC: 4 G/DL — SIGNIFICANT CHANGE UP (ref 3.5–5.2)
ALP SERPL-CCNC: 431 U/L — HIGH (ref 30–115)
ALP SERPL-CCNC: 431 U/L — HIGH (ref 30–115)
ALT FLD-CCNC: <5 U/L — SIGNIFICANT CHANGE UP (ref 0–41)
ALT FLD-CCNC: <5 U/L — SIGNIFICANT CHANGE UP (ref 0–41)
ANION GAP SERPL CALC-SCNC: 19 MMOL/L — HIGH (ref 7–14)
ANION GAP SERPL CALC-SCNC: 19 MMOL/L — HIGH (ref 7–14)
AST SERPL-CCNC: 12 U/L — SIGNIFICANT CHANGE UP (ref 0–41)
AST SERPL-CCNC: 12 U/L — SIGNIFICANT CHANGE UP (ref 0–41)
BASOPHILS # BLD AUTO: 0.06 K/UL — SIGNIFICANT CHANGE UP (ref 0–0.2)
BASOPHILS # BLD AUTO: 0.06 K/UL — SIGNIFICANT CHANGE UP (ref 0–0.2)
BASOPHILS NFR BLD AUTO: 1.5 % — HIGH (ref 0–1)
BASOPHILS NFR BLD AUTO: 1.5 % — HIGH (ref 0–1)
BILIRUB SERPL-MCNC: 0.7 MG/DL — SIGNIFICANT CHANGE UP (ref 0.2–1.2)
BILIRUB SERPL-MCNC: 0.7 MG/DL — SIGNIFICANT CHANGE UP (ref 0.2–1.2)
BUN SERPL-MCNC: 39 MG/DL — HIGH (ref 10–20)
BUN SERPL-MCNC: 39 MG/DL — HIGH (ref 10–20)
CALCIUM SERPL-MCNC: 8.7 MG/DL — SIGNIFICANT CHANGE UP (ref 8.4–10.5)
CALCIUM SERPL-MCNC: 8.7 MG/DL — SIGNIFICANT CHANGE UP (ref 8.4–10.5)
CHLORIDE SERPL-SCNC: 96 MMOL/L — LOW (ref 98–110)
CHLORIDE SERPL-SCNC: 96 MMOL/L — LOW (ref 98–110)
CO2 SERPL-SCNC: 26 MMOL/L — SIGNIFICANT CHANGE UP (ref 17–32)
CO2 SERPL-SCNC: 26 MMOL/L — SIGNIFICANT CHANGE UP (ref 17–32)
CREAT SERPL-MCNC: 6.1 MG/DL — CRITICAL HIGH (ref 0.7–1.5)
CREAT SERPL-MCNC: 6.1 MG/DL — CRITICAL HIGH (ref 0.7–1.5)
EGFR: 7 ML/MIN/1.73M2 — LOW
EGFR: 7 ML/MIN/1.73M2 — LOW
EOSINOPHIL # BLD AUTO: 0.04 K/UL — SIGNIFICANT CHANGE UP (ref 0–0.7)
EOSINOPHIL # BLD AUTO: 0.04 K/UL — SIGNIFICANT CHANGE UP (ref 0–0.7)
EOSINOPHIL NFR BLD AUTO: 1 % — SIGNIFICANT CHANGE UP (ref 0–8)
EOSINOPHIL NFR BLD AUTO: 1 % — SIGNIFICANT CHANGE UP (ref 0–8)
GLUCOSE BLDC GLUCOMTR-MCNC: 59 MG/DL — LOW (ref 70–99)
GLUCOSE BLDC GLUCOMTR-MCNC: 70 MG/DL — SIGNIFICANT CHANGE UP (ref 70–99)
GLUCOSE BLDC GLUCOMTR-MCNC: 70 MG/DL — SIGNIFICANT CHANGE UP (ref 70–99)
GLUCOSE BLDC GLUCOMTR-MCNC: 77 MG/DL — SIGNIFICANT CHANGE UP (ref 70–99)
GLUCOSE BLDC GLUCOMTR-MCNC: 77 MG/DL — SIGNIFICANT CHANGE UP (ref 70–99)
GLUCOSE SERPL-MCNC: 52 MG/DL — CRITICAL LOW (ref 70–99)
GLUCOSE SERPL-MCNC: 52 MG/DL — CRITICAL LOW (ref 70–99)
HCT VFR BLD CALC: 37.1 % — SIGNIFICANT CHANGE UP (ref 37–47)
HCT VFR BLD CALC: 37.1 % — SIGNIFICANT CHANGE UP (ref 37–47)
HGB BLD-MCNC: 12 G/DL — SIGNIFICANT CHANGE UP (ref 12–16)
HGB BLD-MCNC: 12 G/DL — SIGNIFICANT CHANGE UP (ref 12–16)
IMM GRANULOCYTES NFR BLD AUTO: 0.2 % — SIGNIFICANT CHANGE UP (ref 0.1–0.3)
IMM GRANULOCYTES NFR BLD AUTO: 0.2 % — SIGNIFICANT CHANGE UP (ref 0.1–0.3)
LYMPHOCYTES # BLD AUTO: 0.63 K/UL — LOW (ref 1.2–3.4)
LYMPHOCYTES # BLD AUTO: 0.63 K/UL — LOW (ref 1.2–3.4)
LYMPHOCYTES # BLD AUTO: 15.4 % — LOW (ref 20.5–51.1)
LYMPHOCYTES # BLD AUTO: 15.4 % — LOW (ref 20.5–51.1)
MAGNESIUM SERPL-MCNC: 2.3 MG/DL — SIGNIFICANT CHANGE UP (ref 1.8–2.4)
MAGNESIUM SERPL-MCNC: 2.3 MG/DL — SIGNIFICANT CHANGE UP (ref 1.8–2.4)
MCHC RBC-ENTMCNC: 30.2 PG — SIGNIFICANT CHANGE UP (ref 27–31)
MCHC RBC-ENTMCNC: 30.2 PG — SIGNIFICANT CHANGE UP (ref 27–31)
MCHC RBC-ENTMCNC: 32.3 G/DL — SIGNIFICANT CHANGE UP (ref 32–37)
MCHC RBC-ENTMCNC: 32.3 G/DL — SIGNIFICANT CHANGE UP (ref 32–37)
MCV RBC AUTO: 93.2 FL — SIGNIFICANT CHANGE UP (ref 81–99)
MCV RBC AUTO: 93.2 FL — SIGNIFICANT CHANGE UP (ref 81–99)
MONOCYTES # BLD AUTO: 0.34 K/UL — SIGNIFICANT CHANGE UP (ref 0.1–0.6)
MONOCYTES # BLD AUTO: 0.34 K/UL — SIGNIFICANT CHANGE UP (ref 0.1–0.6)
MONOCYTES NFR BLD AUTO: 8.3 % — SIGNIFICANT CHANGE UP (ref 1.7–9.3)
MONOCYTES NFR BLD AUTO: 8.3 % — SIGNIFICANT CHANGE UP (ref 1.7–9.3)
NEUTROPHILS # BLD AUTO: 3.02 K/UL — SIGNIFICANT CHANGE UP (ref 1.4–6.5)
NEUTROPHILS # BLD AUTO: 3.02 K/UL — SIGNIFICANT CHANGE UP (ref 1.4–6.5)
NEUTROPHILS NFR BLD AUTO: 73.6 % — SIGNIFICANT CHANGE UP (ref 42.2–75.2)
NEUTROPHILS NFR BLD AUTO: 73.6 % — SIGNIFICANT CHANGE UP (ref 42.2–75.2)
NRBC # BLD: 0 /100 WBCS — SIGNIFICANT CHANGE UP (ref 0–0)
NRBC # BLD: 0 /100 WBCS — SIGNIFICANT CHANGE UP (ref 0–0)
PLATELET # BLD AUTO: 96 K/UL — LOW (ref 130–400)
PLATELET # BLD AUTO: 96 K/UL — LOW (ref 130–400)
PMV BLD: 12.7 FL — HIGH (ref 7.4–10.4)
PMV BLD: 12.7 FL — HIGH (ref 7.4–10.4)
POTASSIUM SERPL-MCNC: 4.5 MMOL/L — SIGNIFICANT CHANGE UP (ref 3.5–5)
POTASSIUM SERPL-MCNC: 4.5 MMOL/L — SIGNIFICANT CHANGE UP (ref 3.5–5)
POTASSIUM SERPL-SCNC: 4.5 MMOL/L — SIGNIFICANT CHANGE UP (ref 3.5–5)
POTASSIUM SERPL-SCNC: 4.5 MMOL/L — SIGNIFICANT CHANGE UP (ref 3.5–5)
PROT SERPL-MCNC: 6.9 G/DL — SIGNIFICANT CHANGE UP (ref 6–8)
PROT SERPL-MCNC: 6.9 G/DL — SIGNIFICANT CHANGE UP (ref 6–8)
RBC # BLD: 3.98 M/UL — LOW (ref 4.2–5.4)
RBC # BLD: 3.98 M/UL — LOW (ref 4.2–5.4)
RBC # FLD: 16.4 % — HIGH (ref 11.5–14.5)
RBC # FLD: 16.4 % — HIGH (ref 11.5–14.5)
SODIUM SERPL-SCNC: 141 MMOL/L — SIGNIFICANT CHANGE UP (ref 135–146)
SODIUM SERPL-SCNC: 141 MMOL/L — SIGNIFICANT CHANGE UP (ref 135–146)
WBC # BLD: 4.1 K/UL — LOW (ref 4.8–10.8)
WBC # BLD: 4.1 K/UL — LOW (ref 4.8–10.8)
WBC # FLD AUTO: 4.1 K/UL — LOW (ref 4.8–10.8)
WBC # FLD AUTO: 4.1 K/UL — LOW (ref 4.8–10.8)

## 2023-12-11 PROCEDURE — 93990 DOPPLER FLOW TESTING: CPT | Mod: 26

## 2023-12-11 PROCEDURE — 76856 US EXAM PELVIC COMPLETE: CPT | Mod: 26

## 2023-12-11 PROCEDURE — 99233 SBSQ HOSP IP/OBS HIGH 50: CPT

## 2023-12-11 RX ORDER — ONDANSETRON 8 MG/1
4 TABLET, FILM COATED ORAL ONCE
Refills: 0 | Status: COMPLETED | OUTPATIENT
Start: 2023-12-11 | End: 2023-12-11

## 2023-12-11 RX ORDER — HYDROMORPHONE HYDROCHLORIDE 2 MG/ML
1 INJECTION INTRAMUSCULAR; INTRAVENOUS; SUBCUTANEOUS EVERY 4 HOURS
Refills: 0 | Status: DISCONTINUED | OUTPATIENT
Start: 2023-12-11 | End: 2023-12-12

## 2023-12-11 RX ORDER — DIPHENHYDRAMINE HCL 50 MG
25 CAPSULE ORAL ONCE
Refills: 0 | Status: COMPLETED | OUTPATIENT
Start: 2023-12-11 | End: 2023-12-11

## 2023-12-11 RX ORDER — HYDROMORPHONE HYDROCHLORIDE 2 MG/ML
1 INJECTION INTRAMUSCULAR; INTRAVENOUS; SUBCUTANEOUS ONCE
Refills: 0 | Status: DISCONTINUED | OUTPATIENT
Start: 2023-12-11 | End: 2023-12-11

## 2023-12-11 RX ADMIN — SEVELAMER CARBONATE 800 MILLIGRAM(S): 2400 POWDER, FOR SUSPENSION ORAL at 19:06

## 2023-12-11 RX ADMIN — ONDANSETRON 4 MILLIGRAM(S): 8 TABLET, FILM COATED ORAL at 19:03

## 2023-12-11 RX ADMIN — SEVELAMER CARBONATE 800 MILLIGRAM(S): 2400 POWDER, FOR SUSPENSION ORAL at 11:19

## 2023-12-11 RX ADMIN — Medication 25 MILLIGRAM(S): at 22:04

## 2023-12-11 RX ADMIN — Medication 90 MILLIGRAM(S): at 07:39

## 2023-12-11 RX ADMIN — HYDROMORPHONE HYDROCHLORIDE 2 MILLIGRAM(S): 2 INJECTION INTRAMUSCULAR; INTRAVENOUS; SUBCUTANEOUS at 02:00

## 2023-12-11 RX ADMIN — Medication 75 MILLIGRAM(S): at 06:55

## 2023-12-11 RX ADMIN — ONDANSETRON 4 MILLIGRAM(S): 8 TABLET, FILM COATED ORAL at 22:03

## 2023-12-11 RX ADMIN — Medication 200 MILLIGRAM(S): at 22:05

## 2023-12-11 RX ADMIN — Medication 25 MILLIGRAM(S): at 02:50

## 2023-12-11 RX ADMIN — HYDROMORPHONE HYDROCHLORIDE 1 MILLIGRAM(S): 2 INJECTION INTRAMUSCULAR; INTRAVENOUS; SUBCUTANEOUS at 22:05

## 2023-12-11 RX ADMIN — Medication 200 MILLIGRAM(S): at 06:55

## 2023-12-11 RX ADMIN — HYDROMORPHONE HYDROCHLORIDE 2 MILLIGRAM(S): 2 INJECTION INTRAMUSCULAR; INTRAVENOUS; SUBCUTANEOUS at 19:06

## 2023-12-11 RX ADMIN — Medication 81 MILLIGRAM(S): at 11:19

## 2023-12-11 RX ADMIN — GABAPENTIN 300 MILLIGRAM(S): 400 CAPSULE ORAL at 22:07

## 2023-12-11 RX ADMIN — HYDROMORPHONE HYDROCHLORIDE 2 MILLIGRAM(S): 2 INJECTION INTRAMUSCULAR; INTRAVENOUS; SUBCUTANEOUS at 11:19

## 2023-12-11 RX ADMIN — ATORVASTATIN CALCIUM 80 MILLIGRAM(S): 80 TABLET, FILM COATED ORAL at 22:25

## 2023-12-11 RX ADMIN — HYDROMORPHONE HYDROCHLORIDE 2 MILLIGRAM(S): 2 INJECTION INTRAMUSCULAR; INTRAVENOUS; SUBCUTANEOUS at 03:00

## 2023-12-11 RX ADMIN — PANTOPRAZOLE SODIUM 40 MILLIGRAM(S): 20 TABLET, DELAYED RELEASE ORAL at 06:55

## 2023-12-11 RX ADMIN — Medication 75 MILLIGRAM(S): at 22:06

## 2023-12-11 RX ADMIN — HYDROMORPHONE HYDROCHLORIDE 2 MILLIGRAM(S): 2 INJECTION INTRAMUSCULAR; INTRAVENOUS; SUBCUTANEOUS at 06:55

## 2023-12-11 RX ADMIN — HEPARIN SODIUM 5000 UNIT(S): 5000 INJECTION INTRAVENOUS; SUBCUTANEOUS at 06:56

## 2023-12-11 RX ADMIN — GABAPENTIN 300 MILLIGRAM(S): 400 CAPSULE ORAL at 07:39

## 2023-12-11 NOTE — PHYSICAL THERAPY INITIAL EVALUATION ADULT - NSACTIVITYREC_GEN_A_PT
Reviewed ther ex's for B LE to perform throughout day in sitting: hip flex, knee flex/ext, L ankle pumps 10 reps each.

## 2023-12-11 NOTE — PROGRESS NOTE ADULT - SUBJECTIVE AND OBJECTIVE BOX
Nephrology progress note    THIS IS AN INCOMPLETE NOTE . FULL NOTE TO FOLLOW SHORTLY    Patient is seen and examined, events over the last 24 h noted .    Allergies:  penicillin (Rash)  NSAIDs (Nephrotoxicity)    Hospital Medications:   MEDICATIONS  (STANDING):  aspirin enteric coated 81 milliGRAM(s) Oral daily  atorvastatin 80 milliGRAM(s) Oral at bedtime  calcitriol   Capsule 0.25 MICROGram(s) Oral daily  dextrose 5%. 1000 milliLiter(s) (50 mL/Hr) IV Continuous <Continuous>  dextrose 5%. 1000 milliLiter(s) (100 mL/Hr) IV Continuous <Continuous>  dextrose 50% Injectable 25 Gram(s) IV Push once  dextrose 50% Injectable 12.5 Gram(s) IV Push once  dextrose 50% Injectable 25 Gram(s) IV Push once  gabapentin 300 milliGRAM(s) Oral three times a day  glucagon  Injectable 1 milliGRAM(s) IntraMuscular once  heparin   Injectable 5000 Unit(s) SubCutaneous every 8 hours  hydrALAZINE 75 milliGRAM(s) Oral three times a day  HYDROmorphone   Tablet 2 milliGRAM(s) Oral every 4 hours  insulin lispro (ADMELOG) corrective regimen sliding scale   SubCutaneous three times a day before meals  labetalol 200 milliGRAM(s) Oral three times a day  NIFEdipine XL 90 milliGRAM(s) Oral daily  pantoprazole    Tablet 40 milliGRAM(s) Oral before breakfast  sevelamer carbonate 800 milliGRAM(s) Oral three times a day with meals        VITALS:  T(F): 97.5 (12-11-23 @ 06:48), Max: 98 (12-10-23 @ 18:38)  HR: 76 (12-11-23 @ 07:35)  BP: 159/70 (12-11-23 @ 07:35)  RR: 18 (12-11-23 @ 06:48)  SpO2: 97% (12-11-23 @ 06:48)  Wt(kg): --        PHYSICAL EXAM:  Constitutional: NAD  HEENT: anicteric sclera, oropharynx clear, MMM  Neck: No JVD  Respiratory: CTAB, no wheezes, rales or rhonchi  Cardiovascular: S1, S2, RRR  Gastrointestinal: BS+, soft, NT/ND  Extremities: No cyanosis or clubbing. No peripheral edema  :  No mcgrath.   Skin: No rashes    LABS:  12-10    140  |  97<L>  |  33<H>  ----------------------------<  99  4.1   |  28  |  5.6<HH>    Ca    8.7      10 Dec 2023 01:28    TPro  7.2  /  Alb  4.5  /  TBili  0.8  /  DBili      /  AST  15  /  ALT  7   /  AlkPhos  469<H>  12-10                          12.0   4.10  )-----------( 96       ( 11 Dec 2023 06:39 )             37.1       Urine Studies:  Urinalysis Basic - ( 10 Dec 2023 01:28 )    Color:  / Appearance:  / SG:  / pH:   Gluc: 99 mg/dL / Ketone:   / Bili:  / Urobili:    Blood:  / Protein:  / Nitrite:    Leuk Esterase:  / RBC:  / WBC    Sq Epi:  / Non Sq Epi:  / Bacteria:           Iron 58, TIBC 243, %sat 24      [07-09-23 @ 16:11]  Ferritin 518      [07-09-23 @ 16:11]  PTH -- (Ca 8.5)      [11-01-23 @ 07:03]   124  PTH -- (Ca 8.5)      [10-19-23 @ 08:58]   159  PTH -- (Ca 8.9)      [06-23-23 @ 08:41]   92  Vitamin D (25OH) 23      [11-01-23 @ 07:03]  Lipid: chol 195, , , LDL --      [10-31-23 @ 06:57]    HBsAg Nonreact      [05-13-22 @ 18:00]  HCV 0.16, Nonreact      [05-13-22 @ 18:00]    OLVIN: titer 1:80, pattern Speckled      [11-04-21 @ 12:47]  dsDNA <12      [05-13-22 @ 18:00]  ANCA: cANCA Negative, pANCA Negative, atypical ANCA Negative      [05-13-22 @ 18:00]  PLA2R: MARIA GUADALUPE <1.8, IFA --      [05-13-22 @ 18:00]  Free Light Chains: kappa 10.04, lambda 8.96, ratio = 1.12      [05-12 @ 08:21]  Immunofixation Serum:   IgM Lambda Band Identified    Reference Range: None Detected      [05-13-22 @ 10:55]  SPEP Interpretation: Gamma-Migrating Paraprotein Identified      [05-12-22 @ 08:21]  Immunofixation Urine: Weak Bence Howard protein Lambda type      [05-11-22 @ 20:07]  UPEP Interpretation: Mild Selective (Glomerular) Proteinuria      [11-23-21 @ 12:40]      RADIOLOGY & ADDITIONAL STUDIES:   Nephrology progress note  Patient is seen and examined, events over the last 24 h noted .  Lying in bed  had vomiting     Allergies:  penicillin (Rash)  NSAIDs (Nephrotoxicity)    Hospital Medications:   MEDICATIONS  (STANDING):  aspirin enteric coated 81 milliGRAM(s) Oral daily  atorvastatin 80 milliGRAM(s) Oral at bedtime  calcitriol   Capsule 0.25 MICROGram(s) Oral daily  gabapentin 300 milliGRAM(s) Oral three times a day  glucagon  Injectable 1 milliGRAM(s) IntraMuscular once  heparin   Injectable 5000 Unit(s) SubCutaneous every 8 hours  hydrALAZINE 75 milliGRAM(s) Oral three times a day  HYDROmorphone   Tablet 2 milliGRAM(s) Oral every 4 hours  insulin lispro (ADMELOG) corrective regimen sliding scale   SubCutaneous three times a day before meals  labetalol 200 milliGRAM(s) Oral three times a day  NIFEdipine XL 90 milliGRAM(s) Oral daily  pantoprazole    Tablet 40 milliGRAM(s) Oral before breakfast  sevelamer carbonate 800 milliGRAM(s) Oral three times a day with meals        VITALS:  T(F): 97.5 (12-11-23 @ 06:48), Max: 98 (12-10-23 @ 18:38)  HR: 76 (12-11-23 @ 07:35)  BP: 159/70 (12-11-23 @ 07:35)  RR: 18 (12-11-23 @ 06:48)  SpO2: 97% (12-11-23 @ 06:48)          PHYSICAL EXAM:  Constitutional: NAD  Respiratory: CTA  Cardiovascular: S1, S2, RRR  Gastrointestinal: distended   Extremities: No cyanosis or clubbing. No peripheral edema  :  No mcgrath.   Skin: No rashes    LABS:  12-11    141  |  96<L>  |  39<H>  ----------------------------<  52<LL>  4.5   |  26  |  6.1<HH>    Ca    8.7      11 Dec 2023 06:39  Mg     2.3     12-11    TPro  6.9  /  Alb  4.0  /  TBili  0.7  /  DBili  x   /  AST  12  /  ALT  <5  /  AlkPhos  431<H>  12-11    12-10    140  |  97<L>  |  33<H>  ----------------------------<  99  4.1   |  28  |  5.6<HH>    Ca    8.7      10 Dec 2023 01:28    TPro  7.2  /  Alb  4.5  /  TBili  0.8  /  DBili      /  AST  15  /  ALT  7   /  AlkPhos  469<H>  12-10                          12.0   4.10  )-----------( 96       ( 11 Dec 2023 06:39 )             37.1       Urine Studies:  Urinalysis Basic - ( 10 Dec 2023 01:28 )    Color:  / Appearance:  / SG:  / pH:   Gluc: 99 mg/dL / Ketone:   / Bili:  / Urobili:    Blood:  / Protein:  / Nitrite:    Leuk Esterase:  / RBC:  / WBC    Sq Epi:  / Non Sq Epi:  / Bacteria:           Iron 58, TIBC 243, %sat 24      [07-09-23 @ 16:11]  Ferritin 518      [07-09-23 @ 16:11]  PTH -- (Ca 8.5)      [11-01-23 @ 07:03]   124  PTH -- (Ca 8.5)      [10-19-23 @ 08:58]   159  PTH -- (Ca 8.9)      [06-23-23 @ 08:41]   92  Vitamin D (25OH) 23      [11-01-23 @ 07:03]  Lipid: chol 195, , , LDL --      [10-31-23 @ 06:57]    HBsAg Nonreact      [05-13-22 @ 18:00]  HCV 0.16, Nonreact      [05-13-22 @ 18:00]    OLVIN: titer 1:80, pattern Speckled      [11-04-21 @ 12:47]  dsDNA <12      [05-13-22 @ 18:00]  ANCA: cANCA Negative, pANCA Negative, atypical ANCA Negative      [05-13-22 @ 18:00]  PLA2R: MARIA GUADALUPE <1.8, IFA --      [05-13-22 @ 18:00]  Free Light Chains: kappa 10.04, lambda 8.96, ratio = 1.12      [05-12 @ 08:21]  Immunofixation Serum:   IgM Lambda Band Identified    Reference Range: None Detected      [05-13-22 @ 10:55]  SPEP Interpretation: Gamma-Migrating Paraprotein Identified      [05-12-22 @ 08:21]  Immunofixation Urine: Weak Bence Howard protein Lambda type      [05-11-22 @ 20:07]  UPEP Interpretation: Mild Selective (Glomerular) Proteinuria      [11-23-21 @ 12:40]      RADIOLOGY & ADDITIONAL STUDIES:

## 2023-12-11 NOTE — PHYSICAL THERAPY INITIAL EVALUATION ADULT - MANUAL MUSCLE TESTING RESULTS, REHAB EVAL
R UE at least 4/5. L UE at least 3/5. R LE at least 3+/5 hip flex, knee flex/ext at least 3/5, ankle NT fused . L LE at least 3/5 t/o

## 2023-12-11 NOTE — PHYSICAL THERAPY INITIAL EVALUATION ADULT - GAIT DISTANCE, PT EVAL
10'x 2 with standing rest period 2* to feeling lightheaded. Tere LEES notified and aware. Glucose 59. Pt vomiting at b/s.

## 2023-12-11 NOTE — PROGRESS NOTE ADULT - SUBJECTIVE AND OBJECTIVE BOX
SUBJECTIVE / OVERNIGHT EVENTS  Patient was seen and examined. Patient been reporting a 4 days history of vaginal bleeding. No overnight events. Patient is due for HD today    MEDICATIONS  aspirin enteric coated 81 milliGRAM(s) Oral daily  atorvastatin 80 milliGRAM(s) Oral at bedtime  calcitriol   Capsule 0.25 MICROGram(s) Oral daily  dextrose 5%. 1000 milliLiter(s) IV Continuous <Continuous>  dextrose 5%. 1000 milliLiter(s) IV Continuous <Continuous>  dextrose 50% Injectable 25 Gram(s) IV Push once  dextrose 50% Injectable 12.5 Gram(s) IV Push once  dextrose 50% Injectable 25 Gram(s) IV Push once  gabapentin 300 milliGRAM(s) Oral three times a day  glucagon  Injectable 1 milliGRAM(s) IntraMuscular once  heparin   Injectable 5000 Unit(s) SubCutaneous every 8 hours  hydrALAZINE 75 milliGRAM(s) Oral three times a day  HYDROmorphone   Tablet 2 milliGRAM(s) Oral every 4 hours  insulin lispro (ADMELOG) corrective regimen sliding scale   SubCutaneous three times a day before meals  labetalol 200 milliGRAM(s) Oral three times a day  NIFEdipine XL 90 milliGRAM(s) Oral daily  ondansetron Injectable 4 milliGRAM(s) IV Push once  pantoprazole    Tablet 40 milliGRAM(s) Oral before breakfast  sevelamer carbonate 800 milliGRAM(s) Oral three times a day with meals    dextrose Oral Gel 15 Gram(s) Oral once PRN Blood Glucose LESS THAN 70 milliGRAM(s)/deciliter  diphenhydrAMINE 50 milliGRAM(s) Oral every 6 hours PRN Rash and/or Itching    VITALS /  EXAM    T(C): 36.4 (12-11-23 @ 06:48), Max: 36.7 (12-10-23 @ 18:38)  HR: 73 (12-11-23 @ 12:10) (68 - 76)  BP: 168/73 (12-11-23 @ 12:10) (149/65 - 188/74)  RR: 16 (12-11-23 @ 12:10) (16 - 18)  SpO2: 97% (12-11-23 @ 06:48) (97% - 98%)  POCT Blood Glucose.: 77 mg/dL (12-11-23 @ 12:08)  POCT Blood Glucose.: 59 mg/dL (12-11-23 @ 11:20)  POCT Blood Glucose.: 59 mg/dL (12-11-23 @ 07:51)  POCT Blood Glucose.: 77 mg/dL (12-10-23 @ 21:54)  POCT Blood Glucose.: 79 mg/dL (12-10-23 @ 18:32)  POCT Blood Glucose.: 76 mg/dL (12-10-23 @ 17:21)    GENERAL: Tired, not in acute distress  CHEST/LUNG: Clear to auscultation bilaterally; No wheezes, rales or rhonchi  HEART: Regular rate and rhythm; No murmurs, rubs, or gallops  ABDOMEN: Distended abdomen no tenderness on palpation. No guarding  EXTREMITIES: AVF on the left upper extremity. +2 Edema bilaterally bilateral lower extremities      I's & O's     LABS             12.0   4.10  )-----------( 96       ( 12-11-23 @ 06:39 )             37.1     141  |  96  |  39  -------------------------<  52   12-11-23 @ 06:39  4.5  |  26  |  6.1    Ca      8.7     12-11-23 @ 06:39  Mg     2.3     12-11-23 @ 06:39    TPro  6.9  /  Alb  4.0  /  TBili  0.7  /  DBili  x   /  AST  12  /  ALT  <5  /  AlkPhos  431  /  GGT  x     12-11-23 @ 06:39      Troponin T, Serum: 0.06 ng/mL (12-10-23 @ 11:38)  Troponin T, Serum: 0.07 ng/mL (12-10-23 @ 01:28)                Urinalysis Basic - ( 11 Dec 2023 06:39 )    Color: x / Appearance: x / SG: x / pH: x  Gluc: 52 mg/dL / Ketone: x  / Bili: x / Urobili: x   Blood: x / Protein: x / Nitrite: x   Leuk Esterase: x / RBC: x / WBC x   Sq Epi: x / Non Sq Epi: x / Bacteria: x      MICRO / IMAGING / CARDIOLOGY  Telemetry: Reviewed   EKG: Reviewed    CULTURES    IMAGING  PACS Image:  (12-10-23 @ 13:22)  PACS Image:  (12-10-23 @ 02:11)  PACS Image:  (12-10-23 @ 01:13)    CARDIOLOGY

## 2023-12-11 NOTE — PROGRESS NOTE ADULT - ASSESSMENT
59 y/o female with PMH of Hearing loss,  ESRD on HD (T/Th/Sat), HTN, HLD, DM, PVD s/p L fem bypass, and CVA presents to the ED for left lower extremity pain.    # ESRD on HD T TH Sat   # severe PVD and occluded stents   # ascites abdominal   # HTN uncontrolled   # thrombocytopenia     - HD today 3h AVF UF 3l   - follow with vascular recs/ appreciate CS   -consider diagnostic paracentesis / doubt SBP   - BP better controlled keep current   - follow platelets    - check IP     will follow    57 y/o female with PMH of Hearing loss,  ESRD on HD (T/Th/Sat), HTN, HLD, DM, PVD s/p L fem bypass, and CVA presents to the ED for left lower extremity pain.    # ESRD on HD T TH Sat   # severe PVD and occluded stents   # ascites abdominal   # HTN uncontrolled   # thrombocytopenia     - HD today 3h AVF UF 3l   - follow with vascular recs/ appreciate CS   -consider diagnostic paracentesis / doubt SBP   - BP better controlled keep current   - follow platelets    - check IP     will follow

## 2023-12-11 NOTE — PROGRESS NOTE ADULT - ASSESSMENT
57 y/o female with PMH of Hearing loss,  ESRD on HD (T/Th/Sat), HTN, HLD, DM, PVD s/p L fem bypass, and CVA presents to the ED for left lower extremity pain.    # Left lower extremity pain  # H/o PVD s/p left femoral bypass  -Started a few days ago, worsening, unrelated to ambulation, no trauma, or evidence of infection/ inflammation  -CT angio of lower extremities showed occluded superficial femoral artery with stent, occluded popliteal  and posterior tibial arteries  -No evidence of acute occlusion or compartment syndrome  -Vascular consulted: Outpatient follow up   - Duplex bilateral lower extremities negative  - On aspirin and atorvastatin 80 mg qday    #Possible vaginal bleed?  - Patient last menstrual period reportedly at 45  - Pelvic ultrasound ordered   - OB-GYN consulted    # Troponinemia   -Patient does not have chest pain and EKG does not show any ischemic ST segment changes, not meeting criteria of ACS    # ESRD  -Patient missed HD session on Saturday  -CT of the abdomen shows moderate volume ascites  -On sevelamer with meals  - Hemodialysis today    # HTN  -C/w home antihypertensives  -BP decreased after resuming the medications  -Better control after HD session      # H/o CVA  -aspirin and atorvastatin    DVT ppx: heparin 5000 q8h  Diet: Renal  Activity: as tolerated  Dispo: medicine

## 2023-12-11 NOTE — PHYSICAL THERAPY INITIAL EVALUATION ADULT - ADDITIONAL COMMENTS
Pt lives with brother-in-law in apt, 1 step to enter, no steps inside, uses Rollator or w/c depending how she feels, has a shower chair.  Pt goes to hemodialysis. Pt does have hx of intellectual disability. Brother-in-law provided hx on PLOF. Pt had hx of CVA in past affecting L side.

## 2023-12-11 NOTE — PHYSICAL THERAPY INITIAL EVALUATION ADULT - GENERAL OBSERVATIONS, REHAB EVAL
11:06-11:32. Pt encountered semifowler in bed in NAD, is deaf/[place], brother-in-law present at b/s used sign language at b/s. Brother -in-law lives with pt. PT was agreeable to PT. Tere gave pt pain meds prior to PT. 11:06-11:32. Pt encountered semifowler in bed in NAD, is deaf/[place], brother-in-law present at b/s used sign language at b/s. Brother -in-law lives with pt. PT was agreeable to PT. Tere gave pt pain meds prior to PT.

## 2023-12-11 NOTE — PROGRESS NOTE ADULT - ATTENDING COMMENTS
Patient is a 57 y/o female with PMH of Hearing loss,  ESRD on HD (T/Th/Sat), HTN, HLD, DM, PVD s/p L fem bypass, and CVA presents to the ED for left lower extremity pain, Nausea/Vomiting/Diarrhea x 3 days associated with abdominal pain. New onset vaginal spotting x 4 days.    # Left lower extremity pain  # H/o PVD s/p left femoral bypass  -Started a few days ago, worsening, unrelated to ambulation, no trauma, or evidence of infection/ inflammation  -CT angio of lower extremities showed occluded superficial femoral artery with stent, occluded popliteal  and posterior tibial arteries  -No evidence of acute occlusion or compartment syndrome  -Vascular consulted: Outpatient follow up   - Duplex bilateral lower extremities negative  - On aspirin and atorvastatin 80 mg qday    #Vaginal bleed- obtain Pelvic US   - Patient last menstrual period reportedly at 45  - h/h monitoring   - OB-GYN consulted    # Intractable N/V/D- ? viral gastroenteritis, pt. still vomiting today   obtain blood cxs, stool cxs, s/p CT abd/pelvis- no acute GI pathology  - pt. hypoglycemic due to vomiting, start on gentle IVF hydration with dextrose to prevent hypoglycemia  - IV Zofran, IV PPI tx.  - Clear liquid diet as tolerated      # Troponin above reference range- stable levels , due to ESRD  -Patient does not have chest pain and EKG does not show any ischemic ST segment changes, not meeting criteria of ACS    # ESRD  -Patient missed HD session on Saturday  -CT of the abdomen shows moderate volume ascites  -On sevelamer with meals  - resumed on Hemodialysis tx as per Renal team    # HTN -C/w home antihypertensives    # H/o CVA-aspirin and atorvastatin    Code status: full code    #Progress Note Handoff: due to vomiting, continue IVF, monitor glu, active hypoglycemia protocol, f/up cxs, advance diet as tolerated, for vaginal bleeding, obtain transpelvic US, GYN eval   Family discussion: yes, medical team Disposition: Home__ once medically stable    Total time spent to complete patient's bedside assessment, review medical chart, discuss medical plan of care with covering medical team was more than 55 minutes with >50% of time spent face to face with patient, discussion with patient/family and/or coordination of care

## 2023-12-11 NOTE — PHYSICAL THERAPY INITIAL EVALUATION ADULT - RANGE OF MOTION EXAMINATION, REHAB EVAL
B UE's grossly WFL. R LE grossly WFL except for R ankle DF/PF pt's ankle fused, had hx of ankle surgery with screws/rods. L LE grossly WFL, + swelling noted in L great toe, 2nd toe , had hx of amptuation 3 or 4th toe difficulty to tell.

## 2023-12-12 ENCOUNTER — RESULT REVIEW (OUTPATIENT)
Age: 58
End: 2023-12-12

## 2023-12-12 LAB
ANION GAP SERPL CALC-SCNC: 18 MMOL/L — HIGH (ref 7–14)
ANION GAP SERPL CALC-SCNC: 18 MMOL/L — HIGH (ref 7–14)
BUN SERPL-MCNC: 27 MG/DL — HIGH (ref 10–20)
BUN SERPL-MCNC: 27 MG/DL — HIGH (ref 10–20)
CALCIUM SERPL-MCNC: 8.4 MG/DL — SIGNIFICANT CHANGE UP (ref 8.4–10.5)
CALCIUM SERPL-MCNC: 8.4 MG/DL — SIGNIFICANT CHANGE UP (ref 8.4–10.5)
CHLORIDE SERPL-SCNC: 96 MMOL/L — LOW (ref 98–110)
CHLORIDE SERPL-SCNC: 96 MMOL/L — LOW (ref 98–110)
CO2 SERPL-SCNC: 29 MMOL/L — SIGNIFICANT CHANGE UP (ref 17–32)
CO2 SERPL-SCNC: 29 MMOL/L — SIGNIFICANT CHANGE UP (ref 17–32)
CREAT SERPL-MCNC: 5.3 MG/DL — CRITICAL HIGH (ref 0.7–1.5)
CREAT SERPL-MCNC: 5.3 MG/DL — CRITICAL HIGH (ref 0.7–1.5)
EGFR: 9 ML/MIN/1.73M2 — LOW
EGFR: 9 ML/MIN/1.73M2 — LOW
GLUCOSE BLDC GLUCOMTR-MCNC: 108 MG/DL — HIGH (ref 70–99)
GLUCOSE BLDC GLUCOMTR-MCNC: 108 MG/DL — HIGH (ref 70–99)
GLUCOSE BLDC GLUCOMTR-MCNC: 125 MG/DL — HIGH (ref 70–99)
GLUCOSE BLDC GLUCOMTR-MCNC: 125 MG/DL — HIGH (ref 70–99)
GLUCOSE BLDC GLUCOMTR-MCNC: 79 MG/DL — SIGNIFICANT CHANGE UP (ref 70–99)
GLUCOSE BLDC GLUCOMTR-MCNC: 79 MG/DL — SIGNIFICANT CHANGE UP (ref 70–99)
GLUCOSE BLDC GLUCOMTR-MCNC: 89 MG/DL — SIGNIFICANT CHANGE UP (ref 70–99)
GLUCOSE BLDC GLUCOMTR-MCNC: 89 MG/DL — SIGNIFICANT CHANGE UP (ref 70–99)
GLUCOSE SERPL-MCNC: 85 MG/DL — SIGNIFICANT CHANGE UP (ref 70–99)
GLUCOSE SERPL-MCNC: 85 MG/DL — SIGNIFICANT CHANGE UP (ref 70–99)
HCT VFR BLD CALC: 33.1 % — LOW (ref 37–47)
HCT VFR BLD CALC: 33.1 % — LOW (ref 37–47)
HGB BLD-MCNC: 10.6 G/DL — LOW (ref 12–16)
HGB BLD-MCNC: 10.6 G/DL — LOW (ref 12–16)
MAGNESIUM SERPL-MCNC: 2.2 MG/DL — SIGNIFICANT CHANGE UP (ref 1.8–2.4)
MAGNESIUM SERPL-MCNC: 2.2 MG/DL — SIGNIFICANT CHANGE UP (ref 1.8–2.4)
MCHC RBC-ENTMCNC: 29.6 PG — SIGNIFICANT CHANGE UP (ref 27–31)
MCHC RBC-ENTMCNC: 29.6 PG — SIGNIFICANT CHANGE UP (ref 27–31)
MCHC RBC-ENTMCNC: 32 G/DL — SIGNIFICANT CHANGE UP (ref 32–37)
MCHC RBC-ENTMCNC: 32 G/DL — SIGNIFICANT CHANGE UP (ref 32–37)
MCV RBC AUTO: 92.5 FL — SIGNIFICANT CHANGE UP (ref 81–99)
MCV RBC AUTO: 92.5 FL — SIGNIFICANT CHANGE UP (ref 81–99)
NRBC # BLD: 0 /100 WBCS — SIGNIFICANT CHANGE UP (ref 0–0)
NRBC # BLD: 0 /100 WBCS — SIGNIFICANT CHANGE UP (ref 0–0)
PLATELET # BLD AUTO: 60 K/UL — LOW (ref 130–400)
PLATELET # BLD AUTO: 60 K/UL — LOW (ref 130–400)
PMV BLD: SIGNIFICANT CHANGE UP (ref 7.4–10.4)
PMV BLD: SIGNIFICANT CHANGE UP (ref 7.4–10.4)
POTASSIUM SERPL-MCNC: 4 MMOL/L — SIGNIFICANT CHANGE UP (ref 3.5–5)
POTASSIUM SERPL-MCNC: 4 MMOL/L — SIGNIFICANT CHANGE UP (ref 3.5–5)
POTASSIUM SERPL-SCNC: 4 MMOL/L — SIGNIFICANT CHANGE UP (ref 3.5–5)
POTASSIUM SERPL-SCNC: 4 MMOL/L — SIGNIFICANT CHANGE UP (ref 3.5–5)
RBC # BLD: 3.58 M/UL — LOW (ref 4.2–5.4)
RBC # BLD: 3.58 M/UL — LOW (ref 4.2–5.4)
RBC # FLD: 16.2 % — HIGH (ref 11.5–14.5)
RBC # FLD: 16.2 % — HIGH (ref 11.5–14.5)
SODIUM SERPL-SCNC: 143 MMOL/L — SIGNIFICANT CHANGE UP (ref 135–146)
SODIUM SERPL-SCNC: 143 MMOL/L — SIGNIFICANT CHANGE UP (ref 135–146)
WBC # BLD: 4 K/UL — LOW (ref 4.8–10.8)
WBC # BLD: 4 K/UL — LOW (ref 4.8–10.8)
WBC # FLD AUTO: 4 K/UL — LOW (ref 4.8–10.8)
WBC # FLD AUTO: 4 K/UL — LOW (ref 4.8–10.8)

## 2023-12-12 PROCEDURE — 99253 IP/OBS CNSLTJ NEW/EST LOW 45: CPT | Mod: 25

## 2023-12-12 PROCEDURE — 58100 BIOPSY OF UTERUS LINING: CPT

## 2023-12-12 PROCEDURE — 99233 SBSQ HOSP IP/OBS HIGH 50: CPT

## 2023-12-12 PROCEDURE — 88305 TISSUE EXAM BY PATHOLOGIST: CPT | Mod: 26

## 2023-12-12 RX ORDER — ONDANSETRON 8 MG/1
4 TABLET, FILM COATED ORAL EVERY 6 HOURS
Refills: 0 | Status: DISCONTINUED | OUTPATIENT
Start: 2023-12-12 | End: 2023-12-15

## 2023-12-12 RX ADMIN — Medication 75 MILLIGRAM(S): at 06:23

## 2023-12-12 RX ADMIN — SEVELAMER CARBONATE 800 MILLIGRAM(S): 2400 POWDER, FOR SUSPENSION ORAL at 08:22

## 2023-12-12 RX ADMIN — HYDROMORPHONE HYDROCHLORIDE 2 MILLIGRAM(S): 2 INJECTION INTRAMUSCULAR; INTRAVENOUS; SUBCUTANEOUS at 13:58

## 2023-12-12 RX ADMIN — HYDROMORPHONE HYDROCHLORIDE 2 MILLIGRAM(S): 2 INJECTION INTRAMUSCULAR; INTRAVENOUS; SUBCUTANEOUS at 22:29

## 2023-12-12 RX ADMIN — HYDROMORPHONE HYDROCHLORIDE 2 MILLIGRAM(S): 2 INJECTION INTRAMUSCULAR; INTRAVENOUS; SUBCUTANEOUS at 11:43

## 2023-12-12 RX ADMIN — CALCITRIOL 0.25 MICROGRAM(S): 0.5 CAPSULE ORAL at 11:43

## 2023-12-12 RX ADMIN — HYDROMORPHONE HYDROCHLORIDE 2 MILLIGRAM(S): 2 INJECTION INTRAMUSCULAR; INTRAVENOUS; SUBCUTANEOUS at 13:28

## 2023-12-12 RX ADMIN — Medication 90 MILLIGRAM(S): at 06:25

## 2023-12-12 RX ADMIN — Medication 200 MILLIGRAM(S): at 13:28

## 2023-12-12 RX ADMIN — GABAPENTIN 300 MILLIGRAM(S): 400 CAPSULE ORAL at 06:21

## 2023-12-12 RX ADMIN — HYDROMORPHONE HYDROCHLORIDE 2 MILLIGRAM(S): 2 INJECTION INTRAMUSCULAR; INTRAVENOUS; SUBCUTANEOUS at 06:21

## 2023-12-12 RX ADMIN — Medication 75 MILLIGRAM(S): at 22:00

## 2023-12-12 RX ADMIN — Medication 200 MILLIGRAM(S): at 21:59

## 2023-12-12 RX ADMIN — HYDROMORPHONE HYDROCHLORIDE 2 MILLIGRAM(S): 2 INJECTION INTRAMUSCULAR; INTRAVENOUS; SUBCUTANEOUS at 21:59

## 2023-12-12 RX ADMIN — ATORVASTATIN CALCIUM 80 MILLIGRAM(S): 80 TABLET, FILM COATED ORAL at 22:01

## 2023-12-12 RX ADMIN — PANTOPRAZOLE SODIUM 40 MILLIGRAM(S): 20 TABLET, DELAYED RELEASE ORAL at 06:22

## 2023-12-12 RX ADMIN — Medication 75 MILLIGRAM(S): at 13:28

## 2023-12-12 RX ADMIN — GABAPENTIN 300 MILLIGRAM(S): 400 CAPSULE ORAL at 22:00

## 2023-12-12 RX ADMIN — Medication 81 MILLIGRAM(S): at 11:43

## 2023-12-12 RX ADMIN — HYDROMORPHONE HYDROCHLORIDE 2 MILLIGRAM(S): 2 INJECTION INTRAMUSCULAR; INTRAVENOUS; SUBCUTANEOUS at 12:15

## 2023-12-12 RX ADMIN — GABAPENTIN 300 MILLIGRAM(S): 400 CAPSULE ORAL at 13:28

## 2023-12-12 RX ADMIN — SEVELAMER CARBONATE 800 MILLIGRAM(S): 2400 POWDER, FOR SUSPENSION ORAL at 11:43

## 2023-12-12 RX ADMIN — Medication 200 MILLIGRAM(S): at 06:22

## 2023-12-12 NOTE — CONSULT NOTE ADULT - ASSESSMENT
59 yo P2, postmenopausal x 14 years,  with episode of post-menopausal bleeding 57 yo P2, postmenopausal x 14 years, PMH ESRD, HTN, DM, hearing loss, PVD, with first episode of post-menopausal bleeding and ultrasound findings of thickened endometrium (7mm), not currently bleeding,  -Discussed implication of PMB with thickened endometrium on ultrasound with patient and brother-in-law. Discussed importance of ruling any cancer or precancerous changes.  -Endometrial biopsy performed without difficulty with small amount of tissue collected.   -f/u tissue pathology  -[facility]. Please include information in patient discharge.   -dispo per primary team    D/w Dr. Langley and Dr. Johnson 59 yo P2, postmenopausal x 14 years, PMH ESRD, HTN, DM, hearing loss, PVD, with first episode of post-menopausal bleeding and ultrasound findings of thickened endometrium (7mm), not currently bleeding,  -Discussed implication of PMB with thickened endometrium on ultrasound with patient and brother-in-law. Discussed importance of ruling any cancer or precancerous changes.  -Endometrial biopsy performed without difficulty with small amount of tissue collected.   -f/u tissue pathology  -pt [facility]-3 weeks. Please include information in patient discharge.   -dispo per primary team    D/w Dr. Langley and Dr. Johnson

## 2023-12-12 NOTE — CHART NOTE - NSCHARTNOTEFT_GEN_A_CORE
dx: ESRD on HD via left upper AVF    Dplx of left arm obtained to evaluate AVF.  VA Duplex Hemodialysis Access, Left (12.11.23 @ 17:11) >    Patent left upper extremity arteriovenous graft. Area of stenosis roughly   3 cm from the proximal anastomosis as described above.    Spoke with HD: no issues during hemodialysis    Plan:  - no need to do anything in regards to AVF  - follow up with Dr. Toni beal once discharged    SPECTRA 1549 dx: ESRD on HD via left upper AVF    Dplx of left arm obtained to evaluate AVF.  VA Duplex Hemodialysis Access, Left (12.11.23 @ 17:11) >    Patent left upper extremity arteriovenous graft. Area of stenosis roughly   3 cm from the proximal anastomosis as described above.    Spoke with HD: no issues during hemodialysis    Plan:  - no need to do anything in regards to AVF  - follow up with Dr. Toni beal once discharged    SPECTRA [id number]

## 2023-12-12 NOTE — PROGRESS NOTE ADULT - SUBJECTIVE AND OBJECTIVE BOX
Nephrology progress note    THIS IS AN INCOMPLETE NOTE . FULL NOTE TO FOLLOW SHORTLY    Patient is seen and examined, events over the last 24 h noted .    Allergies:  penicillin (Rash)  NSAIDs (Nephrotoxicity)    Hospital Medications:   MEDICATIONS  (STANDING):  aspirin enteric coated 81 milliGRAM(s) Oral daily  atorvastatin 80 milliGRAM(s) Oral at bedtime  calcitriol   Capsule 0.25 MICROGram(s) Oral daily  dextrose 5%. 1000 milliLiter(s) (100 mL/Hr) IV Continuous <Continuous>  dextrose 5%. 1000 milliLiter(s) (50 mL/Hr) IV Continuous <Continuous>  dextrose 50% Injectable 25 Gram(s) IV Push once  dextrose 50% Injectable 12.5 Gram(s) IV Push once  dextrose 50% Injectable 25 Gram(s) IV Push once  gabapentin 300 milliGRAM(s) Oral three times a day  glucagon  Injectable 1 milliGRAM(s) IntraMuscular once  hydrALAZINE 75 milliGRAM(s) Oral three times a day  HYDROmorphone   Tablet 2 milliGRAM(s) Oral every 4 hours  labetalol 200 milliGRAM(s) Oral three times a day  NIFEdipine XL 90 milliGRAM(s) Oral daily  pantoprazole    Tablet 40 milliGRAM(s) Oral before breakfast  sevelamer carbonate 800 milliGRAM(s) Oral three times a day with meals        VITALS:  T(F): 99.4 (12-12-23 @ 06:54), Max: 99.4 (12-12-23 @ 06:54)  HR: 73 (12-12-23 @ 06:54)  BP: 140/64 (12-12-23 @ 06:54)  RR: 16 (12-11-23 @ 21:17)  SpO2: 91% (12-12-23 @ 06:54)  Wt(kg): --    12-11 @ 07:01  -  12-12 @ 07:00  --------------------------------------------------------  IN: 0 mL / OUT: 3000 mL / NET: -3000 mL          PHYSICAL EXAM:  Constitutional: NAD  HEENT: anicteric sclera, oropharynx clear, MMM  Neck: No JVD  Respiratory: CTAB, no wheezes, rales or rhonchi  Cardiovascular: S1, S2, RRR  Gastrointestinal: BS+, soft, NT/ND  Extremities: No cyanosis or clubbing. No peripheral edema  :  No mcgrath.   Skin: No rashes    LABS:  12-11    141  |  96<L>  |  39<H>  ----------------------------<  52<LL>  4.5   |  26  |  6.1<HH>    Ca    8.7      11 Dec 2023 06:39  Mg     2.3     12-11    TPro  6.9  /  Alb  4.0  /  TBili  0.7  /  DBili      /  AST  12  /  ALT  <5  /  AlkPhos  431<H>  12-11                          12.0   4.10  )-----------( 96       ( 11 Dec 2023 06:39 )             37.1       Urine Studies:  Urinalysis Basic - ( 11 Dec 2023 06:39 )    Color:  / Appearance:  / SG:  / pH:   Gluc: 52 mg/dL / Ketone:   / Bili:  / Urobili:    Blood:  / Protein:  / Nitrite:    Leuk Esterase:  / RBC:  / WBC    Sq Epi:  / Non Sq Epi:  / Bacteria:           Iron 58, TIBC 243, %sat 24      [07-09-23 @ 16:11]  Ferritin 518      [07-09-23 @ 16:11]  PTH -- (Ca 8.5)      [11-01-23 @ 07:03]   124  PTH -- (Ca 8.5)      [10-19-23 @ 08:58]   159  PTH -- (Ca 8.9)      [06-23-23 @ 08:41]   92  Vitamin D (25OH) 23      [11-01-23 @ 07:03]  Lipid: chol 195, , , LDL --      [10-31-23 @ 06:57]    HBsAg Nonreact      [05-13-22 @ 18:00]  HCV 0.16, Nonreact      [05-13-22 @ 18:00]    OLVIN: titer 1:80, pattern Speckled      [11-04-21 @ 12:47]  dsDNA <12      [05-13-22 @ 18:00]  ANCA: cANCA Negative, pANCA Negative, atypical ANCA Negative      [05-13-22 @ 18:00]  PLA2R: MARIA GUADALUPE <1.8, IFA --      [05-13-22 @ 18:00]  Free Light Chains: kappa 10.04, lambda 8.96, ratio = 1.12      [05-12 @ 08:21]  Immunofixation Serum:   IgM Lambda Band Identified    Reference Range: None Detected      [05-13-22 @ 10:55]  SPEP Interpretation: Gamma-Migrating Paraprotein Identified      [05-12-22 @ 08:21]  Immunofixation Urine: Weak Bence Howard protein Lambda type      [05-11-22 @ 20:07]  UPEP Interpretation: Mild Selective (Glomerular) Proteinuria      [11-23-21 @ 12:40]      RADIOLOGY & ADDITIONAL STUDIES:   Nephrology progress note    Patient is seen and examined, events over the last 24 h noted .  Lying in bed comfortable      Allergies:  penicillin (Rash)  NSAIDs (Nephrotoxicity)    Hospital Medications:   MEDICATIONS  (STANDING):  aspirin enteric coated 81 milliGRAM(s) Oral daily  atorvastatin 80 milliGRAM(s) Oral at bedtime  calcitriol   Capsule 0.25 MICROGram(s) Oral daily  gabapentin 300 milliGRAM(s) Oral three times a day  glucagon  Injectable 1 milliGRAM(s) IntraMuscular once  hydrALAZINE 75 milliGRAM(s) Oral three times a day  HYDROmorphone   Tablet 2 milliGRAM(s) Oral every 4 hours  labetalol 200 milliGRAM(s) Oral three times a day  NIFEdipine XL 90 milliGRAM(s) Oral daily  pantoprazole    Tablet 40 milliGRAM(s) Oral before breakfast  sevelamer carbonate 800 milliGRAM(s) Oral three times a day with meals        VITALS:  T(F): 99.4 (12-12-23 @ 06:54), Max: 99.4 (12-12-23 @ 06:54)  HR: 73 (12-12-23 @ 06:54)  BP: 140/64 (12-12-23 @ 06:54)  RR: 16 (12-11-23 @ 21:17)  SpO2: 91% (12-12-23 @ 06:54)  Wt(kg): --    12-11 @ 07:01  -  12-12 @ 07:00  --------------------------------------------------------  IN: 0 mL / OUT: 3000 mL / NET: -3000 mL          PHYSICAL EXAM:  Constitutional: NAD  Respiratory: CTAB,   Cardiovascular: S1, S2, RRR  Gastrointestinal: BS+, soft, NT/ND  Extremities: No cyanosis or clubbing. No peripheral edema  :  No mcgrath.   Skin: No rashes    LABS:      12-11    141  |  96<L>  |  39<H>  ----------------------------<  52<LL>  4.5   |  26  |  6.1<HH>    Ca    8.7      11 Dec 2023 06:39  Mg     2.3     12-11    TPro  6.9  /  Alb  4.0  /  TBili  0.7  /  DBili      /  AST  12  /  ALT  <5  /  AlkPhos  431<H>  12-11                          12.0   4.10  )-----------( 96       ( 11 Dec 2023 06:39 )             37.1       Urine Studies:  Urinalysis Basic - ( 11 Dec 2023 06:39 )    Color:  / Appearance:  / SG:  / pH:   Gluc: 52 mg/dL / Ketone:   / Bili:  / Urobili:    Blood:  / Protein:  / Nitrite:    Leuk Esterase:  / RBC:  / WBC    Sq Epi:  / Non Sq Epi:  / Bacteria:           Iron 58, TIBC 243, %sat 24      [07-09-23 @ 16:11]  Ferritin 518      [07-09-23 @ 16:11]  PTH -- (Ca 8.5)      [11-01-23 @ 07:03]   124  PTH -- (Ca 8.5)      [10-19-23 @ 08:58]   159  PTH -- (Ca 8.9)      [06-23-23 @ 08:41]   92  Vitamin D (25OH) 23      [11-01-23 @ 07:03]  Lipid: chol 195, , , LDL --      [10-31-23 @ 06:57]    HBsAg Nonreact      [05-13-22 @ 18:00]  HCV 0.16, Nonreact      [05-13-22 @ 18:00]    OLVIN: titer 1:80, pattern Speckled      [11-04-21 @ 12:47]  dsDNA <12      [05-13-22 @ 18:00]  ANCA: cANCA Negative, pANCA Negative, atypical ANCA Negative      [05-13-22 @ 18:00]  PLA2R: MARIA GUADALUPE <1.8, IFA --      [05-13-22 @ 18:00]  Free Light Chains: kappa 10.04, lambda 8.96, ratio = 1.12      [05-12 @ 08:21]  Immunofixation Serum:   IgM Lambda Band Identified    Reference Range: None Detected      [05-13-22 @ 10:55]  SPEP Interpretation: Gamma-Migrating Paraprotein Identified      [05-12-22 @ 08:21]  Immunofixation Urine: Weak Bence Howard protein Lambda type      [05-11-22 @ 20:07]  UPEP Interpretation: Mild Selective (Glomerular) Proteinuria      [11-23-21 @ 12:40]      RADIOLOGY & ADDITIONAL STUDIES:

## 2023-12-12 NOTE — PROGRESS NOTE ADULT - ASSESSMENT
57 y/o female with PMH of Hearing loss,  ESRD on HD (T/Th/Sat), HTN, HLD, DM, PVD s/p L fem bypass, and CVA presents to the ED for left lower extremity pain.    # ESRD on HD T TH Sat   # severe PVD and occluded stents   # ascites abdominal   # HTN uncontrolled   # thrombocytopenia     - sp HD yesterday no need for HD today   - follow with vascular recs/ appreciate CS / OP follo wup   -consider diagnostic paracentesis / doubt SBP / Nx Vx better   - BP better controlled keep current   - follow platelets    - check IP     will follow

## 2023-12-12 NOTE — PROGRESS NOTE ADULT - SUBJECTIVE AND OBJECTIVE BOX
24H events:    Patient is a 58y old Female who presents with a chief complaint of Lower extremity pain (12 Dec 2023 12:12)    Primary diagnosis of Nausea & vomiting      Day 1:  Day 2:  Day 3:     Today is hospital day 2d. This morning patient was seen and examined at bedside, resting comfortably in bed.    No acute or major events overnight.    Code Status:    Family communication:  Contact date:  Name of person contacted:  Relationship to patient:  Communication details:  What matters most:    PAST MEDICAL & SURGICAL HISTORY  PVD (peripheral vascular disease)    Benign essential HTN    Intellectual disability    Hearing impaired    HLD (hyperlipidemia)    Chronic kidney disease, unspecified CKD stage    CVA (cerebral vascular accident)    DM (diabetes mellitus)    H/O vascular surgery  left leg    H/O fracture of leg    S/P arteriovenous (AV) fistula creation    S/P debridement    History of partial amputation of toe of left foot    S/P femoral-popliteal bypass surgery      SOCIAL HISTORY:  Social History:      ALLERGIES:  penicillin (Rash)  NSAIDs (Nephrotoxicity)    MEDICATIONS:  STANDING MEDICATIONS  aspirin enteric coated 81 milliGRAM(s) Oral daily  atorvastatin 80 milliGRAM(s) Oral at bedtime  calcitriol   Capsule 0.25 MICROGram(s) Oral daily  dextrose 5%. 1000 milliLiter(s) IV Continuous <Continuous>  dextrose 5%. 1000 milliLiter(s) IV Continuous <Continuous>  dextrose 50% Injectable 25 Gram(s) IV Push once  dextrose 50% Injectable 12.5 Gram(s) IV Push once  dextrose 50% Injectable 25 Gram(s) IV Push once  gabapentin 300 milliGRAM(s) Oral three times a day  glucagon  Injectable 1 milliGRAM(s) IntraMuscular once  hydrALAZINE 75 milliGRAM(s) Oral three times a day  HYDROmorphone   Tablet 2 milliGRAM(s) Oral every 4 hours  labetalol 200 milliGRAM(s) Oral three times a day  NIFEdipine XL 90 milliGRAM(s) Oral daily  pantoprazole    Tablet 40 milliGRAM(s) Oral before breakfast  sevelamer carbonate 800 milliGRAM(s) Oral three times a day with meals    PRN MEDICATIONS  dextrose Oral Gel 15 Gram(s) Oral once PRN  diphenhydrAMINE 50 milliGRAM(s) Oral every 6 hours PRN    VITALS:   T(F): 98.7  HR: 72  BP: 154/70  RR: 16  SpO2: 97%    PHYSICAL EXAM:  GENERAL: Tired, not in acute distress  CHEST/LUNG: Clear to auscultation bilaterally; No wheezes, rales or rhonchi  HEART: Regular rate and rhythm; No murmurs, rubs, or gallops  ABDOMEN: Distended abdomen no tenderness on palpation. No guarding  EXTREMITIES: AVF on the left upper extremity. +2 Edema bilaterally bilateral lower extremities    LABS:                        10.6   4.00  )-----------( 60       ( 12 Dec 2023 08:09 )             33.1     12-12    143  |  96<L>  |  27<H>  ----------------------------<  85  4.0   |  29  |  5.3<HH>    Ca    8.4      12 Dec 2023 08:09  Mg     2.2     12-12    TPro  6.9  /  Alb  4.0  /  TBili  0.7  /  DBili  x   /  AST  12  /  ALT  <5  /  AlkPhos  431<H>  12-11      Urinalysis Basic - ( 12 Dec 2023 08:09 )    Color: x / Appearance: x / SG: x / pH: x  Gluc: 85 mg/dL / Ketone: x  / Bili: x / Urobili: x   Blood: x / Protein: x / Nitrite: x   Leuk Esterase: x / RBC: x / WBC x   Sq Epi: x / Non Sq Epi: x / Bacteria: x                RADIOLOGY:

## 2023-12-12 NOTE — PROGRESS NOTE ADULT - SUBJECTIVE AND OBJECTIVE BOX
SUEAIDANSEN MANN  [facility] 009 B ([facility])        Patient was evaluated and examined  by bedside, c/o mild abdominal pain, no nausea in am, c/o dizziness, no vaginal bleeding today, no diarrhea          REVIEW OF SYSTEMS: please see pertinent positives mentioned above, all other 12 ROS negative      T(C): , Max: 37.4 (12-12-23 @ 06:54)  HR: 73 (12-12-23 @ 06:54)  BP: 140/64 (12-12-23 @ 06:54)  RR: 16 (12-11-23 @ 21:17)  SpO2: 91% (12-12-23 @ 06:54)  CAPILLARY BLOOD GLUCOSE      POCT Blood Glucose.: 79 mg/dL (12 Dec 2023 08:11)  POCT Blood Glucose.: 70 mg/dL (11 Dec 2023 16:55)  POCT Blood Glucose.: 77 mg/dL (11 Dec 2023 12:08)  POCT Blood Glucose.: 59 mg/dL (11 Dec 2023 11:20)      PHYSICAL EXAM:  General: NAD, AAOX3, patient is laying comfortably in bed  HEENT: AT, NC, Supple, NO JVD, NO CB, chronic deafness   Lungs: CTA B/L, no wheezing, no rhonchi  CVS: normal S1, S2, RRR, NO M/G/R  Abdomen: soft, bowel sounds present, non-tender, non-distended  Extremities: LUE AVF,  chronic non-pitting b/l lower  edema present, no clubbing, no cyanosis, positive peripheral pulses b/l  Neuro: no acute focal neurological deficits  Skin: no rash, no ecchymosis      LAB  CBC  Date: 12-12-23 @ 08:09  Mean cell Hcpxbuletd44.6  Mean cell Hemoglobin Conc32.0  Mean cell Volum 92.5  Platelet count-Automate 60  RBC Count 3.58  Red Cell Distrib Width16.2  WBC Count4.00  % Albumin, Urine--  Hematocrit 33.1  Hemoglobin 10.6  CBC  Date: 12-11-23 @ 06:39  Mean cell Htdayrzjpt74.2  Mean cell Hemoglobin Conc32.3  Mean cell Volum 93.2  Platelet count-Automate 96  RBC Count 3.98  Red Cell Distrib Width16.4  WBC Count4.10  % Albumin, Urine--  Hematocrit 37.1  Hemoglobin 12.0      BMP  12-12-23 @ 08:09  Blood Gas Arterial-Calcium,Ionized--  Blood Urea Nitrogen, Serum 27 mg/dL<H> [10 - 20]  Carbon Dioxide, Serum29 mmol/L [17 - 32]  Chloride, Serum96 mmol/L<L> [98 - 110]  Creatinie, Serum5.3 mg/dL<HH> [0.7 - 1.5] [Critical value:]  Glucose, Serum85 mg/dL [70 - 99]  Potassium, Serum4.0 mmol/L [3.5 - 5.0]  Sodium, Serum 143 mmol/L [135 - 146]  [facility]  12-11-23 @ 06:39  Blood Gas Arterial-Calcium,Ionized--  Blood Urea Nitrogen, Serum 39 mg/dL<H> [10 - 20]  Carbon Dioxide, Serum26 mmol/L [17 - 32]  Chloride, Serum96 mmol/L<L> [98 - 110]  Creatinie, Serum6.1 mg/dL<HH> [0.7 - 1.5] [Critical value:]  Glucose, Serum52 mg/dL<LL> [70 - 99] [TYPE:(C=Critical, N=Notification, A=Abnormal) C  TESTS: GLU  DATE/TIME CALLED: 12/11/2023 10:16:44 EST  CALLED TO: MD CHUCKIE  READ BACK (2 Patient Identifiers)(Y/N): Y  READ BACK VALUES (Y/N): Y  CALLED BY: ARCELIA]  Potassium, Serum4.5 mmol/L [3.5 - 5.0]  Sodium, Serum 141 mmol/L [135 - 146]        Medications:  aspirin enteric coated 81 milliGRAM(s) Oral daily  atorvastatin 80 milliGRAM(s) Oral at bedtime  calcitriol   Capsule 0.25 MICROGram(s) Oral daily  dextrose 5%. 1000 milliLiter(s) IV Continuous <Continuous>  dextrose 5%. 1000 milliLiter(s) IV Continuous <Continuous>  dextrose 50% Injectable 25 Gram(s) IV Push once  dextrose 50% Injectable 12.5 Gram(s) IV Push once  dextrose 50% Injectable 25 Gram(s) IV Push once  dextrose Oral Gel 15 Gram(s) Oral once PRN  diphenhydrAMINE 50 milliGRAM(s) Oral every 6 hours PRN  gabapentin 300 milliGRAM(s) Oral three times a day  glucagon  Injectable 1 milliGRAM(s) IntraMuscular once  hydrALAZINE 75 milliGRAM(s) Oral three times a day  HYDROmorphone   Tablet 2 milliGRAM(s) Oral every 4 hours  labetalol 200 milliGRAM(s) Oral three times a day  NIFEdipine XL 90 milliGRAM(s) Oral daily  pantoprazole    Tablet 40 milliGRAM(s) Oral before breakfast  sevelamer carbonate 800 milliGRAM(s) Oral three times a day with meals        Assessment and Plan:  Patient is a 57 y/o female with PMH of Hearing loss,  ESRD on HD (T/Th/Sat), HTN, HLD, DM, PVD s/p L fem bypass, and CVA presents to the ED for left lower extremity pain, Nausea/Vomiting/Diarrhea x 3 days associated with abdominal pain. New onset vaginal spotting x 4 days.    # Left lower extremity pain  # H/o PVD s/p left femoral bypass  -Started a few days ago, worsening, unrelated to ambulation, no trauma, or evidence of infection/ inflammation  -CT angio of lower extremities showed occluded superficial femoral artery with stent, occluded popliteal  and posterior tibial arteries  -No evidence of acute occlusion or compartment syndrome  -Vascular consulted: Outpatient follow up   - Duplex bilateral lower extremities negative  - On aspirin and atorvastatin 80 mg qday    #Vaginal bleed- s/p  Pelvic US - showed endometrial thickening 7mm  - Patient last menstrual period reportedly at 45  - h/h monitoring   - OB-GYN consulted- f/up rec.     # Intractable N/V/D- ? viral gastroenteritis, pt reports feeling better today, will advance to full liquid diet today   obtain blood cxs, stool cxs, s/p CT abd/pelvis- no acute GI pathology  - pt. hypoglycemic due to vomiting, start on gentle IVF hydration with dextrose to prevent hypoglycemia  - IV Zofran, IV PPI tx.    # Mild Hypoglycemia due to N/V,  po juice, hypoglycemia tx. prn. , continue close bsfs monitoring     # Thrombocytopenia- chronic, monitor daily CBC for plat. count , hold dvt proph. heparin    # Troponin above reference range- stable levels , due to ESRD  -Patient does not have chest pain and EKG does not show any ischemic ST segment changes, not meeting criteria of ACS    # ESRD  -Patient missed HD session on Saturday  -CT of the abdomen shows moderate volume ascites  -On sevelamer with meals  - resumed on Hemodialysis tx as per Renal team    # HTN -C/w home antihypertensives    # H/o CVA-aspirin and atorvastatin    Code status: full code    #Progress Note Handoff: advance pt. to full liquid diet , monitor glu,  f/up cxs,  for vaginal bleeding and endometrial thickening f/up GYN for rec. , monitor h/h.   Family discussion: yes, medical team Disposition: Home__ once medically stable    Total time spent to complete patient's bedside assessment, review medical chart, discuss medical plan of care with covering medical team was more than 55 minutes with >50% of time spent face to face with patient, discussion with patient/family and/or coordination of care .         SUEAIDANSEN MANN  [facility] 3[facility] 009 B ([facility])        Patient was evaluated and examined  by bedside, c/o mild abdominal pain, no nausea in am, c/o dizziness, no vaginal bleeding today, no diarrhea          REVIEW OF SYSTEMS: please see pertinent positives mentioned above, all other 12 ROS negative      T(C): , Max: 37.4 (12-12-23 @ 06:54)  HR: 73 (12-12-23 @ 06:54)  BP: 140/64 (12-12-23 @ 06:54)  RR: 16 (12-11-23 @ 21:17)  SpO2: 91% (12-12-23 @ 06:54)  CAPILLARY BLOOD GLUCOSE      POCT Blood Glucose.: 79 mg/dL (12 Dec 2023 08:11)  POCT Blood Glucose.: 70 mg/dL (11 Dec 2023 16:55)  POCT Blood Glucose.: 77 mg/dL (11 Dec 2023 12:08)  POCT Blood Glucose.: 59 mg/dL (11 Dec 2023 11:20)      PHYSICAL EXAM:  General: NAD, AAOX3, patient is laying comfortably in bed  HEENT: AT, NC, Supple, NO JVD, NO CB, chronic deafness   Lungs: CTA B/L, no wheezing, no rhonchi  CVS: normal S1, S2, RRR, NO M/G/R  Abdomen: soft, bowel sounds present, non-tender, non-distended  Extremities: LUE AVF,  chronic non-pitting b/l lower  edema present, no clubbing, no cyanosis, positive peripheral pulses b/l  Neuro: no acute focal neurological deficits  Skin: no rash, no ecchymosis      LAB  CBC  Date: 12-12-23 @ 08:09  Mean cell Zuoyvqwkil06.6  Mean cell Hemoglobin Conc32.0  Mean cell Volum 92.5  Platelet count-Automate 60  RBC Count 3.58  Red Cell Distrib Width16.2  WBC Count4.00  % Albumin, Urine--  Hematocrit 33.1  Hemoglobin 10.6  CBC  Date: 12-11-23 @ 06:39  Mean cell Gkclllwsjd08.2  Mean cell Hemoglobin Conc32.3  Mean cell Volum 93.2  Platelet count-Automate 96  RBC Count 3.98  Red Cell Distrib Width16.4  WBC Count4.10  % Albumin, Urine--  Hematocrit 37.1  Hemoglobin 12.0      BMP  12-12-23 @ 08:09  Blood Gas Arterial-Calcium,Ionized--  Blood Urea Nitrogen, Serum 27 mg/dL<H> [10 - 20]  Carbon Dioxide, Serum29 mmol/L [17 - 32]  Chloride, Serum96 mmol/L<L> [98 - 110]  Creatinie, Serum5.3 mg/dL<HH> [0.7 - 1.5] [Critical value:]  Glucose, Serum85 mg/dL [70 - 99]  Potassium, Serum4.0 mmol/L [3.5 - 5.0]  Sodium, Serum 143 mmol/L [135 - 146]  [facility]  12-11-23 @ 06:39  Blood Gas Arterial-Calcium,Ionized--  Blood Urea Nitrogen, Serum 39 mg/dL<H> [10 - 20]  Carbon Dioxide, Serum26 mmol/L [17 - 32]  Chloride, Serum96 mmol/L<L> [98 - 110]  Creatinie, Serum6.1 mg/dL<HH> [0.7 - 1.5] [Critical value:]  Glucose, Serum52 mg/dL<LL> [70 - 99] [TYPE:(C=Critical, N=Notification, A=Abnormal) C  TESTS: GLU  DATE/TIME CALLED: 12/11/2023 10:16:44 EST  CALLED TO: MD CHUCKIE  READ BACK (2 Patient Identifiers)(Y/N): Y  READ BACK VALUES (Y/N): Y  CALLED BY: ARCELIA]  Potassium, Serum4.5 mmol/L [3.5 - 5.0]  Sodium, Serum 141 mmol/L [135 - 146]        Medications:  aspirin enteric coated 81 milliGRAM(s) Oral daily  atorvastatin 80 milliGRAM(s) Oral at bedtime  calcitriol   Capsule 0.25 MICROGram(s) Oral daily  dextrose 5%. 1000 milliLiter(s) IV Continuous <Continuous>  dextrose 5%. 1000 milliLiter(s) IV Continuous <Continuous>  dextrose 50% Injectable 25 Gram(s) IV Push once  dextrose 50% Injectable 12.5 Gram(s) IV Push once  dextrose 50% Injectable 25 Gram(s) IV Push once  dextrose Oral Gel 15 Gram(s) Oral once PRN  diphenhydrAMINE 50 milliGRAM(s) Oral every 6 hours PRN  gabapentin 300 milliGRAM(s) Oral three times a day  glucagon  Injectable 1 milliGRAM(s) IntraMuscular once  hydrALAZINE 75 milliGRAM(s) Oral three times a day  HYDROmorphone   Tablet 2 milliGRAM(s) Oral every 4 hours  labetalol 200 milliGRAM(s) Oral three times a day  NIFEdipine XL 90 milliGRAM(s) Oral daily  pantoprazole    Tablet 40 milliGRAM(s) Oral before breakfast  sevelamer carbonate 800 milliGRAM(s) Oral three times a day with meals        Assessment and Plan:  Patient is a 59 y/o female with PMH of Hearing loss,  ESRD on HD (T/Th/Sat), HTN, HLD, DM, PVD s/p L fem bypass, and CVA presents to the ED for left lower extremity pain, Nausea/Vomiting/Diarrhea x 3 days associated with abdominal pain. New onset vaginal spotting x 4 days.    # Left lower extremity pain  # H/o PVD s/p left femoral bypass  -Started a few days ago, worsening, unrelated to ambulation, no trauma, or evidence of infection/ inflammation  -CT angio of lower extremities showed occluded superficial femoral artery with stent, occluded popliteal  and posterior tibial arteries  -No evidence of acute occlusion or compartment syndrome  -Vascular consulted: Outpatient follow up   - Duplex bilateral lower extremities negative  - On aspirin and atorvastatin 80 mg qday    #Vaginal bleed- s/p  Pelvic US - showed endometrial thickening 7mm  - Patient last menstrual period reportedly at 45  - h/h monitoring   - OB-GYN consulted- f/up rec.     # Intractable N/V/D- ? viral gastroenteritis, pt reports feeling better today, will advance to full liquid diet today   obtain blood cxs, stool cxs, s/p CT abd/pelvis- no acute GI pathology  - pt. hypoglycemic due to vomiting, start on gentle IVF hydration with dextrose to prevent hypoglycemia  - IV Zofran, IV PPI tx.    # Mild Hypoglycemia due to N/V,  po juice, hypoglycemia tx. prn. , continue close bsfs monitoring     # Thrombocytopenia- chronic, monitor daily CBC for plat. count , hold dvt proph. heparin    # Troponin above reference range- stable levels , due to ESRD  -Patient does not have chest pain and EKG does not show any ischemic ST segment changes, not meeting criteria of ACS    # ESRD  -Patient missed HD session on Saturday  -CT of the abdomen shows moderate volume ascites  -On sevelamer with meals  - resumed on Hemodialysis tx as per Renal team    # HTN -C/w home antihypertensives    # H/o CVA-aspirin and atorvastatin    Code status: full code    #Progress Note Handoff: advance pt. to full liquid diet , monitor glu,  f/up cxs,  for vaginal bleeding and endometrial thickening f/up GYN for rec. , monitor h/h.   Family discussion: yes, medical team Disposition: Home__ once medically stable    Total time spent to complete patient's bedside assessment, review medical chart, discuss medical plan of care with covering medical team was more than 55 minutes with >50% of time spent face to face with patient, discussion with patient/family and/or coordination of care .

## 2023-12-12 NOTE — PROGRESS NOTE ADULT - ASSESSMENT
59 y/o female with PMH of Hearing loss,  ESRD on HD (T/Th/Sat), HTN, HLD, DM, PVD s/p L fem bypass, and CVA presents to the ED for left lower extremity pain.    # Left lower extremity pain  # H/o PVD s/p left femoral bypass  -Started a few days ago, worsening, unrelated to ambulation, no trauma, or evidence of infection/ inflammation  -CT angio of lower extremities showed occluded superficial femoral artery with stent, occluded popliteal  and posterior tibial arteries  -No evidence of acute occlusion or compartment syndrome  -Vascular consulted: Outpatient follow up   - Duplex bilateral lower extremities negative  - On aspirin and atorvastatin 80 mg qday      #Nausea/vomiting  - viral gastroenteritis?    -advance to full liquid diet  -zofran, PPI  - CT abd/pelvis- no acute GI pathology  - gently hydration monitor sugars      #Possible vaginal bleed?  - Patient last menstrual period reportedly at 45  - Pelvic ultrasound-Thickened endometrium measuring 7 mm  - OB-GYN     # Troponinemia   -Patient does not have chest pain and EKG does not show any ischemic ST segment changes, not meeting criteria of ACS    # ESRD  -Patient missed HD session on Saturday  -CT of the abdomen shows moderate volume ascites  -On sevelamer with meals      # HTN  -C/w home antihypertensives  -BP decreased after resuming the medications  -Better control after HD session      # H/o CVA  -aspirin and atorvastatin    DVT ppx: heparin 5000 q8h  Diet: Renal  Activity: as tolerated  Dispo: medicine     57 y/o female with PMH of Hearing loss,  ESRD on HD (T/Th/Sat), HTN, HLD, DM, PVD s/p L fem bypass, and CVA presents to the ED for left lower extremity pain.    # Left lower extremity pain  # H/o PVD s/p left femoral bypass  -Started a few days ago, worsening, unrelated to ambulation, no trauma, or evidence of infection/ inflammation  -CT angio of lower extremities showed occluded superficial femoral artery with stent, occluded popliteal  and posterior tibial arteries  -No evidence of acute occlusion or compartment syndrome  -Vascular consulted: Outpatient follow up   - Duplex bilateral lower extremities negative  - On aspirin and atorvastatin 80 mg qday      #Nausea/vomiting  - viral gastroenteritis?    -advance to full liquid diet  -zofran, PPI  - CT abd/pelvis- no acute GI pathology  - gently hydration monitor sugars      #Possible vaginal bleed?  - Patient last menstrual period reportedly at 45  - Pelvic ultrasound-Thickened endometrium measuring 7 mm  - OB-GYN     # Troponinemia   -Patient does not have chest pain and EKG does not show any ischemic ST segment changes, not meeting criteria of ACS    # ESRD  -Patient missed HD session on Saturday  -CT of the abdomen shows moderate volume ascites  -On sevelamer with meals      # HTN  -C/w home antihypertensives  -BP decreased after resuming the medications  -Better control after HD session      # H/o CVA  -aspirin and atorvastatin    DVT ppx: heparin 5000 q8h  Diet: Renal  Activity: as tolerated  Dispo: medicine

## 2023-12-12 NOTE — CONSULT NOTE ADULT - SUBJECTIVE AND OBJECTIVE BOX
PGY2 Note  Chief Complaint: lower extremity pain    History obtained with sign language interpretor    HPI: 57 yo P2, postmenopausal x 15 years, PMH of Hearing loss,  ESRD on HD (//Sat), HTN, HLD, DM, PVD s/p L fem bypass, and CVA, currently admitted for LE pain, GYN consulted for vaginal bleeding. Pt reports episode of vaginal bleeding when in emergency room 2 days ago, red spotting. Denies heavy bleeding. Denies any previous episodes of PMB.         Ob/Gyn History:                   LMP -  age 45                 x2  Denies history of ovarian cysts, uterine fibroids, abnormal paps, or STIs  Last Pap Smear -   Last Mammogram -   Last Colonoscopy -      OBhx:    Denies the following: constitutional symptoms, visual symptoms, cardiovascular symptoms, respiratory symptoms, GI symptoms, musculoskeletal symptoms, skin symptoms, neurologic symptoms, hematologic symptoms, allergic symptoms, psychiatric symptoms  Except any pertinent positives listed.     Home Medications:  Albuterol (Eqv-ProAir HFA) 90 mcg/inh inhalation aerosol: 2 puff(s) inhaled every 6 hours as needed for  bronchospasm (10 Dec 2023 09:18)  NovoLOG Mix 70/30 FlexPen subcutaneous suspension: subcutaneous 3 times a day with meals- sliding scale (10 Dec 2023 09:18)      Allergies    penicillin (Rash)  NSAIDs (Nephrotoxicity)    Intolerances        PAST MEDICAL & SURGICAL HISTORY:  PVD (peripheral vascular disease)      Benign essential HTN      Intellectual disability      Hearing impaired      HLD (hyperlipidemia)      Chronic kidney disease, unspecified CKD stage      CVA (cerebral vascular accident)      DM (diabetes mellitus)      H/O vascular surgery  left leg      H/O fracture of leg      S/P arteriovenous (AV) fistula creation      S/P debridement      History of partial amputation of toe of left foot      S/P femoral-popliteal bypass surgery          FAMILY HISTORY:      SOCIAL HISTORY: Denies cigarette use, alcohol use, or illicit drug use    Vital Signs Last 24 Hrs  T(F): 98.7 (12 Dec 2023 12:01), Max: 99.4 (12 Dec 2023 06:54)  HR: 72 (12 Dec 2023 12:01) (72 - 79)  BP: 154/70 (12 Dec 2023 12:01) (140/64 - 178/72)  RR: 16 (12 Dec 2023 12:01) (16 - 18)      UO:     General Appearance - AAOx3, NAD  Heart - S1S2 regular rate and rhythm  Lung - CTA Bilaterally  Abdomen - Soft, nontender, nondistended, no rebound, no rigidity, no guarding, bowel sounds present    GYN/Pelvis:    Labia Majora - Normal  Labia Minora - Normal  Clitoris - Normal  Urethra - Normal  Vagina - Normal  Cervix - Normal    Uterus:  Size - Normal  Tenderness - None  Mass - None  Freely mobile    Adnexa:  Masses - None  Tenderness - None      Meds:   diphenhydrAMINE Injectable 25 milliGRAM(s) IV Push once  diphenhydrAMINE Injectable 25 milliGRAM(s) IV Push once  HYDROmorphone  Injectable 1 milliGRAM(s) IV Push once  HYDROmorphone  Injectable 1 milliGRAM(s) IV Push every 4 hours  morphine  - Injectable 4 milliGRAM(s) IV Push Once  morphine  - Injectable 4 milliGRAM(s) IV Push once  ondansetron Injectable 4 milliGRAM(s) IV Push once  ondansetron Injectable 4 milliGRAM(s) IV Push once  ondansetron Injectable 4 milliGRAM(s) IV Push once  ondansetron Injectable 4 milliGRAM(s) IV Push once  ondansetron Injectable 4 milliGRAM(s) IV Push once  ondansetron Injectable 4 milliGRAM(s) IV Push once  ondansetron Injectable 4 milliGRAM(s) IV Push once        LABS:                        10.6   4.00  )-----------( 60       ( 12 Dec 2023 08:09 )             33.1         12-    143  |  96<L>  |  27<H>  ----------------------------<  85  4.0   |  29  |  5.3<HH>    Ca    8.4      12 Dec 2023 08:09  Mg     2.2     12-12    TPro  6.9  /  Alb  4.0  /  TBili  0.7  /  DBili  x   /  AST  12  /  ALT  <5  /  AlkPhos  431<H>  12-11      Urinalysis Basic - ( 12 Dec 2023 08:09 )    Color: x / Appearance: x / SG: x / pH: x  Gluc: 85 mg/dL / Ketone: x  / Bili: x / Urobili: x   Blood: x / Protein: x / Nitrite: x   Leuk Esterase: x / RBC: x / WBC x   Sq Epi: x / Non Sq Epi: x / Bacteria: x      RADIOLOGY & ADDITIONAL STUDIES:  < from: US Pelvis Complete (US Pelvis Complete .) (23 @ 16:10) >    PROCEDURE DATE:  2023          INTERPRETATION:  CLINICAL INFORMATION: Vaginal bleeding    LMP: Postmenopausal    COMPARISON: Correlated with CT angiogram abdominal 12/10/2023    TECHNIQUE:  Transabdominal pelvic sonogram only. Color and Spectral Doppler was   performed.    FINDINGS:  Uterus: 6.0 cm x 2.6 cm x 3.1 cm. Within normal limits.  Endometrium: 7 mm (remeasured on PACS). Thickened.    Right ovary: Nonvisualized.  Left ovary: 1.2 cm x 0.9 cm x 1.2 cm. Within normal limits. Vascular flow   seen to the left ovary    Fluid: Moderate volume of abdominopelvic ascites.    IMPRESSION:  Thickened endometrium measuring 7 mm. Further evaluation with tissue   sampling is recommended to exclude an endometrial neoplasm.    Nonvisualization of the right ovary.    Moderate volume of abdominopelvic ascites.      < end of copied text >   PGY2 Note  Chief Complaint: lower extremity pain    History obtained with sign language interpretor    HPI: 59 yo P2, postmenopausal x 15 years, PMH of Hearing loss,  ESRD on HD (//Sat), HTN, HLD, DM, PVD s/p L fem bypass, and CVA, currently admitted for LE pain, GYN consulted for vaginal bleeding. Pt reports episode of vaginal bleeding when in emergency room 2 days ago, red spotting. Denies heavy bleeding. Denies any previous episodes of PMB.         Ob/Gyn History:                   LMP -  age 45                 x2  Denies history of ovarian cysts, uterine fibroids, abnormal paps, or STIs  Last Pap Smear -   Last Mammogram -   Last Colonoscopy -      OBhx:    Denies the following: constitutional symptoms, visual symptoms, cardiovascular symptoms, respiratory symptoms, GI symptoms, musculoskeletal symptoms, skin symptoms, neurologic symptoms, hematologic symptoms, allergic symptoms, psychiatric symptoms  Except any pertinent positives listed.     Home Medications:  Albuterol (Eqv-ProAir HFA) 90 mcg/inh inhalation aerosol: 2 puff(s) inhaled every 6 hours as needed for  bronchospasm (10 Dec 2023 09:18)  NovoLOG Mix 70/30 FlexPen subcutaneous suspension: subcutaneous 3 times a day with meals- sliding scale (10 Dec 2023 09:18)      Allergies    penicillin (Rash)  NSAIDs (Nephrotoxicity)    Intolerances        PAST MEDICAL & SURGICAL HISTORY:  PVD (peripheral vascular disease)      Benign essential HTN      Intellectual disability      Hearing impaired      HLD (hyperlipidemia)      Chronic kidney disease, unspecified CKD stage      CVA (cerebral vascular accident)      DM (diabetes mellitus)      H/O vascular surgery  left leg      H/O fracture of leg      S/P arteriovenous (AV) fistula creation      S/P debridement      History of partial amputation of toe of left foot      S/P femoral-popliteal bypass surgery          FAMILY HISTORY:      SOCIAL HISTORY: Denies cigarette use, alcohol use, or illicit drug use    Vital Signs Last 24 Hrs  T(F): 98.7 (12 Dec 2023 12:01), Max: 99.4 (12 Dec 2023 06:54)  HR: 72 (12 Dec 2023 12:01) (72 - 79)  BP: 154/70 (12 Dec 2023 12:01) (140/64 - 178/72)  RR: 16 (12 Dec 2023 12:01) (16 - 18)      UO:     General Appearance - AAOx3, NAD  Heart - S1S2 regular rate and rhythm  Lung - CTA Bilaterally  Abdomen - Soft, nontender, nondistended, no rebound, no rigidity, no guarding, bowel sounds present    GYN/Pelvis:    Labia Majora - Normal  Labia Minora - Normal  Clitoris - Normal  Urethra - Normal  Vagina - Normal  Cervix - Normal    Uterus:  Size - Normal  Tenderness - None  Mass - None  Freely mobile    Adnexa:  Masses - None  Tenderness - None      Meds:   diphenhydrAMINE Injectable 25 milliGRAM(s) IV Push once  diphenhydrAMINE Injectable 25 milliGRAM(s) IV Push once  HYDROmorphone  Injectable 1 milliGRAM(s) IV Push once  HYDROmorphone  Injectable 1 milliGRAM(s) IV Push every 4 hours  morphine  - Injectable 4 milliGRAM(s) IV Push Once  morphine  - Injectable 4 milliGRAM(s) IV Push once  ondansetron Injectable 4 milliGRAM(s) IV Push once  ondansetron Injectable 4 milliGRAM(s) IV Push once  ondansetron Injectable 4 milliGRAM(s) IV Push once  ondansetron Injectable 4 milliGRAM(s) IV Push once  ondansetron Injectable 4 milliGRAM(s) IV Push once  ondansetron Injectable 4 milliGRAM(s) IV Push once  ondansetron Injectable 4 milliGRAM(s) IV Push once        LABS:                        10.6   4.00  )-----------( 60       ( 12 Dec 2023 08:09 )             33.1         12-    143  |  96<L>  |  27<H>  ----------------------------<  85  4.0   |  29  |  5.3<HH>    Ca    8.4      12 Dec 2023 08:09  Mg     2.2     12-12    TPro  6.9  /  Alb  4.0  /  TBili  0.7  /  DBili  x   /  AST  12  /  ALT  <5  /  AlkPhos  431<H>  12-11      Urinalysis Basic - ( 12 Dec 2023 08:09 )    Color: x / Appearance: x / SG: x / pH: x  Gluc: 85 mg/dL / Ketone: x  / Bili: x / Urobili: x   Blood: x / Protein: x / Nitrite: x   Leuk Esterase: x / RBC: x / WBC x   Sq Epi: x / Non Sq Epi: x / Bacteria: x      RADIOLOGY & ADDITIONAL STUDIES:  < from: US Pelvis Complete (US Pelvis Complete .) (23 @ 16:10) >    PROCEDURE DATE:  2023          INTERPRETATION:  CLINICAL INFORMATION: Vaginal bleeding    LMP: Postmenopausal    COMPARISON: Correlated with CT angiogram abdominal 12/10/2023    TECHNIQUE:  Transabdominal pelvic sonogram only. Color and Spectral Doppler was   performed.    FINDINGS:  Uterus: 6.0 cm x 2.6 cm x 3.1 cm. Within normal limits.  Endometrium: 7 mm (remeasured on PACS). Thickened.    Right ovary: Nonvisualized.  Left ovary: 1.2 cm x 0.9 cm x 1.2 cm. Within normal limits. Vascular flow   seen to the left ovary    Fluid: Moderate volume of abdominopelvic ascites.    IMPRESSION:  Thickened endometrium measuring 7 mm. Further evaluation with tissue   sampling is recommended to exclude an endometrial neoplasm.    Nonvisualization of the right ovary.    Moderate volume of abdominopelvic ascites.      < end of copied text >   PGY2 Note  Chief Complaint: lower extremity pain    History obtained with  and brother-in-law assistance.    HPI: 59 yo P2, postmenopausal x 15 years, PMH of Hearing loss,  ESRD on HD (/Sat), HTN, HLD, DM, PVD s/p L fem bypass, and CVA, currently admitted for LE pain, GYN consulted for vaginal bleeding. Pt reports episode of vaginal bleeding when 2 days ago, noted red spotting on underwear. Denies heavy bleeding. Denies any previous episodes of PMB. Pt reports h/o  x2. Has not seen a GYN in many years, unsure of the last time she had a pap smear. Denies any knowledge of abnormal pap, fibroids, cysts, STI. Denies vaginal pain, itching, burning. Not currently sexually active since death of her  several years ago.     Denies the following: constitutional symptoms, visual symptoms, cardiovascular symptoms, respiratory symptoms, GI symptoms, musculoskeletal symptoms, skin symptoms, neurologic symptoms, hematologic symptoms, allergic symptoms, psychiatric symptoms  Except any pertinent positives listed.     Ob/Gyn History:                   LMP -  age 45                 x2  Last Pap Smear - unknown  Last Mammogram - unknown    Home Medications:  Albuterol (Eqv-ProAir HFA) 90 mcg/inh inhalation aerosol: 2 puff(s) inhaled every 6 hours as needed for  bronchospasm (10 Dec 2023 09:18)  NovoLOG Mix 70/30 FlexPen subcutaneous suspension: subcutaneous 3 times a day with meals- sliding scale (10 Dec 2023 09:18)      Allergies    penicillin (Rash)  NSAIDs (Nephrotoxicity)    Intolerances      PAST MEDICAL & SURGICAL HISTORY:  PVD (peripheral vascular disease)      Benign essential HTN      Intellectual disability      Hearing impaired      HLD (hyperlipidemia)      Chronic kidney disease, unspecified CKD stage      CVA (cerebral vascular accident)      DM (diabetes mellitus)      H/O vascular surgery  left leg      H/O fracture of leg      S/P arteriovenous (AV) fistula creation      S/P debridement      History of partial amputation of toe of left foot      S/P femoral-popliteal bypass surgery          FAMILY HISTORY:      SOCIAL HISTORY: Denies cigarette use, alcohol use, or illicit drug use    Vital Signs Last 24 Hrs  T(F): 98.7 (12 Dec 2023 12:01), Max: 99.4 (12 Dec 2023 06:54)  HR: 72 (12 Dec 2023 12:01) (72 - 79)  BP: 154/70 (12 Dec 2023 12:01) (140/64 - 178/72)  RR: 16 (12 Dec 2023 12:01) (16 - 18)      General Appearance - AAOx3, NAD  Abdomen - Soft, mild generalized tenderness, mildly distended,  no rebound, no rigidity, no guarding, bowel sounds present    GYN/Pelvis:  External -normal  Internal - atrophic vagina, normal appearing cervix. Cervix closed, no bleeding noted.   uterus - 6 week sized, non tender  Adnexa - non palpable, non tender      Meds:   diphenhydrAMINE Injectable 25 milliGRAM(s) IV Push once  diphenhydrAMINE Injectable 25 milliGRAM(s) IV Push once  HYDROmorphone  Injectable 1 milliGRAM(s) IV Push once  HYDROmorphone  Injectable 1 milliGRAM(s) IV Push every 4 hours  morphine  - Injectable 4 milliGRAM(s) IV Push Once  morphine  - Injectable 4 milliGRAM(s) IV Push once  ondansetron Injectable 4 milliGRAM(s) IV Push once  ondansetron Injectable 4 milliGRAM(s) IV Push once  ondansetron Injectable 4 milliGRAM(s) IV Push once  ondansetron Injectable 4 milliGRAM(s) IV Push once  ondansetron Injectable 4 milliGRAM(s) IV Push once  ondansetron Injectable 4 milliGRAM(s) IV Push once  ondansetron Injectable 4 milliGRAM(s) IV Push once        LABS:                        10.6   4.00  )-----------( 60       ( 12 Dec 2023 08:09 )             33.1         12-12    143  |  96<L>  |  27<H>  ----------------------------<  85  4.0   |  29  |  5.3<HH>    Ca    8.4      12 Dec 2023 08:09  Mg     2.2     12-12    TPro  6.9  /  Alb  4.0  /  TBili  0.7  /  DBili  x   /  AST  12  /  ALT  <5  /  AlkPhos  431<H>  12-11      Urinalysis Basic - ( 12 Dec 2023 08:09 )    Color: x / Appearance: x / SG: x / pH: x  Gluc: 85 mg/dL / Ketone: x  / Bili: x / Urobili: x   Blood: x / Protein: x / Nitrite: x   Leuk Esterase: x / RBC: x / WBC x   Sq Epi: x / Non Sq Epi: x / Bacteria: x      RADIOLOGY & ADDITIONAL STUDIES:  < from: US Pelvis Complete (US Pelvis Complete .) (23 @ 16:10) >    PROCEDURE DATE:  2023          INTERPRETATION:  CLINICAL INFORMATION: Vaginal bleeding    LMP: Postmenopausal    COMPARISON: Correlated with CT angiogram abdominal 12/10/2023    TECHNIQUE:  Transabdominal pelvic sonogram only. Color and Spectral Doppler was   performed.    FINDINGS:  Uterus: 6.0 cm x 2.6 cm x 3.1 cm. Within normal limits.  Endometrium: 7 mm (remeasured on PACS). Thickened.    Right ovary: Nonvisualized.  Left ovary: 1.2 cm x 0.9 cm x 1.2 cm. Within normal limits. Vascular flow   seen to the left ovary    Fluid: Moderate volume of abdominopelvic ascites.    IMPRESSION:  Thickened endometrium measuring 7 mm. Further evaluation with tissue   sampling is recommended to exclude an endometrial neoplasm.    Nonvisualization of the right ovary.    Moderate volume of abdominopelvic ascites.      < end of copied text >    PROCEDURE  R/B/A of procedure obtained, all questions answered, consent obtained. Pt placed in lithotomy position, speculum placed, cervix visualized, betadine used to clean the cervix, endometrial biopsy pipelle easily placed through cervix to 7cm, 3 passes made with the pipelle, pt tolerated the procedure well, no bleeding or pain post procedure. Specimen taken to pathology.

## 2023-12-13 LAB
-  STAPHYLOCOCCUS EPIDERMIDIS: SIGNIFICANT CHANGE UP
ALBUMIN SERPL ELPH-MCNC: 4 G/DL — SIGNIFICANT CHANGE UP (ref 3.5–5.2)
ALBUMIN SERPL ELPH-MCNC: 4 G/DL — SIGNIFICANT CHANGE UP (ref 3.5–5.2)
ALP SERPL-CCNC: 368 U/L — HIGH (ref 30–115)
ALP SERPL-CCNC: 368 U/L — HIGH (ref 30–115)
ALT FLD-CCNC: <5 U/L — SIGNIFICANT CHANGE UP (ref 0–41)
ALT FLD-CCNC: <5 U/L — SIGNIFICANT CHANGE UP (ref 0–41)
AMMONIA BLD-MCNC: 62 UMOL/L — HIGH (ref 11–55)
AMMONIA BLD-MCNC: 62 UMOL/L — HIGH (ref 11–55)
ANION GAP SERPL CALC-SCNC: 16 MMOL/L — HIGH (ref 7–14)
ANION GAP SERPL CALC-SCNC: 16 MMOL/L — HIGH (ref 7–14)
AST SERPL-CCNC: 12 U/L — SIGNIFICANT CHANGE UP (ref 0–41)
AST SERPL-CCNC: 12 U/L — SIGNIFICANT CHANGE UP (ref 0–41)
BASOPHILS # BLD AUTO: 0.04 K/UL — SIGNIFICANT CHANGE UP (ref 0–0.2)
BASOPHILS # BLD AUTO: 0.04 K/UL — SIGNIFICANT CHANGE UP (ref 0–0.2)
BASOPHILS NFR BLD AUTO: 0.9 % — SIGNIFICANT CHANGE UP (ref 0–1)
BASOPHILS NFR BLD AUTO: 0.9 % — SIGNIFICANT CHANGE UP (ref 0–1)
BCA 255 TISS QL IMSTN: 16.9 U/ML — SIGNIFICANT CHANGE UP
BCA 255 TISS QL IMSTN: 16.9 U/ML — SIGNIFICANT CHANGE UP
BILIRUB SERPL-MCNC: 0.7 MG/DL — SIGNIFICANT CHANGE UP (ref 0.2–1.2)
BILIRUB SERPL-MCNC: 0.7 MG/DL — SIGNIFICANT CHANGE UP (ref 0.2–1.2)
BUN SERPL-MCNC: 31 MG/DL — HIGH (ref 10–20)
BUN SERPL-MCNC: 31 MG/DL — HIGH (ref 10–20)
CALCIUM SERPL-MCNC: 8.6 MG/DL — SIGNIFICANT CHANGE UP (ref 8.4–10.5)
CALCIUM SERPL-MCNC: 8.6 MG/DL — SIGNIFICANT CHANGE UP (ref 8.4–10.5)
CANCER AG125 SERPL-ACNC: 350 U/ML — HIGH
CANCER AG125 SERPL-ACNC: 350 U/ML — HIGH
CANCER AG19-9 SERPL-ACNC: <2 U/ML — SIGNIFICANT CHANGE UP
CANCER AG19-9 SERPL-ACNC: <2 U/ML — SIGNIFICANT CHANGE UP
CEA SERPL-MCNC: 6 NG/ML — HIGH (ref 0–3.8)
CEA SERPL-MCNC: 6 NG/ML — HIGH (ref 0–3.8)
CHLORIDE SERPL-SCNC: 95 MMOL/L — LOW (ref 98–110)
CHLORIDE SERPL-SCNC: 95 MMOL/L — LOW (ref 98–110)
CO2 SERPL-SCNC: 28 MMOL/L — SIGNIFICANT CHANGE UP (ref 17–32)
CO2 SERPL-SCNC: 28 MMOL/L — SIGNIFICANT CHANGE UP (ref 17–32)
CREAT SERPL-MCNC: 6.1 MG/DL — CRITICAL HIGH (ref 0.7–1.5)
CREAT SERPL-MCNC: 6.1 MG/DL — CRITICAL HIGH (ref 0.7–1.5)
EGFR: 7 ML/MIN/1.73M2 — LOW
EGFR: 7 ML/MIN/1.73M2 — LOW
EOSINOPHIL # BLD AUTO: 0.05 K/UL — SIGNIFICANT CHANGE UP (ref 0–0.7)
EOSINOPHIL # BLD AUTO: 0.05 K/UL — SIGNIFICANT CHANGE UP (ref 0–0.7)
EOSINOPHIL NFR BLD AUTO: 1.1 % — SIGNIFICANT CHANGE UP (ref 0–8)
EOSINOPHIL NFR BLD AUTO: 1.1 % — SIGNIFICANT CHANGE UP (ref 0–8)
GAS PNL BLDA: SIGNIFICANT CHANGE UP
GAS PNL BLDA: SIGNIFICANT CHANGE UP
GLUCOSE BLDC GLUCOMTR-MCNC: 119 MG/DL — HIGH (ref 70–99)
GLUCOSE BLDC GLUCOMTR-MCNC: 119 MG/DL — HIGH (ref 70–99)
GLUCOSE BLDC GLUCOMTR-MCNC: 159 MG/DL — HIGH (ref 70–99)
GLUCOSE BLDC GLUCOMTR-MCNC: 159 MG/DL — HIGH (ref 70–99)
GLUCOSE SERPL-MCNC: 126 MG/DL — HIGH (ref 70–99)
GLUCOSE SERPL-MCNC: 126 MG/DL — HIGH (ref 70–99)
GRAM STN FLD: ABNORMAL
GRAM STN FLD: ABNORMAL
HCT VFR BLD CALC: 35.2 % — LOW (ref 37–47)
HCT VFR BLD CALC: 35.2 % — LOW (ref 37–47)
HGB BLD-MCNC: 11.4 G/DL — LOW (ref 12–16)
HGB BLD-MCNC: 11.4 G/DL — LOW (ref 12–16)
IMM GRANULOCYTES NFR BLD AUTO: 0.2 % — SIGNIFICANT CHANGE UP (ref 0.1–0.3)
IMM GRANULOCYTES NFR BLD AUTO: 0.2 % — SIGNIFICANT CHANGE UP (ref 0.1–0.3)
LYMPHOCYTES # BLD AUTO: 0.51 K/UL — LOW (ref 1.2–3.4)
LYMPHOCYTES # BLD AUTO: 0.51 K/UL — LOW (ref 1.2–3.4)
LYMPHOCYTES # BLD AUTO: 11.3 % — LOW (ref 20.5–51.1)
LYMPHOCYTES # BLD AUTO: 11.3 % — LOW (ref 20.5–51.1)
MAGNESIUM SERPL-MCNC: 2.3 MG/DL — SIGNIFICANT CHANGE UP (ref 1.8–2.4)
MAGNESIUM SERPL-MCNC: 2.3 MG/DL — SIGNIFICANT CHANGE UP (ref 1.8–2.4)
MCHC RBC-ENTMCNC: 29.8 PG — SIGNIFICANT CHANGE UP (ref 27–31)
MCHC RBC-ENTMCNC: 29.8 PG — SIGNIFICANT CHANGE UP (ref 27–31)
MCHC RBC-ENTMCNC: 32.4 G/DL — SIGNIFICANT CHANGE UP (ref 32–37)
MCHC RBC-ENTMCNC: 32.4 G/DL — SIGNIFICANT CHANGE UP (ref 32–37)
MCV RBC AUTO: 91.9 FL — SIGNIFICANT CHANGE UP (ref 81–99)
MCV RBC AUTO: 91.9 FL — SIGNIFICANT CHANGE UP (ref 81–99)
METHOD TYPE: SIGNIFICANT CHANGE UP
MONOCYTES # BLD AUTO: 0.54 K/UL — SIGNIFICANT CHANGE UP (ref 0.1–0.6)
MONOCYTES # BLD AUTO: 0.54 K/UL — SIGNIFICANT CHANGE UP (ref 0.1–0.6)
MONOCYTES NFR BLD AUTO: 11.9 % — HIGH (ref 1.7–9.3)
MONOCYTES NFR BLD AUTO: 11.9 % — HIGH (ref 1.7–9.3)
NEUTROPHILS # BLD AUTO: 3.38 K/UL — SIGNIFICANT CHANGE UP (ref 1.4–6.5)
NEUTROPHILS # BLD AUTO: 3.38 K/UL — SIGNIFICANT CHANGE UP (ref 1.4–6.5)
NEUTROPHILS NFR BLD AUTO: 74.6 % — SIGNIFICANT CHANGE UP (ref 42.2–75.2)
NEUTROPHILS NFR BLD AUTO: 74.6 % — SIGNIFICANT CHANGE UP (ref 42.2–75.2)
NRBC # BLD: 0 /100 WBCS — SIGNIFICANT CHANGE UP (ref 0–0)
NRBC # BLD: 0 /100 WBCS — SIGNIFICANT CHANGE UP (ref 0–0)
PLATELET # BLD AUTO: 56 K/UL — LOW (ref 130–400)
PLATELET # BLD AUTO: 56 K/UL — LOW (ref 130–400)
PMV BLD: 12.7 FL — HIGH (ref 7.4–10.4)
PMV BLD: 12.7 FL — HIGH (ref 7.4–10.4)
POTASSIUM SERPL-MCNC: 4.2 MMOL/L — SIGNIFICANT CHANGE UP (ref 3.5–5)
POTASSIUM SERPL-MCNC: 4.2 MMOL/L — SIGNIFICANT CHANGE UP (ref 3.5–5)
POTASSIUM SERPL-SCNC: 4.2 MMOL/L — SIGNIFICANT CHANGE UP (ref 3.5–5)
POTASSIUM SERPL-SCNC: 4.2 MMOL/L — SIGNIFICANT CHANGE UP (ref 3.5–5)
PROT SERPL-MCNC: 6.8 G/DL — SIGNIFICANT CHANGE UP (ref 6–8)
PROT SERPL-MCNC: 6.8 G/DL — SIGNIFICANT CHANGE UP (ref 6–8)
RBC # BLD: 3.83 M/UL — LOW (ref 4.2–5.4)
RBC # BLD: 3.83 M/UL — LOW (ref 4.2–5.4)
RBC # FLD: 15.9 % — HIGH (ref 11.5–14.5)
RBC # FLD: 15.9 % — HIGH (ref 11.5–14.5)
SODIUM SERPL-SCNC: 139 MMOL/L — SIGNIFICANT CHANGE UP (ref 135–146)
SODIUM SERPL-SCNC: 139 MMOL/L — SIGNIFICANT CHANGE UP (ref 135–146)
SPECIMEN SOURCE: SIGNIFICANT CHANGE UP
SPECIMEN SOURCE: SIGNIFICANT CHANGE UP
SURGICAL PATHOLOGY STUDY: SIGNIFICANT CHANGE UP
SURGICAL PATHOLOGY STUDY: SIGNIFICANT CHANGE UP
WBC # BLD: 4.53 K/UL — LOW (ref 4.8–10.8)
WBC # BLD: 4.53 K/UL — LOW (ref 4.8–10.8)
WBC # FLD AUTO: 4.53 K/UL — LOW (ref 4.8–10.8)
WBC # FLD AUTO: 4.53 K/UL — LOW (ref 4.8–10.8)

## 2023-12-13 PROCEDURE — 99233 SBSQ HOSP IP/OBS HIGH 50: CPT | Mod: 25

## 2023-12-13 PROCEDURE — 99291 CRITICAL CARE FIRST HOUR: CPT

## 2023-12-13 PROCEDURE — 93010 ELECTROCARDIOGRAM REPORT: CPT

## 2023-12-13 RX ORDER — NALOXONE HYDROCHLORIDE 4 MG/.1ML
0.4 SPRAY NASAL ONCE
Refills: 0 | Status: DISCONTINUED | OUTPATIENT
Start: 2023-12-13 | End: 2023-12-13

## 2023-12-13 RX ORDER — HEPARIN SODIUM 5000 [USP'U]/ML
5000 INJECTION INTRAVENOUS; SUBCUTANEOUS EVERY 8 HOURS
Refills: 0 | Status: DISCONTINUED | OUTPATIENT
Start: 2023-12-13 | End: 2023-12-15

## 2023-12-13 RX ORDER — NALOXONE HYDROCHLORIDE 4 MG/.1ML
2 SPRAY NASAL ONCE
Refills: 0 | Status: COMPLETED | OUTPATIENT
Start: 2023-12-13 | End: 2023-12-13

## 2023-12-13 RX ORDER — CHLORHEXIDINE GLUCONATE 213 G/1000ML
1 SOLUTION TOPICAL
Refills: 0 | Status: DISCONTINUED | OUTPATIENT
Start: 2023-12-13 | End: 2023-12-15

## 2023-12-13 RX ORDER — NALOXONE HYDROCHLORIDE 4 MG/.1ML
2 SPRAY NASAL ONCE
Refills: 0 | Status: DISCONTINUED | OUTPATIENT
Start: 2023-12-13 | End: 2023-12-13

## 2023-12-13 RX ADMIN — NALOXONE HYDROCHLORIDE 2 MILLIGRAM(S): 4 SPRAY NASAL at 06:45

## 2023-12-13 RX ADMIN — ATORVASTATIN CALCIUM 80 MILLIGRAM(S): 80 TABLET, FILM COATED ORAL at 22:26

## 2023-12-13 RX ADMIN — CHLORHEXIDINE GLUCONATE 1 APPLICATION(S): 213 SOLUTION TOPICAL at 22:30

## 2023-12-13 RX ADMIN — HYDROMORPHONE HYDROCHLORIDE 2 MILLIGRAM(S): 2 INJECTION INTRAMUSCULAR; INTRAVENOUS; SUBCUTANEOUS at 03:05

## 2023-12-13 RX ADMIN — HEPARIN SODIUM 5000 UNIT(S): 5000 INJECTION INTRAVENOUS; SUBCUTANEOUS at 22:30

## 2023-12-13 RX ADMIN — Medication 200 MILLIGRAM(S): at 22:26

## 2023-12-13 RX ADMIN — Medication 75 MILLIGRAM(S): at 22:26

## 2023-12-13 NOTE — PROGRESS NOTE ADULT - ASSESSMENT
59 y/o female with PMH of Hearing loss,  ESRD on HD (T/Th/Sat), HTN, HLD, DM, PVD s/p L fem bypass, and CVA presents to the ED for left lower extremity pain.    # ESRD on HD T TH Sat outpatient  # Severe PVD and occluded stents   # Hypervolemia / abdominal ascites  # Uncontrolled HTN  # Thrombocytopenia     Rapid response this am for AMS, was on high dose dilaudid, improved with narcan  - HD today, 3hrs, 3K+ bath, 3L UF  - appreciate vascular sx f/u regarding AVG stenosis, functioning, needs to follow up with Dr. Muñoz outpatient   - BP controlled  - follow platelets    - check IP, on sevelamer, renal diet  - hgb at goal, no need for HANNAH      will follow

## 2023-12-13 NOTE — PROGRESS NOTE ADULT - ATTENDING COMMENTS
59 y/o female with PMH of Hearing loss,  ESRD on HD (T/Th/Sat), HTN, HLD, DM, PVD s/p L fem bypass, and CVA presents to the ED for left lower extremity pain, Nausea/Vomiting/Diarrhea x 3 days associated with abdominal pain. New onset vaginal spotting x 4 days.    # Left lower extremity pain H/o PVD s/p left femoral bypass-No evidence of acute occlusion or compartment syndrome  #asterixis today - due to uremia? gabapentin?   # ESRD T/TH/Sat  #Vaginal bleed- s/p  Pelvic US - showed endometrial thickening 7mm  # Intractable N/V/D- viral gastroenteritis resolved   # Mild Hypoglycemia   # Thrombocytopenia  # Troponin above reference range- stable levels , due to ESRD  # opiod insentivity - rapid response on 12/13 s.p narcan   # HTN    Plan     - CT angio of lower extremities showed occluded superficial femoral artery with stent, occluded popliteal  and posterior tibial arteries  - Con aspirin and atorvastatin 80 mg qday  - C/w home antihypertensives  - dc gabapentin- could be cause of asterixis   - avoid opiods   - tolerating full diet   - check ammonia levels   - monitor thrombocytopenia   - dvt ppx  - Discussed with nephro ok to resumed on Hemodialysis today  - Vascular consulted: Outpatient follow up   - OB-GYN consulted- f/up rec. endometrial biopsy inconclusive. will need outpatient follow up         Code status: full code    #Progress Note Handoff:dialysis, asterixis dwayne, anticipate   # spoke to patient using  Patricia Cunningham discussion: yes, medical team Disposition: Home__ once medically stable

## 2023-12-13 NOTE — PROGRESS NOTE ADULT - SUBJECTIVE AND OBJECTIVE BOX
24H events:    Patient is a 58y old Female who presents with a chief complaint of Lower extremity pain (12 Dec 2023 12:39)    Primary diagnosis of Nausea & vomiting      Day 1:  Day 2:  Day 3:     Today is hospital day 3d. This morning patient was seen and examined at bedside, resting comfortably in bed.    No acute or major events overnight.    Code Status:    Family communication:  Contact date:  Name of person contacted:  Relationship to patient:  Communication details:  What matters most:    PAST MEDICAL & SURGICAL HISTORY  PVD (peripheral vascular disease)    Benign essential HTN    Intellectual disability    Hearing impaired    HLD (hyperlipidemia)    Chronic kidney disease, unspecified CKD stage    CVA (cerebral vascular accident)    DM (diabetes mellitus)    H/O vascular surgery  left leg    H/O fracture of leg    S/P arteriovenous (AV) fistula creation    S/P debridement    History of partial amputation of toe of left foot    S/P femoral-popliteal bypass surgery      SOCIAL HISTORY:  Social History:      ALLERGIES:  penicillin (Rash)  NSAIDs (Nephrotoxicity)    MEDICATIONS:  STANDING MEDICATIONS  aspirin enteric coated 81 milliGRAM(s) Oral daily  atorvastatin 80 milliGRAM(s) Oral at bedtime  calcitriol   Capsule 0.25 MICROGram(s) Oral daily  dextrose 5%. 1000 milliLiter(s) IV Continuous <Continuous>  dextrose 5%. 1000 milliLiter(s) IV Continuous <Continuous>  dextrose 50% Injectable 25 Gram(s) IV Push once  dextrose 50% Injectable 12.5 Gram(s) IV Push once  dextrose 50% Injectable 25 Gram(s) IV Push once  gabapentin 300 milliGRAM(s) Oral three times a day  glucagon  Injectable 1 milliGRAM(s) IntraMuscular once  hydrALAZINE 75 milliGRAM(s) Oral three times a day  labetalol 200 milliGRAM(s) Oral three times a day  NIFEdipine XL 90 milliGRAM(s) Oral daily  pantoprazole    Tablet 40 milliGRAM(s) Oral before breakfast  sevelamer carbonate 800 milliGRAM(s) Oral three times a day with meals    PRN MEDICATIONS  dextrose Oral Gel 15 Gram(s) Oral once PRN  diphenhydrAMINE 50 milliGRAM(s) Oral every 6 hours PRN  naloxone Injectable 0.4 milliGRAM(s) IV Push once PRN  naloxone Injectable 0.4 milliGRAM(s) IV Push once PRN  ondansetron Injectable 4 milliGRAM(s) IV Push every 6 hours PRN    VITALS:   T(F): 98.8  HR: 67  BP: 142/65  RR: 18  SpO2: 97%    PHYSICAL EXAM:  GENERAL: Tired, not in acute distress  CHEST/LUNG: Clear to auscultation bilaterally; No wheezes, rales or rhonchi  HEART: Regular rate and rhythm; No murmurs, rubs, or gallops  ABDOMEN: Distended abdomen no tenderness on palpation. No guarding  EXTREMITIES: AVF on the left upper extremity. +2 Edema bilaterally bilateral lower extremities    LABS:                        11.4   4.53  )-----------( 56       ( 13 Dec 2023 06:50 )             35.2     12-13    139  |  95<L>  |  31<H>  ----------------------------<  126<H>  4.2   |  28  |  6.1<HH>    Ca    8.6      13 Dec 2023 06:50  Mg     2.3     12-13    TPro  6.8  /  Alb  4.0  /  TBili  0.7  /  DBili  x   /  AST  12  /  ALT  <5  /  AlkPhos  368<H>  12-13      Urinalysis Basic - ( 13 Dec 2023 06:50 )    Color: x / Appearance: x / SG: x / pH: x  Gluc: 126 mg/dL / Ketone: x  / Bili: x / Urobili: x   Blood: x / Protein: x / Nitrite: x   Leuk Esterase: x / RBC: x / WBC x   Sq Epi: x / Non Sq Epi: x / Bacteria: x      ABG - ( 13 Dec 2023 06:35 )  pH, Arterial: 7.42  pH, Blood: x     /  pCO2: 50    /  pO2: 460   / HCO3: 32    / Base Excess: 6.8   /  SaO2: 99.1                      RADIOLOGY:           24H events:    Patient is a 58y old Female who presents with a chief complaint of Lower extremity pain (12 Dec 2023 12:39)    Primary diagnosis of Nausea & vomiting      Day 1:  Day 2:  Day 3:     Today is hospital day 3d. This morning patient was seen and examined at bedside, resting comfortably in bed.    rapid respone called for ams overnight given narcan    Code Status:    PAST MEDICAL & SURGICAL HISTORY  PVD (peripheral vascular disease)    Benign essential HTN    Intellectual disability    Hearing impaired    HLD (hyperlipidemia)    Chronic kidney disease, unspecified CKD stage    CVA (cerebral vascular accident)    DM (diabetes mellitus)    H/O vascular surgery  left leg    H/O fracture of leg    S/P arteriovenous (AV) fistula creation    S/P debridement    History of partial amputation of toe of left foot    S/P femoral-popliteal bypass surgery        ALLERGIES:  penicillin (Rash)  NSAIDs (Nephrotoxicity)    MEDICATIONS:  STANDING MEDICATIONS  aspirin enteric coated 81 milliGRAM(s) Oral daily  atorvastatin 80 milliGRAM(s) Oral at bedtime  calcitriol   Capsule 0.25 MICROGram(s) Oral daily  dextrose 5%. 1000 milliLiter(s) IV Continuous <Continuous>  dextrose 5%. 1000 milliLiter(s) IV Continuous <Continuous>  dextrose 50% Injectable 25 Gram(s) IV Push once  dextrose 50% Injectable 12.5 Gram(s) IV Push once  dextrose 50% Injectable 25 Gram(s) IV Push once  gabapentin 300 milliGRAM(s) Oral three times a day  glucagon  Injectable 1 milliGRAM(s) IntraMuscular once  hydrALAZINE 75 milliGRAM(s) Oral three times a day  labetalol 200 milliGRAM(s) Oral three times a day  NIFEdipine XL 90 milliGRAM(s) Oral daily  pantoprazole    Tablet 40 milliGRAM(s) Oral before breakfast  sevelamer carbonate 800 milliGRAM(s) Oral three times a day with meals    PRN MEDICATIONS  dextrose Oral Gel 15 Gram(s) Oral once PRN  diphenhydrAMINE 50 milliGRAM(s) Oral every 6 hours PRN  naloxone Injectable 0.4 milliGRAM(s) IV Push once PRN  naloxone Injectable 0.4 milliGRAM(s) IV Push once PRN  ondansetron Injectable 4 milliGRAM(s) IV Push every 6 hours PRN    VITALS:   T(F): 98.8  HR: 67  BP: 142/65  RR: 18  SpO2: 97%    PHYSICAL EXAM:  GENERAL: Tired, not in acute distress  CHEST/LUNG: Clear to auscultation bilaterally; No wheezes, rales or rhonchi  HEART: Regular rate and rhythm; No murmurs, rubs, or gallops  ABDOMEN: Distended abdomen no tenderness on palpation. No guarding  EXTREMITIES: AVF on the left upper extremity. +2 Edema bilaterally bilateral lower extremities + asterixis    LABS:                        11.4   4.53  )-----------( 56       ( 13 Dec 2023 06:50 )             35.2     12-13    139  |  95<L>  |  31<H>  ----------------------------<  126<H>  4.2   |  28  |  6.1<HH>    Ca    8.6      13 Dec 2023 06:50  Mg     2.3     12-13    TPro  6.8  /  Alb  4.0  /  TBili  0.7  /  DBili  x   /  AST  12  /  ALT  <5  /  AlkPhos  368<H>  12-13      Urinalysis Basic - ( 13 Dec 2023 06:50 )    Color: x / Appearance: x / SG: x / pH: x  Gluc: 126 mg/dL / Ketone: x  / Bili: x / Urobili: x   Blood: x / Protein: x / Nitrite: x   Leuk Esterase: x / RBC: x / WBC x   Sq Epi: x / Non Sq Epi: x / Bacteria: x      ABG - ( 13 Dec 2023 06:35 )  pH, Arterial: 7.42  pH, Blood: x     /  pCO2: 50    /  pO2: 460   / HCO3: 32    / Base Excess: 6.8   /  SaO2: 99.1                      RADIOLOGY:  < from: [facility] Duplex Hemodialysis Access, Left (12.11.23 @ 17:11) >  Impression:  Patent left upper extremity arteriovenous graft. Area of stenosis roughly   3 cm from the proximal anastomosis as described above.    --- End of Report ---    < end of copied text >  < from: US Pelvis Complete (US Pelvis Complete .) (12.11.23 @ 16:10) >  IMPRESSION:  Thickened endometrium measuring 7 mm. Further evaluation with tissue   sampling is recommended to exclude an endometrial neoplasm.    Nonvisualization of the right ovary.    Moderate volume of abdominopelvic ascites.    --- End of Report ---    < end of copied text >  < from: CT Angio Abd Aorta w/run-off w/ IV Cont (12.10.23 @ 02:11) >  IMPRESSION:      No acute intra-abdominal pathology. Moderate to large volume ascites.    RIGHT LOWER EXTREMITY:  Right superficial femoral femoral artery proximal severe stenosis, and   occlusion of 4-5 cm segment of mid vessel with reconstitution distally.  Patent anterior tibial artery with apparent occlusion of posterior tibial   and peroneal artery.    LEFT LOWER EXTREMITY:  Patent left femoral to anterior tibial artery graft.  Occluded superficial femoral artery with stent.  Occluded popliteal   artery.  Patent left anterior tibial artery and mid to-distal peroneal artery.   Occluded left posterior tibial artery.    --- End of Report ---    < end of copied text >  < from: Xray Chest 1 View- PORTABLE-Urgent (12.10.23 @ 01:13) >  IMPRESSION:    Stable diffuse vascular congestion and right-sided opacity/effusion.    --- End of Report ---    < end of copied text >

## 2023-12-13 NOTE — PROGRESS NOTE ADULT - ASSESSMENT
57 y/o female with PMH of Hearing loss,  ESRD on HD (T/Th/Sat), HTN, HLD, DM, PVD s/p L fem bypass, and CVA presents to the ED for left lower extremity pain.    # Left lower extremity pain  # H/o PVD s/p left femoral bypass  -Started a few days ago, worsening, unrelated to ambulation, no trauma, or evidence of infection/ inflammation  -CT angio of lower extremities showed occluded superficial femoral artery with stent, occluded popliteal  and posterior tibial arteries  -No evidence of acute occlusion or compartment syndrome  -Vascular consulted: Outpatient follow up   - Duplex bilateral lower extremities negative  - On aspirin and atorvastatin 80 mg qday      #Nausea/vomiting  - viral gastroenteritis?    - CT abd/pelvis- no acute GI pathology  -Zofran with meals  -ppi  -advance to nephro diet- tolerating well      #Possible vaginal bleed?  - Patient last menstrual period reportedly at 45  - Pelvic ultrasound-Thickened endometrium measuring 7 mm  -Endometrial biopsy performed-f/u tissue pathology  -[facility]    # Troponinemia   -Patient does not have chest pain and EKG does not show any ischemic ST segment changes, not meeting criteria of ACS    # ESRD  -Patient missed HD session on Saturday  -CT of the abdomen shows moderate volume ascites  -On sevelamer with meals  -HD tomorrow due to rapid response AM- Dc all opiods     # HTN  -C/w home antihypertensives  -BP decreased after resuming the medications  -Better control after HD session      # H/o CVA  -aspirin and atorvastatin    DVT ppx: heparin 5000 q8h  Diet: Renal  Activity: as tolerated  Dispo: medicine

## 2023-12-13 NOTE — RAPID RESPONSE TEAM SUMMARY - NSOTHERINTERVENTIONSRRT_GEN_ALL_CORE
Anesthesia called at bedside for possible intubation to protect airway, but patient gained back consciousness, and back to her baseline.   Attending Attestation:    I have personally and independently provided 45 minutes of critical care services. This excludes any time spent on separate procedures or teaching.

## 2023-12-13 NOTE — RAPID RESPONSE TEAM SUMMARY - NSADDTLFINDINGSRRT_GEN_ALL_CORE
Patient was getting standing Dilaudid 2mg every 4 hours, patient was unresponsive to stimuli, pupils were constricted, opioid overdose was suspected.  Narcan was administered and few minutes later, patient gain back consciousness, back to baseline.  Will keep narcan at bedside, and will d/c all dilaudid meds.   Patient was getting standing Dilaudid 2mg every 4 hours, patient was unresponsive to stimuli, pupils were constricted, opioid overdose was suspected.  Narcan was administered and few minutes later, patient gain back consciousness, back to baseline.  Will keep narcan at bedside, and will d/c all dilaudid meds.    Anesthesia called at bedside for possible intubation to protect airway, but patient gained back consciousness, and back to her baseline.

## 2023-12-13 NOTE — CHART NOTE - NSCHARTNOTEFT_GEN_A_CORE
The patient had a rapid response for change of mental status this morning and seems unstable for HD today. Will reassess her in AM for HD tomorrow.

## 2023-12-13 NOTE — PROGRESS NOTE ADULT - SUBJECTIVE AND OBJECTIVE BOX
Nephrology Progress Note    SEN ANN  MRN-[id number]  58y  Female    S:  Patient is seen and examined, events over the last 24h noted.    O:  Allergies:  penicillin (Rash)  NSAIDs (Nephrotoxicity)    Hospital Medications:   MEDICATIONS  (STANDING):  aspirin enteric coated 81 milliGRAM(s) Oral daily  atorvastatin 80 milliGRAM(s) Oral at bedtime  calcitriol   Capsule 0.25 MICROGram(s) Oral daily  gabapentin 300 milliGRAM(s) Oral three times a day  hydrALAZINE 75 milliGRAM(s) Oral three times a day  labetalol 200 milliGRAM(s) Oral three times a day  NIFEdipine XL 90 milliGRAM(s) Oral daily  pantoprazole    Tablet 40 milliGRAM(s) Oral before breakfast  sevelamer carbonate 800 milliGRAM(s) Oral three times a day with meals    MEDICATIONS  (PRN):  dextrose Oral Gel 15 Gram(s) Oral once PRN Blood Glucose LESS THAN 70 milliGRAM(s)/deciliter  diphenhydrAMINE 50 milliGRAM(s) Oral every 6 hours PRN Rash and/or Itching  naloxone Injectable 0.4 milliGRAM(s) IV Push once PRN overdose  naloxone Injectable 0.4 milliGRAM(s) IV Push once PRN overdose  ondansetron Injectable 4 milliGRAM(s) IV Push every 6 hours PRN Nausea and/or Vomiting    Home Medications:  Albuterol (Eqv-ProAir HFA) 90 mcg/inh inhalation aerosol: 2 puff(s) inhaled every 6 hours as needed for  bronchospasm (10 Dec 2023 09:18)  NovoLOG Mix 70/30 FlexPen subcutaneous suspension: subcutaneous 3 times a day with meals- sliding scale (10 Dec 2023 09:18)      VITALS:  T(F): 98.8 (12-13-23 @ 05:07), Max: 98.8 (12-13-23 @ 05:07)  HR: 67 (12-13-23 @ 05:07)  BP: 142/65 (12-13-23 @ 06:55)  RR: 18 (12-13-23 @ 05:07)  SpO2: 97% (12-13-23 @ 05:07)  Wt(kg): --  I&O's Detail    I&O's Summary        PHYSICAL EXAM:  Gen: NAD  Chest: b/l breath sounds  Abd: soft  Extremities: +edema  Vascular access: LUE AVG      LABS:  ABG - ( 13 Dec 2023 06:35 )  pH, Arterial: 7.42  pH, Blood: x     /  pCO2: 50    /  pO2: 460   / HCO3: 32    / Base Excess: 6.8   /  SaO2: 99.1      12-13    139  |  95<L>  |  31<H>  ----------------------------<  126<H>  4.2   |  28  |  6.1<HH>    Ca    8.6      13 Dec 2023 06:50  Mg     2.3     12-13    TPro  6.8  /  Alb  4.0  /  TBili  0.7  /  DBili      /  AST  12  /  ALT  <5  /  AlkPhos  368<H>  12-13    Phosphorus: 4.2 mg/dL (11-02-23 @ 06:15)  Phosphorus: 3.9 mg/dL (11-01-23 @ 07:03)                          11.4   4.53  )-----------( 56       ( 13 Dec 2023 06:50 )             35.2     Mean Cell Volume: 91.9 fL (12-13-23 @ 06:50)   Nephrology Progress Note    SEN ANN  MRN-[id number]  58y  Female    S:  Patient is seen and examined, events over the last 24h noted.    O:  Allergies:  penicillin (Rash)  NSAIDs (Nephrotoxicity)    Hospital Medications:   MEDICATIONS  (STANDING):  aspirin enteric coated 81 milliGRAM(s) Oral daily  atorvastatin 80 milliGRAM(s) Oral at bedtime  calcitriol   Capsule 0.25 MICROGram(s) Oral daily  gabapentin 300 milliGRAM(s) Oral three times a day  hydrALAZINE 75 milliGRAM(s) Oral three times a day  labetalol 200 milliGRAM(s) Oral three times a day  NIFEdipine XL 90 milliGRAM(s) Oral daily  pantoprazole    Tablet 40 milliGRAM(s) Oral before breakfast  sevelamer carbonate 800 milliGRAM(s) Oral three times a day with meals    MEDICATIONS  (PRN):  dextrose Oral Gel 15 Gram(s) Oral once PRN Blood Glucose LESS THAN 70 milliGRAM(s)/deciliter  diphenhydrAMINE 50 milliGRAM(s) Oral every 6 hours PRN Rash and/or Itching  naloxone Injectable 0.4 milliGRAM(s) IV Push once PRN overdose  naloxone Injectable 0.4 milliGRAM(s) IV Push once PRN overdose  ondansetron Injectable 4 milliGRAM(s) IV Push every 6 hours PRN Nausea and/or Vomiting    Home Medications:  Albuterol (Eqv-ProAir HFA) 90 mcg/inh inhalation aerosol: 2 puff(s) inhaled every 6 hours as needed for  bronchospasm (10 Dec 2023 09:18)  NovoLOG Mix 70/30 FlexPen subcutaneous suspension: subcutaneous 3 times a day with meals- sliding scale (10 Dec 2023 09:18)      VITALS:  T(F): 98.8 (12-13-23 @ 05:07), Max: 98.8 (12-13-23 @ 05:07)  HR: 67 (12-13-23 @ 05:07)  BP: 142/65 (12-13-23 @ 06:55)  RR: 18 (12-13-23 @ 05:07)  SpO2: 97% (12-13-23 @ 05:07)  Wt(kg): --  I&O's Detail    I&O's Summary        PHYSICAL EXAM:  Gen: NAD  Chest: b/l breath sounds  Abd: soft  Extremities: +edema  Vascular access: LUE AVG      LABS:  ABG - ( 13 Dec 2023 06:35 )  pH, Arterial: 7.42  pH, Blood: x     /  pCO2: 50    /  pO2: 460   / HCO3: 32    / Base Excess: 6.8   /  SaO2: 99.1      12-13    139  |  95<L>  |  31<H>  ----------------------------<  126<H>  4.2   |  28  |  6.1<HH>    Ca    8.6      13 Dec 2023 06:50  Mg     2.3     12-13    TPro  6.8  /  Alb  4.0  /  TBili  0.7  /  DBili      /  AST  12  /  ALT  <5  /  AlkPhos  368<H>  12-13    Phosphorus: 4.2 mg/dL (11-02-23 @ 06:15)  Phosphorus: 3.9 mg/dL (11-01-23 @ 07:03)                          11.4   4.53  )-----------( 56       ( 13 Dec 2023 06:50 )             35.2     Mean Cell Volume: 91.9 fL (12-13-23 @ 06:50)

## 2023-12-13 NOTE — RAPID RESPONSE TEAM SUMMARY - NSSITUATIONBACKGROUNDRRT_GEN_ALL_CORE
Patient is a 59 y/o female with PMH of Hearing loss,  ESRD on HD (T/Th/Sat), HTN, HLD, DM, PVD s/p L fem bypass, and CVA presents to the ED for left lower extremity pain.  Rapid was called for unresponsiveness.      Patient is a 57 y/o female with PMH of Hearing loss,  ESRD on HD (T/Th/Sat), HTN, HLD, DM, PVD s/p L fem bypass, and CVA presents to the ED for left lower extremity pain.  Rapid was called for unresponsiveness.

## 2023-12-14 ENCOUNTER — RESULT REVIEW (OUTPATIENT)
Age: 58
End: 2023-12-14

## 2023-12-14 DIAGNOSIS — I70.262 ATHEROSCLEROSIS OF NATIVE ARTERIES OF EXTREMITIES WITH GANGRENE, LEFT LEG: ICD-10-CM

## 2023-12-14 DIAGNOSIS — I50.30 UNSPECIFIED DIASTOLIC (CONGESTIVE) HEART FAILURE: ICD-10-CM

## 2023-12-14 DIAGNOSIS — E78.00 PURE HYPERCHOLESTEROLEMIA, UNSPECIFIED: ICD-10-CM

## 2023-12-14 DIAGNOSIS — R07.89 OTHER CHEST PAIN: ICD-10-CM

## 2023-12-14 DIAGNOSIS — N18.9 CHRONIC KIDNEY DISEASE, UNSPECIFIED: ICD-10-CM

## 2023-12-14 DIAGNOSIS — Z01.810 ENCOUNTER FOR PREPROCEDURAL CARDIOVASCULAR EXAMINATION: ICD-10-CM

## 2023-12-14 LAB
ALBUMIN SERPL ELPH-MCNC: 4.2 G/DL — SIGNIFICANT CHANGE UP (ref 3.5–5.2)
ALBUMIN SERPL ELPH-MCNC: 4.2 G/DL — SIGNIFICANT CHANGE UP (ref 3.5–5.2)
ALP SERPL-CCNC: 369 U/L — HIGH (ref 30–115)
ALP SERPL-CCNC: 369 U/L — HIGH (ref 30–115)
ALT FLD-CCNC: <5 U/L — SIGNIFICANT CHANGE UP (ref 0–41)
ALT FLD-CCNC: <5 U/L — SIGNIFICANT CHANGE UP (ref 0–41)
ANION GAP SERPL CALC-SCNC: 14 MMOL/L — SIGNIFICANT CHANGE UP (ref 7–14)
ANION GAP SERPL CALC-SCNC: 14 MMOL/L — SIGNIFICANT CHANGE UP (ref 7–14)
AST SERPL-CCNC: 13 U/L — SIGNIFICANT CHANGE UP (ref 0–41)
AST SERPL-CCNC: 13 U/L — SIGNIFICANT CHANGE UP (ref 0–41)
B PERT IGG+IGM PNL SER: ABNORMAL
B PERT IGG+IGM PNL SER: ABNORMAL
BASOPHILS # BLD AUTO: 0.04 K/UL — SIGNIFICANT CHANGE UP (ref 0–0.2)
BASOPHILS # BLD AUTO: 0.04 K/UL — SIGNIFICANT CHANGE UP (ref 0–0.2)
BASOPHILS NFR BLD AUTO: 0.8 % — SIGNIFICANT CHANGE UP (ref 0–1)
BASOPHILS NFR BLD AUTO: 0.8 % — SIGNIFICANT CHANGE UP (ref 0–1)
BILIRUB SERPL-MCNC: 0.8 MG/DL — SIGNIFICANT CHANGE UP (ref 0.2–1.2)
BILIRUB SERPL-MCNC: 0.8 MG/DL — SIGNIFICANT CHANGE UP (ref 0.2–1.2)
BUN SERPL-MCNC: 20 MG/DL — SIGNIFICANT CHANGE UP (ref 10–20)
BUN SERPL-MCNC: 20 MG/DL — SIGNIFICANT CHANGE UP (ref 10–20)
CALCIUM SERPL-MCNC: 8.5 MG/DL — SIGNIFICANT CHANGE UP (ref 8.4–10.4)
CALCIUM SERPL-MCNC: 8.5 MG/DL — SIGNIFICANT CHANGE UP (ref 8.4–10.4)
CHLORIDE SERPL-SCNC: 93 MMOL/L — LOW (ref 98–110)
CHLORIDE SERPL-SCNC: 93 MMOL/L — LOW (ref 98–110)
CO2 SERPL-SCNC: 31 MMOL/L — SIGNIFICANT CHANGE UP (ref 17–32)
CO2 SERPL-SCNC: 31 MMOL/L — SIGNIFICANT CHANGE UP (ref 17–32)
COLOR FLD: SIGNIFICANT CHANGE UP
COLOR FLD: SIGNIFICANT CHANGE UP
CREAT SERPL-MCNC: 4.6 MG/DL — CRITICAL HIGH (ref 0.7–1.5)
CREAT SERPL-MCNC: 4.6 MG/DL — CRITICAL HIGH (ref 0.7–1.5)
CULTURE RESULTS: ABNORMAL
CULTURE RESULTS: ABNORMAL
EGFR: 10 ML/MIN/1.73M2 — LOW
EGFR: 10 ML/MIN/1.73M2 — LOW
EOSINOPHIL # BLD AUTO: 0.09 K/UL — SIGNIFICANT CHANGE UP (ref 0–0.7)
EOSINOPHIL # BLD AUTO: 0.09 K/UL — SIGNIFICANT CHANGE UP (ref 0–0.7)
EOSINOPHIL NFR BLD AUTO: 1.8 % — SIGNIFICANT CHANGE UP (ref 0–8)
EOSINOPHIL NFR BLD AUTO: 1.8 % — SIGNIFICANT CHANGE UP (ref 0–8)
FLUID INTAKE SUBSTANCE CLASS: SIGNIFICANT CHANGE UP
FLUID INTAKE SUBSTANCE CLASS: SIGNIFICANT CHANGE UP
GLUCOSE SERPL-MCNC: 91 MG/DL — SIGNIFICANT CHANGE UP (ref 70–99)
GLUCOSE SERPL-MCNC: 91 MG/DL — SIGNIFICANT CHANGE UP (ref 70–99)
HCT VFR BLD CALC: 34.8 % — LOW (ref 37–47)
HCT VFR BLD CALC: 34.8 % — LOW (ref 37–47)
HGB BLD-MCNC: 11.5 G/DL — LOW (ref 12–16)
HGB BLD-MCNC: 11.5 G/DL — LOW (ref 12–16)
IMM GRANULOCYTES NFR BLD AUTO: 0.4 % — HIGH (ref 0.1–0.3)
IMM GRANULOCYTES NFR BLD AUTO: 0.4 % — HIGH (ref 0.1–0.3)
LYMPHOCYTES # BLD AUTO: 0.77 K/UL — LOW (ref 1.2–3.4)
LYMPHOCYTES # BLD AUTO: 0.77 K/UL — LOW (ref 1.2–3.4)
LYMPHOCYTES # BLD AUTO: 15.3 % — LOW (ref 20.5–51.1)
LYMPHOCYTES # BLD AUTO: 15.3 % — LOW (ref 20.5–51.1)
LYMPHOCYTES # FLD: 47 — SIGNIFICANT CHANGE UP
LYMPHOCYTES # FLD: 47 — SIGNIFICANT CHANGE UP
MAGNESIUM SERPL-MCNC: 2 MG/DL — SIGNIFICANT CHANGE UP (ref 1.8–2.4)
MAGNESIUM SERPL-MCNC: 2 MG/DL — SIGNIFICANT CHANGE UP (ref 1.8–2.4)
MCHC RBC-ENTMCNC: 30 PG — SIGNIFICANT CHANGE UP (ref 27–31)
MCHC RBC-ENTMCNC: 30 PG — SIGNIFICANT CHANGE UP (ref 27–31)
MCHC RBC-ENTMCNC: 33 G/DL — SIGNIFICANT CHANGE UP (ref 32–37)
MCHC RBC-ENTMCNC: 33 G/DL — SIGNIFICANT CHANGE UP (ref 32–37)
MCV RBC AUTO: 90.9 FL — SIGNIFICANT CHANGE UP (ref 81–99)
MCV RBC AUTO: 90.9 FL — SIGNIFICANT CHANGE UP (ref 81–99)
MESOTHL CELL # FLD: 3 % — SIGNIFICANT CHANGE UP
MESOTHL CELL # FLD: 3 % — SIGNIFICANT CHANGE UP
MONOCYTES # BLD AUTO: 0.62 K/UL — HIGH (ref 0.1–0.6)
MONOCYTES # BLD AUTO: 0.62 K/UL — HIGH (ref 0.1–0.6)
MONOCYTES NFR BLD AUTO: 12.3 % — HIGH (ref 1.7–9.3)
MONOCYTES NFR BLD AUTO: 12.3 % — HIGH (ref 1.7–9.3)
MONOS+MACROS # FLD: 42 % — SIGNIFICANT CHANGE UP
MONOS+MACROS # FLD: 42 % — SIGNIFICANT CHANGE UP
NEUTROPHILS # BLD AUTO: 3.49 K/UL — SIGNIFICANT CHANGE UP (ref 1.4–6.5)
NEUTROPHILS # BLD AUTO: 3.49 K/UL — SIGNIFICANT CHANGE UP (ref 1.4–6.5)
NEUTROPHILS NFR BLD AUTO: 69.4 % — SIGNIFICANT CHANGE UP (ref 42.2–75.2)
NEUTROPHILS NFR BLD AUTO: 69.4 % — SIGNIFICANT CHANGE UP (ref 42.2–75.2)
NEUTROPHILS-BODY FLUID: 8 % — SIGNIFICANT CHANGE UP
NEUTROPHILS-BODY FLUID: 8 % — SIGNIFICANT CHANGE UP
NRBC # BLD: 0 /100 WBCS — SIGNIFICANT CHANGE UP (ref 0–0)
NRBC # BLD: 0 /100 WBCS — SIGNIFICANT CHANGE UP (ref 0–0)
ORGANISM # SPEC MICROSCOPIC CNT: ABNORMAL
ORGANISM # SPEC MICROSCOPIC CNT: ABNORMAL
ORGANISM # SPEC MICROSCOPIC CNT: SIGNIFICANT CHANGE UP
ORGANISM # SPEC MICROSCOPIC CNT: SIGNIFICANT CHANGE UP
PLATELET # BLD AUTO: 63 K/UL — LOW (ref 130–400)
PLATELET # BLD AUTO: 63 K/UL — LOW (ref 130–400)
PMV BLD: SIGNIFICANT CHANGE UP (ref 7.4–10.4)
PMV BLD: SIGNIFICANT CHANGE UP (ref 7.4–10.4)
POTASSIUM SERPL-MCNC: 3.7 MMOL/L — SIGNIFICANT CHANGE UP (ref 3.5–5)
POTASSIUM SERPL-MCNC: 3.7 MMOL/L — SIGNIFICANT CHANGE UP (ref 3.5–5)
POTASSIUM SERPL-SCNC: 3.7 MMOL/L — SIGNIFICANT CHANGE UP (ref 3.5–5)
POTASSIUM SERPL-SCNC: 3.7 MMOL/L — SIGNIFICANT CHANGE UP (ref 3.5–5)
PROT SERPL-MCNC: 6.6 G/DL — SIGNIFICANT CHANGE UP (ref 6–8)
PROT SERPL-MCNC: 6.6 G/DL — SIGNIFICANT CHANGE UP (ref 6–8)
RBC # BLD: 3.83 M/UL — LOW (ref 4.2–5.4)
RBC # BLD: 3.83 M/UL — LOW (ref 4.2–5.4)
RBC # FLD: 15.9 % — HIGH (ref 11.5–14.5)
RBC # FLD: 15.9 % — HIGH (ref 11.5–14.5)
RCV VOL RI: HIGH /UL (ref 0–0)
RCV VOL RI: HIGH /UL (ref 0–0)
SODIUM SERPL-SCNC: 138 MMOL/L — SIGNIFICANT CHANGE UP (ref 135–146)
SODIUM SERPL-SCNC: 138 MMOL/L — SIGNIFICANT CHANGE UP (ref 135–146)
SPECIMEN SOURCE: SIGNIFICANT CHANGE UP
SPECIMEN SOURCE: SIGNIFICANT CHANGE UP
TOTAL NUCLEATED CELL COUNT, BODY FLUID: 376 /UL — SIGNIFICANT CHANGE UP
TOTAL NUCLEATED CELL COUNT, BODY FLUID: 376 /UL — SIGNIFICANT CHANGE UP
TUBE TYPE: SIGNIFICANT CHANGE UP
TUBE TYPE: SIGNIFICANT CHANGE UP
WBC # BLD: 5.03 K/UL — SIGNIFICANT CHANGE UP (ref 4.8–10.8)
WBC # BLD: 5.03 K/UL — SIGNIFICANT CHANGE UP (ref 4.8–10.8)
WBC # FLD AUTO: 5.03 K/UL — SIGNIFICANT CHANGE UP (ref 4.8–10.8)
WBC # FLD AUTO: 5.03 K/UL — SIGNIFICANT CHANGE UP (ref 4.8–10.8)

## 2023-12-14 PROCEDURE — 99233 SBSQ HOSP IP/OBS HIGH 50: CPT

## 2023-12-14 PROCEDURE — 88305 TISSUE EXAM BY PATHOLOGIST: CPT | Mod: 26

## 2023-12-14 PROCEDURE — 88112 CYTOPATH CELL ENHANCE TECH: CPT | Mod: 26

## 2023-12-14 RX ORDER — ACETAMINOPHEN 500 MG
975 TABLET ORAL EVERY 8 HOURS
Refills: 0 | Status: DISCONTINUED | OUTPATIENT
Start: 2023-12-14 | End: 2023-12-15

## 2023-12-14 RX ORDER — HYDRALAZINE HCL 50 MG
5 TABLET ORAL ONCE
Refills: 0 | Status: COMPLETED | OUTPATIENT
Start: 2023-12-14 | End: 2023-12-14

## 2023-12-14 RX ORDER — NORTRIPTYLINE HYDROCHLORIDE 10 MG/1
10 CAPSULE ORAL AT BEDTIME
Refills: 0 | Status: DISCONTINUED | OUTPATIENT
Start: 2023-12-14 | End: 2023-12-15

## 2023-12-14 RX ADMIN — PANTOPRAZOLE SODIUM 40 MILLIGRAM(S): 20 TABLET, DELAYED RELEASE ORAL at 06:46

## 2023-12-14 RX ADMIN — CHLORHEXIDINE GLUCONATE 1 APPLICATION(S): 213 SOLUTION TOPICAL at 05:29

## 2023-12-14 RX ADMIN — HEPARIN SODIUM 5000 UNIT(S): 5000 INJECTION INTRAVENOUS; SUBCUTANEOUS at 05:29

## 2023-12-14 RX ADMIN — NORTRIPTYLINE HYDROCHLORIDE 10 MILLIGRAM(S): 10 CAPSULE ORAL at 22:01

## 2023-12-14 RX ADMIN — Medication 975 MILLIGRAM(S): at 22:01

## 2023-12-14 RX ADMIN — SEVELAMER CARBONATE 800 MILLIGRAM(S): 2400 POWDER, FOR SUSPENSION ORAL at 17:48

## 2023-12-14 RX ADMIN — Medication 5 MILLIGRAM(S): at 06:46

## 2023-12-14 RX ADMIN — ATORVASTATIN CALCIUM 80 MILLIGRAM(S): 80 TABLET, FILM COATED ORAL at 22:00

## 2023-12-14 RX ADMIN — HEPARIN SODIUM 5000 UNIT(S): 5000 INJECTION INTRAVENOUS; SUBCUTANEOUS at 22:01

## 2023-12-14 RX ADMIN — Medication 75 MILLIGRAM(S): at 05:27

## 2023-12-14 RX ADMIN — Medication 975 MILLIGRAM(S): at 22:57

## 2023-12-14 RX ADMIN — Medication 200 MILLIGRAM(S): at 22:00

## 2023-12-14 RX ADMIN — SEVELAMER CARBONATE 800 MILLIGRAM(S): 2400 POWDER, FOR SUSPENSION ORAL at 12:53

## 2023-12-14 RX ADMIN — CALCITRIOL 0.25 MICROGRAM(S): 0.5 CAPSULE ORAL at 12:53

## 2023-12-14 RX ADMIN — Medication 81 MILLIGRAM(S): at 12:53

## 2023-12-14 RX ADMIN — Medication 75 MILLIGRAM(S): at 22:00

## 2023-12-14 RX ADMIN — HEPARIN SODIUM 5000 UNIT(S): 5000 INJECTION INTRAVENOUS; SUBCUTANEOUS at 14:42

## 2023-12-14 RX ADMIN — Medication 90 MILLIGRAM(S): at 05:27

## 2023-12-14 RX ADMIN — Medication 200 MILLIGRAM(S): at 05:28

## 2023-12-14 RX ADMIN — SEVELAMER CARBONATE 800 MILLIGRAM(S): 2400 POWDER, FOR SUSPENSION ORAL at 08:15

## 2023-12-14 NOTE — PROGRESS NOTE ADULT - ASSESSMENT
59 y/o female with PMH of Hearing loss,  ESRD on HD (T/Th/Sat), HTN, HLD, DM, PVD s/p L fem bypass, and CVA presents to the ED for left lower extremity pain.  # ESRD on HD T TH Sat outpatient  # Severe PVD and occluded stents   # Hypervolemia / abdominal ascites  # Uncontrolled HTN  # Thrombocytopenia   - HD in am   - appreciate vascular sx f/u   - BP  if remains elevated / increase hydralazine to 100   - follow platelets  count / check HIT   - check IP, on sevelamer, renal diet  - hgb at goal, no need for HANNAH    will follow    59 y/o female with PMH of Hearing loss,  ESRD on HD (T/Th/Sat), HTN, HLD, DM, PVD s/p L fem bypass, and CVA presents to the ED for left lower extremity pain.  # ESRD on HD T TH Sat outpatient  # Severe PVD and occluded stents   # Hypervolemia / abdominal ascites  # Uncontrolled HTN  # Thrombocytopenia   - HD in am   - appreciate vascular sx f/u   - BP  if remains elevated / increase hydralazine to 100   - follow platelets  count / check HIT   - check IP, on sevelamer, renal diet  - hgb at goal, no need for HNANAH    will follow

## 2023-12-14 NOTE — PROGRESS NOTE ADULT - SUBJECTIVE AND OBJECTIVE BOX
seen and examined  24 h events noted   no distress   lying comfortable         PAST HISTORY  --------------------------------------------------------------------------------  No significant changes to PMH, PSH, FHx, SHx, unless otherwise noted    ALLERGIES & MEDICATIONS  --------------------------------------------------------------------------------  Allergies    penicillin (Rash)  NSAIDs (Nephrotoxicity)    Intolerances      Standing Inpatient Medications  aspirin enteric coated 81 milliGRAM(s) Oral daily  atorvastatin 80 milliGRAM(s) Oral at bedtime  calcitriol   Capsule 0.25 MICROGram(s) Oral daily  chlorhexidine 2% Cloths 1 Application(s) Topical <User Schedule>  dextrose 5%. 1000 milliLiter(s) IV Continuous <Continuous>  dextrose 5%. 1000 milliLiter(s) IV Continuous <Continuous>  dextrose 50% Injectable 25 Gram(s) IV Push once  dextrose 50% Injectable 25 Gram(s) IV Push once  dextrose 50% Injectable 12.5 Gram(s) IV Push once  glucagon  Injectable 1 milliGRAM(s) IntraMuscular once  heparin   Injectable 5000 Unit(s) SubCutaneous every 8 hours  hydrALAZINE 75 milliGRAM(s) Oral three times a day  labetalol 200 milliGRAM(s) Oral three times a day  NIFEdipine XL 90 milliGRAM(s) Oral daily  pantoprazole    Tablet 40 milliGRAM(s) Oral before breakfast  sevelamer carbonate 800 milliGRAM(s) Oral three times a day with meals    PRN Inpatient Medications  dextrose Oral Gel 15 Gram(s) Oral once PRN  ondansetron Injectable 4 milliGRAM(s) IV Push every 6 hours PRN      VITALS/PHYSICAL EXAM  --------------------------------------------------------------------------------  T(C): 36.2 (12-14-23 @ 05:00), Max: 36.2 (12-14-23 @ 05:00)  HR: 70 (12-14-23 @ 06:13) (64 - 80)  BP: 178/76 (12-14-23 @ 06:13) (135/57 - 220/89)  RR: 18 (12-14-23 @ 06:13) (17 - 18)  SpO2: 98% (12-14-23 @ 06:13) (97% - 98%)  Wt(kg): --        12-13-23 @ 07:01  -  12-14-23 @ 07:00  --------------------------------------------------------  IN: 0 mL / OUT: 3000 mL / NET: -3000 mL      Physical Exam:  	Gen: NAD,  	Pulm: decrease BS  B/L  	CV:  S1S2; no rub  	Abd: +distended  	LE:  edema  	Vascular access:av     LABS/STUDIES  --------------------------------------------------------------------------------              11.4   4.53  >-----------<  56       [12-13-23 @ 06:50]              35.2     139  |  95  |  31  ----------------------------<  126      [12-13-23 @ 06:50]  4.2   |  28  |  6.1        Ca     8.6     [12-13-23 @ 06:50]      Mg     2.3     [12-13-23 @ 06:50]    TPro  6.8  /  Alb  4.0  /  TBili  0.7  /  DBili  x   /  AST  12  /  ALT  <5  /  AlkPhos  368  [12-13-23 @ 06:50]      Creatinine Trend:  SCr 6.1 [12-13 @ 06:50]  SCr 5.3 [12-12 @ 08:09]  SCr 6.1 [12-11 @ 06:39]  SCr 5.6 [12-10 @ 01:28]    Urinalysis - [12-13-23 @ 06:50]      Color  / Appearance  / SG  / pH       Gluc 126 / Ketone   / Bili  / Urobili        Blood  / Protein  / Leuk Est  / Nitrite       RBC  / WBC  / Hyaline  / Gran  / Sq Epi  / Non Sq Epi  / Bacteria       Iron 58, TIBC 243, %sat 24      [07-09-23 @ 16:11]  Ferritin 518      [07-09-23 @ 16:11]  PTH -- (Ca 8.5)      [11-01-23 @ 07:03]   124  PTH -- (Ca 8.5)      [10-19-23 @ 08:58]   159  PTH -- (Ca 8.9)      [06-23-23 @ 08:41]   92  Vitamin D (25OH) 23      [11-01-23 @ 07:03]  Lipid: chol 195, , , LDL --      [10-31-23 @ 06:57]

## 2023-12-14 NOTE — PROGRESS NOTE ADULT - NS ATTEST RISK PROBLEM GEN_ALL_CORE FT
the following   --------------------------------Complexity of data reviewed ----------------------------------------------  The Patients complexity of data reviewed  is Low [ ] Moderate [ ] High [x ] due to the following:   Reviewed prior external records [ ]  Considering/ Ordered a unique test:  Labs [x ] Imaging [ ] Stress Test  [ ] Other: Specify [ ]  Reviewed each unique test result [x ]   Assessment requiring an independent historian [ ]  Independent interpretation of:   X-Ray [ ] EKG [ ]  Other: Specify  [ ]  Discussion of management of tests with clinician outside of my group [ x]
the following   --------------------------------Complexity of data reviewed ----------------------------------------------  The Patients complexity of data reviewed  is Low [ ] Moderate [ ] High [x ] due to the following:   Reviewed prior external records [ ]  Considering/ Ordered a unique test:  Labs [x ] Imaging [ ] Stress Test  [ ] Other: Specify [ ]  Reviewed each unique test result [x ]   Assessment requiring an independent historian [ ]  Independent interpretation of:   X-Ray [ ] EKG [ ]  Other: Specify  [ ]  Discussion of management of tests with clinician outside of my group [ x]

## 2023-12-14 NOTE — PROGRESS NOTE ADULT - ATTENDING COMMENTS
57 y/o female with PMH of Hearing loss,  ESRD on HD (T/Th/Sat), HTN, HLD, DM, PVD s/p L fem bypass, and CVA presents to the ED for left lower extremity pain, Nausea/Vomiting/Diarrhea x 3 days associated with abdominal pain. New onset vaginal spotting x 4 days.    # Left lower extremity pain H/o PVD s/p left femoral bypass-No evidence of acute occlusion or compartment syndrome  #asterixis today - due to uremia? gabapentin?   # ESRD T/TH/Sat  #Vaginal bleed- s/p  Pelvic US - showed endometrial thickening 7mm  # Intractable N/V/D- viral gastroenteritis resolved   # Mild Hypoglycemia   # Thrombocytopenia  # Troponin above reference range- stable levels , due to ESRD  # opiod insentivity - rapid response on 12/13 s.p narcan   # HTN    Plan     - CT angio of lower extremities showed occluded superficial femoral artery with stent, occluded popliteal  and posterior tibial arteries  - Con aspirin and atorvastatin 80 mg qday  - C/w home antihypertensives  - dc gabapentin- could be cause of asterixis   - avoid opiods start Tylenol, nortriptyline   - tolerating full diet   - ammonia levels elevated  - diagnostic para today -> no sbp   - continue ppi   - monitor thrombocytopenia   - dvt ppx  - Discussed with nephro ok to resumed on Hemodialysis today  - Vascular consulted: Outpatient follow up   - OB-GYN consulted- f/up rec. endometrial biopsy inconclusive. will need outpatient follow up         Code status: full code    #Progress Note Handoff:dialysis, asterixis samir rice   # spoke to patient son at bedside   Family discussion: yes, medical team Disposition: Home__ once medically stable 59 y/o female with PMH of Hearing loss,  ESRD on HD (T/Th/Sat), HTN, HLD, DM, PVD s/p L fem bypass, and CVA presents to the ED for left lower extremity pain, Nausea/Vomiting/Diarrhea x 3 days associated with abdominal pain. New onset vaginal spotting x 4 days.    # Left lower extremity pain H/o PVD s/p left femoral bypass-No evidence of acute occlusion or compartment syndrome  #asterixis today - due to uremia? gabapentin?   # ESRD T/TH/Sat  #Vaginal bleed- s/p  Pelvic US - showed endometrial thickening 7mm  # Intractable N/V/D- viral gastroenteritis resolved   # Mild Hypoglycemia   # Thrombocytopenia  # Troponin above reference range- stable levels , due to ESRD  # opiod insentivity - rapid response on 12/13 s.p narcan   # HTN    Plan     - CT angio of lower extremities showed occluded superficial femoral artery with stent, occluded popliteal  and posterior tibial arteries  - Con aspirin and atorvastatin 80 mg qday  - C/w home antihypertensives  - dc gabapentin- could be cause of asterixis   - avoid opiods start Tylenol, nortriptyline   - tolerating full diet   - ammonia levels elevated  - diagnostic para today -> no sbp   - continue ppi   - monitor thrombocytopenia   - dvt ppx  - Discussed with nephro ok to resumed on Hemodialysis today  - Vascular consulted: Outpatient follow up   - OB-GYN consulted- f/up rec. endometrial biopsy inconclusive. will need outpatient follow up         Code status: full code    #Progress Note Handoff:dialysis, asterixis samir rice   # spoke to patient son at bedside   Family discussion: yes, medical team Disposition: Home__ once medically stable

## 2023-12-14 NOTE — PROGRESS NOTE ADULT - SUBJECTIVE AND OBJECTIVE BOX
24H events:    Patient is a 58y old Female who presents with a chief complaint of Lower extremity pain (14 Dec 2023 07:36)    Primary diagnosis of Nausea & vomiting      Day 1:  Day 2:  Day 3:     Today is hospital day 4d. This morning patient was seen and examined at bedside, resting comfortably in bed.    No acute or major events overnight.    Code Status:    Family communication:  Contact date:  Name of person contacted:  Relationship to patient:  Communication details:  What matters most:    PAST MEDICAL & SURGICAL HISTORY  PVD (peripheral vascular disease)    Benign essential HTN    Intellectual disability    Hearing impaired    HLD (hyperlipidemia)    Chronic kidney disease, unspecified CKD stage    CVA (cerebral vascular accident)    DM (diabetes mellitus)    H/O vascular surgery  left leg    H/O fracture of leg    S/P arteriovenous (AV) fistula creation    S/P debridement    History of partial amputation of toe of left foot    S/P femoral-popliteal bypass surgery      SOCIAL HISTORY:  Social History:      ALLERGIES:  penicillin (Rash)  NSAIDs (Nephrotoxicity)    MEDICATIONS:  STANDING MEDICATIONS  aspirin enteric coated 81 milliGRAM(s) Oral daily  atorvastatin 80 milliGRAM(s) Oral at bedtime  calcitriol   Capsule 0.25 MICROGram(s) Oral daily  chlorhexidine 2% Cloths 1 Application(s) Topical <User Schedule>  dextrose 5%. 1000 milliLiter(s) IV Continuous <Continuous>  dextrose 5%. 1000 milliLiter(s) IV Continuous <Continuous>  dextrose 50% Injectable 12.5 Gram(s) IV Push once  dextrose 50% Injectable 25 Gram(s) IV Push once  dextrose 50% Injectable 25 Gram(s) IV Push once  glucagon  Injectable 1 milliGRAM(s) IntraMuscular once  heparin   Injectable 5000 Unit(s) SubCutaneous every 8 hours  hydrALAZINE 75 milliGRAM(s) Oral three times a day  labetalol 200 milliGRAM(s) Oral three times a day  NIFEdipine XL 90 milliGRAM(s) Oral daily  pantoprazole    Tablet 40 milliGRAM(s) Oral before breakfast  sevelamer carbonate 800 milliGRAM(s) Oral three times a day with meals    PRN MEDICATIONS  dextrose Oral Gel 15 Gram(s) Oral once PRN  ondansetron Injectable 4 milliGRAM(s) IV Push every 6 hours PRN    VITALS:   T(F): 97.1  HR: 70  BP: 178/76  RR: 18  SpO2: 98%    PHYSICAL EXAM:  GENERAL: Tired, not in acute distress  CHEST/LUNG: Clear to auscultation bilaterally; No wheezes, rales or rhonchi  HEART: Regular rate and rhythm; No murmurs, rubs, or gallops  ABDOMEN: Distended abdomen no tenderness on palpation. No guarding  EXTREMITIES: AVF on the left upper extremity. +2 Edema bilaterally bilateral lower extremities +    LABS:                        11.5   5.03  )-----------( 63       ( 14 Dec 2023 07:38 )             34.8     12-14    138  |  93<L>  |  20  ----------------------------<  91  3.7   |  31  |  4.6<HH>    Ca    8.5      14 Dec 2023 07:38  Mg     2.0     12-14    TPro  6.6  /  Alb  4.2  /  TBili  0.8  /  DBili  x   /  AST  13  /  ALT  <5  /  AlkPhos  369<H>  12-14      Urinalysis Basic - ( 14 Dec 2023 07:38 )    Color: x / Appearance: x / SG: x / pH: x  Gluc: 91 mg/dL / Ketone: x  / Bili: x / Urobili: x   Blood: x / Protein: x / Nitrite: x   Leuk Esterase: x / RBC: x / WBC x   Sq Epi: x / Non Sq Epi: x / Bacteria: x      ABG - ( 13 Dec 2023 06:35 )  pH, Arterial: 7.42  pH, Blood: x     /  pCO2: 50    /  pO2: 460   / HCO3: 32    / Base Excess: 6.8   /  SaO2: 99.1                  Culture - Blood (collected 12 Dec 2023 08:09)  Source: .Blood None  Gram Stain (13 Dec 2023 13:42):    Growth in aerobic bottle: Gram Positive Cocci in Clusters  Preliminary Report (13 Dec 2023 13:42):    Growth in aerobic bottle: Gram Positive Cocci in Clusters    Direct identification is available within approximately 3-5    hours either by Blood Panel Multiplexed PCR or Direct    MALDI-TOF. Details: [email or web address]  Organism: Blood Culture PCR (13 Dec 2023 16:12)  Organism: Blood Culture PCR (13 Dec 2023 16:12)          RADIOLOGY:           24H events:    Patient is a 58y old Female who presents with a chief complaint of Lower extremity pain (14 Dec 2023 07:36)    Primary diagnosis of Nausea & vomiting      Day 1:  Day 2:  Day 3:     Today is hospital day 4d. This morning patient was seen and examined at bedside, resting comfortably in bed.    No acute or major events overnight.    Code Status:    Family communication:  Contact date:  Name of person contacted:  Relationship to patient:  Communication details:  What matters most:    PAST MEDICAL & SURGICAL HISTORY  PVD (peripheral vascular disease)    Benign essential HTN    Intellectual disability    Hearing impaired    HLD (hyperlipidemia)    Chronic kidney disease, unspecified CKD stage    CVA (cerebral vascular accident)    DM (diabetes mellitus)    H/O vascular surgery  left leg    H/O fracture of leg    S/P arteriovenous (AV) fistula creation    S/P debridement    History of partial amputation of toe of left foot    S/P femoral-popliteal bypass surgery      SOCIAL HISTORY:  Social History:      ALLERGIES:  penicillin (Rash)  NSAIDs (Nephrotoxicity)    MEDICATIONS:  STANDING MEDICATIONS  aspirin enteric coated 81 milliGRAM(s) Oral daily  atorvastatin 80 milliGRAM(s) Oral at bedtime  calcitriol   Capsule 0.25 MICROGram(s) Oral daily  chlorhexidine 2% Cloths 1 Application(s) Topical <User Schedule>  dextrose 5%. 1000 milliLiter(s) IV Continuous <Continuous>  dextrose 5%. 1000 milliLiter(s) IV Continuous <Continuous>  dextrose 50% Injectable 12.5 Gram(s) IV Push once  dextrose 50% Injectable 25 Gram(s) IV Push once  dextrose 50% Injectable 25 Gram(s) IV Push once  glucagon  Injectable 1 milliGRAM(s) IntraMuscular once  heparin   Injectable 5000 Unit(s) SubCutaneous every 8 hours  hydrALAZINE 75 milliGRAM(s) Oral three times a day  labetalol 200 milliGRAM(s) Oral three times a day  NIFEdipine XL 90 milliGRAM(s) Oral daily  pantoprazole    Tablet 40 milliGRAM(s) Oral before breakfast  sevelamer carbonate 800 milliGRAM(s) Oral three times a day with meals    PRN MEDICATIONS  dextrose Oral Gel 15 Gram(s) Oral once PRN  ondansetron Injectable 4 milliGRAM(s) IV Push every 6 hours PRN    VITALS:   T(F): 97.1  HR: 70  BP: 178/76  RR: 18  SpO2: 98%    PHYSICAL EXAM:  GENERAL: Tired, not in acute distress  CHEST/LUNG: Clear to auscultation bilaterally; No wheezes, rales or rhonchi  HEART: Regular rate and rhythm; No murmurs, rubs, or gallops  ABDOMEN: Distended abdomen no tenderness on palpation. No guarding  EXTREMITIES: AVF on the left upper extremity. +2 Edema bilaterally bilateral lower extremities +    LABS:                        11.5   5.03  )-----------( 63       ( 14 Dec 2023 07:38 )             34.8     12-14    138  |  93<L>  |  20  ----------------------------<  91  3.7   |  31  |  4.6<HH>    Ca    8.5      14 Dec 2023 07:38  Mg     2.0     12-14    TPro  6.6  /  Alb  4.2  /  TBili  0.8  /  DBili  x   /  AST  13  /  ALT  <5  /  AlkPhos  369<H>  12-14      Urinalysis Basic - ( 14 Dec 2023 07:38 )    Color: x / Appearance: x / SG: x / pH: x  Gluc: 91 mg/dL / Ketone: x  / Bili: x / Urobili: x   Blood: x / Protein: x / Nitrite: x   Leuk Esterase: x / RBC: x / WBC x   Sq Epi: x / Non Sq Epi: x / Bacteria: x      ABG - ( 13 Dec 2023 06:35 )  pH, Arterial: 7.42  pH, Blood: x     /  pCO2: 50    /  pO2: 460   / HCO3: 32    / Base Excess: 6.8   /  SaO2: 99.1                  Culture - Blood (collected 12 Dec 2023 08:09)  Source: .Blood None  Gram Stain (13 Dec 2023 13:42):    Growth in aerobic bottle: Gram Positive Cocci in Clusters  Preliminary Report (13 Dec 2023 13:42):    Growth in aerobic bottle: Gram Positive Cocci in Clusters    Direct identification is available within approximately 3-5    hours either by Blood Panel Multiplexed PCR or Direct    MALDI-TOF. Details: [email or web address]  Organism: Blood Culture PCR (13 Dec 2023 16:12)  Organism: Blood Culture PCR (13 Dec 2023 16:12)          RADIOLOGY:

## 2023-12-14 NOTE — PROCEDURE NOTE - ADDITIONAL PROCEDURE DETAILS
Difficulty inserting paracentesis needle into peritoneum, likely secondary to overlying abdominal muscle. Further cutting of skin performed with drainage of fluid into paracentesis needle. Catheter left in place, minimal drainage. Further fluid removed directly with needle with direct visualization on US.

## 2023-12-14 NOTE — PROGRESS NOTE ADULT - ASSESSMENT
59 y/o female with PMH of Hearing loss,  ESRD on HD (T/Th/Sat), HTN, HLD, DM, PVD s/p L fem bypass, and CVA presents to the ED for left lower extremity pain.    # Left lower extremity pain  # H/o PVD s/p left femoral bypass  -Started a few days ago, worsening, unrelated to ambulation, no trauma, or evidence of infection/ inflammation  -CT angio of lower extremities showed occluded superficial femoral artery with stent, occluded popliteal  and posterior tibial arteries  -No evidence of acute occlusion or compartment syndrome  -Vascular consulted: Outpatient follow up   - Duplex bilateral lower extremities negative  - On aspirin and atorvastatin 80 mg qday      #Nausea/vomiting  - viral gastroenteritis?    - CT abd/pelvis- no acute GI pathology  -Zofran with meals  -ppi  -advanced to nephro diet- tolerating well  resolved      #Possible vaginal bleed?  - Patient last menstrual period reportedly at 45  - Pelvic ultrasound-Thickened endometrium measuring 7 mm  -Endometrial biopsy performed-f/u tissue pathology- inconclusive  -pt needs [facility]YN follow up at [facility] in 2-3 weeks    # Troponinemia   -Patient does not have chest pain and EKG does not show any ischemic ST segment changes, not meeting criteria of ACS    # ESRD  -Patient missed HD session on Saturday  -CT of the abdomen shows moderate volume ascites  -On sevelamer with meals  -HD tomorrow due to rapid response AM- Dc all opiods   -monitor asterixis - improving after HD     # HTN  -C/w home antihypertensives  -BP decreased after resuming the medications  -Better control after HD session      # H/o CVA  -aspirin and atorvastatin    DVT ppx: heparin 5000 q8h  Diet: Renal  Activity: as tolerated  Dispo: medicine     57 y/o female with PMH of Hearing loss,  ESRD on HD (T/Th/Sat), HTN, HLD, DM, PVD s/p L fem bypass, and CVA presents to the ED for left lower extremity pain.    # Left lower extremity pain  # H/o PVD s/p left femoral bypass  -Started a few days ago, worsening, unrelated to ambulation, no trauma, or evidence of infection/ inflammation  -CT angio of lower extremities showed occluded superficial femoral artery with stent, occluded popliteal  and posterior tibial arteries  -No evidence of acute occlusion or compartment syndrome  -Vascular consulted: Outpatient follow up   - Duplex bilateral lower extremities negative  - On aspirin and atorvastatin 80 mg qday      #Nausea/vomiting  - viral gastroenteritis?    - CT abd/pelvis- no acute GI pathology  -Zofran with meals  -ppi  -advanced to nephro diet- tolerating well  resolved      #Possible vaginal bleed?  - Patient last menstrual period reportedly at 45  - Pelvic ultrasound-Thickened endometrium measuring 7 mm  -Endometrial biopsy performed-f/u tissue pathology- inconclusive  -pt needs GYN follow up at [facility] in 2-3 weeks    # Troponinemia   -Patient does not have chest pain and EKG does not show any ischemic ST segment changes, not meeting criteria of ACS    # ESRD  -Patient missed HD session on Saturday  -CT of the abdomen shows moderate volume ascites  -On sevelamer with meals  -HD tomorrow due to rapid response AM- Dc all opiods   -monitor asterixis - improving after HD     # HTN  -C/w home antihypertensives  -BP decreased after resuming the medications  -Better control after HD session      # H/o CVA  -aspirin and atorvastatin    DVT ppx: heparin 5000 q8h  Diet: Renal  Activity: as tolerated  Dispo: medicine

## 2023-12-15 ENCOUNTER — TRANSCRIPTION ENCOUNTER (OUTPATIENT)
Age: 58
End: 2023-12-15

## 2023-12-15 VITALS
DIASTOLIC BLOOD PRESSURE: 67 MMHG | RESPIRATION RATE: 18 BRPM | SYSTOLIC BLOOD PRESSURE: 149 MMHG | HEART RATE: 70 BPM | OXYGEN SATURATION: 98 % | TEMPERATURE: 98 F

## 2023-12-15 LAB
ALBUMIN FLD-MCNC: 2.5 G/DL — SIGNIFICANT CHANGE UP
ALBUMIN FLD-MCNC: 2.5 G/DL — SIGNIFICANT CHANGE UP
ALBUMIN SERPL ELPH-MCNC: 3.7 G/DL — SIGNIFICANT CHANGE UP (ref 3.5–5.2)
ALBUMIN SERPL ELPH-MCNC: 3.7 G/DL — SIGNIFICANT CHANGE UP (ref 3.5–5.2)
ALP SERPL-CCNC: 355 U/L — HIGH (ref 30–115)
ALP SERPL-CCNC: 355 U/L — HIGH (ref 30–115)
ALT FLD-CCNC: <5 U/L — SIGNIFICANT CHANGE UP (ref 0–41)
ALT FLD-CCNC: <5 U/L — SIGNIFICANT CHANGE UP (ref 0–41)
ANION GAP SERPL CALC-SCNC: 15 MMOL/L — HIGH (ref 7–14)
ANION GAP SERPL CALC-SCNC: 15 MMOL/L — HIGH (ref 7–14)
AST SERPL-CCNC: 12 U/L — SIGNIFICANT CHANGE UP (ref 0–41)
AST SERPL-CCNC: 12 U/L — SIGNIFICANT CHANGE UP (ref 0–41)
BASOPHILS # BLD AUTO: 0.03 K/UL — SIGNIFICANT CHANGE UP (ref 0–0.2)
BASOPHILS # BLD AUTO: 0.03 K/UL — SIGNIFICANT CHANGE UP (ref 0–0.2)
BASOPHILS NFR BLD AUTO: 0.7 % — SIGNIFICANT CHANGE UP (ref 0–1)
BASOPHILS NFR BLD AUTO: 0.7 % — SIGNIFICANT CHANGE UP (ref 0–1)
BILIRUB SERPL-MCNC: 0.6 MG/DL — SIGNIFICANT CHANGE UP (ref 0.2–1.2)
BILIRUB SERPL-MCNC: 0.6 MG/DL — SIGNIFICANT CHANGE UP (ref 0.2–1.2)
BUN SERPL-MCNC: 26 MG/DL — HIGH (ref 10–20)
BUN SERPL-MCNC: 26 MG/DL — HIGH (ref 10–20)
CALCIUM SERPL-MCNC: 8.1 MG/DL — LOW (ref 8.4–10.5)
CALCIUM SERPL-MCNC: 8.1 MG/DL — LOW (ref 8.4–10.5)
CHLORIDE SERPL-SCNC: 92 MMOL/L — LOW (ref 98–110)
CHLORIDE SERPL-SCNC: 92 MMOL/L — LOW (ref 98–110)
CO2 SERPL-SCNC: 30 MMOL/L — SIGNIFICANT CHANGE UP (ref 17–32)
CO2 SERPL-SCNC: 30 MMOL/L — SIGNIFICANT CHANGE UP (ref 17–32)
CREAT SERPL-MCNC: 5.9 MG/DL — CRITICAL HIGH (ref 0.7–1.5)
CREAT SERPL-MCNC: 5.9 MG/DL — CRITICAL HIGH (ref 0.7–1.5)
EGFR: 8 ML/MIN/1.73M2 — LOW
EGFR: 8 ML/MIN/1.73M2 — LOW
EOSINOPHIL # BLD AUTO: 0.12 K/UL — SIGNIFICANT CHANGE UP (ref 0–0.7)
EOSINOPHIL # BLD AUTO: 0.12 K/UL — SIGNIFICANT CHANGE UP (ref 0–0.7)
EOSINOPHIL NFR BLD AUTO: 2.7 % — SIGNIFICANT CHANGE UP (ref 0–8)
EOSINOPHIL NFR BLD AUTO: 2.7 % — SIGNIFICANT CHANGE UP (ref 0–8)
GLUCOSE FLD-MCNC: 121 MG/DL — SIGNIFICANT CHANGE UP
GLUCOSE FLD-MCNC: 121 MG/DL — SIGNIFICANT CHANGE UP
GLUCOSE SERPL-MCNC: 123 MG/DL — HIGH (ref 70–99)
GLUCOSE SERPL-MCNC: 123 MG/DL — HIGH (ref 70–99)
GRAM STN FLD: SIGNIFICANT CHANGE UP
GRAM STN FLD: SIGNIFICANT CHANGE UP
HCT VFR BLD CALC: 33.4 % — LOW (ref 37–47)
HCT VFR BLD CALC: 33.4 % — LOW (ref 37–47)
HGB BLD-MCNC: 11 G/DL — LOW (ref 12–16)
HGB BLD-MCNC: 11 G/DL — LOW (ref 12–16)
IMM GRANULOCYTES NFR BLD AUTO: 0.5 % — HIGH (ref 0.1–0.3)
IMM GRANULOCYTES NFR BLD AUTO: 0.5 % — HIGH (ref 0.1–0.3)
LDH SERPL L TO P-CCNC: 100 U/L — SIGNIFICANT CHANGE UP
LDH SERPL L TO P-CCNC: 100 U/L — SIGNIFICANT CHANGE UP
LDH SERPL L TO P-CCNC: 179 — SIGNIFICANT CHANGE UP (ref 50–242)
LDH SERPL L TO P-CCNC: 179 — SIGNIFICANT CHANGE UP (ref 50–242)
LYMPHOCYTES # BLD AUTO: 0.67 K/UL — LOW (ref 1.2–3.4)
LYMPHOCYTES # BLD AUTO: 0.67 K/UL — LOW (ref 1.2–3.4)
LYMPHOCYTES # BLD AUTO: 15.1 % — LOW (ref 20.5–51.1)
LYMPHOCYTES # BLD AUTO: 15.1 % — LOW (ref 20.5–51.1)
MAGNESIUM SERPL-MCNC: 2 MG/DL — SIGNIFICANT CHANGE UP (ref 1.8–2.4)
MAGNESIUM SERPL-MCNC: 2 MG/DL — SIGNIFICANT CHANGE UP (ref 1.8–2.4)
MCHC RBC-ENTMCNC: 30.4 PG — SIGNIFICANT CHANGE UP (ref 27–31)
MCHC RBC-ENTMCNC: 30.4 PG — SIGNIFICANT CHANGE UP (ref 27–31)
MCHC RBC-ENTMCNC: 32.9 G/DL — SIGNIFICANT CHANGE UP (ref 32–37)
MCHC RBC-ENTMCNC: 32.9 G/DL — SIGNIFICANT CHANGE UP (ref 32–37)
MCV RBC AUTO: 92.3 FL — SIGNIFICANT CHANGE UP (ref 81–99)
MCV RBC AUTO: 92.3 FL — SIGNIFICANT CHANGE UP (ref 81–99)
MONOCYTES # BLD AUTO: 0.57 K/UL — SIGNIFICANT CHANGE UP (ref 0.1–0.6)
MONOCYTES # BLD AUTO: 0.57 K/UL — SIGNIFICANT CHANGE UP (ref 0.1–0.6)
MONOCYTES NFR BLD AUTO: 12.9 % — HIGH (ref 1.7–9.3)
MONOCYTES NFR BLD AUTO: 12.9 % — HIGH (ref 1.7–9.3)
NEUTROPHILS # BLD AUTO: 3.02 K/UL — SIGNIFICANT CHANGE UP (ref 1.4–6.5)
NEUTROPHILS # BLD AUTO: 3.02 K/UL — SIGNIFICANT CHANGE UP (ref 1.4–6.5)
NEUTROPHILS NFR BLD AUTO: 68.1 % — SIGNIFICANT CHANGE UP (ref 42.2–75.2)
NEUTROPHILS NFR BLD AUTO: 68.1 % — SIGNIFICANT CHANGE UP (ref 42.2–75.2)
NON-GYNECOLOGICAL CYTOLOGY STUDY: SIGNIFICANT CHANGE UP
NON-GYNECOLOGICAL CYTOLOGY STUDY: SIGNIFICANT CHANGE UP
NRBC # BLD: 0 /100 WBCS — SIGNIFICANT CHANGE UP (ref 0–0)
NRBC # BLD: 0 /100 WBCS — SIGNIFICANT CHANGE UP (ref 0–0)
PLATELET # BLD AUTO: 72 K/UL — LOW (ref 130–400)
PLATELET # BLD AUTO: 72 K/UL — LOW (ref 130–400)
PMV BLD: 13.7 FL — HIGH (ref 7.4–10.4)
PMV BLD: 13.7 FL — HIGH (ref 7.4–10.4)
POTASSIUM SERPL-MCNC: 4.1 MMOL/L — SIGNIFICANT CHANGE UP (ref 3.5–5)
POTASSIUM SERPL-MCNC: 4.1 MMOL/L — SIGNIFICANT CHANGE UP (ref 3.5–5)
POTASSIUM SERPL-SCNC: 4.1 MMOL/L — SIGNIFICANT CHANGE UP (ref 3.5–5)
POTASSIUM SERPL-SCNC: 4.1 MMOL/L — SIGNIFICANT CHANGE UP (ref 3.5–5)
PROT FLD-MCNC: 3.9 G/DL — SIGNIFICANT CHANGE UP
PROT FLD-MCNC: 3.9 G/DL — SIGNIFICANT CHANGE UP
PROT SERPL-MCNC: 6.2 G/DL — SIGNIFICANT CHANGE UP (ref 6–8)
PROT SERPL-MCNC: 6.2 G/DL — SIGNIFICANT CHANGE UP (ref 6–8)
RBC # BLD: 3.62 M/UL — LOW (ref 4.2–5.4)
RBC # BLD: 3.62 M/UL — LOW (ref 4.2–5.4)
RBC # FLD: 15.6 % — HIGH (ref 11.5–14.5)
RBC # FLD: 15.6 % — HIGH (ref 11.5–14.5)
SODIUM SERPL-SCNC: 137 MMOL/L — SIGNIFICANT CHANGE UP (ref 135–146)
SODIUM SERPL-SCNC: 137 MMOL/L — SIGNIFICANT CHANGE UP (ref 135–146)
SPECIMEN SOURCE: SIGNIFICANT CHANGE UP
SPECIMEN SOURCE: SIGNIFICANT CHANGE UP
WBC # BLD: 4.43 K/UL — LOW (ref 4.8–10.8)
WBC # BLD: 4.43 K/UL — LOW (ref 4.8–10.8)
WBC # FLD AUTO: 4.43 K/UL — LOW (ref 4.8–10.8)
WBC # FLD AUTO: 4.43 K/UL — LOW (ref 4.8–10.8)

## 2023-12-15 PROCEDURE — 99239 HOSP IP/OBS DSCHRG MGMT >30: CPT

## 2023-12-15 RX ORDER — TRAMADOL HYDROCHLORIDE 50 MG/1
0.5 TABLET ORAL
Qty: 3 | Refills: 0
Start: 2023-12-15 | End: 2023-12-17

## 2023-12-15 RX ORDER — TRAMADOL HYDROCHLORIDE 50 MG/1
25 TABLET ORAL
Refills: 0 | Status: DISCONTINUED | OUTPATIENT
Start: 2023-12-15 | End: 2023-12-15

## 2023-12-15 RX ORDER — ONDANSETRON 8 MG/1
1 TABLET, FILM COATED ORAL
Qty: 14 | Refills: 0
Start: 2023-12-15 | End: 2023-12-21

## 2023-12-15 RX ORDER — NORTRIPTYLINE HYDROCHLORIDE 10 MG/1
1 CAPSULE ORAL
Qty: 30 | Refills: 0
Start: 2023-12-15 | End: 2024-01-13

## 2023-12-15 RX ORDER — NALOXONE HYDROCHLORIDE 4 MG/.1ML
4 SPRAY NASAL
Qty: 1 | Refills: 0
Start: 2023-12-15

## 2023-12-15 RX ADMIN — Medication 75 MILLIGRAM(S): at 13:38

## 2023-12-15 RX ADMIN — Medication 75 MILLIGRAM(S): at 05:56

## 2023-12-15 RX ADMIN — Medication 81 MILLIGRAM(S): at 13:06

## 2023-12-15 RX ADMIN — Medication 975 MILLIGRAM(S): at 13:13

## 2023-12-15 RX ADMIN — Medication 200 MILLIGRAM(S): at 13:38

## 2023-12-15 RX ADMIN — SEVELAMER CARBONATE 800 MILLIGRAM(S): 2400 POWDER, FOR SUSPENSION ORAL at 17:18

## 2023-12-15 RX ADMIN — CALCITRIOL 0.25 MICROGRAM(S): 0.5 CAPSULE ORAL at 13:06

## 2023-12-15 RX ADMIN — HEPARIN SODIUM 5000 UNIT(S): 5000 INJECTION INTRAVENOUS; SUBCUTANEOUS at 05:56

## 2023-12-15 RX ADMIN — SEVELAMER CARBONATE 800 MILLIGRAM(S): 2400 POWDER, FOR SUSPENSION ORAL at 13:07

## 2023-12-15 RX ADMIN — HEPARIN SODIUM 5000 UNIT(S): 5000 INJECTION INTRAVENOUS; SUBCUTANEOUS at 13:06

## 2023-12-15 RX ADMIN — Medication 90 MILLIGRAM(S): at 05:57

## 2023-12-15 RX ADMIN — Medication 975 MILLIGRAM(S): at 05:56

## 2023-12-15 RX ADMIN — CHLORHEXIDINE GLUCONATE 1 APPLICATION(S): 213 SOLUTION TOPICAL at 05:56

## 2023-12-15 RX ADMIN — PANTOPRAZOLE SODIUM 40 MILLIGRAM(S): 20 TABLET, DELAYED RELEASE ORAL at 05:57

## 2023-12-15 RX ADMIN — Medication 200 MILLIGRAM(S): at 05:56

## 2023-12-15 NOTE — DISCHARGE NOTE PROVIDER - NSDCCPCAREPLAN_GEN_ALL_CORE_FT
PRINCIPAL DISCHARGE DIAGNOSIS  Diagnosis: Nausea & vomiting  Assessment and Plan of Treatment:       SECONDARY DISCHARGE DIAGNOSES  Diagnosis: ESRD on dialysis  Assessment and Plan of Treatment:     Diagnosis: Severe peripheral arterial disease  Assessment and Plan of Treatment:     Diagnosis: Uremia  Assessment and Plan of Treatment:      PRINCIPAL DISCHARGE DIAGNOSIS  Diagnosis: Nausea & vomiting  Assessment and Plan of Treatment: You came to the ED and were having nausea and vomiting. We treated you with nausea medication and slowly started your diet. You are now tolerating diet well, please continue to take zofran before meals as needed.        SECONDARY DISCHARGE DIAGNOSES  Diagnosis: ESRD on dialysis  Assessment and Plan of Treatment:     Diagnosis: Severe peripheral arterial disease  Assessment and Plan of Treatment: You came to the ED for pain over the left lower extremity. CT angio of lower extremities showed occluded superficial femoral artery with stent, occluded popliteal  and posterior tibial arteries. Please follow up with [facility] with  Dr Christianson. Please continue aspirin and statin as prescirbed.    Diagnosis: Vaginal bleeding  Assessment and Plan of Treatment: You were found to have new vaginal bleeding. Pelvic ultrasound thickened endometrium measuring 7 mm. Endometrial biopsy performed but pathology was inconclusive.   Please follow up with [facility] in 2-3 weeks.

## 2023-12-15 NOTE — DISCHARGE NOTE PROVIDER - ATTENDING DISCHARGE PHYSICAL EXAMINATION:
PHYSICAL EXAM:  GENERAL:not in acute distress  CHEST/LUNG: Clear to auscultation bilaterally; No wheezes, rales or rhonchi  HEART: Regular rate and rhythm; No murmurs, rubs, or gallops  ABDOMEN: Distended abdomen no tenderness on palpation. No guarding  EXTREMITIES: AVF on the left upper extremity. +2 Edema bilaterally bilateral lower extremities + asterixis

## 2023-12-15 NOTE — DISCHARGE NOTE NURSING/CASE MANAGEMENT/SOCIAL WORK - NSDCPEFALRISK_GEN_ALL_CORE
For information on Fall & Injury Prevention, visit: [email or web address] OR  [email or web address] OR  https://www.cdc.gov/steadi/patient.html For information on Fall & Injury Prevention, visit: [email or web address] OR  [email or web address] OR  https://www.cdc.gov/steadi/patient.html

## 2023-12-15 NOTE — PROGRESS NOTE ADULT - PROVIDER SPECIALTY LIST ADULT
Internal Medicine
Internal Medicine
Nephrology
Hospitalist
Nephrology
Internal Medicine
Nephrology
Internal Medicine

## 2023-12-15 NOTE — DISCHARGE NOTE PROVIDER - CARE PROVIDERS DIRECT ADDRESSES
,[email or web address] ,[email or web address] ,[email or web address] [place] [email or web address]\"""[email or web address] [place] [email or web address]\""[email or web address].net

## 2023-12-15 NOTE — PROGRESS NOTE ADULT - SUBJECTIVE AND OBJECTIVE BOX
Nephrology progress note    THIS IS AN INCOMPLETE NOTE . FULL NOTE TO FOLLOW SHORTLY    Patient is seen and examined, events over the last 24 h noted .    Allergies:  penicillin (Rash)  NSAIDs (Nephrotoxicity)    Hospital Medications:   MEDICATIONS  (STANDING):  acetaminophen     Tablet .. 975 milliGRAM(s) Oral every 8 hours  aspirin enteric coated 81 milliGRAM(s) Oral daily  atorvastatin 80 milliGRAM(s) Oral at bedtime  calcitriol   Capsule 0.25 MICROGram(s) Oral daily  chlorhexidine 2% Cloths 1 Application(s) Topical <User Schedule>  dextrose 5%. 1000 milliLiter(s) (100 mL/Hr) IV Continuous <Continuous>  dextrose 5%. 1000 milliLiter(s) (50 mL/Hr) IV Continuous <Continuous>  dextrose 50% Injectable 25 Gram(s) IV Push once  dextrose 50% Injectable 12.5 Gram(s) IV Push once  dextrose 50% Injectable 25 Gram(s) IV Push once  glucagon  Injectable 1 milliGRAM(s) IntraMuscular once  heparin   Injectable 5000 Unit(s) SubCutaneous every 8 hours  hydrALAZINE 75 milliGRAM(s) Oral three times a day  labetalol 200 milliGRAM(s) Oral three times a day  NIFEdipine XL 90 milliGRAM(s) Oral daily  nortriptyline 10 milliGRAM(s) Oral at bedtime  pantoprazole    Tablet 40 milliGRAM(s) Oral before breakfast  sevelamer carbonate 800 milliGRAM(s) Oral three times a day with meals        VITALS:  T(F): 99 (12-14-23 @ 20:30), Max: 99 (12-14-23 @ 20:30)  HR: 72 (12-14-23 @ 20:30)  BP: 161/63 (12-14-23 @ 20:30)  RR: 18 (12-14-23 @ 20:30)  SpO2: 95% (12-14-23 @ 20:30)  Wt(kg): --    12-13 @ 07:01  -  12-14 @ 07:00  --------------------------------------------------------  IN: 0 mL / OUT: 3000 mL / NET: -3000 mL          PHYSICAL EXAM:  Constitutional: NAD  HEENT: anicteric sclera, oropharynx clear, MMM  Neck: No JVD  Respiratory: CTAB, no wheezes, rales or rhonchi  Cardiovascular: S1, S2, RRR  Gastrointestinal: BS+, soft, NT/ND  Extremities: No cyanosis or clubbing. No peripheral edema  :  No mcgrath.   Skin: No rashes    LABS:  12-14    138  |  93<L>  |  20  ----------------------------<  91  3.7   |  31  |  4.6<HH>    Ca    8.5      14 Dec 2023 07:38  Mg     2.0     12-14    TPro  6.6  /  Alb  4.2  /  TBili  0.8  /  DBili      /  AST  13  /  ALT  <5  /  AlkPhos  369<H>  12-14                          11.0   4.43  )-----------( 72       ( 15 Dec 2023 06:35 )             33.4       Urine Studies:  Urinalysis Basic - ( 14 Dec 2023 07:38 )    Color:  / Appearance:  / SG:  / pH:   Gluc: 91 mg/dL / Ketone:   / Bili:  / Urobili:    Blood:  / Protein:  / Nitrite:    Leuk Esterase:  / RBC:  / WBC    Sq Epi:  / Non Sq Epi:  / Bacteria:           Iron 58, TIBC 243, %sat 24      [07-09-23 @ 16:11]  Ferritin 518      [07-09-23 @ 16:11]  PTH -- (Ca 8.5)      [11-01-23 @ 07:03]   124  PTH -- (Ca 8.5)      [10-19-23 @ 08:58]   159  PTH -- (Ca 8.9)      [06-23-23 @ 08:41]   92  Vitamin D (25OH) 23      [11-01-23 @ 07:03]  Lipid: chol 195, , , LDL --      [10-31-23 @ 06:57]    HBsAg Nonreact      [05-13-22 @ 18:00]  HCV 0.16, Nonreact      [05-13-22 @ 18:00]    OLVIN: titer 1:80, pattern Speckled      [11-04-21 @ 12:47]  dsDNA <12      [05-13-22 @ 18:00]  ANCA: cANCA Negative, pANCA Negative, atypical ANCA Negative      [05-13-22 @ 18:00]  PLA2R: MARIA GUADALUPE <1.8, IFA --      [05-13-22 @ 18:00]  Free Light Chains: kappa 10.04, lambda 8.96, ratio = 1.12      [05-12 @ 08:21]  Immunofixation Serum:   IgM Lambda Band Identified    Reference Range: None Detected      [05-13-22 @ 10:55]  SPEP Interpretation: Gamma-Migrating Paraprotein Identified      [05-12-22 @ 08:21]  Immunofixation Urine: Weak Bence Howard protein Lambda type      [05-11-22 @ 20:07]  UPEP Interpretation: Mild Selective (Glomerular) Proteinuria      [11-23-21 @ 12:40]      RADIOLOGY & ADDITIONAL STUDIES:   Nephrology progress note    Patient is seen and examined, events over the last 24 h noted .  Lying in bed comfortable   sp paracentesis     Allergies:  penicillin (Rash)  NSAIDs (Nephrotoxicity)    Hospital Medications:   MEDICATIONS  (STANDING):    acetaminophen     Tablet .. 975 milliGRAM(s) Oral every 8 hours  aspirin enteric coated 81 milliGRAM(s) Oral daily  atorvastatin 80 milliGRAM(s) Oral at bedtime  calcitriol   Capsule 0.25 MICROGram(s) Oral daily  glucagon  Injectable 1 milliGRAM(s) IntraMuscular once  heparin   Injectable 5000 Unit(s) SubCutaneous every 8 hours  hydrALAZINE 75 milliGRAM(s) Oral three times a day  labetalol 200 milliGRAM(s) Oral three times a day  NIFEdipine XL 90 milliGRAM(s) Oral daily  nortriptyline 10 milliGRAM(s) Oral at bedtime  pantoprazole    Tablet 40 milliGRAM(s) Oral before breakfast  sevelamer carbonate 800 milliGRAM(s) Oral three times a day with meals        VITALS:  T(F): 99 (12-14-23 @ 20:30), Max: 99 (12-14-23 @ 20:30)  HR: 72 (12-14-23 @ 20:30)  BP: 161/63 (12-14-23 @ 20:30)  RR: 18 (12-14-23 @ 20:30)  SpO2: 95% (12-14-23 @ 20:30)      12-13 @ 07:01  -  12-14 @ 07:00  --------------------------------------------------------  IN: 0 mL / OUT: 3000 mL / NET: -3000 mL          PHYSICAL EXAM:  Constitutional: NAD  Respiratory: CTAB,  Cardiovascular: S1, S2, RRR  Gastrointestinal: BS+, soft, NT/ND  Extremities: No cyanosis or clubbing. No peripheral edema  :  No mcgrath.   Skin: No rashes    LABS:  12-14    138  |  93<L>  |  20  ----------------------------<  91  3.7   |  31  |  4.6<HH>    Ca    8.5      14 Dec 2023 07:38  Mg     2.0     12-14    TPro  6.6  /  Alb  4.2  /  TBili  0.8  /  DBili      /  AST  13  /  ALT  <5  /  AlkPhos  369<H>  12-14                          11.0   4.43  )-----------( 72       ( 15 Dec 2023 06:35 )             33.4       Urine Studies:  Urinalysis Basic - ( 14 Dec 2023 07:38 )    Color:  / Appearance:  / SG:  / pH:   Gluc: 91 mg/dL / Ketone:   / Bili:  / Urobili:    Blood:  / Protein:  / Nitrite:    Leuk Esterase:  / RBC:  / WBC    Sq Epi:  / Non Sq Epi:  / Bacteria:           Iron 58, TIBC 243, %sat 24      [07-09-23 @ 16:11]  Ferritin 518      [07-09-23 @ 16:11]  PTH -- (Ca 8.5)      [11-01-23 @ 07:03]   124  PTH -- (Ca 8.5)      [10-19-23 @ 08:58]   159  PTH -- (Ca 8.9)      [06-23-23 @ 08:41]   92  Vitamin D (25OH) 23      [11-01-23 @ 07:03]  Lipid: chol 195, , , LDL --      [10-31-23 @ 06:57]    HBsAg Nonreact      [05-13-22 @ 18:00]  HCV 0.16, Nonreact      [05-13-22 @ 18:00]    OLVIN: titer 1:80, pattern Speckled      [11-04-21 @ 12:47]  dsDNA <12      [05-13-22 @ 18:00]  ANCA: cANCA Negative, pANCA Negative, atypical ANCA Negative      [05-13-22 @ 18:00]  PLA2R: MARIA GUADALUPE <1.8, IFA --      [05-13-22 @ 18:00]  Free Light Chains: kappa 10.04, lambda 8.96, ratio = 1.12      [05-12 @ 08:21]  Immunofixation Serum:   IgM Lambda Band Identified    Reference Range: None Detected      [05-13-22 @ 10:55]  SPEP Interpretation: Gamma-Migrating Paraprotein Identified      [05-12-22 @ 08:21]  Immunofixation Urine: Weak Bence Howard protein Lambda type      [05-11-22 @ 20:07]  UPEP Interpretation: Mild Selective (Glomerular) Proteinuria      [11-23-21 @ 12:40]      RADIOLOGY & ADDITIONAL STUDIES:

## 2023-12-15 NOTE — DISCHARGE NOTE PROVIDER - NSDCMRMEDTOKEN_GEN_ALL_CORE_FT
Albuterol (Eqv-ProAir HFA) 90 mcg/inh inhalation aerosol: 2 puff(s) inhaled every 6 hours as needed for  bronchospasm  aspirin 81 mg oral delayed release tablet: 1 tab(s) orally once a day   atorvastatin 80 mg oral tablet: 1 tab(s) orally once a day  calcitriol 0.25 mcg oral capsule: 1 cap(s) orally once a day  Dilaudid 2 mg oral tablet: 1 tab(s) orally every 4 hours MDD: 6 tabs  diphenhydrAMINE 25 mg oral capsule: 2 cap(s) orally every 6 hours as needed for Rash and/or Itching  gabapentin 300 mg oral capsule: 1 cap(s) orally 3 times a day  hydrALAZINE 25 mg oral tablet: 3 tab(s) orally 3 times a day  labetalol 200 mg oral tablet: 1 tab(s) orally 3 times a day  NIFEdipine (Eqv-Adalat CC) 90 mg oral tablet, extended release: 1 tab(s) orally once a day  NovoLOG Mix 70/30 FlexPen subcutaneous suspension: subcutaneous 3 times a day with meals- sliding scale  pantoprazole 40 mg oral delayed release tablet: 1 tab(s) orally 2 times a day  sevelamer carbonate 800 mg oral tablet: 1 tab(s) orally 3 times a day (with meals)  Tylenol 325 mg oral tablet: 2 tab(s) orally every 6 hours as needed for  moderate pain   Albuterol (Eqv-ProAir HFA) 90 mcg/inh inhalation aerosol: 2 puff(s) inhaled every 6 hours as needed for  bronchospasm  aspirin 81 mg oral delayed release tablet: 1 tab(s) orally once a day   atorvastatin 80 mg oral tablet: 1 tab(s) orally once a day  calcitriol 0.25 mcg oral capsule: 1 cap(s) orally once a day  hydrALAZINE 25 mg oral tablet: 3 tab(s) orally 3 times a day  labetalol 200 mg oral tablet: 1 tab(s) orally 3 times a day  NIFEdipine (Eqv-Adalat CC) 90 mg oral tablet, extended release: 1 tab(s) orally once a day  nortriptyline 10 mg oral capsule: 1 cap(s) orally once a day (at bedtime)  NovoLOG Mix 70/30 FlexPen subcutaneous suspension: subcutaneous 3 times a day with meals- sliding scale  ondansetron 8 mg oral tablet: 1 tab(s) orally 2 times a day as needed for  nausea  pantoprazole 40 mg oral delayed release tablet: 1 tab(s) orally 2 times a day  sevelamer carbonate 800 mg oral tablet: 1 tab(s) orally 3 times a day (with meals)  Tylenol 325 mg oral tablet: 2 tab(s) orally every 6 hours as needed for  moderate pain   Albuterol (Eqv-ProAir HFA) 90 mcg/inh inhalation aerosol: 2 puff(s) inhaled every 6 hours as needed for  bronchospasm  aspirin 81 mg oral delayed release tablet: 1 tab(s) orally once a day   atorvastatin 80 mg oral tablet: 1 tab(s) orally once a day  calcitriol 0.25 mcg oral capsule: 1 cap(s) orally once a day  hydrALAZINE 25 mg oral tablet: 3 tab(s) orally 3 times a day  labetalol 200 mg oral tablet: 1 tab(s) orally 3 times a day  naloxone 4 mg/0.1 mL nasal spray: 4 milligram(s) intranasally once a day as needed for Tramadol Overdose  NIFEdipine (Eqv-Adalat CC) 90 mg oral tablet, extended release: 1 tab(s) orally once a day  nortriptyline 10 mg oral capsule: 1 cap(s) orally once a day (at bedtime)  NovoLOG Mix 70/30 FlexPen subcutaneous suspension: subcutaneous 3 times a day with meals- sliding scale  ondansetron 8 mg oral tablet: 1 tab(s) orally 2 times a day as needed for  nausea  pantoprazole 40 mg oral delayed release tablet: 1 tab(s) orally 2 times a day  sevelamer carbonate 800 mg oral tablet: 1 tab(s) orally 3 times a day (with meals)  traMADol 50 mg oral tablet: 0.5 tab(s) orally 2 times a day as needed for Moderate Pain (4 - 6) MDD: 50  Tylenol 325 mg oral tablet: 2 tab(s) orally every 6 hours as needed for  moderate pain

## 2023-12-15 NOTE — PROGRESS NOTE ADULT - REASON FOR ADMISSION
Lower extremity pain

## 2023-12-15 NOTE — DISCHARGE NOTE PROVIDER - PROVIDER TOKENS
PROVIDER:[TOKEN:[[id number]],FOLLOWUP:[1 week]] PROVIDER:[TOKEN:[[id number]],FOLLOWUP:[1 week]] PROVIDER:[TOKEN:[[id number]],FOLLOWUP:[1 week]],PROVIDER:[TOKEN:[[id number]],FOLLOWUP:[2 weeks]] PROVIDER:[TOKEN:[[id number]],FOLLOWUP:[1 week]],PROVIDER:[TOKEN:[[id number]],FOLLOWUP:[2 weeks]]

## 2023-12-15 NOTE — DISCHARGE NOTE PROVIDER - CARE PROVIDER_API CALL
Shira Damon  Internal Medicine  [address] [place] [address]  Phone: [phone]  Fax: [phone]  Follow Up Time: 1 week   Shira Damon  Internal Medicine  [address] [place] [address]  Phone: [phone]  Fax: [phone]  Follow Up Time: 1 week   Shira Damon  Internal Medicine  [address] [place] [address]  Phone: [phone]  Fax: [phone]  Follow Up Time: 1 week    Lexx Muñoz  Vascular Surgery  [address] [place] [address], [address]  [place] [address]  Phone: [phone]  Fax: [phone]  Follow Up Time: 2 weeks   Shira Damon  Internal Medicine  [address] [place] [address]  Phone: [phone]  Fax: [phone]  Follow Up Time: 1 week    Lexx Muñoz  Vascular Surgery  [address] [place] [address], [address]  [place] [address]  Phone: [phone]  Fax: [phone]  Follow Up Time: 2 weeks

## 2023-12-15 NOTE — DISCHARGE NOTE PROVIDER - HOSPITAL COURSE
59 y/o female with PMH of Hearing loss,  ESRD on HD (T/Th/Sat), HTN, HLD, DM, PVD s/p L fem bypass, and CVA presents to the ED for left lower extremity pain. Sign language is aided by son at bedside. Patient reports that over the past few days she has been having worsening pain over the left lower extremity, unrelated to ambulation, present on rest, no tingling/ numbness, weakness, signs of infection over the same extremity, no recent trauma. Patient reports as well diarrhea over the past few days, which has resolved.  Patient has not gone to the HD center for dialysis on Saturday because of these symptoms. No chest pain, shortness of breath, cough, or constitutional symptoms.  CT angio of the abdomen showed moderate to large volume ascites.  CT angio of the lower extremities showed: Right superficial femoral femoral artery proximal severe stenosis, and occlusion of 4-5 cm segment of mid vessel with reconstitution distally. Occluded left superficial femoral artery with stent.  Occluded left popliteal artery. Occluded left posterior tibial artery.      59 y/o female with PMH of Hearing loss,  ESRD on HD (T/Th/Sat), HTN, HLD, DM, PVD s/p L fem bypass, and CVA presents to the ED for left lower extremity pain.    # Left lower extremity pain  # H/o PVD s/p left femoral bypass  -Started a few days ago, worsening, unrelated to ambulation, no trauma, or evidence of infection/ inflammation  -CT angio of lower extremities showed occluded superficial femoral artery with stent, occluded popliteal  and posterior tibial arteries  -No evidence of acute occlusion or compartment syndrome  -Vascular consulted: Outpatient follow up   - Duplex bilateral lower extremities negative  - On aspirin and atorvastatin 80 mg qday  --Dc gabapentin due to asterixis- switch to Nortriptyline    #Nausea/vomiting  - possible viral gastroenteritis    - CT abd/pelvis- no acute GI pathology  -Zofran with meals  -ppi  -advanced to nephro diet- tolerating well  resolved     #Ascites   -s/p paracentesis    #vaginal bleed  - Patient last menstrual period reportedly at 45  - Pelvic ultrasound-Thickened endometrium measuring 7 mm  -Endometrial biopsy performed-f/u tissue pathology- inconclusive  -pt needs [facility]-3 weeks for possible repeat embx vs. EUA with D&C hysteroscopy    # Troponinemia   -Patient does not have chest pain and EKG does not show any ischemic ST segment changes, not meeting criteria of ACS    # ESRD  #Asterixis  -Patient missed HD session on Saturday  -CT of the abdomen shows moderate volume ascites  -On sevelamer with meals  -HD tomorrow due to rapid response AM- Dc all opiods   -Asterixis improving post HD      # HTN  -C/w home antihypertensives  -BP decreased after resuming the medications  -Better control after HD session      # H/o CVA  -aspirin and atorvastatin               57 y/o female with PMH of Hearing loss,  ESRD on HD (T/Th/Sat), HTN, HLD, DM, PVD s/p L fem bypass, and CVA presents to the ED for left lower extremity pain. Sign language is aided by son at bedside. Patient reports that over the past few days she has been having worsening pain over the left lower extremity, unrelated to ambulation, present on rest, no tingling/ numbness, weakness, signs of infection over the same extremity, no recent trauma. Patient reports as well diarrhea over the past few days, which has resolved.  Patient has not gone to the HD center for dialysis on Saturday because of these symptoms. No chest pain, shortness of breath, cough, or constitutional symptoms.  CT angio of the abdomen showed moderate to large volume ascites.  CT angio of the lower extremities showed: Right superficial femoral femoral artery proximal severe stenosis, and occlusion of 4-5 cm segment of mid vessel with reconstitution distally. Occluded left superficial femoral artery with stent.  Occluded left popliteal artery. Occluded left posterior tibial artery.      57 y/o female with PMH of Hearing loss,  ESRD on HD (T/Th/Sat), HTN, HLD, DM, PVD s/p L fem bypass, and CVA presents to the ED for left lower extremity pain.    # Left lower extremity pain  # H/o PVD s/p left femoral bypass  -Started a few days ago, worsening, unrelated to ambulation, no trauma, or evidence of infection/ inflammation  -CT angio of lower extremities showed occluded superficial femoral artery with stent, occluded popliteal  and posterior tibial arteries  -No evidence of acute occlusion or compartment syndrome  -Vascular consulted: Outpatient follow up   - Duplex bilateral lower extremities negative  - On aspirin and atorvastatin 80 mg qday  --Dc gabapentin due to asterixis- switch to Nortriptyline    #Nausea/vomiting  - possible viral gastroenteritis    - CT abd/pelvis- no acute GI pathology  -Zofran with meals  -ppi  -advanced to nephro diet- tolerating well  resolved     #Ascites   -s/p paracentesis    #vaginal bleed  - Patient last menstrual period reportedly at 45  - Pelvic ultrasound-Thickened endometrium measuring 7 mm  -Endometrial biopsy performed-f/u tissue pathology- inconclusive  -pt needs [facility]YN follow up at [facility] in 2-3 weeks for possible repeat embx vs. EUA with D&C hysteroscopy    # Troponinemia   -Patient does not have chest pain and EKG does not show any ischemic ST segment changes, not meeting criteria of ACS    # ESRD  #Asterixis  -Patient missed HD session on Saturday  -CT of the abdomen shows moderate volume ascites  -On sevelamer with meals  -HD tomorrow due to rapid response AM- Dc all opiods   -Asterixis improving post HD      # HTN  -C/w home antihypertensives  -BP decreased after resuming the medications  -Better control after HD session      # H/o CVA  -aspirin and atorvastatin               59 y/o female with PMH of Hearing loss,  ESRD on HD (T/Th/Sat), HTN, HLD, DM, PVD s/p L fem bypass, and CVA presents to the ED for left lower extremity pain. Sign language is aided by son at bedside. Patient reports that over the past few days she has been having worsening pain over the left lower extremity, unrelated to ambulation, present on rest, no tingling/ numbness, weakness, signs of infection over the same extremity, no recent trauma. Patient reports as well diarrhea over the past few days, which has resolved.  Patient has not gone to the HD center for dialysis on Saturday because of these symptoms. No chest pain, shortness of breath, cough, or constitutional symptoms.  CT angio of the abdomen showed moderate to large volume ascites.  CT angio of the lower extremities showed: Right superficial femoral femoral artery proximal severe stenosis, and occlusion of 4-5 cm segment of mid vessel with reconstitution distally. Occluded left superficial femoral artery with stent.  Occluded left popliteal artery. Occluded left posterior tibial artery.      59 y/o female with PMH of Hearing loss,  ESRD on HD (T/Th/Sat), HTN, HLD, DM, PVD s/p L fem bypass, and CVA presents to the ED for left lower extremity pain.    # Left lower extremity pain  # H/o PVD s/p left femoral bypass  -Started a few days ago, worsening, unrelated to ambulation, no trauma, or evidence of infection/ inflammation  -CT angio of lower extremities showed occluded superficial femoral artery with stent, occluded popliteal  and posterior tibial arteries  -No evidence of acute occlusion or compartment syndrome  -Vascular consulted: Outpatient follow up Dr Christianson  - Duplex bilateral lower extremities negative  - On aspirin and atorvastatin 80 mg qday  --Dc gabapentin due to asterixis- switch to Nortriptyline    #Nausea/vomiting  - possible viral gastroenteritis    - CT abd/pelvis- no acute GI pathology  -Zofran with meals  -ppi  -advanced to nephro diet- tolerating well  resolved     #Ascites   -s/p paracentesis    #vaginal bleed  - Patient last menstrual period reportedly at 45  - Pelvic ultrasound-Thickened endometrium measuring 7 mm  -Endometrial biopsy performed-f/u tissue pathology- inconclusive  -pt needs [facility]YN follow [facility] at [facility] in 2-3 weeks for possible repeat embx vs. EUA with D&C hysteroscopy    # Troponinemia   -Patient does not have chest pain and EKG does not show any ischemic ST segment changes, not meeting criteria of ACS    # ESRD  #Asterixis  -Patient missed HD session on Saturday  -CT of the abdomen shows moderate volume ascites  -On sevelamer with meals  -HD tomorrow due to rapid response AM- Dc all opiods   -Asterixis improving post HD      # HTN  -C/w home antihypertensives  -BP decreased after resuming the medications  -Better control after HD session      # H/o CVA  -aspirin and atorvastatin

## 2023-12-15 NOTE — DISCHARGE NOTE NURSING/CASE MANAGEMENT/SOCIAL WORK - PATIENT PORTAL LINK FT
You can access the FollowMyHealth Patient Portal offered by [facility] by registering at the following website: [email or web address]. By joining "[employer]’s FollowMyHealth portal, you will also be able to view your health information using other applications (apps) compatible with our system. You can access the FollowMyHealth Patient Portal offered by [facility] by registering at the following website: [email or web address]. By joining [employer]’s FollowMyHealth portal, you will also be able to view your health information using other applications (apps) compatible with our system.

## 2023-12-15 NOTE — PROGRESS NOTE ADULT - ASSESSMENT
57 y/o female with PMH of Hearing loss,  ESRD on HD (T/Th/Sat), HTN, HLD, DM, PVD s/p L fem bypass, and CVA presents to the ED for left lower extremity pain.    # ESRD on HD T TH Sat outpatient  # Severe PVD and occluded stents   # Hypervolemia / abdominal ascites  # Uncontrolled HTN  # Thrombocytopenia     - HD today 3h opti 160 UF 2l   - appreciate vascular sx f/u   - BP  if remains elevated / increase hydralazine to 100   - follow platelets  count / check HIT   - check IP, on sevelamer, renal diet  - hgb at goal, no need for HANNAH    will follow    59 y/o female with PMH of Hearing loss,  ESRD on HD (T/Th/Sat), HTN, HLD, DM, PVD s/p L fem bypass, and CVA presents to the ED for left lower extremity pain.    # ESRD on HD T TH Sat outpatient  # Severe PVD and occluded stents   # Hypervolemia / abdominal ascites  # Uncontrolled HTN  # Thrombocytopenia     - HD today 3h opti 160 UF 2l   - appreciate vascular sx f/u   - BP  if remains elevated / increase hydralazine to 100   - follow platelets  count / check HIT   - check IP, on sevelamer, renal diet  - hgb at goal, no need for HANNAH    will follow

## 2023-12-15 NOTE — CHART NOTE - NSCHARTNOTEFT_GEN_A_CORE
PGY 2 Note    Pt seen at bedside with  Jasmina to discuss results of Embx.    Discussed that EMbx inconclusive given limited amount of tissue collected. No abnormalities seen, however unable to say with certainty that biopsy completely normal. Discussed with patient and brother-in-law that future testing should be done in a non-emergent manner. Pt reports she has only two small spots of bleeding yesterday, otherwise denies additional vaginal bleeding, denies heavy vaginal bleeding.     Pt should follow up at [facility] in the next month after discharge for possible repeat embx vs. EUA with D&C hysteroscopy. We will assist patient in setting up appointment.     Specimen(s) Submitted   Endometrial biopsy   Final Diagnosis   Endometrium, biopsy:   - Scanty material consisting of endometrial surface epithelium and   endocervical tissue admixed with mucoid material.   - Tissue insufficient for proper evaluation.   Verified by: Katerina Pablo M.D.   (Electronic Signature)       d/w Dr. Alfonso and Dr. Higginbotham PGY 2 Note    Pt seen at bedside with  Jasmina to discuss results of Embx.    Discussed that EMbx inconclusive given limited amount of tissue collected. No abnormalities seen, however unable to say with certainty that biopsy completely normal. Discussed with patient and brother-in-law that future testing should be done in a non-emergent manner. Pt reports she has only two small spots of bleeding yesterday, otherwise denies additional vaginal bleeding, denies heavy vaginal bleeding.     Pt should follow up at [facility] in the next month after discharge for possible repeat embx vs. EUA with D&C hysteroscopy. We will assist patient in setting up appointment.     Specimen(s) Submitted   Endometrial biopsy   Final Diagnosis   Endometrium, biopsy:   - Scanty material consisting of endometrial surface epithelium and   endocervical tissue admixed with mucoid material.   - Tissue insufficient for proper evaluation.   Verified by: Katerina Pablo M.D.   (Electronic Signature)       d/w Dr. Alfonso and Dr. Higginbotham

## 2023-12-18 ENCOUNTER — NON-APPOINTMENT (OUTPATIENT)
Age: 58
End: 2023-12-18

## 2023-12-19 LAB
CULTURE RESULTS: SIGNIFICANT CHANGE UP
CULTURE RESULTS: SIGNIFICANT CHANGE UP
SPECIMEN SOURCE: SIGNIFICANT CHANGE UP
SPECIMEN SOURCE: SIGNIFICANT CHANGE UP

## 2023-12-22 DIAGNOSIS — N18.6 END STAGE RENAL DISEASE: ICD-10-CM

## 2023-12-22 DIAGNOSIS — T40.2X5A ADVERSE EFFECT OF OTHER OPIOIDS, INITIAL ENCOUNTER: ICD-10-CM

## 2023-12-22 DIAGNOSIS — T82.856A STENOSIS OF PERIPHERAL VASCULAR STENT, INITIAL ENCOUNTER: ICD-10-CM

## 2023-12-22 DIAGNOSIS — A08.4 VIRAL INTESTINAL INFECTION, UNSPECIFIED: ICD-10-CM

## 2023-12-22 DIAGNOSIS — Z79.82 LONG TERM (CURRENT) USE OF ASPIRIN: ICD-10-CM

## 2023-12-22 DIAGNOSIS — I77.1 STRICTURE OF ARTERY: ICD-10-CM

## 2023-12-22 DIAGNOSIS — J90 PLEURAL EFFUSION, NOT ELSEWHERE CLASSIFIED: ICD-10-CM

## 2023-12-22 DIAGNOSIS — D69.6 THROMBOCYTOPENIA, UNSPECIFIED: ICD-10-CM

## 2023-12-22 DIAGNOSIS — Z88.8 ALLERGY STATUS TO OTHER DRUGS, MEDICAMENTS AND BIOLOGICAL SUBSTANCES: ICD-10-CM

## 2023-12-22 DIAGNOSIS — Z99.2 DEPENDENCE ON RENAL DIALYSIS: ICD-10-CM

## 2023-12-22 DIAGNOSIS — F79 UNSPECIFIED INTELLECTUAL DISABILITIES: ICD-10-CM

## 2023-12-22 DIAGNOSIS — N93.9 ABNORMAL UTERINE AND VAGINAL BLEEDING, UNSPECIFIED: ICD-10-CM

## 2023-12-22 DIAGNOSIS — E11.51 TYPE 2 DIABETES MELLITUS WITH DIABETIC PERIPHERAL ANGIOPATHY WITHOUT GANGRENE: ICD-10-CM

## 2023-12-22 DIAGNOSIS — R18.8 OTHER ASCITES: ICD-10-CM

## 2023-12-22 DIAGNOSIS — Z28.310 UNVACCINATED FOR COVID-19: ICD-10-CM

## 2023-12-22 DIAGNOSIS — E11.22 TYPE 2 DIABETES MELLITUS WITH DIABETIC CHRONIC KIDNEY DISEASE: ICD-10-CM

## 2023-12-22 DIAGNOSIS — E87.70 FLUID OVERLOAD, UNSPECIFIED: ICD-10-CM
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
## 2023-12-22 DIAGNOSIS — R11.2 NAUSEA WITH VOMITING, UNSPECIFIED: ICD-10-CM

## 2023-12-22 DIAGNOSIS — H91.90 UNSPECIFIED HEARING LOSS, UNSPECIFIED EAR: ICD-10-CM

## 2023-12-22 DIAGNOSIS — I12.0 HYPERTENSIVE CHRONIC KIDNEY DISEASE WITH STAGE 5 CHRONIC KIDNEY DISEASE OR END STAGE RENAL DISEASE: ICD-10-CM

## 2023-12-22 DIAGNOSIS — Z86.73 PERSONAL HISTORY OF TRANSIENT ISCHEMIC ATTACK (TIA), AND CEREBRAL INFARCTION WITHOUT RESIDUAL DEFICITS: ICD-10-CM

## 2023-12-22 DIAGNOSIS — E78.5 HYPERLIPIDEMIA, UNSPECIFIED: ICD-10-CM

## 2023-12-22 DIAGNOSIS — Z79.4 LONG TERM (CURRENT) USE OF INSULIN: ICD-10-CM

## 2023-12-22 DIAGNOSIS — R41.82 ALTERED MENTAL STATUS, UNSPECIFIED: ICD-10-CM

## 2023-12-22 DIAGNOSIS — Z88.0 ALLERGY STATUS TO PENICILLIN: ICD-10-CM

## 2023-12-22 DIAGNOSIS — E11.649 TYPE 2 DIABETES MELLITUS WITH HYPOGLYCEMIA WITHOUT COMA: ICD-10-CM

## 2023-12-26 ENCOUNTER — APPOINTMENT (OUTPATIENT)
Dept: VASCULAR SURGERY | Facility: CLINIC | Age: 58
End: 2023-12-26
Payer: COMMERCIAL

## 2023-12-26 VITALS
HEIGHT: 60 IN | SYSTOLIC BLOOD PRESSURE: 141 MMHG | DIASTOLIC BLOOD PRESSURE: 78 MMHG | BODY MASS INDEX: 29.84 KG/M2 | WEIGHT: 152 LBS

## 2023-12-26 DIAGNOSIS — I65.23 OCCLUSION AND STENOSIS OF BILATERAL CAROTID ARTERIES: ICD-10-CM

## 2023-12-26 PROCEDURE — 93880 EXTRACRANIAL BILAT STUDY: CPT

## 2023-12-26 PROCEDURE — 99212 OFFICE O/P EST SF 10 MIN: CPT

## 2023-12-26 NOTE — HISTORY OF PRESENT ILLNESS
[FreeTextEntry1] : 57 y/o female with h/o stroke, with left heel gangrene and left SFA occlusion, underwent left femoral to AT reverse saphenous vein bypass on 11/8/21.  She has h/o ESRD on HD, underwent left brachial artery to axillary vein AV graft placement on 6/22/22. She has right SFA occlusion.  She underwent angioplasty of the LLE bypass graft in September 2023, presents today for carotid duplex.

## 2023-12-26 NOTE — DATA REVIEWED
[FreeTextEntry1] : I performed a carotid duplex which was medically necessary to evaluate for carotid stenosis. It showed <50% carotid artery stenosis bilaterally.

## 2023-12-26 NOTE — ASSESSMENT
[FreeTextEntry1] : 59 y/o female with h/o stroke, with left heel gangrene and left SFA occlusion, underwent left femoral to AT reverse saphenous vein bypass on 11/8/21. She has h/o ESRD on HD, underwent left brachial artery to axillary vein AV graft placement on 6/22/22. She has right SFA occlusion. She underwent angioplasty of the LLE bypass graft in September 2023, presents today for carotid duplex.  Carotid duplex showed <50% carotid artery stenosis bilaterally.  She is travelling to [place] and will receive HD there. LUE AV graft is working well and can be accessed for HD in [place].   I, Dr. Lexx Muñoz, personally performed the evaluation and management (E/M) services for this established patient who presents today with (a) new problem(s)/exacerbation of (an) existing condition(s). That E/M includes conducting the clinically appropriate interval history &/or exam, assessing all new/exacerbated conditions, and establishing a new plan of care. Today, my BROWN, Rebekah Brewster PA-C, was here to observe my evaluation and management service for this new problem/exacerbated condition and follow the plan of care established by me going forward.

## 2023-12-27 ENCOUNTER — APPOINTMENT (OUTPATIENT)
Dept: INTERNAL MEDICINE | Facility: CLINIC | Age: 58
End: 2023-12-27

## 2023-12-29 ENCOUNTER — APPOINTMENT (OUTPATIENT)
Dept: GASTROENTEROLOGY | Facility: CLINIC | Age: 58
End: 2023-12-29

## 2024-01-18 ENCOUNTER — APPOINTMENT (OUTPATIENT)
Dept: OBGYN | Facility: CLINIC | Age: 59
End: 2024-01-18

## 2024-01-30 NOTE — DISCHARGE NOTE PROVIDER - NSDCCPGOAL_GEN_ALL_CORE_FT
Post-Care Instructions: I reviewed with the patient in detail post-care instructions. Patient is to wear sunprotection, and avoid picking at any of the treated lesions. Pt may apply Vaseline to crusted or scabbing areas. Render Note In Bullet Format When Appropriate: No To get better and follow your care plan as instructed. Show Aperture Variable?: Yes Consent: The patient's consent was obtained including but not limited to risks of crusting, scabbing, blistering, scarring, darker or lighter pigmentary change, recurrence, incomplete removal and infection. Duration Of Freeze Thaw-Cycle (Seconds): 0 Number Of Freeze-Thaw Cycles: 1 freeze-thaw cycle Detail Level: Simple

## 2024-02-01 ENCOUNTER — APPOINTMENT (OUTPATIENT)
Dept: INTERNAL MEDICINE | Facility: CLINIC | Age: 59
End: 2024-02-01

## 2024-02-08 ENCOUNTER — APPOINTMENT (OUTPATIENT)
Dept: INTERNAL MEDICINE | Facility: CLINIC | Age: 59
End: 2024-02-08

## 2024-02-26 ENCOUNTER — APPOINTMENT (OUTPATIENT)
Dept: PODIATRY | Facility: CLINIC | Age: 59
End: 2024-02-26

## 2024-04-08 ENCOUNTER — APPOINTMENT (OUTPATIENT)
Age: 59
End: 2024-04-08

## 2024-06-25 ENCOUNTER — APPOINTMENT (OUTPATIENT)
Dept: VASCULAR SURGERY | Facility: CLINIC | Age: 59
End: 2024-06-25

## 2024-07-24 ENCOUNTER — APPOINTMENT (OUTPATIENT)
Dept: GASTROENTEROLOGY | Facility: CLINIC | Age: 59
End: 2024-07-24

## 2024-08-27 ENCOUNTER — APPOINTMENT (OUTPATIENT)
Dept: NEUROLOGY | Facility: CLINIC | Age: 59
End: 2024-08-27

## 2024-09-06 NOTE — H&P ADULT - NSHPSOURCEINFORD_GEN_ALL_CORE
Airway    Performed by: Maria Del Carmen Hunter, CRNA  Authorized by: Amber Clark MD    Final Airway Type:  Endotracheal airway  Final Endotracheal Airway*:  ETT  ETT Size (mm)*:  7.0  Cuff*:  Regular  Technique Used for Successful ETT Placement:  Video laryngoscopy  Devices/Methods Used in Placement*:  Mask and Oral ETT  Intubation Procedure*:  ETCO2, Preoxygenation, Atraumatic, Dentition Unchanged, Pharynx Clear, Rapid Sequence Intubation and Cricoid Pressure  Insertion Site:  Oral  Blade Type*:  Video Laryngoscope  Blade Size*:  3  Secured at (cm)*:  21  Placement Verified by: auscultation, capnometry and palpation of cuff    Glottic View*:  1 - full view of glottis  Attempts*:  1   Patient Identified, Procedure confirmed, Emergency equipment available and Safety protocols followed  Location:  OR  Urgency:  Elective  Difficult Airway: No    Indications for Airway Management:  Anesthesia  Spontaneous Ventilation: absent    Sedation Level:  Deep  Mask Difficulty Assessment:  0 - not attempted  Start Time: 9/6/2024 12:54 PM       Chart(s)/Other Family Member

## 2024-09-20 NOTE — PATIENT PROFILE ADULT - WAS YOUR LAST COVID-19 VACCINE GREATER THAN OR EQUAL TO TWO MONTHS AGO?
[Time Spent: ___ minutes] : I have spent [unfilled] minutes of time on the encounter which excludes teaching and separately reported services.
Unknown

## 2024-09-27 NOTE — ED PROVIDER NOTE - NS ED MD DISPO DIVISION
Detail Level: Detailed
Add 1585x Cpt? (Do Not Bill If You Billed For The Procedure Placing The Sutures. This Is An Add-On Code That Must Be Billed With An E/M Visit Code): No
Suture Removal Completed By (Optional): SANJANA Black
[facility]

## 2024-09-28 NOTE — DISCHARGE NOTE NURSING/CASE MANAGEMENT/SOCIAL WORK - NSSCCONTNUM_GEN_ALL_CORE
Received second clinic page from patient. She is traveling in [place] and does not have her inhalers. Per my last note, I attempted to send her inhalers to her at a pharmacy in [place]. I provided strict return precautions and recommendations to present to the emergency department is she developed shortness of breath in the interim prior to being able to fill her inhalers.     Called patient to discuss ongoing concerns. She states she was able to fill her albuterol and used this. She was not able to fill Spiriva as it would cost over $700 out of pocket.     Despite use of albuterol, the patient states she is having wheezing and shortness of breath. She used a home oximeter which is giving her reads of 85-89% O2. While the patient sounds well on the phone, I did advise she present to an urgent care or emergency room if her oxygen levels remain this low. She is agreeable to this plan and was able to repeat plan back to me.     Natasha Crain MD PGY-3  [facility]       [phone]

## 2024-10-02 NOTE — H&P ADULT - NSICDXPASTMEDICALHX_GEN_ALL_CORE_FT
PAST MEDICAL HISTORY:  Benign essential HTN     Chronic kidney disease, unspecified CKD stage     Hearing impaired     HLD (hyperlipidemia)     Intellectual disability     PVD (peripheral vascular disease)     
6

## 2024-10-07 NOTE — ED ADULT NURSE REASSESSMENT NOTE - NS ED NURSE REASSESS COMMENT FT1
Received report from prior RN, Pt is awake and alert, Pt is deaf Brother is at bed side help translate. pt went to vascular.   fall precaution maintained, bed to lowest position.
generalized myalgia

## 2025-02-03 NOTE — CHART NOTE - NSCHARTNOTESELECT_GEN_ALL_CORE
Patient/Family Update/Event Note
Podiatry/Event Note
Transfer Note
Vascular Sx
Event Note
PRBC/Event Note
Post-Op Vascular Sx
PreOp
Transfer Note
Vascular surgery -/Event Note
post op check/Event Note
no

## 2025-03-18 NOTE — DISCHARGE NOTE NURSING/CASE MANAGEMENT/SOCIAL WORK - MODE OF TRANSPORTATION
PRINCIPAL PROCEDURE  Procedure: Robot-assisted laparoscopic supracervical hysterectomy with sacrocolpopexy  Findings and Treatment:       SECONDARY PROCEDURE  Procedure: Repair of umbilical hernia with lipectomy  Findings and Treatment:     Procedure: Posterior repair of vagina  Findings and Treatment:     Procedure: Creation of midurethral sling with cystoscopy  Findings and Treatment:     Procedure: Robot-assisted bilateral salpingectomy  Findings and Treatment:      Ambulatory

## 2025-03-26 NOTE — DISCHARGE NOTE NURSING/CASE MANAGEMENT/SOCIAL WORK - NSDCPEPT PROEDMA_GEN_ALL_CORE
I reviewed the H&P, I examined the patient, and there are no changes in the patient's condition.    
Yes

## 2025-05-20 NOTE — ED PROVIDER NOTE - ATTENDING CONTRIBUTION TO CARE
unable to assess- pt intubated/sedated
55 yo f with pmh of htn, hld, pvd, smoker, cva, dm, mild MR, deaf, sent by [facility] for placement.  pt was discharged yesterday from hospital after a L fem-pop bypass and L heel dfu debridement.  as per brother in law, he was under impression that they were arranging for 24 hr care for pt at home.  he found out today that [facility] was arranged only for wound care.  brother in law says he is unable to care for her 24 hrs as he needs to go to work on monday.  no changes in pt's status since discharge.  exam: nad, ncat, perrl, eomi, mmm, rrr, ctab, abd soft, nt, nd, L fem pop bypass wound dressing, L foot dfu dressing imp: pt with inability to do her ADLs, will need placement.

## 2025-07-11 NOTE — ED ADULT NURSE NOTE - PRO INTERPRETER NEED 2
Sign Language Comment: Acne fu - clear with pih (brother did a course of accutane) Patient currently tretinoin 0.025% cream QHS as tolerated and is maintaining well. Patient reports stopping BP wash as it was drying, since patient does not have inflammatory lesions informed he can just use cerave gentle cleanser. Emphasized moisturizing cream and sunscreen application daily. Patient will continue current regimen.\\nFU yearly. Render Risk Assessment In Note?: no Detail Level: Simple